# Patient Record
Sex: FEMALE | Race: BLACK OR AFRICAN AMERICAN | NOT HISPANIC OR LATINO | Employment: FULL TIME | URBAN - METROPOLITAN AREA
[De-identification: names, ages, dates, MRNs, and addresses within clinical notes are randomized per-mention and may not be internally consistent; named-entity substitution may affect disease eponyms.]

---

## 2017-01-18 NOTE — TELEPHONE ENCOUNTER
Patient contacted and states she is just not feeling well, she came in to see Dr. Mcfadden a couple weeks ago and was given antibiotics, but the patient states she is still not feeling well.

## 2017-01-18 NOTE — TELEPHONE ENCOUNTER
----- Message from Roseline Harley sent at 1/18/2017  2:25 PM CST -----  Contact: Patient   Patient request a call back at [phone], Regards to she still feel the same and she is done with all of her antibiotics that she was taken for an sinus infection.    Thanks  td

## 2017-01-19 RX ORDER — AMOXICILLIN AND CLAVULANATE POTASSIUM 875; 125 MG/1; MG/1
1 TABLET, FILM COATED ORAL 2 TIMES DAILY
Qty: 20 TABLET | Refills: 0 | Status: SHIPPED | OUTPATIENT
Start: 2017-01-19 | End: 2017-01-30 | Stop reason: ALTCHOICE

## 2017-01-19 RX ORDER — METHYLPREDNISOLONE 4 MG/1
TABLET ORAL
Qty: 1 PACKAGE | Refills: 0 | Status: SHIPPED | OUTPATIENT
Start: 2017-01-19 | End: 2017-01-25

## 2017-01-19 NOTE — TELEPHONE ENCOUNTER
Pt notified and verbalized understanding.   Pt states that she would liked to try the Augmentin with the medrol dose pack.

## 2017-01-19 NOTE — TELEPHONE ENCOUNTER
Advise pt can try another round of ABX (Augmentin) with Medrol dose pack to see if gets better.  Let me know.

## 2017-01-25 ENCOUNTER — OFFICE VISIT (OUTPATIENT)
Dept: FAMILY MEDICINE | Facility: CLINIC | Age: 57
End: 2017-01-25
Payer: COMMERCIAL

## 2017-01-25 VITALS
WEIGHT: 185.63 LBS | BODY MASS INDEX: 34.16 KG/M2 | SYSTOLIC BLOOD PRESSURE: 128 MMHG | OXYGEN SATURATION: 98 % | DIASTOLIC BLOOD PRESSURE: 74 MMHG | HEART RATE: 75 BPM | TEMPERATURE: 97 F | RESPIRATION RATE: 18 BRPM | HEIGHT: 62 IN

## 2017-01-25 DIAGNOSIS — M54.50 ACUTE RIGHT-SIDED LOW BACK PAIN WITHOUT SCIATICA: Primary | ICD-10-CM

## 2017-01-25 DIAGNOSIS — J01.91 ACUTE RECURRENT SINUSITIS, UNSPECIFIED LOCATION: ICD-10-CM

## 2017-01-25 PROCEDURE — 3078F DIAST BP <80 MM HG: CPT | Mod: S$GLB,,, | Performed by: INTERNAL MEDICINE

## 2017-01-25 PROCEDURE — 99213 OFFICE O/P EST LOW 20 MIN: CPT | Mod: S$GLB,,, | Performed by: INTERNAL MEDICINE

## 2017-01-25 PROCEDURE — 3074F SYST BP LT 130 MM HG: CPT | Mod: S$GLB,,, | Performed by: INTERNAL MEDICINE

## 2017-01-25 PROCEDURE — 1159F MED LIST DOCD IN RCRD: CPT | Mod: S$GLB,,, | Performed by: INTERNAL MEDICINE

## 2017-01-25 PROCEDURE — 99999 PR PBB SHADOW E&M-EST. PATIENT-LVL IV: CPT | Mod: PBBFAC,,, | Performed by: INTERNAL MEDICINE

## 2017-01-25 RX ORDER — HYDROCODONE BITARTRATE AND ACETAMINOPHEN 7.5; 325 MG/1; MG/1
1 TABLET ORAL 2 TIMES DAILY PRN
Qty: 20 TABLET | Refills: 0 | Status: SHIPPED | OUTPATIENT
Start: 2017-01-25 | End: 2017-06-12 | Stop reason: ALTCHOICE

## 2017-01-25 RX ORDER — MOMETASONE FUROATE 50 UG/1
2 SPRAY, METERED NASAL DAILY
Qty: 17 G | Refills: 0 | Status: SHIPPED | OUTPATIENT
Start: 2017-01-25 | End: 2017-04-05 | Stop reason: SDUPTHER

## 2017-01-25 RX ORDER — CYCLOBENZAPRINE HCL 10 MG
10 TABLET ORAL NIGHTLY PRN
Qty: 30 TABLET | Refills: 0 | Status: SHIPPED | OUTPATIENT
Start: 2017-01-25 | End: 2017-06-12

## 2017-01-25 NOTE — MR AVS SNAPSHOT
[facility]  [address] [address]  Phone: [phone]                  Angelica Mcfadden   2017 1:00 PM   Office Visit    Description:  Female : 1960   Provider:  Delon Brown MD   Department:  [facility]           Reason for Visit     Back Pain           Diagnoses this Visit        Comments    Acute right-sided low back pain without sciatica    -  Primary     Acute recurrent sinusitis, unspecified location                To Do List           Future Appointments        Provider Department Dept Phone    2017 7:45 AM Monet Alvarez MD [facility] [phone]      Goals (5 Years of Data)     None      Follow-Up and Disposition     Return if symptoms worsen or fail to improve.       These Medications        Disp Refills Start End    cyclobenzaprine (FLEXERIL) 10 MG tablet 30 tablet 0 2017     Take 1 tablet (10 mg total) by mouth nightly as needed for Muscle spasms. - Oral    Pharmacy: [facility] [address] [place] [address] Ph #: [phone]       hydrocodone-acetaminophen 7.5-325mg (NORCO) 7.5-325 mg per tablet 20 tablet 0 2017     Take 1 tablet by mouth 2 (two) times daily as needed for Pain. - Oral    Pharmacy: [facility] [address] [place] [address] Ph #: [phone]       mometasone (NASONEX) 50 mcg/actuation nasal spray 17 g 0 2017     2 sprays by Nasal route once daily. - Nasal    Pharmacy: [facility] [address] [place], [place] [address] [facility] Ph #: [phone]         [facility] On Call     [facility] On Call Nurse Care Line -  Assistance  Registered nurses in the [facility] On Call Center provide clinical advisement, health education, appointment booking, and other advisory services.  Call for this free service at [phone].             Medications           Message regarding Medications      Verify the changes and/or additions to your medication regime listed below are the same as discussed with your clinician today.  If any of these changes or additions are incorrect, please notify your healthcare provider.        START taking these NEW medications        Refills    cyclobenzaprine (FLEXERIL) 10 MG tablet 0    Sig: Take 1 tablet (10 mg total) by mouth nightly as needed for Muscle spasms.    Class: Normal    Route: Oral    hydrocodone-acetaminophen 7.5-325mg (NORCO) 7.5-325 mg per tablet 0    Sig: Take 1 tablet by mouth 2 (two) times daily as needed for Pain.    Class: Normal    Route: Oral      STOP taking these medications     methylPREDNISolone (MEDROL DOSEPACK) 4 mg tablet use as directed           Verify that the below list of medications is an accurate representation of the medications you are currently taking.  If none reported, the list may be blank. If incorrect, please contact your healthcare provider. Carry this list with you in case of emergency.           Current Medications     albuterol 90 mcg/actuation inhaler Inhale 2 puffs into the lungs every 6 (six) hours as needed for Wheezing.    alprazolam (XANAX) 0.5 MG tablet 1 Tablet Oral Twice a day prn    amlodipine (NORVASC) 10 MG tablet Take 1 tablet (10 mg total) by mouth every morning.    amoxicillin-clavulanate 875-125mg (AUGMENTIN) 875-125 mg per tablet Take 1 tablet by mouth 2 (two) times daily.    ascorbic acid (VITAMIN C) 1000 MG tablet Take by mouth. 1 Tablet Oral Every day    BACILLUS COAGULANS (PROBIOTIC, B. COAGULANS, ORAL) Take by mouth once daily.    benzonatate (TESSALON) 100 MG capsule Take 1 capsule (100 mg total) by mouth 3 (three) times daily as needed for Cough.    cetirizine (ZYRTEC) 10 MG tablet Take 1 tablet (10 mg total) by mouth once daily.    cholecalciferol, vitamin D3, 2,000 unit Cap Take by mouth. 1 capsule Oral Every day    ibuprofen (ADVIL,MOTRIN) 800 MG tablet Take 1 tablet (800 mg total) by mouth 2 (two)  "times daily as needed for Pain.    lisinopril (PRINIVIL,ZESTRIL) 20 MG tablet TAKE ONE TABLET BY MOUTH ONCE DAILY    mercaptopurine (PURINETHOL) 50 mg tablet TAKE ONE TABLET BY MOUTH ONCE DAILY    mometasone (NASONEX) 50 mcg/actuation nasal spray 2 sprays by Nasal route once daily.    montelukast (SINGULAIR) 10 mg tablet Take 1 tablet (10 mg total) by mouth every evening.    multivitamin-Ca-iron-minerals (WOMEN'S MULTIPLE VITAMINS) 18-0.4 mg Tab Take by mouth. 1 Tablet Oral Every day    pravastatin (PRAVACHOL) 80 MG tablet TAKE ONE TABLET BY MOUTH ONCE DAILY IN THE EVENING    valacyclovir (VALTREX) 1000 MG tablet Take 2 tablets (2,000 mg total) by mouth 2 (two) times daily. Take 2 pills twice daily as directed    cyclobenzaprine (FLEXERIL) 10 MG tablet Take 1 tablet (10 mg total) by mouth nightly as needed for Muscle spasms.    hydrocodone-acetaminophen 7.5-325mg (NORCO) 7.5-325 mg per tablet Take 1 tablet by mouth 2 (two) times daily as needed for Pain.           Clinical Reference Information           Vital Signs - Last Recorded  Most recent update: 1/25/2017  1:05 PM by Davion Ruiz LPN    BP Pulse Temp Resp    128/74 (BP Location: Right arm, Patient Position: Sitting, BP Method: Manual) 75 97.2 °F (36.2 °C) (Tympanic) 18    Ht Wt SpO2 BMI    5' 2" (1.575 m) 84.2 kg (185 lb 10 oz) 98% 33.95 kg/m2      Blood Pressure          Most Recent Value    BP  128/74      Allergies as of 1/25/2017     No Known Allergies      Immunizations Administered on Date of Encounter - 1/25/2017     None      "

## 2017-01-25 NOTE — PROGRESS NOTES
Subjective:       Patient ID: Angelica Mcfadden is a 56 y.o. female.    Chief Complaint: Back Pain (lower right)  -1 week------------right lower back pain after helping her daughter move----------no radiation---------has been taking ibuprofen.HPI  Review of Systems   Constitutional: Negative for chills and fever.   HENT: Negative.    Respiratory: Negative for apnea, cough, choking, chest tightness, shortness of breath, wheezing and stridor.    Cardiovascular: Negative for chest pain, palpitations and leg swelling.   Gastrointestinal: Negative for abdominal pain, nausea and vomiting.   Genitourinary: Negative.    Musculoskeletal: Positive for back pain.   Psychiatric/Behavioral: Negative for agitation, behavioral problems and confusion.       Objective:      Physical Exam   Constitutional: She is oriented to person, place, and time. She appears well-developed and well-nourished.   Cardiovascular: Normal rate, regular rhythm and normal heart sounds.    Pulmonary/Chest: Effort normal and breath sounds normal.   Musculoskeletal: She exhibits no edema, tenderness or deformity.   Neurological: She is alert and oriented to person, place, and time.   Psychiatric: She has a normal mood and affect. Her behavior is normal. Judgment and thought content normal.   Nursing note and vitals reviewed.      Assessment:       1. Acute right-sided low back pain without sciatica    2. Acute recurrent sinusitis, unspecified location        Plan:        continue ibuprofen, add flexeril 10 mg q hs prn with norco 7.5 bid prn-----------call if persists.

## 2017-01-26 RX ORDER — IBUPROFEN 800 MG/1
TABLET ORAL
Qty: 180 TABLET | Refills: 1 | Status: SHIPPED | OUTPATIENT
Start: 2017-01-26 | End: 2017-06-30 | Stop reason: SDUPTHER

## 2017-01-30 ENCOUNTER — OFFICE VISIT (OUTPATIENT)
Dept: FAMILY MEDICINE | Facility: CLINIC | Age: 57
End: 2017-01-30
Payer: COMMERCIAL

## 2017-01-30 ENCOUNTER — OFFICE VISIT (OUTPATIENT)
Dept: OTOLARYNGOLOGY | Facility: CLINIC | Age: 57
End: 2017-01-30
Payer: COMMERCIAL

## 2017-01-30 ENCOUNTER — LAB VISIT (OUTPATIENT)
Dept: LAB | Facility: HOSPITAL | Age: 57
End: 2017-01-30
Attending: OTOLARYNGOLOGY
Payer: COMMERCIAL

## 2017-01-30 VITALS
DIASTOLIC BLOOD PRESSURE: 71 MMHG | BODY MASS INDEX: 33.43 KG/M2 | HEIGHT: 62 IN | SYSTOLIC BLOOD PRESSURE: 102 MMHG | HEART RATE: 78 BPM | TEMPERATURE: 99 F | WEIGHT: 181.69 LBS | RESPIRATION RATE: 18 BRPM

## 2017-01-30 VITALS
TEMPERATURE: 99 F | DIASTOLIC BLOOD PRESSURE: 82 MMHG | OXYGEN SATURATION: 97 % | SYSTOLIC BLOOD PRESSURE: 128 MMHG | HEIGHT: 62 IN | RESPIRATION RATE: 18 BRPM | WEIGHT: 181.69 LBS | HEART RATE: 112 BPM | BODY MASS INDEX: 33.43 KG/M2

## 2017-01-30 DIAGNOSIS — J30.9 ALLERGIC RHINITIS, UNSPECIFIED ALLERGIC RHINITIS TRIGGER, UNSPECIFIED RHINITIS SEASONALITY: Primary | ICD-10-CM

## 2017-01-30 DIAGNOSIS — M54.50 ACUTE BILATERAL LOW BACK PAIN WITHOUT SCIATICA: ICD-10-CM

## 2017-01-30 DIAGNOSIS — J32.4 CHRONIC PANSINUSITIS: ICD-10-CM

## 2017-01-30 DIAGNOSIS — J30.2 SEASONAL ALLERGIC RHINITIS, UNSPECIFIED ALLERGIC RHINITIS TRIGGER: ICD-10-CM

## 2017-01-30 DIAGNOSIS — J30.2 SEASONAL ALLERGIC RHINITIS, UNSPECIFIED ALLERGIC RHINITIS TRIGGER: Primary | ICD-10-CM

## 2017-01-30 LAB
BASOPHILS # BLD AUTO: 0.01 K/UL
BASOPHILS NFR BLD: 0.1 %
BILIRUB SERPL-MCNC: NEGATIVE MG/DL
BLOOD URINE, POC: NEGATIVE
COLOR, POC UA: YELLOW
DIFFERENTIAL METHOD: ABNORMAL
EOSINOPHIL # BLD AUTO: 0 K/UL
EOSINOPHIL NFR BLD: 0.1 %
ERYTHROCYTE [DISTWIDTH] IN BLOOD BY AUTOMATED COUNT: 14.7 %
GLUCOSE UR QL STRIP: NORMAL
HCT VFR BLD AUTO: 39.9 %
HGB BLD-MCNC: 13.2 G/DL
KETONES UR QL STRIP: ABNORMAL
LEUKOCYTE ESTERASE URINE, POC: ABNORMAL
LYMPHOCYTES # BLD AUTO: 1 K/UL
LYMPHOCYTES NFR BLD: 13.5 %
MCH RBC QN AUTO: 30.9 PG
MCHC RBC AUTO-ENTMCNC: 33.1 %
MCV RBC AUTO: 93 FL
MONOCYTES # BLD AUTO: 1.1 K/UL
MONOCYTES NFR BLD: 14 %
NEUTROPHILS # BLD AUTO: 5.5 K/UL
NEUTROPHILS NFR BLD: 72.2 %
NITRITE, POC UA: NEGATIVE
PH, POC UA: 5
PLATELET # BLD AUTO: 306 K/UL
PMV BLD AUTO: 11.3 FL
PROTEIN, POC: ABNORMAL
RBC # BLD AUTO: 4.27 M/UL
SPECIFIC GRAVITY, POC UA: 1.02
UROBILINOGEN, POC UA: NORMAL
WBC # BLD AUTO: 7.56 K/UL

## 2017-01-30 PROCEDURE — 99999 PR PBB SHADOW E&M-EST. PATIENT-LVL V: CPT | Mod: PBBFAC,,, | Performed by: FAMILY MEDICINE

## 2017-01-30 PROCEDURE — 3079F DIAST BP 80-89 MM HG: CPT | Mod: S$GLB,,, | Performed by: FAMILY MEDICINE

## 2017-01-30 PROCEDURE — 99244 OFF/OP CNSLTJ NEW/EST MOD 40: CPT | Mod: S$GLB,,, | Performed by: OTOLARYNGOLOGY

## 2017-01-30 PROCEDURE — 86003 ALLG SPEC IGE CRUDE XTRC EA: CPT | Mod: 59

## 2017-01-30 PROCEDURE — 36415 COLL VENOUS BLD VENIPUNCTURE: CPT | Mod: PO

## 2017-01-30 PROCEDURE — 99214 OFFICE O/P EST MOD 30 MIN: CPT | Mod: 25,S$GLB,, | Performed by: FAMILY MEDICINE

## 2017-01-30 PROCEDURE — 3074F SYST BP LT 130 MM HG: CPT | Mod: S$GLB,,, | Performed by: FAMILY MEDICINE

## 2017-01-30 PROCEDURE — 86003 ALLG SPEC IGE CRUDE XTRC EA: CPT

## 2017-01-30 PROCEDURE — 81002 URINALYSIS NONAUTO W/O SCOPE: CPT | Mod: S$GLB,,, | Performed by: FAMILY MEDICINE

## 2017-01-30 PROCEDURE — 99999 PR PBB SHADOW E&M-EST. PATIENT-LVL IV: CPT | Mod: PBBFAC,,, | Performed by: OTOLARYNGOLOGY

## 2017-01-30 PROCEDURE — 85025 COMPLETE CBC W/AUTO DIFF WBC: CPT

## 2017-01-30 RX ORDER — METRONIDAZOLE 500 MG/1
500 TABLET ORAL 3 TIMES DAILY
Qty: 30 TABLET | Refills: 0 | Status: SHIPPED | OUTPATIENT
Start: 2017-01-30 | End: 2017-02-09

## 2017-01-30 RX ORDER — LEVOFLOXACIN 500 MG/1
500 TABLET, FILM COATED ORAL DAILY
Qty: 21 TABLET | Refills: 0 | Status: SHIPPED | OUTPATIENT
Start: 2017-01-30 | End: 2017-02-20

## 2017-01-30 RX ORDER — PREDNISONE 20 MG/1
TABLET ORAL
Qty: 21 TABLET | Refills: 0 | Status: SHIPPED | OUTPATIENT
Start: 2017-01-30 | End: 2017-05-04 | Stop reason: ALTCHOICE

## 2017-01-30 NOTE — PROGRESS NOTES
Subjective:       Patient ID: Angelica Mcfadden is a 56 y.o. female.    Chief Complaint: Back Pain and Fever      HPI   Ms. Mcfadden presents to clinic today for complaints of back pain and fever. She states she had a low grade temperature yesterday of 100 and was concerned because she already was on steroids and antibiotics.   She states she just finished Augmentin and a medrol dose pack.   She states she might be having a chron's flare up. She has had diarrhea for 2 days.   The diarrhea has now resolved. She did not have any blood in her stool.   She also has been nauseated.     She reports that the back pain occurred when she tried to help her daughter move. She was seen for this on 1/25 by Dr. Brown and given pain medicine.   She feels that she still has flank pain and it may be a UTI.     She is also still having sneezing and sinus pressure.   She feels that she needs to ENT because of her recurrent sinusitis.     Review of Systems   Constitutional: Positive for fever.   HENT: Positive for sinus pressure and sneezing.    Gastrointestinal: Positive for diarrhea and nausea. Negative for blood in stool.   Genitourinary: Negative for dysuria, frequency, hematuria and urgency.   Musculoskeletal: Positive for back pain.       Medication List with Changes/Refills   Current Medications    ALBUTEROL 90 MCG/ACTUATION INHALER    Inhale 2 puffs into the lungs every 6 (six) hours as needed for Wheezing.    ALPRAZOLAM (XANAX) 0.5 MG TABLET    1 Tablet Oral Twice a day prn    AMLODIPINE (NORVASC) 10 MG TABLET    Take 1 tablet (10 mg total) by mouth every morning.    ASCORBIC ACID (VITAMIN C) 1000 MG TABLET    Take by mouth. 1 Tablet Oral Every day    BACILLUS COAGULANS (PROBIOTIC, B. COAGULANS, ORAL)    Take by mouth once daily.    BENZONATATE (TESSALON) 100 MG CAPSULE    Take 1 capsule (100 mg total) by mouth 3 (three) times daily as needed for Cough.    CETIRIZINE (ZYRTEC) 10 MG TABLET    Take 1 tablet (10 mg total) by mouth  once daily.    CHOLECALCIFEROL, VITAMIN D3, 2,000 UNIT CAP    Take by mouth. 1 capsule Oral Every day    CYCLOBENZAPRINE (FLEXERIL) 10 MG TABLET    Take 1 tablet (10 mg total) by mouth nightly as needed for Muscle spasms.    HYDROCODONE-ACETAMINOPHEN 7.5-325MG (NORCO) 7.5-325 MG PER TABLET    Take 1 tablet by mouth 2 (two) times daily as needed for Pain.    IBUPROFEN (ADVIL,MOTRIN) 800 MG TABLET    TAKE ONE TABLET BY MOUTH TWICE DAILY AS NEEDED FOR PAIN    LISINOPRIL (PRINIVIL,ZESTRIL) 20 MG TABLET    TAKE ONE TABLET BY MOUTH ONCE DAILY    MERCAPTOPURINE (PURINETHOL) 50 MG TABLET    TAKE ONE TABLET BY MOUTH ONCE DAILY    MOMETASONE (NASONEX) 50 MCG/ACTUATION NASAL SPRAY    2 sprays by Nasal route once daily.    MONTELUKAST (SINGULAIR) 10 MG TABLET    Take 1 tablet (10 mg total) by mouth every evening.    MULTIVITAMIN-CA-IRON-MINERALS (WOMEN'S MULTIPLE VITAMINS) 18-0.4 MG TAB    Take by mouth. 1 Tablet Oral Every day    PRAVASTATIN (PRAVACHOL) 80 MG TABLET    TAKE ONE TABLET BY MOUTH ONCE DAILY IN THE EVENING    VALACYCLOVIR (VALTREX) 1000 MG TABLET    Take 2 tablets (2,000 mg total) by mouth 2 (two) times daily. Take 2 pills twice daily as directed       Patient Active Problem List   Diagnosis    HTN (hypertension)    Crohn disease    Dysthymic disorder    Hyperlipidemia    Vitamin D deficiency    Insomnia    Ankle pain, right    Allergic rhinitis    Maxillary sinusitis    Obesity (BMI 30.0-34.9)         Objective:     Physical Exam   Constitutional: She is oriented to person, place, and time. She appears well-developed and well-nourished. No distress.   HENT:   Head: Normocephalic and atraumatic.   Right Ear: External ear normal.   Left Ear: External ear normal.   Turbinate hypertrophy   Eyes: EOM are normal. Right eye exhibits no discharge. Left eye exhibits no discharge.   Cardiovascular: Normal rate and regular rhythm.    Pulmonary/Chest: Effort normal and breath sounds normal. No respiratory distress.  She has no wheezes.   Musculoskeletal: She exhibits no edema or tenderness.   No cva tenderness    Neurological: She is alert and oriented to person, place, and time.   Skin: Skin is warm and dry. She is not diaphoretic. No erythema.   Psychiatric: She has a normal mood and affect.   Vitals reviewed.    Vitals:    01/30/17 1154   BP: 128/82   Pulse: (!) 112   Resp: 18   Temp: 99.2 °F (37.3 °C)       Assessment/  PLAN     Allergic rhinitis, unspecified allergic rhinitis trigger, unspecified rhinitis seasonality  -     Ambulatory referral to ENT    Acute bilateral low back pain without sciatica  -     POCT urine dipstick without microscope  -     Urine culture  - continue pain medicine and anti inflammatory as previously prescribed         Brian Mcfadden MD  [facility]

## 2017-01-30 NOTE — MR AVS SNAPSHOT
[facility]  [address] [address]  Phone: [phone]  Fax: [phone]                  Angelica Mcfadden   2017 2:45 PM   Office Visit    Description:  Female : 1960   Provider:  Desmond Mcconnell MD   Department:  [facility]           Reason for Visit     Sinus Problem           Diagnoses this Visit        Comments    Seasonal allergic rhinitis, unspecified allergic rhinitis trigger    -  Primary     Chronic pansinusitis                To Do List           Future Appointments        Provider Department Dept Phone    2017 6:00 PM LAB, SAME DAY [facility] [phone]    2017 7:30 AM [facility] CT1 LIMIT 500 LBS [facility] [phone]    2017 7:45 AM Monet Alvarez MD [facility] [phone]      Goals (5 Years of Data)     None       These Medications        Disp Refills Start End    levoFLOXacin (LEVAQUIN) 500 MG tablet 21 tablet 0 2017    Take 1 tablet (500 mg total) by mouth once daily. - Oral    Pharmacy: [facility] [address] [place] [address] Ph #: [phone]       metronidazole (FLAGYL) 500 MG tablet 30 tablet 0 2017    Take 1 tablet (500 mg total) by mouth 3 (three) times daily. - Oral    Pharmacy: [facility] [address] [place] [address] Ph #: [phone]       predniSONE (DELTASONE) 20 MG tablet 21 tablet 0 2017     Take 3 tab in am x 3d, then 2 tab in am x 3d, then 1 tab in am x 3d, then 1/2 tab in am x 3d    Pharmacy: [facility] [address] [place] [address] Ph #: [phone]         [facility]     [facility] On Call Nurse Care Line -  Assistance  Registered nurses in the [facility] On Call Center provide clinical advisement, health education, appointment booking, and other advisory services.  Call for this free service at [phone].             Medications            Message regarding Medications     Verify the changes and/or additions to your medication regime listed below are the same as discussed with your clinician today.  If any of these changes or additions are incorrect, please notify your healthcare provider.        START taking these NEW medications        Refills    levoFLOXacin (LEVAQUIN) 500 MG tablet 0    Sig: Take 1 tablet (500 mg total) by mouth once daily.    Class: Normal    Route: Oral    metronidazole (FLAGYL) 500 MG tablet 0    Sig: Take 1 tablet (500 mg total) by mouth 3 (three) times daily.    Class: Normal    Route: Oral    predniSONE (DELTASONE) 20 MG tablet 0    Sig: Take 3 tab in am x 3d, then 2 tab in am x 3d, then 1 tab in am x 3d, then 1/2 tab in am x 3d    Class: Normal           Verify that the below list of medications is an accurate representation of the medications you are currently taking.  If none reported, the list may be blank. If incorrect, please contact your healthcare provider. Carry this list with you in case of emergency.           Current Medications     albuterol 90 mcg/actuation inhaler Inhale 2 puffs into the lungs every 6 (six) hours as needed for Wheezing.    alprazolam (XANAX) 0.5 MG tablet 1 Tablet Oral Twice a day prn    amlodipine (NORVASC) 10 MG tablet Take 1 tablet (10 mg total) by mouth every morning.    ascorbic acid (VITAMIN C) 1000 MG tablet Take by mouth. 1 Tablet Oral Every day    BACILLUS COAGULANS (PROBIOTIC, B. COAGULANS, ORAL) Take by mouth once daily.    benzonatate (TESSALON) 100 MG capsule Take 1 capsule (100 mg total) by mouth 3 (three) times daily as needed for Cough.    cetirizine (ZYRTEC) 10 MG tablet Take 1 tablet (10 mg total) by mouth once daily.    cyclobenzaprine (FLEXERIL) 10 MG tablet Take 1 tablet (10 mg total) by mouth nightly as needed for Muscle spasms.    hydrocodone-acetaminophen 7.5-325mg (NORCO) 7.5-325 mg per tablet Take 1 tablet by mouth 2 (two) times daily as needed for Pain.     "ibuprofen (ADVIL,MOTRIN) 800 MG tablet TAKE ONE TABLET BY MOUTH TWICE DAILY AS NEEDED FOR PAIN    lisinopril (PRINIVIL,ZESTRIL) 20 MG tablet TAKE ONE TABLET BY MOUTH ONCE DAILY    mercaptopurine (PURINETHOL) 50 mg tablet TAKE ONE TABLET BY MOUTH ONCE DAILY    mometasone (NASONEX) 50 mcg/actuation nasal spray 2 sprays by Nasal route once daily.    montelukast (SINGULAIR) 10 mg tablet Take 1 tablet (10 mg total) by mouth every evening.    multivitamin-Ca-iron-minerals (WOMEN'S MULTIPLE VITAMINS) 18-0.4 mg Tab Take by mouth. 1 Tablet Oral Every day    pravastatin (PRAVACHOL) 80 MG tablet TAKE ONE TABLET BY MOUTH ONCE DAILY IN THE EVENING    valacyclovir (VALTREX) 1000 MG tablet Take 2 tablets (2,000 mg total) by mouth 2 (two) times daily. Take 2 pills twice daily as directed    cholecalciferol, vitamin D3, 2,000 unit Cap Take by mouth. 1 capsule Oral Every day    levoFLOXacin (LEVAQUIN) 500 MG tablet Take 1 tablet (500 mg total) by mouth once daily.    metronidazole (FLAGYL) 500 MG tablet Take 1 tablet (500 mg total) by mouth 3 (three) times daily.    predniSONE (DELTASONE) 20 MG tablet Take 3 tab in am x 3d, then 2 tab in am x 3d, then 1 tab in am x 3d, then 1/2 tab in am x 3d           Clinical Reference Information           Vital Signs - Last Recorded  Most recent update: 1/30/2017  2:36 PM by González Duron LPN    BP Pulse Temp Resp Ht Wt    102/71 78 98.8 °F (37.1 °C) (Tympanic) 18 5' 1.5" (1.562 m) 82.4 kg (181 lb 10.5 oz)    BMI                33.77 kg/m2          Blood Pressure          Most Recent Value    BP  102/71      Allergies as of 1/30/2017     No Known Allergies      Immunizations Administered on Date of Encounter - 1/30/2017     None      Orders Placed During Today's Visit     Future Labs/Procedures Expected by Expires    Allergen, Cocklebur  1/30/2017 3/31/2018    Allergen, [place]  1/30/2017 3/31/2018    Allergen, [place]ow Grass ([place])  1/30/2017 3/31/2018    Allergen, Mucor Racemosus  " 1/30/2017 3/31/2018    Allergen, Pecan [place] IgE  1/30/2017 3/31/2018    Allergen, White Panfilo  1/30/2017 3/31/2018    Allergen-Alternaria Alternata  1/30/2017 3/31/2018    Allergen-[place]  1/30/2017 3/31/2018    Allergen-Common Pigweed  1/30/2017 3/31/2018    Allergen-[place]  1/30/2017 3/31/2018    Aspergillus fumagatus IgE  1/30/2017 3/31/2018    Bermuda grass IgE  1/30/2017 3/31/2018    Cat epithelium IgE  1/30/2017 3/31/2018    CBC auto differential  1/30/2017 3/31/2018    Cladosporium IgE  1/30/2017 3/31/2018    Cockroach, American IgE  1/30/2017 3/31/2018    CT Sinuses without Contrast  1/30/2017 1/30/2018    [place], bald IgE  1/30/2017 3/31/2018    D. farinae IgE  1/30/2017 3/31/2018    D. pteronyssinus IgE  1/30/2017 3/31/2018    Dog dander IgE  1/30/2017 3/31/2018    Feather Panel #2  1/30/2017 3/31/2018    Jame grass IgE  1/30/2017 3/31/2018    Duran elder, rough IgE  1/30/2017 3/31/2018    Mugwort IgE  1/30/2017 3/31/2018    Nettle IgE  1/30/2017 3/31/2018    Bourbon, white IgE  1/30/2017 3/31/2018    Penicillium IgE  1/30/2017 3/31/2018    Plantain, English IgE  1/30/2017 3/31/2018    Ragweed, short, common IgE  1/30/2017 3/31/2018    RAST Allergen for [place]  1/30/2017 3/31/2018    RAST Allergen Maple ([place])  1/30/2017 3/31/2018    RAST Allergen [place]  1/30/2017 3/31/2018    RAST Allergen, [place]  1/30/2017 3/31/2018    RAST Allergen, Sheep [place])  1/30/2017 3/31/2018    Lucien IgE  1/30/2017 3/31/2018

## 2017-01-30 NOTE — MR AVS SNAPSHOT
[facility]  [address] [place] [address]  Phone: [phone]                  Angelica CENTENO Bogdan   2017 11:40 AM   Office Visit    Description:  Female : 1960   Provider:  Brian Mcfadden MD   Department:  [facility]           Reason for Visit     Back Pain     Fever           Diagnoses this Visit        Comments    Allergic rhinitis, unspecified allergic rhinitis trigger, unspecified rhinitis seasonality    -  Primary     Acute bilateral low back pain without sciatica                To Do List           Future Appointments        Provider Department Dept Phone    2017 2:45 PM Desmond Mcconnell MD [facility] - [phone]    2017 7:45 AM Monet Alvarez MD [facility] [phone]      Goals (5 Years of Data)     None      [facility]     [facility] -  Assistance  Registered nurses in the [facility] provide clinical advisement, health education, appointment booking, and other advisory services.  Call for this free service at [phone].             Medications           Message regarding Medications     Verify the changes and/or additions to your medication regime listed below are the same as discussed with your clinician today.  If any of these changes or additions are incorrect, please notify your healthcare provider.        STOP taking these medications     amoxicillin-clavulanate 875-125mg (AUGMENTIN) 875-125 mg per tablet Take 1 tablet by mouth 2 (two) times daily.           Verify that the below list of medications is an accurate representation of the medications you are currently taking.  If none reported, the list may be blank. If incorrect, please contact your healthcare provider. Carry this list with you in case of emergency.           Current Medications     albuterol 90 mcg/actuation inhaler Inhale 2 puffs into the lungs every 6 (six) hours as needed for Wheezing.     alprazolam (XANAX) 0.5 MG tablet 1 Tablet Oral Twice a day prn    amlodipine (NORVASC) 10 MG tablet Take 1 tablet (10 mg total) by mouth every morning.    ascorbic acid (VITAMIN C) 1000 MG tablet Take by mouth. 1 Tablet Oral Every day    BACILLUS COAGULANS (PROBIOTIC, B. COAGULANS, ORAL) Take by mouth once daily.    benzonatate (TESSALON) 100 MG capsule Take 1 capsule (100 mg total) by mouth 3 (three) times daily as needed for Cough.    cetirizine (ZYRTEC) 10 MG tablet Take 1 tablet (10 mg total) by mouth once daily.    cyclobenzaprine (FLEXERIL) 10 MG tablet Take 1 tablet (10 mg total) by mouth nightly as needed for Muscle spasms.    hydrocodone-acetaminophen 7.5-325mg (NORCO) 7.5-325 mg per tablet Take 1 tablet by mouth 2 (two) times daily as needed for Pain.    ibuprofen (ADVIL,MOTRIN) 800 MG tablet TAKE ONE TABLET BY MOUTH TWICE DAILY AS NEEDED FOR PAIN    lisinopril (PRINIVIL,ZESTRIL) 20 MG tablet TAKE ONE TABLET BY MOUTH ONCE DAILY    mercaptopurine (PURINETHOL) 50 mg tablet TAKE ONE TABLET BY MOUTH ONCE DAILY    mometasone (NASONEX) 50 mcg/actuation nasal spray 2 sprays by Nasal route once daily.    montelukast (SINGULAIR) 10 mg tablet Take 1 tablet (10 mg total) by mouth every evening.    multivitamin-Ca-iron-minerals (WOMEN'S MULTIPLE VITAMINS) 18-0.4 mg Tab Take by mouth. 1 Tablet Oral Every day    pravastatin (PRAVACHOL) 80 MG tablet TAKE ONE TABLET BY MOUTH ONCE DAILY IN THE EVENING    valacyclovir (VALTREX) 1000 MG tablet Take 2 tablets (2,000 mg total) by mouth 2 (two) times daily. Take 2 pills twice daily as directed    cholecalciferol, vitamin D3, 2,000 unit Cap Take by mouth. 1 capsule Oral Every day           Clinical Reference Information           Vital Signs - Last Recorded  Most recent update: 1/30/2017 12:00 PM by Yamile Singh MA    BP Pulse Temp Resp    128/82 (BP Location: Right arm, Patient Position: Sitting, BP Method: Manual) (!) 112 99.2 °F (37.3 °C) (Tympanic) 18    Ht Wt SpO2  "BMI    5' 1.5" (1.562 m) 82.4 kg (181 lb 10.5 oz) 97% 33.77 kg/m2      Blood Pressure          Most Recent Value    BP  128/82      Allergies as of 1/30/2017     No Known Allergies      Immunizations Administered on Date of Encounter - 1/30/2017     None      Orders Placed During Today's Visit      Normal Orders This Visit    Ambulatory referral to ENT     POCT urine dipstick without microscope       "

## 2017-01-30 NOTE — LETTER
February 2, 2017      Brian Mcfadden MD  [address]  [place] [address]           [facility]  [address]  [place] [address]  Phone: [phone]  Fax: [phone]          Patient: Angelica Mcfadden   MR Number: [id number]   YOB: 1960   Date of Visit: 1/30/2017       Dear Dr. Brian Mcfadden:    Thank you for referring Angelica Mcfadden to me for evaluation. Attached you will find relevant portions of my assessment and plan of care.    If you have questions, please do not hesitate to call me. I look forward to following Angelica Mcfadden along with you.    Sincerely,    Desmond Mcconnell MD    Enclosure  CC:  No Recipients    If you would like to receive this communication electronically, please contact [email or web address] or [phone] to request more information on [employer] Link access.    For providers and/or their staff who would like to refer a patient to [facility], please contact us through our one-stop-shop provider referral line, Paul Nunez, at [phone].    If you feel you have received this communication in error or would no longer like to receive these types of communications, please e-mail [email or web address]

## 2017-01-31 ENCOUNTER — TELEPHONE (OUTPATIENT)
Dept: FAMILY MEDICINE | Facility: CLINIC | Age: 57
End: 2017-01-31

## 2017-01-31 NOTE — TELEPHONE ENCOUNTER
Attempted to call pt to go over urine results   Tried both number and did not reach     filomena talavera

## 2017-02-01 LAB
A ALTERNATA IGE QN: <0.35 KU/L
A FUMIGATUS IGE QN: <0.35 KU/L
ALLERGEN BOXELDER MAPLE TREE IGE: <0.35 KU/L
ALLERGEN MAPLE (BOX ELDER) CLASS: NORMAL
ALLERGEN MULBERRY CLASS: NORMAL
ALLERGEN MULBERRY TREE IGE: <0.35 KU/L
ALLERGEN PENICILLIUM IGE: <0.35 KU/L
ALLERGEN WHITE ASH TREE IGE: <0.35 KU/L
AMER SYCAMORE IGE QN: <0.35 KU/L
BALD CYPRESS IGE QN: <0.35 KU/L
BERMUDA GRASS IGE QN: <0.35 KU/L
C HERBARUM IGE QN: <0.35 KU/L
CAT DANDER IGE QN: <0.35 KU/L
CEDAR IGE QN: <0.35 KU/L
CMN PIGWEED IGE QN: <0.35 KU/L
COCKLEBUR IGE QN: <0.35 KU/L
COMMON RAGWEED IGE QN: <0.35 KU/L
D FARINAE IGE QN: <0.35 KU/L
D PTERONYSS IGE QN: <0.35 KU/L
DEPRECATED A ALTERNATA IGE RAST QL: NORMAL
DEPRECATED A FUMIGATUS IGE RAST QL: NORMAL
DEPRECATED BALD CYPRESS IGE RAST QL: NORMAL
DEPRECATED BERMUDA GRASS IGE RAST QL: NORMAL
DEPRECATED C HERBARUM IGE RAST QL: NORMAL
DEPRECATED CAT DANDER IGE RAST QL: NORMAL
DEPRECATED CEDAR IGE RAST QL: NORMAL
DEPRECATED COCKLEBUR IGE RAST QL: NORMAL
DEPRECATED COMMON PIGWEED IGE RAST QL: NORMAL
DEPRECATED COMMON RAGWEED IGE RAST QL: NORMAL
DEPRECATED D FARINAE IGE RAST QL: NORMAL
DEPRECATED D PTERONYSS IGE RAST QL: NORMAL
DEPRECATED DOG DANDER IGE RAST QL: NORMAL
DEPRECATED ENGL PLANTAIN IGE RAST QL: NORMAL
DEPRECATED GOOSEFOOT IGE RAST QL: NORMAL
DEPRECATED JOHNSON GRASS IGE RAST QL: NORMAL
DEPRECATED KENT BLUE GRASS IGE RAST QL: NORMAL
DEPRECATED M RACEMOSUS IGE RAST QL: NORMAL
DEPRECATED MARSH ELDER IGE RAST QL: NORMAL
DEPRECATED MUGWORT IGE RAST QL: NORMAL
DEPRECATED NETTLE IGE RAST QL: NORMAL
DEPRECATED PECAN/HICK TREE IGE RAST QL: NORMAL
DEPRECATED ROACH IGE RAST QL: NORMAL
DEPRECATED SHEEP SORREL IGE RAST QL: NORMAL
DEPRECATED SILVER BIRCH IGE RAST QL: NORMAL
DEPRECATED TIMOTHY IGE RAST QL: NORMAL
DEPRECATED WHITE OAK IGE RAST QL: NORMAL
DOG DANDER IGE QN: <0.35 KU/L
ELM CEDAR CLASS: NORMAL
ELM CEDAR, IGE: <0.35 KU/L
ENGL PLANTAIN IGE QN: <0.35 KU/L
FEATHER PANEL #2: <0.35 KU/L
GOOSEFOOT IGE QN: <0.35 KU/L
JOHNSON GRASS IGE QN: <0.35 KU/L
KENT BLUE GRASS IGE QN: <0.35 KU/L
M RACEMOSUS IGE QN: <0.35 KU/L
MARSH ELDER IGE QN: <0.35 KU/L
MUGWORT IGE QN: <0.35 KU/L
NETTLE IGE QN: <0.35 KU/L
PECAN/HICK TREE IGE QN: <0.35 KU/L
PENICILLIUM CLASS: NORMAL
ROACH IGE QN: <0.35 KU/L
SHEEP SORREL IGE QN: <0.35 KU/L
SILVER BIRCH IGE QN: <0.35 KU/L
TIMOTHY IGE QN: <0.35 KU/L
WHITE ASH CLASS: NORMAL
WHITE OAK IGE QN: <0.35 KU/L

## 2017-02-03 NOTE — PROGRESS NOTES
Referring Provider:    Biran Mcfadden Md  [address] [address]  Subjective:   Patient: Angelica Mcfadden [id number], :1960   Visit date:2017 10:09 PM    Chief Complaint:  Sinus Problem    HPI:  Angelica is a 56 y.o. female who I was asked to see in consultation for evaluation of the following issue(s):     Sinonasal / Allergy: Angelica has bilateral mild nasal obstruction. She reports the the following sinonasal symptoms: allergies, facial pain and facial pressure.  She denies the the following sinonasal symptoms: aspirin intolerance, reduced sense of smell, significant history of dental procedure just prior to the onset of sinus problems, significant history of prior facial trauma or fractures and history of nasal trauma/surgery.. She has been  on medications for these symptoms. Medications: NASAL STEROID SPRAY(S) and ORAL ANTIHISTAMINE(S) which has been ineffective.    She has had 3 or 4 sinus infections in the past 12 months.  There have been no recent imaging study of the sinuses (none).    She has moderate allergic rhinitis with symptoms including nasal congestion, nasal itchiness and nasal rhinorrhea.  She denies the following allergy symptoms:   coughing, eye itchiness, eye watering, sneezing and wheezing    Allergy testing for this patient was not done.    Current smoker:  No      Review of Systems:  -     Allergic/Immunologic: has No Known Allergies..  -     Constitutional: Current temp: 98.8 °F (37.1 °C) (Tympanic)      Her meds, allergies, medical, surgical, social & family histories were reviewed & updated:  -     She has a current medication list which includes the following prescription(s): amlodipine, ascorbic acid (vitamin c), bacillus coagulans, cetirizine, cholecalciferol (vitamin d3), cyclobenzaprine, hydrocodone-acetaminophen 7.5-325mg, ibuprofen, lisinopril, mercaptopurine, mometasone, montelukast, multivitamin-ca-iron-minerals, pravastatin, albuterol, alprazolam,  "benzonatate, levofloxacin, metronidazole, prednisone, and valacyclovir.  -     She  has a past medical history of Anxiety; Asthma; Crohn's disease; Fibroids; Hyperlipidemia; Hypertension; Iritis; Migraine headache; and Vitamin D deficiency disease.   -     She  does not have any pertinent problems on file.   -     She  has a past surgical history that includes TAHBSO (February 2011); Total abdominal hysterectomy; Breast biopsy; and Ankle fracture surgery (08/19/08).  -     She  reports that she has never smoked. She has never used smokeless tobacco. She reports that she drinks alcohol. She reports that she does not use illicit drugs.  -     Her family history includes Arthritis in her mother; Cancer in her maternal grandfather; Colon cancer in her paternal aunt; Diabetes in her maternal grandmother; Fuch's dystrophy in her mother; Hypertension in her father; Lupus in her sister; Obesity in her sister; Rheum arthritis in her sister; Stroke in her maternal grandmother.  -     She has No Known Allergies.    Objective:     Physical Exam:  Vitals:    Visit Vitals    /71    Pulse 78    Temp 98.8 °F (37.1 °C) (Tympanic)    Resp 18    Ht 5' 1.5" (1.562 m)    Wt 82.4 kg (181 lb 10.5 oz)    BMI 33.77 kg/m2     General appearance:  Well developed, well nourished    Eyes:  Extraocular motions intact, PERRL    Communication:  no hoarseness, no dysphonia    Ears:  Otoscopy of external auditory canals and tympanic membranes was normal, clinical speech reception thresholds grossly intact, no mass/lesion of auricle.  Nose:  Significant erythema and edema bilaterally.    Mouth:  No mass/lesion of lips, teeth, gums, hard/soft palate, tongue, tonsils, or oropharynx.    Cardiovascular:  No pedal edema; Radial Pulses +2     Neck & Lymphatics:  No cervical lymphadenopathy, no neck mass/crepitus/ asymmetry, trachea is midline, no thyroid enlargement/tenderness/mass.    Psych: Oriented x3,  Alert with normal mood and " affect.     Respiration/Chest:  Symmetric expansion during respiration, normal respiratory effort.    Skin:  Warm and intact. No ulcerations of face, scalp, neck.      Assessment & Plan:   Angelica was seen today for sinus problem.    Diagnoses and all orders for this visit:    Seasonal allergic rhinitis, unspecified allergic rhinitis trigger  -     Aspergillus fumagatus IgE; Future  -     Bermuda grass IgE; Future  -     Cat epithelium IgE; Future  -     Cladosporium IgE; Future  -     Cockroach, American IgE; Future  -     [place], bald IgE; Future  -     D. farinae IgE; Future  -     D. pteronyssinus IgE; Future  -     Dog dander IgE; Future  -     Plantain, English IgE; Future  -     Jame grass IgE; Future  -     Marsh elder, rough IgE; Future  -     Mugwort IgE; Future  -     Nettle IgE; Future  -     Oak, white IgE; Future  -     Penicillium IgE; Future  -     Ragweed, short, common IgE; Future  -     Lucien IgE; Future  -     Allergen, Cocklebur; Future  -     Allergen, Elm Cedar; Future  -     Allergen, Meadow Grass ([facility]); Future  -     Allergen, Mucor Racemosus; Future  -     Allergen, Pecan Pollock IgE; Future  -     Allergen, White Panfilo; Future  -     Allergen-Alternaria Alternata; Future  -     Allergen-Cedar; Future  -     Allergen-Common Pigweed; Future  -     Allergen-Silver Birch; Future  -     Feather Panel #2; Future  -     RAST Allergen for [place]; Future  -     RAST Allergen Maple ([place]); Future  -     RAST Allergen [place]; Future  -     RAST Allergen, Lamb's Quarters; Future  -     RAST Allergen, Sheep [place]ellow [place]); Future  -     CBC auto differential; Future  -     CT Sinuses without Contrast; Future    Chronic pansinusitis  -     Aspergillus fumagatus IgE; Future  -     Bermuda grass IgE; Future  -     Cat epithelium IgE; Future  -     Cladosporium IgE; Future  -     Cockroach, American IgE; Future  -     T[place]edo, bald IgE; Future  -     D. farinae IgE;  Future  -     D. pteronyssinus IgE; Future  -     Dog dander IgE; Future  -     Plantain, English IgE; Future  -     Jame grass IgE; Future  -     Marsh elder, rough IgE; Future  -     Mugwort IgE; Future  -     Nettle IgE; Future  -     Oak, white IgE; Future  -     Penicillium IgE; Future  -     Ragweed, short, common IgE; Future  -     Lucien IgE; Future  -     Allergen, Cocklebur; Future  -     Allergen, Elm Cedar; Future  -     Allergen, Meadow Grass ([facility] Blue); Future  -     Allergen, Mucor Racemosus; Future  -     Allergen, Pecan [place] IgE; Future  -     Allergen, White Panfilo; Future  -     Allergen-Alternaria Alternata; Future  -     Allergen-Cedar; Future  -     Allergen-Common Pigweed; Future  -     Allergen-Silver Birch; Future  -     Feather Panel #2; Future  -     RAST Allergen for [place]; Future  -     RAST Allergen Maple ([place]); Future  -     RAST Allergen [place]; Future  -     RAST Allergen, Lamb's Quarters; Future  -     RAST Allergen, Sheep [place](Yellow Dock); Future  -     CBC auto differential; Future  -     CT Sinuses without Contrast; Future    Other orders  -     levoFLOXacin (LEVAQUIN) 500 MG tablet; Take 1 tablet (500 mg total) by mouth once daily.  -     metronidazole (FLAGYL) 500 MG tablet; Take 1 tablet (500 mg total) by mouth 3 (three) times daily.  -     predniSONE (DELTASONE) 20 MG tablet; Take 3 tab in am x 3d, then 2 tab in am x 3d, then 1 tab in am x 3d, then 1/2 tab in am x 3d      -SINUSITIS/RHINITIS  Angelica presents today for initial evaluation.  They have multiple sinonasal complaints and determination of the underlying etiology is a problem of moderate to high complexity.  Patients may present with sinonasal symptoms such as nasal obstruction as a primary anatomic disorder.  Patients may also present with recurrent or chronic inflammatory sinonasal symptoms.  Generally, patients can be stratified into one of several groups.      The first group  represents patients who have frequent or recurrent upper respiratory infections, most frequently viral.  These patients most frequently have normally functioning immune system's but have high levels of exposure.  This is commonly seen in nurses and schoolteachers as well as other groups who spend large amounts of time around 6 patient's and children.      Other patients may have isolated rhinitis without evidence of sinusitis.  The majority of these patients have ALLERGIC rhinitis however other groups may have more rare forms of rhinitis such as nonallergic rhinitis with eosinophilia syndrome.  As a screening evaluation, if the patient has had a normal endoscopy, from time to time a simple x-ray of the sinuses may be performed in combination with antigen specific ALLERGY testing.    The third group of patients present with significant evidence of chronic sinusitis.  Nasal endoscopy may reveal polyps or purulent drainage.  CT scan is an important aspect to determining the extent of disease.  Some patients with chronic sinusitis have an underlying etiology of ALLERGY leading to obstruction of the ostiomeatal units with furthering of the infection.  Others may have an acute infection that leads to stenosis of the sinonasal openings followed by a long, progressive course of infection.  Patients with unilateral disease who do not have inverting papilloma typically fall into this latter group.    CT scan of the sinuses has not been performed.      Antigen specific allergy testing  has not been performed.    Allergy treatment with topical steroids and/or antihistamines has been used with good compliance with symptoms unchanged.      By review of all data, history and exam, there does not seem to be a clear distinction between allergic rhinitis versus rhinitis with a component of chronic sinusitis.        My recommendation is antigen specific allergy testing, a course of antibiotics and steroids followed by CT sinus in  about 4 weeks.      We discussed her medical conditions, treatments and plan.  Angelica should return to clinic if any issues arise (symptoms worsen or persist), otherwise we will see her back in the clinic after CT.      Thank you for allowing me to participate in the care of Angelica.    Desmond Mcconnell MD

## 2017-02-15 RX ORDER — AMLODIPINE BESYLATE 10 MG/1
TABLET ORAL
Qty: 90 TABLET | Refills: 1 | Status: SHIPPED | OUTPATIENT
Start: 2017-02-15 | End: 2017-06-30 | Stop reason: SDUPTHER

## 2017-02-21 ENCOUNTER — TELEPHONE (OUTPATIENT)
Dept: RADIOLOGY | Facility: HOSPITAL | Age: 57
End: 2017-02-21

## 2017-02-22 ENCOUNTER — HOSPITAL ENCOUNTER (OUTPATIENT)
Dept: RADIOLOGY | Facility: HOSPITAL | Age: 57
Discharge: HOME | End: 2017-02-22
Attending: OTOLARYNGOLOGY
Payer: COMMERCIAL

## 2017-02-22 ENCOUNTER — PATIENT MESSAGE (OUTPATIENT)
Dept: OTOLARYNGOLOGY | Facility: CLINIC | Age: 57
End: 2017-02-22

## 2017-02-22 DIAGNOSIS — J32.4 CHRONIC PANSINUSITIS: ICD-10-CM

## 2017-02-22 DIAGNOSIS — J30.2 SEASONAL ALLERGIC RHINITIS, UNSPECIFIED ALLERGIC RHINITIS TRIGGER: ICD-10-CM

## 2017-02-22 PROCEDURE — 70486 CT MAXILLOFACIAL W/O DYE: CPT | Mod: 26,,, | Performed by: RADIOLOGY

## 2017-02-22 PROCEDURE — 70486 CT MAXILLOFACIAL W/O DYE: CPT | Mod: TC,PO

## 2017-02-23 ENCOUNTER — PATIENT MESSAGE (OUTPATIENT)
Dept: OTOLARYNGOLOGY | Facility: CLINIC | Age: 57
End: 2017-02-23

## 2017-02-23 DIAGNOSIS — R05.9 COUGH: ICD-10-CM

## 2017-02-23 DIAGNOSIS — J32.0 MAXILLARY SINUSITIS, UNSPECIFIED CHRONICITY: ICD-10-CM

## 2017-02-23 DIAGNOSIS — J30.9 ALLERGIC RHINITIS, UNSPECIFIED ALLERGIC RHINITIS TRIGGER, UNSPECIFIED RHINITIS SEASONALITY: ICD-10-CM

## 2017-02-23 DIAGNOSIS — I10 ESSENTIAL HYPERTENSION: Chronic | ICD-10-CM

## 2017-02-23 RX ORDER — BENZONATATE 100 MG/1
100 CAPSULE ORAL 3 TIMES DAILY PRN
Qty: 45 CAPSULE | Refills: 0 | Status: SHIPPED | OUTPATIENT
Start: 2017-02-23 | End: 2017-04-06 | Stop reason: SDUPTHER

## 2017-02-23 RX ORDER — CETIRIZINE HYDROCHLORIDE 10 MG/1
10 TABLET ORAL DAILY
Qty: 90 TABLET | Refills: 0 | Status: CANCELLED | OUTPATIENT
Start: 2017-02-23 | End: 2018-02-23

## 2017-02-23 RX ORDER — VALACYCLOVIR HYDROCHLORIDE 1 G/1
2000 TABLET, FILM COATED ORAL 2 TIMES DAILY
Qty: 20 TABLET | Refills: 0 | Status: CANCELLED | OUTPATIENT
Start: 2017-02-23 | End: 2018-02-23

## 2017-04-05 ENCOUNTER — PATIENT MESSAGE (OUTPATIENT)
Dept: FAMILY MEDICINE | Facility: CLINIC | Age: 57
End: 2017-04-05

## 2017-04-05 DIAGNOSIS — J32.0 MAXILLARY SINUSITIS, UNSPECIFIED CHRONICITY: ICD-10-CM

## 2017-04-05 DIAGNOSIS — I10 ESSENTIAL HYPERTENSION: Chronic | ICD-10-CM

## 2017-04-05 DIAGNOSIS — K50.919 CROHN'S DISEASE WITH COMPLICATION, UNSPECIFIED GASTROINTESTINAL TRACT LOCATION: ICD-10-CM

## 2017-04-05 DIAGNOSIS — J30.9 ALLERGIC RHINITIS, UNSPECIFIED ALLERGIC RHINITIS TRIGGER, UNSPECIFIED RHINITIS SEASONALITY: ICD-10-CM

## 2017-04-05 DIAGNOSIS — J01.91 ACUTE RECURRENT SINUSITIS, UNSPECIFIED LOCATION: ICD-10-CM

## 2017-04-05 RX ORDER — ALPRAZOLAM 0.5 MG/1
TABLET ORAL
Qty: 30 TABLET | Refills: 5 | Status: SHIPPED | OUTPATIENT
Start: 2017-04-05 | End: 2017-12-21 | Stop reason: SDUPTHER

## 2017-04-05 RX ORDER — VALACYCLOVIR HYDROCHLORIDE 1 G/1
2000 TABLET, FILM COATED ORAL 2 TIMES DAILY
Qty: 20 TABLET | Refills: 0 | Status: SHIPPED | OUTPATIENT
Start: 2017-04-05 | End: 2018-07-05 | Stop reason: SDUPTHER

## 2017-04-05 RX ORDER — MOMETASONE FUROATE 50 UG/1
2 SPRAY, METERED NASAL DAILY
Qty: 17 G | Refills: 5 | Status: SHIPPED | OUTPATIENT
Start: 2017-04-05 | End: 2018-06-06 | Stop reason: SDUPTHER

## 2017-04-05 RX ORDER — CETIRIZINE HYDROCHLORIDE 10 MG/1
10 TABLET ORAL DAILY
Qty: 90 TABLET | Refills: 1 | Status: SHIPPED | OUTPATIENT
Start: 2017-04-05 | End: 2018-06-06 | Stop reason: SDUPTHER

## 2017-04-05 RX ORDER — CETIRIZINE HYDROCHLORIDE 10 MG/1
10 TABLET ORAL DAILY
Qty: 30 TABLET | Refills: 5 | Status: CANCELLED | OUTPATIENT
Start: 2017-04-05 | End: 2018-04-05

## 2017-04-05 RX ORDER — MERCAPTOPURINE 50 MG/1
50 TABLET ORAL DAILY
Qty: 90 TABLET | Refills: 4 | Status: SHIPPED | OUTPATIENT
Start: 2017-04-05 | End: 2018-06-06 | Stop reason: SDUPTHER

## 2017-04-06 DIAGNOSIS — R05.9 COUGH: ICD-10-CM

## 2017-04-06 RX ORDER — BENZONATATE 100 MG/1
100 CAPSULE ORAL 3 TIMES DAILY PRN
Qty: 45 CAPSULE | Refills: 0 | Status: SHIPPED | OUTPATIENT
Start: 2017-04-06 | End: 2019-07-31

## 2017-05-04 ENCOUNTER — OFFICE VISIT (OUTPATIENT)
Dept: FAMILY MEDICINE | Facility: CLINIC | Age: 57
End: 2017-05-04
Payer: COMMERCIAL

## 2017-05-04 VITALS
SYSTOLIC BLOOD PRESSURE: 138 MMHG | WEIGHT: 178.81 LBS | TEMPERATURE: 97 F | RESPIRATION RATE: 18 BRPM | DIASTOLIC BLOOD PRESSURE: 86 MMHG | BODY MASS INDEX: 32.91 KG/M2 | HEART RATE: 68 BPM | HEIGHT: 62 IN

## 2017-05-04 DIAGNOSIS — J04.0 LARYNGITIS, ACUTE: ICD-10-CM

## 2017-05-04 DIAGNOSIS — J01.90 ACUTE NON-RECURRENT SINUSITIS, UNSPECIFIED LOCATION: Primary | ICD-10-CM

## 2017-05-04 PROCEDURE — 99213 OFFICE O/P EST LOW 20 MIN: CPT | Mod: 25,S$GLB,, | Performed by: FAMILY MEDICINE

## 2017-05-04 PROCEDURE — 3079F DIAST BP 80-89 MM HG: CPT | Mod: S$GLB,,, | Performed by: FAMILY MEDICINE

## 2017-05-04 PROCEDURE — 3075F SYST BP GE 130 - 139MM HG: CPT | Mod: S$GLB,,, | Performed by: FAMILY MEDICINE

## 2017-05-04 PROCEDURE — 96372 THER/PROPH/DIAG INJ SC/IM: CPT | Mod: S$GLB,,, | Performed by: FAMILY MEDICINE

## 2017-05-04 PROCEDURE — 1160F RVW MEDS BY RX/DR IN RCRD: CPT | Mod: S$GLB,,, | Performed by: FAMILY MEDICINE

## 2017-05-04 PROCEDURE — 99999 PR PBB SHADOW E&M-EST. PATIENT-LVL IV: CPT | Mod: PBBFAC,,, | Performed by: FAMILY MEDICINE

## 2017-05-04 RX ORDER — METHYLPREDNISOLONE ACETATE 80 MG/ML
40 INJECTION, SUSPENSION INTRA-ARTICULAR; INTRALESIONAL; INTRAMUSCULAR; SOFT TISSUE
Status: COMPLETED | OUTPATIENT
Start: 2017-05-04 | End: 2017-05-04

## 2017-05-04 RX ORDER — AMOXICILLIN AND CLAVULANATE POTASSIUM 875; 125 MG/1; MG/1
1 TABLET, FILM COATED ORAL EVERY 12 HOURS
Qty: 20 TABLET | Refills: 0 | Status: SHIPPED | OUTPATIENT
Start: 2017-05-04 | End: 2017-05-14

## 2017-05-04 RX ORDER — METHYLPREDNISOLONE ACETATE 40 MG/ML
40 INJECTION, SUSPENSION INTRA-ARTICULAR; INTRALESIONAL; INTRAMUSCULAR; SOFT TISSUE
Status: DISCONTINUED | OUTPATIENT
Start: 2017-05-04 | End: 2017-05-04

## 2017-05-04 RX ORDER — HYDROQUINONE 40 MG/G
CREAM TOPICAL 2 TIMES DAILY PRN
Qty: 28.35 G | Refills: 1 | Status: SHIPPED | OUTPATIENT
Start: 2017-05-04 | End: 2017-06-03

## 2017-05-04 RX ORDER — BETAMETHASONE SODIUM PHOSPHATE AND BETAMETHASONE ACETATE 3; 3 MG/ML; MG/ML
12 INJECTION, SUSPENSION INTRA-ARTICULAR; INTRALESIONAL; INTRAMUSCULAR; SOFT TISSUE
Status: DISCONTINUED | OUTPATIENT
Start: 2017-05-04 | End: 2017-05-04

## 2017-05-04 RX ADMIN — METHYLPREDNISOLONE ACETATE 40 MG: 80 INJECTION, SUSPENSION INTRA-ARTICULAR; INTRALESIONAL; INTRAMUSCULAR; SOFT TISSUE at 11:05

## 2017-05-04 NOTE — PROGRESS NOTES
Subjective:       Patient ID: Angelica Mcfadden is a 56 y.o. female.    Chief Complaint: URI    HPI Comments: Ms. Mcfadden comes in today stating on Saturday she developed sinus pressure followed by 2 days of laryngitis.  She reports last night having pain with difficulty swallowing at the left side of her throat.  She also reports having sore throat, postnasal drip, low-grade fever (temp. 99), headaches, productive cough, itchy, red and watery eyes, body aches and neck pain.  She denies having runny nose, nasal congestion, chills, shortness of breath, wheezing, chest pain, palpitations, abdominal pain, nausea, vomiting, diarrhea.  She states she has been taking Singulair, Tessalon Perles, over-the-counter ibuprofen and Mucinex without help.  She also states she has been using Netipot without help.  She does not smoke.    She has taken medication today.    She requests refill of hydrocodone which she applies for melasma with help.        Current Outpatient Prescriptions:  albuterol 90 mcg/actuation inhaler, Inhale 2 puffs into the lungs every 6 (six) hours as needed for Wheezing.  alprazolam (XANAX) 0.5 MG tablet, 1 Tablet Oral Twice a day prn  amlodipine (NORVASC) 10 MG tablet, TAKE ONE TABLET BY MOUTH ONCE DAILY IN THE MORNING  ascorbic acid (VITAMIN C) 1000 MG tablet, Take by mouth. 1 Tablet Oral Every day  BACILLUS COAGULANS (PROBIOTIC, B. COAGULANS, ORAL), Take by mouth once daily.  benzonatate (TESSALON) 100 MG capsule, Take 1 capsule (100 mg total) by mouth 3 (three) times daily as needed for Cough.  cetirizine (ZYRTEC) 10 MG tablet, Take 1 tablet (10 mg total) by mouth once daily.  cholecalciferol, vitamin D3, 2,000 unit Cap, Take by mouth. 1 capsule Oral Every day  cyclobenzaprine (FLEXERIL) 10 MG tablet, Take 1 tablet (10 mg total) by mouth nightly as needed for Muscle spasms.  hydrocodone-acetaminophen 7.5-325mg (NORCO) 7.5-325 mg per tablet, Take 1 tablet by mouth 2 (two) times daily as needed for  Pain.  ibuprofen (ADVIL,MOTRIN) 800 MG tablet, TAKE ONE TABLET BY MOUTH TWICE DAILY AS NEEDED FOR PAIN  lisinopril (PRINIVIL,ZESTRIL) 20 MG tablet, TAKE ONE TABLET BY MOUTH ONCE DAILY  mercaptopurine (PURINETHOL) 50 mg tablet, Take 1 tablet (50 mg total) by mouth once daily.  mometasone (NASONEX) 50 mcg/actuation nasal spray, 2 sprays by Nasal route once daily.  montelukast (SINGULAIR) 10 mg tablet, Take 1 tablet (10 mg total) by mouth every evening.  multivitamin-Ca-iron-minerals (WOMEN'S MULTIPLE VITAMINS) 18-0.4 mg Tab, Take by mouth. 1 Tablet Oral Every day  pravastatin (PRAVACHOL) 80 MG tablet, TAKE ONE TABLET BY MOUTH ONCE DAILY IN THE EVENING  valacyclovir (VALTREX) 1000 MG tablet, Take 2 tablets (2,000 mg total) by mouth 2 (two) times daily. Take 2 pills twice daily as directed          URI    Associated symptoms include coughing, headaches, neck pain and a sore throat. Pertinent negatives include no abdominal pain, chest pain, congestion, diarrhea, nausea, rhinorrhea, sneezing, vomiting or wheezing.     Review of Systems   Constitutional: Positive for fever. Negative for chills.   HENT: Positive for postnasal drip, sore throat, trouble swallowing and voice change. Negative for congestion, rhinorrhea, sinus pressure and sneezing.    Eyes: Positive for discharge, redness and itching. Negative for pain.   Respiratory: Positive for cough. Negative for shortness of breath and wheezing.    Cardiovascular: Negative for chest pain and palpitations.   Gastrointestinal: Negative for abdominal pain, diarrhea, nausea and vomiting.   Musculoskeletal: Positive for myalgias and neck pain.   Neurological: Positive for headaches.       Objective:      Physical Exam   Constitutional: She is oriented to person, place, and time. She appears well-developed and well-nourished. No distress.   Pleasant.   HENT:   Head: Normocephalic and atraumatic.   Right Ear: External ear normal.   Left Ear: External ear normal.   Nose: Nose  normal.   Mouth/Throat: Oropharynx is clear and moist. No oropharyngeal exudate.   Tender sinuses.  Nasal mucosa inflamed, congestion (right > left) without drainage. TM's shiny and clear. Hoarse voice.   Eyes: Conjunctivae and EOM are normal. Pupils are equal, round, and reactive to light. Right eye exhibits no discharge. Left eye exhibits no discharge.   Neck: Normal range of motion. Neck supple. No thyromegaly present.   Cardiovascular: Normal rate, regular rhythm and normal heart sounds.    No murmur heard.  Pulmonary/Chest: Effort normal and breath sounds normal. No respiratory distress. She has no wheezes.   Abdominal: Soft. Bowel sounds are normal. She exhibits no distension and no mass. There is no tenderness. There is no rebound and no guarding.   Musculoskeletal: Normal range of motion. She exhibits no edema.   She is ambulatory without problems.   Lymphadenopathy:     She has no cervical adenopathy.   Neurological: She is alert and oriented to person, place, and time.   Skin: She is not diaphoretic.   Psychiatric: She has a normal mood and affect. Her behavior is normal. Judgment and thought content normal.   Vitals reviewed.      Assessment:       1. Acute non-recurrent sinusitis, unspecified location    2. Laryngitis, acute        Plan:       1.  DepoMedrol 40 mg IM x 1 today.  2.  Augmentin 875 mg twice daily for days.  3.  Continue current medications, follow low sodium, low cholesterol, low carb diet, daily walks.  4.  Follow up sooner if no improvement or worsening symptoms noted.  5.  Work excuse for 5/4/2017 - 5/5/2017 with return 5/9/2017.  6.  Prescription refill - Hydroquinone 4% cream - apply twice daily prn melasma, #28.35 gm, 1 refill.

## 2017-05-22 DIAGNOSIS — J32.0 MAXILLARY SINUSITIS, UNSPECIFIED CHRONICITY: ICD-10-CM

## 2017-05-22 DIAGNOSIS — I10 ESSENTIAL HYPERTENSION: Chronic | ICD-10-CM

## 2017-05-22 RX ORDER — LISINOPRIL 20 MG/1
20 TABLET ORAL DAILY
Qty: 90 TABLET | Refills: 1 | Status: SHIPPED | OUTPATIENT
Start: 2017-05-22 | End: 2017-06-30 | Stop reason: SDUPTHER

## 2017-05-22 RX ORDER — MONTELUKAST SODIUM 10 MG/1
10 TABLET ORAL NIGHTLY
Qty: 90 TABLET | Refills: 1 | Status: SHIPPED | OUTPATIENT
Start: 2017-05-22 | End: 2017-06-30 | Stop reason: SDUPTHER

## 2017-06-12 ENCOUNTER — OFFICE VISIT (OUTPATIENT)
Dept: FAMILY MEDICINE | Facility: CLINIC | Age: 57
End: 2017-06-12
Payer: COMMERCIAL

## 2017-06-12 VITALS
BODY MASS INDEX: 33.73 KG/M2 | WEIGHT: 181.44 LBS | RESPIRATION RATE: 18 BRPM | SYSTOLIC BLOOD PRESSURE: 130 MMHG | TEMPERATURE: 98 F | OXYGEN SATURATION: 99 % | HEART RATE: 82 BPM | DIASTOLIC BLOOD PRESSURE: 74 MMHG

## 2017-06-12 DIAGNOSIS — J01.11 ACUTE RECURRENT FRONTAL SINUSITIS: Primary | ICD-10-CM

## 2017-06-12 PROCEDURE — 99214 OFFICE O/P EST MOD 30 MIN: CPT | Mod: S$GLB,,, | Performed by: FAMILY MEDICINE

## 2017-06-12 PROCEDURE — 99999 PR PBB SHADOW E&M-EST. PATIENT-LVL V: CPT | Mod: PBBFAC,,, | Performed by: FAMILY MEDICINE

## 2017-06-12 RX ORDER — DOXYCYCLINE 100 MG/1
100 CAPSULE ORAL 2 TIMES DAILY
Qty: 14 CAPSULE | Refills: 0 | Status: SHIPPED | OUTPATIENT
Start: 2017-06-12 | End: 2017-06-19

## 2017-06-12 NOTE — PROGRESS NOTES
Subjective:       Patient ID: Angelica Mcfadden is a 56 y.o. female.    Chief Complaint: Sinusitis      HPI   Ms. Mcfadden presents to clinic today for complaints of fever, post nasal drip, and sinus pressure.   She states it started on Friday.   She has still been using her nasonex, Singulair, and zyrtec.   She states it will get worst if she does not get treated.   She has also been using her netti pot.  She has not tried steam yet.   She has seen ENT before and has had CT sinuses done.     Review of Systems   Constitutional: Positive for diaphoresis and fever.   HENT: Positive for postnasal drip and sinus pressure. Negative for rhinorrhea.    Respiratory: Negative for cough.    Gastrointestinal: Negative for abdominal pain, nausea and vomiting.       Medication List with Changes/Refills   New Medications    DOXYCYCLINE (VIBRAMYCIN) 100 MG CAP    Take 1 capsule (100 mg total) by mouth 2 (two) times daily.   Current Medications    ALBUTEROL 90 MCG/ACTUATION INHALER    Inhale 2 puffs into the lungs every 6 (six) hours as needed for Wheezing.    ALPRAZOLAM (XANAX) 0.5 MG TABLET    1 Tablet Oral Twice a day prn    AMLODIPINE (NORVASC) 10 MG TABLET    TAKE ONE TABLET BY MOUTH ONCE DAILY IN THE MORNING    ASCORBIC ACID (VITAMIN C) 1000 MG TABLET    Take by mouth. 1 Tablet Oral Every day    BACILLUS COAGULANS (PROBIOTIC, B. COAGULANS, ORAL)    Take by mouth once daily.    BENZONATATE (TESSALON) 100 MG CAPSULE    Take 1 capsule (100 mg total) by mouth 3 (three) times daily as needed for Cough.    CETIRIZINE (ZYRTEC) 10 MG TABLET    Take 1 tablet (10 mg total) by mouth once daily.    CHOLECALCIFEROL, VITAMIN D3, 2,000 UNIT CAP    Take by mouth. 1 capsule Oral Every day    IBUPROFEN (ADVIL,MOTRIN) 800 MG TABLET    TAKE ONE TABLET BY MOUTH TWICE DAILY AS NEEDED FOR PAIN    LISINOPRIL (PRINIVIL,ZESTRIL) 20 MG TABLET    Take 1 tablet (20 mg total) by mouth once daily.    MERCAPTOPURINE (PURINETHOL) 50 MG TABLET    Take 1 tablet  (50 mg total) by mouth once daily.    MOMETASONE (NASONEX) 50 MCG/ACTUATION NASAL SPRAY    2 sprays by Nasal route once daily.    MONTELUKAST (SINGULAIR) 10 MG TABLET    Take 1 tablet (10 mg total) by mouth every evening.    MULTIVITAMIN-CA-IRON-MINERALS (WOMEN'S MULTIPLE VITAMINS) 18-0.4 MG TAB    Take by mouth. 1 Tablet Oral Every day    PRAVASTATIN (PRAVACHOL) 80 MG TABLET    TAKE ONE TABLET BY MOUTH ONCE DAILY IN THE EVENING    VALACYCLOVIR (VALTREX) 1000 MG TABLET    Take 2 tablets (2,000 mg total) by mouth 2 (two) times daily. Take 2 pills twice daily as directed   Discontinued Medications    CYCLOBENZAPRINE (FLEXERIL) 10 MG TABLET    Take 1 tablet (10 mg total) by mouth nightly as needed for Muscle spasms.    HYDROCODONE-ACETAMINOPHEN 7.5-325MG (NORCO) 7.5-325 MG PER TABLET    Take 1 tablet by mouth 2 (two) times daily as needed for Pain.       Patient Active Problem List   Diagnosis    HTN (hypertension)    Crohn disease    Dysthymic disorder    Hyperlipidemia    Vitamin D deficiency    Insomnia    Ankle pain, right    Allergic rhinitis    Maxillary sinusitis    Obesity (BMI 30.0-34.9)         Objective:     Physical Exam   Constitutional: She is oriented to person, place, and time. She appears well-developed and well-nourished. No distress.   HENT:   Head: Normocephalic and atraumatic.   Transillumination positive    Eyes: EOM are normal. Right eye exhibits no discharge. Left eye exhibits no discharge.   Cardiovascular: Normal rate and regular rhythm.    Pulmonary/Chest: Effort normal and breath sounds normal. No respiratory distress. She has no wheezes.   Musculoskeletal: She exhibits no edema.   Neurological: She is alert and oriented to person, place, and time.   Skin: Skin is warm and dry. She is not diaphoretic. No erythema.   Psychiatric: She has a normal mood and affect.   Vitals reviewed.    Vitals:    06/12/17 1617   BP: 130/74   Pulse: 82   Resp: 18   Temp: 97.9 °F (36.6 °C)        Assessment/  PLAN     Acute recurrent frontal sinusitis  -     doxycycline (VIBRAMYCIN) 100 MG Cap; Take 1 capsule (100 mg total) by mouth 2 (two) times daily.  Dispense: 14 capsule; Refill: 0  - continue supportive care , continue nasonex, singulair, zyrtec. , try steam       Plan as above  Rtc prn     Brian Mcfadden MD  [facility]

## 2017-06-12 NOTE — PATIENT INSTRUCTIONS
Understanding Sinus Problems    You dont often think about your sinuses until theres a problem. One day you realize you cant smell dinner cooking. Or you find you often have headaches or problems breathing through your nose.  Symptoms of sinus problems  Sinus problems can cause uncomfortable symptoms. Your nose may run constantly. You might have trouble sleeping at night. You may even lose your sense of smell. Other symptoms can include:  · Nasal congestion  · Fullness in ears  · Green, yellow, or bloody drainage from the nose  · Trouble tasting food  · Frequent headaches  · Facial pain  · Cough  When sinuses are blocked  If something blocks the passages in the nose or sinuses, mucus cant drain. Mucus-filled sinuses often become infected.  · Colds cause the lining of the nose and sinuses to swell and make extra mucus. A buildup of mucus can lead to a more serious infection.  · Allergies irritate turbinates and other tissues. This causes swelling, which can cause a blockage. Over time, this irritation can also lead to sacs of swollen tissue (polyps).  · Polyps may form in both the sinuses and nose. Polyps can grow large enough to clog nasal passages and block drainage.  · A crooked (deviated) septum may block nasal passages. This is often the result of an injury.  Date Last Reviewed: 11/1/2016  © 2999-6649 [employer]. [address] [place] [address], [place] [address]. All rights reserved. This information is not intended as a substitute for professional medical care. Always follow your healthcare professional's instructions.

## 2017-06-30 ENCOUNTER — OFFICE VISIT (OUTPATIENT)
Dept: FAMILY MEDICINE | Facility: CLINIC | Age: 57
End: 2017-06-30
Payer: COMMERCIAL

## 2017-06-30 VITALS
OXYGEN SATURATION: 97 % | RESPIRATION RATE: 16 BRPM | WEIGHT: 181.19 LBS | HEIGHT: 62 IN | HEART RATE: 82 BPM | BODY MASS INDEX: 33.34 KG/M2 | DIASTOLIC BLOOD PRESSURE: 79 MMHG | TEMPERATURE: 98 F | SYSTOLIC BLOOD PRESSURE: 130 MMHG

## 2017-06-30 DIAGNOSIS — I10 ESSENTIAL HYPERTENSION: ICD-10-CM

## 2017-06-30 DIAGNOSIS — E55.9 VITAMIN D DEFICIENCY: ICD-10-CM

## 2017-06-30 DIAGNOSIS — J30.9 ALLERGIC RHINITIS, UNSPECIFIED ALLERGIC RHINITIS TRIGGER, UNSPECIFIED RHINITIS SEASONALITY: ICD-10-CM

## 2017-06-30 DIAGNOSIS — E66.9 OBESITY (BMI 30.0-34.9): ICD-10-CM

## 2017-06-30 DIAGNOSIS — Z00.00 ANNUAL PHYSICAL EXAM: Primary | ICD-10-CM

## 2017-06-30 DIAGNOSIS — F34.1 DYSTHYMIC DISORDER: ICD-10-CM

## 2017-06-30 DIAGNOSIS — Z12.31 OTHER SCREENING MAMMOGRAM: ICD-10-CM

## 2017-06-30 DIAGNOSIS — E78.5 HYPERLIPIDEMIA, UNSPECIFIED HYPERLIPIDEMIA TYPE: ICD-10-CM

## 2017-06-30 DIAGNOSIS — Z78.0 POSTMENOPAUSAL: ICD-10-CM

## 2017-06-30 DIAGNOSIS — K50.90 CROHN'S DISEASE WITHOUT COMPLICATION, UNSPECIFIED GASTROINTESTINAL TRACT LOCATION: ICD-10-CM

## 2017-06-30 PROCEDURE — 99999 PR PBB SHADOW E&M-EST. PATIENT-LVL V: CPT | Mod: PBBFAC,,, | Performed by: FAMILY MEDICINE

## 2017-06-30 PROCEDURE — 99396 PREV VISIT EST AGE 40-64: CPT | Mod: S$GLB,,, | Performed by: FAMILY MEDICINE

## 2017-06-30 RX ORDER — CETIRIZINE HYDROCHLORIDE 10 MG/1
10 TABLET ORAL DAILY
Qty: 90 TABLET | Refills: 1 | Status: SHIPPED | OUTPATIENT
Start: 2017-06-30 | End: 2018-07-09

## 2017-06-30 RX ORDER — MONTELUKAST SODIUM 10 MG/1
10 TABLET ORAL NIGHTLY
Qty: 90 TABLET | Refills: 1 | Status: SHIPPED | OUTPATIENT
Start: 2017-06-30 | End: 2018-06-06 | Stop reason: SDUPTHER

## 2017-06-30 RX ORDER — LISINOPRIL 20 MG/1
20 TABLET ORAL DAILY
Qty: 90 TABLET | Refills: 1 | Status: SHIPPED | OUTPATIENT
Start: 2017-06-30 | End: 2018-06-06 | Stop reason: SDUPTHER

## 2017-06-30 RX ORDER — IBUPROFEN 800 MG/1
800 TABLET ORAL 2 TIMES DAILY PRN
Qty: 180 TABLET | Refills: 1 | Status: SHIPPED | OUTPATIENT
Start: 2017-06-30 | End: 2018-06-06 | Stop reason: SDUPTHER

## 2017-06-30 RX ORDER — PRAVASTATIN SODIUM 80 MG/1
TABLET ORAL
Qty: 90 TABLET | Refills: 1 | Status: SHIPPED | OUTPATIENT
Start: 2017-06-30 | End: 2018-03-20 | Stop reason: SDUPTHER

## 2017-06-30 RX ORDER — AMLODIPINE BESYLATE 10 MG/1
10 TABLET ORAL EVERY MORNING
Qty: 90 TABLET | Refills: 1 | Status: SHIPPED | OUTPATIENT
Start: 2017-06-30 | End: 2018-03-20 | Stop reason: SDUPTHER

## 2017-07-05 ENCOUNTER — TELEPHONE (OUTPATIENT)
Dept: GASTROENTEROLOGY | Facility: CLINIC | Age: 57
End: 2017-07-05

## 2017-07-05 NOTE — TELEPHONE ENCOUNTER
----- Message from Zoya Donohue sent at 7/5/2017  9:35 AM CDT -----  Contact: pt  Please call pt @ [phone] regarding appt tomorrow, pt have question regarding procedure.

## 2017-07-06 ENCOUNTER — OFFICE VISIT (OUTPATIENT)
Dept: GASTROENTEROLOGY | Facility: CLINIC | Age: 57
End: 2017-07-06
Payer: COMMERCIAL

## 2017-07-06 ENCOUNTER — LAB VISIT (OUTPATIENT)
Dept: LAB | Facility: HOSPITAL | Age: 57
End: 2017-07-06
Attending: FAMILY MEDICINE
Payer: COMMERCIAL

## 2017-07-06 VITALS — WEIGHT: 185.19 LBS | HEIGHT: 62 IN | BODY MASS INDEX: 34.08 KG/M2

## 2017-07-06 DIAGNOSIS — K50.90 CROHN'S DISEASE WITHOUT COMPLICATION, UNSPECIFIED GASTROINTESTINAL TRACT LOCATION: Primary | ICD-10-CM

## 2017-07-06 DIAGNOSIS — K52.9 IBD (INFLAMMATORY BOWEL DISEASE): Primary | ICD-10-CM

## 2017-07-06 DIAGNOSIS — Z00.00 ANNUAL PHYSICAL EXAM: ICD-10-CM

## 2017-07-06 DIAGNOSIS — K52.9 IBD (INFLAMMATORY BOWEL DISEASE): ICD-10-CM

## 2017-07-06 LAB
25(OH)D3+25(OH)D2 SERPL-MCNC: 48 NG/ML
ALBUMIN SERPL BCP-MCNC: 3.6 G/DL
ALP SERPL-CCNC: 64 U/L
ALT SERPL W/O P-5'-P-CCNC: 19 U/L
ANION GAP SERPL CALC-SCNC: 6 MMOL/L
AST SERPL-CCNC: 21 U/L
BASOPHILS # BLD AUTO: 0.02 K/UL
BASOPHILS NFR BLD: 0.2 %
BILIRUB SERPL-MCNC: 0.4 MG/DL
BUN SERPL-MCNC: 20 MG/DL
CALCIUM SERPL-MCNC: 9.7 MG/DL
CHLORIDE SERPL-SCNC: 106 MMOL/L
CHOLEST/HDLC SERPL: 2.6 {RATIO}
CO2 SERPL-SCNC: 27 MMOL/L
CREAT SERPL-MCNC: 0.8 MG/DL
DIFFERENTIAL METHOD: ABNORMAL
EOSINOPHIL # BLD AUTO: 0.1 K/UL
EOSINOPHIL NFR BLD: 1.5 %
ERYTHROCYTE [DISTWIDTH] IN BLOOD BY AUTOMATED COUNT: 14.8 %
EST. GFR  (AFRICAN AMERICAN): >60 ML/MIN/1.73 M^2
EST. GFR  (NON AFRICAN AMERICAN): >60 ML/MIN/1.73 M^2
GLUCOSE SERPL-MCNC: 83 MG/DL
HCT VFR BLD AUTO: 40.1 %
HDL/CHOLESTEROL RATIO: 37.9 %
HDLC SERPL-MCNC: 198 MG/DL
HDLC SERPL-MCNC: 75 MG/DL
HGB BLD-MCNC: 13.1 G/DL
LDLC SERPL CALC-MCNC: 112.8 MG/DL
LYMPHOCYTES # BLD AUTO: 2.4 K/UL
LYMPHOCYTES NFR BLD: 25.6 %
MCH RBC QN AUTO: 31 PG
MCHC RBC AUTO-ENTMCNC: 32.7 %
MCV RBC AUTO: 95 FL
MONOCYTES # BLD AUTO: 0.6 K/UL
MONOCYTES NFR BLD: 6.3 %
NEUTROPHILS # BLD AUTO: 6.3 K/UL
NEUTROPHILS NFR BLD: 66.3 %
NONHDLC SERPL-MCNC: 123 MG/DL
PLATELET # BLD AUTO: 320 K/UL
PMV BLD AUTO: 11.4 FL
POTASSIUM SERPL-SCNC: 4.4 MMOL/L
PROT SERPL-MCNC: 7.5 G/DL
RBC # BLD AUTO: 4.22 M/UL
SODIUM SERPL-SCNC: 139 MMOL/L
TRIGL SERPL-MCNC: 51 MG/DL
TSH SERPL DL<=0.005 MIU/L-ACNC: 0.93 UIU/ML
WBC # BLD AUTO: 9.44 K/UL

## 2017-07-06 PROCEDURE — 99999 PR PBB SHADOW E&M-EST. PATIENT-LVL II: CPT | Mod: PBBFAC,,, | Performed by: INTERNAL MEDICINE

## 2017-07-06 PROCEDURE — 80061 LIPID PANEL: CPT

## 2017-07-06 PROCEDURE — 99499 UNLISTED E&M SERVICE: CPT | Mod: S$GLB,,, | Performed by: INTERNAL MEDICINE

## 2017-07-06 PROCEDURE — 80053 COMPREHEN METABOLIC PANEL: CPT

## 2017-07-06 PROCEDURE — 82306 VITAMIN D 25 HYDROXY: CPT

## 2017-07-06 PROCEDURE — 36415 COLL VENOUS BLD VENIPUNCTURE: CPT

## 2017-07-06 PROCEDURE — 85025 COMPLETE CBC W/AUTO DIFF WBC: CPT

## 2017-07-06 PROCEDURE — 84443 ASSAY THYROID STIM HORMONE: CPT

## 2017-07-06 RX ORDER — SODIUM, POTASSIUM,MAG SULFATES 17.5-3.13G
SOLUTION, RECONSTITUTED, ORAL ORAL
Qty: 254 ML | Refills: 0 | Status: SHIPPED | OUTPATIENT
Start: 2017-07-06 | End: 2017-07-06 | Stop reason: SDUPTHER

## 2017-07-06 RX ORDER — SODIUM, POTASSIUM,MAG SULFATES 17.5-3.13G
SOLUTION, RECONSTITUTED, ORAL ORAL
Qty: 254 ML | Refills: 0 | Status: ON HOLD | OUTPATIENT
Start: 2017-07-06 | End: 2017-08-11 | Stop reason: HOSPADM

## 2017-07-06 RX ORDER — SODIUM, POTASSIUM,MAG SULFATES 17.5-3.13G
SOLUTION, RECONSTITUTED, ORAL ORAL
OUTPATIENT
Start: 2017-07-06

## 2017-07-06 NOTE — PROGRESS NOTES
The patient was not in need of a visit.  She had been seen by her primary care physician, and a occasional course was already in.  Arrangements were made for her date and time of procedure by the nursing staff.  No physician visit was done.

## 2017-07-06 NOTE — LETTER
July 6, 2017      Monet Alvarez MD  [address]  [facility] [address]           [facility]  [address] [place] [address]  Phone: [phone]  Fax: [phone]          Patient: Angelica Mcfadden   MR Number: [id number]   YOB: 1960   Date of Visit: 7/6/2017       Dear Dr. Monet Alvarez:    Thank you for referring Angelica Mcfadden to me for evaluation. Attached you will find relevant portions of my assessment and plan of care.    If you have questions, please do not hesitate to call me. I look forward to following Angelica Mcfadden along with you.    Sincerely,    Lalo Wood III, MD    Enclosure  CC:  No Recipients    If you would like to receive this communication electronically, please contact [email or web address] or [phone] to request more information on [employer] Link access.    For providers and/or their staff who would like to refer a patient to [facility], please contact us through our one-stop-shop provider referral line, [facility], at [phone].    If you feel you have received this communication in error or would no longer like to receive these types of communications, please e-mail [email or web address]

## 2017-07-19 ENCOUNTER — HOSPITAL ENCOUNTER (OUTPATIENT)
Dept: RADIOLOGY | Facility: HOSPITAL | Age: 57
Discharge: HOME | End: 2017-07-19
Attending: FAMILY MEDICINE
Payer: COMMERCIAL

## 2017-07-19 VITALS — HEIGHT: 62 IN | WEIGHT: 185 LBS | BODY MASS INDEX: 34.04 KG/M2

## 2017-07-19 DIAGNOSIS — Z12.31 OTHER SCREENING MAMMOGRAM: ICD-10-CM

## 2017-07-19 PROCEDURE — 77063 BREAST TOMOSYNTHESIS BI: CPT | Mod: 26,,, | Performed by: RADIOLOGY

## 2017-07-19 PROCEDURE — 77067 SCR MAMMO BI INCL CAD: CPT | Mod: TC

## 2017-07-19 PROCEDURE — 77067 SCR MAMMO BI INCL CAD: CPT | Mod: 26,,, | Performed by: RADIOLOGY

## 2017-08-08 ENCOUNTER — PATIENT MESSAGE (OUTPATIENT)
Dept: GASTROENTEROLOGY | Facility: CLINIC | Age: 57
End: 2017-08-08

## 2017-08-08 ENCOUNTER — TELEPHONE (OUTPATIENT)
Dept: GASTROENTEROLOGY | Facility: CLINIC | Age: 57
End: 2017-08-08

## 2017-08-08 NOTE — TELEPHONE ENCOUNTER
----- Message from Marti Read sent at 8/7/2017  4:55 PM CDT -----  Contact: Pt   Pt called and stated she needed to speak to the nurse. She stated that she is checking the status of her request to have the colonoscopy instructions faxed to her. She can be reached at [phone] fax [phone].    Thanks,  TF

## 2017-08-10 NOTE — H&P
Short Stay Endoscopy History and Physical    PCP - Monet Alvarez MD    Procedure - Colonoscopy  ASA - 3  Mallampati - per anesthesia  History of Anesthesia problems - no  Family history Anesthesia problems -  no     HPI:  This is a 57 y.o.female here for evaluation of : Crohn's Disease surveillance    Reflux - no  Dysphagia - no  Abdominal pain - no  Diarrhea - yes  Anemia - no  GI bleeding - no  Nausea and vomiting-no  Early satiety-no  aversion to sight or smell of food-no    ROS:  Constitutional: No fevers, chills, No weight loss  ENT: No allergies  CV: No chest pain  Pulm: No cough, No shortness of breath  Ophtho: No vision changes  GI: see HPI  Derm: No rash  Heme: No lymphadenopathy, No bruising  MSK: No arthritis  : No dysuria, No hematuria  Endo: No hot or cold intolerance  Neuro: No syncope, No seizure  Psych: No anxiety, No depression    Medical History:  Past Medical History:   Diagnosis Date    Anxiety     Asthma     Crohn's disease     Fibroids     Hyperlipidemia     Hypertension     Iritis     Migraine headache     Ocular    Vitamin D deficiency disease        Surgical History:  Past Surgical History:   Procedure Laterality Date    ANKLE FRACTURE SURGERY  08/19/08    right ankle    BREAST BIOPSY Left 1977    TAHBSO  February 2011    Due to abnormal pap smear and fibroids    TOTAL ABDOMINAL HYSTERECTOMY         Family History:  Family History   Problem Relation Age of Onset    Arthritis Mother     Fuch's dystrophy Mother     Lupus Sister     Rheum arthritis Sister     Obesity Sister     Hypertension Father     Cancer Maternal Grandfather      lung ca    Stroke Maternal Grandmother     Diabetes Maternal Grandmother     Colon cancer Paternal Aunt        Social History:  Social History     Social History    Marital status: Single     Spouse name: N/A    Number of children: 2    Years of education: N/A     Occupational History    [employer]     Social  History Main Topics    Smoking status: Never Smoker    Smokeless tobacco: Never Used    Alcohol use 0.0 oz/week      Comment: ocassionally    Drug use: No    Sexual activity: Yes     Partners: Male      Comment: engaged     Other Topics Concern    Not on file     Social History Narrative    She wears seatbelt. Engaged to be  July 22, 2107       Allergies: Review of patient's allergies indicates:  No Known Allergies    Medications:   No current facility-administered medications on file prior to encounter.      Current Outpatient Prescriptions on File Prior to Encounter   Medication Sig Dispense Refill    albuterol 90 mcg/actuation inhaler Inhale 2 puffs into the lungs every 6 (six) hours as needed for Wheezing. 18 g 5    alprazolam (XANAX) 0.5 MG tablet 1 Tablet Oral Twice a day prn 30 tablet 5    amlodipine (NORVASC) 10 MG tablet Take 1 tablet (10 mg total) by mouth every morning. 90 tablet 1    ascorbic acid (VITAMIN C) 1000 MG tablet Take by mouth. 1 Tablet Oral Every day      BACILLUS COAGULANS (PROBIOTIC, B. COAGULANS, ORAL) Take by mouth once daily.      benzonatate (TESSALON) 100 MG capsule Take 1 capsule (100 mg total) by mouth 3 (three) times daily as needed for Cough. 45 capsule 0    cetirizine (ZYRTEC) 10 MG tablet Take 1 tablet (10 mg total) by mouth once daily. 90 tablet 1    cholecalciferol, vitamin D3, 2,000 unit Cap Take by mouth. 1 capsule Oral Every day      ibuprofen (ADVIL,MOTRIN) 800 MG tablet Take 1 tablet (800 mg total) by mouth 2 (two) times daily as needed. for pain 180 tablet 1    lisinopril (PRINIVIL,ZESTRIL) 20 MG tablet Take 1 tablet (20 mg total) by mouth once daily. 90 tablet 1    mercaptopurine (PURINETHOL) 50 mg tablet Take 1 tablet (50 mg total) by mouth once daily. 90 tablet 4    mometasone (NASONEX) 50 mcg/actuation nasal spray 2 sprays by Nasal route once daily. 17 g 5    montelukast (SINGULAIR) 10 mg tablet Take 1 tablet (10 mg total) by mouth every  evening. 90 tablet 1    multivitamin-Ca-iron-minerals (WOMEN'S MULTIPLE VITAMINS) 18-0.4 mg Tab Take by mouth. 1 Tablet Oral Every day      pravastatin (PRAVACHOL) 80 MG tablet TAKE ONE TABLET BY MOUTH ONCE DAILY IN THE EVENING 90 tablet 1    valacyclovir (VALTREX) 1000 MG tablet Take 2 tablets (2,000 mg total) by mouth 2 (two) times daily. Take 2 pills twice daily as directed 20 tablet 0       Objective Findings:    Vital Signs:There were no vitals filed for this visit.        Physical Exam:  General Appearance: Well appearing in no acute distress  Eyes:    No scleral icterus  ENT: Neck supple, Lips, mucosa, and tongue normal; teeth and gums normal  Lungs: CTA bilaterally in anterior and posterior fields, no wheezes, no crackles.  Heart:  Regular rate, S1, S2 normal, no murmurs heard.  Abdomen: Soft, non tender, non distended with normal bowel sounds. No hepatosplenomegaly, ascites, or mass.  Extremities: No clubbing, cyanosis or edema  Skin: No rash    Labs:  Reviewed    Plan:Colonoscopy  I have explained the risks and benefits of endoscopy procedures to the patient including but not limited to bleeding, perforation, infection, and death. The patient wishes to proceed.

## 2017-08-11 ENCOUNTER — HOSPITAL ENCOUNTER (OUTPATIENT)
Facility: HOSPITAL | Age: 57
Discharge: HOME | End: 2017-08-11
Attending: INTERNAL MEDICINE | Admitting: INTERNAL MEDICINE
Payer: COMMERCIAL

## 2017-08-11 ENCOUNTER — ANESTHESIA EVENT (OUTPATIENT)
Dept: ENDOSCOPY | Facility: HOSPITAL | Age: 57
End: 2017-08-11
Payer: COMMERCIAL

## 2017-08-11 ENCOUNTER — ANESTHESIA (OUTPATIENT)
Dept: ENDOSCOPY | Facility: HOSPITAL | Age: 57
End: 2017-08-11
Payer: COMMERCIAL

## 2017-08-11 ENCOUNTER — SURGERY (OUTPATIENT)
Age: 57
End: 2017-08-11

## 2017-08-11 VITALS
HEIGHT: 62 IN | RESPIRATION RATE: 18 BRPM | BODY MASS INDEX: 32.76 KG/M2 | HEART RATE: 69 BPM | DIASTOLIC BLOOD PRESSURE: 61 MMHG | WEIGHT: 178 LBS | SYSTOLIC BLOOD PRESSURE: 123 MMHG | TEMPERATURE: 98 F | OXYGEN SATURATION: 100 %

## 2017-08-11 DIAGNOSIS — K50.10 CROHN'S DISEASE OF LARGE INTESTINE WITHOUT COMPLICATION: Primary | Chronic | ICD-10-CM

## 2017-08-11 DIAGNOSIS — K50.90 CROHN'S DISEASE: ICD-10-CM

## 2017-08-11 PROCEDURE — 63600175 PHARM REV CODE 636 W HCPCS: Performed by: NURSE ANESTHETIST, CERTIFIED REGISTERED

## 2017-08-11 PROCEDURE — 88305 TISSUE EXAM BY PATHOLOGIST: CPT | Mod: 26,,, | Performed by: PATHOLOGY

## 2017-08-11 PROCEDURE — 88305 TISSUE EXAM BY PATHOLOGIST: CPT | Performed by: PATHOLOGY

## 2017-08-11 PROCEDURE — 37000008 HC ANESTHESIA 1ST 15 MINUTES: Performed by: INTERNAL MEDICINE

## 2017-08-11 PROCEDURE — 27201012 HC FORCEPS, HOT/COLD, DISP: Performed by: INTERNAL MEDICINE

## 2017-08-11 PROCEDURE — 37000009 HC ANESTHESIA EA ADD 15 MINS: Performed by: INTERNAL MEDICINE

## 2017-08-11 PROCEDURE — 45380 COLONOSCOPY AND BIOPSY: CPT | Mod: 33,,, | Performed by: INTERNAL MEDICINE

## 2017-08-11 PROCEDURE — 25000003 PHARM REV CODE 250: Performed by: INTERNAL MEDICINE

## 2017-08-11 PROCEDURE — 25000003 PHARM REV CODE 250: Performed by: NURSE ANESTHETIST, CERTIFIED REGISTERED

## 2017-08-11 PROCEDURE — 45380 COLONOSCOPY AND BIOPSY: CPT | Performed by: INTERNAL MEDICINE

## 2017-08-11 RX ORDER — LIDOCAINE HCL/PF 100 MG/5ML
SYRINGE (ML) INTRAVENOUS
Status: DISCONTINUED | OUTPATIENT
Start: 2017-08-11 | End: 2017-08-11

## 2017-08-11 RX ORDER — SODIUM CHLORIDE, SODIUM LACTATE, POTASSIUM CHLORIDE, CALCIUM CHLORIDE 600; 310; 30; 20 MG/100ML; MG/100ML; MG/100ML; MG/100ML
INJECTION, SOLUTION INTRAVENOUS CONTINUOUS PRN
Status: DISCONTINUED | OUTPATIENT
Start: 2017-08-11 | End: 2017-08-11

## 2017-08-11 RX ORDER — PROPOFOL 10 MG/ML
INJECTION, EMULSION INTRAVENOUS
Status: DISCONTINUED | OUTPATIENT
Start: 2017-08-11 | End: 2017-08-11

## 2017-08-11 RX ORDER — SODIUM CHLORIDE, SODIUM LACTATE, POTASSIUM CHLORIDE, CALCIUM CHLORIDE 600; 310; 30; 20 MG/100ML; MG/100ML; MG/100ML; MG/100ML
INJECTION, SOLUTION INTRAVENOUS CONTINUOUS
Status: DISCONTINUED | OUTPATIENT
Start: 2017-08-11 | End: 2017-08-11 | Stop reason: HOSPADM

## 2017-08-11 RX ADMIN — PROPOFOL 30 MG: 10 INJECTION, EMULSION INTRAVENOUS at 10:08

## 2017-08-11 RX ADMIN — SODIUM CHLORIDE, SODIUM LACTATE, POTASSIUM CHLORIDE, AND CALCIUM CHLORIDE: .6; .31; .03; .02 INJECTION, SOLUTION INTRAVENOUS at 09:08

## 2017-08-11 RX ADMIN — LIDOCAINE HYDROCHLORIDE 100 MG: 20 INJECTION, SOLUTION INTRAVENOUS at 10:08

## 2017-08-11 RX ADMIN — PROPOFOL 130 MG: 10 INJECTION, EMULSION INTRAVENOUS at 10:08

## 2017-08-11 RX ADMIN — SODIUM CHLORIDE, SODIUM LACTATE, POTASSIUM CHLORIDE, AND CALCIUM CHLORIDE: 600; 310; 30; 20 INJECTION, SOLUTION INTRAVENOUS at 09:08

## 2017-08-11 NOTE — OR NURSING
Final time out preformed for Patient and procedure verification agreed by all staff - RN, Tech, MD and CRNA.

## 2017-08-11 NOTE — ANESTHESIA PREPROCEDURE EVALUATION
08/11/2017  Angelica Mcfadden is a 57 y.o., female.    Anesthesia Evaluation    I have reviewed the Patient Summary Reports.    I have reviewed the Nursing Notes.   I have reviewed the Medications.     Review of Systems  Anesthesia Hx:  No problems with previous Anesthesia    Social:  Former Smoker, Social Alcohol Use    Hematology/Oncology:  Hematology Normal   Oncology Normal     EENT/Dental:   chronic allergic rhinitis   Cardiovascular:   Hypertension, well controlled hyperlipidemia    Pulmonary:   Asthma mild Snore   Renal/:  Renal/ Normal     Hepatic/GI:  Hepatic/GI Normal Bowel Prep. 0800 last drink of fluid.  Crohn's disease   Musculoskeletal:  Musculoskeletal Normal    Neurological:   Headaches    Endocrine:  Endocrine Normal    Dermatological:  Skin Normal    Psych:   Psychiatric History          Physical Exam  General:  Well nourished, Obesity    Airway/Jaw/Neck:  Airway Findings: Mallampati: III                Anesthesia Plan  Type of Anesthesia, risks & benefits discussed:  Anesthesia Type:  MAC  Patient's Preference:   Intra-op Monitoring Plan:   Intra-op Monitoring Plan Comments:   Post Op Pain Control Plan:   Post Op Pain Control Plan Comments:   Induction:   IV  Beta Blocker:  Patient is not currently on a Beta-Blocker (No further documentation required).       Informed Consent: Patient understands risks and agrees with Anesthesia plan.  Questions answered. Anesthesia consent signed with patient.  ASA Score: 2     Day of Surgery Review of History & Physical: I have interviewed and examined the patient. I have reviewed the patient's H&P dated: 08/11/17. There are no significant changes.  H&P update referred to the surgeon.         Ready For Surgery From Anesthesia Perspective.

## 2017-08-11 NOTE — TRANSFER OF CARE
"Anesthesia Transfer of Care Note    Patient: Angelica Mcfadden    Procedure(s) Performed: Procedure(s) (LRB):  COLONOSCOPY - REQUESTS DR. MATTI ACUNA (N/A)    Patient location: PACU    Anesthesia Type: MAC    Transport from OR: Transported from OR on room air with adequate spontaneous ventilation    Post pain: adequate analgesia    Post assessment: no apparent anesthetic complications    Post vital signs: stable    Level of consciousness: awake    Nausea/Vomiting: no nausea/vomiting    Complications: none    Transfer of care protocol was followed      Last vitals:   Visit Vitals  BP 93/61 (BP Location: Left arm, Patient Position: Lying)   Pulse 71   Temp 36.5 °C (97.7 °F) (Oral)   Resp 17   Ht 5' 2" (1.575 m)   Wt 80.7 kg (178 lb)   SpO2 97%   Breastfeeding? No   BMI 32.56 kg/m²     "

## 2017-08-11 NOTE — DISCHARGE SUMMARY
[facility] -   Brief Operative Note     SUMMARY     Surgery Date: 8/11/2017     Surgeon(s) and Role:     * Matti Acuna III, MD - Primary    Assisting Surgeon: None    Pre-op Diagnosis:  Crohn's disease without complication, unspecified gastrointestinal tract location [K50.90]    Post-op Diagnosis:  Post-Op Diagnosis Codes:     * Crohn's disease without complication, unspecified gastrointestinal tract location [K50.90]    Procedure(s) (LRB):  COLONOSCOPY - REQUESTS DR. MATTI ACUNA (N/A)    Anesthesia: Choice    Description of the findings of the procedure: Procedures completed. See Procedure note for full details.    Findings/Key Components: Procedures completed. See Procedure note for full details.    Prosthetics/Devices: None    Estimated Blood Loss: * No values recorded between 8/11/2017 12:00 AM and 8/11/2017 10:55 AM *         Specimens:   Specimen (12h ago through future)    Start     Ordered    08/11/17 1018  Specimen to Pathology - Surgery  Once     Comments:  1. Cecum Biopsy, crohn's surveillance2. Ascending Colon Biopsy, crohn's surveillance3. Transverse Colon Biopsy, crohn's surveillance4. Descending Colon Biopsy, crohn's surveillance5. Sigmoid Colon Biopsy, crohn's surveillance6. Rectum biopsy, crohn's sureveillance      08/11/17 1030          Discharge Note    SUMMARY     Admit Date: 8/11/2017    Discharge Date and Time: 8/11/2017    Hospital Course (synopsis of major diagnoses, care, treatment, and services provided during the course of the hospital stay):  Procedures completed. See Procedure note for full details. Discharge patient when discharge criteria met.    Final Diagnosis: Post-Op Diagnosis Codes:     * Crohn's disease without complication, unspecified gastrointestinal tract location [K50.90]    Disposition: Discharge patient when discharge criteria met.    Follow Up/Patient Instructions:       Medications:  Reconciled Home Medications: Current Discharge Medication List       CONTINUE these medications which have NOT CHANGED    Details   amlodipine (NORVASC) 10 MG tablet Take 1 tablet (10 mg total) by mouth every morning.  Qty: 90 tablet, Refills: 1      ascorbic acid (VITAMIN C) 1000 MG tablet Take by mouth. 1 Tablet Oral Every day      BACILLUS COAGULANS (PROBIOTIC, B. COAGULANS, ORAL) Take by mouth once daily.      cetirizine (ZYRTEC) 10 MG tablet Take 1 tablet (10 mg total) by mouth once daily.  Qty: 90 tablet, Refills: 1      cholecalciferol, vitamin D3, 2,000 unit Cap Take by mouth. 1 capsule Oral Every day      ibuprofen (ADVIL,MOTRIN) 800 MG tablet Take 1 tablet (800 mg total) by mouth 2 (two) times daily as needed. for pain  Qty: 180 tablet, Refills: 1      lisinopril (PRINIVIL,ZESTRIL) 20 MG tablet Take 1 tablet (20 mg total) by mouth once daily.  Qty: 90 tablet, Refills: 1      mercaptopurine (PURINETHOL) 50 mg tablet Take 1 tablet (50 mg total) by mouth once daily.  Qty: 90 tablet, Refills: 4    Associated Diagnoses: Crohn's disease with complication, unspecified gastrointestinal tract location      mometasone (NASONEX) 50 mcg/actuation nasal spray 2 sprays by Nasal route once daily.  Qty: 17 g, Refills: 5    Associated Diagnoses: Acute recurrent sinusitis, unspecified location      montelukast (SINGULAIR) 10 mg tablet Take 1 tablet (10 mg total) by mouth every evening.  Qty: 90 tablet, Refills: 1      multivitamin-Ca-iron-minerals (WOMEN'S MULTIPLE VITAMINS) 18-0.4 mg Tab Take by mouth. 1 Tablet Oral Every day      pravastatin (PRAVACHOL) 80 MG tablet TAKE ONE TABLET BY MOUTH ONCE DAILY IN THE EVENING  Qty: 90 tablet, Refills: 1      albuterol 90 mcg/actuation inhaler Inhale 2 puffs into the lungs every 6 (six) hours as needed for Wheezing.  Qty: 18 g, Refills: 5      alprazolam (XANAX) 0.5 MG tablet 1 Tablet Oral Twice a day prn  Qty: 30 tablet, Refills: 5      benzonatate (TESSALON) 100 MG capsule Take 1 capsule (100 mg total) by mouth 3 (three) times daily as needed for  Cough.  Qty: 45 capsule, Refills: 0    Associated Diagnoses: Cough      valacyclovir (VALTREX) 1000 MG tablet Take 2 tablets (2,000 mg total) by mouth 2 (two) times daily. Take 2 pills twice daily as directed  Qty: 20 tablet, Refills: 0         STOP taking these medications       sodium,potassium,mag sulfates (SUPREP BOWEL PREP KIT) 17.5-3.13-1.6 gram SolR Comments:   Reason for Stopping:                Discharge Procedure Orders  Diet general     Activity as tolerated

## 2017-08-11 NOTE — PLAN OF CARE
MD at , speaking with fmly, all questions answered, pt denies c/o pain, VSS, dc instructions reviewed, verbalized understanding, pt ok to dc to home when criteria

## 2017-08-11 NOTE — ANESTHESIA RELEASE NOTE
"Anesthesia Release from PACU Note    Patient: Angelica Mcfadden    Procedure(s) Performed: Procedure(s) (LRB):  COLONOSCOPY - REQUESTS DR. MATTI ACUNA (N/A)    Anesthesia type: MAC    Post pain: Adequate analgesia    Post assessment: no apparent anesthetic complications, tolerated procedure well and no evidence of recall    Last Vitals:   Visit Vitals  BP 93/61 (BP Location: Left arm, Patient Position: Lying)   Pulse 71   Temp 36.5 °C (97.7 °F) (Oral)   Resp 17   Ht 5' 2" (1.575 m)   Wt 80.7 kg (178 lb)   SpO2 97%   Breastfeeding? No   BMI 32.56 kg/m²       Post vital signs: stable    Level of consciousness: awake and alert     Nausea/Vomiting: no nausea/no vomiting    Complications: none    Airway Patency: patent    Respiratory: unassisted, spontaneous ventilation, room air    Cardiovascular: stable    Hydration: euvolemic  "

## 2017-08-11 NOTE — PLAN OF CARE
Problem: Fall Risk (Adult)  Goal: Absence of Falls  Patient will demonstrate the desired outcomes by discharge/transition of care.  Outcome: Outcome(s) achieved Date Met: 08/11/17  Pt denies c/o discomfort, dc instructions reviewed, criteria met, iv dc'd tolerated well no bleeding noted, dc'd to [facility] via wc with fmly

## 2017-08-11 NOTE — ANESTHESIA POSTPROCEDURE EVALUATION
"Anesthesia Post Evaluation    Patient: Angelica Mcfadden    Procedure(s) Performed: Procedure(s) (LRB):  COLONOSCOPY - REQUESTS DR. MATTI ACUNA (N/A)    Final Anesthesia Type: MAC  Patient location during evaluation: PACU  Patient participation: Yes- Able to Participate  Level of consciousness: awake and alert and oriented  Post-procedure vital signs: reviewed and stable  Pain management: adequate  Airway patency: patent  PONV status at discharge: No PONV  Anesthetic complications: no      Cardiovascular status: blood pressure returned to baseline  Respiratory status: unassisted, room air and spontaneous ventilation  Hydration status: euvolemic  Follow-up not needed.        Visit Vitals  BP 93/61 (BP Location: Left arm, Patient Position: Lying)   Pulse 71   Temp 36.5 °C (97.7 °F) (Oral)   Resp 17   Ht 5' 2" (1.575 m)   Wt 80.7 kg (178 lb)   SpO2 97%   Breastfeeding? No   BMI 32.56 kg/m²       Pain/Germán Score: Germán Score: 10 (8/11/2017 10:35 AM)      "

## 2017-08-21 ENCOUNTER — PATIENT MESSAGE (OUTPATIENT)
Dept: GASTROENTEROLOGY | Facility: CLINIC | Age: 57
End: 2017-08-21

## 2017-12-21 ENCOUNTER — OFFICE VISIT (OUTPATIENT)
Dept: FAMILY MEDICINE | Facility: CLINIC | Age: 57
End: 2017-12-21
Payer: COMMERCIAL

## 2017-12-21 VITALS
WEIGHT: 180.56 LBS | TEMPERATURE: 97 F | HEART RATE: 88 BPM | HEIGHT: 62 IN | OXYGEN SATURATION: 98 % | RESPIRATION RATE: 16 BRPM | BODY MASS INDEX: 33.23 KG/M2 | SYSTOLIC BLOOD PRESSURE: 130 MMHG | DIASTOLIC BLOOD PRESSURE: 82 MMHG

## 2017-12-21 DIAGNOSIS — I10 ESSENTIAL HYPERTENSION: Primary | Chronic | ICD-10-CM

## 2017-12-21 DIAGNOSIS — F34.1 DYSTHYMIC DISORDER: Chronic | ICD-10-CM

## 2017-12-21 DIAGNOSIS — E66.9 OBESITY (BMI 30.0-34.9): ICD-10-CM

## 2017-12-21 DIAGNOSIS — E78.5 HYPERLIPIDEMIA, UNSPECIFIED HYPERLIPIDEMIA TYPE: ICD-10-CM

## 2017-12-21 DIAGNOSIS — J30.2 SEASONAL ALLERGIC RHINITIS, UNSPECIFIED CHRONICITY, UNSPECIFIED TRIGGER: ICD-10-CM

## 2017-12-21 DIAGNOSIS — K50.90 CROHN'S DISEASE WITHOUT COMPLICATION, UNSPECIFIED GASTROINTESTINAL TRACT LOCATION: ICD-10-CM

## 2017-12-21 PROCEDURE — 99999 PR PBB SHADOW E&M-EST. PATIENT-LVL III: CPT | Mod: PBBFAC,,, | Performed by: FAMILY MEDICINE

## 2017-12-21 PROCEDURE — 96372 THER/PROPH/DIAG INJ SC/IM: CPT | Mod: S$GLB,,, | Performed by: FAMILY MEDICINE

## 2017-12-21 PROCEDURE — 99214 OFFICE O/P EST MOD 30 MIN: CPT | Mod: 25,S$GLB,, | Performed by: FAMILY MEDICINE

## 2017-12-21 RX ORDER — ALPRAZOLAM 0.5 MG/1
TABLET ORAL
Qty: 30 TABLET | Refills: 5 | Status: SHIPPED | OUTPATIENT
Start: 2017-12-21 | End: 2018-07-09 | Stop reason: SDUPTHER

## 2017-12-21 RX ORDER — TOPIRAMATE 25 MG/1
25 TABLET ORAL 2 TIMES DAILY
Qty: 60 TABLET | Refills: 5 | Status: SHIPPED | OUTPATIENT
Start: 2017-12-21 | End: 2018-07-09 | Stop reason: SDUPTHER

## 2017-12-21 RX ORDER — BETAMETHASONE SODIUM PHOSPHATE AND BETAMETHASONE ACETATE 3; 3 MG/ML; MG/ML
12 INJECTION, SUSPENSION INTRA-ARTICULAR; INTRALESIONAL; INTRAMUSCULAR; SOFT TISSUE
Status: COMPLETED | OUTPATIENT
Start: 2017-12-21 | End: 2017-12-21

## 2017-12-21 RX ADMIN — BETAMETHASONE SODIUM PHOSPHATE AND BETAMETHASONE ACETATE 12 MG: 3; 3 INJECTION, SUSPENSION INTRA-ARTICULAR; INTRALESIONAL; INTRAMUSCULAR; SOFT TISSUE at 11:12

## 2017-12-21 NOTE — PROGRESS NOTES
Subjective:       Patient ID: Angelica Mcfadden is a 57 y.o. female.    Chief Complaint: Hypertension and Follow-up    Ms. Mcfadden comes in today for 6-month hypertension follow-up.  She has taken medication today.  She states she exercises by walking 3 mouth daily and monitors her diet most times.  She states she went to [facility] last month and was prescribed phentermine which she states she takes without problems.  She states she desires to continue taking phentermine.    She states she's been stressed recently with her daughters employment issues.  However, she denies suicidal or homicidal thoughts.    She states for 1 week having left-sided head and nasal congestion, headache, postnasal drip without other sinus, ocular or respiratory symptoms.  She states she's been taken ibuprofen with help for headache.  She also states she has been using Nasonex and taken Singulair and Zyrtec with help.  She does not smoke.    She states on [facility] pressure was 200/102 with recheck 164/94.  Around that time, she states she had seen a patient in the patient's home and a gas range was on.  She states she had also taken Sudafed.  She states the next day her blood pressure was 136/86 and notes this morning blood pressure of 140/90 30 minutes after taken medication.    Otherwise, she denies having fever, chills, fatigue, appetite change; shortness of breath, cough, wheezing; chest pain, palpitations, leg swelling; abdominal pain, nausea, vomiting, diarrhea, constipation; unusual urinary symptoms; back pain.    She saw Dr. Wood, gastroenterologist, on July 6, 2017 for surveillance of Crohn's.      Current Outpatient Prescriptions:  alprazolam (XANAX) 0.5 MG tablet, 1 Tablet Oral Twice a day prn  amlodipine (NORVASC) 10 MG tablet, Take 1 tablet (10 mg total) by mouth every morning.  ascorbic acid (VITAMIN C) 1000 MG tablet, Take by mouth. 1 Tablet Oral Every day  BACILLUS COAGULANS  (PROBIOTIC, B. COAGULANS, ORAL), Take by mouth once daily.  benzonatate (TESSALON) 100 MG capsule, Take 1 capsule (100 mg total) by mouth 3 (three) times daily as needed for Cough.  cetirizine (ZYRTEC) 10 MG tablet, Take 1 tablet (10 mg total) by mouth once daily.  cholecalciferol, vitamin D3, 2,000 unit Cap, Take by mouth. 1 capsule Oral Every day  ibuprofen (ADVIL,MOTRIN) 800 MG tablet, Take 1 tablet (800 mg total) by mouth 2 (two) times daily as needed. for pain  lisinopril (PRINIVIL,ZESTRIL) 20 MG tablet, Take 1 tablet (20 mg total) by mouth once daily.  mercaptopurine (PURINETHOL) 50 mg tablet, Take 1 tablet (50 mg total) by mouth once daily.  mometasone (NASONEX) 50 mcg/actuation nasal spray, 2 sprays by Nasal route once daily.  montelukast (SINGULAIR) 10 mg tablet, Take 1 tablet (10 mg total) by mouth every evening.  multivitamin-Ca-iron-minerals (WOMEN'S MULTIPLE VITAMINS) 18-0.4 mg Tab, Take by mouth. 1 Tablet Oral Every day  pravastatin (PRAVACHOL) 80 MG tablet, TAKE ONE TABLET BY MOUTH ONCE DAILY IN THE EVENING  valacyclovir (VALTREX) 1000 MG tablet, Take 2 tablets (2,000 mg total) by mouth 2 (two) times daily. Take 2 pills twice daily as directed  albuterol 90 mcg/actuation inhaler, Inhale 2 puffs into the lungs every 6 (six) hours as needed for Wheezing.      Labs:                  WBC                      9.44                07/06/2017                 HGB                      13.1                07/06/2017                 HCT                      40.1                07/06/2017                 PLT                      320                 07/06/2017               LDLCALC                  112.8               07/06/2017                           CHOL                     198                 07/06/2017                 TRIG                     51                  07/06/2017                 HDL                      75                  07/06/2017                 ALT                      19                   07/06/2017                 AST                      21                  07/06/2017                 NA                       139                 07/06/2017                 K                        4.4                 07/06/2017                 CL                       106                 07/06/2017                 CREATININE               0.8                 07/06/2017                 BUN                      20                  07/06/2017                 CO2                      27                  07/06/2017                 TSH                      0.925               07/06/2017                 INR                      0.9                 08/18/2008                 HGBA1C                   5.6                 04/27/2016                  Hypertension   Associated symptoms include headaches. Pertinent negatives include no chest pain, palpitations or shortness of breath.     Review of Systems   Constitutional: Negative for activity change, appetite change, chills, fatigue and fever.        Weight  82.2 kg (181 lb 3.5 oz) at June 30, 2017 visit.   HENT: Positive for congestion and postnasal drip. Negative for rhinorrhea, sinus pain, sinus pressure, sneezing and sore throat.         See history of present illness.   Eyes: Negative for pain, discharge, redness and itching.   Respiratory: Negative for cough, shortness of breath and wheezing.    Cardiovascular: Negative for chest pain, palpitations and leg swelling.        See history of present illness.   Gastrointestinal: Negative for abdominal pain, constipation, diarrhea, nausea and vomiting.   Endocrine:        See history of present illness.   Genitourinary: Negative for difficulty urinating.   Musculoskeletal: Negative for back pain.   Neurological: Positive for headaches. Negative for seizures.   Psychiatric/Behavioral: Negative for dysphoric mood and suicidal ideas. The patient is nervous/anxious.         Negative for homicidal ideas.       Objective:       Physical Exam   Constitutional: She is oriented to person, place, and time. She appears well-developed and well-nourished. No distress.   Pleasant.   HENT:   Head: Normocephalic and atraumatic.   Right Ear: External ear normal.   Left Ear: External ear normal.   Nose: Nose normal.   Mouth/Throat: Oropharynx is clear and moist. No oropharyngeal exudate.   Non tender sinuses except slightly tender at left side of face adjacent to eye.  TM's shiny and clear.  Nasal mucosa pink, congested (left > right) without drainage noted.   Eyes: Conjunctivae and EOM are normal. Pupils are equal, round, and reactive to light. Right eye exhibits no discharge. Left eye exhibits no discharge.   She wears glasses.   Neck: Normal range of motion. Neck supple. No thyromegaly present.   Cardiovascular: Normal rate, regular rhythm, normal heart sounds and intact distal pulses.    No murmur heard.  Pulmonary/Chest: Effort normal and breath sounds normal. No respiratory distress. She has no wheezes.   Abdominal: Soft. Bowel sounds are normal. She exhibits no distension and no mass. There is no tenderness. There is no rebound and no guarding.   Musculoskeletal: Normal range of motion. She exhibits no edema.   She is ambulatory without problems.   Lymphadenopathy:     She has no cervical adenopathy.   Neurological: She is alert and oriented to person, place, and time.   Skin: She is not diaphoretic.   Psychiatric: She has a normal mood and affect. Her behavior is normal. Judgment and thought content normal.   Vitals reviewed.      Assessment:       1. Essential hypertension    2. Hyperlipidemia, unspecified hyperlipidemia type    3. Crohn's disease without complication, unspecified gastrointestinal tract location    4. Seasonal allergic rhinitis, unspecified chronicity, unspecified trigger    5. Dysthymic disorder    6. Obesity (BMI 30.0-34.9)        Plan:       1.  No labs today.  2.  Try Topamax 25 mg twice daily as directed to help for  migraine headache and weight issue, #60, 5 refills; medication precautions discussed with patient.  3.  Continue current medications, follow low sodium, low cholesterol, low carb diet, daily walks.  4.  Celestone 12 mg IM x today.  5.  Prescription refill - Alprazolam 0.5 mg twice daily prn anxiety, #60, 5 refills.  6.  Work excuse for 12/28-12/29/17 with return 1/2/2018.  7.  See me in June 2018 for fasting annual wellness examination.

## 2018-03-20 RX ORDER — AMLODIPINE BESYLATE 10 MG/1
10 TABLET ORAL EVERY MORNING
Qty: 90 TABLET | Refills: 1 | Status: SHIPPED | OUTPATIENT
Start: 2018-03-20 | End: 2018-09-07 | Stop reason: SDUPTHER

## 2018-03-20 RX ORDER — PRAVASTATIN SODIUM 80 MG/1
TABLET ORAL
Qty: 90 TABLET | Refills: 1 | Status: SHIPPED | OUTPATIENT
Start: 2018-03-20 | End: 2018-07-09 | Stop reason: SDUPTHER

## 2018-03-21 ENCOUNTER — OFFICE VISIT (OUTPATIENT)
Dept: FAMILY MEDICINE | Facility: CLINIC | Age: 58
End: 2018-03-21
Payer: COMMERCIAL

## 2018-03-21 VITALS
BODY MASS INDEX: 33.95 KG/M2 | TEMPERATURE: 99 F | DIASTOLIC BLOOD PRESSURE: 86 MMHG | HEART RATE: 66 BPM | WEIGHT: 184.5 LBS | SYSTOLIC BLOOD PRESSURE: 122 MMHG | OXYGEN SATURATION: 99 % | RESPIRATION RATE: 18 BRPM | HEIGHT: 62 IN

## 2018-03-21 DIAGNOSIS — L02.92 FURUNCULOSIS: Primary | ICD-10-CM

## 2018-03-21 DIAGNOSIS — J32.9 SINUSITIS, UNSPECIFIED CHRONICITY, UNSPECIFIED LOCATION: ICD-10-CM

## 2018-03-21 PROCEDURE — 3079F DIAST BP 80-89 MM HG: CPT | Mod: CPTII,S$GLB,, | Performed by: FAMILY MEDICINE

## 2018-03-21 PROCEDURE — 99999 PR PBB SHADOW E&M-EST. PATIENT-LVL V: CPT | Mod: PBBFAC,,, | Performed by: FAMILY MEDICINE

## 2018-03-21 PROCEDURE — 3074F SYST BP LT 130 MM HG: CPT | Mod: CPTII,S$GLB,, | Performed by: FAMILY MEDICINE

## 2018-03-21 PROCEDURE — 99214 OFFICE O/P EST MOD 30 MIN: CPT | Mod: S$GLB,,, | Performed by: FAMILY MEDICINE

## 2018-03-21 RX ORDER — DOXYCYCLINE HYCLATE 100 MG
100 TABLET ORAL 2 TIMES DAILY
Qty: 14 TABLET | Refills: 0 | Status: SHIPPED | OUTPATIENT
Start: 2018-03-21 | End: 2018-03-28

## 2018-03-21 NOTE — PATIENT INSTRUCTIONS
Understanding Sinus Problems    You dont often think about your sinuses until theres a problem. One day you realize you cant smell dinner cooking. Or you find you often have headaches or problems breathing through your nose.  Symptoms of sinus problems  Sinus problems can cause uncomfortable symptoms. Your nose may run constantly. You might have trouble sleeping at night. You may even lose your sense of smell. Other symptoms can include:  · Nasal congestion  · Fullness in ears  · Green, yellow, or bloody drainage from the nose  · Trouble tasting food  · Frequent headaches  · Facial pain  · Cough  When sinuses are blocked  If something blocks the passages in the nose or sinuses, mucus cant drain. Mucus-filled sinuses often become infected.  · Colds cause the lining of the nose and sinuses to swell and make extra mucus. A buildup of mucus can lead to a more serious infection.  · Allergies irritate turbinates and other tissues. This causes swelling, which can cause a blockage. Over time, this irritation can also lead to sacs of swollen tissue (polyps).  · Polyps may form in both the sinuses and nose. Polyps can grow large enough to clog nasal passages and block drainage.  · A crooked (deviated) septum may block nasal passages. This is often the result of an injury.  Date Last Reviewed: 11/1/2016  © 5212-4507 [employer]. [address] [place] [address], [place] [address]. All rights reserved. This information is not intended as a substitute for professional medical care. Always follow your healthcare professional's instructions.

## 2018-03-21 NOTE — PROGRESS NOTES
Subjective:       Patient ID: Angelica Flores is a 57 y.o. female.    Chief Complaint: Facial Swelling      HPI   Ms. Flores presents to clinic today for complaints of facial swelling.   She states it started a few days ago and this morning she feels her left side of her face is swollen.   She states the nose is swollen and she is having a headache on her left side.   She has had some sweats.   She denies any fever.   She has had congestion, runny nose and sinus pressure.     Review of Systems   Constitutional: Positive for diaphoresis. Negative for fever.   HENT: Positive for congestion, rhinorrhea, sinus pressure and sore throat.    Eyes: Positive for itching.   Respiratory: Negative for cough.        Medication List with Changes/Refills   New Medications    DOXYCYCLINE (VIBRA-TABS) 100 MG TABLET    Take 1 tablet (100 mg total) by mouth 2 (two) times daily.   Current Medications    ALBUTEROL 90 MCG/ACTUATION INHALER    Inhale 2 puffs into the lungs every 6 (six) hours as needed for Wheezing.    ALPRAZOLAM (XANAX) 0.5 MG TABLET    1 Tablet Oral Twice a day prn    AMLODIPINE (NORVASC) 10 MG TABLET    Take 1 tablet (10 mg total) by mouth every morning.    ASCORBIC ACID (VITAMIN C) 1000 MG TABLET    Take by mouth. 1 Tablet Oral Every day    BACILLUS COAGULANS (PROBIOTIC, B. COAGULANS, ORAL)    Take by mouth once daily.    BENZONATATE (TESSALON) 100 MG CAPSULE    Take 1 capsule (100 mg total) by mouth 3 (three) times daily as needed for Cough.    CETIRIZINE (ZYRTEC) 10 MG TABLET    Take 1 tablet (10 mg total) by mouth once daily.    CHOLECALCIFEROL, VITAMIN D3, 2,000 UNIT CAP    Take by mouth. 1 capsule Oral Every day    IBUPROFEN (ADVIL,MOTRIN) 800 MG TABLET    Take 1 tablet (800 mg total) by mouth 2 (two) times daily as needed. for pain    LISINOPRIL (PRINIVIL,ZESTRIL) 20 MG TABLET    Take 1 tablet (20 mg total) by mouth once daily.    MERCAPTOPURINE (PURINETHOL) 50 MG TABLET    Take 1 tablet (50 mg total) by  mouth once daily.    MOMETASONE (NASONEX) 50 MCG/ACTUATION NASAL SPRAY    2 sprays by Nasal route once daily.    MONTELUKAST (SINGULAIR) 10 MG TABLET    Take 1 tablet (10 mg total) by mouth every evening.    MULTIVITAMIN-CA-IRON-MINERALS (WOMEN'S MULTIPLE VITAMINS) 18-0.4 MG TAB    Take by mouth. 1 Tablet Oral Every day    PRAVASTATIN (PRAVACHOL) 80 MG TABLET    TAKE ONE TABLET BY MOUTH ONCE DAILY IN THE EVENING    TOPIRAMATE (TOPAMAX) 25 MG TABLET    Take 1 tablet (25 mg total) by mouth 2 (two) times daily.    VALACYCLOVIR (VALTREX) 1000 MG TABLET    Take 2 tablets (2,000 mg total) by mouth 2 (two) times daily. Take 2 pills twice daily as directed       Patient Active Problem List   Diagnosis    HTN (hypertension)    Crohn disease    Dysthymic disorder    Hyperlipidemia    Vitamin D deficiency    Insomnia    Ankle pain, right    Allergic rhinitis    Maxillary sinusitis    Obesity (BMI 30.0-34.9)    Crohn's disease         Objective:     Physical Exam   Constitutional: She is oriented to person, place, and time. She appears well-developed and well-nourished. No distress.   HENT:   Head: Normocephalic and atraumatic.   erythema and induration left nare. Furuncle noted inside nasal vestibule. No fluctuation, tender to touch    Eyes: EOM are normal. Right eye exhibits no discharge. Left eye exhibits no discharge.   Cardiovascular: Normal rate and regular rhythm.    Pulmonary/Chest: Effort normal and breath sounds normal. No respiratory distress. She has no wheezes.   Musculoskeletal: She exhibits no edema.   Neurological: She is alert and oriented to person, place, and time.   Skin: Skin is warm and dry. She is not diaphoretic. No erythema.   Psychiatric: She has a normal mood and affect.   Vitals reviewed.    Vitals:    03/21/18 1133   BP: 122/86   Pulse: 66   Resp: 18   Temp: 98.5 °F (36.9 °C)       Assessment/  PLAN     Furunculosis  -     doxycycline (VIBRA-TABS) 100 MG tablet; Take 1 tablet (100 mg  total) by mouth 2 (two) times daily.  Dispense: 14 tablet; Refill: 0    Sinusitis, unspecified chronicity, unspecified location  -     doxycycline (VIBRA-TABS) 100 MG tablet; Take 1 tablet (100 mg total) by mouth 2 (two) times daily.  Dispense: 14 tablet; Refill: 0    apply warm compress on face  Rtc If not better, and may need ENT      Brian Mcfadden MD  [facility]

## 2018-03-22 ENCOUNTER — TELEPHONE (OUTPATIENT)
Dept: FAMILY MEDICINE | Facility: CLINIC | Age: 58
End: 2018-03-22

## 2018-03-22 ENCOUNTER — OFFICE VISIT (OUTPATIENT)
Dept: URGENT CARE | Facility: CLINIC | Age: 58
End: 2018-03-22
Payer: COMMERCIAL

## 2018-03-22 VITALS
HEIGHT: 61 IN | TEMPERATURE: 98 F | BODY MASS INDEX: 35.09 KG/M2 | WEIGHT: 185.88 LBS | OXYGEN SATURATION: 99 % | RESPIRATION RATE: 18 BRPM | HEART RATE: 69 BPM

## 2018-03-22 DIAGNOSIS — L02.92 FURUNCULOSIS: Primary | ICD-10-CM

## 2018-03-22 PROCEDURE — 99999 PR PBB SHADOW E&M-EST. PATIENT-LVL III: CPT | Mod: PBBFAC,,, | Performed by: FAMILY MEDICINE

## 2018-03-22 PROCEDURE — 99214 OFFICE O/P EST MOD 30 MIN: CPT | Mod: S$GLB,,, | Performed by: FAMILY MEDICINE

## 2018-03-22 NOTE — TELEPHONE ENCOUNTER
Spoke with pt, states she saw Dr. Mcfadden yesterday for nasal abscess. Dr. Mcfadden gave her doxycycline. She has taken 3 doses and states it's not getting better. Advised pt that it may take more than a day to help but pt states it is bigger than what it was. Wants to know if you want to see her or if you want to refer to ENT. Dr. Mcfadden is gone for the day.

## 2018-03-22 NOTE — LETTER
March 22, 2018      [facility]  [address]  [place] [address]  Phone: [phone]  Fax: [phone]       Patient: Angelica Flores   YOB: 1960  Date of Visit: 03/22/2018    To Whom It May Concern:    Pia Flores  was at [facility] on 03/22/2018. She may return to work/school on 3/26/18 with no restrictions. If you have any questions or concerns, or if I can be of further assistance, please do not hesitate to contact me.    Sincerely,    MD Estrellita Paris LPN

## 2018-03-22 NOTE — TELEPHONE ENCOUNTER
----- Message from Genevieve Cowan sent at 3/22/2018  2:43 PM CDT -----  Pt at [phone]//states she was seen yesterday by Dr Mcfadden//is calling to discuss her visit//would like a return call from Dr Alvarez's office/please call//thanks/[facility]

## 2018-03-22 NOTE — LETTER
March 22, 2018      [facility]  [address]  [place] [address]  Phone: [phone]  Fax: [phone]       Patient: Angelica Flores   YOB: 1960  Date of Visit: 03/22/2018    To Whom It May Concern:    Pia Flores  was at [facility] on 03/22/2018. She may return to work/school on 3/24 with no restrictions. If you have any questions or concerns, or if I can be of further assistance, please do not hesitate to contact me.    Sincerely,    Donna England MD

## 2018-03-22 NOTE — PATIENT INSTRUCTIONS
continue your doxycycline and continue warm compression  OTC Ichthammol anabela coronae apply topically to boil  Work excuse tomorrow      Folliculitis  Folliculitis is an inflammation of a hair follicle. A hair follicle is the little pocket where a hair grows out of the skin. Bacteria normally live on the skin. But sometimes bacteria can get trapped in a follicle and cause infection. This causes a bumpy rash. The area over the follicles is red and raised. It may itch or be painful. The bumps may have fluid (pus) inside. The pus may leak and then form crusts. Sores can spread to other areas of the body. Once it goes away, folliculitis can come back at any time. Severe cases may cause permanent hair loss and scarring.  Folliculitis can happen anywhere on the body where hair grows. It can be caused by rubbing from tight clothing. Ingrown hairs can cause it. Soaking in a hot tub or swimming pool that has bacteria in the water can cause it. It may also occur if a hair follicle is blocked by a bandage.  Sores often go away in a few days with no treatment. In some cases, medicine may be given. A small piece of skin or pus may be taken to find the type of bacteria causing the infection.  Home care  The healthcare provider may prescribe an antibiotic cream or ointment.  Oral antibiotics may also be prescribed. Or you may be told to use an over-the-counter antibiotic cream. Follow all instructions when using any of these medicines.  General care:  · Apply warm, moist compresses to the sores for 20 minutes up to 3 times a day. You can make a compress by soaking a cloth in warm water. Squeeze out excess water.  · Dont cut, poke, or squeeze the sores. This can be painful and spread infection.  · Dont scratch the affected area. Scratching can delay healing.  · Dont shave the areas affected by folliculitis.  · If the sores leak fluid, cover the area with a nonstick gauze bandage. Use as little tape as possible. Carefully discard  all soiled bandages.  · Dress in loose cotton clothing.  · Change sheets and blankets if they are soiled by pus. Wash all clothes, towels, sheets, and cloth diapers in soap and hot water. Do not share clothes, towels, or sheets with other family members.  · Do not soak the sores in bath water. This can spread infection. Instead, keep the area clean by gently washing sores with soap and warm water.  · Wash your hands or use antibacterial gels often to prevent spreading the bacteria.  Follow-up care  Follow up with your healthcare provider, or as advised.  When to seek medical advice  Call your healthcare provider right away if any of these occur:  · Fever of 100.4°F (38°C) or higher  · Spreading of the rash  · Rash does not get better with treatment  · Redness or swelling that gets worse  · Rash becomes more painful  · Foul-smelling fluid leaking from the skin  · Rash improves, but then comes back   Date Last Reviewed: 11/1/2016  © 0237-5655 [employer]. [address] [place] [address], [place] [address]. All rights reserved. This information is not intended as a substitute for professional medical care. Always follow your healthcare professional's instructions.

## 2018-03-22 NOTE — PROGRESS NOTES
"Subjective:       Patient ID: Angelica Flores is a 57 y.o. female.    Chief Complaint: No chief complaint on file.    Pulse 69   Temp 98.1 °F (36.7 °C) (Tympanic)   Resp 18   Ht 5' 1" (1.549 m)   Wt 84.3 kg (185 lb 13.6 oz)   SpO2 99%   BMI 35.12 kg/m²     HPI  Pt presented with a boil on left side of nose for a few days. Seen at PCP office yesterday, received doxycycline, had 3 doses, hadn't gotten better. Wants checked  Review of Systems   Constitutional: Negative for chills and fever.   HENT: Positive for facial swelling.    Skin: Positive for color change.       Objective:      Physical Exam   Constitutional: She is oriented to person, place, and time. She appears well-developed and well-nourished. No distress.   HENT:   Head: Normocephalic and atraumatic.   Eyes: EOM are normal. Pupils are equal, round, and reactive to light. Right eye exhibits no discharge. Left eye exhibits no discharge.   Neck: Normal range of motion. Neck supple.   Cardiovascular: Normal rate.    Pulmonary/Chest: Effort normal. No respiratory distress.   Musculoskeletal: Normal range of motion.   Neurological: She is alert and oriented to person, place, and time.   Skin: Skin is warm and dry. No rash noted. She is not diaphoretic.   Nose: erythema and induration left nare. Furuncle noted inside nasal vestibule. No fluctuation, tender to touch   Psychiatric: She has a normal mood and affect.   Nursing note and vitals reviewed.      Assessment:       1. Furunculosis        Plan:     Diagnoses and all orders for this visit:    Furunculosis    continue your doxycycline and continue warm compression  OTC Ichthammol drawing salve apply topically to boil  Work excuse tomorrow          Discussed with pt/family all information and results pertaining to this visit. Discussed diagnosis and plan of treatment.  All questions and concerns were addressed at this time. Pt/family expresses understanding of information and instructions.  Care and " follow up instruction provided.

## 2018-03-22 NOTE — TELEPHONE ENCOUNTER
She has seen ENT Dr. Mcconnell before. But, if she wants to see me tomorrow, that's fine but ok if she wants to see Dr. Mcconnell.

## 2018-03-26 ENCOUNTER — OFFICE VISIT (OUTPATIENT)
Dept: OTOLARYNGOLOGY | Facility: CLINIC | Age: 58
End: 2018-03-26
Payer: COMMERCIAL

## 2018-03-26 VITALS
SYSTOLIC BLOOD PRESSURE: 118 MMHG | BODY MASS INDEX: 35.46 KG/M2 | TEMPERATURE: 98 F | WEIGHT: 187.81 LBS | DIASTOLIC BLOOD PRESSURE: 77 MMHG | HEART RATE: 65 BPM | HEIGHT: 61 IN

## 2018-03-26 DIAGNOSIS — J34.0: Primary | ICD-10-CM

## 2018-03-26 PROCEDURE — 99999 PR PBB SHADOW E&M-EST. PATIENT-LVL III: CPT | Mod: PBBFAC,,, | Performed by: OTOLARYNGOLOGY

## 2018-03-26 PROCEDURE — 99213 OFFICE O/P EST LOW 20 MIN: CPT | Mod: S$GLB,,, | Performed by: OTOLARYNGOLOGY

## 2018-03-26 PROCEDURE — 3078F DIAST BP <80 MM HG: CPT | Mod: CPTII,S$GLB,, | Performed by: OTOLARYNGOLOGY

## 2018-03-26 PROCEDURE — 3074F SYST BP LT 130 MM HG: CPT | Mod: CPTII,S$GLB,, | Performed by: OTOLARYNGOLOGY

## 2018-03-26 NOTE — PROGRESS NOTES
"Subjective:   Patient: Angelica Flores [id number], :1960   Visit date:3/26/2018 10:23 AM    Chief Complaint:  Abscess    HPI:  Angelica is a 57 y.o. female who is here for follow-up after a recent visit to  for a "boil" on the inside of the nose.  She was placed on doxy and over the weekend, this began draining intranasally.  Since then, she has had dramatic improvement in the pain.  Swelling and erythema improving.        Review of Systems:  -     Allergic/Immunologic: has No Known Allergies..  -     Constitutional: Current temp: 98.3 °F (36.8 °C) (Tympanic)    Her meds, allergies, medical, surgical, social & family histories were reviewed & updated:  -     She has a current medication list which includes the following prescription(s): albuterol, alprazolam, amlodipine, ascorbic acid (vitamin c), bacillus coagulans, benzonatate, cetirizine, cholecalciferol (vitamin d3), doxycycline, ibuprofen, lisinopril, mercaptopurine, mometasone, montelukast, multivitamin-ca-iron-minerals, pravastatin, topiramate, and valacyclovir.  -     She  has a past medical history of Anxiety; Asthma; Crohn's disease; Fibroids; Hyperlipidemia; Hypertension; Iritis; Migraine headache; and Vitamin D deficiency disease.   -     She  does not have any pertinent problems on file.   -     She  has a past surgical history that includes TAHBSO (2011); Total abdominal hysterectomy; Ankle fracture surgery (08); Breast biopsy (Left, ); and Colonoscopy (N/A, 2017).  -     She  reports that she has never smoked. She has never used smokeless tobacco. She reports that she drinks alcohol. She reports that she does not use drugs.  -     Her family history includes Arthritis in her mother; Cancer in her maternal grandfather; Colon cancer in her paternal aunt; Diabetes in her maternal grandmother; Fuch's dystrophy in her mother; Hypertension in her father; Lupus in her sister; Obesity in her sister; Rheum arthritis in her " "sister; Stroke in her maternal grandmother.  -     She has No Known Allergies.    Objective:     Physical Exam:  Vitals:  /77 (BP Location: Left arm, Patient Position: Sitting, BP Method: Large (Automatic))   Pulse 65   Temp 98.3 °F (36.8 °C) (Tympanic)   Ht 5' 1" (1.549 m)   Wt 85.2 kg (187 lb 13.3 oz)   BMI 35.49 kg/m²   Communication:  Able to communicate, no hoarseness.  Head & Face:  Normocephalic, atraumatic, no sinus tenderness.  Eyes:  Extraocular motions intact.  Ears:  Otoscopy of external auditory canals and tympanic membranes was normal, clinical speech reception thresholds grossly intact, no mass/lesion of auricle.  Nose:   nasal mucosa, septum, and turbinates were within normal limits. Small pimple on external nose.  Internally with no evidence of persistent abscess  Mouth:  No mass/lesion of lips, teeth, gums, hard/soft palate, tongue, tonsils, or oropharynx.  Neck & Lymphatics:  No cervical lymphadenopathy, no neck mass/crepitus/ asymmetry, trachea is midline, no thyroid enlargement/tenderness/mass.  Neuro/Psych: Alert with normal mood and affect.   Respiration/Chest:  Symmetric expansion during respiration, normal respiratory effort.  Skin:  Warm and intact.    Assessment & Plan:     Doing well.  Improving.  Call if sx begin to worsen or fail to resolve.     "

## 2018-06-06 DIAGNOSIS — J01.91 ACUTE RECURRENT SINUSITIS, UNSPECIFIED LOCATION: ICD-10-CM

## 2018-06-06 DIAGNOSIS — K50.919 CROHN'S DISEASE WITH COMPLICATION, UNSPECIFIED GASTROINTESTINAL TRACT LOCATION: ICD-10-CM

## 2018-06-06 DIAGNOSIS — J30.9 ALLERGIC RHINITIS: ICD-10-CM

## 2018-06-06 DIAGNOSIS — I10 ESSENTIAL HYPERTENSION: Chronic | ICD-10-CM

## 2018-06-06 DIAGNOSIS — J32.0 MAXILLARY SINUSITIS, UNSPECIFIED CHRONICITY: ICD-10-CM

## 2018-06-06 RX ORDER — CETIRIZINE HYDROCHLORIDE 10 MG/1
TABLET ORAL
Qty: 90 TABLET | Refills: 1 | Status: SHIPPED | OUTPATIENT
Start: 2018-06-06 | End: 2018-06-19

## 2018-06-06 RX ORDER — MOMETASONE FUROATE 50 UG/1
SPRAY, METERED NASAL
Qty: 17 EACH | Refills: 3 | Status: SHIPPED | OUTPATIENT
Start: 2018-06-06 | End: 2021-10-04

## 2018-06-06 RX ORDER — IBUPROFEN 800 MG/1
TABLET ORAL
Qty: 180 TABLET | Refills: 0 | Status: SHIPPED | OUTPATIENT
Start: 2018-06-06 | End: 2018-12-03 | Stop reason: SDUPTHER

## 2018-06-06 RX ORDER — MONTELUKAST SODIUM 10 MG/1
TABLET ORAL
Qty: 90 TABLET | Refills: 0 | Status: SHIPPED | OUTPATIENT
Start: 2018-06-06 | End: 2018-12-03 | Stop reason: SDUPTHER

## 2018-06-06 RX ORDER — LISINOPRIL 20 MG/1
TABLET ORAL
Qty: 90 TABLET | Refills: 0 | Status: SHIPPED | OUTPATIENT
Start: 2018-06-06 | End: 2018-09-06 | Stop reason: SDUPTHER

## 2018-06-06 RX ORDER — MERCAPTOPURINE 50 MG/1
TABLET ORAL
Qty: 90 TABLET | Refills: 2 | Status: SHIPPED | OUTPATIENT
Start: 2018-06-06 | End: 2019-03-16 | Stop reason: SDUPTHER

## 2018-06-19 ENCOUNTER — OFFICE VISIT (OUTPATIENT)
Dept: FAMILY MEDICINE | Facility: CLINIC | Age: 58
End: 2018-06-19
Payer: COMMERCIAL

## 2018-06-19 VITALS
SYSTOLIC BLOOD PRESSURE: 116 MMHG | HEIGHT: 61 IN | BODY MASS INDEX: 34.88 KG/M2 | RESPIRATION RATE: 18 BRPM | HEART RATE: 91 BPM | WEIGHT: 184.75 LBS | TEMPERATURE: 99 F | DIASTOLIC BLOOD PRESSURE: 80 MMHG | OXYGEN SATURATION: 97 %

## 2018-06-19 DIAGNOSIS — J01.90 ACUTE RHINOSINUSITIS: Primary | ICD-10-CM

## 2018-06-19 PROCEDURE — 96372 THER/PROPH/DIAG INJ SC/IM: CPT | Mod: S$GLB,,, | Performed by: FAMILY MEDICINE

## 2018-06-19 PROCEDURE — 99213 OFFICE O/P EST LOW 20 MIN: CPT | Mod: 25,S$GLB,, | Performed by: FAMILY MEDICINE

## 2018-06-19 PROCEDURE — 3074F SYST BP LT 130 MM HG: CPT | Mod: CPTII,S$GLB,, | Performed by: FAMILY MEDICINE

## 2018-06-19 PROCEDURE — 99999 PR PBB SHADOW E&M-EST. PATIENT-LVL III: CPT | Mod: PBBFAC,,, | Performed by: FAMILY MEDICINE

## 2018-06-19 PROCEDURE — 3008F BODY MASS INDEX DOCD: CPT | Mod: CPTII,S$GLB,, | Performed by: FAMILY MEDICINE

## 2018-06-19 PROCEDURE — 3079F DIAST BP 80-89 MM HG: CPT | Mod: CPTII,S$GLB,, | Performed by: FAMILY MEDICINE

## 2018-06-19 RX ORDER — BETAMETHASONE SODIUM PHOSPHATE AND BETAMETHASONE ACETATE 3; 3 MG/ML; MG/ML
12 INJECTION, SUSPENSION INTRA-ARTICULAR; INTRALESIONAL; INTRAMUSCULAR; SOFT TISSUE
Status: COMPLETED | OUTPATIENT
Start: 2018-06-19 | End: 2018-06-19

## 2018-06-19 RX ADMIN — BETAMETHASONE SODIUM PHOSPHATE AND BETAMETHASONE ACETATE 12 MG: 3; 3 INJECTION, SUSPENSION INTRA-ARTICULAR; INTRALESIONAL; INTRAMUSCULAR; SOFT TISSUE at 11:06

## 2018-06-19 NOTE — PROGRESS NOTES
CHIEF COMPLAINT: This is a 57-year-old female complaining of upper respiratory symptoms.    SUBJECTIVE: Patient complains of a 2-3 day history of upper respiratory illness starting with runny nose, nasal congestion, left, postnasal drip, left ear ache, cough, hoarseness and burning sore throat.  Patient has had fever to 100.1.  She denies chills and myalgias.  Patient complains of odynophagia.  Mucus has turned from light to dark green.  She's been taking Tessalon Perles lozenges, nasal steroids and Zyrtec.  Patient was exposed to strep.    ROS:  GENERAL: Patient denies fever, chills, night sweats.  Patient denies weight gain or loss. Patient denies anorexia,, weakness or swollen glands.  Positive for fatigue.  SKIN: Patient denies rash.  HEENT: Patient denies visual disturbance, eye irritation or discharge.  LUNGS: Patient denies wheeze or hemoptysis.  CARDIOVASCULAR: Patient denies chest pain, shortness of breath, palpitations, syncope or lower extremity edema.  GI: Patient denies abdominal pain, nausea, vomiting, diarrhea, constipation, blood in stool or melena.    OBJECTIVE:   GENERAL: Well-developed well-nourished pleasant obese black female alert and oriented x3 in no acute distress.  Memory, judgment and cognition without deficit.  No audible wheezing.  Hoarseness.  SKIN: Clear without rash.  Normal color and tone.  HEENT: Eyes: Clear conjunctivae.  No scleral icterus.  Ears: Clear canals.  Clear TMs.  Nose: Right nasal congestion.  Pharynx: Posterior pharynx with 3+ injection.  No exudates.  NECK: Supple, normal range of motion.  No lymphadenopathy.  LUNGS: Clear to auscultation.  Normal respiratory effort.  CARDIOVASCULAR: Regular rhythm, normal S1, S2 without murmur, gallop or rub.    ASSESSMENT:  Acute rhinosinusitis    PLAN:   1.  Symptomatic treatment.  2.  Vitamin C 1000 mg daily.  3.  Celestone 12 mg IM.  4.  Increase fluid intake.  5.  Follow-up if no improvement or worsening symptoms.

## 2018-07-05 ENCOUNTER — TELEPHONE (OUTPATIENT)
Dept: FAMILY MEDICINE | Facility: CLINIC | Age: 58
End: 2018-07-05

## 2018-07-05 RX ORDER — VALACYCLOVIR HYDROCHLORIDE 1 G/1
2000 TABLET, FILM COATED ORAL 2 TIMES DAILY
Qty: 20 TABLET | Refills: 0 | Status: SHIPPED | OUTPATIENT
Start: 2018-07-05 | End: 2018-07-09 | Stop reason: SDUPTHER

## 2018-07-05 NOTE — TELEPHONE ENCOUNTER
----- Message from Lionel Ryder sent at 7/5/2018 11:28 AM CDT -----  Contact: pt   David(Valtrex) , 1000mg tab. As needed.                 .  [facility] [id number] [facility] [facility] [address] [facility]  [address] [facility] [address]  Phone: [phone] Fax: [phone]

## 2018-07-05 NOTE — TELEPHONE ENCOUNTER
----- Message from Niki Zhao sent at 7/5/2018  3:50 PM CDT -----  Contact: Pt  Please give pt a call at .[phone] (home) regarding a refill she requested earlier and states that she's been waiting at the pharmacy

## 2018-07-09 ENCOUNTER — OFFICE VISIT (OUTPATIENT)
Dept: FAMILY MEDICINE | Facility: CLINIC | Age: 58
End: 2018-07-09
Payer: COMMERCIAL

## 2018-07-09 ENCOUNTER — LAB VISIT (OUTPATIENT)
Dept: LAB | Facility: HOSPITAL | Age: 58
End: 2018-07-09
Attending: FAMILY MEDICINE
Payer: COMMERCIAL

## 2018-07-09 VITALS
TEMPERATURE: 97 F | WEIGHT: 183 LBS | SYSTOLIC BLOOD PRESSURE: 116 MMHG | DIASTOLIC BLOOD PRESSURE: 84 MMHG | HEART RATE: 84 BPM | BODY MASS INDEX: 34.55 KG/M2 | RESPIRATION RATE: 17 BRPM | HEIGHT: 61 IN | OXYGEN SATURATION: 99 %

## 2018-07-09 DIAGNOSIS — E66.9 OBESITY (BMI 30.0-34.9): ICD-10-CM

## 2018-07-09 DIAGNOSIS — Z00.00 ANNUAL PHYSICAL EXAM: ICD-10-CM

## 2018-07-09 DIAGNOSIS — E55.9 VITAMIN D DEFICIENCY: ICD-10-CM

## 2018-07-09 DIAGNOSIS — Z78.0 POSTMENOPAUSAL: ICD-10-CM

## 2018-07-09 DIAGNOSIS — G43.909 MIGRAINE WITHOUT STATUS MIGRAINOSUS, NOT INTRACTABLE, UNSPECIFIED MIGRAINE TYPE: ICD-10-CM

## 2018-07-09 DIAGNOSIS — Z76.0 MEDICATION REFILL: ICD-10-CM

## 2018-07-09 DIAGNOSIS — Z00.00 ANNUAL PHYSICAL EXAM: Primary | ICD-10-CM

## 2018-07-09 DIAGNOSIS — I10 ESSENTIAL HYPERTENSION: Chronic | ICD-10-CM

## 2018-07-09 DIAGNOSIS — J30.2 SEASONAL ALLERGIC RHINITIS, UNSPECIFIED TRIGGER: ICD-10-CM

## 2018-07-09 DIAGNOSIS — K50.90 CROHN'S DISEASE WITHOUT COMPLICATION, UNSPECIFIED GASTROINTESTINAL TRACT LOCATION: ICD-10-CM

## 2018-07-09 DIAGNOSIS — Z12.31 VISIT FOR SCREENING MAMMOGRAM: ICD-10-CM

## 2018-07-09 DIAGNOSIS — E78.5 HYPERLIPIDEMIA, UNSPECIFIED HYPERLIPIDEMIA TYPE: ICD-10-CM

## 2018-07-09 DIAGNOSIS — F34.1 DYSTHYMIC DISORDER: Chronic | ICD-10-CM

## 2018-07-09 LAB
25(OH)D3+25(OH)D2 SERPL-MCNC: 52 NG/ML
ALBUMIN SERPL BCP-MCNC: 4 G/DL
ALP SERPL-CCNC: 66 U/L
ALT SERPL W/O P-5'-P-CCNC: 15 U/L
ANION GAP SERPL CALC-SCNC: 11 MMOL/L
AST SERPL-CCNC: 19 U/L
BASOPHILS # BLD AUTO: 0.05 K/UL
BASOPHILS NFR BLD: 0.5 %
BILIRUB SERPL-MCNC: 0.7 MG/DL
BILIRUB UR QL STRIP: NEGATIVE
BUN SERPL-MCNC: 12 MG/DL
CALCIUM SERPL-MCNC: 9.8 MG/DL
CAOX CRY UR QL COMP ASSIST: NORMAL
CHLORIDE SERPL-SCNC: 107 MMOL/L
CHOLEST SERPL-MCNC: 195 MG/DL
CHOLEST/HDLC SERPL: 3 {RATIO}
CLARITY UR REFRACT.AUTO: ABNORMAL
CO2 SERPL-SCNC: 21 MMOL/L
COLOR UR AUTO: ABNORMAL
CREAT SERPL-MCNC: 0.7 MG/DL
DIFFERENTIAL METHOD: ABNORMAL
EOSINOPHIL # BLD AUTO: 0.1 K/UL
EOSINOPHIL NFR BLD: 1.3 %
ERYTHROCYTE [DISTWIDTH] IN BLOOD BY AUTOMATED COUNT: 14.8 %
EST. GFR  (AFRICAN AMERICAN): >60 ML/MIN/1.73 M^2
EST. GFR  (NON AFRICAN AMERICAN): >60 ML/MIN/1.73 M^2
GLUCOSE SERPL-MCNC: 83 MG/DL
GLUCOSE UR QL STRIP: NEGATIVE
HCT VFR BLD AUTO: 40.9 %
HDLC SERPL-MCNC: 65 MG/DL
HDLC SERPL: 33.3 %
HGB BLD-MCNC: 13 G/DL
HGB UR QL STRIP: NEGATIVE
IMM GRANULOCYTES # BLD AUTO: 0.03 K/UL
IMM GRANULOCYTES NFR BLD AUTO: 0.3 %
KETONES UR QL STRIP: NEGATIVE
LDLC SERPL CALC-MCNC: 110.8 MG/DL
LEUKOCYTE ESTERASE UR QL STRIP: ABNORMAL
LYMPHOCYTES # BLD AUTO: 2.1 K/UL
LYMPHOCYTES NFR BLD: 20.8 %
MCH RBC QN AUTO: 31.7 PG
MCHC RBC AUTO-ENTMCNC: 31.8 G/DL
MCV RBC AUTO: 100 FL
MICROSCOPIC COMMENT: NORMAL
MONOCYTES # BLD AUTO: 0.5 K/UL
MONOCYTES NFR BLD: 4.8 %
NEUTROPHILS # BLD AUTO: 7.2 K/UL
NEUTROPHILS NFR BLD: 72.3 %
NITRITE UR QL STRIP: NEGATIVE
NONHDLC SERPL-MCNC: 130 MG/DL
NRBC BLD-RTO: 0 /100 WBC
PH UR STRIP: 5 [PH] (ref 5–8)
PLATELET # BLD AUTO: 378 K/UL
PMV BLD AUTO: 11.6 FL
POTASSIUM SERPL-SCNC: 3.8 MMOL/L
PROT SERPL-MCNC: 7.9 G/DL
PROT UR QL STRIP: NEGATIVE
RBC # BLD AUTO: 4.1 M/UL
RBC #/AREA URNS AUTO: 3 /HPF (ref 0–4)
SODIUM SERPL-SCNC: 139 MMOL/L
SP GR UR STRIP: 1.02 (ref 1–1.03)
TRIGL SERPL-MCNC: 96 MG/DL
TSH SERPL DL<=0.005 MIU/L-ACNC: 0.89 UIU/ML
URN SPEC COLLECT METH UR: ABNORMAL
UROBILINOGEN UR STRIP-ACNC: NEGATIVE EU/DL
WBC # BLD AUTO: 9.96 K/UL
WBC #/AREA URNS AUTO: 5 /HPF (ref 0–5)

## 2018-07-09 PROCEDURE — 82306 VITAMIN D 25 HYDROXY: CPT

## 2018-07-09 PROCEDURE — 3079F DIAST BP 80-89 MM HG: CPT | Mod: CPTII,S$GLB,, | Performed by: FAMILY MEDICINE

## 2018-07-09 PROCEDURE — 85025 COMPLETE CBC W/AUTO DIFF WBC: CPT

## 2018-07-09 PROCEDURE — 80053 COMPREHEN METABOLIC PANEL: CPT

## 2018-07-09 PROCEDURE — 36415 COLL VENOUS BLD VENIPUNCTURE: CPT | Mod: PO

## 2018-07-09 PROCEDURE — 99396 PREV VISIT EST AGE 40-64: CPT | Mod: 25,S$GLB,, | Performed by: FAMILY MEDICINE

## 2018-07-09 PROCEDURE — 3074F SYST BP LT 130 MM HG: CPT | Mod: CPTII,S$GLB,, | Performed by: FAMILY MEDICINE

## 2018-07-09 PROCEDURE — 80061 LIPID PANEL: CPT

## 2018-07-09 PROCEDURE — 99999 PR PBB SHADOW E&M-EST. PATIENT-LVL IV: CPT | Mod: PBBFAC,,, | Performed by: FAMILY MEDICINE

## 2018-07-09 PROCEDURE — 84443 ASSAY THYROID STIM HORMONE: CPT

## 2018-07-09 PROCEDURE — 96372 THER/PROPH/DIAG INJ SC/IM: CPT | Mod: S$GLB,,, | Performed by: FAMILY MEDICINE

## 2018-07-09 PROCEDURE — 81001 URINALYSIS AUTO W/SCOPE: CPT

## 2018-07-09 RX ORDER — ALPRAZOLAM 0.5 MG/1
TABLET ORAL
Qty: 30 TABLET | Refills: 5 | Status: SHIPPED | OUTPATIENT
Start: 2018-07-09 | End: 2019-06-27 | Stop reason: SDUPTHER

## 2018-07-09 RX ORDER — BETAMETHASONE SODIUM PHOSPHATE AND BETAMETHASONE ACETATE 3; 3 MG/ML; MG/ML
12 INJECTION, SUSPENSION INTRA-ARTICULAR; INTRALESIONAL; INTRAMUSCULAR; SOFT TISSUE
Status: COMPLETED | OUTPATIENT
Start: 2018-07-09 | End: 2018-07-09

## 2018-07-09 RX ORDER — TOPIRAMATE 25 MG/1
25 TABLET ORAL 2 TIMES DAILY
Qty: 180 TABLET | Refills: 1 | Status: SHIPPED | OUTPATIENT
Start: 2018-07-09 | End: 2019-06-01 | Stop reason: SDUPTHER

## 2018-07-09 RX ORDER — VALACYCLOVIR HYDROCHLORIDE 1 G/1
2000 TABLET, FILM COATED ORAL 2 TIMES DAILY
Qty: 30 TABLET | Refills: 0 | Status: SHIPPED | OUTPATIENT
Start: 2018-07-09 | End: 2019-03-21 | Stop reason: SDUPTHER

## 2018-07-09 RX ORDER — PRAVASTATIN SODIUM 80 MG/1
TABLET ORAL
Qty: 90 TABLET | Refills: 1 | Status: SHIPPED | OUTPATIENT
Start: 2018-07-09 | End: 2019-02-22 | Stop reason: SDUPTHER

## 2018-07-09 RX ORDER — PHENTERMINE HYDROCHLORIDE 37.5 MG/1
37.5 TABLET ORAL
Qty: 30 TABLET | Refills: 0 | Status: SHIPPED | OUTPATIENT
Start: 2018-07-09 | End: 2018-08-17 | Stop reason: SDUPTHER

## 2018-07-09 RX ADMIN — BETAMETHASONE SODIUM PHOSPHATE AND BETAMETHASONE ACETATE 12 MG: 3; 3 INJECTION, SUSPENSION INTRA-ARTICULAR; INTRALESIONAL; INTRAMUSCULAR; SOFT TISSUE at 11:07

## 2018-07-09 NOTE — PROGRESS NOTES
HISTORY OF PRESENT ILLNESS: Ms. Mcfadden comes in today fasting but with taking medication and without acute problems for annual wellness examination.      END OF LIFE DECISION: She does not have a living will and states she does not desire life support.  However, she states she is an organ donor.    Current Outpatient Prescriptions   Medication Sig    ALPRAZolam (XANAX) 0.5 MG tablet 1 Tablet Oral Twice a day prn    amLODIPine (NORVASC) 10 MG tablet Take 1 tablet (10 mg total) by mouth every morning.    ascorbic acid (VITAMIN C) 1000 MG tablet Take by mouth. 1 Tablet Oral Every day    BACILLUS COAGULANS (PROBIOTIC, B. COAGULANS, ORAL) Take by mouth once daily.    benzonatate (TESSALON) 100 MG capsule Take 1 capsule (100 mg total) by mouth 3 (three) times daily as needed for Cough.    cetirizine (ZYRTEC) 10 MG tablet Take 1 tablet (10 mg total) by mouth once daily.    cholecalciferol, vitamin D3, 2,000 unit Cap Take by mouth. 1 capsule Oral Every day    ibuprofen (ADVIL,MOTRIN) 800 MG tablet TAKE 1 TABLET BY MOUTH TWICE DAILY AS NEEDED FOR PAIN    lisinopril (PRINIVIL,ZESTRIL) 20 MG tablet TAKE 1 TABLET BY MOUTH ONCE DAILY    mercaptopurine (PURINETHOL) 50 mg tablet TAKE 1 TABLET BY MOUTH ONCE DAILY    mometasone (NASONEX) 50 mcg/actuation nasal spray USE 2 SPRAY(S) IN EACH NOSTRIL ONCE DAILY    montelukast (SINGULAIR) 10 mg tablet TAKE 1 TABLET BY MOUTH ONCE DAILY IN THE EVENING    multivitamin-Ca-iron-minerals (WOMEN'S MULTIPLE VITAMINS) 18-0.4 mg Tab Take by mouth. 1 Tablet Oral Every day    pravastatin (PRAVACHOL) 80 MG tablet TAKE ONE TABLET BY MOUTH ONCE DAILY IN THE EVENING    topiramate (TOPAMAX) 25 MG tablet Take 1 tablet (25 mg total) by mouth 2 (two) times daily.    valACYclovir (VALTREX) 1000 MG tablet Take 2 tablets (2,000 mg total) by mouth 2 (two) times daily. Take 2 pills twice daily as directed    albuterol 90 mcg/actuation inhaler Inhale 2 puffs into the lungs every 6 (six) hours as  needed for Wheezing. (not currently using)     SCREENINGS:       Cholesterol: July 6, 2017.     FFS/Colonoscopy: August 11, 2017 - Crohn's disease; repeat in 5 years.     Mammogram: July 19, 2017 - okay.      GYN Exam (breast): June 30, 2017 - okay. May 5, 2016 pap smear - okay. Desires pap smear every 3 years (due 2019).     Dexa Scan:  Never.  Reports will wait until age 59.     Eye Exam: January 2018 at [facility] per patient.  She wears glasses.     PPD: Negative in the past.     Immunizations:  Td/Tdap - May 5, 2016.                              Gardasil - N./A.                              Zostavax - N./A.                              Pneumovax - Never.                              Seasonal Flu - September 2017 at [employer] (her job) per patient.     ROS:  GENERAL: Denies fever, chills, fatigue or unusual weight change. Appetite normal. Weight 81.9 kg (180 lb 8.9 oz) at December 21, 2017 visit. Monitors diet and states eats smaller portions.  Exercises/walks 2-1/2 miles 3 times per week. Desires to try Phentermine for weight reduction; reports took if > 1 year ago with help and without problems.  SKIN: Denies rashes, itching, changes in mole, color or texture of skin or easy bruising.  HEAD:  Denies headaches or recent head trauma.  EYES: Denies change in vision, pain, diplopia, redness or discharge.  EARS: Denies ear pain, discharge, vertigo or decreased hearing.  NOSE: Denies loss of smell, epistaxis or rhinitis except reports nasal congestion, postnasal drip, cough with clear phlegm and reports completed Augmentin course 3 days ago.  MOUTH & THROAT: Denies hoarseness or change in voice. Denies excessive gum bleeding or mouth sores.  Denies sore throat.  NODES: Denies swollen glands.  CHEST: Denies MIKE, wheezing, cough, or sputum production.  CARDIOVASCULAR: Denies chest pain, PND, orthopnea or reduced exercise tolerance.  Denies palpitations.  ABDOMEN: Denies nausea, vomiting, abdominal pain, or blood in stool except  "reports recent intermittent diarrhea, constipation.  Saw Dr. Wood, gastroenterologist, on July 6, 2017 for surveillance of Crohn's.  URINARY: Denies flank pain, dysuria or hematuria.  GENITOURINARY: Denies flank pain, dysuria, frequency or hematuria.  Performs monthly breast self-examinations.  ENDOCRINE: Denies diabetes or thyroid problems.  HEME/LYMPH: Denies bleeding problems.  PERIPHERAL VASCULAR:Denies claudication or cyanosis.  MUSCULOSKELETAL: Denies joint stiffness, pain or swelling. Denies edema.  NEUROLOGIC: Denies history of seizures, tremors, paralysis, alteration of gait or coordination.  PSYCHIATRIC: Denies mood swings, uncontrolled depression or anxiety, homicidal or suicidal thoughts. Denies sleep problems.      PE:   VS: /84 (BP Location: Left arm, Patient Position: Sitting, BP Method: Medium (Manual))   Pulse 84   Temp 97.4 °F (36.3 °C) (Tympanic)   Resp 17   Ht 5' 1" (1.549 m)   Wt 83 kg (182 lb 15.7 oz)   SpO2 99%   BMI 34.57 kg/m²   APPEARANCE:  Well nourished, well developed female, obese and pleasant, alert and oriented in no acute distress.    HEAD: Nontender. Full range of motion.  EYES: PERRL, conjunctiva pink, lids no edema.  She wears glasses.  EARS: External canal patent, no swelling or redness. TM's shiny and clear.  NOSE: Mucosa and turbinates inflammed, very swollen. No discharge. Nontender sinuses.  THROAT: No pharyngeal erythema or exudate. No stridor.   NECK: Supple, no mass, thyroid not enlarged.  NODES: No cervical, axillary or inguinal lymph node enlargement.  CHEST: Normal respiratory effort. Lungs clear to auscultation.  CARDIOVASCULAR: Normal S1, S2. No rubs, murmurs or gallops. PMI not displaced. No carotid bruit. Pedal pulses palpable bilaterally. No edema.  ABDOMEN: Bowel sounds present. Not distended. Soft. No tenderness, masses or organomegaly.  BREAST EXAM: Symmetrical, no external lesions, no discharge, no masses palpated.  PELVIC EXAM: Not examined " today as patient has had TAHBSO due to non cancerous reasons.    RECTAL EXAM: No external hemorrhoids or anal fissures. Heme-negative stool in the rectal vault.  MUSCULOSKELETAL: No joint deformities or stiffness.  She is ambulatory without problems.  SKIN: No rashes or suspicious lesions, normal color and turgor.  NEUROLOGIC:   Cranial Nerves: II-XII grossly intact.   DTR's: Knees, Ankles 2+ and equal bilaterally. Gait & Posture: Normal gait and fine motion.  PSYCHIATRIC: Patient alert, oriented x 3. Mood/Affect normal without acute anxiety and depression noted. Judgment/insight good as she makes appropriate decisions on today's examination.     ASSESSMENT:    ICD-10-CM ICD-9-CM    1. Annual physical exam Z00.00 V70.0 CBC auto differential      TSH      Urinalysis      Comprehensive metabolic panel      Lipid panel      Vitamin D   2. Essential hypertension I10 401.9    3. Hyperlipidemia, unspecified hyperlipidemia type E78.5 272.4 pravastatin (PRAVACHOL) 80 MG tablet   4. Vitamin D deficiency E55.9 268.9    5. Crohn's disease without complication, unspecified gastrointestinal tract location K50.90 555.9    6. Seasonal allergic rhinitis, unspecified trigger J30.2 477.9 betamethasone acetate-betamethasone sodium phosphate injection 12 mg   7. Dysthymic disorder F34.1 300.4 ALPRAZolam (XANAX) 0.5 MG tablet   8. Migraine without status migrainosus, not intractable, unspecified migraine type G43.909 346.90 topiramate (TOPAMAX) 25 MG tablet   9. Medication refill Z76.0 V68.1 valACYclovir (VALTREX) 1000 MG tablet   10. Obesity (BMI 30.0-34.9) E66.9 278.00 phentermine (ADIPEX-P) 37.5 mg tablet   11. Postmenopausal Z78.0 V49.81    12. Visit for screening mammogram Z12.31 V76.12 Mammo Digital Screening Bilat with CAD     PLAN:  1. Age-appropriate counseling-appropriate low-sodium, low-cholesterol, low carbohydrate diet and exercise daily, monthly breast self exam, annual wellness examination.   2. Patient advised to call  for results.  3. Continue current medications.  4. Keep follow-up with specialist.  5. Prescription refills as noted above.    6. Start Phentermine 37.5 mg every morning, #30, 0 refill; medication precautions discussed with patient.  7. Flu shot this fall.  8. Follow-up in about 5 weeks (around 8/10/2018) for hypertension and weight recheck.

## 2018-08-03 ENCOUNTER — HOSPITAL ENCOUNTER (OUTPATIENT)
Dept: RADIOLOGY | Facility: HOSPITAL | Age: 58
Discharge: HOME | End: 2018-08-03
Attending: FAMILY MEDICINE
Payer: COMMERCIAL

## 2018-08-03 VITALS — WEIGHT: 182 LBS | HEIGHT: 61 IN | BODY MASS INDEX: 34.36 KG/M2

## 2018-08-03 DIAGNOSIS — Z12.31 VISIT FOR SCREENING MAMMOGRAM: ICD-10-CM

## 2018-08-03 PROCEDURE — 77063 BREAST TOMOSYNTHESIS BI: CPT | Mod: TC,PO

## 2018-08-03 PROCEDURE — 77063 BREAST TOMOSYNTHESIS BI: CPT | Mod: 26,,, | Performed by: RADIOLOGY

## 2018-08-03 PROCEDURE — 77067 SCR MAMMO BI INCL CAD: CPT | Mod: 26,,, | Performed by: RADIOLOGY

## 2018-08-17 ENCOUNTER — OFFICE VISIT (OUTPATIENT)
Dept: FAMILY MEDICINE | Facility: CLINIC | Age: 58
End: 2018-08-17
Payer: COMMERCIAL

## 2018-08-17 VITALS
OXYGEN SATURATION: 99 % | BODY MASS INDEX: 33.05 KG/M2 | HEIGHT: 61 IN | WEIGHT: 175.06 LBS | TEMPERATURE: 97 F | DIASTOLIC BLOOD PRESSURE: 78 MMHG | SYSTOLIC BLOOD PRESSURE: 112 MMHG | HEART RATE: 100 BPM

## 2018-08-17 DIAGNOSIS — E66.9 OBESITY (BMI 30.0-34.9): ICD-10-CM

## 2018-08-17 DIAGNOSIS — I10 ESSENTIAL HYPERTENSION: Primary | Chronic | ICD-10-CM

## 2018-08-17 PROCEDURE — 99213 OFFICE O/P EST LOW 20 MIN: CPT | Mod: S$GLB,,, | Performed by: FAMILY MEDICINE

## 2018-08-17 PROCEDURE — 3074F SYST BP LT 130 MM HG: CPT | Mod: CPTII,S$GLB,, | Performed by: FAMILY MEDICINE

## 2018-08-17 PROCEDURE — 99999 PR PBB SHADOW E&M-EST. PATIENT-LVL III: CPT | Mod: PBBFAC,,, | Performed by: FAMILY MEDICINE

## 2018-08-17 PROCEDURE — 3078F DIAST BP <80 MM HG: CPT | Mod: CPTII,S$GLB,, | Performed by: FAMILY MEDICINE

## 2018-08-17 PROCEDURE — 3008F BODY MASS INDEX DOCD: CPT | Mod: CPTII,S$GLB,, | Performed by: FAMILY MEDICINE

## 2018-08-17 RX ORDER — PHENTERMINE HYDROCHLORIDE 37.5 MG/1
37.5 TABLET ORAL
Qty: 30 TABLET | Refills: 0 | Status: SHIPPED | OUTPATIENT
Start: 2018-08-17 | End: 2018-09-14 | Stop reason: SDUPTHER

## 2018-08-17 NOTE — PROGRESS NOTES
Angelica Flores    Chief Complaint   Patient presents with    Hypertension    Weight Check    Follow-up       History of Present Illness:   Ms. Flores comes in today for 5-week hypertension follow-up and weight recheck.  On July 9, 2018 I prescribed phentermine therapy for her to help her to lose weight.  She states she has been taking it daily without problems. She denies having fever, chills, fatigue, appetite changes; shortness of breath, cough, wheezing; chest pain, palpitations, leg swelling; abdominal pain, nausea, vomiting, diarrhea, constipation; unusual urinary symptoms; back pain; headache; anxiety, depression, homicidal or suicidal thoughts.  She states she walks 5 times a week, 1 hr each time.  She states she monitors her diet.  She desires to continue phentermine therapy.    She reports having slight nasal congestion with postnasal drip; she takes sinus medications with help.  She does not smoke.      Labs:                     WBC                      9.96                07/09/2018                 HGB                      13.0                07/09/2018                 HCT                      40.9                07/09/2018                 PLT                      378 (H)             07/09/2018                 CHOL                     195                 07/09/2018                 TRIG                     96                  07/09/2018                 HDL                      65                  07/09/2018                 ALT                      15                  07/09/2018                 AST                      19                  07/09/2018                 NA                       139                 07/09/2018                 K                        3.8                 07/09/2018                 CL                       107                 07/09/2018                 CREATININE               0.7                 07/09/2018                 BUN                      12                   07/09/2018                 CO2                      21 (L)              07/09/2018                 TSH                      0.894               07/09/2018                 INR                      0.9                 08/18/2008                 HGBA1C                   5.6                 04/27/2016             LDLCALC                  110.8               07/09/2018                  Current Outpatient Medications   Medication Sig    ALPRAZolam (XANAX) 0.5 MG tablet 1 Tablet Oral Twice a day prn    amLODIPine (NORVASC) 10 MG tablet Take 1 tablet (10 mg total) by mouth every morning.    ascorbic acid (VITAMIN C) 1000 MG tablet Take by mouth. 1 Tablet Oral Every day    BACILLUS COAGULANS (PROBIOTIC, B. COAGULANS, ORAL) Take by mouth once daily.    benzonatate (TESSALON) 100 MG capsule Take 1 capsule (100 mg total) by mouth 3 (three) times daily as needed for Cough.    cholecalciferol, vitamin D3, 2,000 unit Cap Take by mouth. 1 capsule Oral Every day    ibuprofen (ADVIL,MOTRIN) 800 MG tablet TAKE 1 TABLET BY MOUTH TWICE DAILY AS NEEDED FOR PAIN    lisinopril (PRINIVIL,ZESTRIL) 20 MG tablet TAKE 1 TABLET BY MOUTH ONCE DAILY    mercaptopurine (PURINETHOL) 50 mg tablet TAKE 1 TABLET BY MOUTH ONCE DAILY    mometasone (NASONEX) 50 mcg/actuation nasal spray USE 2 SPRAY(S) IN EACH NOSTRIL ONCE DAILY    montelukast (SINGULAIR) 10 mg tablet TAKE 1 TABLET BY MOUTH ONCE DAILY IN THE EVENING    multivitamin-Ca-iron-minerals (WOMEN'S MULTIPLE VITAMINS) 18-0.4 mg Tab Take by mouth. 1 Tablet Oral Every day    pravastatin (PRAVACHOL) 80 MG tablet TAKE ONE TABLET BY MOUTH ONCE DAILY IN THE EVENING    topiramate (TOPAMAX) 25 MG tablet Take 1 tablet (25 mg total) by mouth 2 (two) times daily.    valACYclovir (VALTREX) 1000 MG tablet Take 2 tablets (2,000 mg total) by mouth 2 (two) times daily. Take 2 pills twice daily as directed    albuterol 90 mcg/actuation inhaler Inhale 2 puffs into the lungs every 6 (six) hours as  needed for Wheezing.    cetirizine (ZYRTEC) 10 MG tablet Take 1 tablet (10 mg total) by mouth once daily.         Review of Systems   Constitutional: Positive for activity change. Negative for appetite change, chills, fatigue and fever.        Weight  83 kg (182 lb 15.7 oz) at July 9, 2018 visit. See history of present illness.   HENT: Positive for congestion and postnasal drip. Negative for rhinorrhea, sinus pressure, sinus pain, sneezing and sore throat.    Respiratory: Negative for cough, shortness of breath and wheezing.    Cardiovascular: Negative for chest pain, palpitations and leg swelling.   Gastrointestinal: Negative for abdominal pain, constipation, diarrhea, nausea and vomiting.   Genitourinary: Negative for difficulty urinating.   Musculoskeletal: Negative for back pain.   Neurological: Negative for headaches.   Psychiatric/Behavioral: Negative for dysphoric mood and suicidal ideas. The patient is not nervous/anxious.         Negative for homicidal ideas.       Objective:  Physical Exam   Constitutional: She is oriented to person, place, and time. She appears well-developed and well-nourished. No distress.   Pleasant.   HENT:   Head: Normocephalic and atraumatic.   Right Ear: External ear normal.   Left Ear: External ear normal.   Nose: Nose normal.   Mouth/Throat: Oropharynx is clear and moist. No oropharyngeal exudate.   Non tender sinuses.  TM's shiny and clear. Nasal mucosa pink, congested without drainage noted.   Eyes: Conjunctivae and EOM are normal. Pupils are equal, round, and reactive to light. Right eye exhibits no discharge. Left eye exhibits no discharge.   She wears glasses.   Neck: Normal range of motion. Neck supple. No thyromegaly present.   Cardiovascular: Normal rate, regular rhythm, normal heart sounds and intact distal pulses.   No murmur heard.  Pulmonary/Chest: Effort normal and breath sounds normal. No respiratory distress. She has no wheezes.   Abdominal: Soft. Bowel sounds are  normal. She exhibits no distension and no mass. There is no tenderness. There is no rebound and no guarding.   Musculoskeletal: Normal range of motion. She exhibits no edema or tenderness.   She is ambulatory without problems.   Lymphadenopathy:     She has no cervical adenopathy.   Neurological: She is alert and oriented to person, place, and time.   Skin: She is not diaphoretic.   Psychiatric: She has a normal mood and affect. Her behavior is normal. Judgment and thought content normal.   Vitals reviewed.      ASSESSMENT:  1. Essential hypertension    2. Obesity (BMI 30.0-34.9)        PLAN:  Angelica was seen today for hypertension, weight check and follow-up.    Diagnoses and all orders for this visit:    Essential hypertension    Obesity (BMI 30.0-34.9)  -     phentermine (ADIPEX-P) 37.5 mg tablet; Take 1 tablet (37.5 mg total) by mouth before breakfast.       Continue current medications, follow low sodium, low cholesterol, low carb diet, daily walks.  Prescription refill as noted above.  Flu shot this fall.  Follow-up in about 4 weeks (around 9/17/2018) for hypertension and weight recheck.

## 2018-09-07 RX ORDER — LISINOPRIL 20 MG/1
TABLET ORAL
Qty: 90 TABLET | Refills: 1 | Status: SHIPPED | OUTPATIENT
Start: 2018-09-07 | End: 2019-03-16 | Stop reason: SDUPTHER

## 2018-09-07 RX ORDER — AMLODIPINE BESYLATE 10 MG/1
TABLET ORAL
Qty: 90 TABLET | Refills: 1 | Status: SHIPPED | OUTPATIENT
Start: 2018-09-07 | End: 2019-03-16 | Stop reason: SDUPTHER

## 2018-09-14 ENCOUNTER — OFFICE VISIT (OUTPATIENT)
Dept: FAMILY MEDICINE | Facility: CLINIC | Age: 58
End: 2018-09-14
Payer: COMMERCIAL

## 2018-09-14 ENCOUNTER — LAB VISIT (OUTPATIENT)
Dept: LAB | Facility: HOSPITAL | Age: 58
End: 2018-09-14
Attending: FAMILY MEDICINE
Payer: COMMERCIAL

## 2018-09-14 VITALS
OXYGEN SATURATION: 97 % | SYSTOLIC BLOOD PRESSURE: 124 MMHG | HEART RATE: 95 BPM | WEIGHT: 175.69 LBS | TEMPERATURE: 98 F | HEIGHT: 61 IN | DIASTOLIC BLOOD PRESSURE: 82 MMHG | BODY MASS INDEX: 33.17 KG/M2

## 2018-09-14 DIAGNOSIS — I10 ESSENTIAL HYPERTENSION: Chronic | ICD-10-CM

## 2018-09-14 DIAGNOSIS — R68.89 COLD INTOLERANCE: ICD-10-CM

## 2018-09-14 DIAGNOSIS — E66.9 OBESITY (BMI 30.0-34.9): ICD-10-CM

## 2018-09-14 LAB
BASOPHILS # BLD AUTO: 0.04 K/UL
BASOPHILS NFR BLD: 0.4 %
DIFFERENTIAL METHOD: ABNORMAL
EOSINOPHIL # BLD AUTO: 0.1 K/UL
EOSINOPHIL NFR BLD: 0.7 %
ERYTHROCYTE [DISTWIDTH] IN BLOOD BY AUTOMATED COUNT: 14.5 %
FERRITIN SERPL-MCNC: 199 NG/ML
HCT VFR BLD AUTO: 39.9 %
HGB BLD-MCNC: 12.8 G/DL
IMM GRANULOCYTES # BLD AUTO: 0.03 K/UL
IMM GRANULOCYTES NFR BLD AUTO: 0.3 %
LYMPHOCYTES # BLD AUTO: 1.7 K/UL
LYMPHOCYTES NFR BLD: 16 %
MCH RBC QN AUTO: 31.7 PG
MCHC RBC AUTO-ENTMCNC: 32.1 G/DL
MCV RBC AUTO: 99 FL
MONOCYTES # BLD AUTO: 0.6 K/UL
MONOCYTES NFR BLD: 6.1 %
NEUTROPHILS # BLD AUTO: 7.9 K/UL
NEUTROPHILS NFR BLD: 76.5 %
NRBC BLD-RTO: 0 /100 WBC
PLATELET # BLD AUTO: 340 K/UL
PMV BLD AUTO: 11.7 FL
RBC # BLD AUTO: 4.04 M/UL
TSH SERPL DL<=0.005 MIU/L-ACNC: 0.84 UIU/ML
WBC # BLD AUTO: 10.36 K/UL

## 2018-09-14 PROCEDURE — 99999 PR PBB SHADOW E&M-EST. PATIENT-LVL III: CPT | Mod: PBBFAC,,, | Performed by: FAMILY MEDICINE

## 2018-09-14 PROCEDURE — 99214 OFFICE O/P EST MOD 30 MIN: CPT | Mod: S$GLB,,, | Performed by: FAMILY MEDICINE

## 2018-09-14 PROCEDURE — 85025 COMPLETE CBC W/AUTO DIFF WBC: CPT

## 2018-09-14 PROCEDURE — 36415 COLL VENOUS BLD VENIPUNCTURE: CPT | Mod: PO

## 2018-09-14 PROCEDURE — 3008F BODY MASS INDEX DOCD: CPT | Mod: CPTII,S$GLB,, | Performed by: FAMILY MEDICINE

## 2018-09-14 PROCEDURE — 82728 ASSAY OF FERRITIN: CPT

## 2018-09-14 PROCEDURE — 3079F DIAST BP 80-89 MM HG: CPT | Mod: CPTII,S$GLB,, | Performed by: FAMILY MEDICINE

## 2018-09-14 PROCEDURE — 3074F SYST BP LT 130 MM HG: CPT | Mod: CPTII,S$GLB,, | Performed by: FAMILY MEDICINE

## 2018-09-14 PROCEDURE — 84443 ASSAY THYROID STIM HORMONE: CPT

## 2018-09-14 RX ORDER — PHENTERMINE HYDROCHLORIDE 37.5 MG/1
37.5 TABLET ORAL
Qty: 30 TABLET | Refills: 0 | Status: SHIPPED | OUTPATIENT
Start: 2018-09-14 | End: 2018-10-14

## 2018-09-14 NOTE — PROGRESS NOTES
Angelica Flores    Chief Complaint   Patient presents with    Hypertension    Weight Check    Follow-up       History of Present Illness:   Ms. Flores  comes in today for hypertension and weight recheck.  She continues to take Adipex 37.5 mg daily for weight loss attempts without problems.  She states she desires to continue Adipex therapy.  She states she exercises by walking at least 10,000 steps 5 times a week and monitors her diet.    She reports having cold intolerance in the evenings lately (for few months but worse lately).    Otherwise, she states she feels okay today and denies having fever, chills, fatigue, appetite changes; shortness of breath, cough, wheezing; chest pain, palpitations, leg swelling; abdominal pain, nausea, vomiting, diarrhea, constipation; hot intolerance; unusual urinary symptoms; back pain; headache; anxiety, depression, homicidal or suicidal thoughts.    Labs:                  WBC                      9.96                07/09/2018                 HGB                      13.0                07/09/2018                 HCT                      40.9                07/09/2018                 PLT                      378 (H)             07/09/2018                 CHOL                     195                 07/09/2018                 TRIG                     96                  07/09/2018                 HDL                      65                  07/09/2018                 ALT                      15                  07/09/2018                 AST                      19                  07/09/2018                 NA                       139                 07/09/2018                 K                        3.8                 07/09/2018                 CL                       107                 07/09/2018                 CREATININE               0.7                 07/09/2018                 BUN                      12                  07/09/2018                 CO2                       21 (L)              07/09/2018                 TSH                      0.894               07/09/2018                 INR                      0.9                 08/18/2008                 HGBA1C                   5.6                 04/27/2016               LDLCALC                  110.8               07/09/2018                  Current Outpatient Medications   Medication Sig    ALPRAZolam (XANAX) 0.5 MG tablet 1 Tablet Oral Twice a day prn    amLODIPine (NORVASC) 10 MG tablet TAKE 1 TABLET BY MOUTH ONCE DAILY IN THE MORNING    ascorbic acid (VITAMIN C) 1000 MG tablet Take by mouth. 1 Tablet Oral Every day    BACILLUS COAGULANS (PROBIOTIC, B. COAGULANS, ORAL) Take by mouth once daily.    benzonatate (TESSALON) 100 MG capsule Take 1 capsule (100 mg total) by mouth 3 (three) times daily as needed for Cough.    cholecalciferol, vitamin D3, 2,000 unit Cap Take by mouth. 1 capsule Oral Every day    ibuprofen (ADVIL,MOTRIN) 800 MG tablet TAKE 1 TABLET BY MOUTH TWICE DAILY AS NEEDED FOR PAIN    lisinopril (PRINIVIL,ZESTRIL) 20 MG tablet TAKE 1 TABLET BY MOUTH ONCE DAILY    mercaptopurine (PURINETHOL) 50 mg tablet TAKE 1 TABLET BY MOUTH ONCE DAILY    mometasone (NASONEX) 50 mcg/actuation nasal spray USE 2 SPRAY(S) IN EACH NOSTRIL ONCE DAILY    montelukast (SINGULAIR) 10 mg tablet TAKE 1 TABLET BY MOUTH ONCE DAILY IN THE EVENING    multivitamin-Ca-iron-minerals (WOMEN'S MULTIPLE VITAMINS) 18-0.4 mg Tab Take by mouth. 1 Tablet Oral Every day    phentermine (ADIPEX-P) 37.5 mg tablet Take 1 tablet (37.5 mg total) by mouth before breakfast.    pravastatin (PRAVACHOL) 80 MG tablet TAKE ONE TABLET BY MOUTH ONCE DAILY IN THE EVENING    topiramate (TOPAMAX) 25 MG tablet Take 1 tablet (25 mg total) by mouth 2 (two) times daily.    valACYclovir (VALTREX) 1000 MG tablet Take 2 tablets (2,000 mg total) by mouth 2 (two) times daily. Take 2 pills twice daily as directed    albuterol 90 mcg/actuation inhaler  Inhale 2 puffs into the lungs every 6 (six) hours as needed for Wheezing.    cetirizine (ZYRTEC) 10 MG tablet Take 1 tablet (10 mg total) by mouth once daily.     Review of Systems   Constitutional: Negative for activity change, appetite change, chills, fatigue and fever.        Weight  79.4 kg (175 lb 0.7 oz) at August 17, 2018 visit. See history of present illness.   Respiratory: Negative for cough, shortness of breath and wheezing.    Cardiovascular: Negative for chest pain, palpitations and leg swelling.   Gastrointestinal: Negative for abdominal pain, constipation, diarrhea, nausea and vomiting.   Endocrine: Positive for cold intolerance. Negative for heat intolerance.   Genitourinary: Negative for difficulty urinating.   Musculoskeletal: Negative for back pain.   Neurological: Negative for headaches.   Psychiatric/Behavioral: Negative for dysphoric mood and suicidal ideas. The patient is not nervous/anxious.         Negative for homicidal ideas.       Objective:  Physical Exam   Constitutional: She is oriented to person, place, and time. She appears well-developed and well-nourished. No distress.   Pleasant.   Eyes:   She wears glasses.   Neck: Normal range of motion. Neck supple. No thyromegaly present.   Cardiovascular: Normal rate, regular rhythm, normal heart sounds and intact distal pulses.   No murmur heard.  Pulmonary/Chest: Effort normal and breath sounds normal. No respiratory distress. She has no wheezes.   Abdominal: Soft. Bowel sounds are normal. She exhibits no distension and no mass. There is no tenderness. There is no rebound and no guarding.   Musculoskeletal: Normal range of motion. She exhibits no edema or tenderness.   She is ambulatory without problems.   Lymphadenopathy:     She has no cervical adenopathy.   Neurological: She is alert and oriented to person, place, and time.   Skin: She is not diaphoretic.   Psychiatric: She has a normal mood and affect. Her behavior is normal. Judgment  and thought content normal.   Vitals reviewed.      ASSESSMENT:  1. Essential hypertension    2. Cold intolerance    3. Obesity (BMI 30.0-34.9)        PLAN:  Angelica was seen today for hypertension, weight check and follow-up.    Diagnoses and all orders for this visit:    Essential hypertension    Cold intolerance  -     CBC auto differential; Future  -     TSH; Future  -     Ferritin; Future    Obesity (BMI 30.0-34.9)  -     phentermine (ADIPEX-P) 37.5 mg tablet; Take 1 tablet (37.5 mg total) by mouth before breakfast.       Patient advised to call for results.  Continue current medications, follow low sodium, low cholesterol, low carb diet, daily walks.  Prescription refill as noted above.  Follow-up in about 5 weeks (around 10/17/2018) for hypertension and weight recheck.

## 2018-12-03 DIAGNOSIS — I10 ESSENTIAL HYPERTENSION: Chronic | ICD-10-CM

## 2018-12-03 DIAGNOSIS — J30.9 ALLERGIC RHINITIS: ICD-10-CM

## 2018-12-03 DIAGNOSIS — J32.0 MAXILLARY SINUSITIS, UNSPECIFIED CHRONICITY: ICD-10-CM

## 2018-12-04 RX ORDER — CETIRIZINE HYDROCHLORIDE 10 MG/1
TABLET, FILM COATED ORAL
Qty: 90 TABLET | Refills: 1 | Status: SHIPPED | OUTPATIENT
Start: 2018-12-04 | End: 2019-09-20 | Stop reason: SDUPTHER

## 2018-12-04 RX ORDER — MONTELUKAST SODIUM 10 MG/1
TABLET ORAL
Qty: 90 TABLET | Refills: 0 | Status: SHIPPED | OUTPATIENT
Start: 2018-12-04 | End: 2019-02-22 | Stop reason: SDUPTHER

## 2018-12-04 RX ORDER — IBUPROFEN 800 MG/1
TABLET ORAL
Qty: 180 TABLET | Refills: 0 | Status: SHIPPED | OUTPATIENT
Start: 2018-12-04 | End: 2019-02-22 | Stop reason: SDUPTHER

## 2018-12-18 ENCOUNTER — OFFICE VISIT (OUTPATIENT)
Dept: INTERNAL MEDICINE | Facility: CLINIC | Age: 58
End: 2018-12-18
Payer: COMMERCIAL

## 2018-12-18 VITALS
WEIGHT: 180.31 LBS | TEMPERATURE: 98 F | SYSTOLIC BLOOD PRESSURE: 118 MMHG | HEIGHT: 61 IN | DIASTOLIC BLOOD PRESSURE: 78 MMHG | BODY MASS INDEX: 34.04 KG/M2 | HEART RATE: 92 BPM | OXYGEN SATURATION: 97 %

## 2018-12-18 DIAGNOSIS — J01.90 ACUTE SINUSITIS, RECURRENCE NOT SPECIFIED, UNSPECIFIED LOCATION: Primary | ICD-10-CM

## 2018-12-18 PROCEDURE — 99214 OFFICE O/P EST MOD 30 MIN: CPT | Mod: 25,S$GLB,, | Performed by: PHYSICIAN ASSISTANT

## 2018-12-18 PROCEDURE — 3008F BODY MASS INDEX DOCD: CPT | Mod: CPTII,S$GLB,, | Performed by: PHYSICIAN ASSISTANT

## 2018-12-18 PROCEDURE — 99999 PR PBB SHADOW E&M-EST. PATIENT-LVL V: CPT | Mod: PBBFAC,,, | Performed by: PHYSICIAN ASSISTANT

## 2018-12-18 PROCEDURE — 96372 THER/PROPH/DIAG INJ SC/IM: CPT | Mod: S$GLB,,, | Performed by: PHYSICIAN ASSISTANT

## 2018-12-18 PROCEDURE — 3074F SYST BP LT 130 MM HG: CPT | Mod: CPTII,S$GLB,, | Performed by: PHYSICIAN ASSISTANT

## 2018-12-18 PROCEDURE — 3078F DIAST BP <80 MM HG: CPT | Mod: CPTII,S$GLB,, | Performed by: PHYSICIAN ASSISTANT

## 2018-12-18 RX ORDER — BETAMETHASONE SODIUM PHOSPHATE AND BETAMETHASONE ACETATE 3; 3 MG/ML; MG/ML
6 INJECTION, SUSPENSION INTRA-ARTICULAR; INTRALESIONAL; INTRAMUSCULAR; SOFT TISSUE
Status: COMPLETED | OUTPATIENT
Start: 2018-12-18 | End: 2018-12-18

## 2018-12-18 RX ORDER — DOXYCYCLINE 100 MG/1
100 CAPSULE ORAL 2 TIMES DAILY
Qty: 20 CAPSULE | Refills: 0 | Status: SHIPPED | OUTPATIENT
Start: 2018-12-18 | End: 2018-12-28

## 2018-12-18 RX ADMIN — BETAMETHASONE SODIUM PHOSPHATE AND BETAMETHASONE ACETATE 6 MG: 3; 3 INJECTION, SUSPENSION INTRA-ARTICULAR; INTRALESIONAL; INTRAMUSCULAR; SOFT TISSUE at 04:12

## 2018-12-18 NOTE — PROGRESS NOTES
Subjective:      Patient ID: Angelica Flores is a 58 y.o. female.    Chief Complaint: Sore Throat; URI; Cough (with green phlem); and Hoarse    URI    This is a new problem. The current episode started in the past 7 days. The problem has been gradually worsening. There has been no fever. Associated symptoms include congestion, coughing, headaches, rhinorrhea, sinus pain, sneezing, a sore throat and wheezing. Pertinent negatives include no abdominal pain, chest pain, diarrhea, dysuria, ear pain, joint pain, joint swelling, nausea, neck pain, plugged ear sensation, rash, swollen glands or vomiting. She has tried NSAIDs (tessalon pearls, albuterol inhaler, tea, coridicin hbp) for the symptoms. The treatment provided mild relief.       Review of Systems   Constitutional: Positive for activity change, appetite change, chills and fatigue. Negative for diaphoresis, fever and unexpected weight change.   HENT: Positive for congestion, postnasal drip, rhinorrhea, sinus pressure, sinus pain, sneezing and sore throat. Negative for dental problem, drooling, ear discharge, ear pain, facial swelling, hearing loss, mouth sores, nosebleeds and trouble swallowing.    Eyes: Negative for pain, discharge, redness, itching and visual disturbance.   Respiratory: Positive for cough and wheezing. Negative for chest tightness and shortness of breath.    Cardiovascular: Negative for chest pain, palpitations and leg swelling.   Gastrointestinal: Negative for abdominal pain, constipation, diarrhea, nausea and vomiting.   Endocrine: Negative.    Genitourinary: Negative.  Negative for difficulty urinating and dysuria.   Musculoskeletal: Positive for myalgias. Negative for joint pain, neck pain and neck stiffness.   Skin: Negative for rash.   Allergic/Immunologic: Negative for environmental allergies, food allergies and immunocompromised state.   Neurological: Positive for headaches. Negative for dizziness and weakness.     Objective:   BP  "118/78   Pulse 92   Temp 98.3 °F (36.8 °C) (Oral)   Ht 5' 1" (1.549 m)   Wt 81.8 kg (180 lb 5.4 oz)   SpO2 97%   BMI 34.07 kg/m²     Physical Exam   Constitutional: She is oriented to person, place, and time. She appears well-developed and well-nourished.   HENT:   Head: Normocephalic and atraumatic.   Right Ear: Hearing, tympanic membrane, external ear and ear canal normal. No tenderness.   Left Ear: Hearing, tympanic membrane, external ear and ear canal normal. No tenderness.   Nose: Mucosal edema and rhinorrhea present. No sinus tenderness. Right sinus exhibits no maxillary sinus tenderness and no frontal sinus tenderness. Left sinus exhibits maxillary sinus tenderness and frontal sinus tenderness.   Mouth/Throat: Uvula is midline and mucous membranes are normal. No oral lesions. Normal dentition. No dental abscesses, uvula swelling or dental caries. Oropharyngeal exudate and posterior oropharyngeal erythema present. No posterior oropharyngeal edema or tonsillar abscesses. No tonsillar exudate.   Eyes: Conjunctivae and EOM are normal. Pupils are equal, round, and reactive to light. Right eye exhibits no discharge. Left eye exhibits no discharge.   Neck: Normal range of motion. Neck supple.   Cardiovascular: Normal rate, regular rhythm and normal heart sounds. Exam reveals no gallop and no friction rub.   No murmur heard.  Pulmonary/Chest: Effort normal and breath sounds normal. No respiratory distress. She has no wheezes. She has no rales.   Lymphadenopathy:     She has no cervical adenopathy.   Neurological: She is alert and oriented to person, place, and time.   Skin: Skin is warm. No rash noted. No erythema. No pallor.   Psychiatric: She has a normal mood and affect. Her behavior is normal. Judgment and thought content normal.   Nursing note and vitals reviewed.      Assessment:     1. Acute sinusitis, recurrence not specified, unspecified location      Plan:   Acute sinusitis, recurrence not specified, " unspecified location  -     betamethasone acetate-betamethasone sodium phosphate injection 6 mg  -     doxycycline (MONODOX) 100 MG capsule; Take 1 capsule (100 mg total) by mouth 2 (two) times daily. Do not take with milk or yogurt for 10 days  Dispense: 20 capsule; Refill: 0    Steroids can increase blood sugar and blood pressure. Therefore, diabetics need to check their blood sugar regularly while taking a steroid. Patients with hypertension need to check their blood pressure regularly as well.    -continue coricidin hbp  Educational handout on over-the-counter medications and at-home conservative care, pertinent to the patients diagnosis today, was handed to the patient and discussed in detail.    Follow-up if symptoms worsen or fail to improve.

## 2019-02-22 DIAGNOSIS — E78.5 HYPERLIPIDEMIA, UNSPECIFIED HYPERLIPIDEMIA TYPE: ICD-10-CM

## 2019-02-24 RX ORDER — IBUPROFEN 800 MG/1
TABLET ORAL
Qty: 180 TABLET | Refills: 0 | Status: SHIPPED | OUTPATIENT
Start: 2019-02-24 | End: 2019-09-20 | Stop reason: SDUPTHER

## 2019-02-24 RX ORDER — PRAVASTATIN SODIUM 80 MG/1
TABLET ORAL
Qty: 90 TABLET | Refills: 1 | Status: SHIPPED | OUTPATIENT
Start: 2019-02-24 | End: 2019-09-20 | Stop reason: SDUPTHER

## 2019-02-24 RX ORDER — MONTELUKAST SODIUM 10 MG/1
TABLET ORAL
Qty: 90 TABLET | Refills: 0 | Status: SHIPPED | OUTPATIENT
Start: 2019-02-24 | End: 2019-06-01 | Stop reason: SDUPTHER

## 2019-03-16 DIAGNOSIS — K50.919 CROHN'S DISEASE WITH COMPLICATION, UNSPECIFIED GASTROINTESTINAL TRACT LOCATION: ICD-10-CM

## 2019-03-16 RX ORDER — LISINOPRIL 20 MG/1
TABLET ORAL
Qty: 90 TABLET | Refills: 1 | Status: SHIPPED | OUTPATIENT
Start: 2019-03-16 | End: 2019-09-18 | Stop reason: SDUPTHER

## 2019-03-16 RX ORDER — AMLODIPINE BESYLATE 10 MG/1
TABLET ORAL
Qty: 90 TABLET | Refills: 1 | Status: SHIPPED | OUTPATIENT
Start: 2019-03-16 | End: 2019-09-18 | Stop reason: SDUPTHER

## 2019-03-17 RX ORDER — MERCAPTOPURINE 50 MG/1
TABLET ORAL
Qty: 90 TABLET | Refills: 2 | Status: SHIPPED | OUTPATIENT
Start: 2019-03-17 | End: 2020-01-23 | Stop reason: SDUPTHER

## 2019-03-21 DIAGNOSIS — Z76.0 MEDICATION REFILL: ICD-10-CM

## 2019-03-21 RX ORDER — VALACYCLOVIR HYDROCHLORIDE 1 G/1
TABLET, FILM COATED ORAL
Qty: 30 TABLET | Refills: 0 | Status: SHIPPED | OUTPATIENT
Start: 2019-03-21 | End: 2019-07-31 | Stop reason: SDUPTHER

## 2019-06-01 DIAGNOSIS — G43.909 MIGRAINE WITHOUT STATUS MIGRAINOSUS, NOT INTRACTABLE, UNSPECIFIED MIGRAINE TYPE: ICD-10-CM

## 2019-06-01 RX ORDER — TOPIRAMATE 25 MG/1
TABLET ORAL
Qty: 180 TABLET | Refills: 1 | Status: SHIPPED | OUTPATIENT
Start: 2019-06-01 | End: 2019-12-30

## 2019-06-01 RX ORDER — MONTELUKAST SODIUM 10 MG/1
TABLET ORAL
Qty: 90 TABLET | Refills: 1 | Status: SHIPPED | OUTPATIENT
Start: 2019-06-01 | End: 2019-12-30

## 2019-06-27 DIAGNOSIS — F34.1 DYSTHYMIC DISORDER: Chronic | ICD-10-CM

## 2019-06-27 RX ORDER — ALPRAZOLAM 0.5 MG/1
TABLET ORAL
Qty: 30 TABLET | Refills: 5 | Status: SHIPPED | OUTPATIENT
Start: 2019-06-27 | End: 2019-12-30

## 2019-07-31 ENCOUNTER — OFFICE VISIT (OUTPATIENT)
Dept: FAMILY MEDICINE | Facility: CLINIC | Age: 59
End: 2019-07-31
Payer: COMMERCIAL

## 2019-07-31 ENCOUNTER — LAB VISIT (OUTPATIENT)
Dept: LAB | Facility: HOSPITAL | Age: 59
End: 2019-07-31
Attending: FAMILY MEDICINE
Payer: COMMERCIAL

## 2019-07-31 VITALS
HEIGHT: 61 IN | HEART RATE: 80 BPM | WEIGHT: 200.63 LBS | OXYGEN SATURATION: 98 % | BODY MASS INDEX: 37.88 KG/M2 | TEMPERATURE: 98 F | SYSTOLIC BLOOD PRESSURE: 112 MMHG | DIASTOLIC BLOOD PRESSURE: 76 MMHG

## 2019-07-31 DIAGNOSIS — E66.9 OBESITY (BMI 30.0-34.9): ICD-10-CM

## 2019-07-31 DIAGNOSIS — E55.9 VITAMIN D DEFICIENCY: ICD-10-CM

## 2019-07-31 DIAGNOSIS — K50.90 CROHN'S DISEASE WITHOUT COMPLICATION, UNSPECIFIED GASTROINTESTINAL TRACT LOCATION: ICD-10-CM

## 2019-07-31 DIAGNOSIS — Z00.00 ANNUAL PHYSICAL EXAM: ICD-10-CM

## 2019-07-31 DIAGNOSIS — Z76.0 MEDICATION REFILL: ICD-10-CM

## 2019-07-31 DIAGNOSIS — F34.1 DYSTHYMIC DISORDER: Chronic | ICD-10-CM

## 2019-07-31 DIAGNOSIS — Z00.00 ANNUAL PHYSICAL EXAM: Primary | ICD-10-CM

## 2019-07-31 DIAGNOSIS — G43.909 MIGRAINE WITHOUT STATUS MIGRAINOSUS, NOT INTRACTABLE, UNSPECIFIED MIGRAINE TYPE: ICD-10-CM

## 2019-07-31 DIAGNOSIS — I10 ESSENTIAL HYPERTENSION: Chronic | ICD-10-CM

## 2019-07-31 DIAGNOSIS — J30.2 SEASONAL ALLERGIC RHINITIS, UNSPECIFIED TRIGGER: ICD-10-CM

## 2019-07-31 DIAGNOSIS — Z78.0 POSTMENOPAUSAL: ICD-10-CM

## 2019-07-31 DIAGNOSIS — Z12.31 VISIT FOR SCREENING MAMMOGRAM: ICD-10-CM

## 2019-07-31 DIAGNOSIS — E78.5 HYPERLIPIDEMIA, UNSPECIFIED HYPERLIPIDEMIA TYPE: ICD-10-CM

## 2019-07-31 LAB
25(OH)D3+25(OH)D2 SERPL-MCNC: 41 NG/ML (ref 30–96)
ALBUMIN SERPL BCP-MCNC: 4 G/DL (ref 3.5–5.2)
ALP SERPL-CCNC: 72 U/L (ref 55–135)
ALT SERPL W/O P-5'-P-CCNC: 15 U/L (ref 10–44)
ANION GAP SERPL CALC-SCNC: 11 MMOL/L (ref 8–16)
AST SERPL-CCNC: 17 U/L (ref 10–40)
BACTERIA #/AREA URNS AUTO: ABNORMAL /HPF
BASOPHILS # BLD AUTO: 0.05 K/UL (ref 0–0.2)
BASOPHILS NFR BLD: 0.7 % (ref 0–1.9)
BILIRUB SERPL-MCNC: 0.2 MG/DL (ref 0.1–1)
BILIRUB UR QL STRIP: NEGATIVE
BUN SERPL-MCNC: 17 MG/DL (ref 6–20)
CALCIUM SERPL-MCNC: 9.5 MG/DL (ref 8.7–10.5)
CHLORIDE SERPL-SCNC: 111 MMOL/L (ref 95–110)
CHOLEST SERPL-MCNC: 190 MG/DL (ref 120–199)
CHOLEST/HDLC SERPL: 2.6 {RATIO} (ref 2–5)
CLARITY UR REFRACT.AUTO: ABNORMAL
CO2 SERPL-SCNC: 20 MMOL/L (ref 23–29)
COLOR UR AUTO: YELLOW
CREAT SERPL-MCNC: 0.8 MG/DL (ref 0.5–1.4)
DIFFERENTIAL METHOD: ABNORMAL
EOSINOPHIL # BLD AUTO: 0.2 K/UL (ref 0–0.5)
EOSINOPHIL NFR BLD: 2.2 % (ref 0–8)
ERYTHROCYTE [DISTWIDTH] IN BLOOD BY AUTOMATED COUNT: 14.4 % (ref 11.5–14.5)
EST. GFR  (AFRICAN AMERICAN): >60 ML/MIN/1.73 M^2
EST. GFR  (NON AFRICAN AMERICAN): >60 ML/MIN/1.73 M^2
GLUCOSE SERPL-MCNC: 73 MG/DL (ref 70–110)
GLUCOSE UR QL STRIP: NEGATIVE
HCT VFR BLD AUTO: 41.7 % (ref 37–48.5)
HDLC SERPL-MCNC: 73 MG/DL (ref 40–75)
HDLC SERPL: 38.4 % (ref 20–50)
HGB BLD-MCNC: 13.3 G/DL (ref 12–16)
HGB UR QL STRIP: NEGATIVE
IMM GRANULOCYTES # BLD AUTO: 0.03 K/UL (ref 0–0.04)
IMM GRANULOCYTES NFR BLD AUTO: 0.4 % (ref 0–0.5)
KETONES UR QL STRIP: NEGATIVE
LDLC SERPL CALC-MCNC: 107.6 MG/DL (ref 63–159)
LEUKOCYTE ESTERASE UR QL STRIP: ABNORMAL
LYMPHOCYTES # BLD AUTO: 2 K/UL (ref 1–4.8)
LYMPHOCYTES NFR BLD: 26.2 % (ref 18–48)
MCH RBC QN AUTO: 30.5 PG (ref 27–31)
MCHC RBC AUTO-ENTMCNC: 31.9 G/DL (ref 32–36)
MCV RBC AUTO: 96 FL (ref 82–98)
MICROSCOPIC COMMENT: ABNORMAL
MONOCYTES # BLD AUTO: 0.5 K/UL (ref 0.3–1)
MONOCYTES NFR BLD: 6.5 % (ref 4–15)
NEUTROPHILS # BLD AUTO: 4.9 K/UL (ref 1.8–7.7)
NEUTROPHILS NFR BLD: 64 % (ref 38–73)
NITRITE UR QL STRIP: NEGATIVE
NONHDLC SERPL-MCNC: 117 MG/DL
NRBC BLD-RTO: 0 /100 WBC
PH UR STRIP: 5 [PH] (ref 5–8)
PLATELET # BLD AUTO: 321 K/UL (ref 150–350)
PMV BLD AUTO: 11.1 FL (ref 9.2–12.9)
POTASSIUM SERPL-SCNC: 3.9 MMOL/L (ref 3.5–5.1)
PROT SERPL-MCNC: 7.9 G/DL (ref 6–8.4)
PROT UR QL STRIP: NEGATIVE
RBC # BLD AUTO: 4.36 M/UL (ref 4–5.4)
RBC #/AREA URNS AUTO: 1 /HPF (ref 0–4)
SODIUM SERPL-SCNC: 142 MMOL/L (ref 136–145)
SP GR UR STRIP: 1.01 (ref 1–1.03)
SQUAMOUS #/AREA URNS AUTO: 0 /HPF
TRIGL SERPL-MCNC: 47 MG/DL (ref 30–150)
TSH SERPL DL<=0.005 MIU/L-ACNC: 0.67 UIU/ML (ref 0.4–4)
URN SPEC COLLECT METH UR: ABNORMAL
WBC # BLD AUTO: 7.59 K/UL (ref 3.9–12.7)
WBC #/AREA URNS AUTO: 5 /HPF (ref 0–5)

## 2019-07-31 PROCEDURE — 3078F PR MOST RECENT DIASTOLIC BLOOD PRESSURE < 80 MM HG: ICD-10-PCS | Mod: CPTII,S$GLB,, | Performed by: FAMILY MEDICINE

## 2019-07-31 PROCEDURE — 84443 ASSAY THYROID STIM HORMONE: CPT

## 2019-07-31 PROCEDURE — 99396 PR PREVENTIVE VISIT,EST,40-64: ICD-10-PCS | Mod: 25,S$GLB,, | Performed by: FAMILY MEDICINE

## 2019-07-31 PROCEDURE — 3078F DIAST BP <80 MM HG: CPT | Mod: CPTII,S$GLB,, | Performed by: FAMILY MEDICINE

## 2019-07-31 PROCEDURE — 36415 COLL VENOUS BLD VENIPUNCTURE: CPT | Mod: PO

## 2019-07-31 PROCEDURE — 85025 COMPLETE CBC W/AUTO DIFF WBC: CPT

## 2019-07-31 PROCEDURE — 88175 CYTOPATH C/V AUTO FLUID REDO: CPT

## 2019-07-31 PROCEDURE — 96372 THER/PROPH/DIAG INJ SC/IM: CPT | Mod: S$GLB,,, | Performed by: FAMILY MEDICINE

## 2019-07-31 PROCEDURE — 80053 COMPREHEN METABOLIC PANEL: CPT

## 2019-07-31 PROCEDURE — 82306 VITAMIN D 25 HYDROXY: CPT

## 2019-07-31 PROCEDURE — 99396 PREV VISIT EST AGE 40-64: CPT | Mod: 25,S$GLB,, | Performed by: FAMILY MEDICINE

## 2019-07-31 PROCEDURE — 99999 PR PBB SHADOW E&M-EST. PATIENT-LVL IV: CPT | Mod: PBBFAC,,, | Performed by: FAMILY MEDICINE

## 2019-07-31 PROCEDURE — 3074F SYST BP LT 130 MM HG: CPT | Mod: CPTII,S$GLB,, | Performed by: FAMILY MEDICINE

## 2019-07-31 PROCEDURE — 99999 PR PBB SHADOW E&M-EST. PATIENT-LVL IV: ICD-10-PCS | Mod: PBBFAC,,, | Performed by: FAMILY MEDICINE

## 2019-07-31 PROCEDURE — 81001 URINALYSIS AUTO W/SCOPE: CPT

## 2019-07-31 PROCEDURE — 80061 LIPID PANEL: CPT

## 2019-07-31 PROCEDURE — 3074F PR MOST RECENT SYSTOLIC BLOOD PRESSURE < 130 MM HG: ICD-10-PCS | Mod: CPTII,S$GLB,, | Performed by: FAMILY MEDICINE

## 2019-07-31 PROCEDURE — 96372 PR INJECTION,THERAP/PROPH/DIAG2ST, IM OR SUBCUT: ICD-10-PCS | Mod: S$GLB,,, | Performed by: FAMILY MEDICINE

## 2019-07-31 RX ORDER — HYDROQUINONE 40 MG/G
CREAM TOPICAL 2 TIMES DAILY
Qty: 28.35 G | Refills: 0 | Status: SHIPPED | OUTPATIENT
Start: 2019-07-31 | End: 2019-08-30

## 2019-07-31 RX ORDER — LEVOCETIRIZINE DIHYDROCHLORIDE 5 MG/1
5 TABLET, FILM COATED ORAL NIGHTLY
COMMUNITY
End: 2021-10-04

## 2019-07-31 RX ORDER — PHENTERMINE HYDROCHLORIDE 37.5 MG/1
37.5 TABLET ORAL
Qty: 30 TABLET | Refills: 0 | Status: SHIPPED | OUTPATIENT
Start: 2019-07-31 | End: 2019-09-05 | Stop reason: SDUPTHER

## 2019-07-31 RX ORDER — ACYCLOVIR 50 MG/G
CREAM TOPICAL
Qty: 2 G | Refills: 0 | Status: SHIPPED | OUTPATIENT
Start: 2019-07-31 | End: 2020-02-11

## 2019-07-31 RX ORDER — METHYLPREDNISOLONE ACETATE 80 MG/ML
80 INJECTION, SUSPENSION INTRA-ARTICULAR; INTRALESIONAL; INTRAMUSCULAR; SOFT TISSUE ONCE
Status: COMPLETED | OUTPATIENT
Start: 2019-07-31 | End: 2019-07-31

## 2019-07-31 RX ORDER — VALACYCLOVIR HYDROCHLORIDE 1 G/1
2000 TABLET, FILM COATED ORAL 2 TIMES DAILY
Qty: 30 TABLET | Refills: 0 | Status: SHIPPED | OUTPATIENT
Start: 2019-07-31 | End: 2020-12-08 | Stop reason: SDUPTHER

## 2019-07-31 RX ADMIN — METHYLPREDNISOLONE ACETATE 80 MG: 80 INJECTION, SUSPENSION INTRA-ARTICULAR; INTRALESIONAL; INTRAMUSCULAR; SOFT TISSUE at 10:07

## 2019-07-31 NOTE — PROGRESS NOTES
HISTORY OF PRESENT ILLNESS: Ms. Mcfadden comes in today fasting but with taking medication for annual wellness examination.      END OF LIFE DECISION: She does not have a living will and states she does not desire life support.  However, she states she is an organ donor.    Current Outpatient Medications   Medication Sig    albuterol 90 mcg/actuation inhaler Inhale 2 puffs into the lungs every 6 (six) hours as needed for Wheezing.    ALLERGY RELIEF, CETIRIZINE, 10 mg tablet TAKE 1 TABLET BY MOUTH ONCE DAILY    ALPRAZolam (XANAX) 0.5 MG tablet TAKE 1 TABLET BY MOUTH TWICE DAILY AS NEEDED    amLODIPine (NORVASC) 10 MG tablet TAKE 1 TABLET BY MOUTH ONCE DAILY IN THE MORNING    ascorbic acid (VITAMIN C) 1000 MG tablet Take by mouth. 1 Tablet Oral Every day    BACILLUS COAGULANS (PROBIOTIC, B. COAGULANS, ORAL) Take by mouth once daily.    cholecalciferol, vitamin D3, 2,000 unit Cap Take by mouth. 1 capsule Oral Every day    ibuprofen (ADVIL,MOTRIN) 800 MG tablet TAKE 1 TABLET BY MOUTH TWICE DAILY AS NEEDED FOR PAIN    levocetirizine (XYZAL) 5 MG tablet Take 5 mg by mouth every evening.    lisinopril (PRINIVIL,ZESTRIL) 20 MG tablet TAKE 1 TABLET BY MOUTH ONCE DAILY    mercaptopurine (PURINETHOL) 50 mg tablet TAKE 1 TABLET BY MOUTH ONCE DAILY    mometasone (NASONEX) 50 mcg/actuation nasal spray USE 2 SPRAY(S) IN EACH NOSTRIL ONCE DAILY    montelukast (SINGULAIR) 10 mg tablet TAKE 1 TABLET BY MOUTH ONCE DAILY IN THE EVENING    multivitamin-Ca-iron-minerals (WOMEN'S MULTIPLE VITAMINS) 18-0.4 mg Tab Take by mouth. 1 Tablet Oral Every day    pravastatin (PRAVACHOL) 80 MG tablet TAKE 1 TABLET BY MOUTH ONCE DAILY IN THE EVENING    topiramate (TOPAMAX) 25 MG tablet TAKE 1 TABLET BY MOUTH TWICE DAILY    valACYclovir (VALTREX) 1000 MG tablet TAKE 2 TABLETS BY MOUTH TWICE DAILY      SCREENINGS:       Cholesterol: July 9, 2018.     FFS/Colonoscopy: August 11, 2017 - Crohn's disease; repeat in 5 years.     Mammogram:  August 3, 2018 - okay.      GYN Exam (breast): June 9, 2018 - okay. May 5, 2016 pap smear - okay. Desires pap smear every 3 years (due 2019).     Dexa Scan:  Never.  Reports will wait until age 59.     Eye Exam: January 2018 at [facility] per patient.  She wears glasses.     PPD: Negative in the past.     Immunizations:  Td/Tdap - May 5, 2016.                              Gardasil - N./A.                              Zostavax - Never. Reports history of varicella and zoster. She declines.                               Shingrix - Never. Reports history of varicella and zoster. She desires when available.                              Pneumovax - Never.                              Seasonal Flu - September 2018 at [facility] (her job) per patient.     ROS:  GENERAL: Denies fever, chills, fatigue or unusual weight change. Appetite normal. Weight 79.7 kg (175 lb 11.3 oz) at September 14, 2019 visit. Monitors diet by eater smaller portions but eats out a lot.  Reports no longer exercises due to her work hours. Desires to take phentermine again to help with weight loss; reports took appx. 1 year ago with help and without problems  SKIN: Denies rashes, itching, changes in mole, color or texture of skin or easy bruising.  HEAD:  Denies headaches or recent head trauma.  EYES: Denies change in vision, pain, diplopia, redness or discharge.  EARS: Denies ear pain, discharge, vertigo or decreased hearing.  NOSE: Denies loss of smell, epistaxis or rhinitis except reports nasal congestion and uses Nasonex, Xyzal with help.  MOUTH & THROAT: Denies hoarseness or change in voice. Denies excessive gum bleeding or mouth sores.  Denies sore throat.  NODES: Denies swollen glands.  CHEST: Denies MIKE, wheezing, cough, or sputum production.  CARDIOVASCULAR: Denies chest pain, PND, orthopnea or reduced exercise tolerance.  Denies palpitations.  ABDOMEN: Denies nausea, vomiting, diarrhea, abdominal pain, or blood in stool except reports intermittent  "constipation.  Saw Dr. Wood, gastroenterologist, on July 6, 2017 for surveillance of Crohn's.  URINARY: Denies flank pain, dysuria or hematuria.  GENITOURINARY: Denies flank pain, dysuria, frequency or hematuria.  Performs monthly breast self-examinations.  ENDOCRINE: Denies diabetes or thyroid problems.  HEME/LYMPH: Denies bleeding problems.  PERIPHERAL VASCULAR:Denies claudication or cyanosis.  MUSCULOSKELETAL: Denies joint stiffness, pain or swelling. Reports occasional ankle swelling but eats out a lot.   NEUROLOGIC: Denies history of seizures, tremors, paralysis, alteration of gait or coordination.  PSYCHIATRIC: Denies mood swings, uncontrolled depression or anxiety, homicidal or suicidal thoughts. Denies sleep problems. However, reports stressed some with family issues.    PE:   VS: /76   Pulse 80   Temp 98.3 °F (36.8 °C) (Oral)   Ht 5' 1" (1.549 m)   Wt 91 kg (200 lb 9.9 oz)   SpO2 98%   BMI 37.91 kg/m²   APPEARANCE:  Well nourished, well developed female, obese and pleasant, alert and oriented in no acute distress.    HEAD: Nontender. Full range of motion.  EYES: PERRL, conjunctiva pink, lids no edema.  She wears glasses.  EARS: External canal patent, no swelling or redness. TM's shiny and clear.  NOSE: Mucosa and turbinates inflammed, very swollen (right > left). No discharge. Nontender sinuses.  THROAT: No pharyngeal erythema or exudate. No stridor.   NECK: Supple, no mass, thyroid not enlarged.  NODES: No cervical, axillary or inguinal lymph node enlargement.  CHEST: Normal respiratory effort. Lungs clear to auscultation.  CARDIOVASCULAR: Normal S1, S2. No rubs, murmurs or gallops. PMI not displaced. No carotid bruit. Pedal pulses palpable bilaterally. No edema.  ABDOMEN: Bowel sounds present. Not distended. Soft. No tenderness, masses or organomegaly.  BREAST EXAM: Symmetrical, no external lesions, no discharge, no masses palpated.  PELVIC EXAM: No external lesions noted, no discharge, " absent cervix and uterus. Bimanual exam showed no adnexal masses or tenderness. Urethra and bladder intact.   RECTAL EXAM: No external hemorrhoids or anal fissures. Heme-negative stool in the rectal vault.  MUSCULOSKELETAL: No joint deformities or stiffness.  She is ambulatory without problems.  SKIN: No rashes or suspicious lesions, normal color and turgor.  NEUROLOGIC:   Cranial Nerves: II-XII grossly intact.   DTR's: Knees, Ankles 2+ and equal bilaterally. Gait & Posture: Normal gait and fine motion.  PSYCHIATRIC: Patient alert, oriented x 3. Mood/Affect normal without acute anxiety and depression noted. Judgment/insight good as she makes appropriate decisions on today's examination.     ASSESSMENT:    ICD-10-CM ICD-9-CM    1. Annual physical exam Z00.00 V70.0 CBC auto differential      TSH      Urinalysis      Comprehensive metabolic panel      Lipid panel      Vitamin D      Liquid-based pap smear, screening   2. Essential hypertension I10 401.9    3. Hyperlipidemia, unspecified hyperlipidemia type E78.5 272.4    4. Vitamin D deficiency E55.9 268.9    5. Crohn's disease without complication, unspecified gastrointestinal tract location K50.90 555.9    6. Seasonal allergic rhinitis, unspecified trigger J30.2 477.9 methylPREDNISolone acetate injection 80 mg   7. Migraine without status migrainosus, not intractable, unspecified migraine type G43.909 346.90    8. Dysthymic disorder F34.1 300.4    9. Obesity (BMI 30.0-34.9) E66.9 278.00 phentermine (ADIPEX-P) 37.5 mg tablet   10. Postmenopausal Z78.0 V49.81 DXA Bone Density Spine And Hip   11. Visit for screening mammogram Z12.31 V76.12 Mammo Digital Screening Bilat   12. Medication refill Z76.0 V68.1 valACYclovir (VALTREX) 1000 MG tablet      acyclovir 5% (ZOVIRAX) 5 % Crea      hydroquinone 4 % Crea     PLAN:  1. Age-appropriate counseling-appropriate low-sodium, low-cholesterol, low carbohydrate diet and exercise daily, monthly breast self exam, annual wellness  examination.   2. Patient advised to call for results.  3. Continue current medications.  4. Prescription refills per patient request as noted above.  5. Restart Phentermine 37.5 mg every morning; medication precautions discussed with patient.  6. Keep follow up with specialists.  7. Flu shot this fall.  8. Follow up in about 30 days (around 8/30/2019) for hypertension and weight recheck.

## 2019-08-13 ENCOUNTER — HOSPITAL ENCOUNTER (OUTPATIENT)
Dept: RADIOLOGY | Facility: HOSPITAL | Age: 59
Discharge: HOME | End: 2019-08-13
Attending: FAMILY MEDICINE
Payer: COMMERCIAL

## 2019-08-13 VITALS — BODY MASS INDEX: 37.76 KG/M2 | HEIGHT: 61 IN | WEIGHT: 200 LBS

## 2019-08-13 DIAGNOSIS — Z12.31 VISIT FOR SCREENING MAMMOGRAM: ICD-10-CM

## 2019-08-13 PROCEDURE — 77067 SCR MAMMO BI INCL CAD: CPT | Mod: TC

## 2019-08-13 PROCEDURE — 77063 BREAST TOMOSYNTHESIS BI: CPT | Mod: 26,,, | Performed by: RADIOLOGY

## 2019-08-13 PROCEDURE — 77063 MAMMO DIGITAL SCREENING BILAT WITH TOMOSYNTHESIS_CAD: ICD-10-PCS | Mod: 26,,, | Performed by: RADIOLOGY

## 2019-08-13 PROCEDURE — 77067 MAMMO DIGITAL SCREENING BILAT WITH TOMOSYNTHESIS_CAD: ICD-10-PCS | Mod: 26,,, | Performed by: RADIOLOGY

## 2019-08-13 PROCEDURE — 77067 SCR MAMMO BI INCL CAD: CPT | Mod: 26,,, | Performed by: RADIOLOGY

## 2019-09-05 ENCOUNTER — OFFICE VISIT (OUTPATIENT)
Dept: FAMILY MEDICINE | Facility: CLINIC | Age: 59
End: 2019-09-05
Payer: COMMERCIAL

## 2019-09-05 VITALS
BODY MASS INDEX: 35.11 KG/M2 | TEMPERATURE: 98 F | HEART RATE: 88 BPM | OXYGEN SATURATION: 97 % | SYSTOLIC BLOOD PRESSURE: 110 MMHG | HEIGHT: 61 IN | DIASTOLIC BLOOD PRESSURE: 76 MMHG | WEIGHT: 185.94 LBS

## 2019-09-05 DIAGNOSIS — I10 ESSENTIAL HYPERTENSION: Primary | Chronic | ICD-10-CM

## 2019-09-05 DIAGNOSIS — E66.9 OBESITY (BMI 30-39.9): ICD-10-CM

## 2019-09-05 PROCEDURE — 3008F PR BODY MASS INDEX (BMI) DOCUMENTED: ICD-10-PCS | Mod: CPTII,S$GLB,, | Performed by: FAMILY MEDICINE

## 2019-09-05 PROCEDURE — 3008F BODY MASS INDEX DOCD: CPT | Mod: CPTII,S$GLB,, | Performed by: FAMILY MEDICINE

## 2019-09-05 PROCEDURE — 99999 PR PBB SHADOW E&M-EST. PATIENT-LVL III: CPT | Mod: PBBFAC,,, | Performed by: FAMILY MEDICINE

## 2019-09-05 PROCEDURE — 99213 OFFICE O/P EST LOW 20 MIN: CPT | Mod: S$GLB,,, | Performed by: FAMILY MEDICINE

## 2019-09-05 PROCEDURE — 99999 PR PBB SHADOW E&M-EST. PATIENT-LVL III: ICD-10-PCS | Mod: PBBFAC,,, | Performed by: FAMILY MEDICINE

## 2019-09-05 PROCEDURE — 3074F SYST BP LT 130 MM HG: CPT | Mod: CPTII,S$GLB,, | Performed by: FAMILY MEDICINE

## 2019-09-05 PROCEDURE — 99213 PR OFFICE/OUTPT VISIT, EST, LEVL III, 20-29 MIN: ICD-10-PCS | Mod: S$GLB,,, | Performed by: FAMILY MEDICINE

## 2019-09-05 PROCEDURE — 3074F PR MOST RECENT SYSTOLIC BLOOD PRESSURE < 130 MM HG: ICD-10-PCS | Mod: CPTII,S$GLB,, | Performed by: FAMILY MEDICINE

## 2019-09-05 PROCEDURE — 3078F PR MOST RECENT DIASTOLIC BLOOD PRESSURE < 80 MM HG: ICD-10-PCS | Mod: CPTII,S$GLB,, | Performed by: FAMILY MEDICINE

## 2019-09-05 PROCEDURE — 3078F DIAST BP <80 MM HG: CPT | Mod: CPTII,S$GLB,, | Performed by: FAMILY MEDICINE

## 2019-09-05 RX ORDER — PHENTERMINE HYDROCHLORIDE 37.5 MG/1
37.5 TABLET ORAL
Qty: 30 TABLET | Refills: 0 | Status: SHIPPED | OUTPATIENT
Start: 2019-09-05 | End: 2020-07-31 | Stop reason: SDUPTHER

## 2019-09-05 NOTE — PROGRESS NOTES
Angelica Flores    Chief Complaint   Patient presents with    Hypertension    Weight Check    Follow-up       History of Present Illness:   Ms. Flores comes in today for hypertension and weight recheck.  She has taken medication today.  On July 31, 2019 she restarted taking phentermine daily to help her lose weight.  She reports no problems with taking medication but has not taken medication for week and a half as she does not have prescription; she desires to continue taking it.  She states she monitors her diet by eating smaller portions and by increasing her water intake; she also states she will start intermittent fasting.  However, she states she has not been exercising.    Otherwise, she states she feels okay today.  She denies having fever, chills, fatigue, appetite changes; shortness of breath, cough, wheezing; chest pain, palpitations, leg swelling; abdominal pain, nausea, vomiting, diarrhea, constipation; unusual urinary symptoms; back pain; headache; anxiety, depression, homicidal or suicidal thoughts.      Labs:                   WBC                      7.59                07/31/2019                 HGB                      13.3                07/31/2019                 HCT                      41.7                07/31/2019                 PLT                      321                 07/31/2019                 CHOL                     190                 07/31/2019                 TRIG                     47                  07/31/2019                 HDL                      73                  07/31/2019                 ALT                      15                  07/31/2019                 AST                      17                  07/31/2019                 NA                       142                 07/31/2019                 K                        3.9                 07/31/2019                 CL                       111 (H)             07/31/2019                 CREATININE                0.8                 07/31/2019                 BUN                      17                  07/31/2019                 CO2                      20 (L)              07/31/2019                 TSH                      0.673               07/31/2019                 INR                      0.9                 08/18/2008                 HGBA1C                   5.6                 04/27/2016         Vit D, 25-Hydroxy               41         07/31/2019               LDLCALC                  107.6               07/31/2019                Current Outpatient Medications   Medication Sig    acyclovir 5% (ZOVIRAX) 5 % Crea Apply topically 5 (five) times daily. For 4 days as directed    ALLERGY RELIEF, CETIRIZINE, 10 mg tablet TAKE 1 TABLET BY MOUTH ONCE DAILY    ALPRAZolam (XANAX) 0.5 MG tablet TAKE 1 TABLET BY MOUTH TWICE DAILY AS NEEDED    amLODIPine (NORVASC) 10 MG tablet TAKE 1 TABLET BY MOUTH ONCE DAILY IN THE MORNING    ascorbic acid (VITAMIN C) 1000 MG tablet Take by mouth. 1 Tablet Oral Every day    BACILLUS COAGULANS (PROBIOTIC, B. COAGULANS, ORAL) Take by mouth once daily.    cholecalciferol, vitamin D3, 2,000 unit Cap Take by mouth. 1 capsule Oral Every day    ibuprofen (ADVIL,MOTRIN) 800 MG tablet TAKE 1 TABLET BY MOUTH TWICE DAILY AS NEEDED FOR PAIN    levocetirizine (XYZAL) 5 MG tablet Take 5 mg by mouth every evening.    lisinopril (PRINIVIL,ZESTRIL) 20 MG tablet TAKE 1 TABLET BY MOUTH ONCE DAILY    mercaptopurine (PURINETHOL) 50 mg tablet TAKE 1 TABLET BY MOUTH ONCE DAILY    mometasone (NASONEX) 50 mcg/actuation nasal spray USE 2 SPRAY(S) IN EACH NOSTRIL ONCE DAILY    montelukast (SINGULAIR) 10 mg tablet TAKE 1 TABLET BY MOUTH ONCE DAILY IN THE EVENING    multivitamin-Ca-iron-minerals (WOMEN'S MULTIPLE VITAMINS) 18-0.4 mg Tab Take by mouth. 1 Tablet Oral Every day    pravastatin (PRAVACHOL) 80 MG tablet TAKE 1 TABLET BY MOUTH ONCE DAILY IN THE EVENING    topiramate (TOPAMAX) 25 MG tablet  TAKE 1 TABLET BY MOUTH TWICE DAILY    valACYclovir (VALTREX) 1000 MG tablet Take 2 tablets (2,000 mg total) by mouth 2 (two) times daily.    albuterol 90 mcg/actuation inhaler Inhale 2 puffs into the lungs every 6 (six) hours as needed for Wheezing.       Review of Systems   Constitutional: Negative for activity change, appetite change, chills, fatigue and fever.        Weight  91 kg (200 lb 9.9 oz) at July 31, 2019 visit. See history of present illness.   Respiratory: Negative for cough, shortness of breath and wheezing.    Cardiovascular: Negative for chest pain, palpitations and leg swelling.   Gastrointestinal: Negative for abdominal pain, constipation, diarrhea, nausea and vomiting.   Genitourinary: Negative for difficulty urinating.   Musculoskeletal: Negative for back pain.   Neurological: Negative for headaches.   Psychiatric/Behavioral: Negative for dysphoric mood and suicidal ideas. The patient is not nervous/anxious.         Negative for homicidal ideas.       Objective:  Physical Exam   Constitutional: She is oriented to person, place, and time. She appears well-developed and well-nourished. No distress.   Pleasant.   Eyes:   She wears glasses.   Neck: Normal range of motion. Neck supple. No thyromegaly present.   Cardiovascular: Normal rate, regular rhythm, normal heart sounds and intact distal pulses.   No murmur heard.  Pulmonary/Chest: Effort normal and breath sounds normal. No respiratory distress. She has no wheezes.   Abdominal: Soft. Bowel sounds are normal. She exhibits no distension and no mass. There is no tenderness. There is no rebound and no guarding.   Musculoskeletal: Normal range of motion. She exhibits no edema or tenderness.   She is ambulatory without problems.   Lymphadenopathy:     She has no cervical adenopathy.   Neurological: She is alert and oriented to person, place, and time.   Skin: She is not diaphoretic.   Psychiatric: She has a normal mood and affect. Her behavior is  normal. Judgment and thought content normal.   Vitals reviewed.      ASSESSMENT:  1. Essential hypertension    2. Obesity (BMI 30-39.9)        PLAN:  Angelica was seen today for hypertension, weight check and follow-up.    Diagnoses and all orders for this visit:    Essential hypertension    Obesity (BMI 30-39.9)  -     phentermine (ADIPEX-P) 37.5 mg tablet; Take 1 tablet (37.5 mg total) by mouth before breakfast.       Continue current medications, follow low sodium, low cholesterol, low carb diet, daily walks.  Prescription refills as noted above.  Flu shot this fall (at work per patient).  Follow up in about 29 days (around 10/4/2019) for hypertension and weight recheck follow up.

## 2019-09-18 RX ORDER — AMLODIPINE BESYLATE 10 MG/1
TABLET ORAL
Qty: 90 TABLET | Refills: 1 | Status: SHIPPED | OUTPATIENT
Start: 2019-09-18 | End: 2020-04-11

## 2019-09-18 RX ORDER — LISINOPRIL 20 MG/1
TABLET ORAL
Qty: 90 TABLET | Refills: 1 | Status: SHIPPED | OUTPATIENT
Start: 2019-09-18 | End: 2020-04-11

## 2019-09-20 DIAGNOSIS — I10 ESSENTIAL HYPERTENSION: Chronic | ICD-10-CM

## 2019-09-20 DIAGNOSIS — J32.0 MAXILLARY SINUSITIS, UNSPECIFIED CHRONICITY: ICD-10-CM

## 2019-09-20 DIAGNOSIS — J30.9 ALLERGIC RHINITIS: ICD-10-CM

## 2019-09-20 DIAGNOSIS — E78.5 HYPERLIPIDEMIA, UNSPECIFIED HYPERLIPIDEMIA TYPE: ICD-10-CM

## 2019-09-20 RX ORDER — PRAVASTATIN SODIUM 80 MG/1
TABLET ORAL
Qty: 90 TABLET | Refills: 1 | Status: SHIPPED | OUTPATIENT
Start: 2019-09-20 | End: 2020-07-02

## 2019-09-20 RX ORDER — IBUPROFEN 800 MG/1
TABLET ORAL
Qty: 180 TABLET | Refills: 0 | Status: SHIPPED | OUTPATIENT
Start: 2019-09-20 | End: 2019-12-30

## 2019-09-20 RX ORDER — CETIRIZINE HYDROCHLORIDE 10 MG/1
TABLET, FILM COATED ORAL
Qty: 90 TABLET | Refills: 1 | Status: SHIPPED | OUTPATIENT
Start: 2019-09-20 | End: 2022-02-08 | Stop reason: SDUPTHER

## 2019-12-28 DIAGNOSIS — K50.919 CROHN'S DISEASE WITH COMPLICATION, UNSPECIFIED GASTROINTESTINAL TRACT LOCATION: ICD-10-CM

## 2019-12-28 DIAGNOSIS — F34.1 DYSTHYMIC DISORDER: Chronic | ICD-10-CM

## 2019-12-28 DIAGNOSIS — G43.909 MIGRAINE WITHOUT STATUS MIGRAINOSUS, NOT INTRACTABLE, UNSPECIFIED MIGRAINE TYPE: ICD-10-CM

## 2019-12-28 RX ORDER — MERCAPTOPURINE 50 MG/1
TABLET ORAL
Refills: 0 | OUTPATIENT
Start: 2019-12-28

## 2019-12-30 RX ORDER — ALPRAZOLAM 0.5 MG/1
TABLET ORAL
Qty: 60 TABLET | Refills: 0 | Status: SHIPPED | OUTPATIENT
Start: 2019-12-30 | End: 2020-04-11

## 2019-12-30 RX ORDER — IBUPROFEN 800 MG/1
TABLET ORAL
Qty: 180 TABLET | Refills: 1 | Status: SHIPPED | OUTPATIENT
Start: 2019-12-30 | End: 2020-10-14

## 2019-12-30 RX ORDER — MONTELUKAST SODIUM 10 MG/1
TABLET ORAL
Qty: 90 TABLET | Refills: 1 | Status: SHIPPED | OUTPATIENT
Start: 2019-12-30 | End: 2020-07-02

## 2019-12-30 RX ORDER — TOPIRAMATE 25 MG/1
TABLET ORAL
Qty: 180 TABLET | Refills: 1 | Status: SHIPPED | OUTPATIENT
Start: 2019-12-30 | End: 2020-07-31 | Stop reason: SDUPTHER

## 2020-01-03 DIAGNOSIS — K50.919 CROHN'S DISEASE WITH COMPLICATION, UNSPECIFIED GASTROINTESTINAL TRACT LOCATION: ICD-10-CM

## 2020-01-05 RX ORDER — MERCAPTOPURINE 50 MG/1
50 TABLET ORAL DAILY
Qty: 90 TABLET | Refills: 2 | OUTPATIENT
Start: 2020-01-05

## 2020-01-06 ENCOUNTER — PATIENT MESSAGE (OUTPATIENT)
Dept: GASTROENTEROLOGY | Facility: CLINIC | Age: 60
End: 2020-01-06

## 2020-01-21 ENCOUNTER — PATIENT MESSAGE (OUTPATIENT)
Dept: GASTROENTEROLOGY | Facility: CLINIC | Age: 60
End: 2020-01-21

## 2020-01-23 ENCOUNTER — OFFICE VISIT (OUTPATIENT)
Dept: GASTROENTEROLOGY | Facility: CLINIC | Age: 60
End: 2020-01-23
Payer: COMMERCIAL

## 2020-01-23 VITALS
HEIGHT: 67 IN | WEIGHT: 197.56 LBS | BODY MASS INDEX: 31.01 KG/M2 | DIASTOLIC BLOOD PRESSURE: 80 MMHG | HEART RATE: 74 BPM | SYSTOLIC BLOOD PRESSURE: 126 MMHG

## 2020-01-23 DIAGNOSIS — K50.919 CROHN'S DISEASE WITH COMPLICATION, UNSPECIFIED GASTROINTESTINAL TRACT LOCATION: ICD-10-CM

## 2020-01-23 PROCEDURE — 99213 PR OFFICE/OUTPT VISIT, EST, LEVL III, 20-29 MIN: ICD-10-PCS | Mod: S$GLB,,, | Performed by: INTERNAL MEDICINE

## 2020-01-23 PROCEDURE — 3079F PR MOST RECENT DIASTOLIC BLOOD PRESSURE 80-89 MM HG: ICD-10-PCS | Mod: CPTII,S$GLB,, | Performed by: INTERNAL MEDICINE

## 2020-01-23 PROCEDURE — 3008F PR BODY MASS INDEX (BMI) DOCUMENTED: ICD-10-PCS | Mod: CPTII,S$GLB,, | Performed by: INTERNAL MEDICINE

## 2020-01-23 PROCEDURE — 3074F SYST BP LT 130 MM HG: CPT | Mod: CPTII,S$GLB,, | Performed by: INTERNAL MEDICINE

## 2020-01-23 PROCEDURE — 99999 PR PBB SHADOW E&M-EST. PATIENT-LVL III: ICD-10-PCS | Mod: PBBFAC,,, | Performed by: INTERNAL MEDICINE

## 2020-01-23 PROCEDURE — 3074F PR MOST RECENT SYSTOLIC BLOOD PRESSURE < 130 MM HG: ICD-10-PCS | Mod: CPTII,S$GLB,, | Performed by: INTERNAL MEDICINE

## 2020-01-23 PROCEDURE — 3008F BODY MASS INDEX DOCD: CPT | Mod: CPTII,S$GLB,, | Performed by: INTERNAL MEDICINE

## 2020-01-23 PROCEDURE — 99213 OFFICE O/P EST LOW 20 MIN: CPT | Mod: S$GLB,,, | Performed by: INTERNAL MEDICINE

## 2020-01-23 PROCEDURE — 3079F DIAST BP 80-89 MM HG: CPT | Mod: CPTII,S$GLB,, | Performed by: INTERNAL MEDICINE

## 2020-01-23 PROCEDURE — 99999 PR PBB SHADOW E&M-EST. PATIENT-LVL III: CPT | Mod: PBBFAC,,, | Performed by: INTERNAL MEDICINE

## 2020-01-23 RX ORDER — MERCAPTOPURINE 50 MG/1
50 TABLET ORAL DAILY
Qty: 90 TABLET | Refills: 2 | Status: SHIPPED | OUTPATIENT
Start: 2020-01-23 | End: 2020-10-15

## 2020-01-24 ENCOUNTER — LAB VISIT (OUTPATIENT)
Dept: LAB | Facility: HOSPITAL | Age: 60
End: 2020-01-24
Attending: INTERNAL MEDICINE
Payer: COMMERCIAL

## 2020-01-24 DIAGNOSIS — K50.919 CROHN'S DISEASE WITH COMPLICATION, UNSPECIFIED GASTROINTESTINAL TRACT LOCATION: ICD-10-CM

## 2020-01-24 LAB
BASOPHILS # BLD AUTO: 0.03 K/UL (ref 0–0.2)
BASOPHILS NFR BLD: 0.3 % (ref 0–1.9)
CRP SERPL-MCNC: 6.8 MG/L (ref 0–8.2)
DIFFERENTIAL METHOD: ABNORMAL
EOSINOPHIL # BLD AUTO: 0.2 K/UL (ref 0–0.5)
EOSINOPHIL NFR BLD: 1.9 % (ref 0–8)
ERYTHROCYTE [DISTWIDTH] IN BLOOD BY AUTOMATED COUNT: 13.9 % (ref 11.5–14.5)
ERYTHROCYTE [SEDIMENTATION RATE] IN BLOOD BY WESTERGREN METHOD: 18 MM/HR (ref 0–36)
HCT VFR BLD AUTO: 40.5 % (ref 37–48.5)
HGB BLD-MCNC: 13.2 G/DL (ref 12–16)
IMM GRANULOCYTES # BLD AUTO: 0.02 K/UL (ref 0–0.04)
IMM GRANULOCYTES NFR BLD AUTO: 0.2 % (ref 0–0.5)
LYMPHOCYTES # BLD AUTO: 2.7 K/UL (ref 1–4.8)
LYMPHOCYTES NFR BLD: 31.6 % (ref 18–48)
MCH RBC QN AUTO: 32.3 PG (ref 27–31)
MCHC RBC AUTO-ENTMCNC: 32.6 G/DL (ref 32–36)
MCV RBC AUTO: 99 FL (ref 82–98)
MONOCYTES # BLD AUTO: 0.6 K/UL (ref 0.3–1)
MONOCYTES NFR BLD: 6.6 % (ref 4–15)
NEUTROPHILS # BLD AUTO: 5.1 K/UL (ref 1.8–7.7)
NEUTROPHILS NFR BLD: 59.4 % (ref 38–73)
NRBC BLD-RTO: 0 /100 WBC
PLATELET # BLD AUTO: 360 K/UL (ref 150–350)
PMV BLD AUTO: 11.4 FL (ref 9.2–12.9)
RBC # BLD AUTO: 4.09 M/UL (ref 4–5.4)
WBC # BLD AUTO: 8.61 K/UL (ref 3.9–12.7)

## 2020-01-24 PROCEDURE — 86706 HEP B SURFACE ANTIBODY: CPT

## 2020-01-24 PROCEDURE — 80074 ACUTE HEPATITIS PANEL: CPT

## 2020-01-24 PROCEDURE — 86704 HEP B CORE ANTIBODY TOTAL: CPT

## 2020-01-24 PROCEDURE — 86790 VIRUS ANTIBODY NOS: CPT

## 2020-01-24 PROCEDURE — 36415 COLL VENOUS BLD VENIPUNCTURE: CPT

## 2020-01-24 PROCEDURE — 86140 C-REACTIVE PROTEIN: CPT

## 2020-01-24 PROCEDURE — 85652 RBC SED RATE AUTOMATED: CPT

## 2020-01-24 PROCEDURE — 85025 COMPLETE CBC W/AUTO DIFF WBC: CPT

## 2020-01-26 RX ORDER — SODIUM, POTASSIUM,MAG SULFATES 17.5-3.13G
SOLUTION, RECONSTITUTED, ORAL ORAL
Qty: 254 ML | Refills: 0 | Status: SHIPPED | OUTPATIENT
Start: 2020-01-26 | End: 2020-10-17

## 2020-01-26 NOTE — PROGRESS NOTES
Subjective:       Patient ID: Angelica Flores is a 59 y.o. female.    Chief Complaint: Medication Refill     The patient is known to me from multiple previous encounters, and has been followed for Crohn's disease. Her last colonoscopy was in August of 2017.  At the time preceding her colonoscopy, the last study she had in 2015 showed no clear evidence of IBD on biopsy.  On her last study, however, there was chronic inflammation in several segments including 1 with poorly formed granulomas.  Throughout the last few years the patient has been totally asymptomatic, however, and she has been on therapy with only 6 MP.  She was brought in today to discuss the need for more involved intervention.    In the discussion, it was confirmed that on previous lab studies her hemoglobins have been normal and her inflammatory mediated is have been minimally elevated at best.  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 There is difficulty, however, in that it is possible to have asymptomatic disease progression.  With that in mind, knowing that she is not or approved sufficient therapy for her disease, we will do repeat labs at present and look to move to more definitive treatment.  In the meantime will also stool look to do repeat colonoscopy.  We will likely consider starting a biologic based on those findings.    Review of Systems   Constitutional: Negative for activity change, appetite change, chills, diaphoresis, fatigue, fever and unexpected weight change.   HENT: Negative for congestion, ear discharge, ear pain, hearing loss, nosebleeds, postnasal drip and tinnitus.    Eyes: Negative for photophobia and visual disturbance.   Respiratory: Negative for apnea, cough, choking, chest tightness, shortness of breath and wheezing.    Cardiovascular: Negative for chest pain, palpitations and leg swelling.   Gastrointestinal:  Negative for abdominal distention, abdominal pain, anal bleeding, blood in stool, constipation, diarrhea, nausea, rectal pain and vomiting.   Genitourinary: Negative for difficulty urinating, dyspareunia, dysuria, flank pain, frequency, hematuria, menstrual problem, pelvic pain, urgency, vaginal bleeding and vaginal discharge.   Musculoskeletal: Negative for arthralgias, back pain, gait problem, joint swelling, myalgias and neck stiffness.   Skin: Negative for pallor and rash.   Neurological: Negative for dizziness, tremors, seizures, syncope, speech difficulty, weakness, numbness and headaches.   Hematological: Negative for adenopathy.   Psychiatric/Behavioral: Negative for agitation, confusion, hallucinations, sleep disturbance and suicidal ideas.       Objective:      Physical Exam   Constitutional: She is oriented to person, place, and time. She appears well-developed and well-nourished.   Mildly obese   HENT:   Head: Normocephalic and atraumatic.   Bilateral turbinate congestion   Eyes: Pupils are equal, round, and reactive to light. Conjunctivae and EOM are normal. Right eye exhibits no discharge. Left eye exhibits no discharge. No scleral icterus.   Neck: Normal range of motion. Neck supple. No JVD present. No thyromegaly present.   Cardiovascular: Normal rate, regular rhythm, normal heart sounds and intact distal pulses. Exam reveals no gallop and no friction rub.   No murmur heard.  Pulmonary/Chest: Effort normal and breath sounds normal. No respiratory distress. She has no wheezes. She has no rales. She exhibits no tenderness.   Abdominal: Soft. Bowel sounds are normal. She exhibits no distension and no mass. There is no tenderness. There is no rebound and no guarding.   Musculoskeletal: Normal range of motion. She exhibits no edema.   Lymphadenopathy:     She has no cervical adenopathy.   Neurological: She is alert and oriented to person, place, and time. She has normal reflexes. She exhibits normal  muscle tone. Coordination normal.   Skin: Skin is warm and dry. No rash noted. No erythema. No pallor.   Psychiatric: She has a normal mood and affect. Her behavior is normal. Judgment and thought content normal.   Vitals reviewed.      Assessment:     1. Crohn's disease with complication, unspecified gastrointestinal tract location  mercaptopurine (PURINETHOL) 50 mg tablet    Hepatitis panel, acute    Hepatitis panel, acute    Hepatitis B surface antibody    HEPATITIS B CORE ANTIBODY, TOTAL    HEPATITIS A ANTIBODY, IGG    CBC auto differential    C-reactive protein    ESR (SEDIMENTATION RATE, MANUAL)        Plan:   Crohn's disease with complication, unspecified gastrointestinal tract location  -     mercaptopurine (PURINETHOL) 50 mg tablet; Take 1 tablet (50 mg total) by mouth once daily.  Dispense: 90 tablet; Refill: 2  -     Hepatitis panel, acute; Future; Expected date: 01/23/2020  -     Hepatitis panel, acute; Future; Expected date: 01/23/2020  -     Hepatitis B surface antibody; Future; Expected date: 01/23/2020  -     HEPATITIS B CORE ANTIBODY, TOTAL; Future; Expected date: 01/23/2020  -     HEPATITIS A ANTIBODY, IGG; Future; Expected date: 01/23/2020  -     CBC auto differential; Future; Expected date: 01/23/2020  -     C-reactive protein; Future; Expected date: 01/23/2020  -     ESR (SEDIMENTATION RATE, MANUAL); Future; Expected date: 01/23/2020

## 2020-01-27 ENCOUNTER — TELEPHONE (OUTPATIENT)
Dept: ENDOSCOPY | Facility: HOSPITAL | Age: 60
End: 2020-01-27

## 2020-01-27 LAB
HAV IGM SERPL QL IA: NEGATIVE
HAV IGM SERPL QL IA: NEGATIVE
HBV CORE AB SERPL QL IA: NEGATIVE
HBV CORE IGM SERPL QL IA: NEGATIVE
HBV CORE IGM SERPL QL IA: NEGATIVE
HBV SURFACE AB SER-ACNC: NEGATIVE M[IU]/ML
HBV SURFACE AG SERPL QL IA: NEGATIVE
HBV SURFACE AG SERPL QL IA: NEGATIVE
HCV AB SERPL QL IA: NEGATIVE
HCV AB SERPL QL IA: NEGATIVE
HEPATITIS A ANTIBODY, IGG: NEGATIVE

## 2020-01-27 NOTE — TELEPHONE ENCOUNTER
Attempted to schedule procedure. Per pt ; I am not due for another colonoscopy until August 11, 2020. I will call in July to schedule. I am following my insurance guidelines. dw

## 2020-01-28 ENCOUNTER — PATIENT MESSAGE (OUTPATIENT)
Dept: GASTROENTEROLOGY | Facility: CLINIC | Age: 60
End: 2020-01-28

## 2020-02-11 ENCOUNTER — OFFICE VISIT (OUTPATIENT)
Dept: FAMILY MEDICINE | Facility: CLINIC | Age: 60
End: 2020-02-11
Payer: COMMERCIAL

## 2020-02-11 VITALS
HEIGHT: 67 IN | DIASTOLIC BLOOD PRESSURE: 84 MMHG | WEIGHT: 190.69 LBS | HEART RATE: 92 BPM | TEMPERATURE: 98 F | SYSTOLIC BLOOD PRESSURE: 118 MMHG | OXYGEN SATURATION: 97 % | BODY MASS INDEX: 29.93 KG/M2

## 2020-02-11 DIAGNOSIS — J40 SINOBRONCHITIS: Primary | ICD-10-CM

## 2020-02-11 DIAGNOSIS — R68.89 FLU-LIKE SYMPTOMS: ICD-10-CM

## 2020-02-11 DIAGNOSIS — J32.9 SINOBRONCHITIS: Primary | ICD-10-CM

## 2020-02-11 LAB
INFLUENZA A, MOLECULAR: POSITIVE
INFLUENZA B, MOLECULAR: NEGATIVE
SPECIMEN SOURCE: ABNORMAL

## 2020-02-11 PROCEDURE — 87502 INFLUENZA DNA AMP PROBE: CPT

## 2020-02-11 PROCEDURE — 3079F PR MOST RECENT DIASTOLIC BLOOD PRESSURE 80-89 MM HG: ICD-10-PCS | Mod: CPTII,S$GLB,, | Performed by: REGISTERED NURSE

## 2020-02-11 PROCEDURE — 99999 PR PBB SHADOW E&M-EST. PATIENT-LVL IV: CPT | Mod: PBBFAC,,, | Performed by: REGISTERED NURSE

## 2020-02-11 PROCEDURE — 99214 OFFICE O/P EST MOD 30 MIN: CPT | Mod: S$GLB,,, | Performed by: REGISTERED NURSE

## 2020-02-11 PROCEDURE — 99214 PR OFFICE/OUTPT VISIT, EST, LEVL IV, 30-39 MIN: ICD-10-PCS | Mod: S$GLB,,, | Performed by: REGISTERED NURSE

## 2020-02-11 PROCEDURE — 99999 PR PBB SHADOW E&M-EST. PATIENT-LVL IV: ICD-10-PCS | Mod: PBBFAC,,, | Performed by: REGISTERED NURSE

## 2020-02-11 PROCEDURE — 3079F DIAST BP 80-89 MM HG: CPT | Mod: CPTII,S$GLB,, | Performed by: REGISTERED NURSE

## 2020-02-11 PROCEDURE — 3074F PR MOST RECENT SYSTOLIC BLOOD PRESSURE < 130 MM HG: ICD-10-PCS | Mod: CPTII,S$GLB,, | Performed by: REGISTERED NURSE

## 2020-02-11 PROCEDURE — 3074F SYST BP LT 130 MM HG: CPT | Mod: CPTII,S$GLB,, | Performed by: REGISTERED NURSE

## 2020-02-11 PROCEDURE — 3008F PR BODY MASS INDEX (BMI) DOCUMENTED: ICD-10-PCS | Mod: CPTII,S$GLB,, | Performed by: REGISTERED NURSE

## 2020-02-11 PROCEDURE — 3008F BODY MASS INDEX DOCD: CPT | Mod: CPTII,S$GLB,, | Performed by: REGISTERED NURSE

## 2020-02-11 RX ORDER — DOXYCYCLINE 100 MG/1
100 CAPSULE ORAL 2 TIMES DAILY
Qty: 20 CAPSULE | Refills: 0 | Status: SHIPPED | OUTPATIENT
Start: 2020-02-11 | End: 2020-02-21

## 2020-02-11 RX ORDER — ALBUTEROL SULFATE 90 UG/1
2 AEROSOL, METERED RESPIRATORY (INHALATION) EVERY 6 HOURS PRN
Qty: 18 G | Refills: 2 | Status: SHIPPED | OUTPATIENT
Start: 2020-02-11 | End: 2021-08-11 | Stop reason: SDUPTHER

## 2020-02-11 RX ORDER — BENZONATATE 100 MG/1
100-200 CAPSULE ORAL 3 TIMES DAILY PRN
Qty: 60 CAPSULE | Refills: 0 | Status: SHIPPED | OUTPATIENT
Start: 2020-02-11 | End: 2021-10-04

## 2020-02-11 NOTE — PROGRESS NOTES
Subjective:       Patient ID: Angelica Flores is a 59 y.o. female.    Chief Complaint   Patient presents with    Cough    Fever    Nausea    Nasal Congestion       HPI    Angelica Flores is here today with c/o feeling ill yesterday with temp 101.9, sweats, chills and BA.  Actually feeling a little better today.  Positive for ill contacts, works at the [employer].  Reports wheezing, chest tightness and congestion.  Used compazine suppository for NV.  Has taken Tessalon and Nyquil for s/s.  Coughing up green mucus from chest.  History of exercise induced asthma, did not have her MDI to use.        Review of Systems   Constitutional: Positive for chills, diaphoresis, fatigue and fever.   HENT: Positive for congestion, ear pain, postnasal drip, rhinorrhea, sinus pain and sore throat. Negative for nosebleeds, sneezing and tinnitus.    Eyes: Positive for pain.   Respiratory: Positive for cough, chest tightness, shortness of breath and wheezing.    Cardiovascular: Negative.    Gastrointestinal: Positive for nausea and vomiting. Negative for abdominal pain.   Musculoskeletal: Positive for myalgias.   Neurological: Positive for headaches. Negative for weakness, light-headedness and numbness.   Hematological: Negative for adenopathy.         Review of patient's allergies indicates:  No Known Allergies      Patient Active Problem List   Diagnosis    HTN (hypertension)    Crohn disease    Dysthymic disorder    Hyperlipidemia    Vitamin D deficiency    Insomnia    Allergic rhinitis    Crohn's disease    Postmenopausal    Migraine without status migrainosus, not intractable    Obesity (BMI 30-39.9)       Medication List with Changes/Refills   Current Medications    ALBUTEROL 90 MCG/ACTUATION INHALER    Inhale 2 puffs into the lungs every 6 (six) hours as needed for Wheezing.    ALLERGY RELIEF, CETIRIZINE, 10 MG TABLET    TAKE 1 TABLET BY MOUTH ONCE DAILY    ALPRAZOLAM (XANAX) 0.5 MG TABLET    TAKE 1  "TABLET BY MOUTH TWICE DAILY AS NEEDED    AMLODIPINE (NORVASC) 10 MG TABLET    TAKE 1 TABLET BY MOUTH ONCE DAILY IN THE MORNING    ASCORBIC ACID (VITAMIN C) 1000 MG TABLET    Take by mouth. 1 Tablet Oral Every day    BACILLUS COAGULANS (PROBIOTIC, B. COAGULANS, ORAL)    Take by mouth once daily.    CHOLECALCIFEROL, VITAMIN D3, 2,000 UNIT CAP    Take by mouth. 1 capsule Oral Every day    IBUPROFEN (ADVIL,MOTRIN) 800 MG TABLET    TAKE 1 TABLET BY MOUTH TWICE DAILY AS NEEDED FOR PAIN    LEVOCETIRIZINE (XYZAL) 5 MG TABLET    Take 5 mg by mouth every evening.    LISINOPRIL (PRINIVIL,ZESTRIL) 20 MG TABLET    TAKE 1 TABLET BY MOUTH ONCE DAILY    MERCAPTOPURINE (PURINETHOL) 50 MG TABLET    Take 1 tablet (50 mg total) by mouth once daily.    MOMETASONE (NASONEX) 50 MCG/ACTUATION NASAL SPRAY    USE 2 SPRAY(S) IN EACH NOSTRIL ONCE DAILY    MONTELUKAST (SINGULAIR) 10 MG TABLET    TAKE 1 TABLET BY MOUTH ONCE DAILY IN THE EVENING    MULTIVITAMIN-CA-IRON-MINERALS (WOMEN'S MULTIPLE VITAMINS) 18-0.4 MG TAB    Take by mouth. 1 Tablet Oral Every day    PRAVASTATIN (PRAVACHOL) 80 MG TABLET    TAKE 1 TABLET BY MOUTH ONCE DAILY IN THE EVENING    SODIUM,POTASSIUM,MAG SULFATES (SUPREP BOWEL PREP KIT) 17.5-3.13-1.6 GRAM SOLR    As directed for colonoscopy    TOPIRAMATE (TOPAMAX) 25 MG TABLET    TAKE 1 TABLET BY MOUTH TWICE DAILY    VALACYCLOVIR (VALTREX) 1000 MG TABLET    Take 2 tablets (2,000 mg total) by mouth 2 (two) times daily.   Discontinued Medications    ACYCLOVIR 5% (ZOVIRAX) 5 % CREA    Apply topically 5 (five) times daily. For 4 days as directed             Past medical, surgical, family and social histories have been reviewed today.        Objective:     Vitals:    02/11/20 0912   BP: 118/84   Pulse: 92   Temp: 98.4 °F (36.9 °C)   SpO2: 97%   Weight: 86.5 kg (190 lb 11.2 oz)   Height: 5' 7" (1.702 m)   PainSc:   3   PainLoc: Head       Estimated body mass index is 29.87 kg/m² as calculated from the following:    Height as of this " "encounter: 5' 7" (1.702 m).    Weight as of this encounter: 86.5 kg (190 lb 11.2 oz).      Physical Exam   Constitutional: She appears well-developed and well-nourished.   HENT:   Head: Normocephalic and atraumatic.   Right Ear: Tympanic membrane is bulging. Tympanic membrane is not injected, not perforated, not erythematous and not retracted.   Left Ear: Tympanic membrane is bulging. Tympanic membrane is not injected, not perforated, not erythematous and not retracted.   Nose: Mucosal edema and sinus tenderness present. No rhinorrhea or nasal deformity. No epistaxis. Right sinus exhibits frontal sinus tenderness. Left sinus exhibits frontal sinus tenderness.   Mouth/Throat: Oropharynx is clear and moist and mucous membranes are normal.   Eyes: Pupils are equal, round, and reactive to light. Conjunctivae and EOM are normal.   Cardiovascular: Normal rate and regular rhythm.   Pulmonary/Chest: Effort normal. No respiratory distress. She has wheezes. She has no rales. She exhibits no tenderness.   Musculoskeletal: Normal range of motion. She exhibits no edema.   Lymphadenopathy:     She has no cervical adenopathy.   Vitals reviewed.        Diagnosis       1. Sinobronchitis    2. Flu-like symptoms          Assessment/ Plan     Sinobronchitis  -     doxycycline (MONODOX) 100 MG capsule; Take 1 capsule (100 mg total) by mouth 2 (two) times daily. for 10 days  Dispense: 20 capsule; Refill: 0  -     benzonatate (TESSALON) 100 MG capsule; Take 1-2 capsules (100-200 mg total) by mouth 3 (three) times daily as needed for Cough.  Dispense: 60 capsule; Refill: 0  -     albuterol (PROVENTIL/VENTOLIN HFA) 90 mcg/actuation inhaler; Inhale 2 puffs into the lungs every 6 (six) hours as needed for Wheezing or Shortness of Breath.  Dispense: 18 g; Refill: 2    Flu-like symptoms  -     Influenza A & B by Molecular        Flu test pending.  Medication discussed, as directed.  Symptomatic care, rest and fluids.  Follow-up in clinic as " needed.        Patient Care Team:  Monet Alvarez MD as PCP - General (Family Medicine)  Haris Segal LPN as Care Coordinator (Internal Medicine)      [facility]

## 2020-02-12 ENCOUNTER — TELEPHONE (OUTPATIENT)
Dept: FAMILY MEDICINE | Facility: CLINIC | Age: 60
End: 2020-02-12

## 2020-02-12 NOTE — TELEPHONE ENCOUNTER
----- Message from Rolando Bradshaw sent at 2/12/2020 11:02 AM CST -----  Contact: Patient  Patient called in and wanted to speak with the office regarding her past appointment and would like a call back from the office. She can be reached at    [phone]

## 2020-02-12 NOTE — TELEPHONE ENCOUNTER
Called patient back in reference to her message. Patient wanted to know what flu type she had. And was advised that she tested posItive for Flu A

## 2020-02-18 ENCOUNTER — PATIENT MESSAGE (OUTPATIENT)
Dept: FAMILY MEDICINE | Facility: CLINIC | Age: 60
End: 2020-02-18

## 2020-04-10 DIAGNOSIS — F34.1 DYSTHYMIC DISORDER: Chronic | ICD-10-CM

## 2020-04-11 RX ORDER — LISINOPRIL 20 MG/1
TABLET ORAL
Qty: 90 TABLET | Refills: 0 | Status: SHIPPED | OUTPATIENT
Start: 2020-04-11 | End: 2020-07-02

## 2020-04-11 RX ORDER — AMLODIPINE BESYLATE 10 MG/1
TABLET ORAL
Qty: 90 TABLET | Refills: 0 | Status: SHIPPED | OUTPATIENT
Start: 2020-04-11 | End: 2020-07-02

## 2020-04-11 RX ORDER — ALPRAZOLAM 0.5 MG/1
TABLET ORAL
Qty: 60 TABLET | Refills: 0 | Status: SHIPPED | OUTPATIENT
Start: 2020-04-11 | End: 2020-07-02

## 2020-06-22 NOTE — OR NURSING
See anesthesia record for MAC documentation including vital signs and medication administration.     Advancement Flap (Double) Text: The defect edges were debeveled with a #15 scalpel blade.  Given the location of the defect and the proximity to free margins a double advancement flap was deemed most appropriate.  Using a sterile surgical marker, the appropriate advancement flaps were drawn incorporating the defect and placing the expected incisions within the relaxed skin tension lines where possible.    The area thus outlined was incised deep to adipose tissue with a #15 scalpel blade.  The skin margins were undermined to an appropriate distance in all directions utilizing iris scissors.

## 2020-07-31 ENCOUNTER — LAB VISIT (OUTPATIENT)
Dept: LAB | Facility: HOSPITAL | Age: 60
End: 2020-07-31
Payer: COMMERCIAL

## 2020-07-31 ENCOUNTER — OFFICE VISIT (OUTPATIENT)
Dept: FAMILY MEDICINE | Facility: CLINIC | Age: 60
End: 2020-07-31
Payer: COMMERCIAL

## 2020-07-31 VITALS
HEIGHT: 67 IN | SYSTOLIC BLOOD PRESSURE: 130 MMHG | BODY MASS INDEX: 31.65 KG/M2 | OXYGEN SATURATION: 98 % | HEART RATE: 89 BPM | TEMPERATURE: 99 F | WEIGHT: 201.63 LBS | DIASTOLIC BLOOD PRESSURE: 80 MMHG | RESPIRATION RATE: 16 BRPM

## 2020-07-31 DIAGNOSIS — J30.2 SEASONAL ALLERGIC RHINITIS, UNSPECIFIED TRIGGER: ICD-10-CM

## 2020-07-31 DIAGNOSIS — Z00.00 ANNUAL PHYSICAL EXAM: Primary | ICD-10-CM

## 2020-07-31 DIAGNOSIS — K50.90 CROHN'S DISEASE WITHOUT COMPLICATION, UNSPECIFIED GASTROINTESTINAL TRACT LOCATION: ICD-10-CM

## 2020-07-31 DIAGNOSIS — E55.9 VITAMIN D DEFICIENCY: ICD-10-CM

## 2020-07-31 DIAGNOSIS — E78.5 HYPERLIPIDEMIA, UNSPECIFIED HYPERLIPIDEMIA TYPE: ICD-10-CM

## 2020-07-31 DIAGNOSIS — G43.909 MIGRAINE WITHOUT STATUS MIGRAINOSUS, NOT INTRACTABLE, UNSPECIFIED MIGRAINE TYPE: ICD-10-CM

## 2020-07-31 DIAGNOSIS — E66.9 OBESITY (BMI 30-39.9): ICD-10-CM

## 2020-07-31 DIAGNOSIS — Z00.00 ANNUAL PHYSICAL EXAM: ICD-10-CM

## 2020-07-31 DIAGNOSIS — I10 ESSENTIAL HYPERTENSION: Chronic | ICD-10-CM

## 2020-07-31 DIAGNOSIS — Z78.0 POSTMENOPAUSAL: ICD-10-CM

## 2020-07-31 DIAGNOSIS — Z12.31 VISIT FOR SCREENING MAMMOGRAM: ICD-10-CM

## 2020-07-31 LAB
ALBUMIN SERPL BCP-MCNC: 4.2 G/DL (ref 3.5–5.2)
ALP SERPL-CCNC: 81 U/L (ref 55–135)
ALT SERPL W/O P-5'-P-CCNC: 13 U/L (ref 10–44)
ANION GAP SERPL CALC-SCNC: 11 MMOL/L (ref 8–16)
AST SERPL-CCNC: 18 U/L (ref 10–40)
BASOPHILS # BLD AUTO: 0.05 K/UL (ref 0–0.2)
BASOPHILS NFR BLD: 0.5 % (ref 0–1.9)
BILIRUB SERPL-MCNC: 0.3 MG/DL (ref 0.1–1)
BILIRUB UR QL STRIP: NEGATIVE
BUN SERPL-MCNC: 16 MG/DL (ref 8–23)
CALCIUM SERPL-MCNC: 9.9 MG/DL (ref 8.7–10.5)
CHLORIDE SERPL-SCNC: 110 MMOL/L (ref 95–110)
CHOLEST SERPL-MCNC: 190 MG/DL (ref 120–199)
CHOLEST/HDLC SERPL: 2.6 {RATIO} (ref 2–5)
CLARITY UR REFRACT.AUTO: CLEAR
CO2 SERPL-SCNC: 19 MMOL/L (ref 23–29)
COLOR UR AUTO: YELLOW
CREAT SERPL-MCNC: 0.8 MG/DL (ref 0.5–1.4)
DIFFERENTIAL METHOD: ABNORMAL
EOSINOPHIL # BLD AUTO: 0.2 K/UL (ref 0–0.5)
EOSINOPHIL NFR BLD: 1.9 % (ref 0–8)
ERYTHROCYTE [DISTWIDTH] IN BLOOD BY AUTOMATED COUNT: 14.5 % (ref 11.5–14.5)
EST. GFR  (AFRICAN AMERICAN): >60 ML/MIN/1.73 M^2
EST. GFR  (NON AFRICAN AMERICAN): >60 ML/MIN/1.73 M^2
GLUCOSE SERPL-MCNC: 73 MG/DL (ref 70–110)
GLUCOSE UR QL STRIP: NEGATIVE
HCT VFR BLD AUTO: 43.4 % (ref 37–48.5)
HDLC SERPL-MCNC: 74 MG/DL (ref 40–75)
HDLC SERPL: 38.9 % (ref 20–50)
HGB BLD-MCNC: 13.5 G/DL (ref 12–16)
HGB UR QL STRIP: NEGATIVE
IMM GRANULOCYTES # BLD AUTO: 0.02 K/UL (ref 0–0.04)
IMM GRANULOCYTES NFR BLD AUTO: 0.2 % (ref 0–0.5)
KETONES UR QL STRIP: NEGATIVE
LDLC SERPL CALC-MCNC: 106.6 MG/DL (ref 63–159)
LEUKOCYTE ESTERASE UR QL STRIP: NEGATIVE
LYMPHOCYTES # BLD AUTO: 2.8 K/UL (ref 1–4.8)
LYMPHOCYTES NFR BLD: 28.4 % (ref 18–48)
MCH RBC QN AUTO: 31 PG (ref 27–31)
MCHC RBC AUTO-ENTMCNC: 31.1 G/DL (ref 32–36)
MCV RBC AUTO: 100 FL (ref 82–98)
MICROSCOPIC COMMENT: NORMAL
MONOCYTES # BLD AUTO: 0.6 K/UL (ref 0.3–1)
MONOCYTES NFR BLD: 6.2 % (ref 4–15)
NEUTROPHILS # BLD AUTO: 6.2 K/UL (ref 1.8–7.7)
NEUTROPHILS NFR BLD: 62.8 % (ref 38–73)
NITRITE UR QL STRIP: NEGATIVE
NONHDLC SERPL-MCNC: 116 MG/DL
NRBC BLD-RTO: 0 /100 WBC
PH UR STRIP: 6 [PH] (ref 5–8)
PLATELET # BLD AUTO: 357 K/UL (ref 150–350)
PMV BLD AUTO: 11.6 FL (ref 9.2–12.9)
POTASSIUM SERPL-SCNC: 4.4 MMOL/L (ref 3.5–5.1)
PROT SERPL-MCNC: 8.3 G/DL (ref 6–8.4)
PROT UR QL STRIP: NEGATIVE
RBC # BLD AUTO: 4.35 M/UL (ref 4–5.4)
RBC #/AREA URNS AUTO: 1 /HPF (ref 0–4)
SODIUM SERPL-SCNC: 140 MMOL/L (ref 136–145)
SP GR UR STRIP: 1.02 (ref 1–1.03)
TRIGL SERPL-MCNC: 47 MG/DL (ref 30–150)
TSH SERPL DL<=0.005 MIU/L-ACNC: 0.79 UIU/ML (ref 0.4–4)
URN SPEC COLLECT METH UR: NORMAL
WBC # BLD AUTO: 9.85 K/UL (ref 3.9–12.7)
WBC #/AREA URNS AUTO: 1 /HPF (ref 0–5)

## 2020-07-31 PROCEDURE — 81001 URINALYSIS AUTO W/SCOPE: CPT

## 2020-07-31 PROCEDURE — 99396 PREV VISIT EST AGE 40-64: CPT | Mod: S$GLB,,, | Performed by: FAMILY MEDICINE

## 2020-07-31 PROCEDURE — 36415 COLL VENOUS BLD VENIPUNCTURE: CPT | Mod: PO

## 2020-07-31 PROCEDURE — 80053 COMPREHEN METABOLIC PANEL: CPT

## 2020-07-31 PROCEDURE — 3008F PR BODY MASS INDEX (BMI) DOCUMENTED: ICD-10-PCS | Mod: CPTII,S$GLB,, | Performed by: FAMILY MEDICINE

## 2020-07-31 PROCEDURE — 99999 PR PBB SHADOW E&M-EST. PATIENT-LVL V: CPT | Mod: PBBFAC,,, | Performed by: FAMILY MEDICINE

## 2020-07-31 PROCEDURE — 3075F PR MOST RECENT SYSTOLIC BLOOD PRESS GE 130-139MM HG: ICD-10-PCS | Mod: CPTII,S$GLB,, | Performed by: FAMILY MEDICINE

## 2020-07-31 PROCEDURE — 85025 COMPLETE CBC W/AUTO DIFF WBC: CPT

## 2020-07-31 PROCEDURE — 3079F DIAST BP 80-89 MM HG: CPT | Mod: CPTII,S$GLB,, | Performed by: FAMILY MEDICINE

## 2020-07-31 PROCEDURE — 99396 PR PREVENTIVE VISIT,EST,40-64: ICD-10-PCS | Mod: S$GLB,,, | Performed by: FAMILY MEDICINE

## 2020-07-31 PROCEDURE — 80061 LIPID PANEL: CPT

## 2020-07-31 PROCEDURE — 3075F SYST BP GE 130 - 139MM HG: CPT | Mod: CPTII,S$GLB,, | Performed by: FAMILY MEDICINE

## 2020-07-31 PROCEDURE — 84443 ASSAY THYROID STIM HORMONE: CPT

## 2020-07-31 PROCEDURE — 82306 VITAMIN D 25 HYDROXY: CPT

## 2020-07-31 PROCEDURE — 99999 PR PBB SHADOW E&M-EST. PATIENT-LVL V: ICD-10-PCS | Mod: PBBFAC,,, | Performed by: FAMILY MEDICINE

## 2020-07-31 PROCEDURE — 3079F PR MOST RECENT DIASTOLIC BLOOD PRESSURE 80-89 MM HG: ICD-10-PCS | Mod: CPTII,S$GLB,, | Performed by: FAMILY MEDICINE

## 2020-07-31 PROCEDURE — 3008F BODY MASS INDEX DOCD: CPT | Mod: CPTII,S$GLB,, | Performed by: FAMILY MEDICINE

## 2020-07-31 RX ORDER — PHENTERMINE HYDROCHLORIDE 37.5 MG/1
37.5 TABLET ORAL
Qty: 30 TABLET | Refills: 0 | Status: SHIPPED | OUTPATIENT
Start: 2020-07-31 | End: 2020-08-31 | Stop reason: SDUPTHER

## 2020-07-31 RX ORDER — TOPIRAMATE 25 MG/1
25 TABLET ORAL 2 TIMES DAILY
Qty: 180 TABLET | Refills: 1 | Status: SHIPPED | OUTPATIENT
Start: 2020-07-31 | End: 2021-08-11

## 2020-07-31 NOTE — PROGRESS NOTES
HISTORY OF PRESENT ILLNESS: Ms. Mcfadden comes in today fasting but with taking medication for annual wellness examination. She reports no acute problems today.     END OF LIFE DECISION: She does not have a living will and states she does not desire life support.  However, she states she is an organ donor.    Current Outpatient Medications   Medication Sig    albuterol (PROVENTIL/VENTOLIN HFA) 90 mcg/actuation inhaler Inhale 2 puffs into the lungs every 6 (six) hours as needed for Wheezing or Shortness of Breath.    ALLERGY RELIEF, CETIRIZINE, 10 mg tablet TAKE 1 TABLET BY MOUTH ONCE DAILY    ALPRAZolam (XANAX) 0.5 MG tablet Take 1 tablet by mouth twice daily as needed    amLODIPine (NORVASC) 10 MG tablet TAKE 1 TABLET BY MOUTH ONCE DAILY IN THE MORNING . APPOINTMENT REQUIRED FOR FUTURE REFILLS    ascorbic acid (VITAMIN C) 1000 MG tablet Take by mouth. 1 Tablet Oral Every day    BACILLUS COAGULANS (PROBIOTIC, B. COAGULANS, ORAL) Take by mouth once daily.    benzonatate (TESSALON) 100 MG capsule Take 1-2 capsules (100-200 mg total) by mouth 3 (three) times daily as needed for Cough.    cholecalciferol, vitamin D3, 2,000 unit Cap Take by mouth. 1 capsule Oral Every day    ibuprofen (ADVIL,MOTRIN) 800 MG tablet TAKE 1 TABLET BY MOUTH TWICE DAILY AS NEEDED FOR PAIN    levocetirizine (XYZAL) 5 MG tablet Take 5 mg by mouth every evening.    lisinopriL (PRINIVIL,ZESTRIL) 20 MG tablet TAKE 1 TABLET BY MOUTH ONCE DAILY . APPOINTMENT REQUIRED FOR FUTURE REFILLS    mercaptopurine (PURINETHOL) 50 mg tablet Take 1 tablet (50 mg total) by mouth once daily.    mometasone (NASONEX) 50 mcg/actuation nasal spray USE 2 SPRAY(S) IN EACH NOSTRIL ONCE DAILY    montelukast (SINGULAIR) 10 mg tablet TAKE 1 TABLET BY MOUTH ONCE DAILY IN THE EVENING    multivitamin-Ca-iron-minerals (WOMEN'S MULTIPLE VITAMINS) 18-0.4 mg Tab Take by mouth. 1 Tablet Oral Every day    pravastatin (PRAVACHOL) 80 MG tablet TAKE 1 TABLET BY MOUTH ONCE  DAILY IN THE EVENING    sodium,potassium,mag sulfates (SUPREP BOWEL PREP KIT) 17.5-3.13-1.6 gram SolR As directed for colonoscopy    topiramate (TOPAMAX) 25 MG tablet TAKE 1 TABLET BY MOUTH TWICE DAILY    valACYclovir (VALTREX) 1000 MG tablet Take 2 tablets (2,000 mg total) by mouth 2 (two) times daily.    zinc sulfate (ZINC-220 ORAL)      SCREENINGS:       Cholesterol: July 31, 2019.     FFS/Colonoscopy: August 11, 2017 - Crohn's disease; repeat in 5 years.     Mammogram: August 13, 2019 - okay.      GYN Exam (breast): July 31, 2019 pap smear - okay. Desires pap smear every 3 years (due 2022).     Dexa Scan: August 13, 2019 - osteopenia.      Eye Exam: January 2018 at [facility] per patient.  She wears glasses.     PPD: Negative in the past.     Immunizations:  Td/Tdap - May 5, 2016.                              Gardasil - N./A.                              Zostavax - Never. Reports history of varicella and zoster. She declines.                               Shingrix - Reports had 1st dose in September 2019, then 2nd dose on November 16, 2019.                                Pneumovax - Never.                              Seasonal Flu - September 30, 2019.     ROS:  GENERAL: Denies fever, chills, fatigue or unusual weight change. Appetite normal. Weight 84.4 kg (185 lb 15.3 oz) at September 5, 2019 visit. Not monitors diet or exercises recently.  Desires to take phentermine again to help with weight loss; reports took appx. 1 year ago with help and without problems  SKIN: Denies rashes, itching, changes in mole, color or texture of skin or easy bruising.  HEAD:  Denies headaches or recent head trauma.  EYES: Denies change in vision, pain, diplopia, redness or discharge.  EARS: Denies ear pain, discharge, vertigo or decreased hearing.  NOSE: Denies loss of smell, epistaxis or rhinitis today.   MOUTH & THROAT: Denies hoarseness or change in voice. Denies excessive gum bleeding or mouth sores.  Denies sore  "throat.  NODES: Denies swollen glands.  CHEST: Denies MIKE, wheezing, cough, or sputum production.  CARDIOVASCULAR: Denies chest pain, PND, orthopnea or reduced exercise tolerance.  Denies palpitations.  ABDOMEN: Denies nausea, vomiting, diarrhea, abdominal pain, or blood in stool except reports intermittent constipation.  Saw Dr. Wood, gastroenterologist, on January 23, 2020 for surveillance of Crohn's.  URINARY: Denies flank pain, dysuria or hematuria.  GENITOURINARY: Denies flank pain, dysuria, frequency or hematuria.  Performs monthly breast self-examinations.  ENDOCRINE: Denies diabetes or thyroid problems.  HEME/LYMPH: Denies bleeding problems.  PERIPHERAL VASCULAR:Denies claudication or cyanosis.  MUSCULOSKELETAL: Denies joint stiffness, pain or swelling. Reports occasional ankle swelling but eats out a lot.   NEUROLOGIC: Denies history of seizures, tremors, paralysis, alteration of gait or coordination.  PSYCHIATRIC: Denies mood swings, uncontrolled depression or anxiety, homicidal or suicidal thoughts. Denies sleep problems.     PE:   VS: /80   Pulse 89   Temp 99 °F (37.2 °C) (Temporal)   Resp 16   Ht 5' 7" (1.702 m)   Wt 91.4 kg (201 lb 9.8 oz)   SpO2 98%   BMI 31.58 kg/m²   APPEARANCE:  Well nourished, well developed female, obese and pleasant, alert and oriented in no acute distress.    HEAD: Nontender. Full range of motion.  EYES: PERRL, conjunctiva pink, lids no edema.  She wears glasses.  EARS: External canal patent, no swelling or redness. TM's shiny and clear.  NOSE: Mucosa and turbinates no swollen. No discharge. Nontender sinuses.  THROAT: No pharyngeal erythema or exudate. No stridor.   NECK: Supple, no mass, thyroid not enlarged.  NODES: No cervical, axillary or inguinal lymph node enlargement.  CHEST: Normal respiratory effort. Lungs clear to auscultation.  CARDIOVASCULAR: Normal S1, S2. No rubs, murmurs or gallops. PMI not displaced. No carotid bruit. Pedal pulses palpable " bilaterally. No edema.  ABDOMEN: Bowel sounds present. Not distended. Soft. No tenderness, masses or organomegaly.  BREAST EXAM: Symmetrical, no external lesions, no discharge, no masses palpated.  PELVIC EXAM: No external lesions noted, no discharge, absent cervix and uterus. Bimanual exam showed no adnexal masses or tenderness. Urethra and bladder intact.   RECTAL EXAM: No external hemorrhoids or anal fissures. Heme-negative stool in the rectal vault.  MUSCULOSKELETAL: No joint deformities or stiffness.  She is ambulatory without problems.  SKIN: No rashes or suspicious lesions, normal color and turgor.  NEUROLOGIC:   Cranial Nerves: II-XII grossly intact.   DTR's: Knees, Ankles 2+ and equal bilaterally. Gait & Posture: Normal gait and fine motion.  PSYCHIATRIC: Patient alert, oriented x 3. Mood/Affect normal without acute anxiety and depression noted. Judgment/insight good as she makes appropriate decisions on today's examination.        ASSESSMENT:    ICD-10-CM ICD-9-CM    1. Annual physical exam  Z00.00 V70.0 CBC auto differential      TSH      Urinalysis      Comprehensive metabolic panel      Lipid Panel      Vitamin D   2. Essential hypertension  I10 401.9    3. Hyperlipidemia, unspecified hyperlipidemia type  E78.5 272.4    4. Vitamin D deficiency  E55.9 268.9    5. Seasonal allergic rhinitis, unspecified trigger  J30.2 477.9    6. Migraine without status migrainosus, not intractable, unspecified migraine type  G43.909 346.90 topiramate (TOPAMAX) 25 MG tablet   7. Crohn's disease without complication, unspecified gastrointestinal tract location  K50.90 555.9    8. Obesity (BMI 30-39.9)  E66.9 278.00 phentermine (ADIPEX-P) 37.5 mg tablet   9. Postmenopausal  Z78.0 V49.81    10. Visit for screening mammogram  Z12.31 V76.12 Mammo Digital Screening Bilat       PLAN:  1. Age-appropriate counseling-appropriate low-sodium, low-cholesterol, low carbohydrate diet and exercise daily, monthly breast self exam, annual  wellness examination.   2. Patient advised to call for results.  3. Continue current medications.  4. Prescription refill as noted above.  5. Restart Phentermine 37.5 mg every morning; medication precautions discussed with patient.  6. Keep follow up with specialists.  7. Flu shot this fall.                        8. Follow up in about 1 month (around 8/31/2020) for hypertension and weight recheck.    Answers for HPI/ROS submitted by the patient on 7/30/2020   activity change: No  unexpected weight change: No  neck pain: No  hearing loss: No  rhinorrhea: No  trouble swallowing: No  eye discharge: No  visual disturbance: No  chest tightness: No  wheezing: No  chest pain: No  palpitations: No  blood in stool: No  constipation: No  vomiting: No  diarrhea: No  polydipsia: No  polyuria: No  difficulty urinating: No  hematuria: No  menstrual problem: No  dysuria: No  joint swelling: No  arthralgias: No  headaches: Yes  weakness: No  confusion: No  dysphoric mood: No

## 2020-08-01 LAB — 25(OH)D3+25(OH)D2 SERPL-MCNC: 46 NG/ML (ref 30–96)

## 2020-08-19 ENCOUNTER — TELEPHONE (OUTPATIENT)
Dept: ENDOSCOPY | Facility: HOSPITAL | Age: 60
End: 2020-08-19

## 2020-08-19 ENCOUNTER — TELEPHONE (OUTPATIENT)
Dept: GASTROENTEROLOGY | Facility: CLINIC | Age: 60
End: 2020-08-19

## 2020-08-19 DIAGNOSIS — K50.919 CROHN'S DISEASE WITH COMPLICATION, UNSPECIFIED GASTROINTESTINAL TRACT LOCATION: Primary | ICD-10-CM

## 2020-08-19 NOTE — TELEPHONE ENCOUNTER
Returned call to pt. Colonoscopy scheduled. Instructions faxed to pt at [phone] per her request.

## 2020-08-19 NOTE — TELEPHONE ENCOUNTER
----- Message from Rossy Williamson sent at 8/19/2020  1:31 PM CDT -----  Regarding: return call back  .Type:  Patient Returning Call    Who Called: pt  Who Left Message for Patient: Cesar   Does the patient know what this is regarding?:   Would the patient rather a call back or a response via [facility]?  Call back   Best Call Back Number: [phone] (home)    Additional Information:

## 2020-08-19 NOTE — TELEPHONE ENCOUNTER
Patient left a message on [facility] voicemail; attempted to speak with patient-no answer. Left patient a message to call our office, number provided.

## 2020-08-20 ENCOUNTER — HOSPITAL ENCOUNTER (OUTPATIENT)
Dept: RADIOLOGY | Facility: HOSPITAL | Age: 60
Discharge: HOME | End: 2020-08-20
Attending: FAMILY MEDICINE
Payer: COMMERCIAL

## 2020-08-20 VITALS — WEIGHT: 200.63 LBS | BODY MASS INDEX: 31.49 KG/M2 | HEIGHT: 67 IN

## 2020-08-20 DIAGNOSIS — Z12.31 VISIT FOR SCREENING MAMMOGRAM: ICD-10-CM

## 2020-08-20 PROCEDURE — 77063 MAMMO DIGITAL SCREENING BILAT WITH TOMOSYNTHESIS_CAD: ICD-10-PCS | Mod: 26,,, | Performed by: RADIOLOGY

## 2020-08-20 PROCEDURE — 77067 SCR MAMMO BI INCL CAD: CPT | Mod: TC

## 2020-08-20 PROCEDURE — 77067 SCR MAMMO BI INCL CAD: CPT | Mod: 26,,, | Performed by: RADIOLOGY

## 2020-08-20 PROCEDURE — 77063 BREAST TOMOSYNTHESIS BI: CPT | Mod: 26,,, | Performed by: RADIOLOGY

## 2020-08-20 PROCEDURE — 77067 MAMMO DIGITAL SCREENING BILAT WITH TOMOSYNTHESIS_CAD: ICD-10-PCS | Mod: 26,,, | Performed by: RADIOLOGY

## 2020-08-24 ENCOUNTER — TELEPHONE (OUTPATIENT)
Dept: GASTROENTEROLOGY | Facility: CLINIC | Age: 60
End: 2020-08-24

## 2020-08-24 NOTE — TELEPHONE ENCOUNTER
----- Message from Maria Luz Abel sent at 8/24/2020  4:39 PM CDT -----  Regarding: procedure  Good evening,        Pt will cancel procedure and covid testing however will call back to r/s . Her son is having a total hip replacement and she will be helping him. Pt can be reached at [phone]      Thanks,  Maria Luz Abel

## 2020-08-24 NOTE — TELEPHONE ENCOUNTER
Returned call to pt who requested colonoscopy and covid test be cancelled. Pt has to go out of town for her son's surgery and will call to schedule when she returns.

## 2020-08-31 ENCOUNTER — OFFICE VISIT (OUTPATIENT)
Dept: FAMILY MEDICINE | Facility: CLINIC | Age: 60
End: 2020-08-31
Payer: COMMERCIAL

## 2020-08-31 VITALS
HEART RATE: 83 BPM | OXYGEN SATURATION: 99 % | BODY MASS INDEX: 30.97 KG/M2 | RESPIRATION RATE: 16 BRPM | WEIGHT: 197.31 LBS | SYSTOLIC BLOOD PRESSURE: 116 MMHG | HEIGHT: 67 IN | TEMPERATURE: 99 F | DIASTOLIC BLOOD PRESSURE: 82 MMHG

## 2020-08-31 DIAGNOSIS — I10 ESSENTIAL HYPERTENSION: Chronic | ICD-10-CM

## 2020-08-31 DIAGNOSIS — E66.9 OBESITY (BMI 30-39.9): ICD-10-CM

## 2020-08-31 PROCEDURE — 99999 PR PBB SHADOW E&M-EST. PATIENT-LVL V: CPT | Mod: PBBFAC,,, | Performed by: FAMILY MEDICINE

## 2020-08-31 PROCEDURE — 99213 PR OFFICE/OUTPT VISIT, EST, LEVL III, 20-29 MIN: ICD-10-PCS | Mod: S$GLB,,, | Performed by: FAMILY MEDICINE

## 2020-08-31 PROCEDURE — 99999 PR PBB SHADOW E&M-EST. PATIENT-LVL V: ICD-10-PCS | Mod: PBBFAC,,, | Performed by: FAMILY MEDICINE

## 2020-08-31 PROCEDURE — 3074F PR MOST RECENT SYSTOLIC BLOOD PRESSURE < 130 MM HG: ICD-10-PCS | Mod: CPTII,S$GLB,, | Performed by: FAMILY MEDICINE

## 2020-08-31 PROCEDURE — 3079F PR MOST RECENT DIASTOLIC BLOOD PRESSURE 80-89 MM HG: ICD-10-PCS | Mod: CPTII,S$GLB,, | Performed by: FAMILY MEDICINE

## 2020-08-31 PROCEDURE — 99213 OFFICE O/P EST LOW 20 MIN: CPT | Mod: S$GLB,,, | Performed by: FAMILY MEDICINE

## 2020-08-31 PROCEDURE — 3079F DIAST BP 80-89 MM HG: CPT | Mod: CPTII,S$GLB,, | Performed by: FAMILY MEDICINE

## 2020-08-31 PROCEDURE — 3008F PR BODY MASS INDEX (BMI) DOCUMENTED: ICD-10-PCS | Mod: CPTII,S$GLB,, | Performed by: FAMILY MEDICINE

## 2020-08-31 PROCEDURE — 3008F BODY MASS INDEX DOCD: CPT | Mod: CPTII,S$GLB,, | Performed by: FAMILY MEDICINE

## 2020-08-31 PROCEDURE — 3074F SYST BP LT 130 MM HG: CPT | Mod: CPTII,S$GLB,, | Performed by: FAMILY MEDICINE

## 2020-08-31 RX ORDER — PHENTERMINE HYDROCHLORIDE 37.5 MG/1
37.5 TABLET ORAL
Qty: 30 TABLET | Refills: 0 | Status: SHIPPED | OUTPATIENT
Start: 2020-08-31 | End: 2020-09-30

## 2020-08-31 NOTE — PROGRESS NOTES
Angelica Flores    Chief Complaint   Patient presents with    Hypertension    Weight Check    Follow-up       History of Present Illness:   Ms. Flores comes in today for hypertension follow-up with weight recheck.  She states she did not take phentermine for a full month due to pending colonoscopy scheduling although reports she did not have colonoscopy.  She states she continues to monitor her diet but has not been exercising due to the hot weather.    She states she feels okay today.  She denies having acute problems.  She denies having fever, chills, fatigue, appetite changes; shortness of breath, cough, wheezing; chest pain, palpitations, leg swelling abdominal pain, nausea, vomiting, diarrhea, constipation; unusual urinary symptoms; back or neck pain; headache; anxiety, depression, homicidal or suicidal thoughts.              Labs:                  WBC                      9.85                07/31/2020                 HGB                      13.5                07/31/2020                 HCT                      43.4                07/31/2020                 PLT                      357 (H)             07/31/2020                 CHOL                     190                 07/31/2020                 TRIG                     47                  07/31/2020                 HDL                      74                  07/31/2020                 ALT                      13                  07/31/2020                 AST                      18                  07/31/2020                 NA                       140                 07/31/2020                 K                        4.4                 07/31/2020                 CL                       110                 07/31/2020                 CREATININE               0.8                 07/31/2020                 BUN                      16                  07/31/2020                 CO2                      19 (L)              07/31/2020                  TSH                      0.790               07/31/2020                 INR                      0.9                 08/18/2008                 HGBA1C                   5.6                 04/27/2016                LDLCALC                  106.6               07/31/2020              Hypertension  This is a chronic problem. The current episode started more than 1 year ago. The problem has been resolved since onset. The problem is controlled. Pertinent negatives include no anxiety, blurred vision, chest pain, headaches, malaise/fatigue, neck pain, orthopnea, palpitations, peripheral edema, PND, shortness of breath or sweats. Agents associated with hypertension include anorectics. Risk factors for coronary artery disease include obesity, post-menopausal state, sedentary lifestyle and stress. Past treatments include ACE inhibitors, beta blockers and lifestyle changes. The current treatment provides moderate improvement. Compliance problems include exercise.        Current Outpatient Medications   Medication Sig    albuterol (PROVENTIL/VENTOLIN HFA) 90 mcg/actuation inhaler Inhale 2 puffs into the lungs every 6 (six) hours as needed for Wheezing or Shortness of Breath.    ALLERGY RELIEF, CETIRIZINE, 10 mg tablet TAKE 1 TABLET BY MOUTH ONCE DAILY    ALPRAZolam (XANAX) 0.5 MG tablet Take 1 tablet by mouth twice daily as needed    amLODIPine (NORVASC) 10 MG tablet TAKE 1 TABLET BY MOUTH ONCE DAILY IN THE MORNING . APPOINTMENT REQUIRED FOR FUTURE REFILLS    ascorbic acid (VITAMIN C) 1000 MG tablet Take by mouth. 1 Tablet Oral Every day    BACILLUS COAGULANS (PROBIOTIC, B. COAGULANS, ORAL) Take by mouth once daily.    benzonatate (TESSALON) 100 MG capsule Take 1-2 capsules (100-200 mg total) by mouth 3 (three) times daily as needed for Cough.    cholecalciferol, vitamin D3, 2,000 unit Cap Take by mouth. 1 capsule Oral Every day    ibuprofen (ADVIL,MOTRIN) 800 MG tablet TAKE 1 TABLET BY MOUTH TWICE DAILY AS NEEDED  FOR PAIN    levocetirizine (XYZAL) 5 MG tablet Take 5 mg by mouth every evening.    lisinopriL (PRINIVIL,ZESTRIL) 20 MG tablet TAKE 1 TABLET BY MOUTH ONCE DAILY . APPOINTMENT REQUIRED FOR FUTURE REFILLS    mercaptopurine (PURINETHOL) 50 mg tablet Take 1 tablet (50 mg total) by mouth once daily.    mometasone (NASONEX) 50 mcg/actuation nasal spray USE 2 SPRAY(S) IN EACH NOSTRIL ONCE DAILY    montelukast (SINGULAIR) 10 mg tablet TAKE 1 TABLET BY MOUTH ONCE DAILY IN THE EVENING    multivitamin-Ca-iron-minerals (WOMEN'S MULTIPLE VITAMINS) 18-0.4 mg Tab Take by mouth. 1 Tablet Oral Every day    phentermine (ADIPEX-P) 37.5 mg tablet Take 1 tablet (37.5 mg total) by mouth before breakfast.    pravastatin (PRAVACHOL) 80 MG tablet TAKE 1 TABLET BY MOUTH ONCE DAILY IN THE EVENING    sodium,potassium,mag sulfates (SUPREP BOWEL PREP KIT) 17.5-3.13-1.6 gram SolR As directed for colonoscopy    topiramate (TOPAMAX) 25 MG tablet Take 1 tablet (25 mg total) by mouth 2 (two) times daily.    valACYclovir (VALTREX) 1000 MG tablet Take 2 tablets (2,000 mg total) by mouth 2 (two) times daily.    zinc sulfate (ZINC-220 ORAL)      Review of Systems   Constitutional: Positive for appetite change. Negative for activity change, chills, fatigue, fever and malaise/fatigue.        Weight 91.4 kg (201 lb 9.8 oz) at July 31, 2020 visit. See history of present illness.   Eyes: Negative for blurred vision.   Respiratory: Negative for cough, shortness of breath and wheezing.    Cardiovascular: Negative for chest pain, palpitations, orthopnea, leg swelling and PND.   Gastrointestinal: Negative for abdominal pain, constipation, diarrhea, nausea and vomiting.   Genitourinary: Negative for difficulty urinating.   Musculoskeletal: Negative for back pain and neck pain.   Neurological: Negative for headaches.   Psychiatric/Behavioral: Negative for dysphoric mood and suicidal ideas. The patient is not nervous/anxious.         Negative for  homicidal ideas.       Objective:  Physical Exam  Vitals signs reviewed.   Constitutional:       General: She is not in acute distress.     Appearance: Normal appearance. She is well-developed. She is obese. She is not ill-appearing, toxic-appearing or diaphoretic.      Comments: Pleasant.   Neck:      Musculoskeletal: Normal range of motion and neck supple. No muscular tenderness.      Thyroid: No thyromegaly.   Cardiovascular:      Rate and Rhythm: Normal rate and regular rhythm.      Heart sounds: Normal heart sounds. No murmur.   Pulmonary:      Effort: Pulmonary effort is normal. No respiratory distress.      Breath sounds: Normal breath sounds. No wheezing.   Abdominal:      General: Bowel sounds are normal. There is no distension.      Palpations: Abdomen is soft. There is no mass.      Tenderness: There is no abdominal tenderness. There is no guarding or rebound.   Musculoskeletal: Normal range of motion.         General: No swelling or tenderness.      Comments: She is ambulatory without problems.   Lymphadenopathy:      Cervical: No cervical adenopathy.   Neurological:      General: No focal deficit present.      Mental Status: She is alert and oriented to person, place, and time.   Psychiatric:         Mood and Affect: Mood normal.         Behavior: Behavior normal.         Thought Content: Thought content normal.         Judgment: Judgment normal.         ASSESSMENT:  1. Essential hypertension    2. Obesity (BMI 30-39.9)        PLAN:  Angelica was seen today for hypertension, weight check and follow-up.    Diagnoses and all orders for this visit:    Essential hypertension    Obesity (BMI 30-39.9)  -     phentermine (ADIPEX-P) 37.5 mg tablet; Take 1 tablet (37.5 mg total) by mouth before breakfast.       Continue current medications, follow low sodium, low cholesterol, low carb diet, daily walks.  Prescription refill as noted above.  Flu shot this fall.  Follow up in about 1 month (around 9/30/2020) for  weight check with hypertension follow up.

## 2020-10-08 ENCOUNTER — OFFICE VISIT (OUTPATIENT)
Dept: FAMILY MEDICINE | Facility: CLINIC | Age: 60
End: 2020-10-08
Payer: COMMERCIAL

## 2020-10-08 VITALS
DIASTOLIC BLOOD PRESSURE: 80 MMHG | OXYGEN SATURATION: 98 % | HEART RATE: 85 BPM | WEIGHT: 191.81 LBS | SYSTOLIC BLOOD PRESSURE: 120 MMHG | HEIGHT: 67 IN | RESPIRATION RATE: 16 BRPM | TEMPERATURE: 99 F | BODY MASS INDEX: 30.1 KG/M2

## 2020-10-08 DIAGNOSIS — I10 ESSENTIAL HYPERTENSION: Primary | Chronic | ICD-10-CM

## 2020-10-08 DIAGNOSIS — E66.9 OBESITY (BMI 30.0-34.9): ICD-10-CM

## 2020-10-08 PROCEDURE — 99999 PR PBB SHADOW E&M-EST. PATIENT-LVL V: ICD-10-PCS | Mod: PBBFAC,,, | Performed by: FAMILY MEDICINE

## 2020-10-08 PROCEDURE — 3008F BODY MASS INDEX DOCD: CPT | Mod: CPTII,S$GLB,, | Performed by: FAMILY MEDICINE

## 2020-10-08 PROCEDURE — 99213 PR OFFICE/OUTPT VISIT, EST, LEVL III, 20-29 MIN: ICD-10-PCS | Mod: S$GLB,,, | Performed by: FAMILY MEDICINE

## 2020-10-08 PROCEDURE — 99213 OFFICE O/P EST LOW 20 MIN: CPT | Mod: S$GLB,,, | Performed by: FAMILY MEDICINE

## 2020-10-08 PROCEDURE — 3074F SYST BP LT 130 MM HG: CPT | Mod: CPTII,S$GLB,, | Performed by: FAMILY MEDICINE

## 2020-10-08 PROCEDURE — 3008F PR BODY MASS INDEX (BMI) DOCUMENTED: ICD-10-PCS | Mod: CPTII,S$GLB,, | Performed by: FAMILY MEDICINE

## 2020-10-08 PROCEDURE — 3079F PR MOST RECENT DIASTOLIC BLOOD PRESSURE 80-89 MM HG: ICD-10-PCS | Mod: CPTII,S$GLB,, | Performed by: FAMILY MEDICINE

## 2020-10-08 PROCEDURE — 3074F PR MOST RECENT SYSTOLIC BLOOD PRESSURE < 130 MM HG: ICD-10-PCS | Mod: CPTII,S$GLB,, | Performed by: FAMILY MEDICINE

## 2020-10-08 PROCEDURE — 3079F DIAST BP 80-89 MM HG: CPT | Mod: CPTII,S$GLB,, | Performed by: FAMILY MEDICINE

## 2020-10-08 PROCEDURE — 99999 PR PBB SHADOW E&M-EST. PATIENT-LVL V: CPT | Mod: PBBFAC,,, | Performed by: FAMILY MEDICINE

## 2020-10-08 RX ORDER — PHENTERMINE HYDROCHLORIDE 37.5 MG/1
37.5 TABLET ORAL
Qty: 30 TABLET | Refills: 0 | Status: SHIPPED | OUTPATIENT
Start: 2020-10-08 | End: 2021-02-10 | Stop reason: SDUPTHER

## 2020-10-08 NOTE — PROGRESS NOTES
Angelica Flores    Chief Complaint   Patient presents with    Hypertension    Weight Check    Follow-up       History of Present Illness:   Ms. Flores comes in today for hypertension follow-up with weight recheck.  She has been taking Phentermine since July 31, 2020 for assistance with losing weight. She states she continues to monitor her diet and states she stops eating when she is full but has been exercising bu walking.     She states she feels okay today.  She denies having acute problems.  She denies having fever, chills, fatigue, appetite changes; shortness of breath, cough, wheezing; chest pain, palpitations, leg swelling abdominal pain, nausea, vomiting, diarrhea, constipation; unusual urinary symptoms; back or neck pain; headache; anxiety, depression, homicidal or suicidal thoughts.       Current Outpatient Medications   Medication Sig    albuterol (PROVENTIL/VENTOLIN HFA) 90 mcg/actuation inhaler Inhale 2 puffs into the lungs every 6 (six) hours as needed for Wheezing or Shortness of Breath.    ALLERGY RELIEF, CETIRIZINE, 10 mg tablet TAKE 1 TABLET BY MOUTH ONCE DAILY    ALPRAZolam (XANAX) 0.5 MG tablet Take 1 tablet by mouth twice daily as needed    amLODIPine (NORVASC) 10 MG tablet TAKE 1 TABLET BY MOUTH ONCE DAILY IN THE MORNING . APPOINTMENT REQUIRED FOR FUTURE REFILLS    ascorbic acid (VITAMIN C) 1000 MG tablet Take by mouth. 1 Tablet Oral Every day    BACILLUS COAGULANS (PROBIOTIC, B. COAGULANS, ORAL) Take by mouth once daily.    benzonatate (TESSALON) 100 MG capsule Take 1-2 capsules (100-200 mg total) by mouth 3 (three) times daily as needed for Cough.    cholecalciferol, vitamin D3, 2,000 unit Cap Take by mouth. 1 capsule Oral Every day    ibuprofen (ADVIL,MOTRIN) 800 MG tablet TAKE 1 TABLET BY MOUTH TWICE DAILY AS NEEDED FOR PAIN    levocetirizine (XYZAL) 5 MG tablet Take 5 mg by mouth every evening.    lisinopriL (PRINIVIL,ZESTRIL) 20 MG tablet TAKE 1 TABLET BY MOUTH ONCE  DAILY . APPOINTMENT REQUIRED FOR FUTURE REFILLS    mercaptopurine (PURINETHOL) 50 mg tablet Take 1 tablet (50 mg total) by mouth once daily.    mometasone (NASONEX) 50 mcg/actuation nasal spray USE 2 SPRAY(S) IN EACH NOSTRIL ONCE DAILY    montelukast (SINGULAIR) 10 mg tablet TAKE 1 TABLET BY MOUTH ONCE DAILY IN THE EVENING    multivitamin-Ca-iron-minerals (WOMEN'S MULTIPLE VITAMINS) 18-0.4 mg Tab Take by mouth. 1 Tablet Oral Every day    pravastatin (PRAVACHOL) 80 MG tablet TAKE 1 TABLET BY MOUTH ONCE DAILY IN THE EVENING    sodium,potassium,mag sulfates (SUPREP BOWEL PREP KIT) 17.5-3.13-1.6 gram SolR As directed for colonoscopy    topiramate (TOPAMAX) 25 MG tablet Take 1 tablet (25 mg total) by mouth 2 (two) times daily.    valACYclovir (VALTREX) 1000 MG tablet Take 2 tablets (2,000 mg total) by mouth 2 (two) times daily.    zinc sulfate (ZINC-220 ORAL)        Review of Systems   Constitutional: Negative for activity change, appetite change, chills, fatigue and fever.        Weight 89.5 kg (197 lb 5 oz) at August 31, 2020 visit. See history of present illness.   Respiratory: Negative for cough, shortness of breath and wheezing.    Cardiovascular: Negative for chest pain, palpitations and leg swelling.   Gastrointestinal: Negative for abdominal pain, constipation, diarrhea, nausea and vomiting.   Genitourinary: Negative for difficulty urinating.   Musculoskeletal: Negative for back pain.   Neurological: Negative for headaches.   Psychiatric/Behavioral: Negative for dysphoric mood and suicidal ideas. The patient is not nervous/anxious.         Negative for homicidal ideas.       Objective:  Physical Exam  Vitals signs reviewed.   Constitutional:       General: She is not in acute distress.     Appearance: Normal appearance. She is well-developed. She is obese. She is not ill-appearing, toxic-appearing or diaphoretic.      Comments: Pleasant.   Neck:      Musculoskeletal: Normal range of motion and neck  supple. No muscular tenderness.      Thyroid: No thyromegaly.   Cardiovascular:      Rate and Rhythm: Normal rate and regular rhythm.      Heart sounds: Normal heart sounds. No murmur.   Pulmonary:      Effort: Pulmonary effort is normal. No respiratory distress.      Breath sounds: Normal breath sounds. No wheezing.   Abdominal:      General: Bowel sounds are normal. There is no distension.      Palpations: Abdomen is soft. There is no mass.      Tenderness: There is no abdominal tenderness. There is no guarding or rebound.   Musculoskeletal: Normal range of motion.         General: No swelling or tenderness.      Comments: She is ambulatory without problems.   Lymphadenopathy:      Cervical: No cervical adenopathy.   Neurological:      General: No focal deficit present.      Mental Status: She is alert and oriented to person, place, and time.   Psychiatric:         Mood and Affect: Mood normal.         Behavior: Behavior normal.         Thought Content: Thought content normal.         Judgment: Judgment normal.         ASSESSMENT:  1. Essential hypertension    2. Obesity (BMI 30.0-34.9)        PLAN:  Angelica was seen today for hypertension, weight check and follow-up.    Diagnoses and all orders for this visit:    Essential hypertension    Obesity (BMI 30.0-34.9)  -     phentermine (ADIPEX-P) 37.5 mg tablet; Take 1 tablet (37.5 mg total) by mouth before breakfast.       Continue current medications, follow low sodium, low cholesterol, low carb diet, daily walks.  Prescription refill as noted above.  Flu shot this fall.  Follow up in about 2 months (around 12/8/2020) for hypertension and weight recheck.

## 2020-10-16 ENCOUNTER — TELEPHONE (OUTPATIENT)
Dept: ENDOSCOPY | Facility: HOSPITAL | Age: 60
End: 2020-10-16

## 2020-10-17 DIAGNOSIS — K52.9 IBD (INFLAMMATORY BOWEL DISEASE): Primary | ICD-10-CM

## 2020-10-17 RX ORDER — SODIUM, POTASSIUM,MAG SULFATES 17.5-3.13G
SOLUTION, RECONSTITUTED, ORAL ORAL
Qty: 254 ML | Refills: 0 | Status: ON HOLD | OUTPATIENT
Start: 2020-10-17 | End: 2020-11-23 | Stop reason: CLARIF

## 2020-10-19 ENCOUNTER — TELEPHONE (OUTPATIENT)
Dept: ENDOSCOPY | Facility: HOSPITAL | Age: 60
End: 2020-10-19

## 2020-10-19 DIAGNOSIS — K52.9 INFLAMMATORY BOWEL DISEASE: Primary | ICD-10-CM

## 2020-10-19 NOTE — TELEPHONE ENCOUNTER
Pt has Suprep from last visit. Will call and cxl new rx. Went over prep instructions. Pt will hold Adipex 2 wks prior. Pt verbalize understanding.

## 2020-10-19 NOTE — TELEPHONE ENCOUNTER
Location Screening:    If answers yes to any of the following, schedule at [facility] ONLY. If No, OK for either location.    1. Is there a diagnosis of heart failure, severe heart valve disease (aortic stenosis) or mechanical valve? no  a. Is the Left Ventricle Ejection Fraction <30% ? no    2. Does the pt have pulmonary hypertension? no   a. Is pulmonary arterial pressure gradient >50mmHg? no   b. Is there evidence of right ventricular dysfunction? no    3. Does the pt have achalasia? no    4. Any history of negative reaction to anesthesia? no   a. Myasthenia gravis? no   b. Malignant hyperthermia? no   c. Other? no    5. Is procedure for esophageal banding? no      COVID Screening    1. Have you had a fever in the last 7 days or have you used fever reducing medicines for a fever in the last 7 days?  no    2. Are you experiencing shortness of breath, cough, muscle aches, loss of taste or loss of smell?  no    If answered yes to questions 1 and 2, the patient must seek medical attention with their PCP.  Do not schedule their procedure.     3. Are you residing with anyone who has tested positive for Covid?  no    If answered yes to question 3, recommend 14 day self-quarantine from the date of relative's positive test and place special needs note in the depot.  Wait to schedule.     ENDO screening    Have you been admitted for cardiac, kidney or pulmonary causes to the hospital in the past 3 months? No   If yes, schedule an appointment with PCP before scheduling endoscopic procedure.     2. Have you had a stent placed in the last 12 months? no   If yes, for a screening visit, cancel and message the ordering provider.  The patient will need a new order when the time is appropriate.     3. Have you had a stroke or heart attack in the past 6 months? no   If yes, cancel and refer patient to ordering provider for clearance, also message ordering provider to inform.     4. Have you had any chest pain in the past 3 months?  "no   If yes, Have you been evaluated by your PCP and/or cardiologist and it was determined to not be heart related? not applicable   If No, Pt needs to be seen by PCP or Cardiologist .  Pt can be scheduled once clearance obtained by either of those providers.     5. Do you take prescription weight loss medications?  yes. Adipex   If yes, must stop for 2 weeks prior to procedure. Pt verbalized understanding.     6. Have you been diagnosed with diverticulitis within the past 3 months? no   If yes, must have been seen by GI within the last 3 months, if not schedule with GI [facility].    If pt has been seen by GI, schedule procedure 8-12 weeks post antibiotic treatment.     7. Are you on Dialysis? no  If yes, schedule procedure for the day AFTER dialysis.  Appt time should be 9am or later, patient arrival time is 2 hours prior.  Nulytely or miralax prep for all patients with kidney disease.     8. Are you diabetic?  no   If yes, schedule morning appt. Advise pt to hold all diabetic meds day of procedure.     9. If pt is older than 80 years of age and HAS NOT been seen by GI or PCP within the last 6 months, needs appt with GI [facility].   If pt has been seen by the GI provider or PCP within the past 6  months AND meets criteria, schedule procedure AND send message to the endoscopist.     10. Is patient on a "high risk" medication (blood thinner/antiplatelet agent)?  no   If yes, has cardiac clearance been obtained within the last 60 days? No   If no, a new clearance needs to be obtained.     Final Questions:    1.I have reviewed the last colonoscopy for recommendations regarding next procedure bowel prep.  yes  2. I have reviewed medications and allergies.  yes  3. I have verified the pharmacy information and appropriate prep sent if needed. yes  4. Prep instructions have been mailed or sent to portal per patient request. yes  11- vázquez      All schedulers will have ability to reach out to the ordering GI provider to clarify " any issues.

## 2020-11-15 ENCOUNTER — PATIENT MESSAGE (OUTPATIENT)
Dept: FAMILY MEDICINE | Facility: CLINIC | Age: 60
End: 2020-11-15

## 2020-11-16 ENCOUNTER — OFFICE VISIT (OUTPATIENT)
Dept: URGENT CARE | Facility: CLINIC | Age: 60
End: 2020-11-16
Payer: COMMERCIAL

## 2020-11-16 ENCOUNTER — TELEPHONE (OUTPATIENT)
Dept: FAMILY MEDICINE | Facility: CLINIC | Age: 60
End: 2020-11-16

## 2020-11-16 VITALS
OXYGEN SATURATION: 95 % | HEART RATE: 77 BPM | HEIGHT: 60 IN | BODY MASS INDEX: 37.69 KG/M2 | SYSTOLIC BLOOD PRESSURE: 114 MMHG | DIASTOLIC BLOOD PRESSURE: 60 MMHG | TEMPERATURE: 99 F | WEIGHT: 192 LBS | RESPIRATION RATE: 18 BRPM

## 2020-11-16 DIAGNOSIS — J01.90 ACUTE BACTERIAL SINUSITIS: ICD-10-CM

## 2020-11-16 DIAGNOSIS — R05.8 DRY COUGH: Primary | ICD-10-CM

## 2020-11-16 DIAGNOSIS — B96.89 ACUTE BACTERIAL SINUSITIS: ICD-10-CM

## 2020-11-16 LAB
CTP QC/QA: YES
SARS-COV-2 RDRP RESP QL NAA+PROBE: NEGATIVE

## 2020-11-16 PROCEDURE — 1125F PR PAIN SEVERITY QUANTIFIED, PAIN PRESENT: ICD-10-PCS | Mod: S$GLB,,, | Performed by: NURSE PRACTITIONER

## 2020-11-16 PROCEDURE — 3008F PR BODY MASS INDEX (BMI) DOCUMENTED: ICD-10-PCS | Mod: CPTII,S$GLB,, | Performed by: NURSE PRACTITIONER

## 2020-11-16 PROCEDURE — 3008F BODY MASS INDEX DOCD: CPT | Mod: CPTII,S$GLB,, | Performed by: NURSE PRACTITIONER

## 2020-11-16 PROCEDURE — 1125F AMNT PAIN NOTED PAIN PRSNT: CPT | Mod: S$GLB,,, | Performed by: NURSE PRACTITIONER

## 2020-11-16 PROCEDURE — U0002 COVID-19 LAB TEST NON-CDC: HCPCS | Mod: QW,S$GLB,, | Performed by: NURSE PRACTITIONER

## 2020-11-16 PROCEDURE — 99214 OFFICE O/P EST MOD 30 MIN: CPT | Mod: S$GLB,,, | Performed by: NURSE PRACTITIONER

## 2020-11-16 PROCEDURE — U0002: ICD-10-PCS | Mod: QW,S$GLB,, | Performed by: NURSE PRACTITIONER

## 2020-11-16 PROCEDURE — 99214 PR OFFICE/OUTPT VISIT, EST, LEVL IV, 30-39 MIN: ICD-10-PCS | Mod: S$GLB,,, | Performed by: NURSE PRACTITIONER

## 2020-11-16 RX ORDER — DOXYCYCLINE 100 MG/1
100 CAPSULE ORAL EVERY 12 HOURS
Qty: 20 CAPSULE | Refills: 0 | Status: SHIPPED | OUTPATIENT
Start: 2020-11-16 | End: 2020-11-26

## 2020-11-16 NOTE — TELEPHONE ENCOUNTER
----- Message from Luzrito Trammell sent at 11/16/2020  8:32 AM CST -----  Contact: Angelica  Pt has been exposed to someone with positive covid test. She has no symptoms, but wants to know if she should get a covid test today instead of waiting until pre procedure test on Friday. If needs done today, her work is 10 minutes from the [employer]. Please call back at [phone]. Thanks jh

## 2020-11-16 NOTE — PROGRESS NOTES
Subjective:       Patient ID: Angelica Flores is a 60 y.o. female.    Vitals:  height is 5' (1.524 m) and weight is 87.1 kg (192 lb). Her temporal temperature is 98.7 °F (37.1 °C). Her blood pressure is 114/60 and her pulse is 77. Her respiration is 18 and oxygen saturation is 95%.     Chief Complaint: COVID-19 Concerns    Patient present with covid concerns. Patient complains of sinus pain,dry cough,headache,and nauesa .Known exposure to covid. Taken OTC med (ibuprofen). Alysha Fever,diarrhea, or vomiting.     Other  This is a new problem. Associated symptoms include coughing, headaches and nausea. Pertinent negatives include no chills, congestion, diaphoresis, fatigue, fever, myalgias, rash, sore throat or vomiting. Treatments tried: ibuprofen. The treatment provided no relief.       Constitution: Negative for chills, sweating, fatigue and fever.   HENT: Positive for sinus pain. Negative for ear pain, congestion, sinus pressure, sore throat and voice change.    Neck: Negative for painful lymph nodes.   Eyes: Negative for eye redness.   Respiratory: Positive for cough. Negative for chest tightness, sputum production, bloody sputum, COPD, shortness of breath, stridor, wheezing and asthma.    Gastrointestinal: Positive for nausea. Negative for vomiting.   Musculoskeletal: Negative for muscle ache.   Skin: Negative for rash.   Allergic/Immunologic: Negative for seasonal allergies and asthma.   Neurological: Positive for headaches.   Hematologic/Lymphatic: Negative for swollen lymph nodes.       Objective:      Physical Exam   Constitutional: She is oriented to person, place, and time. She appears well-developed. She is cooperative.  Non-toxic appearance. She does not appear ill. No distress.   HENT:   Head: Normocephalic and atraumatic.   Ears:   Right Ear: Hearing, external ear and ear canal normal. A middle ear effusion is present.   Left Ear: Hearing, external ear and ear canal normal. A middle ear effusion  is present.   Nose: Mucosal edema and rhinorrhea present. No nasal deformity. No epistaxis. Right sinus exhibits no maxillary sinus tenderness and no frontal sinus tenderness. Left sinus exhibits maxillary sinus tenderness. Left sinus exhibits no frontal sinus tenderness.   Mouth/Throat: Uvula is midline and mucous membranes are normal. No trismus in the jaw. Normal dentition. No uvula swelling. Posterior oropharyngeal erythema present. No oropharyngeal exudate or posterior oropharyngeal edema.   Eyes: Conjunctivae and lids are normal. No scleral icterus.   Neck: Trachea normal, full passive range of motion without pain and phonation normal. Neck supple. No neck rigidity. No edema and no erythema present.   Cardiovascular: Normal rate, regular rhythm, normal heart sounds and normal pulses.   Pulmonary/Chest: Effort normal and breath sounds normal. No accessory muscle usage. No tachypnea and no bradypnea. No respiratory distress. She has no decreased breath sounds. She has no rhonchi.   Abdominal: Normal appearance.   Musculoskeletal: Normal range of motion.         General: No deformity.   Lymphadenopathy:        Head (right side): No submandibular and no tonsillar adenopathy present.        Head (left side): No submandibular and no tonsillar adenopathy present.   Neurological: She is alert and oriented to person, place, and time. She exhibits normal muscle tone. Coordination normal.   Skin: Skin is warm, dry, intact, not diaphoretic and not pale. Psychiatric: Her speech is normal and behavior is normal. Judgment and thought content normal.   Nursing note and vitals reviewed.        Assessment:       1. Dry cough    2. Acute bacterial sinusitis        Plan:         Dry cough  -     POCT COVID-19 Rapid Screening    Acute bacterial sinusitis  -     doxycycline (MONODOX) 100 MG capsule; Take 1 capsule (100 mg total) by mouth every 12 (twelve) hours. for 10 days  Dispense: 20 capsule; Refill: 0         Results for  orders placed or performed in visit on 11/16/20   POCT COVID-19 Rapid Screening   Result Value Ref Range    POC Rapid COVID Negative Negative     Acceptable Yes      Antibiotic Therapy  Take antibiotics for entire course.  Do not save medications for later, all medications must be taken for full regimen.    Sinus/Allergy Symptoms    - Attempt to avoid allergens.  - Use Normal saline nasal spray to wash offending allergens from airways.  - Take antihistamine as prescribed such as Allegra, Zyrtec, Clartin.   - Take Plain Mucinex as directed.   - Drink plenty of fluids.  - Follow up with Primary Care Provider in 1-2 weeks or sooner if needed.              Go to ER immediately if severe allergic response experienced such as difficulty breathing, facial swelling, throat swelling.

## 2020-11-16 NOTE — TELEPHONE ENCOUNTER
Pt was exposed last Thursday to covid   Asking if she should test today  Or wait until Friday ---she will be doing pre op covid for colonscopy    She has headache, no fever, no SOB, had a cough at night the past 2 days     No other symptoms

## 2020-11-16 NOTE — PATIENT INSTRUCTIONS
Sinusitis (Antibiotic Treatment)    The sinuses are air-filled spaces within the bones of the face. They connect to the inside of the nose. Sinusitis is an inflammation of the tissue lining the sinus cavity. Sinus inflammation can occur during a cold. It can also be due to allergies to pollens and other particles in the air. Sinusitis can cause symptoms of sinus congestion and fullness. A sinus infection causes fever, headache and facial pain. There is often green or yellow drainage from the nose or into the back of the throat (post-nasal drip). You have been given antibiotics to treat this condition.  Home care:  · Take the full course of antibiotics as instructed. Do not stop taking them, even if you feel better.  · Drink plenty of water, hot tea, and other liquids. This may help thin mucus. It also may promote sinus drainage.  · Heat may help soothe painful areas of the face. Use a towel soaked in hot water. Or,  the shower and direct the hot spray onto your face. Using a vaporizer along with a menthol rub at night may also help.   · An expectorant containing guaifenesin may help thin the mucus and promote drainage from the sinuses.  · Over-the-counter decongestants may be used unless a similar medicine was prescribed. Nasal sprays work the fastest. Use one that contains phenylephrine or oxymetazoline. First blow the nose gently. Then use the spray. Do not use these medicines more often than directed on the label or symptoms may get worse. You may also use tablets containing pseudoephedrine. Avoid products that combine ingredients, because side effects may be increased. Read labels. You can also ask the pharmacist for help. (NOTE: Persons with high blood pressure should not use decongestants. They can raise blood pressure.)  · Over-the-counter antihistamines may help if allergies contributed to your sinusitis.    · Do not use nasal rinses or irrigation during an acute sinus infection, unless told to by  your health care provider. Rinsing may spread the infection to other sinuses.  · Use acetaminophen or ibuprofen to control pain, unless another pain medicine was prescribed. (If you have chronic liver or kidney disease or ever had a stomach ulcer, talk with your doctor before using these medicines. Aspirin should never be used in anyone under 18 years of age who is ill with a fever. It may cause severe liver damage.)  · Don't smoke. This can worsen symptoms.  Follow-up care  Follow up with your healthcare provider or our staff if you are not improving within the next week.  When to seek medical advice  Call your healthcare provider if any of these occur:  · Facial pain or headache becoming more severe  · Stiff neck  · Unusual drowsiness or confusion  · Swelling of the forehead or eyelids  · Vision problems, including blurred or double vision  · Fever of 100.4ºF (38ºC) or higher, or as directed by your healthcare provider  · Seizure  · Breathing problems  · Symptoms not resolving within 10 days  Date Last Reviewed: 4/13/2015  © 3154-4760 [employer]. [address] [place] [address], [place] [address]. All rights reserved. This information is not intended as a substitute for professional medical care. Always follow your healthcare professional's instructions.

## 2020-11-20 ENCOUNTER — LAB VISIT (OUTPATIENT)
Dept: OTOLARYNGOLOGY | Facility: CLINIC | Age: 60
End: 2020-11-20
Payer: COMMERCIAL

## 2020-11-20 DIAGNOSIS — K50.919 CROHN'S DISEASE WITH COMPLICATION, UNSPECIFIED GASTROINTESTINAL TRACT LOCATION: ICD-10-CM

## 2020-11-20 PROCEDURE — U0003 INFECTIOUS AGENT DETECTION BY NUCLEIC ACID (DNA OR RNA); SEVERE ACUTE RESPIRATORY SYNDROME CORONAVIRUS 2 (SARS-COV-2) (CORONAVIRUS DISEASE [COVID-19]), AMPLIFIED PROBE TECHNIQUE, MAKING USE OF HIGH THROUGHPUT TECHNOLOGIES AS DESCRIBED BY CMS-2020-01-R: HCPCS

## 2020-11-21 LAB — SARS-COV-2 RNA RESP QL NAA+PROBE: NOT DETECTED

## 2020-11-22 NOTE — H&P
Short Stay Endoscopy History and Physical    PCP - Monet Alvarez MD    Procedure - Colonoscopy  ASA -3  Mallampati - per anesthesia  History of Anesthesia problems - no  Family history Anesthesia problems -  no     HPI:  This is a 60 y.o.female here for evaluation of : Crohn's Disease    Reflux - no  Dysphagia - no  Abdominal pain - no  Diarrhea - no  Anemia - no  GI bleeding - no  Nausea and vomiting-no  Early satiety-no  aversion to sight or smell of food-no    ROS:  Constitutional: No fevers, chills, No weight loss  ENT: No allergies  CV: No chest pain  Pulm: No cough, No shortness of breath  Ophtho: No vision changes  GI: see HPI  Derm: No rash  Heme: No lymphadenopathy, No bruising  MSK: No arthritis  : No dysuria, No hematuria  Endo: No hot or cold intolerance  Neuro: No syncope, No seizure  Psych: No anxiety, No depression    Medical History:  Past Medical History:   Diagnosis Date    Anxiety     Asthma     Crohn's disease     Fibroids     Hyperlipidemia     Hypertension     Iritis     Migraine headache     Ocular    Osteopenia 08/2019    Vitamin D deficiency disease        Surgical History:  Past Surgical History:   Procedure Laterality Date    ANKLE FRACTURE SURGERY  08/19/08    right ankle    BREAST BIOPSY Left 1977    COLONOSCOPY N/A 8/11/2017    Procedure: COLONOSCOPY - REQUESTS DR. MATTI ACUNA;  Surgeon: Matti Acuna III, MD;  Location: [facility];  Service: Endoscopy;  Laterality: N/A;    TAHB  February 2011    Due to abnormal pap smear and fibroids    TOTAL ABDOMINAL HYSTERECTOMY         Family History:  Family History   Problem Relation Age of Onset    Arthritis Mother     Fuch's dystrophy Mother     Lupus Sister     Rheum arthritis Sister     Obesity Sister     Hypertension Father     Cancer Maternal Grandfather         lung ca    Stroke Maternal Grandmother     Diabetes Maternal Grandmother     Colon cancer Paternal Aunt        Social History:  Social History      Socioeconomic History    Marital status:      Spouse name: Not on file    Number of children: 2    Years of education: Not on file    Highest education level: Not on file   Occupational History    Occupation: RN     Employer: [employer]     Comment: [employer]   Social Needs    Financial resource strain: Not hard at all    Food insecurity     Worry: Never true     Inability: Never true    Transportation needs     Medical: No     Non-medical: No   Tobacco Use    Smoking status: Never Smoker    Smokeless tobacco: Never Used   Substance and Sexual Activity    Alcohol use: Yes     Alcohol/week: 0.0 standard drinks     Frequency: 2-4 times a month     Drinks per session: 1 or 2     Binge frequency: Never     Comment: ocassionally    Drug use: No    Sexual activity: Yes     Partners: Male     Birth control/protection: Surgical   Lifestyle    Physical activity     Days per week: 0 days     Minutes per session: 0 min    Stress: To some extent   Relationships    Social connections     Talks on phone: More than three times a week     Gets together: Once a week     Attends [employer] service: Not on file     Active member of club or organization: Yes     Attends meetings of clubs or organizations: 1 to 4 times per year     Relationship status:    Other Topics Concern    Not on file   Social History Narrative    She wears seatbelt.  July 22, 2017. She states she works new position at [employer].       Allergies: Review of patient's allergies indicates:  No Known Allergies    Medications:   No current facility-administered medications on file prior to encounter.      Current Outpatient Medications on File Prior to Encounter   Medication Sig Dispense Refill    albuterol (PROVENTIL/VENTOLIN HFA) 90 mcg/actuation inhaler Inhale 2 puffs into the lungs every 6 (six) hours as needed for Wheezing or Shortness of Breath. 18 g 2    ALLERGY RELIEF, CETIRIZINE, 10 mg tablet TAKE 1 TABLET BY MOUTH  ONCE DAILY 90 tablet 1    ALPRAZolam (XANAX) 0.5 MG tablet Take 1 tablet by mouth twice daily as needed 60 tablet 0    amLODIPine (NORVASC) 10 MG tablet TAKE 1 TABLET BY MOUTH ONCE DAILY IN THE MORNING . APPOINTMENT REQUIRED FOR FUTURE REFILLS 90 tablet 1    ascorbic acid (VITAMIN C) 1000 MG tablet Take by mouth. 1 Tablet Oral Every day      BACILLUS COAGULANS (PROBIOTIC, B. COAGULANS, ORAL) Take by mouth once daily.      benzonatate (TESSALON) 100 MG capsule Take 1-2 capsules (100-200 mg total) by mouth 3 (three) times daily as needed for Cough. 60 capsule 0    cholecalciferol, vitamin D3, 2,000 unit Cap Take by mouth. 1 capsule Oral Every day      ibuprofen (ADVIL,MOTRIN) 800 MG tablet Take 1 tablet by mouth twice daily as needed for pain 180 tablet 1    levocetirizine (XYZAL) 5 MG tablet Take 5 mg by mouth every evening.      lisinopriL (PRINIVIL,ZESTRIL) 20 MG tablet TAKE 1 TABLET BY MOUTH ONCE DAILY . APPOINTMENT REQUIRED FOR FUTURE REFILLS 90 tablet 1    mercaptopurine (PURINETHOL) 50 mg tablet Take 1 tablet by mouth once daily 90 tablet 0    mometasone (NASONEX) 50 mcg/actuation nasal spray USE 2 SPRAY(S) IN EACH NOSTRIL ONCE DAILY 17 each 3    montelukast (SINGULAIR) 10 mg tablet TAKE 1 TABLET BY MOUTH ONCE DAILY IN THE EVENING 90 tablet 1    multivitamin-Ca-iron-minerals (WOMEN'S MULTIPLE VITAMINS) 18-0.4 mg Tab Take by mouth. 1 Tablet Oral Every day      pravastatin (PRAVACHOL) 80 MG tablet TAKE 1 TABLET BY MOUTH ONCE DAILY IN THE EVENING 90 tablet 1    sodium,potassium,mag sulfates (SUPREP BOWEL PREP KIT) 17.5-3.13-1.6 gram SolR As directed for colonoscopy 254 mL 0    topiramate (TOPAMAX) 25 MG tablet Take 1 tablet (25 mg total) by mouth 2 (two) times daily. 180 tablet 1    valACYclovir (VALTREX) 1000 MG tablet Take 2 tablets (2,000 mg total) by mouth 2 (two) times daily. 30 tablet 0    zinc sulfate (ZINC-220 ORAL)          Objective Findings:    Vital Signs:There were no vitals filed  for this visit.        Physical Exam:  General Appearance: Well appearing in no acute distress  Eyes:    No scleral icterus  ENT: Neck supple, Lips, mucosa, and tongue normal; teeth and gums normal  Lungs: CTA bilaterally in anterior and posterior fields, no wheezes, no crackles.  Heart:  Regular rate, S1, S2 normal, no murmurs heard.  Abdomen: Soft, non tender, non distended with normal bowel sounds. No hepatosplenomegaly, ascites, or mass.  Extremities: No clubbing, cyanosis or edema  Skin: No rash    Labs:  Reviewed    Plan: Colonoscopy  I have explained the risks and benefits of endoscopy procedures to the patient including but not limited to bleeding, perforation, infection, and death. The patient wishes to proceed.

## 2020-11-23 ENCOUNTER — ANESTHESIA (OUTPATIENT)
Dept: ENDOSCOPY | Facility: HOSPITAL | Age: 60
End: 2020-11-23
Payer: COMMERCIAL

## 2020-11-23 ENCOUNTER — HOSPITAL ENCOUNTER (OUTPATIENT)
Facility: HOSPITAL | Age: 60
Discharge: HOME | End: 2020-11-23
Attending: INTERNAL MEDICINE | Admitting: INTERNAL MEDICINE
Payer: COMMERCIAL

## 2020-11-23 ENCOUNTER — ANESTHESIA EVENT (OUTPATIENT)
Dept: ENDOSCOPY | Facility: HOSPITAL | Age: 60
End: 2020-11-23
Payer: COMMERCIAL

## 2020-11-23 VITALS
HEART RATE: 70 BPM | SYSTOLIC BLOOD PRESSURE: 100 MMHG | DIASTOLIC BLOOD PRESSURE: 70 MMHG | HEIGHT: 61 IN | WEIGHT: 188.94 LBS | OXYGEN SATURATION: 97 % | BODY MASS INDEX: 35.67 KG/M2 | RESPIRATION RATE: 97 BRPM | TEMPERATURE: 99 F

## 2020-11-23 DIAGNOSIS — K50.10 CROHN'S DISEASE OF LARGE INTESTINE WITHOUT COMPLICATION: Primary | ICD-10-CM

## 2020-11-23 DIAGNOSIS — K52.9 IBD (INFLAMMATORY BOWEL DISEASE): ICD-10-CM

## 2020-11-23 PROCEDURE — 88305 TISSUE EXAM BY PATHOLOGIST: CPT | Mod: 59 | Performed by: STUDENT IN AN ORGANIZED HEALTH CARE EDUCATION/TRAINING PROGRAM

## 2020-11-23 PROCEDURE — 88305 TISSUE EXAM BY PATHOLOGIST: CPT | Mod: 26,,, | Performed by: STUDENT IN AN ORGANIZED HEALTH CARE EDUCATION/TRAINING PROGRAM

## 2020-11-23 PROCEDURE — 37000009 HC ANESTHESIA EA ADD 15 MINS: Performed by: INTERNAL MEDICINE

## 2020-11-23 PROCEDURE — 88305 TISSUE EXAM BY PATHOLOGIST: ICD-10-PCS | Mod: 26,,, | Performed by: STUDENT IN AN ORGANIZED HEALTH CARE EDUCATION/TRAINING PROGRAM

## 2020-11-23 PROCEDURE — 37000008 HC ANESTHESIA 1ST 15 MINUTES: Performed by: INTERNAL MEDICINE

## 2020-11-23 PROCEDURE — 45380 PR COLONOSCOPY,BIOPSY: ICD-10-PCS | Mod: ,,, | Performed by: INTERNAL MEDICINE

## 2020-11-23 PROCEDURE — 45380 COLONOSCOPY AND BIOPSY: CPT | Mod: ,,, | Performed by: INTERNAL MEDICINE

## 2020-11-23 PROCEDURE — 27201012 HC FORCEPS, HOT/COLD, DISP: Performed by: INTERNAL MEDICINE

## 2020-11-23 PROCEDURE — 45380 COLONOSCOPY AND BIOPSY: CPT | Performed by: INTERNAL MEDICINE

## 2020-11-23 PROCEDURE — 63600175 PHARM REV CODE 636 W HCPCS: Performed by: NURSE ANESTHETIST, CERTIFIED REGISTERED

## 2020-11-23 PROCEDURE — 63600175 PHARM REV CODE 636 W HCPCS: Performed by: INTERNAL MEDICINE

## 2020-11-23 PROCEDURE — 25000003 PHARM REV CODE 250: Performed by: NURSE ANESTHETIST, CERTIFIED REGISTERED

## 2020-11-23 RX ORDER — LIDOCAINE HYDROCHLORIDE 10 MG/ML
INJECTION, SOLUTION EPIDURAL; INFILTRATION; INTRACAUDAL; PERINEURAL
Status: DISCONTINUED | OUTPATIENT
Start: 2020-11-23 | End: 2020-11-23

## 2020-11-23 RX ORDER — PROPOFOL 10 MG/ML
VIAL (ML) INTRAVENOUS
Status: DISCONTINUED | OUTPATIENT
Start: 2020-11-23 | End: 2020-11-23

## 2020-11-23 RX ORDER — SODIUM CHLORIDE 0.9 % (FLUSH) 0.9 %
10 SYRINGE (ML) INJECTION
Status: DISCONTINUED | OUTPATIENT
Start: 2020-11-23 | End: 2020-11-23 | Stop reason: HOSPADM

## 2020-11-23 RX ORDER — SODIUM CHLORIDE, SODIUM LACTATE, POTASSIUM CHLORIDE, CALCIUM CHLORIDE 600; 310; 30; 20 MG/100ML; MG/100ML; MG/100ML; MG/100ML
INJECTION, SOLUTION INTRAVENOUS CONTINUOUS
Status: DISCONTINUED | OUTPATIENT
Start: 2020-11-23 | End: 2020-11-23 | Stop reason: HOSPADM

## 2020-11-23 RX ADMIN — SODIUM CHLORIDE, SODIUM LACTATE, POTASSIUM CHLORIDE, AND CALCIUM CHLORIDE: 600; 310; 30; 20 INJECTION, SOLUTION INTRAVENOUS at 01:11

## 2020-11-23 RX ADMIN — PROPOFOL 50 MG: 10 INJECTION, EMULSION INTRAVENOUS at 01:11

## 2020-11-23 RX ADMIN — LIDOCAINE HYDROCHLORIDE 50 MG: 10 INJECTION, SOLUTION EPIDURAL; INFILTRATION; INTRACAUDAL; PERINEURAL at 01:11

## 2020-11-23 RX ADMIN — PROPOFOL 100 MG: 10 INJECTION, EMULSION INTRAVENOUS at 01:11

## 2020-11-23 NOTE — ANESTHESIA PREPROCEDURE EVALUATION
11/23/2020  Angelica Flores is a 60 y.o., female.    Anesthesia Evaluation    I have reviewed the Patient Summary Reports.    I have reviewed the Nursing Notes.       Review of Systems  Anesthesia Hx:  No problems with previous Anesthesia    Cardiovascular:   Exercise tolerance: good Hypertension  Denies Angina.    Pulmonary:   Asthma Denies Shortness of breath.  Denies Sleep Apnea.    Renal/:  Renal/ Normal     Hepatic/GI:  Hepatic/GI Normal    Neurological:   Headaches    Endocrine:  Endocrine Normal    Psych:   Psychiatric History          Physical Exam  General:  Well nourished    Airway/Jaw/Neck:  Airway Findings: Mouth Opening: Normal Tongue: Normal  General Airway Assessment: Adult  Mallampati: II  TM Distance: 4 - 6 cm  Jaw/Neck Findings:  Neck ROM: Normal ROM      Dental:  Dental Findings: In tact   Chest/Lungs:  Chest/Lungs Findings: Clear to auscultation, Normal Respiratory Rate     Heart/Vascular:  Heart Findings: Rate: Normal  Rhythm: Regular Rhythm  Sounds: Normal        Mental Status:  Mental Status Findings:  Cooperative, Alert and Oriented         Anesthesia Plan  Type of Anesthesia, risks & benefits discussed:  Anesthesia Type:  MAC  Patient's Preference:   Intra-op Monitoring Plan:   Intra-op Monitoring Plan Comments:   Post Op Pain Control Plan: per primary service following discharge from PACU  Post Op Pain Control Plan Comments:   Induction:   IV  Beta Blocker:         Informed Consent: Patient understands risks and agrees with Anesthesia plan.  Questions answered. Anesthesia consent signed with patient.  ASA Score: 2     Day of Surgery Review of History & Physical: I have interviewed and examined the patient. I have reviewed the patient's H&P dated:  There are no significant changes.  H&P update referred to the surgeon.  H&P completed by Anesthesiologist.       Ready For  Surgery From Anesthesia Perspective.

## 2020-11-23 NOTE — ANESTHESIA POSTPROCEDURE EVALUATION
Anesthesia Post Evaluation    Patient: Angelica Flores    Procedure(s) Performed: Procedure(s) (LRB):  COLONOSCOPY (N/A)    Final Anesthesia Type: MAC    Patient location during evaluation: PACU  Patient participation: Yes- Able to Participate  Level of consciousness: awake and alert, oriented and awake  Post-procedure vital signs: reviewed and stable  Pain management: adequate  Airway patency: patent    PONV status at discharge: No PONV  Anesthetic complications: no      Cardiovascular status: blood pressure returned to baseline  Respiratory status: unassisted, spontaneous ventilation and room air  Hydration status: euvolemic  Follow-up not needed.          Vitals Value Taken Time   BP 90/49 11/23/20 1353   Temp 99 11/23/20 1356   Pulse 76 11/23/20 1353   Resp 16 11/23/20 1353   SpO2 97 % 11/23/20 1353         No case tracking events are documented in the log.      Pain/Germán Score: Germán Score: 8 (11/23/2020  1:53 PM)

## 2020-11-23 NOTE — DISCHARGE INSTRUCTIONS
Colonoscopy     A camera attached to a flexible tube with a viewing lens is used to take video pictures.     Colonoscopy is a test to view the inside of your lower digestive tract (colon and rectum). Sometimes it can show the last part of the small intestine (ileum). During the test, small pieces of tissue may be removed for testing. This is called a biopsy. Small growths, such as polyps, may also be removed.   Why is colonoscopy done?  The test is done to help look for colon cancer. And it can help find the source of abdominal pain, bleeding, and changes in bowel habits. It may be needed once a year, depending on factors such as your:  · Age  · Health history  · Family health history  · Symptoms  · Results from any prior colonoscopy  Risks and possible complications  These include:  · Bleeding               · A puncture or tear in the colon   · Risks of anesthesia  · A cancer lesion not being seen  Getting ready   To prepare for the test:  · Talk with your healthcare provider about the risks of the test (see below). Also ask your healthcare provider about alternatives to the test.  · Tell your healthcare provider about any medicines you take. Also tell him or her about any health conditions you may have.  · Make sure your rectum and colon are empty for the test. Follow the diet and bowel prep instructions exactly. If you dont, the test may need to be rescheduled.  · Plan for a friend or family member to drive you home after the test.     Colonoscopy provides an inside view of the entire colon.     You may discuss the results with your doctor right away or at a future visit.  During the test   The test is usually done in the hospital on an outpatient basis. This means you go home the same day. The procedure takes about 30 minutes. During that time:  · You are given relaxing (sedating) medicine through an IV line. You may be drowsy, or fully asleep.  · The healthcare provider will first give you a physical exam to  check for anal and rectal problems.  · Then the anus is lubricated and the scope inserted.  · If you are awake, you may have a feeling similar to needing to have a bowel movement. You may also feel pressure as air is pumped into the colon. Its OK to pass gas during the procedure.  · Biopsy, polyp removal, or other treatments may be done during the test.  After the test   You may have gas right after the test. It can help to try to pass it to help prevent later bloating. Your healthcare provider may discuss the results with you right away. Or you may need to schedule a follow-up visit to talk about the results. After the test, you can go back to your normal eating and other activities. You may be tired from the sedation and need to rest for a few hours.  Date Last Reviewed: 11/1/2016 © 2000-2017 [employer]. [address] [place] [address], [place] [address]. All rights reserved. This information is not intended as a substitute for professional medical care. Always follow your healthcare professional's instructions.

## 2020-11-23 NOTE — [employer] PATIENT INSTRUCTIONS
Discharge Summary/Instructions after an Endoscopic Procedure  Patient Name: Angelica lFores  Patient MRN: [id number]  Patient YOB: 1960 Monday, November 23, 2020 Lalo Wood III, MD  RESTRICTIONS:  During your procedure today, you received medications for sedation.  These   medications may affect your judgment, balance and coordination.  Therefore,   for 24 hours, you have the following restrictions:   - DO NOT drive a car, operate machinery, make legal/financial decisions,   sign important papers or drink alcohol.    ACTIVITY:  Today: no heavy lifting, straining or running due to procedural   sedation/anesthesia.  The following day: return to full activity including work.  DIET:  Eat and drink normally unless instructed otherwise.     TREATMENT FOR COMMON SIDE EFFECTS:  - Mild abdominal pain, nausea, belching, bloating or excessive gas:  rest,   eat lightly and use a heating pad.  - Sore Throat: treat with throat lozenges and/or gargle with warm salt   water.  - Because air was used during the procedure, expelling large amounts of air   from your rectum or belching is normal.  - If a bowel prep was taken, you may not have a bowel movement for 1-3 days.    This is normal.  SYMPTOMS TO WATCH FOR AND REPORT TO YOUR PHYSICIAN:  1. Abdominal pain or bloating, other than gas cramps.  2. Chest pain.  3. Back pain.  4. Signs of infection such as: chills or fever occurring within 24 hours   after the procedure.  5. Rectal bleeding, which would show as bright red, maroon, or black stools.   (A tablespoon of blood from the rectum is not serious, especially if   hemorrhoids are present.)  6. Vomiting.  7. Weakness or dizziness.  GO DIRECTLY TO THE NEAREST EMERGENCY ROOM IF YOU HAVE ANY OF THE FOLLOWING:      Difficulty breathing              Chills and/or fever over 101 F   Persistent vomiting and/or vomiting blood   Severe abdominal pain   Severe chest pain   Black, tarry stools   Bleeding- more than one  tablespoon   Any other symptom or condition that you feel may need urgent attention  Your doctor recommends these additional instructions:  If any biopsies were taken, your doctors clinic will contact you in 1 to 2   weeks with any results.  - Discharge patient to home (via wheelchair).   - High fiber diet.   - Continue present medications.   - Await pathology results.   - Return to GI clinic in 2 weeks.   - High fiber diet.   - Continue present medications.   - Await pathology results.   - Repeat colonoscopy in 2 years for surveillance based on pathology results.     - Discharge patient to home (via wheelchair).   - High fiber diet.   - Continue present medications.   - Await pathology results.   - Repeat colonoscopy in 2 years for surveillance based on pathology results.     - Return to GI clinic in 2 weeks.  For questions, problems or results please call your physician Lalo Wood III, MD at Work:  [phone]  If you have any questions about the above instructions, call the GI   department at [phone] or call the endoscopy unit at [phone]   from 7am until 3 pm.  [facility] [place], EMERGENCY ROOM PHONE NUMBER:   [phone]  IF A COMPLICATION OR EMERGENCY SITUATION ARISES AND YOU ARE UNABLE TO REACH   YOUR PHYSICIAN - GO DIRECTLY TO THE EMERGENCY ROOM.  I have read or have had read to me these discharge instructions for my   procedure and have received a written copy.  I understand these   instructions and will follow-up with my physician if I have any questions.     __________________________________       _____________________________________  Nurse Signature                                          Patient/Designated   Responsible Party Signature  Lalo Wood III, MD  11/23/2020 2:17:36 PM  This report has been verified and signed electronically.  [employer]

## 2020-11-23 NOTE — TRANSFER OF CARE
"Anesthesia Transfer of Care Note    Patient: Angelica Flores    Procedure(s) Performed: Procedure(s) (LRB):  COLONOSCOPY (N/A)    Patient location: PACU    Anesthesia Type: MAC    Transport from OR: Transported from OR on room air with adequate spontaneous ventilation    Post pain: adequate analgesia    Post assessment: no apparent anesthetic complications    Post vital signs: stable    Level of consciousness: awake    Nausea/Vomiting: no nausea/vomiting    Complications: none    Transfer of care protocol was followed      Last vitals:   Visit Vitals  BP (!) 90/49   Pulse 76   Temp 37.1 °C (98.8 °F) (Temporal)   Resp 16   Ht 5' 1" (1.549 m)   Wt 85.7 kg (188 lb 15 oz)   SpO2 97%   Breastfeeding No   BMI 35.70 kg/m²     "

## 2020-11-23 NOTE — DISCHARGE SUMMARY
[facility]  Discharge Note  Short Stay    Procedure(s) (LRB):  COLONOSCOPY (N/A)    OUTCOME: Patient tolerated treatment/procedure well without complication and is now ready for discharge.    DISPOSITION: Home or Self Care    FINAL DIAGNOSIS:  <principal problem not specified>    FOLLOWUP: In clinic    DISCHARGE INSTRUCTIONS:    Discharge Procedure Orders   Diet general     Activity as tolerated        Clinical Reference Documents Added to Patient Instructions       Document    COLONOSCOPY  (ENGLISH)

## 2020-11-23 NOTE — INTERVAL H&P NOTE
The patient has been examined and the H&P has been reviewed:I have reviewed this note and I agree with this assessment. The patient was seen in the GI office and remains stable for endoscopy at the time of this present evaluation. GH         risks, benefits and alternative options discussed and understood by patient/family.          Active Hospital Problems    Diagnosis  POA    IBD (inflammatory bowel disease) [K52.9]  Yes      Resolved Hospital Problems   No resolved problems to display.

## 2020-12-02 LAB
FINAL PATHOLOGIC DIAGNOSIS: NORMAL
GROSS: NORMAL
Lab: NORMAL

## 2020-12-08 ENCOUNTER — OFFICE VISIT (OUTPATIENT)
Dept: FAMILY MEDICINE | Facility: CLINIC | Age: 60
End: 2020-12-08
Payer: COMMERCIAL

## 2020-12-08 VITALS
SYSTOLIC BLOOD PRESSURE: 126 MMHG | WEIGHT: 195.19 LBS | TEMPERATURE: 97 F | DIASTOLIC BLOOD PRESSURE: 80 MMHG | OXYGEN SATURATION: 98 % | HEART RATE: 84 BPM | BODY MASS INDEX: 36.85 KG/M2 | HEIGHT: 61 IN

## 2020-12-08 DIAGNOSIS — I10 ESSENTIAL HYPERTENSION: Primary | Chronic | ICD-10-CM

## 2020-12-08 DIAGNOSIS — J30.2 SEASONAL ALLERGIC RHINITIS, UNSPECIFIED TRIGGER: ICD-10-CM

## 2020-12-08 DIAGNOSIS — B00.1 FEVER BLISTER: ICD-10-CM

## 2020-12-08 DIAGNOSIS — M62.830 SPASM OF MUSCLE OF LOWER BACK: ICD-10-CM

## 2020-12-08 DIAGNOSIS — E66.9 OBESITY (BMI 30-39.9): ICD-10-CM

## 2020-12-08 PROCEDURE — 1126F PR PAIN SEVERITY QUANTIFIED, NO PAIN PRESENT: ICD-10-PCS | Mod: S$GLB,,, | Performed by: FAMILY MEDICINE

## 2020-12-08 PROCEDURE — 99214 PR OFFICE/OUTPT VISIT, EST, LEVL IV, 30-39 MIN: ICD-10-PCS | Mod: S$GLB,,, | Performed by: FAMILY MEDICINE

## 2020-12-08 PROCEDURE — 99999 PR PBB SHADOW E&M-EST. PATIENT-LVL V: ICD-10-PCS | Mod: PBBFAC,,, | Performed by: FAMILY MEDICINE

## 2020-12-08 PROCEDURE — 3008F BODY MASS INDEX DOCD: CPT | Mod: CPTII,S$GLB,, | Performed by: FAMILY MEDICINE

## 2020-12-08 PROCEDURE — 1126F AMNT PAIN NOTED NONE PRSNT: CPT | Mod: S$GLB,,, | Performed by: FAMILY MEDICINE

## 2020-12-08 PROCEDURE — 3074F PR MOST RECENT SYSTOLIC BLOOD PRESSURE < 130 MM HG: ICD-10-PCS | Mod: CPTII,S$GLB,, | Performed by: FAMILY MEDICINE

## 2020-12-08 PROCEDURE — 3074F SYST BP LT 130 MM HG: CPT | Mod: CPTII,S$GLB,, | Performed by: FAMILY MEDICINE

## 2020-12-08 PROCEDURE — 3079F PR MOST RECENT DIASTOLIC BLOOD PRESSURE 80-89 MM HG: ICD-10-PCS | Mod: CPTII,S$GLB,, | Performed by: FAMILY MEDICINE

## 2020-12-08 PROCEDURE — 3008F PR BODY MASS INDEX (BMI) DOCUMENTED: ICD-10-PCS | Mod: CPTII,S$GLB,, | Performed by: FAMILY MEDICINE

## 2020-12-08 PROCEDURE — 99214 OFFICE O/P EST MOD 30 MIN: CPT | Mod: S$GLB,,, | Performed by: FAMILY MEDICINE

## 2020-12-08 PROCEDURE — 3079F DIAST BP 80-89 MM HG: CPT | Mod: CPTII,S$GLB,, | Performed by: FAMILY MEDICINE

## 2020-12-08 PROCEDURE — 99999 PR PBB SHADOW E&M-EST. PATIENT-LVL V: CPT | Mod: PBBFAC,,, | Performed by: FAMILY MEDICINE

## 2020-12-08 RX ORDER — PHENTERMINE HYDROCHLORIDE 15 MG/1
15 CAPSULE ORAL EVERY MORNING
Qty: 30 CAPSULE | Refills: 0 | Status: SHIPPED | OUTPATIENT
Start: 2020-12-08 | End: 2021-01-07

## 2020-12-08 RX ORDER — VALACYCLOVIR HYDROCHLORIDE 1 G/1
2000 TABLET, FILM COATED ORAL 2 TIMES DAILY
Qty: 30 TABLET | Refills: 0 | Status: SHIPPED | OUTPATIENT
Start: 2020-12-08 | End: 2022-02-23 | Stop reason: SDUPTHER

## 2020-12-08 RX ORDER — CYCLOBENZAPRINE HCL 10 MG
10 TABLET ORAL NIGHTLY PRN
Qty: 30 TABLET | Refills: 1 | Status: SHIPPED | OUTPATIENT
Start: 2020-12-08 | End: 2020-12-18

## 2020-12-08 NOTE — PROGRESS NOTES
Angelica Flores    Chief Complaint   Patient presents with    Hypertension    Weight Check    Follow-up       History of Present Illness:   Ms. Flores comes in today for hypertension follow-up with weight recheck.  She states she has been taking phentermine since November 8, 2020 in preparation colonoscopy which she had on November 23, 2020 for surveillance Crohn's disease.  She states she has not been exercising not been monitoring her diet.  She had been taking Phentermine since July 31, 2020 for assistance with losing weight. She states she desires to restart phentermine to help her lose weight but is willing to try different weight loss medication.    She reports having slight headache with slight nasal congestion without other sinus or ocular symptoms, shortness of breath, cough, wheezing, fever, chills.  She states she has been taking Allegra and Singulair and using Flonase with help.  She does not smoke.  She states she was evaluated by urgent care in November 16, 2020 for dry cough, acute bacterial sinusitis and tested Covid-19 (-) and was treated with Doxycycline twice daily x 10 days.    She reports having occasional nontraumatic low back pain as she has been practicing with a local orchestra; she plays the flute.  She requests Flexeril.    Otherwise, she denies having fever, chills, fatigue, appetite changes; chest pain, palpitations, leg swelling; abdominal pain, nausea, vomiting, diarrhea, constipation; unusual urinary symptoms; anxiety, depression, homicidal or suicidal thoughts.        Labs:                   WBC                      9.85                07/31/2020                 HGB                      13.5                07/31/2020                 HCT                      43.4                07/31/2020                 PLT                      357 (H)             07/31/2020                 CHOL                     190                 07/31/2020                 TRIG                     47                   07/31/2020                 HDL                      74                  07/31/2020                 ALT                      13                  07/31/2020                 AST                      18                  07/31/2020                 NA                       140                 07/31/2020                 K                        4.4                 07/31/2020                 CL                       110                 07/31/2020                 CREATININE               0.8                 07/31/2020                 BUN                      16                  07/31/2020                 CO2                      19 (L)              07/31/2020                 TSH                      0.790               07/31/2020                 INR                      0.9                 08/18/2008                 HGBA1C                   5.6                 04/27/2016               LDLCALC                  106.6               07/31/2020                Current Outpatient Medications   Medication Sig    albuterol (PROVENTIL/VENTOLIN HFA) 90 mcg/actuation inhaler Inhale 2 puffs into the lungs every 6 (six) hours as needed for Wheezing or Shortness of Breath.    ALLERGY RELIEF, CETIRIZINE, 10 mg tablet TAKE 1 TABLET BY MOUTH ONCE DAILY    ALPRAZolam (XANAX) 0.5 MG tablet Take 1 tablet by mouth twice daily as needed    amLODIPine (NORVASC) 10 MG tablet TAKE 1 TABLET BY MOUTH ONCE DAILY IN THE MORNING . APPOINTMENT REQUIRED FOR FUTURE REFILLS    ascorbic acid (VITAMIN C) 1000 MG tablet Take by mouth. 1 Tablet Oral Every day    BACILLUS COAGULANS (PROBIOTIC, B. COAGULANS, ORAL) Take by mouth once daily.    benzonatate (TESSALON) 100 MG capsule Take 1-2 capsules (100-200 mg total) by mouth 3 (three) times daily as needed for Cough.    cholecalciferol, vitamin D3, 2,000 unit Cap Take by mouth. 1 capsule Oral Every day    ibuprofen (ADVIL,MOTRIN) 800 MG tablet Take 1 tablet by mouth twice daily as needed for  pain    levocetirizine (XYZAL) 5 MG tablet Take 5 mg by mouth every evening.    lisinopriL (PRINIVIL,ZESTRIL) 20 MG tablet TAKE 1 TABLET BY MOUTH ONCE DAILY . APPOINTMENT REQUIRED FOR FUTURE REFILLS    mercaptopurine (PURINETHOL) 50 mg tablet Take 1 tablet by mouth once daily    mometasone (NASONEX) 50 mcg/actuation nasal spray USE 2 SPRAY(S) IN EACH NOSTRIL ONCE DAILY    montelukast (SINGULAIR) 10 mg tablet TAKE 1 TABLET BY MOUTH ONCE DAILY IN THE EVENING    multivitamin-Ca-iron-minerals (WOMEN'S MULTIPLE VITAMINS) 18-0.4 mg Tab Take by mouth. 1 Tablet Oral Every day    pravastatin (PRAVACHOL) 80 MG tablet TAKE 1 TABLET BY MOUTH ONCE DAILY IN THE EVENING    topiramate (TOPAMAX) 25 MG tablet Take 1 tablet (25 mg total) by mouth 2 (two) times daily.    valACYclovir (VALTREX) 1000 MG tablet Take 2 tablets (2,000 mg total) by mouth 2 (two) times daily.    zinc sulfate (ZINC-220 ORAL)        Review of Systems   Constitutional: Negative for activity change, appetite change, chills, fatigue and fever.        Weight 87 kg (191 lb 12.8 oz) at October 8, 2020 visit. See history of present illness.   HENT: Positive for congestion.         See history of present illness.   Respiratory: Negative for cough, shortness of breath and wheezing.    Cardiovascular: Negative for chest pain, palpitations and leg swelling.   Gastrointestinal: Negative for abdominal pain, constipation, diarrhea, nausea and vomiting.   Genitourinary: Negative for difficulty urinating.   Musculoskeletal: Positive for back pain.        See history of present illness.   Neurological: Positive for headaches.   Psychiatric/Behavioral: Negative for dysphoric mood and suicidal ideas. The patient is not nervous/anxious.         Negative for homicidal ideas.       Objective:  Physical Exam  Vitals signs reviewed.   Constitutional:       General: She is not in acute distress.     Appearance: Normal appearance. She is well-developed. She is obese. She is  not ill-appearing, toxic-appearing or diaphoretic.      Comments: Pleasant.   HENT:      Head: Normocephalic and atraumatic.      Right Ear: Tympanic membrane, ear canal and external ear normal. There is no impacted cerumen.      Left Ear: Tympanic membrane, ear canal and external ear normal. There is no impacted cerumen.      Nose: Congestion present. No rhinorrhea.      Comments: Slightly tender left frontal sinus.     Mouth/Throat:      Mouth: Mucous membranes are moist.      Pharynx: Oropharynx is clear. No oropharyngeal exudate or posterior oropharyngeal erythema.   Eyes:      General:         Right eye: No discharge.         Left eye: No discharge.      Extraocular Movements: Extraocular movements intact.      Conjunctiva/sclera: Conjunctivae normal.   Neck:      Musculoskeletal: Normal range of motion and neck supple. No muscular tenderness.      Thyroid: No thyromegaly.   Cardiovascular:      Rate and Rhythm: Normal rate and regular rhythm.      Heart sounds: Normal heart sounds. No murmur.   Pulmonary:      Effort: Pulmonary effort is normal. No respiratory distress.      Breath sounds: Normal breath sounds. No wheezing.   Abdominal:      General: Bowel sounds are normal. There is no distension.      Palpations: Abdomen is soft. There is no mass.      Tenderness: There is no abdominal tenderness. There is no guarding or rebound.   Musculoskeletal: Normal range of motion.         General: No swelling or tenderness.      Comments: She is ambulatory without problems.   Lymphadenopathy:      Cervical: No cervical adenopathy.   Neurological:      General: No focal deficit present.      Mental Status: She is alert and oriented to person, place, and time.   Psychiatric:         Mood and Affect: Mood normal.         Behavior: Behavior normal.         Thought Content: Thought content normal.         Judgment: Judgment normal.         ASSESSMENT:  1. Essential hypertension    2. Seasonal allergic rhinitis,  unspecified trigger    3. Fever blister    4. Spasm of muscle of lower back    5. Obesity (BMI 30-39.9)        PLAN:  Angelica was seen today for hypertension, weight check and follow-up.    Diagnoses and all orders for this visit:    Essential hypertension    Seasonal allergic rhinitis, unspecified trigger    Fever blister  -     valACYclovir (VALTREX) 1000 MG tablet; Take 2 tablets (2,000 mg total) by mouth 2 (two) times daily.    Spasm of muscle of lower back  -     cyclobenzaprine (FLEXERIL) 10 MG tablet; Take 1 tablet (10 mg total) by mouth nightly as needed for Muscle spasms.    Obesity (BMI 30-39.9)  -     phentermine 15 MG capsule; Take 1 capsule (15 mg total) by mouth every morning.       Continue current medications, follow low sodium, low cholesterol, low carb diet, daily walks.  Prescription refills as noted above.  Follow up in about 2 months (around 2/10/2021) for hypertension with weight recheck follow up.

## 2020-12-09 ENCOUNTER — PATIENT MESSAGE (OUTPATIENT)
Dept: GASTROENTEROLOGY | Facility: CLINIC | Age: 60
End: 2020-12-09

## 2020-12-11 ENCOUNTER — LAB VISIT (OUTPATIENT)
Dept: LAB | Facility: HOSPITAL | Age: 60
End: 2020-12-11
Attending: INTERNAL MEDICINE
Payer: COMMERCIAL

## 2020-12-11 ENCOUNTER — OFFICE VISIT (OUTPATIENT)
Dept: GASTROENTEROLOGY | Facility: CLINIC | Age: 60
End: 2020-12-11
Payer: COMMERCIAL

## 2020-12-11 VITALS
DIASTOLIC BLOOD PRESSURE: 82 MMHG | HEART RATE: 80 BPM | HEIGHT: 61 IN | SYSTOLIC BLOOD PRESSURE: 128 MMHG | WEIGHT: 194.88 LBS | BODY MASS INDEX: 36.8 KG/M2

## 2020-12-11 DIAGNOSIS — K50.10 CROHN'S DISEASE OF COLON WITHOUT COMPLICATION: Primary | ICD-10-CM

## 2020-12-11 DIAGNOSIS — K50.10 CROHN'S DISEASE OF COLON WITHOUT COMPLICATION: ICD-10-CM

## 2020-12-11 PROCEDURE — 99213 PR OFFICE/OUTPT VISIT, EST, LEVL III, 20-29 MIN: ICD-10-PCS | Mod: S$GLB,,, | Performed by: INTERNAL MEDICINE

## 2020-12-11 PROCEDURE — 36415 COLL VENOUS BLD VENIPUNCTURE: CPT

## 2020-12-11 PROCEDURE — 3074F PR MOST RECENT SYSTOLIC BLOOD PRESSURE < 130 MM HG: ICD-10-PCS | Mod: CPTII,S$GLB,, | Performed by: INTERNAL MEDICINE

## 2020-12-11 PROCEDURE — 3008F BODY MASS INDEX DOCD: CPT | Mod: CPTII,S$GLB,, | Performed by: INTERNAL MEDICINE

## 2020-12-11 PROCEDURE — 99213 OFFICE O/P EST LOW 20 MIN: CPT | Mod: S$GLB,,, | Performed by: INTERNAL MEDICINE

## 2020-12-11 PROCEDURE — 99999 PR PBB SHADOW E&M-EST. PATIENT-LVL IV: ICD-10-PCS | Mod: PBBFAC,,, | Performed by: INTERNAL MEDICINE

## 2020-12-11 PROCEDURE — 3079F PR MOST RECENT DIASTOLIC BLOOD PRESSURE 80-89 MM HG: ICD-10-PCS | Mod: CPTII,S$GLB,, | Performed by: INTERNAL MEDICINE

## 2020-12-11 PROCEDURE — 99999 PR PBB SHADOW E&M-EST. PATIENT-LVL IV: CPT | Mod: PBBFAC,,, | Performed by: INTERNAL MEDICINE

## 2020-12-11 PROCEDURE — 82542 COL CHROMOTOGRAPHY QUAL/QUAN: CPT

## 2020-12-11 PROCEDURE — 3079F DIAST BP 80-89 MM HG: CPT | Mod: CPTII,S$GLB,, | Performed by: INTERNAL MEDICINE

## 2020-12-11 PROCEDURE — 3008F PR BODY MASS INDEX (BMI) DOCUMENTED: ICD-10-PCS | Mod: CPTII,S$GLB,, | Performed by: INTERNAL MEDICINE

## 2020-12-11 PROCEDURE — 1126F AMNT PAIN NOTED NONE PRSNT: CPT | Mod: S$GLB,,, | Performed by: INTERNAL MEDICINE

## 2020-12-11 PROCEDURE — 3074F SYST BP LT 130 MM HG: CPT | Mod: CPTII,S$GLB,, | Performed by: INTERNAL MEDICINE

## 2020-12-11 PROCEDURE — 1126F PR PAIN SEVERITY QUANTIFIED, NO PAIN PRESENT: ICD-10-PCS | Mod: S$GLB,,, | Performed by: INTERNAL MEDICINE

## 2020-12-11 NOTE — PROGRESS NOTES
Subjective:       Patient ID: Angelica Flores is a 60 y.o. female.    Chief Complaint: Follow-up (colonoscopy follow up )    NOTE: The patient had a Flu vaccine on September 15th at her place of employment.    Discussed results of her colonoscopy. Biopsies are negative for IBD even though she is still only on 6 MP. She had positive biopsies when we tried to stop meds in 2017. Though it seems medical treatment is inadequate her response says different. Will check her Pro-predict level and schedule for repeat study in 2 years.    Review of Systems   Constitutional: Negative for activity change, appetite change, chills, diaphoresis, fatigue, fever and unexpected weight change.   HENT: Negative for congestion, ear discharge, ear pain, hearing loss, nosebleeds, postnasal drip and tinnitus.    Eyes: Negative for photophobia and visual disturbance.   Respiratory: Negative for apnea, cough, choking, chest tightness, shortness of breath and wheezing.    Cardiovascular: Negative for chest pain, palpitations and leg swelling.   Gastrointestinal: Negative for abdominal distention, abdominal pain, anal bleeding, blood in stool, constipation, diarrhea, nausea, rectal pain and vomiting.   Genitourinary: Negative for difficulty urinating, dyspareunia, dysuria, flank pain, frequency, hematuria, menstrual problem, pelvic pain, urgency, vaginal bleeding and vaginal discharge.   Musculoskeletal: Negative for arthralgias, back pain, gait problem, joint swelling, myalgias and neck stiffness.   Skin: Negative for pallor and rash.   Neurological: Negative for dizziness, tremors, seizures, syncope, speech difficulty, weakness, numbness and headaches.   Hematological: Negative for adenopathy.   Psychiatric/Behavioral: Negative for agitation, confusion, hallucinations, sleep disturbance and suicidal ideas.       Objective:      Physical Exam  Vitals signs reviewed.         Assessment:     1. Crohn's disease of colon without complication   PRO-PREDICT METABOLITES (THIOPURINE THERAPY)        Plan:   Crohn's disease of colon without complication  -     PRO-PREDICT METABOLITES (THIOPURINE THERAPY); Future; Expected date: 12/11/2020

## 2020-12-21 ENCOUNTER — PATIENT MESSAGE (OUTPATIENT)
Dept: GASTROENTEROLOGY | Facility: CLINIC | Age: 60
End: 2020-12-21

## 2020-12-21 LAB — PROMETHEUS THIOPURINE METABOLITES: NORMAL

## 2020-12-30 NOTE — TELEPHONE ENCOUNTER
----- Message from Monet Sommers sent at 8/19/2020 10:16 AM CDT -----  Regarding: returned call  Contact: patient  Patient is returning a call, please call them back at  [phone]      [FreeTextEntry1] : The patient is a 69-year-old gentleman hypertension, hyperlipidemia with dilated aortic root. \par #1 CV- ECHO to assess and monitor\par #2 Htn- losartan 50/12.5mg\par #3 Lipids- on atorvastatin

## 2021-01-13 ENCOUNTER — PATIENT MESSAGE (OUTPATIENT)
Dept: FAMILY MEDICINE | Facility: CLINIC | Age: 61
End: 2021-01-13

## 2021-01-13 DIAGNOSIS — E66.9 OBESITY (BMI 30-39.9): Primary | ICD-10-CM

## 2021-01-13 RX ORDER — PHENTERMINE HYDROCHLORIDE 15 MG/1
15 CAPSULE ORAL EVERY MORNING
Qty: 30 CAPSULE | Refills: 0 | Status: SHIPPED | OUTPATIENT
Start: 2021-01-13 | End: 2021-02-10 | Stop reason: ALTCHOICE

## 2021-01-20 ENCOUNTER — SPECIALTY PHARMACY (OUTPATIENT)
Dept: PHARMACY | Facility: CLINIC | Age: 61
End: 2021-01-20

## 2021-02-10 ENCOUNTER — OFFICE VISIT (OUTPATIENT)
Dept: FAMILY MEDICINE | Facility: CLINIC | Age: 61
End: 2021-02-10
Payer: COMMERCIAL

## 2021-02-10 VITALS
RESPIRATION RATE: 16 BRPM | BODY MASS INDEX: 37.55 KG/M2 | SYSTOLIC BLOOD PRESSURE: 132 MMHG | OXYGEN SATURATION: 98 % | TEMPERATURE: 98 F | DIASTOLIC BLOOD PRESSURE: 82 MMHG | WEIGHT: 198.75 LBS | HEART RATE: 86 BPM

## 2021-02-10 DIAGNOSIS — E66.9 OBESITY (BMI 30-39.9): ICD-10-CM

## 2021-02-10 DIAGNOSIS — I10 ESSENTIAL HYPERTENSION: Primary | ICD-10-CM

## 2021-02-10 PROCEDURE — 99213 PR OFFICE/OUTPT VISIT, EST, LEVL III, 20-29 MIN: ICD-10-PCS | Mod: S$GLB,,, | Performed by: FAMILY MEDICINE

## 2021-02-10 PROCEDURE — 3075F PR MOST RECENT SYSTOLIC BLOOD PRESS GE 130-139MM HG: ICD-10-PCS | Mod: CPTII,S$GLB,, | Performed by: FAMILY MEDICINE

## 2021-02-10 PROCEDURE — 1126F PR PAIN SEVERITY QUANTIFIED, NO PAIN PRESENT: ICD-10-PCS | Mod: S$GLB,,, | Performed by: FAMILY MEDICINE

## 2021-02-10 PROCEDURE — 3008F PR BODY MASS INDEX (BMI) DOCUMENTED: ICD-10-PCS | Mod: CPTII,S$GLB,, | Performed by: FAMILY MEDICINE

## 2021-02-10 PROCEDURE — 3079F DIAST BP 80-89 MM HG: CPT | Mod: CPTII,S$GLB,, | Performed by: FAMILY MEDICINE

## 2021-02-10 PROCEDURE — 99213 OFFICE O/P EST LOW 20 MIN: CPT | Mod: S$GLB,,, | Performed by: FAMILY MEDICINE

## 2021-02-10 PROCEDURE — 1126F AMNT PAIN NOTED NONE PRSNT: CPT | Mod: S$GLB,,, | Performed by: FAMILY MEDICINE

## 2021-02-10 PROCEDURE — 99999 PR PBB SHADOW E&M-EST. PATIENT-LVL IV: ICD-10-PCS | Mod: PBBFAC,,, | Performed by: FAMILY MEDICINE

## 2021-02-10 PROCEDURE — 3075F SYST BP GE 130 - 139MM HG: CPT | Mod: CPTII,S$GLB,, | Performed by: FAMILY MEDICINE

## 2021-02-10 PROCEDURE — 3079F PR MOST RECENT DIASTOLIC BLOOD PRESSURE 80-89 MM HG: ICD-10-PCS | Mod: CPTII,S$GLB,, | Performed by: FAMILY MEDICINE

## 2021-02-10 PROCEDURE — 3008F BODY MASS INDEX DOCD: CPT | Mod: CPTII,S$GLB,, | Performed by: FAMILY MEDICINE

## 2021-02-10 PROCEDURE — 99999 PR PBB SHADOW E&M-EST. PATIENT-LVL IV: CPT | Mod: PBBFAC,,, | Performed by: FAMILY MEDICINE

## 2021-02-10 RX ORDER — PHENTERMINE HYDROCHLORIDE 37.5 MG/1
37.5 TABLET ORAL
Qty: 30 TABLET | Refills: 0 | Status: SHIPPED | OUTPATIENT
Start: 2021-02-10 | End: 2021-05-06 | Stop reason: SDUPTHER

## 2021-04-28 ENCOUNTER — PATIENT MESSAGE (OUTPATIENT)
Dept: RESEARCH | Facility: HOSPITAL | Age: 61
End: 2021-04-28

## 2021-05-06 DIAGNOSIS — E66.9 OBESITY (BMI 30-39.9): ICD-10-CM

## 2021-05-06 RX ORDER — PHENTERMINE HYDROCHLORIDE 37.5 MG/1
37.5 TABLET ORAL
Qty: 30 TABLET | Refills: 0 | Status: SHIPPED | OUTPATIENT
Start: 2021-05-06 | End: 2021-06-05

## 2021-05-13 ENCOUNTER — TELEPHONE (OUTPATIENT)
Dept: FAMILY MEDICINE | Facility: CLINIC | Age: 61
End: 2021-05-13

## 2021-05-14 ENCOUNTER — OFFICE VISIT (OUTPATIENT)
Dept: FAMILY MEDICINE | Facility: CLINIC | Age: 61
End: 2021-05-14
Payer: COMMERCIAL

## 2021-05-14 VITALS
BODY MASS INDEX: 36.91 KG/M2 | OXYGEN SATURATION: 96 % | WEIGHT: 195.31 LBS | DIASTOLIC BLOOD PRESSURE: 76 MMHG | RESPIRATION RATE: 16 BRPM | SYSTOLIC BLOOD PRESSURE: 118 MMHG | HEART RATE: 89 BPM | TEMPERATURE: 98 F

## 2021-05-14 DIAGNOSIS — M54.50 ACUTE LOW BACK PAIN WITHOUT SCIATICA, UNSPECIFIED BACK PAIN LATERALITY: ICD-10-CM

## 2021-05-14 DIAGNOSIS — J30.2 SEASONAL ALLERGIC RHINITIS, UNSPECIFIED TRIGGER: Primary | ICD-10-CM

## 2021-05-14 PROCEDURE — 1125F PR PAIN SEVERITY QUANTIFIED, PAIN PRESENT: ICD-10-PCS | Mod: S$GLB,,, | Performed by: FAMILY MEDICINE

## 2021-05-14 PROCEDURE — 1125F AMNT PAIN NOTED PAIN PRSNT: CPT | Mod: S$GLB,,, | Performed by: FAMILY MEDICINE

## 2021-05-14 PROCEDURE — 3008F PR BODY MASS INDEX (BMI) DOCUMENTED: ICD-10-PCS | Mod: CPTII,S$GLB,, | Performed by: FAMILY MEDICINE

## 2021-05-14 PROCEDURE — 99214 PR OFFICE/OUTPT VISIT, EST, LEVL IV, 30-39 MIN: ICD-10-PCS | Mod: 25,S$GLB,, | Performed by: FAMILY MEDICINE

## 2021-05-14 PROCEDURE — 3008F BODY MASS INDEX DOCD: CPT | Mod: CPTII,S$GLB,, | Performed by: FAMILY MEDICINE

## 2021-05-14 PROCEDURE — 96372 THER/PROPH/DIAG INJ SC/IM: CPT | Mod: S$GLB,,, | Performed by: FAMILY MEDICINE

## 2021-05-14 PROCEDURE — 99999 PR PBB SHADOW E&M-EST. PATIENT-LVL V: ICD-10-PCS | Mod: PBBFAC,,, | Performed by: FAMILY MEDICINE

## 2021-05-14 PROCEDURE — 99214 OFFICE O/P EST MOD 30 MIN: CPT | Mod: 25,S$GLB,, | Performed by: FAMILY MEDICINE

## 2021-05-14 PROCEDURE — 96372 PR INJECTION,THERAP/PROPH/DIAG2ST, IM OR SUBCUT: ICD-10-PCS | Mod: S$GLB,,, | Performed by: FAMILY MEDICINE

## 2021-05-14 PROCEDURE — 99999 PR PBB SHADOW E&M-EST. PATIENT-LVL V: CPT | Mod: PBBFAC,,, | Performed by: FAMILY MEDICINE

## 2021-05-14 RX ORDER — CYCLOBENZAPRINE HCL 10 MG
10 TABLET ORAL 3 TIMES DAILY PRN
COMMUNITY
End: 2022-07-29

## 2021-05-14 RX ORDER — METHYLPREDNISOLONE ACETATE 80 MG/ML
80 INJECTION, SUSPENSION INTRA-ARTICULAR; INTRALESIONAL; INTRAMUSCULAR; SOFT TISSUE ONCE
Status: COMPLETED | OUTPATIENT
Start: 2021-05-14 | End: 2021-05-14

## 2021-05-14 RX ADMIN — METHYLPREDNISOLONE ACETATE 80 MG: 80 INJECTION, SUSPENSION INTRA-ARTICULAR; INTRALESIONAL; INTRAMUSCULAR; SOFT TISSUE at 04:05

## 2021-08-01 NOTE — PROGRESS NOTES
Chief Complaint   Patient presents with   • Laceration        HPI:  14yo male, with PMHx listed, comes to the ER for right upper eyelid laceration just prior to his arrival.  States that he was playing basketball and for a lay up when he was accidentally hit by somebody on the face.  Denies LOC, severe headache, neck pain, nausea/vomiting.  Per father, patient is acting like his normal self.  Patient is overall healthy without any significant past medical history and vaccinations are up-to-date.     Review of Systems   Constitutional: Negative for chills and fever.   HENT: Negative for congestion and sore throat.    Eyes: Negative for discharge.        Positive right upper eyelid laceration.   Respiratory: Negative for cough and shortness of breath.    Cardiovascular: Negative for chest pain.   Gastrointestinal: Negative for abdominal pain, diarrhea, nausea and vomiting.   Genitourinary: Negative for dysuria.   Musculoskeletal: Negative for back pain and neck pain.   Neurological: Negative for dizziness, loss of consciousness and headaches.        PAST MEDICAL HX:  None.    Pertinent medical records readily available were reviewed    PAST SURGICAL HX:  None.    Social History     Tobacco Use   • Smoking status: Not on file   Substance Use Topics   • Alcohol use: Not on file   • Drug use: Not on file        Family history: Noncontributory to this chief complaint    ED Triage Vitals [08/01/21 1649]   BP (!) 150/72   Heart Rate 92   Resp 18   Temp 99 °F (37.2 °C)   SpO2 98 %        Physical Exam   CONSTITUTIONAL: Well-appearing; well-nourished; in no apparent distress.  Nontoxic appearance.  HEAD: Normocephalic; no raccoon eyes or mcclain signs   EYES: EOM intact; PERRL. 3cm superficial laceration on the right upper eyelid, no active bleeding or foreign body.  ENT:  normal nose; no rhinorrhea  NECK: Supple; painless ROM  CARD: Normal S1, S2; no murmurs, rubs, or gallops  RESP: Breath sounds clear and equal bilaterally;  HISTORY OF PRESENT ILLNESS: Ms. Mcfadden comes in today non fasting but with taking medication and without acute problems for annual wellness examination.     She states she will be getting  in the next several weeks.     END OF LIFE DECISION: She does not have a living will and states she does not desire life support.  However, she states she is an organ donor.     Current Outpatient Prescriptions   Medication Sig    albuterol 90 mcg/actuation inhaler Inhale 2 puffs into the lungs every 6 (six) hours as needed for Wheezing.    alprazolam (XANAX) 0.5 MG tablet 1 Tablet Oral Twice a day prn    amlodipine (NORVASC) 10 MG tablet TAKE ONE TABLET BY MOUTH ONCE DAILY IN THE MORNING    ascorbic acid (VITAMIN C) 1000 MG tablet Take by mouth. 1 Tablet Oral Every day    BACILLUS COAGULANS (PROBIOTIC, B. COAGULANS, ORAL) Take by mouth once daily.    benzonatate (TESSALON) 100 MG capsule Take 1 capsule (100 mg total) by mouth 3 (three) times daily as needed for Cough.    cetirizine (ZYRTEC) 10 MG tablet Take 1 tablet (10 mg total) by mouth once daily.    cholecalciferol, vitamin D3, 2,000 unit Cap Take by mouth. 1 capsule Oral Every day    ibuprofen (ADVIL,MOTRIN) 800 MG tablet TAKE ONE TABLET BY MOUTH TWICE DAILY AS NEEDED FOR PAIN    lisinopril (PRINIVIL,ZESTRIL) 20 MG tablet Take 1 tablet (20 mg total) by mouth once daily.    mercaptopurine (PURINETHOL) 50 mg tablet Take 1 tablet (50 mg total) by mouth once daily.    mometasone (NASONEX) 50 mcg/actuation nasal spray 2 sprays by Nasal route once daily.    montelukast (SINGULAIR) 10 mg tablet Take 1 tablet (10 mg total) by mouth every evening.    multivitamin-Ca-iron-minerals (WOMEN'S MULTIPLE VITAMINS) 18-0.4 mg Tab Take by mouth. 1 Tablet Oral Every day    pravastatin (PRAVACHOL) 80 MG tablet TAKE ONE TABLET BY MOUTH ONCE DAILY IN THE EVENING    valacyclovir (VALTREX) 1000 MG tablet Take 2 tablets (2,000 mg total) by mouth 2 (two) times daily. Take 2  no wheezes, rhonchi, or rales  EXT: Normal ROM in all four extremities; no deformity  SKIN: Normal for age and race; warm; dry  NEURO: Alert and oriented x3.  CN grossly intact. Normal gait and speech.  BEHAV:  Calm, cooperative.  Normal affect.    Vitals:    08/01/21 1647 08/01/21 1649 08/01/21 1705   BP:  (!) 150/72    Pulse:  92    Resp:  18    Temp:  99 °F (37.2 °C)    SpO2:  98% 99%   Weight: 83.9 kg (185 lb)        MEDICAL DECISION MAKING:  Pt presents with right upper eyelid laceration after minor injury while playing basketball.  Denies any red flags in the history.   Exam showed a 3 cm superficial laceration right upper eyelid otherwise grossly normal.  No emergent indication for any imaging at this time.  Wound cleaned, irrigated and sutured appropriately.   Pt is afebrile with normal vitals and resting comfortably.       Pt understands and acknowledges POC.        Laceration Repair    Date/Time: 8/1/2021 5:31 PM  Performed by: Tyron Avila PA-C  Authorized by: Tyron Avila PA-C     Consent:     Consent obtained:  Verbal    Consent given by:  Patient and parent    Risks discussed:  Infection, pain, poor wound healing, need for additional repair and poor cosmetic result    Alternatives discussed:  No treatment  Anesthesia (see MAR for exact dosages):     Anesthesia method:  Local infiltration    Local anesthetic:  Lidocaine 1% w/o epi  Laceration details:     Location:  Face    Face location:  R upper eyelid    Extent:  Superficial    Length (cm):  3  Repair type:     Repair type:  Simple  Pre-procedure details:     Preparation:  Patient was prepped and draped in usual sterile fashion  Exploration:     Hemostasis achieved with:  Direct pressure    Wound extent: no foreign bodies/material noted, no underlying fracture noted and no vascular damage noted      Contaminated: no    Treatment:     Area cleansed with:  Betadine    Amount of cleaning:  Standard    Irrigation solution:  Sterile saline     pills twice daily as directed      SCREENINGS:       Cholesterol: April 27, 2016     FFS/Colonoscopy: May 1, 2015 - Crohn's disease; repeat in 2 years.     Mammogram: May 5, 2016 - okay.      GYN Exam: May 5, 2016 pap smear - okay.      Dexa Scan:  Never.  Reports will wait until age 59.     Eye Exam: July 2015 per patient.  She wears glasses. Scheduled for July 7, 2017 at [facility].     PPD: Negative in the past.     Immunizations:  Td/Tdap - May 5, 2016.                              Gardasil - N./A.                              Zostavax - N./A.                              Pneumovax - Never.                              Seasonal Flu - September 2016 at [facility] (her job) per patient.                           ROS:  GENERAL: Denies fever, chills, fatigue or unusual weight change. Appetite normal. Weight 81.1 kg (178 lb 12.7 oz) at May 4, 2017 visit. Monitors diet and states eats salads and protein.  Exercises/walks 2-1/2 miles 5 times per week.  SKIN: Denies rashes, itching, changes in mole, color or texture of skin or easy bruising.  HEAD:  Denies headaches or recent head trauma.  EYES: Denies change in vision, pain, diplopia, redness or discharge except reports itchy, red eyes with going into [employer] building which she works in.  EARS: Denies ear pain, discharge, vertigo or decreased hearing.  NOSE: Denies loss of smell, epistaxis or rhinitis.  MOUTH & THROAT: Denies hoarseness or change in voice. Denies excessive gum bleeding or mouth sores.  Denies sore throat.  NODES: Denies swollen glands.  CHEST: Denies MIKE, wheezing, cough, or sputum production.  CARDIOVASCULAR: Denies chest pain, PND, orthopnea or reduced exercise tolerance.  Denies palpitations.  ABDOMEN: Denies nausea, vomiting, abdominal pain, or blood in stool except reports recent intermittent diarrhea, constipation.  Saw Dr. Lalo Wood, gastroenterologist, on July 31, 2015 for inflammatory bowel disease, namely Crohn's disease.  URINARY: Denies flank  "pain, dysuria or hematuria.  GENITOURINARY: Denies flank pain, dysuria, frequency or hematuria.  Performs monthly breast self-examinations.  ENDOCRINE: Denies diabetes or thyroid problems.  HEME/LYMPH: Denies bleeding problems. Reports having high WBC (11-13,000) when drawn at lab at work 2 weeks ago (but recalls having sinus infection at that time).  PERIPHERAL VASCULAR:Denies claudication or cyanosis.  MUSCULOSKELETAL: Denies joint stiffness, pain or swelling. Denies edema.  NEUROLOGIC: Denies history of seizures, tremors, paralysis, alteration of gait or coordination.  PSYCHIATRIC: Denies mood swings, uncontrolled depression or anxiety, homicidal or suicidal thoughts. Denies sleep problems.      PE:   VS: /79 (BP Location: Left arm, Patient Position: Sitting)   Pulse 82   Temp 97.6 °F (36.4 °C) (Tympanic)   Resp 16   Ht 5' 2" (1.575 m)   Wt 82.2 kg (181 lb 3.5 oz)   SpO2 97%   BMI 33.15 kg/m²   APPEARANCE:  Well nourished, well developed female, obese and pleasant, alert and oriented in no acute distress.    HEAD: Nontender. Full range of motion.  EYES: PERRL, conjunctiva pink, lids no edema.  She wears glasses.  EARS: External canal patent, no swelling or redness. TM's shiny and clear.  NOSE: Mucosa and turbinates pink, not swollen. No discharge. Nontender sinuses.  THROAT: No pharyngeal erythema or exudate. No stridor.   NECK: Supple, no mass, thyroid not enlarged.  NODES: No cervical, axillary or inguinal lymph node enlargement.  CHEST: Normal respiratory effort. Lungs clear to auscultation.  CARDIOVASCULAR: Normal S1, S2. No rubs, murmurs or gallops. PMI not displaced. No carotid bruit. Pedal pulses palpable bilaterally. No edema.  ABDOMEN: Bowel sounds present. Not distended. Soft. No tenderness, masses or organomegaly.  BREAST EXAM: Symmetrical, no external lesions, no discharge, no masses palpated.  PELVIC EXAM: Not examined today as patient has had TAHBSO due to non cancerous reasons.  " Irrigation method:  Pressure wash    Visualized foreign bodies/material removed: no    Skin repair:     Repair method:  Sutures    Suture size:  5-0    Suture material:  Nylon    Suture technique:  Simple interrupted    Number of sutures:  5  Approximation:     Approximation:  Close  Post-procedure details:     Dressing:  Non-adherent dressing and antibiotic ointment    Patient tolerance of procedure:  Tolerated well, no immediate complications        ED Diagnosis        Final diagnosis    Right eyelid laceration, initial encounter                 Follow-up Information     Follow up With Specialties Details Why Contact Info    Lydia Wood MD Pediatrics In 3 days  [address] [facility] [id number] [phone]            There are no discharge medications for this patient.      Disposition:  Discharge [1] 8/1/2021  5:29 PM       Tyron Avila PA-C  08/01/21 1732       RECTAL EXAM: No external hemorrhoids or anal fissures. Heme-negative stool in the rectal vault.  MUSCULOSKELETAL: No joint deformities or stiffness.  She is ambulatory without problems.  SKIN: No rashes or suspicious lesions, normal color and turgor.  NEUROLOGIC:   Cranial Nerves: II-XII grossly intact.   DTR's: Knees, Ankles 2+ and equal bilaterally. Gait & Posture: Normal gait and fine motion.  PSYCHIATRIC: Patient alert, oriented x 3. Mood/Affect normal without acute anxiety and depression noted. Judgment/insight good as she makes appropriate decisions on today's examination.    ASSESSMENT:    ICD-10-CM ICD-9-CM    1. Annual physical exam Z00.00 V70.0 Vitamin D      CBC auto differential      TSH      Urinalysis      Comprehensive metabolic panel      Lipid panel   2. Essential hypertension I10 401.9    3. Hyperlipidemia, unspecified hyperlipidemia type E78.5 272.4    4. Vitamin D deficiency E55.9 268.9    5. Crohn's disease without complication, unspecified gastrointestinal tract location K50.90 555.9 Ambulatory referral to Gastroenterology      Case request GI: COLONOSCOPY - REQUESTS DR. MATTI ACUNA      CANCELED: Case request GI: COLONOSCOPY   6. Dysthymic disorder F34.1 300.4    7. Allergic rhinitis, unspecified allergic rhinitis trigger, unspecified rhinitis seasonality J30.9 477.9    8. Postmenopausal Z78.0 V49.81    9. Obesity (BMI 30.0-34.9) E66.9 278.00    10. Other screening mammogram Z12.31 V76.12 Mammo Digital Screening Bilat with CAD     PLAN:  1.  Age-appropriate counseling-appropriate low-sodium, low-cholesterol, low carbohydrate diet and exercise daily, monthly breast self exam, annual wellness examination.  2.  Patient advised to correspond via My Chart for results.  3.  Continue current medications.  4.  Keep follow-up with specialist.  5.  Prescription refills - Amlodipine 10 mg daily, ibuprofen 800 mg twice daily prn, pravastatin 80 mg nightly, lisinopril 20 mg daily, Zyrtec 10 mg daily,  Singulair 10 mg daily ×6 months.  6.  See me in 6 months for hypertension follow-up.  7.  Flu shot this fall.  8.  Work excuse for 7/3/2017 with return 7/5/2017.

## 2021-08-03 ENCOUNTER — OFFICE VISIT (OUTPATIENT)
Dept: FAMILY MEDICINE | Facility: CLINIC | Age: 61
End: 2021-08-03
Payer: COMMERCIAL

## 2021-08-03 DIAGNOSIS — J06.9 UPPER RESPIRATORY TRACT INFECTION, UNSPECIFIED TYPE: Primary | ICD-10-CM

## 2021-08-03 PROCEDURE — 99213 PR OFFICE/OUTPT VISIT, EST, LEVL III, 20-29 MIN: ICD-10-PCS | Mod: 95,,, | Performed by: INTERNAL MEDICINE

## 2021-08-03 PROCEDURE — 99213 OFFICE O/P EST LOW 20 MIN: CPT | Mod: 95,,, | Performed by: INTERNAL MEDICINE

## 2021-08-03 RX ORDER — DOXYCYCLINE HYCLATE 100 MG
100 TABLET ORAL 2 TIMES DAILY
Qty: 20 TABLET | Refills: 0 | Status: SHIPPED | OUTPATIENT
Start: 2021-08-03 | End: 2021-08-13

## 2021-08-09 ENCOUNTER — PATIENT MESSAGE (OUTPATIENT)
Dept: FAMILY MEDICINE | Facility: CLINIC | Age: 61
End: 2021-08-09

## 2021-08-11 ENCOUNTER — OFFICE VISIT (OUTPATIENT)
Dept: FAMILY MEDICINE | Facility: CLINIC | Age: 61
End: 2021-08-11
Payer: COMMERCIAL

## 2021-08-11 ENCOUNTER — LAB VISIT (OUTPATIENT)
Dept: LAB | Facility: HOSPITAL | Age: 61
End: 2021-08-11
Attending: FAMILY MEDICINE
Payer: COMMERCIAL

## 2021-08-11 VITALS
DIASTOLIC BLOOD PRESSURE: 74 MMHG | SYSTOLIC BLOOD PRESSURE: 109 MMHG | HEART RATE: 93 BPM | BODY MASS INDEX: 37.34 KG/M2 | HEIGHT: 61 IN | OXYGEN SATURATION: 98 % | WEIGHT: 197.75 LBS | TEMPERATURE: 98 F

## 2021-08-11 DIAGNOSIS — Z12.31 VISIT FOR SCREENING MAMMOGRAM: ICD-10-CM

## 2021-08-11 DIAGNOSIS — E66.9 OBESITY (BMI 30-39.9): ICD-10-CM

## 2021-08-11 DIAGNOSIS — J30.2 SEASONAL ALLERGIC RHINITIS, UNSPECIFIED TRIGGER: ICD-10-CM

## 2021-08-11 DIAGNOSIS — I10 ESSENTIAL HYPERTENSION: ICD-10-CM

## 2021-08-11 DIAGNOSIS — G43.909 MIGRAINE WITHOUT STATUS MIGRAINOSUS, NOT INTRACTABLE, UNSPECIFIED MIGRAINE TYPE: ICD-10-CM

## 2021-08-11 DIAGNOSIS — E55.9 VITAMIN D DEFICIENCY: ICD-10-CM

## 2021-08-11 DIAGNOSIS — Z00.00 ANNUAL PHYSICAL EXAM: Primary | ICD-10-CM

## 2021-08-11 DIAGNOSIS — K50.90 CROHN'S DISEASE WITHOUT COMPLICATION, UNSPECIFIED GASTROINTESTINAL TRACT LOCATION: ICD-10-CM

## 2021-08-11 DIAGNOSIS — Z78.0 POSTMENOPAUSAL: ICD-10-CM

## 2021-08-11 DIAGNOSIS — E78.5 HYPERLIPIDEMIA, UNSPECIFIED HYPERLIPIDEMIA TYPE: ICD-10-CM

## 2021-08-11 DIAGNOSIS — Z00.00 ANNUAL PHYSICAL EXAM: ICD-10-CM

## 2021-08-11 LAB
25(OH)D3+25(OH)D2 SERPL-MCNC: 67 NG/ML (ref 30–96)
ALBUMIN SERPL BCP-MCNC: 3.8 G/DL (ref 3.5–5.2)
ALP SERPL-CCNC: 66 U/L (ref 55–135)
ALT SERPL W/O P-5'-P-CCNC: 22 U/L (ref 10–44)
ANION GAP SERPL CALC-SCNC: 14 MMOL/L (ref 8–16)
AST SERPL-CCNC: 25 U/L (ref 10–40)
BACTERIA #/AREA URNS AUTO: NORMAL /HPF
BASOPHILS # BLD AUTO: 0.02 K/UL (ref 0–0.2)
BASOPHILS NFR BLD: 0.3 % (ref 0–1.9)
BILIRUB SERPL-MCNC: 0.2 MG/DL (ref 0.1–1)
BILIRUB UR QL STRIP: NEGATIVE
BUN SERPL-MCNC: 15 MG/DL (ref 8–23)
CALCIUM SERPL-MCNC: 9.3 MG/DL (ref 8.7–10.5)
CAOX CRY UR QL COMP ASSIST: NORMAL
CHLORIDE SERPL-SCNC: 101 MMOL/L (ref 95–110)
CHOLEST SERPL-MCNC: 147 MG/DL (ref 120–199)
CHOLEST/HDLC SERPL: 2.9 {RATIO} (ref 2–5)
CLARITY UR REFRACT.AUTO: CLEAR
CO2 SERPL-SCNC: 26 MMOL/L (ref 23–29)
COLOR UR AUTO: YELLOW
CREAT SERPL-MCNC: 0.7 MG/DL (ref 0.5–1.4)
DIFFERENTIAL METHOD: ABNORMAL
EOSINOPHIL # BLD AUTO: 0.1 K/UL (ref 0–0.5)
EOSINOPHIL NFR BLD: 0.8 % (ref 0–8)
ERYTHROCYTE [DISTWIDTH] IN BLOOD BY AUTOMATED COUNT: 14.6 % (ref 11.5–14.5)
EST. GFR  (AFRICAN AMERICAN): >60 ML/MIN/1.73 M^2
EST. GFR  (NON AFRICAN AMERICAN): >60 ML/MIN/1.73 M^2
GLUCOSE SERPL-MCNC: 83 MG/DL (ref 70–110)
GLUCOSE UR QL STRIP: NEGATIVE
HCT VFR BLD AUTO: 40.8 % (ref 37–48.5)
HDLC SERPL-MCNC: 50 MG/DL (ref 40–75)
HDLC SERPL: 34 % (ref 20–50)
HGB BLD-MCNC: 13.2 G/DL (ref 12–16)
HGB UR QL STRIP: NEGATIVE
HYALINE CASTS UR QL AUTO: 0 /LPF
IMM GRANULOCYTES # BLD AUTO: 0.01 K/UL (ref 0–0.04)
IMM GRANULOCYTES NFR BLD AUTO: 0.2 % (ref 0–0.5)
KETONES UR QL STRIP: NEGATIVE
LDLC SERPL CALC-MCNC: 82 MG/DL (ref 63–159)
LEUKOCYTE ESTERASE UR QL STRIP: NEGATIVE
LYMPHOCYTES # BLD AUTO: 2.7 K/UL (ref 1–4.8)
LYMPHOCYTES NFR BLD: 40.7 % (ref 18–48)
MCH RBC QN AUTO: 30.6 PG (ref 27–31)
MCHC RBC AUTO-ENTMCNC: 32.4 G/DL (ref 32–36)
MCV RBC AUTO: 94 FL (ref 82–98)
MICROSCOPIC COMMENT: NORMAL
MONOCYTES # BLD AUTO: 0.6 K/UL (ref 0.3–1)
MONOCYTES NFR BLD: 8.3 % (ref 4–15)
NEUTROPHILS # BLD AUTO: 3.3 K/UL (ref 1.8–7.7)
NEUTROPHILS NFR BLD: 49.7 % (ref 38–73)
NITRITE UR QL STRIP: NEGATIVE
NONHDLC SERPL-MCNC: 97 MG/DL
NRBC BLD-RTO: 0 /100 WBC
PH UR STRIP: 5 [PH] (ref 5–8)
PLATELET # BLD AUTO: 289 K/UL (ref 150–450)
PMV BLD AUTO: 11.3 FL (ref 9.2–12.9)
POTASSIUM SERPL-SCNC: 3.1 MMOL/L (ref 3.5–5.1)
PROT SERPL-MCNC: 7.6 G/DL (ref 6–8.4)
PROT UR QL STRIP: ABNORMAL
RBC # BLD AUTO: 4.32 M/UL (ref 4–5.4)
RBC #/AREA URNS AUTO: 0 /HPF (ref 0–4)
SODIUM SERPL-SCNC: 141 MMOL/L (ref 136–145)
SP GR UR STRIP: 1.03 (ref 1–1.03)
SQUAMOUS #/AREA URNS AUTO: 1 /HPF
TRIGL SERPL-MCNC: 75 MG/DL (ref 30–150)
TSH SERPL DL<=0.005 MIU/L-ACNC: 0.9 UIU/ML (ref 0.4–4)
URN SPEC COLLECT METH UR: ABNORMAL
WBC # BLD AUTO: 6.61 K/UL (ref 3.9–12.7)
WBC #/AREA URNS AUTO: 1 /HPF (ref 0–5)

## 2021-08-11 PROCEDURE — 3078F DIAST BP <80 MM HG: CPT | Mod: CPTII,S$GLB,, | Performed by: FAMILY MEDICINE

## 2021-08-11 PROCEDURE — 81001 URINALYSIS AUTO W/SCOPE: CPT | Performed by: FAMILY MEDICINE

## 2021-08-11 PROCEDURE — 82306 VITAMIN D 25 HYDROXY: CPT | Performed by: FAMILY MEDICINE

## 2021-08-11 PROCEDURE — 84443 ASSAY THYROID STIM HORMONE: CPT | Performed by: FAMILY MEDICINE

## 2021-08-11 PROCEDURE — 99999 PR PBB SHADOW E&M-EST. PATIENT-LVL V: ICD-10-PCS | Mod: PBBFAC,,, | Performed by: FAMILY MEDICINE

## 2021-08-11 PROCEDURE — 80061 LIPID PANEL: CPT | Performed by: FAMILY MEDICINE

## 2021-08-11 PROCEDURE — 96372 PR INJECTION,THERAP/PROPH/DIAG2ST, IM OR SUBCUT: ICD-10-PCS | Mod: S$GLB,,, | Performed by: FAMILY MEDICINE

## 2021-08-11 PROCEDURE — 3074F PR MOST RECENT SYSTOLIC BLOOD PRESSURE < 130 MM HG: ICD-10-PCS | Mod: CPTII,S$GLB,, | Performed by: FAMILY MEDICINE

## 2021-08-11 PROCEDURE — 85025 COMPLETE CBC W/AUTO DIFF WBC: CPT | Performed by: FAMILY MEDICINE

## 2021-08-11 PROCEDURE — 36415 COLL VENOUS BLD VENIPUNCTURE: CPT | Mod: PO | Performed by: FAMILY MEDICINE

## 2021-08-11 PROCEDURE — 4010F PR ACE/ARB THEARPY RXD/TAKEN: ICD-10-PCS | Mod: CPTII,S$GLB,, | Performed by: FAMILY MEDICINE

## 2021-08-11 PROCEDURE — 99396 PR PREVENTIVE VISIT,EST,40-64: ICD-10-PCS | Mod: 25,S$GLB,, | Performed by: FAMILY MEDICINE

## 2021-08-11 PROCEDURE — 3008F PR BODY MASS INDEX (BMI) DOCUMENTED: ICD-10-PCS | Mod: CPTII,S$GLB,, | Performed by: FAMILY MEDICINE

## 2021-08-11 PROCEDURE — 4010F ACE/ARB THERAPY RXD/TAKEN: CPT | Mod: CPTII,S$GLB,, | Performed by: FAMILY MEDICINE

## 2021-08-11 PROCEDURE — 96372 THER/PROPH/DIAG INJ SC/IM: CPT | Mod: S$GLB,,, | Performed by: FAMILY MEDICINE

## 2021-08-11 PROCEDURE — 99396 PREV VISIT EST AGE 40-64: CPT | Mod: 25,S$GLB,, | Performed by: FAMILY MEDICINE

## 2021-08-11 PROCEDURE — 99999 PR PBB SHADOW E&M-EST. PATIENT-LVL V: CPT | Mod: PBBFAC,,, | Performed by: FAMILY MEDICINE

## 2021-08-11 PROCEDURE — 3078F PR MOST RECENT DIASTOLIC BLOOD PRESSURE < 80 MM HG: ICD-10-PCS | Mod: CPTII,S$GLB,, | Performed by: FAMILY MEDICINE

## 2021-08-11 PROCEDURE — 3008F BODY MASS INDEX DOCD: CPT | Mod: CPTII,S$GLB,, | Performed by: FAMILY MEDICINE

## 2021-08-11 PROCEDURE — 80053 COMPREHEN METABOLIC PANEL: CPT | Performed by: FAMILY MEDICINE

## 2021-08-11 PROCEDURE — 3074F SYST BP LT 130 MM HG: CPT | Mod: CPTII,S$GLB,, | Performed by: FAMILY MEDICINE

## 2021-08-11 RX ORDER — METHYLPREDNISOLONE ACETATE 40 MG/ML
40 INJECTION, SUSPENSION INTRA-ARTICULAR; INTRALESIONAL; INTRAMUSCULAR; SOFT TISSUE
Status: COMPLETED | OUTPATIENT
Start: 2021-08-11 | End: 2021-08-11

## 2021-08-11 RX ORDER — AZELASTINE 1 MG/ML
1 SPRAY, METERED NASAL
COMMUNITY
Start: 2021-08-09 | End: 2021-10-04

## 2021-08-11 RX ORDER — ALBUTEROL SULFATE 90 UG/1
2 AEROSOL, METERED RESPIRATORY (INHALATION) EVERY 6 HOURS PRN
Qty: 18 G | Refills: 2 | Status: SHIPPED | OUTPATIENT
Start: 2021-08-11 | End: 2023-08-31

## 2021-08-11 RX ORDER — METHYLPREDNISOLONE ACETATE 40 MG/ML
80 INJECTION, SUSPENSION INTRA-ARTICULAR; INTRALESIONAL; INTRAMUSCULAR; SOFT TISSUE
Status: DISCONTINUED | OUTPATIENT
Start: 2021-08-11 | End: 2021-08-11

## 2021-08-11 RX ADMIN — METHYLPREDNISOLONE ACETATE 40 MG: 40 INJECTION, SUSPENSION INTRA-ARTICULAR; INTRALESIONAL; INTRAMUSCULAR; SOFT TISSUE at 09:08

## 2021-08-24 ENCOUNTER — HOSPITAL ENCOUNTER (OUTPATIENT)
Dept: RADIOLOGY | Facility: HOSPITAL | Age: 61
Discharge: HOME | End: 2021-08-24
Attending: FAMILY MEDICINE
Payer: COMMERCIAL

## 2021-08-24 VITALS — BODY MASS INDEX: 37.34 KG/M2 | WEIGHT: 197.75 LBS | HEIGHT: 61 IN

## 2021-08-24 DIAGNOSIS — Z12.31 VISIT FOR SCREENING MAMMOGRAM: ICD-10-CM

## 2021-08-24 PROCEDURE — 77063 BREAST TOMOSYNTHESIS BI: CPT | Mod: 26,,, | Performed by: RADIOLOGY

## 2021-08-24 PROCEDURE — 77067 SCR MAMMO BI INCL CAD: CPT | Mod: 26,,, | Performed by: RADIOLOGY

## 2021-08-24 PROCEDURE — 77067 MAMMO DIGITAL SCREENING BILAT WITH TOMO: ICD-10-PCS | Mod: 26,,, | Performed by: RADIOLOGY

## 2021-08-24 PROCEDURE — 77067 SCR MAMMO BI INCL CAD: CPT | Mod: TC

## 2021-08-24 PROCEDURE — 77063 MAMMO DIGITAL SCREENING BILAT WITH TOMO: ICD-10-PCS | Mod: 26,,, | Performed by: RADIOLOGY

## 2021-10-04 ENCOUNTER — OFFICE VISIT (OUTPATIENT)
Dept: FAMILY MEDICINE | Facility: CLINIC | Age: 61
End: 2021-10-04
Payer: COMMERCIAL

## 2021-10-04 ENCOUNTER — TELEPHONE (OUTPATIENT)
Dept: FAMILY MEDICINE | Facility: CLINIC | Age: 61
End: 2021-10-04

## 2021-10-04 DIAGNOSIS — R04.0 NOSEBLEED: Primary | ICD-10-CM

## 2021-10-04 PROCEDURE — 99213 PR OFFICE/OUTPT VISIT, EST, LEVL III, 20-29 MIN: ICD-10-PCS | Mod: 95,,, | Performed by: FAMILY MEDICINE

## 2021-10-04 PROCEDURE — 1159F MED LIST DOCD IN RCRD: CPT | Mod: CPTII,95,, | Performed by: FAMILY MEDICINE

## 2021-10-04 PROCEDURE — 4010F PR ACE/ARB THEARPY RXD/TAKEN: ICD-10-PCS | Mod: CPTII,95,, | Performed by: FAMILY MEDICINE

## 2021-10-04 PROCEDURE — 99213 OFFICE O/P EST LOW 20 MIN: CPT | Mod: 95,,, | Performed by: FAMILY MEDICINE

## 2021-10-04 PROCEDURE — 1160F PR REVIEW ALL MEDS BY PRESCRIBER/CLIN PHARMACIST DOCUMENTED: ICD-10-PCS | Mod: CPTII,95,, | Performed by: FAMILY MEDICINE

## 2021-10-04 PROCEDURE — 4010F ACE/ARB THERAPY RXD/TAKEN: CPT | Mod: CPTII,95,, | Performed by: FAMILY MEDICINE

## 2021-10-04 PROCEDURE — 1160F RVW MEDS BY RX/DR IN RCRD: CPT | Mod: CPTII,95,, | Performed by: FAMILY MEDICINE

## 2021-10-04 PROCEDURE — 1159F PR MEDICATION LIST DOCUMENTED IN MEDICAL RECORD: ICD-10-PCS | Mod: CPTII,95,, | Performed by: FAMILY MEDICINE

## 2021-10-04 RX ORDER — FLUTICASONE PROPIONATE 50 MCG
2 SPRAY, SUSPENSION (ML) NASAL DAILY
COMMUNITY
End: 2022-02-23

## 2021-10-06 DIAGNOSIS — E66.9 OBESITY (BMI 30-39.9): ICD-10-CM

## 2021-10-07 ENCOUNTER — TELEPHONE (OUTPATIENT)
Dept: FAMILY MEDICINE | Facility: CLINIC | Age: 61
End: 2021-10-07

## 2021-10-07 ENCOUNTER — OFFICE VISIT (OUTPATIENT)
Dept: FAMILY MEDICINE | Facility: CLINIC | Age: 61
End: 2021-10-07
Payer: COMMERCIAL

## 2021-10-07 VITALS
BODY MASS INDEX: 38 KG/M2 | DIASTOLIC BLOOD PRESSURE: 100 MMHG | SYSTOLIC BLOOD PRESSURE: 140 MMHG | WEIGHT: 201.25 LBS | OXYGEN SATURATION: 97 % | TEMPERATURE: 98 F | HEART RATE: 90 BPM | HEIGHT: 61 IN

## 2021-10-07 DIAGNOSIS — I10 ESSENTIAL HYPERTENSION: Primary | ICD-10-CM

## 2021-10-07 DIAGNOSIS — J30.2 SEASONAL ALLERGIC RHINITIS, UNSPECIFIED TRIGGER: ICD-10-CM

## 2021-10-07 PROCEDURE — 99999 PR PBB SHADOW E&M-EST. PATIENT-LVL V: CPT | Mod: PBBFAC,,, | Performed by: FAMILY MEDICINE

## 2021-10-07 PROCEDURE — 99999 PR PBB SHADOW E&M-EST. PATIENT-LVL V: ICD-10-PCS | Mod: PBBFAC,,, | Performed by: FAMILY MEDICINE

## 2021-10-07 PROCEDURE — 99214 OFFICE O/P EST MOD 30 MIN: CPT | Mod: S$GLB,,, | Performed by: FAMILY MEDICINE

## 2021-10-07 PROCEDURE — 4010F PR ACE/ARB THEARPY RXD/TAKEN: ICD-10-PCS | Mod: CPTII,S$GLB,, | Performed by: FAMILY MEDICINE

## 2021-10-07 PROCEDURE — 4010F ACE/ARB THERAPY RXD/TAKEN: CPT | Mod: CPTII,S$GLB,, | Performed by: FAMILY MEDICINE

## 2021-10-07 PROCEDURE — 99214 PR OFFICE/OUTPT VISIT, EST, LEVL IV, 30-39 MIN: ICD-10-PCS | Mod: S$GLB,,, | Performed by: FAMILY MEDICINE

## 2021-10-07 RX ORDER — HYDROCHLOROTHIAZIDE 12.5 MG/1
12.5 TABLET ORAL DAILY
Qty: 90 TABLET | Refills: 1 | Status: SHIPPED | OUTPATIENT
Start: 2021-10-07 | End: 2021-10-28 | Stop reason: SDUPTHER

## 2021-10-18 ENCOUNTER — TELEPHONE (OUTPATIENT)
Dept: FAMILY MEDICINE | Facility: CLINIC | Age: 61
End: 2021-10-18

## 2021-10-20 ENCOUNTER — TELEPHONE (OUTPATIENT)
Dept: FAMILY MEDICINE | Facility: CLINIC | Age: 61
End: 2021-10-20

## 2021-10-20 ENCOUNTER — PATIENT MESSAGE (OUTPATIENT)
Dept: FAMILY MEDICINE | Facility: CLINIC | Age: 61
End: 2021-10-20

## 2021-10-28 ENCOUNTER — TELEPHONE (OUTPATIENT)
Dept: FAMILY MEDICINE | Facility: CLINIC | Age: 61
End: 2021-10-28
Payer: COMMERCIAL

## 2021-10-28 ENCOUNTER — PATIENT MESSAGE (OUTPATIENT)
Dept: FAMILY MEDICINE | Facility: CLINIC | Age: 61
End: 2021-10-28
Payer: COMMERCIAL

## 2021-10-28 DIAGNOSIS — I10 ESSENTIAL HYPERTENSION: ICD-10-CM

## 2021-10-28 RX ORDER — HYDROCHLOROTHIAZIDE 25 MG/1
25 TABLET ORAL DAILY
Qty: 90 TABLET | Refills: 1 | Status: SHIPPED | OUTPATIENT
Start: 2021-10-28 | End: 2022-01-09 | Stop reason: SDUPTHER

## 2021-10-28 RX ORDER — AMLODIPINE BESYLATE 10 MG/1
TABLET ORAL
Qty: 90 TABLET | Refills: 1 | Status: SHIPPED | OUTPATIENT
Start: 2021-10-28 | End: 2022-02-02

## 2021-10-28 RX ORDER — LISINOPRIL 40 MG/1
40 TABLET ORAL DAILY
Qty: 90 TABLET | Refills: 1 | Status: SHIPPED | OUTPATIENT
Start: 2021-10-28 | End: 2022-02-02

## 2021-10-29 ENCOUNTER — PATIENT MESSAGE (OUTPATIENT)
Dept: FAMILY MEDICINE | Facility: CLINIC | Age: 61
End: 2021-10-29
Payer: COMMERCIAL

## 2021-11-12 ENCOUNTER — PATIENT MESSAGE (OUTPATIENT)
Dept: FAMILY MEDICINE | Facility: CLINIC | Age: 61
End: 2021-11-12
Payer: COMMERCIAL

## 2021-11-12 ENCOUNTER — TELEPHONE (OUTPATIENT)
Dept: FAMILY MEDICINE | Facility: CLINIC | Age: 61
End: 2021-11-12
Payer: COMMERCIAL

## 2021-11-14 ENCOUNTER — PATIENT MESSAGE (OUTPATIENT)
Dept: FAMILY MEDICINE | Facility: CLINIC | Age: 61
End: 2021-11-14
Payer: COMMERCIAL

## 2021-11-14 RX ORDER — DOXYCYCLINE HYCLATE 100 MG
100 TABLET ORAL EVERY 12 HOURS
Qty: 20 TABLET | Refills: 0 | Status: SHIPPED | OUTPATIENT
Start: 2021-11-14 | End: 2021-11-24

## 2022-01-04 ENCOUNTER — PATIENT OUTREACH (OUTPATIENT)
Dept: ADMINISTRATIVE | Facility: HOSPITAL | Age: 62
End: 2022-01-04
Payer: COMMERCIAL

## 2022-01-04 NOTE — PROGRESS NOTES
Working HTN Report       Called pt for home BP Reading, No answer, L/M,       Giulia LYN, LPN Care Coordinator  Care Coordination Department  [facility]  [phone]

## 2022-01-08 DIAGNOSIS — I10 ESSENTIAL HYPERTENSION: ICD-10-CM

## 2022-01-08 NOTE — TELEPHONE ENCOUNTER
No new care gaps identified.  Powered by [employer]. Reference number: [id number].   1/08/2022 8:06:32 AM CST

## 2022-01-09 DIAGNOSIS — I10 ESSENTIAL HYPERTENSION: ICD-10-CM

## 2022-01-09 RX ORDER — HYDROCHLOROTHIAZIDE 12.5 MG/1
TABLET ORAL
Qty: 90 TABLET | Refills: 1 | OUTPATIENT
Start: 2022-01-09

## 2022-01-09 RX ORDER — HYDROCHLOROTHIAZIDE 25 MG/1
25 TABLET ORAL DAILY
Qty: 90 TABLET | Refills: 1 | Status: SHIPPED | OUTPATIENT
Start: 2022-01-09 | End: 2022-05-16

## 2022-01-10 NOTE — TELEPHONE ENCOUNTER
Provider Staff:     Action required for this patient.    Please note Refusal of medication.            Requested Prescriptions     Refused Prescriptions Disp Refills    hydroCHLOROthiazide (HYDRODIURIL) 12.5 MG Tab [Pharmacy Med Name: HYDROCHLOROTHIAZIDE 12.5MG TABLETS] 90 tablet 1     Sig: TAKE 1 TABLET(12.5 MG) BY MOUTH EVERY DAY     Refused By: BUCK PATEL     Reason for Refusal: Refill not appropriate      Thanks!  [facility]   Note composed: 01/10/2022 10:29 AM

## 2022-01-18 ENCOUNTER — TELEPHONE (OUTPATIENT)
Dept: FAMILY MEDICINE | Facility: CLINIC | Age: 62
End: 2022-01-18
Payer: COMMERCIAL

## 2022-01-18 DIAGNOSIS — J01.80 ACUTE NON-RECURRENT SINUSITIS OF OTHER SINUS: Primary | ICD-10-CM

## 2022-01-18 RX ORDER — METHYLPREDNISOLONE 4 MG/1
TABLET ORAL
Qty: 1 EACH | Refills: 0 | Status: SHIPPED | OUTPATIENT
Start: 2022-01-18 | End: 2022-02-23

## 2022-01-18 RX ORDER — AMOXICILLIN 875 MG/1
875 TABLET, FILM COATED ORAL 2 TIMES DAILY
Qty: 20 TABLET | Refills: 0 | Status: SHIPPED | OUTPATIENT
Start: 2022-01-18 | End: 2022-01-28

## 2022-01-18 NOTE — TELEPHONE ENCOUNTER
----- Message from Natalie Ward sent at 1/18/2022  9:25 AM CST -----  Pt is calling in regards to getting medication called in for a sinus infection. Pt would like to know if she can get antibiotics and steroids sent in.Pt stated that she tested negative for covid on Sunday 01/16/2022.  Pt would like a call when medication will be sent in.  Please call [phone].      [facility]/pharmacy #[id number] [place], [place] - [address] [id number] [facility] [address]  Phone: [phone] Fax: [phone]

## 2022-01-18 NOTE — TELEPHONE ENCOUNTER
I have put the following orders and/or medications to this note.  Please advise pt and assist.    No orders of the defined types were placed in this encounter.      Medications Ordered This Encounter   Medications    amoxicillin (AMOXIL) 875 MG tablet     Sig: Take 1 tablet (875 mg total) by mouth 2 (two) times daily. for 10 days     Dispense:  20 tablet     Refill:  0    methylPREDNISolone (MEDROL DOSEPACK) 4 mg tablet     Sig: use as directed     Dispense:  1 each     Refill:  0

## 2022-01-18 NOTE — TELEPHONE ENCOUNTER
Called pt to get more info. No answer. States she has a sinus infection and wants something sent in. Please advise

## 2022-01-19 ENCOUNTER — TELEPHONE (OUTPATIENT)
Dept: FAMILY MEDICINE | Facility: CLINIC | Age: 62
End: 2022-01-19
Payer: COMMERCIAL

## 2022-01-19 NOTE — TELEPHONE ENCOUNTER
----- Message from Colleen Avendano sent at 1/18/2022  2:59 PM CST -----  Contact: Patient  Patient called to consult with nurse or staff regarding a missed call. She states she wasn't able to get to phone in time and would like a call back. Patient can be reached at [phone]. Thanks/

## 2022-02-02 DIAGNOSIS — I10 ESSENTIAL HYPERTENSION: ICD-10-CM

## 2022-02-02 RX ORDER — AMLODIPINE BESYLATE 10 MG/1
TABLET ORAL
Qty: 90 TABLET | Refills: 1 | Status: SHIPPED | OUTPATIENT
Start: 2022-02-02 | End: 2022-08-04

## 2022-02-02 RX ORDER — LISINOPRIL 40 MG/1
TABLET ORAL
Qty: 90 TABLET | Refills: 1 | Status: SHIPPED | OUTPATIENT
Start: 2022-02-02 | End: 2022-08-23 | Stop reason: SDUPTHER

## 2022-02-02 NOTE — TELEPHONE ENCOUNTER
No new care gaps identified.  Powered by [employer]. Reference number: [id number].   2/02/2022 8:06:30 AM CST

## 2022-02-08 DIAGNOSIS — F34.1 DYSTHYMIC DISORDER: Chronic | ICD-10-CM

## 2022-02-08 DIAGNOSIS — K50.919 CROHN'S DISEASE WITH COMPLICATION, UNSPECIFIED GASTROINTESTINAL TRACT LOCATION: ICD-10-CM

## 2022-02-08 DIAGNOSIS — E78.5 HYPERLIPIDEMIA, UNSPECIFIED HYPERLIPIDEMIA TYPE: ICD-10-CM

## 2022-02-08 DIAGNOSIS — I10 ESSENTIAL HYPERTENSION: Chronic | ICD-10-CM

## 2022-02-08 DIAGNOSIS — J32.0 MAXILLARY SINUSITIS, UNSPECIFIED CHRONICITY: ICD-10-CM

## 2022-02-08 DIAGNOSIS — J30.9 ALLERGIC RHINITIS: ICD-10-CM

## 2022-02-08 RX ORDER — IBUPROFEN 800 MG/1
800 TABLET ORAL 2 TIMES DAILY PRN
Qty: 180 TABLET | Refills: 1 | Status: SHIPPED | OUTPATIENT
Start: 2022-02-08 | End: 2023-02-06 | Stop reason: SDUPTHER

## 2022-02-08 RX ORDER — ALPRAZOLAM 0.5 MG/1
0.5 TABLET ORAL 2 TIMES DAILY PRN
Qty: 60 TABLET | Refills: 0 | Status: SHIPPED | OUTPATIENT
Start: 2022-02-08 | End: 2022-07-22 | Stop reason: SDUPTHER

## 2022-02-08 RX ORDER — MERCAPTOPURINE 50 MG/1
50 TABLET ORAL DAILY
Qty: 90 TABLET | Refills: 4 | OUTPATIENT
Start: 2022-02-08 | End: 2022-05-09

## 2022-02-08 RX ORDER — MONTELUKAST SODIUM 10 MG/1
10 TABLET ORAL NIGHTLY
Qty: 90 TABLET | Refills: 1 | Status: SHIPPED | OUTPATIENT
Start: 2022-02-08 | End: 2022-08-16 | Stop reason: SDUPTHER

## 2022-02-08 RX ORDER — PRAVASTATIN SODIUM 80 MG/1
80 TABLET ORAL NIGHTLY
Qty: 90 TABLET | Refills: 1 | Status: SHIPPED | OUTPATIENT
Start: 2022-02-08 | End: 2023-01-30

## 2022-02-08 RX ORDER — CETIRIZINE HYDROCHLORIDE 10 MG/1
10 TABLET ORAL DAILY
Qty: 90 TABLET | Refills: 1 | Status: SHIPPED | OUTPATIENT
Start: 2022-02-08 | End: 2022-07-28 | Stop reason: SDUPTHER

## 2022-02-08 NOTE — TELEPHONE ENCOUNTER
No new care gaps identified.  Powered by [employer]. Reference number: [id number].   2/08/2022 8:28:23 AM CST

## 2022-02-08 NOTE — TELEPHONE ENCOUNTER
LV- 10/7/21  LAV- 8/11/21  Upcoming appt- 2/23/22    Requesting-    1) ALLERGY RELIEF, CETIRIZINE, 10 mg tablet   2) ibuprofen (ADVIL,MOTRIN) 800 MG tablet    3) montelukast (SINGULAIR) 10 mg tablet  4) pravastatin (PRAVACHOL) 80 MG tablet  5) ALPRAZolam (XANAX) 0.5 MG tablet  6) mercaptopurine (PURINETHOL)    Last filled-    1) 9/20/19  2) 8/4/21  3) 8/4/21  4) 12/1/21  5) 12/1/21  6) 1/19/21

## 2022-02-09 ENCOUNTER — PATIENT OUTREACH (OUTPATIENT)
Dept: ADMINISTRATIVE | Facility: HOSPITAL | Age: 62
End: 2022-02-09
Payer: COMMERCIAL

## 2022-02-09 NOTE — PROGRESS NOTES
Working HTN Report       Called pt for home BP Reading, Pt Remote /89     Giulia LYN LPN Care Coordinator  Care Coordination Department  [facility]  [phone]

## 2022-02-23 ENCOUNTER — OFFICE VISIT (OUTPATIENT)
Dept: FAMILY MEDICINE | Facility: CLINIC | Age: 62
End: 2022-02-23
Payer: COMMERCIAL

## 2022-02-23 VITALS
TEMPERATURE: 97 F | OXYGEN SATURATION: 97 % | HEIGHT: 61 IN | WEIGHT: 183.88 LBS | SYSTOLIC BLOOD PRESSURE: 122 MMHG | DIASTOLIC BLOOD PRESSURE: 82 MMHG | BODY MASS INDEX: 34.72 KG/M2 | HEART RATE: 86 BPM

## 2022-02-23 DIAGNOSIS — J30.2 SEASONAL ALLERGIC RHINITIS, UNSPECIFIED TRIGGER: ICD-10-CM

## 2022-02-23 DIAGNOSIS — K50.90 CROHN'S DISEASE WITHOUT COMPLICATION, UNSPECIFIED GASTROINTESTINAL TRACT LOCATION: ICD-10-CM

## 2022-02-23 DIAGNOSIS — I10 ESSENTIAL HYPERTENSION: Primary | ICD-10-CM

## 2022-02-23 DIAGNOSIS — E55.9 VITAMIN D DEFICIENCY: ICD-10-CM

## 2022-02-23 DIAGNOSIS — B00.1 FEVER BLISTER: ICD-10-CM

## 2022-02-23 DIAGNOSIS — E66.9 OBESITY (BMI 30-39.9): ICD-10-CM

## 2022-02-23 DIAGNOSIS — E78.5 HYPERLIPIDEMIA, UNSPECIFIED HYPERLIPIDEMIA TYPE: ICD-10-CM

## 2022-02-23 PROCEDURE — 1159F MED LIST DOCD IN RCRD: CPT | Mod: CPTII,S$GLB,, | Performed by: FAMILY MEDICINE

## 2022-02-23 PROCEDURE — 4010F ACE/ARB THERAPY RXD/TAKEN: CPT | Mod: CPTII,S$GLB,, | Performed by: FAMILY MEDICINE

## 2022-02-23 PROCEDURE — 3074F SYST BP LT 130 MM HG: CPT | Mod: CPTII,S$GLB,, | Performed by: FAMILY MEDICINE

## 2022-02-23 PROCEDURE — 3074F PR MOST RECENT SYSTOLIC BLOOD PRESSURE < 130 MM HG: ICD-10-PCS | Mod: CPTII,S$GLB,, | Performed by: FAMILY MEDICINE

## 2022-02-23 PROCEDURE — 3008F BODY MASS INDEX DOCD: CPT | Mod: CPTII,S$GLB,, | Performed by: FAMILY MEDICINE

## 2022-02-23 PROCEDURE — 1160F RVW MEDS BY RX/DR IN RCRD: CPT | Mod: CPTII,S$GLB,, | Performed by: FAMILY MEDICINE

## 2022-02-23 PROCEDURE — 3079F DIAST BP 80-89 MM HG: CPT | Mod: CPTII,S$GLB,, | Performed by: FAMILY MEDICINE

## 2022-02-23 PROCEDURE — 1159F PR MEDICATION LIST DOCUMENTED IN MEDICAL RECORD: ICD-10-PCS | Mod: CPTII,S$GLB,, | Performed by: FAMILY MEDICINE

## 2022-02-23 PROCEDURE — 1160F PR REVIEW ALL MEDS BY PRESCRIBER/CLIN PHARMACIST DOCUMENTED: ICD-10-PCS | Mod: CPTII,S$GLB,, | Performed by: FAMILY MEDICINE

## 2022-02-23 PROCEDURE — 4010F PR ACE/ARB THEARPY RXD/TAKEN: ICD-10-PCS | Mod: CPTII,S$GLB,, | Performed by: FAMILY MEDICINE

## 2022-02-23 PROCEDURE — 99999 PR PBB SHADOW E&M-EST. PATIENT-LVL V: ICD-10-PCS | Mod: PBBFAC,,, | Performed by: FAMILY MEDICINE

## 2022-02-23 PROCEDURE — 3008F PR BODY MASS INDEX (BMI) DOCUMENTED: ICD-10-PCS | Mod: CPTII,S$GLB,, | Performed by: FAMILY MEDICINE

## 2022-02-23 PROCEDURE — 3079F PR MOST RECENT DIASTOLIC BLOOD PRESSURE 80-89 MM HG: ICD-10-PCS | Mod: CPTII,S$GLB,, | Performed by: FAMILY MEDICINE

## 2022-02-23 PROCEDURE — 99214 PR OFFICE/OUTPT VISIT, EST, LEVL IV, 30-39 MIN: ICD-10-PCS | Mod: S$GLB,,, | Performed by: FAMILY MEDICINE

## 2022-02-23 PROCEDURE — 99999 PR PBB SHADOW E&M-EST. PATIENT-LVL V: CPT | Mod: PBBFAC,,, | Performed by: FAMILY MEDICINE

## 2022-02-23 PROCEDURE — 99214 OFFICE O/P EST MOD 30 MIN: CPT | Mod: S$GLB,,, | Performed by: FAMILY MEDICINE

## 2022-02-23 RX ORDER — AZELASTINE 1 MG/ML
2 SPRAY, METERED NASAL 2 TIMES DAILY
Qty: 90 ML | Refills: 3 | Status: SHIPPED | OUTPATIENT
Start: 2022-02-23 | End: 2024-03-12

## 2022-02-23 RX ORDER — VALACYCLOVIR HYDROCHLORIDE 1 G/1
2000 TABLET, FILM COATED ORAL 2 TIMES DAILY
Qty: 30 TABLET | Refills: 0 | Status: SHIPPED | OUTPATIENT
Start: 2022-02-23 | End: 2022-08-12

## 2022-02-23 NOTE — PROGRESS NOTES
Angelica Flores    Chief Complaint   Patient presents with    Hypertension    Follow-up       History of Present Illness:   Ms. Flores comes in today for 6-month hypertension follow-up.  She is fasting and has taken medications today.  She states she has not been exercising.  She states she monitors her diet and states she gave up eating sugar and instead uses Monk fruit as a substitute    On October 7, 2021 I recommended she add hydrochlorothiazide 12.5 mg daily for blood pressure control.  She states she takes hydrochlorothiazide without problems.  She also states she continues to take amlodipine 10 mg daily and lisinopril 40 mg daily for blood pressure control.  She states she occasionally performs home blood pressure checks with levels ranging 130's/80's.  She states she occasionally has nose bleeds but states her blood pressure levels are okay during those times.    She states she has sinus symptoms (sinus congestion and red eyes) while at work and likely due to ventilation issues at work.  She does not smoke.  She requests refill of Astelin nasal spray as she states it helps but also requests allergy shots.    She saw GI Dr. Wood on December 11, 2020 for surveillance of Crohn's disease.    Otherwise, she denies having fever, chills, fatigue, appetite changes; shortness of breath, cough, wheezing; chest pain, palpitations, leg swelling; other sinus symptoms; abdominal pain, nausea, vomiting, diarrhea, constipation; unusual urinary symptoms; polydipsia, polyphagia, polyuria, hot or cold intolerance; back pain; acute visual changes, numbness, headache; anxiety, depression, homicidal or suicidal thoughts.          Labs:       WBC                      6.61                08/11/2021                 HGB                      13.2                08/11/2021                 HCT                      40.8                08/11/2021                 PLT                      289                 08/11/2021                  CHOL                     147                 08/11/2021                 TRIG                     75                  08/11/2021                 HDL                      50                  08/11/2021                 ALT                      22                  08/11/2021                 AST                      25                  08/11/2021                 NA                       141                 08/11/2021                 K                        3.1 (L)             08/11/2021                 CL                       101                 08/11/2021                 CREATININE               0.7                 08/11/2021                 BUN                      15                  08/11/2021                 CO2                      26                  08/11/2021                 TSH                      0.901               08/11/2021                 INR                      0.9                 08/18/2008                 HGBA1C                   5.6                 04/27/2016               LDLCALC                  82.0                08/11/2021         Vit D, 25-Hydroxy          67             08/11/2021      Current Outpatient Medications   Medication Sig    albuterol (PROVENTIL/VENTOLIN HFA) 90 mcg/actuation inhaler Inhale 2 puffs into the lungs every 6 (six) hours as needed for Wheezing or Shortness of Breath.    ALPRAZolam (XANAX) 0.5 MG tablet Take 1 tablet (0.5 mg total) by mouth 2 (two) times daily as needed.    amLODIPine (NORVASC) 10 MG tablet TAKE 1 TABLET BY MOUTH EVERY DAY    ascorbic acid, vitamin C, (VITAMIN C) 1000 MG tablet Take by mouth. 1 Tablet Oral Every day    BACILLUS COAGULANS (PROBIOTIC, B. COAGULANS, ORAL) Take by mouth once daily.    cetirizine (ALLERGY RELIEF, CETIRIZINE,) 10 MG tablet Take 1 tablet (10 mg total) by mouth once daily.    cholecalciferol, vitamin D3, 2,000 unit Cap Take by mouth. 1 capsule Oral Every day    cyclobenzaprine (FLEXERIL) 10 MG tablet Take 10 mg by  mouth 3 (three) times daily as needed for Muscle spasms.    hydroCHLOROthiazide (HYDRODIURIL) 25 MG tablet Take 1 tablet (25 mg total) by mouth once daily.    ibuprofen (ADVIL,MOTRIN) 800 MG tablet Take 1 tablet (800 mg total) by mouth 2 (two) times daily as needed.    lisinopriL (PRINIVIL,ZESTRIL) 40 MG tablet TAKE 1 TABLET BY MOUTH DAILY. APPOINTMENT REQUIRED FOR FUTURE REFILLS    mercaptopurine (PURINETHOL) 50 mg tablet Take 1 tablet (50 mg total) by mouth once daily.    montelukast (SINGULAIR) 10 mg tablet Take 1 tablet (10 mg total) by mouth every evening.    multivitamin-Ca-iron-minerals 18-0.4 mg Tab Take by mouth. 1 Tablet Oral Every day    pravastatin (PRAVACHOL) 80 MG tablet Take 1 tablet (80 mg total) by mouth every evening.    valACYclovir (VALTREX) 1000 MG tablet Take 2 tablets (2,000 mg total) by mouth 2 (two) times daily.    zinc sulfate (ZINC-220 ORAL)          Review of Systems   Constitutional: Negative for activity change, appetite change, chills, fatigue and fever.        Weight 91.3 kg (201 lb 4.5 oz) at October 7, 2021 visit.   HENT: Positive for congestion and nosebleeds. Negative for postnasal drip, rhinorrhea, sinus pressure, sinus pain and sneezing.         See history of present illness.   Eyes: Negative for visual disturbance.   Respiratory: Negative for cough, shortness of breath and wheezing.    Cardiovascular: Negative for chest pain, palpitations and leg swelling.        See history of present illness.   Gastrointestinal: Negative for abdominal pain, constipation, diarrhea, nausea and vomiting.        See history of present illness.   Endocrine: Negative for cold intolerance, heat intolerance, polydipsia, polyphagia and polyuria.   Genitourinary: Negative for difficulty urinating.   Musculoskeletal: Negative for back pain, joint swelling and myalgias.   Neurological: Negative for numbness and headaches.   Psychiatric/Behavioral: Negative for dysphoric mood and suicidal ideas.  The patient is not nervous/anxious.         Negative for homicidal ideas.       Objective:  Physical Exam  Vitals reviewed.   Constitutional:       General: She is not in acute distress.     Appearance: Normal appearance. She is well-developed. She is obese. She is not ill-appearing, toxic-appearing or diaphoretic.      Comments: Pleasant.   HENT:      Head: Normocephalic and atraumatic.      Comments: Non tender sinuses.         Right Ear: Tympanic membrane, ear canal and external ear normal.      Left Ear: Tympanic membrane, ear canal and external ear normal.      Nose: Congestion present. No rhinorrhea.      Mouth/Throat:      Mouth: Mucous membranes are moist.      Pharynx: Oropharynx is clear. No oropharyngeal exudate or posterior oropharyngeal erythema.   Eyes:      General:         Right eye: No discharge.         Left eye: No discharge.      Extraocular Movements: Extraocular movements intact.      Conjunctiva/sclera: Conjunctivae normal.      Pupils: Pupils are equal, round, and reactive to light.   Neck:      Thyroid: No thyromegaly.   Cardiovascular:      Rate and Rhythm: Normal rate and regular rhythm.      Heart sounds: Normal heart sounds. No murmur heard.  Pulmonary:      Effort: Pulmonary effort is normal. No respiratory distress.      Breath sounds: Normal breath sounds. No wheezing.   Abdominal:      General: Bowel sounds are normal. There is no distension.      Palpations: Abdomen is soft. There is no mass.      Tenderness: There is no abdominal tenderness. There is no guarding or rebound.   Musculoskeletal:         General: No swelling or tenderness. Normal range of motion.      Cervical back: Normal range of motion and neck supple. No tenderness.      Comments: She is ambulatory without problems.     Lymphadenopathy:      Cervical: No cervical adenopathy.   Neurological:      General: No focal deficit present.      Mental Status: She is alert and oriented to person, place, and time.    Psychiatric:         Mood and Affect: Mood normal.         Behavior: Behavior normal.         Thought Content: Thought content normal.         Judgment: Judgment normal.         ASSESSMENT:  1. Essential hypertension    2. Hyperlipidemia, unspecified hyperlipidemia type    3. Vitamin D deficiency    4. Crohn's disease without complication, unspecified gastrointestinal tract location    5. Seasonal allergic rhinitis, unspecified trigger    6. Fever blister    7. Obesity (BMI 30-39.9)        PLAN:  Angelica was seen today for hypertension and follow-up.    Diagnoses and all orders for this visit:    Essential hypertension    Hyperlipidemia, unspecified hyperlipidemia type    Vitamin D deficiency    Crohn's disease without complication, unspecified gastrointestinal tract location    Seasonal allergic rhinitis, unspecified trigger  -     Ambulatory referral/consult to Allergy; Future  -     azelastine (ASTELIN) 137 mcg (0.1 %) nasal spray; 2 sprays (274 mcg total) by Nasal route 2 (two) times daily.    Fever blister  -     valACYclovir (VALTREX) 1000 MG tablet; Take 2 tablets (2,000 mg total) by mouth 2 (two) times daily. For 1 day per episode    Obesity (BMI 30-39.9)       No labs today.  Continue current medications, follow low sodium, low cholesterol, low carb diet, daily walks.  Prescription refills as noted above.  Keep follow up with specialists.  Follow up in about 6 months (around 8/11/2022) for physical.

## 2022-02-26 ENCOUNTER — SPECIALTY PHARMACY (OUTPATIENT)
Dept: PHARMACY | Facility: CLINIC | Age: 62
End: 2022-02-26
Payer: COMMERCIAL

## 2022-02-26 NOTE — TELEPHONE ENCOUNTER
Mercaptopurine rx received at [facility]. Pt would like to continue filling at [facility]. Rx routed to [facility] [address] [place] - [address] [facility]

## 2022-04-11 ENCOUNTER — LAB VISIT (OUTPATIENT)
Dept: LAB | Facility: HOSPITAL | Age: 62
End: 2022-04-11
Attending: ALLERGY & IMMUNOLOGY
Payer: COMMERCIAL

## 2022-04-11 ENCOUNTER — OFFICE VISIT (OUTPATIENT)
Dept: ALLERGY | Facility: CLINIC | Age: 62
End: 2022-04-11
Payer: COMMERCIAL

## 2022-04-11 VITALS
HEART RATE: 75 BPM | BODY MASS INDEX: 37.74 KG/M2 | HEIGHT: 60 IN | SYSTOLIC BLOOD PRESSURE: 113 MMHG | DIASTOLIC BLOOD PRESSURE: 83 MMHG | WEIGHT: 192.25 LBS | TEMPERATURE: 98 F

## 2022-04-11 DIAGNOSIS — J30.2 SEASONAL ALLERGIC RHINITIS, UNSPECIFIED TRIGGER: ICD-10-CM

## 2022-04-11 DIAGNOSIS — J32.9 RECURRENT SINUS INFECTIONS: ICD-10-CM

## 2022-04-11 DIAGNOSIS — J32.9 RECURRENT SINUS INFECTIONS: Primary | ICD-10-CM

## 2022-04-11 LAB — IGE SERPL-ACNC: <35 IU/ML (ref 0–100)

## 2022-04-11 PROCEDURE — 1159F MED LIST DOCD IN RCRD: CPT | Mod: CPTII,S$GLB,, | Performed by: ALLERGY & IMMUNOLOGY

## 2022-04-11 PROCEDURE — 86003 ALLG SPEC IGE CRUDE XTRC EA: CPT | Mod: 59 | Performed by: ALLERGY & IMMUNOLOGY

## 2022-04-11 PROCEDURE — 3079F DIAST BP 80-89 MM HG: CPT | Mod: CPTII,S$GLB,, | Performed by: ALLERGY & IMMUNOLOGY

## 2022-04-11 PROCEDURE — 3079F PR MOST RECENT DIASTOLIC BLOOD PRESSURE 80-89 MM HG: ICD-10-PCS | Mod: CPTII,S$GLB,, | Performed by: ALLERGY & IMMUNOLOGY

## 2022-04-11 PROCEDURE — 1159F PR MEDICATION LIST DOCUMENTED IN MEDICAL RECORD: ICD-10-PCS | Mod: CPTII,S$GLB,, | Performed by: ALLERGY & IMMUNOLOGY

## 2022-04-11 PROCEDURE — 99999 PR PBB SHADOW E&M-EST. PATIENT-LVL V: CPT | Mod: PBBFAC,,, | Performed by: ALLERGY & IMMUNOLOGY

## 2022-04-11 PROCEDURE — 3074F PR MOST RECENT SYSTOLIC BLOOD PRESSURE < 130 MM HG: ICD-10-PCS | Mod: CPTII,S$GLB,, | Performed by: ALLERGY & IMMUNOLOGY

## 2022-04-11 PROCEDURE — 99203 OFFICE O/P NEW LOW 30 MIN: CPT | Mod: S$GLB,,, | Performed by: ALLERGY & IMMUNOLOGY

## 2022-04-11 PROCEDURE — 86003 ALLG SPEC IGE CRUDE XTRC EA: CPT | Performed by: ALLERGY & IMMUNOLOGY

## 2022-04-11 PROCEDURE — 3008F PR BODY MASS INDEX (BMI) DOCUMENTED: ICD-10-PCS | Mod: CPTII,S$GLB,, | Performed by: ALLERGY & IMMUNOLOGY

## 2022-04-11 PROCEDURE — 3008F BODY MASS INDEX DOCD: CPT | Mod: CPTII,S$GLB,, | Performed by: ALLERGY & IMMUNOLOGY

## 2022-04-11 PROCEDURE — 82785 ASSAY OF IGE: CPT | Performed by: ALLERGY & IMMUNOLOGY

## 2022-04-11 PROCEDURE — 36415 COLL VENOUS BLD VENIPUNCTURE: CPT | Performed by: ALLERGY & IMMUNOLOGY

## 2022-04-11 PROCEDURE — 99999 PR PBB SHADOW E&M-EST. PATIENT-LVL V: ICD-10-PCS | Mod: PBBFAC,,, | Performed by: ALLERGY & IMMUNOLOGY

## 2022-04-11 PROCEDURE — 4010F PR ACE/ARB THEARPY RXD/TAKEN: ICD-10-PCS | Mod: CPTII,S$GLB,, | Performed by: ALLERGY & IMMUNOLOGY

## 2022-04-11 PROCEDURE — 99203 PR OFFICE/OUTPT VISIT, NEW, LEVL III, 30-44 MIN: ICD-10-PCS | Mod: S$GLB,,, | Performed by: ALLERGY & IMMUNOLOGY

## 2022-04-11 PROCEDURE — 86317 IMMUNOASSAY INFECTIOUS AGENT: CPT | Mod: 59 | Performed by: ALLERGY & IMMUNOLOGY

## 2022-04-11 PROCEDURE — 4010F ACE/ARB THERAPY RXD/TAKEN: CPT | Mod: CPTII,S$GLB,, | Performed by: ALLERGY & IMMUNOLOGY

## 2022-04-11 PROCEDURE — 3074F SYST BP LT 130 MM HG: CPT | Mod: CPTII,S$GLB,, | Performed by: ALLERGY & IMMUNOLOGY

## 2022-04-11 RX ORDER — IPRATROPIUM BROMIDE 21 UG/1
2 SPRAY, METERED NASAL 3 TIMES DAILY
Qty: 20 ML | Refills: 5 | Status: SHIPPED | OUTPATIENT
Start: 2022-04-11

## 2022-04-11 NOTE — PROGRESS NOTES
"Subjective:       Patient ID: Angelica Flores is a 61 y.o. female.    Chief Complaint:  Allergies      HPI: 61 year old female complaining of recurent sinus infections. 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   Sinus infections over the last year- 6- steroids and antibiotic      Albuterol- uses when in office building. Last used last week- trigger office building. NO oral steroids. NO night time cough.    Hoarse when she leaves the building. Runny Nose, nasal congestion, headache post nasal drip    She takes Zyrtec in the morning and Singular at night. Astelin nose spray. She takes benadryl prn.    March and September- "worse months".    Right neck- area of erythema- itching, macular lesion for one week. Rash occurred after being outside. She thinks she may have been bitten by an insect.    humidifier for nose bleeds      Crohn's disease- MCP    Past Medical History:   Diagnosis Date    Anxiety     Asthma     Crohn's disease     Fibroids     Hyperlipidemia     Hypertension     Iritis     Migraine headache     Ocular    Osteopenia 08/2019    Vitamin D deficiency disease      Family History   Problem Relation Age of Onset    Arthritis Mother     Fuch's dystrophy Mother     Breast cancer Mother     Lupus Sister     Rheum arthritis Sister     Obesity Sister     Hypertension Father     Cancer Maternal Grandfather         lung ca    Stroke Maternal Grandmother     Diabetes Maternal Grandmother     Colon cancer Paternal Aunt      Current Outpatient Medications on File Prior to Visit   Medication Sig Dispense Refill    albuterol (PROVENTIL/VENTOLIN HFA) 90 mcg/actuation inhaler Inhale 2 puffs into the lungs every 6 (six) hours as needed for Wheezing or Shortness of Breath. 18 g 2    ALPRAZolam (XANAX) 0.5 MG tablet Take 1 tablet (0.5 mg total) by mouth 2 (two) times daily as needed. 60 tablet 0    amLODIPine (NORVASC) 10 MG tablet TAKE 1 TABLET BY MOUTH EVERY DAY 90 tablet 1    " ascorbic acid, vitamin C, (VITAMIN C) 1000 MG tablet Take by mouth. 1 Tablet Oral Every day      azelastine (ASTELIN) 137 mcg (0.1 %) nasal spray 2 sprays (274 mcg total) by Nasal route 2 (two) times daily. 90 mL 3    BACILLUS COAGULANS (PROBIOTIC, B. COAGULANS, ORAL) Take by mouth once daily.      cetirizine (ALLERGY RELIEF, CETIRIZINE,) 10 MG tablet Take 1 tablet (10 mg total) by mouth once daily. 90 tablet 1    cholecalciferol, vitamin D3, 2,000 unit Cap Take by mouth. 1 capsule Oral Every day      cyclobenzaprine (FLEXERIL) 10 MG tablet Take 10 mg by mouth 3 (three) times daily as needed for Muscle spasms.      hydroCHLOROthiazide (HYDRODIURIL) 25 MG tablet Take 1 tablet (25 mg total) by mouth once daily. 90 tablet 1    ibuprofen (ADVIL,MOTRIN) 800 MG tablet Take 1 tablet (800 mg total) by mouth 2 (two) times daily as needed. 180 tablet 1    lisinopriL (PRINIVIL,ZESTRIL) 40 MG tablet TAKE 1 TABLET BY MOUTH DAILY. APPOINTMENT REQUIRED FOR FUTURE REFILLS 90 tablet 1    mercaptopurine (PURINETHOL) 50 mg tablet Take 1 tablet (50 mg total) by mouth once daily. 90 tablet 4    montelukast (SINGULAIR) 10 mg tablet Take 1 tablet (10 mg total) by mouth every evening. 90 tablet 1    multivitamin-Ca-iron-minerals 18-0.4 mg Tab Take by mouth. 1 Tablet Oral Every day      pravastatin (PRAVACHOL) 80 MG tablet Take 1 tablet (80 mg total) by mouth every evening. 90 tablet 1    valACYclovir (VALTREX) 1000 MG tablet Take 2 tablets (2,000 mg total) by mouth 2 (two) times daily. For 1 day per episode 30 tablet 0    zinc sulfate (ZINC-220 ORAL)        No current facility-administered medications on file prior to visit.     Review of patient's allergies indicates:  No Known Allergies    Environmental History: Pets in the home: none. She does not smoke. 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  Review of Systems   Constitutional: Negative for chills and fever.   HENT: Positive for congestion, rhinorrhea and voice change.     Eyes: Positive for itching. Negative for discharge.   Respiratory: Positive for cough and chest tightness. Negative for shortness of breath and wheezing.    Gastrointestinal: Negative for nausea and vomiting.   Skin: Positive for rash. Negative for wound.   Allergic/Immunologic: Negative for environmental allergies and food allergies.   Neurological: Positive for headaches. Negative for facial asymmetry and speech difficulty.   Hematological: Negative for adenopathy. Does not bruise/bleed easily.   Psychiatric/Behavioral: Negative for behavioral problems and suicidal ideas.        Objective:    Physical Exam  Vitals reviewed.   Constitutional:       General: She is not in acute distress.     Appearance: Normal appearance. She is well-developed. She is not ill-appearing, toxic-appearing or diaphoretic.   HENT:      Head: Normocephalic and atraumatic.      Right Ear: Tympanic membrane, ear canal and external ear normal. There is no impacted cerumen.      Left Ear: Tympanic membrane, ear canal and external ear normal. There is no impacted cerumen.      Nose: Nose normal. No congestion or rhinorrhea.      Mouth/Throat:      Mouth: Mucous membranes are moist.      Pharynx: No oropharyngeal exudate or posterior oropharyngeal erythema.   Eyes:      General: No scleral icterus.        Right eye: No discharge.         Left eye: No discharge.      Pupils: Pupils are equal, round, and reactive to light.   Neck:      Thyroid: No thyromegaly.   Cardiovascular:      Rate and Rhythm: Normal rate and regular rhythm.      Heart sounds: Normal heart sounds. No murmur heard.    No friction rub. No gallop.   Pulmonary:      Effort: Pulmonary effort is normal. No respiratory distress.      Breath sounds: Normal breath sounds. No stridor. No wheezing, rhonchi or rales.   Chest:      Chest wall: No tenderness.   Abdominal:      General: Bowel sounds are normal. There is no distension.      Palpations: Abdomen is soft. There is no mass.       Tenderness: There is no abdominal tenderness. There is no guarding or rebound.      Hernia: No hernia is present.   Musculoskeletal:         General: No swelling, tenderness, deformity or signs of injury. Normal range of motion.      Cervical back: Normal range of motion and neck supple. No rigidity or tenderness. No muscular tenderness.      Right lower leg: No edema.      Left lower leg: No edema.   Lymphadenopathy:      Cervical: No cervical adenopathy.   Skin:     General: Skin is warm.      Coloration: Skin is not jaundiced or pale.      Findings: No bruising or erythema.   Neurological:      General: No focal deficit present.      Mental Status: She is alert and oriented to person, place, and time.      Motor: No weakness.      Gait: Gait normal.   Psychiatric:         Mood and Affect: Mood normal.         Behavior: Behavior normal.         Thought Content: Thought content normal.         Judgment: Judgment normal.       Unable to skin prick test, as she is taking oral antihistamines.      Assessment:       1. Recurrent sinus infections    2. Seasonal allergic rhinitis, unspecified trigger         Plan:       Recurrent sinus infections  -     Streptococcus pneumoniae IgG Antibody (23 Serotypes), MAID; Future; Expected date: 04/11/2022    Seasonal allergic rhinitis, unspecified trigger  -     Ambulatory referral/consult to Allergy  -     IgE; Future; Expected date: 04/11/2022  -     Bahia grass IgE; Future; Expected date: 04/11/2022  -     Aspergillus fumagatus IgE; Future; Expected date: 04/11/2022  -     Chaetomium globosum IgE; Future; Expected date: 04/11/2022  -     Cockroach, American IgE; Future; Expected date: 04/11/2022  -     Cladosporium IgE; Future; Expected date: 04/11/2022  -     Curvularia lunata IgE; Future; Expected date: 04/11/2022  -     D. farinae IgE; Future; Expected date: 04/11/2022  -     D. pteronyssinus IgE; Future; Expected date: 04/11/2022  -     Dog dander IgE; Future; Expected  date: 04/11/2022  -     Plantain, English IgE; Future; Expected date: 04/11/2022  -     Eucalyptus IgE; Future; Expected date: 04/11/2022  -     Duran elder, rough IgE; Future; Expected date: 04/11/2022  -     Mugwort IgE; Future; Expected date: 04/11/2022  -     Nettle IgE; Future; Expected date: 04/11/2022  -     Orchard grass IgE; Future; Expected date: 04/11/2022  -     Dimock, western white IgE; Future; Expected date: 04/11/2022  -     Privet, common IgE; Future; Expected date: 04/11/2022  -     Ragweed, short, common IgE; Future; Expected date: 04/11/2022  -     Red top grass IgE; Future; Expected date: 04/11/2022  -     Rye grass, cultivated IgE; Future; Expected date: 04/11/2022  -     Thistle, Russian IgE; Future; Expected date: 04/11/2022  -     Stemphyllium IgE; Future; Expected date: 04/11/2022  -     Lucien IgE; Future; Expected date: 04/11/2022  -     Jame grass IgE; Future; Expected date: 04/11/2022  -     Allergen, Pecan Tree IgE; Future; Expected date: 04/11/2022  -     [place], black IgE; Future; Expected date: 04/11/2022  -     [place], bald IgE; Future; Expected date: 04/11/2022  -     Oak, white IgE; Future; Expected date: 04/11/2022  -     Allergen, Cocklebur; Future; Expected date: 04/11/2022  -     Cat epithelium IgE; Future; Expected date: 04/11/2022  -     Allergen, Hackberry Celtis; Future; Expected date: 04/11/2022  -     Allergen, Elm Cedar; Future; Expected date: 04/11/2022  -     Allergen-Ascension; Future; Expected date: 04/11/2022  -     RAST Allergen for [place]; Future; Expected date: 04/11/2022  -     RAST Allergen Maple ([place]); Future; Expected date: 04/11/2022  -     Allergen, Meadow Grass ([place] Blue); Future; Expected date: 04/11/2022  -     Allergen-Silver Birch; Future; Expected date: 04/11/2022  -     RAST Allergen [place]; Future; Expected date: 04/11/2022  -     RAST Allergen, Sheep [place]); Future; Expected date: 04/11/2022  -      Allergen-Alternaria Alternata; Future; Expected date: 04/11/2022  -     Allergen-Maple [place]; Future; Expected date: 04/11/2022  -     Allergen, White Panfilo; Future; Expected date: 04/11/2022  -     ipratropium (ATROVENT) 21 mcg (0.03 %) nasal spray; 2 sprays by Nasal route 3 (three) times daily.  Dispense: 20 mL; Refill: 5    Continue current medications.  She is interested in allergy immunotherapy.  RTC 4-6 weeks or sooner, if needed.    NGOC MORRISON spent a total of 45 minutes on the day of the visit.  This includes face to face time and non-face to face time preparing to see the patient (eg, review of tests), obtaining and/or reviewing separately obtained history, documenting clinical information in the electronic or other health record, independently interpreting results and communicating results to the patient/family/caregiver, or care coordinator.    CC: Dr. Alvarez

## 2022-04-14 ENCOUNTER — PATIENT MESSAGE (OUTPATIENT)
Dept: ALLERGY | Facility: CLINIC | Age: 62
End: 2022-04-14
Payer: COMMERCIAL

## 2022-04-14 LAB
A ALTERNATA IGE QN: <0.1 KU/L
A FUMIGATUS IGE QN: <0.1 KU/L
ALLERGEN BOXELDER MAPLE TREE IGE: <0.1 KU/L
ALLERGEN CHAETOMIUM GLOBOSUM IGE: <0.1 KU/L
ALLERGEN MAPLE (BOX ELDER) CLASS: NORMAL
ALLERGEN MULBERRY CLASS: NORMAL
ALLERGEN MULBERRY TREE IGE: <0.1 KU/L
ALLERGEN WHITE ASH TREE IGE: <0.1 KU/L
ALLERGEN WHITE PINE TREE IGE: <0.1 KU/L
AMER SYCAMORE IGE QN: <0.35 KU/L
BAHIA GRASS IGE QN: <0.1 KU/L
BALD CYPRESS IGE QN: <0.1 KU/L
C HERBARUM IGE QN: <0.1 KU/L
C LUNATA IGE QN: <0.1 KU/L
CAT DANDER IGE QN: <0.1 KU/L
CHAETOMIUM GLOB. CLASS: NORMAL
COCKLEBUR IGE QN: <0.1 KU/L
COCKSFOOT IGE QN: <0.1 KU/L
COMMON RAGWEED IGE QN: <0.1 KU/L
COTTONWOOD IGE QN: <0.1 KU/L
D FARINAE IGE QN: <0.1 KU/L
D PTERONYSS IGE QN: <0.1 KU/L
DEPRECATED A ALTERNATA IGE RAST QL: NORMAL
DEPRECATED A FUMIGATUS IGE RAST QL: NORMAL
DEPRECATED BAHIA GRASS IGE RAST QL: NORMAL
DEPRECATED BALD CYPRESS IGE RAST QL: NORMAL
DEPRECATED C HERBARUM IGE RAST QL: NORMAL
DEPRECATED C LUNATA IGE RAST QL: NORMAL
DEPRECATED CAT DANDER IGE RAST QL: NORMAL
DEPRECATED COCKLEBUR IGE RAST QL: NORMAL
DEPRECATED COCKSFOOT IGE RAST QL: NORMAL
DEPRECATED COMMON RAGWEED IGE RAST QL: NORMAL
DEPRECATED COTTONWOOD IGE RAST QL: NORMAL
DEPRECATED D FARINAE IGE RAST QL: NORMAL
DEPRECATED D PTERONYSS IGE RAST QL: NORMAL
DEPRECATED DOG DANDER IGE RAST QL: NORMAL
DEPRECATED ELDER IGE RAST QL: NORMAL
DEPRECATED ENGL PLANTAIN IGE RAST QL: NORMAL
DEPRECATED GUM-TREE IGE RAST QL: NORMAL
DEPRECATED HACKBERRY TREE IGE RAST QL: NORMAL
DEPRECATED JOHNSON GRASS IGE RAST QL: NORMAL
DEPRECATED KENT BLUE GRASS IGE RAST QL: NORMAL
DEPRECATED LONDON PLANE IGE RAST QL: NORMAL
DEPRECATED MUGWORT IGE RAST QL: NORMAL
DEPRECATED NETTLE IGE RAST QL: NORMAL
DEPRECATED PECAN/HICK TREE IGE RAST QL: NORMAL
DEPRECATED PER RYE GRASS IGE RAST QL: NORMAL
DEPRECATED PRIVET IGE RAST QL: NORMAL
DEPRECATED RED TOP GRASS IGE RAST QL: NORMAL
DEPRECATED ROACH IGE RAST QL: NORMAL
DEPRECATED SALTWORT IGE RAST QL: NORMAL
DEPRECATED SHEEP SORREL IGE RAST QL: NORMAL
DEPRECATED SILVER BIRCH IGE RAST QL: NORMAL
DEPRECATED TIMOTHY IGE RAST QL: NORMAL
DEPRECATED WHITE OAK IGE RAST QL: NORMAL
DEPRECATED WILLOW IGE RAST QL: NORMAL
DOG DANDER IGE QN: <0.1 KU/L
ELDER IGE QN: <0.1 KU/L
ELM CEDAR CLASS: NORMAL
ELM CEDAR, IGE: <0.1 KU/L
ENGL PLANTAIN IGE QN: <0.1 KU/L
GUM-TREE IGE QN: <0.1 KU/L
HACKBERRY TREE IGE QN: <0.1 KU/L
JOHNSON GRASS IGE QN: <0.1 KU/L
KENT BLUE GRASS IGE QN: <0.1 KU/L
LONDON PLANE IGE QN: <0.1 KU/L
MUGWORT IGE QN: <0.1 KU/L
NETTLE IGE QN: <0.1 KU/L
PECAN/HICK TREE IGE QN: <0.1 KU/L
PER RYE GRASS IGE QN: <0.1 KU/L
PRIVET IGE QN: <0.1 KU/L
RED TOP GRASS IGE QN: <0.1 KU/L
ROACH IGE QN: <0.1 KU/L
SALTWORT IGE QN: <0.1 KU/L
SHEEP SORREL IGE QN: <0.1 KU/L
SILVER BIRCH IGE QN: <0.1 KU/L
STEMPHYLIUM HERBARUM CLASS: NORMAL
STEMPHYLLIUM, IGE: <0.1 KU/L
TIMOTHY IGE QN: <0.1 KU/L
WHITE ASH CLASS: NORMAL
WHITE OAK IGE QN: <0.1 KU/L
WHITE PINE CLASS: NORMAL
WILLOW IGE QN: <0.1 KU/L

## 2022-04-20 LAB
S PNEUM DA 1 IGG SER-MCNC: 7.6 MCG/ML
S PNEUM DA 10A IGG SER-MCNC: 10.4 MCG/ML
S PNEUM DA 11A IGG SER-MCNC: 2.4 MCG/ML
S PNEUM DA 12F IGG SER-MCNC: 0.6 MCG/ML
S PNEUM DA 14 IGG SER-MCNC: 4.7 MCG/ML
S PNEUM DA 15B IGG SER-MCNC: 1.5 MCG/ML
S PNEUM DA 17F IGG SER-MCNC: 25.9 MCG/ML
S PNEUM DA 18C IGG SER-MCNC: 1.3 MCG/ML
S PNEUM DA 19A IGG SER-MCNC: 4.5 MCG/ML
S PNEUM DA 2 IGG SER-MCNC: 3.7 MCG/ML
S PNEUM DA 20A IGG SER-MCNC: 5.6 MCG/ML
S PNEUM DA 22F IGG SER-MCNC: 25.4 MCG/ML
S PNEUM DA 23F IGG SER-MCNC: 28.8 MCG/ML
S PNEUM DA 3 IGG SER-MCNC: 8.3 MCG/ML
S PNEUM DA 33F IGG SER-MCNC: 2.9 MCG/ML
S PNEUM DA 4 IGG SER-MCNC: 1.1 MCG/ML
S PNEUM DA 5 IGG SER-MCNC: 3.5 MCG/ML
S PNEUM DA 6B IGG SER-MCNC: 7.5 MCG/ML
S PNEUM DA 7F IGG SER-MCNC: 12.2 MCG/ML
S PNEUM DA 8 IGG SER-MCNC: 3.9 MCG/ML
S PNEUM DA 9N IGG SER-MCNC: NORMAL MCG/ML
S PNEUM DA 9V IGG SER-MCNC: 3.7 MCG/ML
S.PNEUMONIAE TYPE 19F: 10 MCG/ML

## 2022-05-14 DIAGNOSIS — I10 ESSENTIAL HYPERTENSION: ICD-10-CM

## 2022-05-14 NOTE — TELEPHONE ENCOUNTER
Care Due:                  Date            Visit Type   Department     Provider  --------------------------------------------------------------------------------                                EP -                              PRIMARY      [facility]  Last Visit: 02-      CARE ([facility])   MEDICINE       Monet Alvarez                              EP -                              PRIMARY      [facility]  Next Visit: 08-      CARE ([facility])   MEDICINE       Monet Alvarez                                                            Last  Test          Frequency    Reason                     Performed    Due Date  --------------------------------------------------------------------------------    CBC.........  12 months..  valACYclovir.............  08- 08-    CMP.........  12 months..  hydroCHLOROthiazide,       08- 08-                             lisinopriL, pravastatin,                             valACYclovir.............    Lipid Panel.  12 months..  pravastatin..............  08- 08-    [facility] Embedded Care Gaps. Reference number: [id number]. 5/14/2022   5:16:13 AM CDT

## 2022-05-15 NOTE — TELEPHONE ENCOUNTER
Refill Routing Note   Medication(s) are not appropriate for processing by [facility] for the following reason(s):      - Required laboratory values are abnormal    [facility] action(s):  Defer Medication-related problems identified: Requires labs        Medication reconciliation completed: No     Appointments  past 12m or future 3m with PCP    Date Provider   Last Visit   2/23/2022 Monet Alvarez MD   Next Visit   8/23/2022 Monet Alvarez MD   ED visits in past 90 days: 0        Note composed:1:18 PM 05/15/2022

## 2022-05-16 RX ORDER — HYDROCHLOROTHIAZIDE 25 MG/1
TABLET ORAL
Qty: 90 TABLET | Refills: 1 | Status: SHIPPED | OUTPATIENT
Start: 2022-05-16 | End: 2022-10-28 | Stop reason: SDUPTHER

## 2022-07-22 ENCOUNTER — PATIENT MESSAGE (OUTPATIENT)
Dept: FAMILY MEDICINE | Facility: CLINIC | Age: 62
End: 2022-07-22
Payer: COMMERCIAL

## 2022-07-22 ENCOUNTER — TELEPHONE (OUTPATIENT)
Dept: FAMILY MEDICINE | Facility: CLINIC | Age: 62
End: 2022-07-22
Payer: COMMERCIAL

## 2022-07-22 DIAGNOSIS — F34.1 DYSTHYMIC DISORDER: Chronic | ICD-10-CM

## 2022-07-22 NOTE — TELEPHONE ENCOUNTER
"Pt portal message    "Dr. Alvarez, please call me in reference to me passing out.  I passed out at an outdoor festival at the end of May.  I was standing in line waiting for food, felt extremely overheated, warned the person in front of me and eased myself down.  Last night I was at [facility] celebrating my birthday, had 2 glasses of wine with dinner and several hours later, got very hot,  I carry a fan In my purse so I started fanning myself and passed out again. I was able to ease myself to the ground. I attributed both times to dehydration but I have been drinking water, staying indoors. I was not anywhere to check my blood pressure. I have been eating.  Im taking my meds as prescribed.   Please advise. This is scary!!"    please advise  "

## 2022-07-22 NOTE — TELEPHONE ENCOUNTER
Care Due:                  Date            Visit Type   Department     Provider  --------------------------------------------------------------------------------                                EP -                              PRIMARY      [facility]  Last Visit: 02-      CARE ([facility])   MEDICINE       Monet Alvarez                              EP -                              PRIMARY      [facility]  Next Visit: 08-      CARE ([facility])   MEDICINE       Monet Alvarez                                                            Last  Test          Frequency    Reason                     Performed    Due Date  --------------------------------------------------------------------------------    CBC.........  12 months..  valACYclovir.............  08- 08-    CMP.........  12 months..  hydroCHLOROthiazide,       08- 08-                             lisinopriL, pravastatin,                             valACYclovir.............    Lipid Panel.  12 months..  pravastatin..............  08- 08-    [facility] Embedded Care Gaps. Reference number: [id number]. 7/22/2022   1:59:19 PM CDT

## 2022-07-23 RX ORDER — ALPRAZOLAM 0.5 MG/1
0.5 TABLET ORAL 2 TIMES DAILY PRN
Qty: 60 TABLET | Refills: 0 | Status: SHIPPED | OUTPATIENT
Start: 2022-07-23 | End: 2022-10-03

## 2022-07-25 ENCOUNTER — PATIENT MESSAGE (OUTPATIENT)
Dept: FAMILY MEDICINE | Facility: CLINIC | Age: 62
End: 2022-07-25
Payer: COMMERCIAL

## 2022-07-25 ENCOUNTER — TELEPHONE (OUTPATIENT)
Dept: FAMILY MEDICINE | Facility: CLINIC | Age: 62
End: 2022-07-25
Payer: COMMERCIAL

## 2022-07-25 DIAGNOSIS — R55 SYNCOPE, UNSPECIFIED SYNCOPE TYPE: Primary | ICD-10-CM

## 2022-07-25 NOTE — TELEPHONE ENCOUNTER
Please fax referral at computer to Dr. Tejas Granados, cardiologist on [address] [facility], [place], [place]. Thanks.

## 2022-07-25 NOTE — TELEPHONE ENCOUNTER
"Pt portal message    referring to cardiologist    "Tejas Phillips or Marito Henley preferable or if you have someone in mind           "

## 2022-07-27 ENCOUNTER — TELEPHONE (OUTPATIENT)
Dept: FAMILY MEDICINE | Facility: CLINIC | Age: 62
End: 2022-07-27
Payer: COMMERCIAL

## 2022-07-27 NOTE — TELEPHONE ENCOUNTER
----- Message from La Mckeon sent at 7/27/2022  8:06 AM CDT -----  Contact: Angelica  Patient would like a call back at [phone]  in regards to getting an appointment today for a lower back pain    Thanks

## 2022-07-28 DIAGNOSIS — J30.9 ALLERGIC RHINITIS: ICD-10-CM

## 2022-07-28 DIAGNOSIS — I10 ESSENTIAL HYPERTENSION: Chronic | ICD-10-CM

## 2022-07-28 DIAGNOSIS — J32.0 MAXILLARY SINUSITIS, UNSPECIFIED CHRONICITY: ICD-10-CM

## 2022-07-28 RX ORDER — CETIRIZINE HYDROCHLORIDE 10 MG/1
10 TABLET ORAL DAILY
Qty: 90 TABLET | Refills: 1 | Status: SHIPPED | OUTPATIENT
Start: 2022-07-28 | End: 2022-08-16 | Stop reason: SDUPTHER

## 2022-07-28 NOTE — TELEPHONE ENCOUNTER
LV:2/23/22  LAV:  Upcoming Appt:7/29/22    Pt requesting cetirizine (ALLERGY RELIEF, CETIRIZINE,) 10 MG tablet     Last fill 2/8/22

## 2022-07-28 NOTE — TELEPHONE ENCOUNTER
No new care gaps identified.  [facility] Embedded Care Gaps. Reference number: [id number]. 7/28/2022   3:53:42 PM CDT

## 2022-07-29 ENCOUNTER — OFFICE VISIT (OUTPATIENT)
Dept: FAMILY MEDICINE | Facility: CLINIC | Age: 62
End: 2022-07-29
Payer: COMMERCIAL

## 2022-07-29 VITALS
HEART RATE: 84 BPM | OXYGEN SATURATION: 98 % | WEIGHT: 194.69 LBS | DIASTOLIC BLOOD PRESSURE: 81 MMHG | SYSTOLIC BLOOD PRESSURE: 121 MMHG | TEMPERATURE: 97 F | HEIGHT: 60 IN | BODY MASS INDEX: 38.22 KG/M2

## 2022-07-29 DIAGNOSIS — K59.00 CONSTIPATION, UNSPECIFIED CONSTIPATION TYPE: ICD-10-CM

## 2022-07-29 DIAGNOSIS — M54.50 ACUTE BILATERAL LOW BACK PAIN WITHOUT SCIATICA: Primary | ICD-10-CM

## 2022-07-29 DIAGNOSIS — K50.10 CROHN'S DISEASE OF LARGE INTESTINE WITHOUT COMPLICATION: ICD-10-CM

## 2022-07-29 PROCEDURE — 99999 PR PBB SHADOW E&M-EST. PATIENT-LVL V: CPT | Mod: PBBFAC,,, | Performed by: FAMILY MEDICINE

## 2022-07-29 PROCEDURE — 3008F BODY MASS INDEX DOCD: CPT | Mod: CPTII,S$GLB,, | Performed by: FAMILY MEDICINE

## 2022-07-29 PROCEDURE — 99214 PR OFFICE/OUTPT VISIT, EST, LEVL IV, 30-39 MIN: ICD-10-PCS | Mod: 25,S$GLB,, | Performed by: FAMILY MEDICINE

## 2022-07-29 PROCEDURE — 3074F PR MOST RECENT SYSTOLIC BLOOD PRESSURE < 130 MM HG: ICD-10-PCS | Mod: CPTII,S$GLB,, | Performed by: FAMILY MEDICINE

## 2022-07-29 PROCEDURE — 3079F PR MOST RECENT DIASTOLIC BLOOD PRESSURE 80-89 MM HG: ICD-10-PCS | Mod: CPTII,S$GLB,, | Performed by: FAMILY MEDICINE

## 2022-07-29 PROCEDURE — 96372 THER/PROPH/DIAG INJ SC/IM: CPT | Mod: S$GLB,,, | Performed by: FAMILY MEDICINE

## 2022-07-29 PROCEDURE — 1159F MED LIST DOCD IN RCRD: CPT | Mod: CPTII,S$GLB,, | Performed by: FAMILY MEDICINE

## 2022-07-29 PROCEDURE — 4010F PR ACE/ARB THEARPY RXD/TAKEN: ICD-10-PCS | Mod: CPTII,S$GLB,, | Performed by: FAMILY MEDICINE

## 2022-07-29 PROCEDURE — 99214 OFFICE O/P EST MOD 30 MIN: CPT | Mod: 25,S$GLB,, | Performed by: FAMILY MEDICINE

## 2022-07-29 PROCEDURE — 3074F SYST BP LT 130 MM HG: CPT | Mod: CPTII,S$GLB,, | Performed by: FAMILY MEDICINE

## 2022-07-29 PROCEDURE — 4010F ACE/ARB THERAPY RXD/TAKEN: CPT | Mod: CPTII,S$GLB,, | Performed by: FAMILY MEDICINE

## 2022-07-29 PROCEDURE — 96372 PR INJECTION,THERAP/PROPH/DIAG2ST, IM OR SUBCUT: ICD-10-PCS | Mod: S$GLB,,, | Performed by: FAMILY MEDICINE

## 2022-07-29 PROCEDURE — 3008F PR BODY MASS INDEX (BMI) DOCUMENTED: ICD-10-PCS | Mod: CPTII,S$GLB,, | Performed by: FAMILY MEDICINE

## 2022-07-29 PROCEDURE — 3079F DIAST BP 80-89 MM HG: CPT | Mod: CPTII,S$GLB,, | Performed by: FAMILY MEDICINE

## 2022-07-29 PROCEDURE — 1159F PR MEDICATION LIST DOCUMENTED IN MEDICAL RECORD: ICD-10-PCS | Mod: CPTII,S$GLB,, | Performed by: FAMILY MEDICINE

## 2022-07-29 PROCEDURE — 99999 PR PBB SHADOW E&M-EST. PATIENT-LVL V: ICD-10-PCS | Mod: PBBFAC,,, | Performed by: FAMILY MEDICINE

## 2022-07-29 RX ORDER — PREDNISONE 20 MG/1
TABLET ORAL
Qty: 10 TABLET | Refills: 0 | Status: SHIPPED | OUTPATIENT
Start: 2022-07-29 | End: 2022-08-23

## 2022-07-29 RX ORDER — BETAMETHASONE SODIUM PHOSPHATE AND BETAMETHASONE ACETATE 3; 3 MG/ML; MG/ML
9 INJECTION, SUSPENSION INTRA-ARTICULAR; INTRALESIONAL; INTRAMUSCULAR; SOFT TISSUE
Status: COMPLETED | OUTPATIENT
Start: 2022-07-29 | End: 2022-07-29

## 2022-07-29 RX ORDER — CYCLOBENZAPRINE HCL 10 MG
10 TABLET ORAL 3 TIMES DAILY PRN
Qty: 30 TABLET | Refills: 0 | Status: SHIPPED | OUTPATIENT
Start: 2022-07-29 | End: 2022-08-08

## 2022-07-29 RX ADMIN — BETAMETHASONE SODIUM PHOSPHATE AND BETAMETHASONE ACETATE 9 MG: 3; 3 INJECTION, SUSPENSION INTRA-ARTICULAR; INTRALESIONAL; INTRAMUSCULAR; SOFT TISSUE at 08:07

## 2022-07-29 NOTE — PROGRESS NOTES
CHIEF COMPLAINT:  This is a 62-year-old female complaining of lower back pain.    SUBJECTIVE:  Patient complains of a 4 day history of pain in the lower back bilaterally which is dull and achy in quality.  Patient rates the pain at 8/10 on the pain scale.  Pain is worse with movement.  She has difficulty getting in and out of the bed and feels lightheaded when she stands up.  She denies radiation into her lower extremities.  She denies numbness, tingling or weakness in limb.  Patient has had problems with her lower back in the past and has a history of lumbar degenerative disease.  However this pain is different in quality.  The patient has tried taking Flexeril with transient relief as well as Felix's back pills, Tylenol and ibuprofen.  She has used a heating pad.    Patient also complains of abdominal bloating, constipation and concern regarding flare-up of Crohn's disease.  She had 1 episode of emesis.  She increased prune intake and with elimination of bowel movement has had slight wheezing in lower back pain.  Patient denies blood in stool or melena.    ROS:  GENERAL: Patient denies fever, chills, night sweats.  Patient denies weight gain or loss. Patient denies anorexia, fatigue, weakness or swollen glands.  Positive for lightheadedness.  SKIN: Patient denies rash.  HEENT: Patient denies sore throat, ear pain, hearing loss, nasal congestion, or runny nose. Patient denies visual disturbance, eye irritation or discharge.  LUNGS: Patient denies cough, wheeze or hemoptysis.  CARDIOVASCULAR: Patient denies chest pain, shortness of breath, palpitations, syncope or lower extremity edema.  GI: Patient denies nausea, diarrhea, blood in stool or melena.  Positive for abdominal pain, vomiting and constipation.  GENITOURINARY: Patient denies pelvic pain, vaginal discharge, itch or odor. Patient denies irregular vaginal bleeding.  Patient denies dysuria, frequency, hematuria, nocturia, urgency or  incontinence.  MUSCULOSKELETAL: Patient denies joint pain, swelling, redness or warmth.  Positive for low back pain.  NEUROLOGIC: Patient denies headache, vertigo, paresthesias, weakness in limb, dysarthria, dysphagia or abnormality of gait.    OBJECTIVE:   GENERAL: Well-developed well-nourished obese female alert and oriented x3 in no acute distress.  Memory, judgment and cognition without deficit.  SKIN: Clear without rash.  Normal color and tone.  HEENT: Eyes: Clear conjunctivae.  No scleral icterus.    NECK: Supple, normal range of motion.  No JVD.  LUNGS: Clear to auscultation.  Normal respiratory effort.  CARDIOVASCULAR: Regular rhythm, normal S1, S2 without murmur, gallop or rub.  BACK: No CVA tenderness.  Slight lumbar spine tenderness and paraspinous muscle tenderness bilaterally.  Decreased range of motion in flexion and extension.  ABDOMEN: Normal appearance.  Active bowel sounds.  Soft, nontender without mass or organomegaly.  No rebound or guarding.  EXTREMITIES: Without cyanosis, clubbing or edema.  Distal pulses 2+ and equal.  Normal range of motion in all extremities.  No joint effusion, erythema or warmth.  Negative SLR.  Negative JAVAN.  NEUROLOGIC:  Motor strength equal bilaterally.  Sensation normal to touch.  Deep tendon reflexes 2+ and equal.  Gait without abnormality.  No tremor.    ASSESSMENT:  1. Acute bilateral low back pain without sciatica    2. Crohn's disease of large intestine without complication    3. Constipation, unspecified constipation type      PLAN:   1. Celestone 9 mg IM.  2. Short prednisone taper starting at 40 mg over 1 week.  3. May take ibuprofen 800 mg 3 times daily with food for 3-5 days.  4. Refill Flexeril 10 mg.  5. Apply moist heat and/or ice q.i.d. for 15-20 minutes.  6. Colon cleansing with MiraLax.  7. Anti constipation measures discussed.  8. Follow-up if no improvement or worsening symptoms.      30 minutes of total time spent on the encounter, which includes  face to face time and non-face to face time preparing to see the patient (eg, review of tests), Obtaining and/or reviewing separately obtained history, Documenting clinical information in the electronic or other health record, Independently interpreting results (not separately reported) and communicating results to the patient/family/caregiver, or Care coordination (not separately reported).     This note is generated with speech recognition software and is subject to transcription error and sound alike phrases that may be missed by proofreading.

## 2022-08-04 ENCOUNTER — HOSPITAL ENCOUNTER (OUTPATIENT)
Dept: CARDIOLOGY | Facility: HOSPITAL | Age: 62
Discharge: HOME | End: 2022-08-04
Attending: INTERNAL MEDICINE
Payer: COMMERCIAL

## 2022-08-04 ENCOUNTER — OFFICE VISIT (OUTPATIENT)
Dept: CARDIOLOGY | Facility: CLINIC | Age: 62
End: 2022-08-04
Payer: COMMERCIAL

## 2022-08-04 VITALS
SYSTOLIC BLOOD PRESSURE: 126 MMHG | OXYGEN SATURATION: 97 % | HEIGHT: 60 IN | WEIGHT: 198 LBS | DIASTOLIC BLOOD PRESSURE: 80 MMHG | BODY MASS INDEX: 38.87 KG/M2 | HEART RATE: 80 BPM

## 2022-08-04 DIAGNOSIS — I10 ESSENTIAL HYPERTENSION: ICD-10-CM

## 2022-08-04 DIAGNOSIS — E78.5 HYPERLIPIDEMIA, UNSPECIFIED HYPERLIPIDEMIA TYPE: Primary | ICD-10-CM

## 2022-08-04 DIAGNOSIS — E66.9 OBESITY (BMI 30-39.9): ICD-10-CM

## 2022-08-04 DIAGNOSIS — R55 SYNCOPE, UNSPECIFIED SYNCOPE TYPE: ICD-10-CM

## 2022-08-04 DIAGNOSIS — Z76.89 ESTABLISHING CARE WITH NEW DOCTOR, ENCOUNTER FOR: Primary | ICD-10-CM

## 2022-08-04 DIAGNOSIS — K50.10 CROHN'S DISEASE OF LARGE INTESTINE WITHOUT COMPLICATION: ICD-10-CM

## 2022-08-04 DIAGNOSIS — Z76.89 ESTABLISHING CARE WITH NEW DOCTOR, ENCOUNTER FOR: ICD-10-CM

## 2022-08-04 DIAGNOSIS — K52.9 IBD (INFLAMMATORY BOWEL DISEASE): ICD-10-CM

## 2022-08-04 PROCEDURE — 93010 ELECTROCARDIOGRAM REPORT: CPT | Mod: ,,, | Performed by: INTERNAL MEDICINE

## 2022-08-04 PROCEDURE — 99204 PR OFFICE/OUTPT VISIT, NEW, LEVL IV, 45-59 MIN: ICD-10-PCS | Mod: S$GLB,,, | Performed by: INTERNAL MEDICINE

## 2022-08-04 PROCEDURE — 93010 EKG 12-LEAD: ICD-10-PCS | Mod: ,,, | Performed by: INTERNAL MEDICINE

## 2022-08-04 PROCEDURE — 3074F SYST BP LT 130 MM HG: CPT | Mod: CPTII,S$GLB,, | Performed by: INTERNAL MEDICINE

## 2022-08-04 PROCEDURE — 3008F PR BODY MASS INDEX (BMI) DOCUMENTED: ICD-10-PCS | Mod: CPTII,S$GLB,, | Performed by: INTERNAL MEDICINE

## 2022-08-04 PROCEDURE — 3008F BODY MASS INDEX DOCD: CPT | Mod: CPTII,S$GLB,, | Performed by: INTERNAL MEDICINE

## 2022-08-04 PROCEDURE — 3079F DIAST BP 80-89 MM HG: CPT | Mod: CPTII,S$GLB,, | Performed by: INTERNAL MEDICINE

## 2022-08-04 PROCEDURE — 99999 PR PBB SHADOW E&M-EST. PATIENT-LVL V: CPT | Mod: PBBFAC,,, | Performed by: INTERNAL MEDICINE

## 2022-08-04 PROCEDURE — 99204 OFFICE O/P NEW MOD 45 MIN: CPT | Mod: S$GLB,,, | Performed by: INTERNAL MEDICINE

## 2022-08-04 PROCEDURE — 3074F PR MOST RECENT SYSTOLIC BLOOD PRESSURE < 130 MM HG: ICD-10-PCS | Mod: CPTII,S$GLB,, | Performed by: INTERNAL MEDICINE

## 2022-08-04 PROCEDURE — 93005 ELECTROCARDIOGRAM TRACING: CPT

## 2022-08-04 PROCEDURE — 1159F PR MEDICATION LIST DOCUMENTED IN MEDICAL RECORD: ICD-10-PCS | Mod: CPTII,S$GLB,, | Performed by: INTERNAL MEDICINE

## 2022-08-04 PROCEDURE — 4010F ACE/ARB THERAPY RXD/TAKEN: CPT | Mod: CPTII,S$GLB,, | Performed by: INTERNAL MEDICINE

## 2022-08-04 PROCEDURE — 4010F PR ACE/ARB THEARPY RXD/TAKEN: ICD-10-PCS | Mod: CPTII,S$GLB,, | Performed by: INTERNAL MEDICINE

## 2022-08-04 PROCEDURE — 3079F PR MOST RECENT DIASTOLIC BLOOD PRESSURE 80-89 MM HG: ICD-10-PCS | Mod: CPTII,S$GLB,, | Performed by: INTERNAL MEDICINE

## 2022-08-04 PROCEDURE — 1159F MED LIST DOCD IN RCRD: CPT | Mod: CPTII,S$GLB,, | Performed by: INTERNAL MEDICINE

## 2022-08-04 PROCEDURE — 99999 PR PBB SHADOW E&M-EST. PATIENT-LVL V: ICD-10-PCS | Mod: PBBFAC,,, | Performed by: INTERNAL MEDICINE

## 2022-08-04 RX ORDER — AMLODIPINE BESYLATE 5 MG/1
TABLET ORAL
Qty: 30 TABLET | Refills: 11 | Status: SHIPPED | OUTPATIENT
Start: 2022-08-04 | End: 2022-08-16 | Stop reason: DRUGHIGH

## 2022-08-04 RX ORDER — METOPROLOL SUCCINATE 25 MG/1
25 TABLET, EXTENDED RELEASE ORAL DAILY
Qty: 30 TABLET | Refills: 11 | Status: SHIPPED | OUTPATIENT
Start: 2022-08-04 | End: 2023-05-17

## 2022-08-04 NOTE — PROGRESS NOTES
Subjective:   Patient ID:  Angelica Flores is a 62 y.o. female who presents for evaluation of No chief complaint on file.      HPI  61 yo female, crohn's disease, htn, asthma, anxiety   She comes in for two episodes of syncope   Two months and a month ago ,   First episode happened while at festival, was hot, but states was hydrating all day and was not drinking any alcohol and was standing , she let the person next to her to know she was going to lose consciousness , for few seconds, no hospitalization, immediately felt better   Second [place], had two glasses of wine, stood up  To go home and also passed out shortly, normal afterwards, felt it happening,   Gets hot , started to feel fainting and flushed sensation   She had occular migraine 25 years ago with similar episode  No chest pain, no dyspnea, no leg swelling     Past Medical History:   Diagnosis Date    Anxiety     Asthma     Crohn's disease     Fibroids     Hyperlipidemia     Hypertension     Iritis     Migraine headache     Ocular    Osteopenia 08/2019    Syncope and collapse     Vitamin D deficiency disease        Past Surgical History:   Procedure Laterality Date    ANKLE FRACTURE SURGERY  08/19/08    right ankle    BREAST BIOPSY Left 1977    COLONOSCOPY N/A 8/11/2017    Procedure: COLONOSCOPY - REQUESTS DR. MATTI ACUNA;  Surgeon: Matti Acuna III, MD;  Location: [facility];  Service: Endoscopy;  Laterality: N/A;    COLONOSCOPY N/A 11/23/2020    Procedure: COLONOSCOPY;  Surgeon: Matti Acuna III, MD;  Location: [facility];  Service: Endoscopy;  Laterality: N/A;    TAHBSO  February 2011    Due to abnormal pap smear and fibroids    TOTAL ABDOMINAL HYSTERECTOMY         Social History     Tobacco Use    Smoking status: Never Smoker    Smokeless tobacco: Never Used   Substance Use Topics    Alcohol use: Yes     Alcohol/week: 0.0 standard drinks     Comment: ocassionally    Drug use: No       Family History    Problem Relation Age of Onset    Arthritis Mother     Fuch's dystrophy Mother     Breast cancer Mother     Lupus Sister     Rheum arthritis Sister     Obesity Sister     Hypertension Father     Cancer Maternal Grandfather         lung ca    Stroke Maternal Grandmother     Diabetes Maternal Grandmother     Colon cancer Paternal Aunt        Review of Systems   Constitutional: Negative for fever and malaise/fatigue.   HENT: Negative for sore throat.    Eyes: Negative for blurred vision.   Cardiovascular: Positive for syncope. Negative for chest pain, claudication, cyanosis, dyspnea on exertion, irregular heartbeat, leg swelling, near-syncope, orthopnea, palpitations and paroxysmal nocturnal dyspnea.   Respiratory: Negative for cough and hemoptysis.    Hematologic/Lymphatic: Negative for bleeding problem.   Skin: Negative for rash.   Musculoskeletal: Negative for falls.   Gastrointestinal: Negative for abdominal pain.   Genitourinary: Negative.    Psychiatric/Behavioral: Negative for altered mental status and substance abuse.       Current Outpatient Medications on File Prior to Visit   Medication Sig    albuterol (PROVENTIL/VENTOLIN HFA) 90 mcg/actuation inhaler Inhale 2 puffs into the lungs every 6 (six) hours as needed for Wheezing or Shortness of Breath.    ALPRAZolam (XANAX) 0.5 MG tablet Take 1 tablet (0.5 mg total) by mouth 2 (two) times daily as needed.    ascorbic acid, vitamin C, (VITAMIN C) 1000 MG tablet Take by mouth. 1 Tablet Oral Every day    azelastine (ASTELIN) 137 mcg (0.1 %) nasal spray 2 sprays (274 mcg total) by Nasal route 2 (two) times daily.    BACILLUS COAGULANS (PROBIOTIC, B. COAGULANS, ORAL) Take by mouth once daily.    cetirizine (ALLERGY RELIEF, CETIRIZINE,) 10 MG tablet Take 1 tablet (10 mg total) by mouth once daily.    cholecalciferol, vitamin D3, 2,000 unit Cap Take by mouth. 1 capsule Oral Every day    cyclobenzaprine (FLEXERIL) 10 MG tablet Take 1 tablet (10 mg  total) by mouth 3 (three) times daily as needed for Muscle spasms.    hydroCHLOROthiazide (HYDRODIURIL) 25 MG tablet TAKE 1 TABLET(25 MG) BY MOUTH EVERY DAY    ibuprofen (ADVIL,MOTRIN) 800 MG tablet Take 1 tablet (800 mg total) by mouth 2 (two) times daily as needed.    ipratropium (ATROVENT) 21 mcg (0.03 %) nasal spray 2 sprays by Nasal route 3 (three) times daily.    lisinopriL (PRINIVIL,ZESTRIL) 40 MG tablet TAKE 1 TABLET BY MOUTH DAILY. APPOINTMENT REQUIRED FOR FUTURE REFILLS    montelukast (SINGULAIR) 10 mg tablet Take 1 tablet (10 mg total) by mouth every evening.    multivitamin-Ca-iron-minerals 18-0.4 mg Tab Take by mouth. 1 Tablet Oral Every day    pravastatin (PRAVACHOL) 80 MG tablet Take 1 tablet (80 mg total) by mouth every evening.    predniSONE (DELTASONE) 20 MG tablet Take 2 tablets daily for 3 days, then 1 tablet daily for 3 days, then 1/2 tablet daily for 2 days.    valACYclovir (VALTREX) 1000 MG tablet Take 2 tablets (2,000 mg total) by mouth 2 (two) times daily. For 1 day per episode    zinc sulfate (ZINC-220 ORAL)     [DISCONTINUED] amLODIPine (NORVASC) 10 MG tablet TAKE 1 TABLET BY MOUTH EVERY DAY    mercaptopurine (PURINETHOL) 50 mg tablet Take 1 tablet (50 mg total) by mouth once daily.     No current facility-administered medications on file prior to visit.       Objective:   Objective:  Wt Readings from Last 3 Encounters:   08/04/22 89.8 kg (198 lb)   07/29/22 88.3 kg (194 lb 10.7 oz)   04/11/22 87.2 kg (192 lb 3.9 oz)     Temp Readings from Last 3 Encounters:   07/29/22 97 °F (36.1 °C) (Tympanic)   04/11/22 97.9 °F (36.6 °C) (Temporal)   02/23/22 97.4 °F (36.3 °C)     BP Readings from Last 3 Encounters:   08/04/22 126/80   07/29/22 121/81   04/11/22 113/83     Pulse Readings from Last 3 Encounters:   08/04/22 80   07/29/22 84   04/11/22 75       Physical Exam  Vitals reviewed.   Constitutional:       Appearance: She is well-developed.   HENT:      Head: Normocephalic and  atraumatic.   Eyes:      General: No scleral icterus.     Conjunctiva/sclera: Conjunctivae normal.   Cardiovascular:      Rate and Rhythm: Normal rate and regular rhythm.      Pulses: Intact distal pulses.      Heart sounds: Normal heart sounds. No murmur heard.  Pulmonary:      Effort: No respiratory distress.      Breath sounds: No wheezing or rales.   Chest:      Chest wall: No tenderness.   Abdominal:      General: Bowel sounds are normal. There is no distension.      Palpations: Abdomen is soft.      Tenderness: There is no guarding.   Musculoskeletal:         General: Normal range of motion.      Cervical back: Normal range of motion and neck supple.   Skin:     General: Skin is warm.   Neurological:      Mental Status: She is alert and oriented to person, place, and time.         Lab Results   Component Value Date    CHOL 147 08/11/2021    CHOL 190 07/31/2020    CHOL 190 07/31/2019     Lab Results   Component Value Date    HDL 50 08/11/2021    HDL 74 07/31/2020    HDL 73 07/31/2019     Lab Results   Component Value Date    LDLCALC 82.0 08/11/2021    LDLCALC 106.6 07/31/2020    LDLCALC 107.6 07/31/2019     Lab Results   Component Value Date    TRIG 75 08/11/2021    TRIG 47 07/31/2020    TRIG 47 07/31/2019     Lab Results   Component Value Date    CHOLHDL 34.0 08/11/2021    CHOLHDL 38.9 07/31/2020    CHOLHDL 38.4 07/31/2019       Chemistry        Component Value Date/Time     08/11/2021 0950    K 3.1 (L) 08/11/2021 0950     08/11/2021 0950    CO2 26 08/11/2021 0950    BUN 15 08/11/2021 0950    CREATININE 0.7 08/11/2021 0950    GLU 83 08/11/2021 0950        Component Value Date/Time    CALCIUM 9.3 08/11/2021 0950    ALKPHOS 66 08/11/2021 0950    AST 25 08/11/2021 0950    ALT 22 08/11/2021 0950    BILITOT 0.2 08/11/2021 0950    ESTGFRAFRICA >60.0 08/11/2021 0950    EGFRNONAA >60.0 08/11/2021 0950          Lab Results   Component Value Date    TSH 0.901 08/11/2021     Lab Results   Component Value Date     INR 0.9 08/18/2008     Lab Results   Component Value Date    WBC 6.61 08/11/2021    HGB 13.2 08/11/2021    HCT 40.8 08/11/2021    MCV 94 08/11/2021     08/11/2021     BNP  @LABRCNTIP(BNP,BNPTRIAGEBLO)@  CrCl cannot be calculated (Patient's most recent lab result is older than the maximum 7 days allowed.).     Imaging:  ======  No results found for this or any previous visit.    No results found for this or any previous visit.    No results found for this or any previous visit.    Results for orders placed in visit on 02/11/11    X-Ray Chest PA And Lateral    Narrative  DATE OF EXAM: Feb 23 2011    GEN   0012  -  CHEST PA & LAT:   1123 HOURS  [id number]    CLINICAL HISTORY:   V72.84 0 PREOP EXAMINATION NOS    PROCEDURE COMMENT:    ICD 9 CODE(S):   ()    CPT 4 CODE(S)/MODIFIER(S):   ()    RESULTS: COMPARISON - 11/2009.    NORMAL CHEST RADIOGRAPH.    IMPRESSION: NORMAL CHEST RADIOGRAPH.      : florentin  Transcribe Date/Time: Feb 23 2011  1:51P  Dictated by : JAYSHREE YORK MD  Read On: Feb 23 2011 11:25A  JAYSHREE YORK M.D.  [id number]  Images were reviewed, findings were verified and document was  electronically  SIGNED BY: JAYSHREE YORK MD On: Feb 24 2011  3:43P    No results found for this or any previous visit.    No valid procedures specified.    Diagnostic Results:  ECG: Reviewed    The 10-year ASCVD risk score (Ji ANGIE Jr., et al., 2013) is: 6.1%    Values used to calculate the score:      Age: 62 years      Sex: Female      Is Non- : Yes      Diabetic: No      Tobacco smoker: No      Systolic Blood Pressure: 126 mmHg      Is BP treated: Yes      HDL Cholesterol: 50 mg/dL      Total Cholesterol: 147 mg/dL    Assessment and Plan:   Hyperlipidemia, unspecified hyperlipidemia type    Syncope, unspecified syncope type  -     Ambulatory referral/consult to Cardiology  -     Holter monitor - 24 hour; Future  -     metoprolol succinate (TOPROL-XL) 25 MG 24 hr tablet;  Take 1 tablet (25 mg total) by mouth once daily.  Dispense: 30 tablet; Refill: 11    Essential hypertension  -     amLODIPine (NORVASC) 5 MG tablet; TAKE 1 TABLET BY MOUTH EVERY DAY  Dispense: 30 tablet; Refill: 11    Obesity (BMI 30-39.9)    IBD (inflammatory bowel disease)    Crohn's disease of large intestine without complication      Symptoms most likely vasovagal/orthostatic syncope.  Less likely to be arrhythmogenic.  Will give the Holter monitor.  Decrease amlodipine.  Add low-dose beta-blocker.  Discussed compression stockings.  Hydration.  Lower body exercise.  Reviewed all tests and above medical conditions with patient in detail and formulated treatment plan.  Risk factor modification discussed.   Cardiac low salt diet discussed.  Maintaining healthy weight and weight loss goals were discussed in clinic.    Follow up in 6 months

## 2022-08-12 DIAGNOSIS — B00.1 FEVER BLISTER: ICD-10-CM

## 2022-08-12 RX ORDER — VALACYCLOVIR HYDROCHLORIDE 1 G/1
TABLET, FILM COATED ORAL
Qty: 30 TABLET | Refills: 0 | Status: SHIPPED | OUTPATIENT
Start: 2022-08-12

## 2022-08-12 NOTE — TELEPHONE ENCOUNTER
No new care gaps identified.  [facility] Embedded Care Gaps. Reference number: [id number]. 8/12/2022   6:18:24 AM CDT

## 2022-08-12 NOTE — TELEPHONE ENCOUNTER
Refill Decision Note   Angelica Flores  is requesting a refill authorization.  Brief Assessment and Rationale for Refill:  Approve     Medication Therapy Plan:  Labs not required for PRN use.    Medication Reconciliation Completed: No   Comments:     No Care Gaps recommended.     Note composed:10:21 AM 08/12/2022

## 2022-08-16 ENCOUNTER — OFFICE VISIT (OUTPATIENT)
Dept: ALLERGY | Facility: CLINIC | Age: 62
End: 2022-08-16
Payer: COMMERCIAL

## 2022-08-16 VITALS
HEIGHT: 60 IN | WEIGHT: 196.44 LBS | BODY MASS INDEX: 38.56 KG/M2 | HEART RATE: 66 BPM | TEMPERATURE: 98 F | SYSTOLIC BLOOD PRESSURE: 113 MMHG | DIASTOLIC BLOOD PRESSURE: 80 MMHG

## 2022-08-16 DIAGNOSIS — J30.1 NON-SEASONAL ALLERGIC RHINITIS DUE TO POLLEN: ICD-10-CM

## 2022-08-16 DIAGNOSIS — J31.0 NON-ALLERGIC RHINITIS: Primary | ICD-10-CM

## 2022-08-16 PROCEDURE — 99999 PR PBB SHADOW E&M-EST. PATIENT-LVL V: CPT | Mod: PBBFAC,,, | Performed by: ALLERGY & IMMUNOLOGY

## 2022-08-16 PROCEDURE — 3079F DIAST BP 80-89 MM HG: CPT | Mod: CPTII,S$GLB,, | Performed by: ALLERGY & IMMUNOLOGY

## 2022-08-16 PROCEDURE — 1159F PR MEDICATION LIST DOCUMENTED IN MEDICAL RECORD: ICD-10-PCS | Mod: CPTII,S$GLB,, | Performed by: ALLERGY & IMMUNOLOGY

## 2022-08-16 PROCEDURE — 3079F PR MOST RECENT DIASTOLIC BLOOD PRESSURE 80-89 MM HG: ICD-10-PCS | Mod: CPTII,S$GLB,, | Performed by: ALLERGY & IMMUNOLOGY

## 2022-08-16 PROCEDURE — 3074F PR MOST RECENT SYSTOLIC BLOOD PRESSURE < 130 MM HG: ICD-10-PCS | Mod: CPTII,S$GLB,, | Performed by: ALLERGY & IMMUNOLOGY

## 2022-08-16 PROCEDURE — 3008F BODY MASS INDEX DOCD: CPT | Mod: CPTII,S$GLB,, | Performed by: ALLERGY & IMMUNOLOGY

## 2022-08-16 PROCEDURE — 99999 PR PBB SHADOW E&M-EST. PATIENT-LVL V: ICD-10-PCS | Mod: PBBFAC,,, | Performed by: ALLERGY & IMMUNOLOGY

## 2022-08-16 PROCEDURE — 1159F MED LIST DOCD IN RCRD: CPT | Mod: CPTII,S$GLB,, | Performed by: ALLERGY & IMMUNOLOGY

## 2022-08-16 PROCEDURE — 99214 PR OFFICE/OUTPT VISIT, EST, LEVL IV, 30-39 MIN: ICD-10-PCS | Mod: S$GLB,,, | Performed by: ALLERGY & IMMUNOLOGY

## 2022-08-16 PROCEDURE — 4010F PR ACE/ARB THEARPY RXD/TAKEN: ICD-10-PCS | Mod: CPTII,S$GLB,, | Performed by: ALLERGY & IMMUNOLOGY

## 2022-08-16 PROCEDURE — 4010F ACE/ARB THERAPY RXD/TAKEN: CPT | Mod: CPTII,S$GLB,, | Performed by: ALLERGY & IMMUNOLOGY

## 2022-08-16 PROCEDURE — 3008F PR BODY MASS INDEX (BMI) DOCUMENTED: ICD-10-PCS | Mod: CPTII,S$GLB,, | Performed by: ALLERGY & IMMUNOLOGY

## 2022-08-16 PROCEDURE — 3074F SYST BP LT 130 MM HG: CPT | Mod: CPTII,S$GLB,, | Performed by: ALLERGY & IMMUNOLOGY

## 2022-08-16 PROCEDURE — 99214 OFFICE O/P EST MOD 30 MIN: CPT | Mod: S$GLB,,, | Performed by: ALLERGY & IMMUNOLOGY

## 2022-08-16 RX ORDER — MONTELUKAST SODIUM 10 MG/1
10 TABLET ORAL NIGHTLY
Qty: 90 TABLET | Refills: 1 | Status: SHIPPED | OUTPATIENT
Start: 2022-08-16 | End: 2023-08-31 | Stop reason: SDUPTHER

## 2022-08-16 RX ORDER — CETIRIZINE HYDROCHLORIDE 10 MG/1
10 TABLET ORAL DAILY
Qty: 90 TABLET | Refills: 1 | Status: SHIPPED | OUTPATIENT
Start: 2022-08-16

## 2022-08-16 RX ORDER — AMLODIPINE BESYLATE 10 MG/1
5 TABLET ORAL DAILY
COMMUNITY
Start: 2022-08-07 | End: 2022-08-23 | Stop reason: SDUPTHER

## 2022-08-16 NOTE — PROGRESS NOTES
"Subjective:       Patient ID: Angelica Flores is a 62 y.o. female.    Chief Complaint:  Follow-up      HPI 4/11/2022: 61 year old female complaining of recurent sinus infections. 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   Sinus infections over the last year- 6- steroids and antibiotic      Albuterol- uses when in office building. Last used last week- trigger office building. NO oral steroids. NO night time cough.    Hoarse when she leaves the building. Runny Nose, nasal congestion, headache post nasal drip    She takes Zyrtec in the morning and Singular at night. Astelin nose spray. She takes benadryl prn.    March and September- "worse months".    Right neck- area of erythema- itching, macular lesion for one week. Rash occurred after being outside. She thinks she may have been bitten by an insect.    humidifier for nose bleeds      Crohn's disease- [facility]    HPI today:  62 year old female with non allergic rhinitis here for follow up. She reports doing well. She feels the nasal spray previously prescribed has alleviated her nasal symptoms.  She is requesting refills on Singulair and Cetirizine.      Past Medical History:   Diagnosis Date    Anxiety     Asthma     Crohn's disease     Fibroids     Hyperlipidemia     Hypertension     Iritis     Migraine headache     Ocular    Osteopenia 08/2019    Syncope and collapse     Vitamin D deficiency disease      Family History   Problem Relation Age of Onset    Arthritis Mother     Fuch's dystrophy Mother     Breast cancer Mother     Lupus Sister     Rheum arthritis Sister     Obesity Sister     Hypertension Father     Cancer Maternal Grandfather         lung ca    Stroke Maternal Grandmother     Diabetes Maternal Grandmother     Colon cancer Paternal Aunt      Current Outpatient Medications on File Prior to Visit   Medication Sig Dispense Refill    ALPRAZolam (XANAX) 0.5 MG tablet Take 1 tablet (0.5 mg total) by mouth 2 (two) times daily " as needed. 60 tablet 0    amLODIPine (NORVASC) 10 MG tablet Take 10 mg by mouth once daily.      ascorbic acid, vitamin C, (VITAMIN C) 1000 MG tablet Take by mouth. 1 Tablet Oral Every day      azelastine (ASTELIN) 137 mcg (0.1 %) nasal spray 2 sprays (274 mcg total) by Nasal route 2 (two) times daily. 90 mL 3    BACILLUS COAGULANS (PROBIOTIC, B. COAGULANS, ORAL) Take by mouth once daily.      cholecalciferol, vitamin D3, 2,000 unit Cap Take by mouth. 1 capsule Oral Every day      hydroCHLOROthiazide (HYDRODIURIL) 25 MG tablet TAKE 1 TABLET(25 MG) BY MOUTH EVERY DAY 90 tablet 1    ibuprofen (ADVIL,MOTRIN) 800 MG tablet Take 1 tablet (800 mg total) by mouth 2 (two) times daily as needed. 180 tablet 1    ipratropium (ATROVENT) 21 mcg (0.03 %) nasal spray 2 sprays by Nasal route 3 (three) times daily. 20 mL 5    lisinopriL (PRINIVIL,ZESTRIL) 40 MG tablet TAKE 1 TABLET BY MOUTH DAILY. APPOINTMENT REQUIRED FOR FUTURE REFILLS 90 tablet 1    metoprolol succinate (TOPROL-XL) 25 MG 24 hr tablet Take 1 tablet (25 mg total) by mouth once daily. 30 tablet 11    multivitamin-Ca-iron-minerals 18-0.4 mg Tab Take by mouth. 1 Tablet Oral Every day      pravastatin (PRAVACHOL) 80 MG tablet Take 1 tablet (80 mg total) by mouth every evening. 90 tablet 1    predniSONE (DELTASONE) 20 MG tablet Take 2 tablets daily for 3 days, then 1 tablet daily for 3 days, then 1/2 tablet daily for 2 days. 10 tablet 0    valACYclovir (VALTREX) 1000 MG tablet TAKE 2 TABLETS BY MOUTH TWICE DAILY FOR 1 DAY PER EPISODE 30 tablet 0    zinc sulfate (ZINC-220 ORAL)       [DISCONTINUED] cetirizine (ALLERGY RELIEF, CETIRIZINE,) 10 MG tablet Take 1 tablet (10 mg total) by mouth once daily. 90 tablet 1    [DISCONTINUED] montelukast (SINGULAIR) 10 mg tablet Take 1 tablet (10 mg total) by mouth every evening. 90 tablet 1    albuterol (PROVENTIL/VENTOLIN HFA) 90 mcg/actuation inhaler Inhale 2 puffs into the lungs every 6 (six) hours as needed for  Wheezing or Shortness of Breath. 18 g 2    mercaptopurine (PURINETHOL) 50 mg tablet Take 1 tablet (50 mg total) by mouth once daily. 90 tablet 4    [DISCONTINUED] amLODIPine (NORVASC) 5 MG tablet TAKE 1 TABLET BY MOUTH EVERY DAY 30 tablet 11     No current facility-administered medications on file prior to visit.     Review of patient's allergies indicates:  No Known Allergies    Environmental History: Pets in the home: none. She does not smoke. 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  Review of Systems   Constitutional: Negative for chills and fever.   HENT: Negative for congestion, rhinorrhea and voice change.    Eyes: Negative for discharge and itching.   Respiratory: Negative for cough, chest tightness, shortness of breath and wheezing.    Gastrointestinal: Negative for nausea and vomiting.   Skin: Negative for rash and wound.   Allergic/Immunologic: Negative for environmental allergies and food allergies.   Neurological: Negative for facial asymmetry, speech difficulty and headaches.   Hematological: Negative for adenopathy. Does not bruise/bleed easily.   Psychiatric/Behavioral: Negative for behavioral problems and suicidal ideas.        Objective:    Physical Exam  Vitals reviewed.   Constitutional:       General: She is not in acute distress.     Appearance: Normal appearance. She is well-developed. She is not ill-appearing, toxic-appearing or diaphoretic.   HENT:      Head: Normocephalic and atraumatic.      Right Ear: Tympanic membrane, ear canal and external ear normal. There is no impacted cerumen.      Left Ear: Tympanic membrane, ear canal and external ear normal. There is no impacted cerumen.      Nose: Nose normal. No congestion or rhinorrhea.      Mouth/Throat:      Mouth: Mucous membranes are moist.      Pharynx: No oropharyngeal exudate or posterior oropharyngeal erythema.   Eyes:      General: No scleral icterus.        Right eye: No discharge.         Left eye: No discharge.      Pupils: Pupils are  equal, round, and reactive to light.   Neck:      Thyroid: No thyromegaly.   Cardiovascular:      Rate and Rhythm: Normal rate and regular rhythm.      Heart sounds: Normal heart sounds. No murmur heard.    No friction rub. No gallop.   Pulmonary:      Effort: Pulmonary effort is normal. No respiratory distress.      Breath sounds: Normal breath sounds. No stridor. No wheezing, rhonchi or rales.   Chest:      Chest wall: No tenderness.   Abdominal:      General: Bowel sounds are normal. There is no distension.      Palpations: Abdomen is soft. There is no mass.      Tenderness: There is no abdominal tenderness. There is no guarding or rebound.      Hernia: No hernia is present.   Musculoskeletal:         General: No swelling, tenderness, deformity or signs of injury. Normal range of motion.      Cervical back: Normal range of motion and neck supple. No rigidity or tenderness. No muscular tenderness.      Right lower leg: No edema.      Left lower leg: No edema.   Lymphadenopathy:      Cervical: No cervical adenopathy.   Skin:     General: Skin is warm.      Coloration: Skin is not jaundiced or pale.      Findings: No bruising or erythema.   Neurological:      General: No focal deficit present.      Mental Status: She is alert and oriented to person, place, and time.      Motor: No weakness.      Gait: Gait normal.   Psychiatric:         Mood and Affect: Mood normal.         Behavior: Behavior normal.         Thought Content: Thought content normal.         Judgment: Judgment normal.       Specific IgE to allergens via the blood was negative.        Assessment:       1. Non-allergic rhinitis    2. Allergic rhinitis         Plan:       Non-allergic rhinitis  -     montelukast (SINGULAIR) 10 mg tablet; Take 1 tablet (10 mg total) by mouth every evening.  Dispense: 90 tablet; Refill: 1  -     cetirizine (ALLERGY RELIEF, CETIRIZINE,) 10 MG tablet; Take 1 tablet (10 mg total) by mouth once daily.  Dispense: 90 tablet;  Refill: 1    Allergic rhinitis  -     cetirizine (ALLERGY RELIEF, CETIRIZINE,) 10 MG tablet; Take 1 tablet (10 mg total) by mouth once daily.  Dispense: 90 tablet; Refill: 1    Continue current medications.  RTC 6 months  NGOC MORRISON spent a total of 30 minutes on the day of the visit.  This includes face to face time and non-face to face time preparing to see the patient (eg, review of tests), obtaining and/or reviewing separately obtained history, documenting clinical information in the electronic or other health record, independently interpreting results and communicating results to the patient/family/caregiver, or care coordinator.

## 2022-08-18 ENCOUNTER — HOSPITAL ENCOUNTER (OUTPATIENT)
Dept: CARDIOLOGY | Facility: HOSPITAL | Age: 62
Discharge: HOME | End: 2022-08-18
Attending: INTERNAL MEDICINE
Payer: COMMERCIAL

## 2022-08-18 DIAGNOSIS — R55 SYNCOPE, UNSPECIFIED SYNCOPE TYPE: ICD-10-CM

## 2022-08-18 PROCEDURE — 93226 XTRNL ECG REC<48 HR SCAN A/R: CPT

## 2022-08-18 PROCEDURE — 93227 HOLTER MONITOR - 24 HOUR (CUPID ONLY): ICD-10-PCS | Mod: ,,, | Performed by: INTERNAL MEDICINE

## 2022-08-18 PROCEDURE — 93227 XTRNL ECG REC<48 HR R&I: CPT | Mod: ,,, | Performed by: INTERNAL MEDICINE

## 2022-08-19 LAB
OHS CV EVENT MONITOR DAY: 0
OHS CV HOLTER LENGTH DECIMAL HOURS: 24
OHS CV HOLTER LENGTH HOURS: 24
OHS CV HOLTER LENGTH MINUTES: 0
OHS CV HOLTER SINUS AVERAGE HR: 63
OHS CV HOLTER SINUS MAX HR: 95
OHS CV HOLTER SINUS MIN HR: 44

## 2022-08-23 ENCOUNTER — OFFICE VISIT (OUTPATIENT)
Dept: FAMILY MEDICINE | Facility: CLINIC | Age: 62
End: 2022-08-23
Payer: COMMERCIAL

## 2022-08-23 ENCOUNTER — LAB VISIT (OUTPATIENT)
Dept: LAB | Facility: HOSPITAL | Age: 62
End: 2022-08-23
Attending: FAMILY MEDICINE
Payer: COMMERCIAL

## 2022-08-23 VITALS
DIASTOLIC BLOOD PRESSURE: 76 MMHG | HEIGHT: 60 IN | OXYGEN SATURATION: 97 % | SYSTOLIC BLOOD PRESSURE: 132 MMHG | RESPIRATION RATE: 18 BRPM | BODY MASS INDEX: 38.65 KG/M2 | TEMPERATURE: 98 F | HEART RATE: 73 BPM | WEIGHT: 196.88 LBS

## 2022-08-23 DIAGNOSIS — I10 ESSENTIAL HYPERTENSION: ICD-10-CM

## 2022-08-23 DIAGNOSIS — G43.909 MIGRAINE WITHOUT STATUS MIGRAINOSUS, NOT INTRACTABLE, UNSPECIFIED MIGRAINE TYPE: ICD-10-CM

## 2022-08-23 DIAGNOSIS — Z12.31 VISIT FOR SCREENING MAMMOGRAM: ICD-10-CM

## 2022-08-23 DIAGNOSIS — Z00.00 ANNUAL PHYSICAL EXAM: ICD-10-CM

## 2022-08-23 DIAGNOSIS — E78.5 HYPERLIPIDEMIA, UNSPECIFIED HYPERLIPIDEMIA TYPE: ICD-10-CM

## 2022-08-23 DIAGNOSIS — D84.821 DRUG-INDUCED IMMUNODEFICIENCY: ICD-10-CM

## 2022-08-23 DIAGNOSIS — J30.1 NON-SEASONAL ALLERGIC RHINITIS DUE TO POLLEN: ICD-10-CM

## 2022-08-23 DIAGNOSIS — E55.9 VITAMIN D DEFICIENCY: ICD-10-CM

## 2022-08-23 DIAGNOSIS — Z78.0 POSTMENOPAUSAL: ICD-10-CM

## 2022-08-23 DIAGNOSIS — K50.10 CROHN'S DISEASE OF LARGE INTESTINE WITHOUT COMPLICATION: ICD-10-CM

## 2022-08-23 DIAGNOSIS — Z79.899 DRUG-INDUCED IMMUNODEFICIENCY: ICD-10-CM

## 2022-08-23 DIAGNOSIS — E66.01 SEVERE OBESITY (BMI 35.0-39.9) WITH COMORBIDITY: ICD-10-CM

## 2022-08-23 LAB
ALBUMIN SERPL BCP-MCNC: 4.2 G/DL (ref 3.5–5.2)
ALP SERPL-CCNC: 78 U/L (ref 55–135)
ALT SERPL W/O P-5'-P-CCNC: 13 U/L (ref 10–44)
ANION GAP SERPL CALC-SCNC: 9 MMOL/L (ref 8–16)
AST SERPL-CCNC: 14 U/L (ref 10–40)
BASOPHILS # BLD AUTO: 0.05 K/UL (ref 0–0.2)
BASOPHILS NFR BLD: 0.4 % (ref 0–1.9)
BILIRUB SERPL-MCNC: 0.4 MG/DL (ref 0.1–1)
BILIRUB UR QL STRIP: NEGATIVE
BUN SERPL-MCNC: 17 MG/DL (ref 8–23)
CALCIUM SERPL-MCNC: 9.8 MG/DL (ref 8.7–10.5)
CHLORIDE SERPL-SCNC: 102 MMOL/L (ref 95–110)
CHOLEST SERPL-MCNC: 220 MG/DL (ref 120–199)
CHOLEST/HDLC SERPL: 3.5 {RATIO} (ref 2–5)
CLARITY UR REFRACT.AUTO: CLEAR
CO2 SERPL-SCNC: 29 MMOL/L (ref 23–29)
COLOR UR AUTO: YELLOW
CREAT SERPL-MCNC: 0.8 MG/DL (ref 0.5–1.4)
DIFFERENTIAL METHOD: ABNORMAL
EOSINOPHIL # BLD AUTO: 0.3 K/UL (ref 0–0.5)
EOSINOPHIL NFR BLD: 2.5 % (ref 0–8)
ERYTHROCYTE [DISTWIDTH] IN BLOOD BY AUTOMATED COUNT: 14.2 % (ref 11.5–14.5)
EST. GFR  (NO RACE VARIABLE): >60 ML/MIN/1.73 M^2
GLUCOSE SERPL-MCNC: 94 MG/DL (ref 70–110)
GLUCOSE UR QL STRIP: NEGATIVE
HCT VFR BLD AUTO: 40.5 % (ref 37–48.5)
HDLC SERPL-MCNC: 63 MG/DL (ref 40–75)
HDLC SERPL: 28.6 % (ref 20–50)
HGB BLD-MCNC: 12.7 G/DL (ref 12–16)
HGB UR QL STRIP: NEGATIVE
IMM GRANULOCYTES # BLD AUTO: 0.03 K/UL (ref 0–0.04)
IMM GRANULOCYTES NFR BLD AUTO: 0.2 % (ref 0–0.5)
KETONES UR QL STRIP: NEGATIVE
LDLC SERPL CALC-MCNC: 142.8 MG/DL (ref 63–159)
LEUKOCYTE ESTERASE UR QL STRIP: ABNORMAL
LYMPHOCYTES # BLD AUTO: 3.4 K/UL (ref 1–4.8)
LYMPHOCYTES NFR BLD: 27.4 % (ref 18–48)
MCH RBC QN AUTO: 30.5 PG (ref 27–31)
MCHC RBC AUTO-ENTMCNC: 31.4 G/DL (ref 32–36)
MCV RBC AUTO: 97 FL (ref 82–98)
MICROSCOPIC COMMENT: NORMAL
MONOCYTES # BLD AUTO: 0.8 K/UL (ref 0.3–1)
MONOCYTES NFR BLD: 6.3 % (ref 4–15)
NEUTROPHILS # BLD AUTO: 7.8 K/UL (ref 1.8–7.7)
NEUTROPHILS NFR BLD: 63.2 % (ref 38–73)
NITRITE UR QL STRIP: NEGATIVE
NONHDLC SERPL-MCNC: 157 MG/DL
NRBC BLD-RTO: 0 /100 WBC
PH UR STRIP: 7 [PH] (ref 5–8)
PLATELET # BLD AUTO: 389 K/UL (ref 150–450)
PMV BLD AUTO: 11.2 FL (ref 9.2–12.9)
POTASSIUM SERPL-SCNC: 3.3 MMOL/L (ref 3.5–5.1)
PROT SERPL-MCNC: 7.7 G/DL (ref 6–8.4)
PROT UR QL STRIP: NEGATIVE
RBC # BLD AUTO: 4.16 M/UL (ref 4–5.4)
RBC #/AREA URNS AUTO: 3 /HPF (ref 0–4)
SODIUM SERPL-SCNC: 140 MMOL/L (ref 136–145)
SP GR UR STRIP: 1.02 (ref 1–1.03)
SQUAMOUS #/AREA URNS AUTO: 0 /HPF
TRIGL SERPL-MCNC: 71 MG/DL (ref 30–150)
TSH SERPL DL<=0.005 MIU/L-ACNC: 0.77 UIU/ML (ref 0.4–4)
URN SPEC COLLECT METH UR: ABNORMAL
WBC # BLD AUTO: 12.32 K/UL (ref 3.9–12.7)
WBC #/AREA URNS AUTO: 4 /HPF (ref 0–5)

## 2022-08-23 PROCEDURE — 99396 PREV VISIT EST AGE 40-64: CPT | Mod: S$GLB,,, | Performed by: FAMILY MEDICINE

## 2022-08-23 PROCEDURE — 3075F SYST BP GE 130 - 139MM HG: CPT | Mod: CPTII,S$GLB,, | Performed by: FAMILY MEDICINE

## 2022-08-23 PROCEDURE — 87624 HPV HI-RISK TYP POOLED RSLT: CPT | Performed by: FAMILY MEDICINE

## 2022-08-23 PROCEDURE — 4010F PR ACE/ARB THEARPY RXD/TAKEN: ICD-10-PCS | Mod: CPTII,S$GLB,, | Performed by: FAMILY MEDICINE

## 2022-08-23 PROCEDURE — 99999 PR PBB SHADOW E&M-EST. PATIENT-LVL V: CPT | Mod: PBBFAC,,, | Performed by: FAMILY MEDICINE

## 2022-08-23 PROCEDURE — 3078F DIAST BP <80 MM HG: CPT | Mod: CPTII,S$GLB,, | Performed by: FAMILY MEDICINE

## 2022-08-23 PROCEDURE — 1159F MED LIST DOCD IN RCRD: CPT | Mod: CPTII,S$GLB,, | Performed by: FAMILY MEDICINE

## 2022-08-23 PROCEDURE — 1160F RVW MEDS BY RX/DR IN RCRD: CPT | Mod: CPTII,S$GLB,, | Performed by: FAMILY MEDICINE

## 2022-08-23 PROCEDURE — 36415 COLL VENOUS BLD VENIPUNCTURE: CPT | Mod: PO | Performed by: FAMILY MEDICINE

## 2022-08-23 PROCEDURE — 1159F PR MEDICATION LIST DOCUMENTED IN MEDICAL RECORD: ICD-10-PCS | Mod: CPTII,S$GLB,, | Performed by: FAMILY MEDICINE

## 2022-08-23 PROCEDURE — 80053 COMPREHEN METABOLIC PANEL: CPT | Performed by: FAMILY MEDICINE

## 2022-08-23 PROCEDURE — 85025 COMPLETE CBC W/AUTO DIFF WBC: CPT | Performed by: FAMILY MEDICINE

## 2022-08-23 PROCEDURE — 1160F PR REVIEW ALL MEDS BY PRESCRIBER/CLIN PHARMACIST DOCUMENTED: ICD-10-PCS | Mod: CPTII,S$GLB,, | Performed by: FAMILY MEDICINE

## 2022-08-23 PROCEDURE — 3075F PR MOST RECENT SYSTOLIC BLOOD PRESS GE 130-139MM HG: ICD-10-PCS | Mod: CPTII,S$GLB,, | Performed by: FAMILY MEDICINE

## 2022-08-23 PROCEDURE — 4010F ACE/ARB THERAPY RXD/TAKEN: CPT | Mod: CPTII,S$GLB,, | Performed by: FAMILY MEDICINE

## 2022-08-23 PROCEDURE — 3008F BODY MASS INDEX DOCD: CPT | Mod: CPTII,S$GLB,, | Performed by: FAMILY MEDICINE

## 2022-08-23 PROCEDURE — 3078F PR MOST RECENT DIASTOLIC BLOOD PRESSURE < 80 MM HG: ICD-10-PCS | Mod: CPTII,S$GLB,, | Performed by: FAMILY MEDICINE

## 2022-08-23 PROCEDURE — 88175 CYTOPATH C/V AUTO FLUID REDO: CPT | Performed by: FAMILY MEDICINE

## 2022-08-23 PROCEDURE — 3008F PR BODY MASS INDEX (BMI) DOCUMENTED: ICD-10-PCS | Mod: CPTII,S$GLB,, | Performed by: FAMILY MEDICINE

## 2022-08-23 PROCEDURE — 99999 PR PBB SHADOW E&M-EST. PATIENT-LVL V: ICD-10-PCS | Mod: PBBFAC,,, | Performed by: FAMILY MEDICINE

## 2022-08-23 PROCEDURE — 81001 URINALYSIS AUTO W/SCOPE: CPT | Performed by: FAMILY MEDICINE

## 2022-08-23 PROCEDURE — 99396 PR PREVENTIVE VISIT,EST,40-64: ICD-10-PCS | Mod: S$GLB,,, | Performed by: FAMILY MEDICINE

## 2022-08-23 PROCEDURE — 84443 ASSAY THYROID STIM HORMONE: CPT | Performed by: FAMILY MEDICINE

## 2022-08-23 PROCEDURE — 80061 LIPID PANEL: CPT | Performed by: FAMILY MEDICINE

## 2022-08-23 RX ORDER — AMLODIPINE BESYLATE 5 MG/1
5 TABLET ORAL DAILY
Qty: 90 TABLET | Refills: 1 | Status: SHIPPED | OUTPATIENT
Start: 2022-08-23 | End: 2023-05-17

## 2022-08-23 RX ORDER — LISINOPRIL 40 MG/1
TABLET ORAL
Qty: 90 TABLET | Refills: 1 | Status: SHIPPED | OUTPATIENT
Start: 2022-08-23 | End: 2023-02-02

## 2022-08-23 NOTE — PROGRESS NOTES
HISTORY OF PRESENT ILLNESS: Ms. Mcfadden comes in today not fasting but with taking medication for annual wellness examination. She reports no acute problems today.     END OF LIFE DECISION: She does not have a living will and states she does not desire life support. However, she states she is an organ donor.     Current Outpatient Medications   Medication Sig    albuterol (PROVENTIL/VENTOLIN HFA) 90 mcg/actuation inhaler Inhale 2 puffs into the lungs every 6 (six) hours as needed for Wheezing or Shortness of Breath.    ALPRAZolam (XANAX) 0.5 MG tablet Take 1 tablet (0.5 mg total) by mouth 2 (two) times daily as needed.    amLODIPine (NORVASC) 10 MG tablet Take 5 mg by mouth once daily.    ascorbic acid, vitamin C, (VITAMIN C) 1000 MG tablet Take by mouth. 1 Tablet Oral Every day    azelastine (ASTELIN) 137 mcg (0.1 %) nasal spray 2 sprays (274 mcg total) by Nasal route 2 (two) times daily.    BACILLUS COAGULANS (PROBIOTIC, B. COAGULANS, ORAL) Take by mouth once daily.    cetirizine (ALLERGY RELIEF, CETIRIZINE,) 10 MG tablet Take 1 tablet (10 mg total) by mouth once daily.    cholecalciferol, vitamin D3, 2,000 unit Cap Take by mouth. 1 capsule Oral Every day    hydroCHLOROthiazide (HYDRODIURIL) 25 MG tablet TAKE 1 TABLET(25 MG) BY MOUTH EVERY DAY    ibuprofen (ADVIL,MOTRIN) 800 MG tablet Take 1 tablet (800 mg total) by mouth 2 (two) times daily as needed.    ipratropium (ATROVENT) 21 mcg (0.03 %) nasal spray 2 sprays by Nasal route 3 (three) times daily.    lisinopriL (PRINIVIL,ZESTRIL) 40 MG tablet TAKE 1 TABLET BY MOUTH DAILY. APPOINTMENT REQUIRED FOR FUTURE REFILLS    mercaptopurine (PURINETHOL) 50 mg tablet Take 1 tablet (50 mg total) by mouth once daily.    metoprolol succinate (TOPROL-XL) 25 MG 24 hr tablet Take 1 tablet (25 mg total) by mouth once daily.    montelukast (SINGULAIR) 10 mg tablet Take 1 tablet (10 mg total) by mouth every evening.    multivitamin-Ca-iron-minerals 18-0.4 mg Tab Take  by mouth. 1 Tablet Oral Every day    pravastatin (PRAVACHOL) 80 MG tablet Take 1 tablet (80 mg total) by mouth every evening.    valACYclovir (VALTREX) 1000 MG tablet TAKE 2 TABLETS BY MOUTH TWICE DAILY FOR 1 DAY PER EPISODE    zinc sulfate (ZINC-220 ORAL)      SCREENINGS:   Cholesterol: August 11, 2021.   FFS/Colonoscopy: August 11, 2017 - Crohn's disease but November 23, 2020 - okay; repeat in 2 years.   Mammogram: August 24, 2021 - benign.   GYN Exam (breasts): August 11, 2021 - okay. July 31, 2019 pap smear - okay. Desires pap smear every 3 years (due 2022).   Dexa Scan: August 13, 2019 - osteopenia.   Eye Exam: November 2021 at [facility] per patient. She wears glasses.   PPD: Negative in the past.   Immunizations: Td/Tdap - May 5, 2016.   Gardasil - N./A.   Zostavax - Never. Reports history of varicella and zoster. She declines.   Shingrix - Reports had 1st dose in September 2019, then 2nd dose on November 16, 2019.   Pneumovax - Never.   Prevnar - Never.   Seasonal Flu - September 30, 2021.   COVID-19 (Pfizer) vaccine series - December 26, 2020, January 25, 2021, September 30, 2021, April 8, 2022.     ROS:   GENERAL: Denies fever, chills, fatigue or unusual weight change. Appetite normal. Weight 83.4 kg (183 lb 13.8 oz) at February 23, 2022 visit. Monitors diet but does not exercise.  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  SKIN: Denies rashes, itching, changes in mole, color or texture of skin or easy bruising.   HEAD: Denies headaches or recent head trauma.   EYES: Denies change in vision, pain, diplopia, redness or discharge.   EARS: Denies ear pain, discharge, vertigo or decreased hearing.   NOSE: Denies loss of smell, epistaxis or rhinitis today. Saw Dr. Elliott, allergist, on August 16, 2022 for allergies.  MOUTH & THROAT: Denies hoarseness or change in voice. Denies excessive gum bleeding or mouth sores. Denies sore throat.   NODES: Denies swollen glands.   CHEST: Denies MIKE, wheezing, cough, or sputum  production.   CARDIOVASCULAR: Denies chest pain, PND, orthopnea or reduced exercise tolerance. Denies palpitations. Saw Dr. Wharton, cardiologist, on August 4, 2022 for likely vasovagal/orthostatic syncope and advised to decrease Amlodipine 10 mg to 5 mg daily and added metoprolol succinate 25 mg daily with 6-month follow up advised.   ABDOMEN: Denies nausea, vomiting, diarrhea, abdominal pain, or blood in stool except reports intermittent constipation. Saw Dr. Wood, gastroenterologist, on December 11, 2020 for surveillance of Crohn's.   URINARY: Denies flank pain, dysuria or hematuria.   GENITOURINARY: Denies flank pain, dysuria, frequency or hematuria. Performs monthly breast self-examinations.   ENDOCRINE: Denies diabetes or thyroid problems.   HEME/LYMPH: Denies bleeding problems.   PERIPHERAL VASCULAR:Denies claudication or cyanosis.   MUSCULOSKELETAL: Denies joint stiffness, pain or swelling.  Reports now takes OTC potassium - 2 pills daily to help with leg cramping.  NEUROLOGIC: Denies history of seizures, tremors, paralysis, alteration of gait or coordination.   PSYCHIATRIC: Denies mood swings, uncontrolled depression or anxiety, homicidal or suicidal thoughts. Denies sleep problems.     PE:   VS: Blood Pressure 132/76 (BP Location: Right arm, Patient Position: Sitting, BP Method: Medium (Manual))   Pulse 73   Temperature 98.2 °F (36.8 °C)   Respiration 18   Height 5' (1.524 m)   Weight 89.3 kg (196 lb 13.9 oz)   Oxygen Saturation 97%   Body Mass Index 38.45 kg/m²   APPEARANCE: Well nourished, well developed female, obese and pleasant, alert and oriented in no acute distress.   HEAD: Nontender. Full range of motion.   EYES: PERRL, conjunctiva pink, lids no edema. She wears glasses.   EARS: External canal patent, no swelling or redness. TM's shiny and clear but congested at right TM.   NOSE: Mucosa and turbinates swollen. No discharge. Nontender sinuses.   THROAT: No pharyngeal erythema or exudate.  No stridor.   NECK: Supple, no mass, thyroid not enlarged.   NODES: No cervical, axillary or inguinal lymph node enlargement.   CHEST: Normal respiratory effort. Lungs clear to auscultation.   CARDIOVASCULAR: Normal S1, S2. No rubs, murmurs or gallops. PMI not displaced. No carotid bruit. Pedal pulses palpable bilaterally. No edema.   ABDOMEN: Bowel sounds present. Not distended. Soft. No tenderness, masses or organomegaly.   BREAST EXAM: Symmetrical, no external lesions, no discharge, no masses palpated.   PELVIC EXAM: No external lesions noted, no discharge, absent cervix and uterus. Bimanual exam showed no adnexal masses or tenderness. Urethra and bladder intact.  RECTAL EXAM: No external hemorrhoids or anal fissures. Heme-negative stool in the rectal vault.   MUSCULOSKELETAL: No joint deformities or stiffness. She is ambulatory without problems.   SKIN: No rashes or suspicious lesions, normal color and turgor.   NEUROLOGIC: Cranial Nerves: II-XII grossly intact. DTR's: Knees, Ankles 2+ and equal bilaterally. Gait & Posture: Normal gait and fine motion.   PSYCHIATRIC: Patient alert, oriented x 3. Mood/Affect normal without acute anxiety and depression noted. Judgment/insight good as she makes appropriate decisions on today's examination.     ASSESSMENT:    ICD-10-CM ICD-9-CM    1. Annual physical exam  Z00.00 V70.0 CBC Auto Differential      TSH      Urinalysis      Comprehensive Metabolic Panel      Lipid Panel      Liquid-Based Pap Smear, Screening      HPV High Risk Genotypes, PCR   2. Essential hypertension  I10 401.9 lisinopriL (PRINIVIL,ZESTRIL) 40 MG tablet      amLODIPine (NORVASC) 5 MG tablet   3. Hyperlipidemia, unspecified hyperlipidemia type  E78.5 272.4    4. Vitamin D deficiency  E55.9 268.9    5. Crohn's disease of large intestine without complication  K50.10 555.1 Ambulatory referral/consult to Endo Procedure    6. Drug-induced immunodeficiency  D84.821 279.3     Z79.899 E947.9    7.  Migraine without status migrainosus, not intractable, unspecified migraine type  G43.909 346.90    8. Non-seasonal allergic rhinitis due to pollen  J30.1 477.0    9. Severe obesity (BMI 35.0-39.9) with comorbidity  E66.01 278.01    10. Postmenopausal  Z78.0 V49.81    11. Visit for screening mammogram  Z12.31 V76.12 Mammo Digital Screening Bilat w/ Viktor     PLAN:  1. Age-appropriate counseling-appropriate low-sodium, low-cholesterol, low carbohydrate diet and exercise daily, monthly breast self exam, annual wellness examination.   2. Patient advised to call for results.  3. Continue current medications.  4. Prescription refills as noted above.  5. Keep follow up with specialists.  6. Flu shot this fall.  7. Follow up in about 6 months (around 2/23/2023) for hypertension follow up.

## 2022-08-24 ENCOUNTER — HOSPITAL ENCOUNTER (OUTPATIENT)
Dept: PREADMISSION TESTING | Facility: HOSPITAL | Age: 62
Discharge: HOME | End: 2022-08-24
Attending: FAMILY MEDICINE
Payer: COMMERCIAL

## 2022-08-24 DIAGNOSIS — K50.10 CROHN'S DISEASE OF LARGE INTESTINE WITHOUT COMPLICATION: Primary | ICD-10-CM

## 2022-08-24 RX ORDER — SODIUM, POTASSIUM,MAG SULFATES 17.5-3.13G
1 SOLUTION, RECONSTITUTED, ORAL ORAL DAILY
Qty: 1 KIT | Refills: 0 | Status: SHIPPED | OUTPATIENT
Start: 2022-08-24 | End: 2022-08-26

## 2022-08-29 LAB
CLINICAL INFO: NORMAL
CYTO CVX: NORMAL
CYTOLOGIST CVX/VAG CYTO: NORMAL
CYTOLOGIST CVX/VAG CYTO: NORMAL
CYTOLOGY CMNT CVX/VAG CYTO-IMP: NORMAL
CYTOLOGY PAP THIN PREP EXPLANATION: NORMAL
DATE OF PREVIOUS PAP: NORMAL
DATE PREVIOUS BX: NO
GEN CATEG CVX/VAG CYTO-IMP: NORMAL
HPV E6+E7 MRNA CVX QL NAA+PROBE: NOT DETECTED
LMP START DATE: NORMAL
MICROORGANISM CVX/VAG CYTO: NORMAL
PATHOLOGIST CVX/VAG CYTO: NORMAL
SERVICE CMNT-IMP: NORMAL
SPECIMEN SOURCE CVX/VAG CYTO: NORMAL
STAT OF ADQ CVX/VAG CYTO-IMP: NORMAL

## 2022-08-30 ENCOUNTER — HOSPITAL ENCOUNTER (OUTPATIENT)
Dept: RADIOLOGY | Facility: HOSPITAL | Age: 62
Discharge: HOME | End: 2022-08-30
Attending: FAMILY MEDICINE
Payer: COMMERCIAL

## 2022-08-30 DIAGNOSIS — Z12.31 VISIT FOR SCREENING MAMMOGRAM: ICD-10-CM

## 2022-08-30 PROCEDURE — 77063 BREAST TOMOSYNTHESIS BI: CPT | Mod: TC

## 2022-08-30 PROCEDURE — 77067 SCR MAMMO BI INCL CAD: CPT | Mod: TC

## 2022-08-30 PROCEDURE — 77063 BREAST TOMOSYNTHESIS BI: CPT | Mod: 26,,, | Performed by: RADIOLOGY

## 2022-08-30 PROCEDURE — 77067 SCR MAMMO BI INCL CAD: CPT | Mod: 26,,, | Performed by: RADIOLOGY

## 2022-08-30 PROCEDURE — 77063 MAMMO DIGITAL SCREENING BILAT WITH TOMO: ICD-10-PCS | Mod: 26,,, | Performed by: RADIOLOGY

## 2022-08-30 PROCEDURE — 77067 MAMMO DIGITAL SCREENING BILAT WITH TOMO: ICD-10-PCS | Mod: 26,,, | Performed by: RADIOLOGY

## 2022-09-26 NOTE — PROGRESS NOTES
Procedure instructions reviewed. Place, time, begin low fiber diet, clear liquids only on day before procedure no solid food at all, nothing to eat or drink after 2 am dose of prep on am of procedure, no chewing gum or sucking on candy, take BP medication with sip of water at least 2 hours after am dose of prep on am of procedure and inhaler if needed, have someone to drive them home and bowel prep instructions reviewed with pt. Pt verbalized understanding.

## 2022-09-28 ENCOUNTER — HOSPITAL ENCOUNTER (OUTPATIENT)
Facility: HOSPITAL | Age: 62
Discharge: HOME | End: 2022-09-28
Attending: INTERNAL MEDICINE | Admitting: INTERNAL MEDICINE
Payer: COMMERCIAL

## 2022-09-28 ENCOUNTER — PATIENT MESSAGE (OUTPATIENT)
Dept: GASTROENTEROLOGY | Facility: CLINIC | Age: 62
End: 2022-09-28
Payer: COMMERCIAL

## 2022-09-28 ENCOUNTER — ANESTHESIA EVENT (OUTPATIENT)
Dept: ENDOSCOPY | Facility: HOSPITAL | Age: 62
End: 2022-09-28
Payer: COMMERCIAL

## 2022-09-28 ENCOUNTER — ANESTHESIA (OUTPATIENT)
Dept: ENDOSCOPY | Facility: HOSPITAL | Age: 62
End: 2022-09-28
Payer: COMMERCIAL

## 2022-09-28 VITALS
DIASTOLIC BLOOD PRESSURE: 69 MMHG | HEIGHT: 60 IN | SYSTOLIC BLOOD PRESSURE: 100 MMHG | RESPIRATION RATE: 16 BRPM | TEMPERATURE: 97 F | BODY MASS INDEX: 38.28 KG/M2 | WEIGHT: 195 LBS | OXYGEN SATURATION: 98 % | HEART RATE: 48 BPM

## 2022-09-28 DIAGNOSIS — K50.90 CROHN'S DISEASE: ICD-10-CM

## 2022-09-28 DIAGNOSIS — K50.10 CROHN'S DISEASE OF LARGE INTESTINE WITHOUT COMPLICATION: Primary | ICD-10-CM

## 2022-09-28 PROCEDURE — 63600175 PHARM REV CODE 636 W HCPCS: Performed by: NURSE ANESTHETIST, CERTIFIED REGISTERED

## 2022-09-28 PROCEDURE — 45380 COLONOSCOPY AND BIOPSY: CPT | Performed by: INTERNAL MEDICINE

## 2022-09-28 PROCEDURE — 37000008 HC ANESTHESIA 1ST 15 MINUTES: Performed by: INTERNAL MEDICINE

## 2022-09-28 PROCEDURE — 88342 CHG IMMUNOCYTOCHEMISTRY: ICD-10-PCS | Mod: 26,,, | Performed by: PATHOLOGY

## 2022-09-28 PROCEDURE — 88305 TISSUE EXAM BY PATHOLOGIST: CPT | Mod: 26,,, | Performed by: PATHOLOGY

## 2022-09-28 PROCEDURE — 88342 IMHCHEM/IMCYTCHM 1ST ANTB: CPT | Mod: 26,,, | Performed by: PATHOLOGY

## 2022-09-28 PROCEDURE — 27201012 HC FORCEPS, HOT/COLD, DISP: Performed by: INTERNAL MEDICINE

## 2022-09-28 PROCEDURE — 88305 TISSUE EXAM BY PATHOLOGIST: CPT | Mod: 59 | Performed by: PATHOLOGY

## 2022-09-28 PROCEDURE — 88305 TISSUE EXAM BY PATHOLOGIST: ICD-10-PCS | Mod: 26,,, | Performed by: PATHOLOGY

## 2022-09-28 PROCEDURE — 37000009 HC ANESTHESIA EA ADD 15 MINS: Performed by: INTERNAL MEDICINE

## 2022-09-28 PROCEDURE — 45380 COLONOSCOPY AND BIOPSY: CPT | Mod: ,,, | Performed by: INTERNAL MEDICINE

## 2022-09-28 PROCEDURE — 88342 IMHCHEM/IMCYTCHM 1ST ANTB: CPT | Performed by: PATHOLOGY

## 2022-09-28 PROCEDURE — 45380 PR COLONOSCOPY,BIOPSY: ICD-10-PCS | Mod: ,,, | Performed by: INTERNAL MEDICINE

## 2022-09-28 RX ORDER — SODIUM CHLORIDE, SODIUM LACTATE, POTASSIUM CHLORIDE, CALCIUM CHLORIDE 600; 310; 30; 20 MG/100ML; MG/100ML; MG/100ML; MG/100ML
INJECTION, SOLUTION INTRAVENOUS CONTINUOUS
Status: DISCONTINUED | OUTPATIENT
Start: 2022-09-28 | End: 2022-09-28 | Stop reason: HOSPADM

## 2022-09-28 RX ORDER — PROPOFOL 10 MG/ML
VIAL (ML) INTRAVENOUS
Status: DISCONTINUED | OUTPATIENT
Start: 2022-09-28 | End: 2022-09-28

## 2022-09-28 RX ORDER — SODIUM CHLORIDE 0.9 % (FLUSH) 0.9 %
10 SYRINGE (ML) INJECTION
Status: DISCONTINUED | OUTPATIENT
Start: 2022-09-28 | End: 2022-09-28 | Stop reason: HOSPADM

## 2022-09-28 RX ADMIN — PROPOFOL 80 MG: 10 INJECTION, EMULSION INTRAVENOUS at 07:09

## 2022-09-28 RX ADMIN — SODIUM CHLORIDE, POTASSIUM CHLORIDE, SODIUM LACTATE AND CALCIUM CHLORIDE: 600; 310; 30; 20 INJECTION, SOLUTION INTRAVENOUS at 06:09

## 2022-09-28 NOTE — ANESTHESIA POSTPROCEDURE EVALUATION
Anesthesia Post Evaluation    Patient: Angelica Flores    Procedure(s) Performed: Procedure(s) (LRB):  COLONOSCOPY (N/A)    Final Anesthesia Type: MAC      Patient location during evaluation: GI PACU  Patient participation: Yes- Able to Participate  Level of consciousness: awake and alert and awake  Post-procedure vital signs: reviewed and stable  Pain management: adequate  Airway patency: patent  POLI mitigation strategies: Multimodal analgesia  PONV status at discharge: No PONV  Anesthetic complications: no      Cardiovascular status: blood pressure returned to baseline and hemodynamically stable  Respiratory status: unassisted and spontaneous ventilation  Hydration status: euvolemic  Follow-up not needed.          Vitals Value Taken Time   /67 09/28/22 0722   Temp 36.2 °C (97.2 °F) 09/28/22 0722   Pulse 54 09/28/22 0722   Resp 12 09/28/22 0722   SpO2 98 % 09/28/22 0722         No case tracking events are documented in the log.      Pain/Germán Score: Germán Score: 10 (9/28/2022  7:22 AM)

## 2022-09-28 NOTE — ANESTHESIA PREPROCEDURE EVALUATION
09/28/2022  Angelica Flores is a 62 y.o., female.    Pre-op Assessment    I have reviewed the Patient Summary Reports.    I have reviewed the Nursing Notes.       Review of Systems  Anesthesia Hx:  No problems with previous Anesthesia    Cardiovascular:   Exercise tolerance: good Hypertension  Denies Angina.    Pulmonary:   Asthma Denies Shortness of breath.  Denies Sleep Apnea.    Renal/:  Renal/ Normal     Hepatic/GI:  Hepatic/GI Normal    Neurological:   Headaches    Endocrine:  Endocrine Normal    Psych:   Psychiatric History          Physical Exam  General:  Well nourished      Airway/Jaw/Neck:  Airway Findings: Mouth Opening: Normal   Tongue: Normal   General Airway Assessment: Adult Mallampati: II  TM Distance: 4 - 6 cm   Jaw/Neck Findings:  Neck ROM: Normal ROM       Dental:  Dental Findings: In tact     Chest/Lungs:  Chest/Lungs Findings: Clear to auscultation, Normal Respiratory Rate      Heart/Vascular:  Heart Findings: Rate: Normal  Rhythm: Regular Rhythm  Sounds: Normal        Mental Status:  Mental Status Findings:  Cooperative, Alert and Oriented         Anesthesia Plan  Type of Anesthesia, risks & benefits discussed:  Anesthesia Type:  MAC    Patient's Preference:   Plan Factors:          Intra-op Monitoring Plan:   Intra-op Monitoring Plan Comments:   Post Op Pain Control Plan: per primary service following discharge from PACU  Post Op Pain Control Plan Comments:     Induction:   IV  Beta Blocker:         Informed Consent: Informed consent signed with the Patient and all parties understand the risks and agree with anesthesia plan.  All questions answered.  Anesthesia consent signed with patient.  ASA Score: 2     Day of Surgery Review of History & Physical: I have interviewed and examined the patient. I have reviewed the patient's H&P dated:  There are no significant changes.   H&P  completed by Anesthesiologist.         Ready For Surgery From Anesthesia Perspective.           Physical Exam  General: Well nourished    Airway:  Mallampati: II   Mouth Opening: Normal  TM Distance: 4 - 6 cm  Tongue: Normal  Neck ROM: Normal ROM    Dental:  In tact    Chest/Lungs:  Clear to auscultation, Normal Respiratory Rate    Heart:  Rate: Normal  Rhythm: Regular Rhythm  Sounds: Normal          Anesthesia Plan  Type of Anesthesia, risks & benefits discussed:    Anesthesia Type: MAC  Post Op Pain Control Plan: per primary service following discharge from PACU  Induction:  IV  Informed Consent: Informed consent signed with the Patient and all parties understand the risks and agree with anesthesia plan.  All questions answered.   ASA Score: 2  Day of Surgery Review of History & Physical: I have interviewed and examined the patient. I have reviewed the patient's H&P dated: H&P completed by Anesthesiologist.    Ready For Surgery From Anesthesia Perspective.       .

## 2022-09-28 NOTE — PROVATION PATIENT INSTRUCTIONS
Discharge Summary/Instructions after an Endoscopic Procedure  Patient Name: Angelica Flores  Patient MRN: [id number]  Patient YOB: 1960 Wednesday, September 28, 2022 Lalo Wood III, MD  Dear patient,  As a result of recent federal legislation (The Federal Cures Act), you may   receive lab or pathology results from your procedure in your [facility]   account before your physician is able to contact you. Your physician or   their representative will relay the results to you with their   recommendations at their soonest availability.  Thank you,  RESTRICTIONS:  During your procedure today, you received medications for sedation.  These   medications may affect your judgment, balance and coordination.  Therefore,   for 24 hours, you have the following restrictions:   - DO NOT drive a car, operate machinery, make legal/financial decisions,   sign important papers or drink alcohol.    ACTIVITY:  Today: no heavy lifting, straining or running due to procedural   sedation/anesthesia.  The following day: return to full activity including work.  DIET:  Eat and drink normally unless instructed otherwise.     TREATMENT FOR COMMON SIDE EFFECTS:  - Mild abdominal pain, nausea, belching, bloating or excessive gas:  rest,   eat lightly and use a heating pad.  - Sore Throat: treat with throat lozenges and/or gargle with warm salt   water.  - Because air was used during the procedure, expelling large amounts of air   from your rectum or belching is normal.  - If a bowel prep was taken, you may not have a bowel movement for 1-3 days.    This is normal.  SYMPTOMS TO WATCH FOR AND REPORT TO YOUR PHYSICIAN:  1. Abdominal pain or bloating, other than gas cramps.  2. Chest pain.  3. Back pain.  4. Signs of infection such as: chills or fever occurring within 24 hours   after the procedure.  5. Rectal bleeding, which would show as bright red, maroon, or black stools.   (A tablespoon of blood from the rectum is not serious, especially  if   hemorrhoids are present.)  6. Vomiting.  7. Weakness or dizziness.  GO DIRECTLY TO THE NEAREST EMERGENCY ROOM IF YOU HAVE ANY OF THE FOLLOWING:      Difficulty breathing              Chills and/or fever over 101 F   Persistent vomiting and/or vomiting blood   Severe abdominal pain   Severe chest pain   Black, tarry stools   Bleeding- more than one tablespoon   Any other symptom or condition that you feel may need urgent attention  Your doctor recommends these additional instructions:  If any biopsies were taken, your doctors clinic will contact you in 1 to 2   weeks with any results.  - Discharge patient to home (via wheelchair).   - Resume previous diet.   - Continue present medications.   - Await pathology results.   - Repeat colonoscopy in 3 years for surveillance based on pathology results.     - Return to GI clinic in 2 weeks.   - Discharge patient to home (via wheelchair).   - Resume previous diet.   - Continue present medications.   - Await pathology results.   - Repeat colonoscopy in 3 years for surveillance based on pathology results.     - Return to primary care physician as previously scheduled.   - Return to GI clinic in 2 weeks.  For questions, problems or results please call your physician Lalo Wood III, MD at Work:  [phone]  If you have any questions about the above instructions, call the GI   department at [phone] or call the endoscopy unit at [phone]   from 7am until 3 pm.  [facility] [place], EMERGENCY ROOM PHONE NUMBER:   [phone]  IF A COMPLICATION OR EMERGENCY SITUATION ARISES AND YOU ARE UNABLE TO REACH   YOUR PHYSICIAN - GO DIRECTLY TO THE EMERGENCY ROOM.  I have read or have had read to me these discharge instructions for my   procedure and have received a written copy.  I understand these   instructions and will follow-up with my physician if I have any questions.     __________________________________        _____________________________________  Nurse Signature                                          Patient/Designated   Responsible Party Signature  Lalo Wood III, MD  9/28/2022 7:36:38 AM  This report has been verified and signed electronically.  Dear patient,  As a result of recent federal legislation (The Federal Cures Act), you may   receive lab or pathology results from your procedure in your [facility]   account before your physician is able to contact you. Your physician or   their representative will relay the results to you with their   recommendations at their soonest availability.  Thank you,  PROVATION

## 2022-09-28 NOTE — DISCHARGE SUMMARY
[facility] - Endoscopy (Hospital)  Discharge Note  Short Stay    Procedure(s) (LRB):  COLONOSCOPY (N/A)    OUTCOME: Patient tolerated treatment/procedure well without complication and is now ready for discharge.    DISPOSITION: Home or Self Care    FINAL DIAGNOSIS:  Crohn's disease of the Colon    FOLLOWUP: In clinic    DISCHARGE INSTRUCTIONS:    Discharge Procedure Orders   Diet general     Activity as tolerated         Clinical Reference Documents Added to Patient Instructions         Document    COLONOSCOPY (ENGLISH)            TIME SPENT ON DISCHARGE: 14   minutes

## 2022-09-28 NOTE — H&P
Short Stay Endoscopy History and Physical    PCP - Monet Alvarez MD    Procedure - Colonoscopy  ASA - 3  Mallampati - per anesthesia  History of Anesthesia problems - no  Family history Anesthesia problems -  no     HPI:  This is a 62 y.o.female here for evaluation of : Crohn's disease    Reflux - no  Dysphagia - no  Abdominal pain - no  Diarrhea - no  Anemia - no  GI bleeding - no  Nausea and vomiting-no  Early satiety-no  aversion to sight or smell of food-no    ROS:  Constitutional: No fevers, chills, No weight loss  ENT: No allergies  CV: No chest pain  Pulm: No cough, No shortness of breath  Ophtho: No vision changes  GI: see HPI  Derm: No rash  Heme: No lymphadenopathy, No bruising  MSK: No arthritis  : No dysuria, No hematuria  Endo: No hot or cold intolerance  Neuro: No syncope, No seizure  Psych: No anxiety, No depression    Medical History:  Past Medical History:   Diagnosis Date    Anxiety     Asthma     Crohn's disease     Fibroids     Hyperlipidemia     Hypertension     Iritis     Migraine headache     Ocular    Osteopenia 08/2019    Syncope and collapse     Vitamin D deficiency disease        Surgical History:  Past Surgical History:   Procedure Laterality Date    ANKLE FRACTURE SURGERY  08/19/08    right ankle    BREAST BIOPSY Left 1977    COLONOSCOPY N/A 8/11/2017    Procedure: COLONOSCOPY - REQUESTS DR. MATTI ACUNA;  Surgeon: Matti Acuna III, MD;  Location: [facility];  Service: Endoscopy;  Laterality: N/A;    COLONOSCOPY N/A 11/23/2020    Procedure: COLONOSCOPY;  Surgeon: Matti Acuna III, MD;  Location: [facility];  Service: Endoscopy;  Laterality: N/A;    TAHBSO  February 2011    Due to abnormal pap smear and fibroids    TOTAL ABDOMINAL HYSTERECTOMY         Family History:  Family History   Problem Relation Age of Onset    Arthritis Mother     Fuch's dystrophy Mother     Breast cancer Mother     Lupus Sister     Rheum arthritis Sister     Obesity Sister     Hypertension Father      Cancer Maternal Grandfather         lung ca    Stroke Maternal Grandmother     Diabetes Maternal Grandmother     Colon cancer Paternal Aunt        Social History:  Social History     Socioeconomic History    Marital status:     Number of children: 2   Occupational History    Occupation: RN     Employer: [employer]     Comment: [employer]   Tobacco Use    Smoking status: Never    Smokeless tobacco: Never   Substance and Sexual Activity    Alcohol use: Yes     Alcohol/week: 0.0 standard drinks     Comment: ocassionally    Drug use: No    Sexual activity: Yes     Partners: Male     Birth control/protection: Surgical   Social History Narrative    She wears seatbelt.  July 22, 2017. She states she works new position at [employer].     Social Determinants of Health     Financial Resource Strain: Low Risk     Difficulty of Paying Living Expenses: Not hard at all   Food Insecurity: No Food Insecurity    Worried About Running Out of Food in the Last Year: Never true    Ran Out of Food in the Last Year: Never true   Transportation Needs: No Transportation Needs    Lack of Transportation (Medical): No    Lack of Transportation (Non-Medical): No   Physical Activity: Inactive    Days of Exercise per Week: 0 days    Minutes of Exercise per Session: 0 min   Stress: Stress Concern Present    Feeling of Stress : To some extent   Social Connections: Unknown    Frequency of Communication with Friends and Family: More than three times a week    Frequency of Social Gatherings with Friends and Family: Once a week    Active Member of Clubs or Organizations: Yes    Attends Club or Organization Meetings: More than 4 times per year    Marital Status:    Housing Stability: Low Risk     Unable to Pay for Housing in the Last Year: No    Number of Places Lived in the Last Year: 1    Unstable Housing in the Last Year: No       Allergies: Review of patient's allergies indicates:  No Known Allergies    Medications:   No current  facility-administered medications on file prior to encounter.     Current Outpatient Medications on File Prior to Encounter   Medication Sig Dispense Refill    albuterol (PROVENTIL/VENTOLIN HFA) 90 mcg/actuation inhaler Inhale 2 puffs into the lungs every 6 (six) hours as needed for Wheezing or Shortness of Breath. 18 g 2    ALPRAZolam (XANAX) 0.5 MG tablet Take 1 tablet (0.5 mg total) by mouth 2 (two) times daily as needed. 60 tablet 0    amLODIPine (NORVASC) 5 MG tablet Take 1 tablet (5 mg total) by mouth once daily. 90 tablet 1    ascorbic acid, vitamin C, (VITAMIN C) 1000 MG tablet Take by mouth. 1 Tablet Oral Every day      azelastine (ASTELIN) 137 mcg (0.1 %) nasal spray 2 sprays (274 mcg total) by Nasal route 2 (two) times daily. 90 mL 3    BACILLUS COAGULANS (PROBIOTIC, B. COAGULANS, ORAL) Take by mouth once daily.      cetirizine (ALLERGY RELIEF, CETIRIZINE,) 10 MG tablet Take 1 tablet (10 mg total) by mouth once daily. 90 tablet 1    cholecalciferol, vitamin D3, 2,000 unit Cap Take by mouth. 1 capsule Oral Every day      hydroCHLOROthiazide (HYDRODIURIL) 25 MG tablet TAKE 1 TABLET(25 MG) BY MOUTH EVERY DAY 90 tablet 1    ibuprofen (ADVIL,MOTRIN) 800 MG tablet Take 1 tablet (800 mg total) by mouth 2 (two) times daily as needed. 180 tablet 1    ipratropium (ATROVENT) 21 mcg (0.03 %) nasal spray 2 sprays by Nasal route 3 (three) times daily. 20 mL 5    lisinopriL (PRINIVIL,ZESTRIL) 40 MG tablet TAKE 1 TABLET BY MOUTH DAILY. APPOINTMENT REQUIRED FOR FUTURE REFILLS 90 tablet 1    mercaptopurine (PURINETHOL) 50 mg tablet Take 1 tablet (50 mg total) by mouth once daily. 90 tablet 4    metoprolol succinate (TOPROL-XL) 25 MG 24 hr tablet Take 1 tablet (25 mg total) by mouth once daily. 30 tablet 11    montelukast (SINGULAIR) 10 mg tablet Take 1 tablet (10 mg total) by mouth every evening. 90 tablet 1    multivitamin-Ca-iron-minerals 18-0.4 mg Tab Take by mouth. 1 Tablet Oral Every day      pravastatin (PRAVACHOL)  80 MG tablet Take 1 tablet (80 mg total) by mouth every evening. 90 tablet 1    valACYclovir (VALTREX) 1000 MG tablet TAKE 2 TABLETS BY MOUTH TWICE DAILY FOR 1 DAY PER EPISODE 30 tablet 0    zinc sulfate (ZINC-220 ORAL)          Objective Findings:    Vital Signs:There were no vitals filed for this visit.        Physical Exam:  General Appearance: Well appearing in no acute distress  Eyes:    No scleral icterus  ENT: Neck supple, Lips, mucosa, and tongue normal; teeth and gums normal  Lungs: CTA bilaterally in anterior and posterior fields, no wheezes, no crackles.  Heart:  Regular rate, S1, S2 normal, no murmurs heard.  Abdomen: Soft, non tender, non distended with normal bowel sounds. No hepatosplenomegaly, ascites, or mass.  Extremities: No clubbing, cyanosis or edema  Skin: No rash    Labs:  Reviewed    Plan: Colonoscopy  I have explained the risks and benefits of endoscopy procedures to the patient including but not limited to bleeding, perforation, infection, and death. The patient wishes to proceed.

## 2022-09-28 NOTE — TRANSFER OF CARE
Anesthesia Transfer of Care Note    Patient: Angelica Flores    Procedure(s) Performed: Procedure(s) (LRB):  COLONOSCOPY (N/A)    Patient location: GI    Anesthesia Type: MAC    Transport from OR: Transported from OR on room air with adequate spontaneous ventilation    Post pain: adequate analgesia    Post assessment: no apparent anesthetic complications and tolerated procedure well    Post vital signs: stable    Level of consciousness: awake and alert    Nausea/Vomiting: no nausea/vomiting    Complications: none    Transfer of care protocol was followed      Last vitals:   Visit Vitals  BP (!) 142/73   Pulse 61   Temp 36.3 °C (97.3 °F)   Resp 18   Ht 5' (1.524 m)   Wt 88.5 kg (195 lb)   SpO2 97%   Breastfeeding No   BMI 38.08 kg/m²

## 2022-10-03 LAB
FINAL PATHOLOGIC DIAGNOSIS: NORMAL
GROSS: NORMAL
Lab: NORMAL

## 2022-10-19 ENCOUNTER — OFFICE VISIT (OUTPATIENT)
Dept: GASTROENTEROLOGY | Facility: CLINIC | Age: 62
End: 2022-10-19
Payer: COMMERCIAL

## 2022-10-19 ENCOUNTER — TELEPHONE (OUTPATIENT)
Dept: GASTROENTEROLOGY | Facility: CLINIC | Age: 62
End: 2022-10-19
Payer: COMMERCIAL

## 2022-10-19 ENCOUNTER — LAB VISIT (OUTPATIENT)
Dept: LAB | Facility: HOSPITAL | Age: 62
End: 2022-10-19
Attending: NURSE PRACTITIONER
Payer: COMMERCIAL

## 2022-10-19 VITALS
DIASTOLIC BLOOD PRESSURE: 80 MMHG | WEIGHT: 205.69 LBS | HEIGHT: 60 IN | SYSTOLIC BLOOD PRESSURE: 114 MMHG | BODY MASS INDEX: 40.38 KG/M2 | HEART RATE: 52 BPM

## 2022-10-19 DIAGNOSIS — K50.10 CROHN'S DISEASE OF COLON WITHOUT COMPLICATION: Primary | ICD-10-CM

## 2022-10-19 DIAGNOSIS — K50.10 CROHN'S DISEASE OF COLON WITHOUT COMPLICATION: ICD-10-CM

## 2022-10-19 DIAGNOSIS — E55.9 VITAMIN D DEFICIENCY: ICD-10-CM

## 2022-10-19 LAB
ALBUMIN SERPL BCP-MCNC: 3.8 G/DL (ref 3.5–5.2)
ALP SERPL-CCNC: 76 U/L (ref 55–135)
ALT SERPL W/O P-5'-P-CCNC: 11 U/L (ref 10–44)
ANION GAP SERPL CALC-SCNC: 8 MMOL/L (ref 8–16)
AST SERPL-CCNC: 13 U/L (ref 10–40)
BASOPHILS # BLD AUTO: 0.05 K/UL (ref 0–0.2)
BASOPHILS NFR BLD: 0.5 % (ref 0–1.9)
BILIRUB SERPL-MCNC: 0.3 MG/DL (ref 0.1–1)
BUN SERPL-MCNC: 20 MG/DL (ref 8–23)
CALCIUM SERPL-MCNC: 9.1 MG/DL (ref 8.7–10.5)
CHLORIDE SERPL-SCNC: 103 MMOL/L (ref 95–110)
CO2 SERPL-SCNC: 25 MMOL/L (ref 23–29)
CREAT SERPL-MCNC: 0.7 MG/DL (ref 0.5–1.4)
CRP SERPL-MCNC: 15.7 MG/L (ref 0–8.2)
DIFFERENTIAL METHOD: ABNORMAL
EOSINOPHIL # BLD AUTO: 0.2 K/UL (ref 0–0.5)
EOSINOPHIL NFR BLD: 1.7 % (ref 0–8)
ERYTHROCYTE [DISTWIDTH] IN BLOOD BY AUTOMATED COUNT: 14.5 % (ref 11.5–14.5)
ERYTHROCYTE [SEDIMENTATION RATE] IN BLOOD BY PHOTOMETRIC METHOD: 42 MM/HR (ref 0–36)
EST. GFR  (NO RACE VARIABLE): >60 ML/MIN/1.73 M^2
GLUCOSE SERPL-MCNC: 80 MG/DL (ref 70–110)
HBV CORE AB SERPL QL IA: NORMAL
HBV SURFACE AG SERPL QL IA: NORMAL
HCT VFR BLD AUTO: 38.8 % (ref 37–48.5)
HGB BLD-MCNC: 11.9 G/DL (ref 12–16)
IMM GRANULOCYTES # BLD AUTO: 0.03 K/UL (ref 0–0.04)
IMM GRANULOCYTES NFR BLD AUTO: 0.3 % (ref 0–0.5)
LYMPHOCYTES # BLD AUTO: 2.4 K/UL (ref 1–4.8)
LYMPHOCYTES NFR BLD: 23 % (ref 18–48)
MCH RBC QN AUTO: 30.4 PG (ref 27–31)
MCHC RBC AUTO-ENTMCNC: 30.7 G/DL (ref 32–36)
MCV RBC AUTO: 99 FL (ref 82–98)
MONOCYTES # BLD AUTO: 0.7 K/UL (ref 0.3–1)
MONOCYTES NFR BLD: 6.6 % (ref 4–15)
NEUTROPHILS # BLD AUTO: 7.1 K/UL (ref 1.8–7.7)
NEUTROPHILS NFR BLD: 67.9 % (ref 38–73)
NRBC BLD-RTO: 0 /100 WBC
PLATELET # BLD AUTO: 386 K/UL (ref 150–450)
PMV BLD AUTO: 11.4 FL (ref 9.2–12.9)
POTASSIUM SERPL-SCNC: 3.6 MMOL/L (ref 3.5–5.1)
PROT SERPL-MCNC: 7.4 G/DL (ref 6–8.4)
RBC # BLD AUTO: 3.92 M/UL (ref 4–5.4)
SODIUM SERPL-SCNC: 136 MMOL/L (ref 136–145)
WBC # BLD AUTO: 10.44 K/UL (ref 3.9–12.7)

## 2022-10-19 PROCEDURE — 1159F PR MEDICATION LIST DOCUMENTED IN MEDICAL RECORD: ICD-10-PCS | Mod: CPTII,S$GLB,, | Performed by: NURSE PRACTITIONER

## 2022-10-19 PROCEDURE — 99215 OFFICE O/P EST HI 40 MIN: CPT | Mod: S$GLB,,, | Performed by: NURSE PRACTITIONER

## 2022-10-19 PROCEDURE — 1159F MED LIST DOCD IN RCRD: CPT | Mod: CPTII,S$GLB,, | Performed by: NURSE PRACTITIONER

## 2022-10-19 PROCEDURE — 1160F PR REVIEW ALL MEDS BY PRESCRIBER/CLIN PHARMACIST DOCUMENTED: ICD-10-PCS | Mod: CPTII,S$GLB,, | Performed by: NURSE PRACTITIONER

## 2022-10-19 PROCEDURE — 86480 TB TEST CELL IMMUN MEASURE: CPT | Performed by: NURSE PRACTITIONER

## 2022-10-19 PROCEDURE — 4010F PR ACE/ARB THEARPY RXD/TAKEN: ICD-10-PCS | Mod: CPTII,S$GLB,, | Performed by: NURSE PRACTITIONER

## 2022-10-19 PROCEDURE — 99999 PR PBB SHADOW E&M-EST. PATIENT-LVL V: ICD-10-PCS | Mod: PBBFAC,,, | Performed by: NURSE PRACTITIONER

## 2022-10-19 PROCEDURE — 80053 COMPREHEN METABOLIC PANEL: CPT | Performed by: NURSE PRACTITIONER

## 2022-10-19 PROCEDURE — 86704 HEP B CORE ANTIBODY TOTAL: CPT | Performed by: NURSE PRACTITIONER

## 2022-10-19 PROCEDURE — 4010F ACE/ARB THERAPY RXD/TAKEN: CPT | Mod: CPTII,S$GLB,, | Performed by: NURSE PRACTITIONER

## 2022-10-19 PROCEDURE — 86706 HEP B SURFACE ANTIBODY: CPT | Mod: 91 | Performed by: NURSE PRACTITIONER

## 2022-10-19 PROCEDURE — 99999 PR PBB SHADOW E&M-EST. PATIENT-LVL V: CPT | Mod: PBBFAC,,, | Performed by: NURSE PRACTITIONER

## 2022-10-19 PROCEDURE — 1160F RVW MEDS BY RX/DR IN RCRD: CPT | Mod: CPTII,S$GLB,, | Performed by: NURSE PRACTITIONER

## 2022-10-19 PROCEDURE — 3074F PR MOST RECENT SYSTOLIC BLOOD PRESSURE < 130 MM HG: ICD-10-PCS | Mod: CPTII,S$GLB,, | Performed by: NURSE PRACTITIONER

## 2022-10-19 PROCEDURE — 3074F SYST BP LT 130 MM HG: CPT | Mod: CPTII,S$GLB,, | Performed by: NURSE PRACTITIONER

## 2022-10-19 PROCEDURE — 3079F DIAST BP 80-89 MM HG: CPT | Mod: CPTII,S$GLB,, | Performed by: NURSE PRACTITIONER

## 2022-10-19 PROCEDURE — 85652 RBC SED RATE AUTOMATED: CPT | Performed by: NURSE PRACTITIONER

## 2022-10-19 PROCEDURE — 86140 C-REACTIVE PROTEIN: CPT | Performed by: NURSE PRACTITIONER

## 2022-10-19 PROCEDURE — 36415 COLL VENOUS BLD VENIPUNCTURE: CPT | Performed by: NURSE PRACTITIONER

## 2022-10-19 PROCEDURE — 3079F PR MOST RECENT DIASTOLIC BLOOD PRESSURE 80-89 MM HG: ICD-10-PCS | Mod: CPTII,S$GLB,, | Performed by: NURSE PRACTITIONER

## 2022-10-19 PROCEDURE — 85025 COMPLETE CBC W/AUTO DIFF WBC: CPT | Performed by: NURSE PRACTITIONER

## 2022-10-19 PROCEDURE — 87340 HEPATITIS B SURFACE AG IA: CPT | Performed by: NURSE PRACTITIONER

## 2022-10-19 PROCEDURE — 99215 PR OFFICE/OUTPT VISIT, EST, LEVL V, 40-54 MIN: ICD-10-PCS | Mod: S$GLB,,, | Performed by: NURSE PRACTITIONER

## 2022-10-19 RX ORDER — ADALIMUMAB 40MG/0.4ML
40 KIT SUBCUTANEOUS
Qty: 2 PEN | Refills: 11 | Status: SHIPPED | OUTPATIENT
Start: 2022-10-19 | End: 2022-12-29

## 2022-10-19 RX ORDER — AZATHIOPRINE 50 MG/1
100 TABLET ORAL DAILY
Qty: 60 TABLET | Refills: 5 | Status: SHIPPED | OUTPATIENT
Start: 2022-10-19 | End: 2022-12-29

## 2022-10-19 RX ORDER — ADALIMUMAB 80MG/0.8ML
KIT SUBCUTANEOUS
Qty: 3 PEN | Refills: 0 | Status: SHIPPED | OUTPATIENT
Start: 2022-10-19 | End: 2022-12-29

## 2022-10-19 NOTE — TELEPHONE ENCOUNTER
----- Message from Aletha Asif sent at 10/19/2022 12:57 PM CDT -----  Contact: [facility]  Request a return call concerning a drug interaction on two of the pt meds, no additional info given and can be reached at [phone]///[id number]

## 2022-10-19 NOTE — PROGRESS NOTES
Clinic Follow Up:  [facility] Follow Up Note    Reason for Follow Up:  The primary encounter diagnosis was Crohn's disease of colon without complication. A diagnosis of Vitamin D deficiency was also pertinent to this visit.    PCP: Monet Alvarez       HPI:  This is a 62 y.o. female here for follow up of Crohn's disease.     IBD History  - Type: crohn's disease  - Disease Location: colon  - Amount of colon involvement (for Crohn's Disease only): More than 1/3 of the colon is involved   - Phenotype:  inflammatory  - Diagnosed: 25 years (?)  - Surgeries related to IBD: none   - Extra-intestinal Manifestations: joint pain(s)-- left shoulder only    Current Medications  Mercaptopurine 50 mg daily, started 10-12 years ago    Past Medications  Unknown other PO medication     Drug and Antibody Levels   2020- Pro-predict thiopurine metabolites 6TG low normal at 233 (230-400)    Endoscopy Reports  2008- patchy inflammation (descending and ascending)  2012- normal   2015- scattered inflammation (proximal transverse)   2017- patchy inflammation (transverse, ascending, cecum)  2020- normal  9/2022- patchy inflammation (hepatic flexure, ascending, cecum)- on 6-MP    Pertinent Imagine Reports:  NA    Interval History  She had a recent colonoscopy that showed active Crohn's disease in colon only. She follows her individual elimination diet. She has had recent issue with constipation but has resolved.     Bowel movement Hx:   Stool frequency:  3 times per day (in the morning-- incomplete evacuation)  Stool consistency: soft and formed. Occasionally loose.   Abdominal pain: yes-- sometimes has mild cramping   Hematochezia: no    Preventative Medicine    Immunizations  - Influenza: 10/18/22  - Pnemococcal:  PCV 20: none (PCV-13: none; PSV-23 none)  - Hepatitis A/B: not immune to Hep B  - Herpes Zoster: 11/16/19, 8/1/19  - COVID-19: 4/8/22, 9/30/21, 1/25/21, 1/4/21    Cancer Prevention   - Date of last pap smear:  NA- hyst   - Date of last skin cancer screening: due, recommend yearly   - Date of last surveillance colonoscopy: 2022    Bone Health  - Vitamin D level: 67-- on supplement.   - Date of last DEXA: NA    Therapy Related Testing  - Date of last TB testing: 10/19/22- negative   - TPMT status: normal     Miscellaneous  - Vitamin B 12 level (if ileal disease or resection): ?  - Smoking status: no  - NSAID use: yes-- ibuprofen 800 mg (for the last 3 days). She usually does not take them.   - History of C. Diff: no  - Family planning: hyst     Recent Labs  Lab Results   Component Value Date    WBC 10.44 10/19/2022    HGB 11.9 (L) 10/19/2022    HCT 38.8 10/19/2022     10/19/2022    ALT 11 10/19/2022    AST 13 10/19/2022    BUN 20 10/19/2022    CREATININE 0.7 10/19/2022     10/19/2022    K 3.6 10/19/2022    GLU 80 10/19/2022     Lab Results   Component Value Date    CRP 15.7 (H) 10/19/2022    SEDRATE 42 (H) 10/19/2022     Lab Results   Component Value Date    HEPBSAB 5.69 10/19/2022    HEPBSAB Non-reactive 10/19/2022    HEPBSAG Non-reactive 10/19/2022    HEPBCAB Non-reactive 10/19/2022    HEPAIGG Negative 01/24/2020     Lab Results   Component Value Date    ELIJLSZS40WI 67 08/11/2021    NLLEXVQM19 533 06/24/2009     Lab Results   Component Value Date    TBGOLDPLUS Negative 10/19/2022         Review of Systems   Constitutional:  Negative for activity change and appetite change.        As per interval history above   Respiratory:  Negative for cough and shortness of breath.    Cardiovascular:  Negative for chest pain.   Gastrointestinal:  Positive for abdominal pain and diarrhea. Negative for anal bleeding, blood in stool, constipation, nausea, rectal pain and vomiting.   Skin:  Negative for color change and rash.     Medical History:  Past Medical History:   Diagnosis Date    Anxiety     Asthma     Crohn's disease     Fibroids     Hyperlipidemia     Hypertension     Iritis     Migraine headache     Ocular     Osteopenia 08/2019    Syncope and collapse     Vitamin D deficiency disease        Surgical History:   Past Surgical History:   Procedure Laterality Date    ANKLE FRACTURE SURGERY  08/19/08    right ankle    BREAST BIOPSY Left 1977    COLONOSCOPY N/A 8/11/2017    Procedure: COLONOSCOPY - REQUESTS DR. MATTI ACUNA;  Surgeon: Matti Acuna III, MD;  Location: [facility] ENDO;  Service: Endoscopy;  Laterality: N/A;    COLONOSCOPY N/A 11/23/2020    Procedure: COLONOSCOPY;  Surgeon: Matti Acuna III, MD;  Location: [facility] ENDO;  Service: Endoscopy;  Laterality: N/A;    COLONOSCOPY N/A 9/28/2022    Procedure: COLONOSCOPY;  Surgeon: Matti Acuna III, MD;  Location: [facility] ENDO;  Service: Endoscopy;  Laterality: N/A;    TAHBSO  February 2011    Due to abnormal pap smear and fibroids    TOTAL ABDOMINAL HYSTERECTOMY         Family History:   Family History   Problem Relation Age of Onset    Arthritis Mother     Fuch's dystrophy Mother     Breast cancer Mother     Lupus Sister     Rheum arthritis Sister     Obesity Sister     Hypertension Father     Cancer Maternal Grandfather         lung ca    Stroke Maternal Grandmother     Diabetes Maternal Grandmother     Colon cancer Paternal Aunt        Social History:   Social History     Tobacco Use    Smoking status: Never    Smokeless tobacco: Never   Substance Use Topics    Alcohol use: Yes     Alcohol/week: 0.0 standard drinks     Comment: ocassionally    Drug use: No       Allergies: Review of patient's allergies indicates:  No Known Allergies    Home Medications:  Current Outpatient Medications on File Prior to Visit   Medication Sig Dispense Refill    ALPRAZolam (XANAX) 0.5 MG tablet Take 1 tablet by mouth twice daily as needed 60 tablet 0    amLODIPine (NORVASC) 5 MG tablet Take 1 tablet (5 mg total) by mouth once daily. 90 tablet 1    ascorbic acid, vitamin C, (VITAMIN C) 1000 MG tablet Take by mouth. 1 Tablet Oral Every day      azelastine (ASTELIN) 137 mcg (0.1 %) nasal  spray 2 sprays (274 mcg total) by Nasal route 2 (two) times daily. 90 mL 3    BACILLUS COAGULANS (PROBIOTIC, B. COAGULANS, ORAL) Take by mouth once daily.      cetirizine (ALLERGY RELIEF, CETIRIZINE,) 10 MG tablet Take 1 tablet (10 mg total) by mouth once daily. 90 tablet 1    cholecalciferol, vitamin D3, 2,000 unit Cap Take by mouth. 1 capsule Oral Every day      hydroCHLOROthiazide (HYDRODIURIL) 25 MG tablet TAKE 1 TABLET(25 MG) BY MOUTH EVERY DAY 90 tablet 1    ibuprofen (ADVIL,MOTRIN) 800 MG tablet Take 1 tablet (800 mg total) by mouth 2 (two) times daily as needed. 180 tablet 1    ipratropium (ATROVENT) 21 mcg (0.03 %) nasal spray 2 sprays by Nasal route 3 (three) times daily. 20 mL 5    lisinopriL (PRINIVIL,ZESTRIL) 40 MG tablet TAKE 1 TABLET BY MOUTH DAILY. APPOINTMENT REQUIRED FOR FUTURE REFILLS 90 tablet 1    metoprolol succinate (TOPROL-XL) 25 MG 24 hr tablet Take 1 tablet (25 mg total) by mouth once daily. 30 tablet 11    montelukast (SINGULAIR) 10 mg tablet Take 1 tablet (10 mg total) by mouth every evening. 90 tablet 1    multivitamin-Ca-iron-minerals 18-0.4 mg Tab Take by mouth. 1 Tablet Oral Every day      potassium bicarbonate disintegrating tablet Take 10 mEq by mouth 2 (two) times daily.      pravastatin (PRAVACHOL) 80 MG tablet Take 1 tablet (80 mg total) by mouth every evening. 90 tablet 1    valACYclovir (VALTREX) 1000 MG tablet TAKE 2 TABLETS BY MOUTH TWICE DAILY FOR 1 DAY PER EPISODE 30 tablet 0    zinc sulfate (ZINC-220 ORAL)       albuterol (PROVENTIL/VENTOLIN HFA) 90 mcg/actuation inhaler Inhale 2 puffs into the lungs every 6 (six) hours as needed for Wheezing or Shortness of Breath. 18 g 2     No current facility-administered medications on file prior to visit.       /80 (BP Location: Left arm, Patient Position: Sitting, BP Method: Large (Automatic))   Pulse (!) 52   Ht 5' (1.524 m)   Wt 93.3 kg (205 lb 11 oz)   BMI 40.17 kg/m²   Body mass index is 40.17 kg/m².  Physical  Exam  Constitutional:       General: She is not in acute distress.  HENT:      Head: Normocephalic and atraumatic.   Eyes:      General: No scleral icterus.     Conjunctiva/sclera: Conjunctivae normal.   Cardiovascular:      Rate and Rhythm: Normal rate and regular rhythm.      Heart sounds: No murmur heard.  Pulmonary:      Effort: Pulmonary effort is normal. No respiratory distress.      Breath sounds: Normal breath sounds. No wheezing.   Abdominal:      General: Abdomen is flat. Bowel sounds are normal.      Palpations: Abdomen is soft.      Tenderness: There is no abdominal tenderness.   Skin:     General: Skin is warm and dry.   Neurological:      General: No focal deficit present.      Mental Status: She is alert and oriented to person, place, and time.      Cranial Nerves: No cranial nerve deficit.   Psychiatric:         Mood and Affect: Mood normal.         Judgment: Judgment normal.       Labs: Pertinent labs reviewed.    Assessment:   1. Crohn's disease of colon without complication    2. Vitamin D deficiency      Patient with Crohn's disease not in remission on 6-MP therapy. This was evidenced on colonoscopy. She is biologic naive.      Recommendations:   - start Humira at standard dosing  - change mercaptopurine to AZA for immunogenicity benefits.   - need TB and Hep B serology prior to starting.   - Check Humira levels in 12 weeks.   - Will need CBC/CMP every 3 months while on treatment to monitor for drug toxicity.  Standing orders placed.  - counseled on risks and benefits of medications.    Performed independent interpretation of tests completed by another healthcare professional.   - Discussed management and/or test interpretation with another healthcare professional. IBD RN    Health Maintenance Discussed  - Vitamin D supplement: continue Vitamin D3 supplement-- Vitamin D level is at goal.   - Vaccines due: PCV 20- can get anytime in the pharmacy  - DEXA scan: per primary   - Skin cancer screening  with dermatologist: due, recommend yearly. Referral placed.   - Sun exposure precautions: Stay in the shade, especially during midday. Wear clothing to protect exposed skin. Wear a hat with a wide brim to shade the face, head, ears, and neck. Wear sunglasses to protect your eyes. Use sunscreen with at least SPF 30 before you go outside.   - Cervical cancer screening with GYN: vane (August 2022)   - Screening colonoscopy: up to date   - eye exam: due in December.     Crohn's disease of colon without complication  -     Ambulatory referral/consult to Dermatology; Future; Expected date: 10/26/2022  -     adalimumab (HUMIRA,CF, PEN CROHNS-UC-HS) 80 mg/0.8 mL PnKt; Inject 1.6 mLs (160 mg total) into the skin on day 1 then 0.8 mLs (80 mg total) on day 15.  Dispense: 3 pen; Refill: 0  -     adalimumab (HUMIRA,CF, PEN) 40 mg/0.4 mL PnKt; Inject 0.4 mLs (40 mg total) into the skin every 14 (fourteen) days.  Dispense: 2 pen; Refill: 11  -     azaTHIOprine (IMURAN) 50 mg Tab; Take 2 tablets (100 mg total) by mouth once daily.  Dispense: 60 tablet; Refill: 5  -     CBC Auto Differential; Standing  -     Comprehensive Metabolic Panel; Standing  -     C-Reactive Protein; Future; Expected date: 10/19/2022  -     Sedimentation rate; Future; Expected date: 10/19/2022  -     Hepatitis B Core Antibody, Total; Future; Expected date: 10/19/2022  -     Hepatitis B Surface Ab, Qualitative; Future; Expected date: 10/19/2022  -     Hepatitis B Surface Antigen; Future; Expected date: 10/19/2022  -     Quantiferon Gold TB; Future; Expected date: 10/19/2022  -     ADALIMUMAB CONCENTRATION AND ANTI-ADALIMUMAB ANTIBODY (SERIA); Future; Expected date: 10/19/2022  -     Calprotectin, Stool; Future; Expected date: 10/19/2022    Vitamin D deficiency    Other orders  -     Pneumococcal Conjugate Vaccine (20 Valent) (IM); Future; Expected date: 10/19/2022    Return to Clinic:  Follow up in about 3 months (around 1/19/2023).    Thank you for the  opportunity to participate in the care of this patient.  BK Del Toro

## 2022-10-19 NOTE — PATIENT INSTRUCTIONS
Health Maintenance Discussed  - Vitamin D supplement: continue Vitamin D3 supplement-- Vitamin D level is at goal.   - Vaccines due: PCV 20- can get anytime in the pharmacy  - DEXA scan: per primary   - Skin cancer screening with dermatologist: due, recommend yearly. Referral placed.   - Sun exposure precautions: Stay in the shade, especially during midday. Wear clothing to protect exposed skin. Wear a hat with a wide brim to shade the face, head, ears, and neck. Wear sunglasses to protect your eyes. Use sunscreen with at least SPF 30 before you go outside.   - Cervical cancer screening with GYN: hy (2022)   - Screening colonoscopy: up to date   - eye exam: due in December.     Adalimumab (Humira): Biologics - Anti-TNF Agent  - Mechanism of action:  humira blocks TNF alpha which plays a role in the inflammatory process for inflammatory bowel disease  - Labs: we have or will be checking labs to determine the safety of taking this medication including baseline blood counts, liver and kidney function tests, TB test and viral hepatitis testing    Humira Dosage:  - Loading Dose (non-citrate free): 160 mg Subcutaneous on week 0 (4 pens or pre-filled syringes), 80 mg SC on week 2 (2 pens or prefilled syringes)   - Maintenance Dosin mg SC every other week (1 pen or prefilled syringe)    Risks of Humira:  Stop therapy due to adverse event= 10% (10/100)    Please call us immediately if you develop any of the below problems    Allergic reactions  - <2% (2/100) develop injection site reactions  - RARE- hives (rash), difficulty breathing, chest pain/tightness, high or low blood pressure, swelling of the face and hands, fever or chills    Serious Infections= 3% (3/100)  fever, tiredness, flu, open sores, warm red painful skin    Blood Disorders  fever that doesn't go away, bruising, bleeding, severe paleness    Non-Hodgkins Lymphoma=0.06% (6/10,000)    Drug related lupus-like reaction= 1% (1/100)  chest discomfort  or pain that does not go away, shortness of breath, joint pain, rash on the cheeks or arms that gets worse in the sun    Psoriasis  new or worsening red scaly patches or raised bumps on the skin that are filled with pus     Case Reports Only: Multiple Sclerosis and Guillain Prather Syndrome  Numbness/weakness/tingling of your hands and feet, changes in your vision or seizures    Case Reports Only: New or worsening Congestive Heart Failure  shortness of breath, swelling in your ankles or feet, sudden weight gain    Case Reports Only: Serious Liver Injury  jaundice (yellow skin or eyes), dark brown urine, right-sided abdominal pain, fever, severe itchiness    AVOID LIVE VACCINES WHICH INCLUDE:   --Intranasal Influenza A/B  --Measles, Mumps, Rubella (MMR)  --Rotavirus  --Typhoid (oral)  --Vaccina (Smallpox)  --Varicella (Chicken Pox)  --Yellow Fever  --Zoster

## 2022-10-20 LAB
HBV SURFACE AB SER-ACNC: 5.69 MIU/ML
HBV SURFACE AB SER-ACNC: NORMAL M[IU]/ML

## 2022-10-21 ENCOUNTER — LAB VISIT (OUTPATIENT)
Dept: LAB | Facility: HOSPITAL | Age: 62
End: 2022-10-21
Payer: COMMERCIAL

## 2022-10-21 ENCOUNTER — PATIENT MESSAGE (OUTPATIENT)
Dept: GASTROENTEROLOGY | Facility: CLINIC | Age: 62
End: 2022-10-21
Payer: COMMERCIAL

## 2022-10-21 DIAGNOSIS — K50.10 CROHN'S DISEASE OF COLON WITHOUT COMPLICATION: ICD-10-CM

## 2022-10-21 PROBLEM — K52.9 IBD (INFLAMMATORY BOWEL DISEASE): Status: RESOLVED | Noted: 2020-11-23 | Resolved: 2022-10-21

## 2022-10-21 PROBLEM — K50.90 CROHN'S DISEASE: Status: RESOLVED | Noted: 2017-08-11 | Resolved: 2022-10-21

## 2022-10-21 LAB
GAMMA INTERFERON BACKGROUND BLD IA-ACNC: 0.08 IU/ML
M TB IFN-G CD4+ BCKGRND COR BLD-ACNC: -0.01 IU/ML
MITOGEN IGNF BCKGRD COR BLD-ACNC: 3.76 IU/ML
TB GOLD PLUS: NEGATIVE
TB2 - NIL: 0 IU/ML

## 2022-10-21 PROCEDURE — 83993 ASSAY FOR CALPROTECTIN FECAL: CPT | Performed by: NURSE PRACTITIONER

## 2022-10-24 ENCOUNTER — SPECIALTY PHARMACY (OUTPATIENT)
Dept: PHARMACY | Facility: CLINIC | Age: 62
End: 2022-10-24
Payer: COMMERCIAL

## 2022-10-24 ENCOUNTER — PATIENT MESSAGE (OUTPATIENT)
Dept: GASTROENTEROLOGY | Facility: CLINIC | Age: 62
End: 2022-10-24
Payer: COMMERCIAL

## 2022-10-24 DIAGNOSIS — Z23 NEED FOR HEPATITIS B VACCINATION: Primary | ICD-10-CM

## 2022-10-24 NOTE — TELEPHONE ENCOUNTER
[facility] OON. Patient must fill at [facility] ([phone])    -Patient informed via telephone   -Will transfer Rx and close out at [facility].

## 2022-10-24 NOTE — TELEPHONE ENCOUNTER
Flor Morales, this is Lynne Chase with [facility].  We are working on your prescription that your doctor has sent us. We will be working with your insurance to get this approved for you. We will be calling you along the way with updates on your medication.  If you have any questions, you can reach us at [phone].    Welcome call outcome: Left voicemail    Did Pa over the phone due to FEP not supported by CMM.    -PA approved from 10/24/2022 to 10/24/2023

## 2022-10-26 LAB — CALPROTECTIN STL-MCNT: 151.6 MCG/G

## 2022-10-28 ENCOUNTER — PATIENT MESSAGE (OUTPATIENT)
Dept: GASTROENTEROLOGY | Facility: CLINIC | Age: 62
End: 2022-10-28
Payer: COMMERCIAL

## 2022-10-28 DIAGNOSIS — I10 ESSENTIAL HYPERTENSION: ICD-10-CM

## 2022-10-28 RX ORDER — HYDROCHLOROTHIAZIDE 25 MG/1
25 TABLET ORAL DAILY
Qty: 90 TABLET | Refills: 1 | Status: SHIPPED | OUTPATIENT
Start: 2022-10-28 | End: 2023-02-06 | Stop reason: SDUPTHER

## 2022-10-28 NOTE — TELEPHONE ENCOUNTER
No new care gaps identified.  [facility] Embedded Care Gaps. Reference number: [id number]. 10/28/2022   4:09:30 PM CDT

## 2022-11-15 ENCOUNTER — PATIENT MESSAGE (OUTPATIENT)
Dept: GASTROENTEROLOGY | Facility: CLINIC | Age: 62
End: 2022-11-15
Payer: COMMERCIAL

## 2022-11-22 ENCOUNTER — LAB VISIT (OUTPATIENT)
Dept: LAB | Facility: HOSPITAL | Age: 62
End: 2022-11-22
Attending: NURSE PRACTITIONER
Payer: COMMERCIAL

## 2022-11-22 DIAGNOSIS — K50.10 CROHN'S DISEASE OF COLON WITHOUT COMPLICATION: ICD-10-CM

## 2022-11-22 PROCEDURE — 85025 COMPLETE CBC W/AUTO DIFF WBC: CPT | Performed by: NURSE PRACTITIONER

## 2022-11-22 PROCEDURE — 36415 COLL VENOUS BLD VENIPUNCTURE: CPT | Performed by: NURSE PRACTITIONER

## 2022-11-22 PROCEDURE — 80053 COMPREHEN METABOLIC PANEL: CPT | Performed by: NURSE PRACTITIONER

## 2022-11-23 ENCOUNTER — PATIENT MESSAGE (OUTPATIENT)
Dept: GASTROENTEROLOGY | Facility: CLINIC | Age: 62
End: 2022-11-23
Payer: COMMERCIAL

## 2022-11-23 ENCOUNTER — TELEPHONE (OUTPATIENT)
Dept: FAMILY MEDICINE | Facility: CLINIC | Age: 62
End: 2022-11-23
Payer: COMMERCIAL

## 2022-11-23 ENCOUNTER — PATIENT MESSAGE (OUTPATIENT)
Dept: FAMILY MEDICINE | Facility: CLINIC | Age: 62
End: 2022-11-23
Payer: COMMERCIAL

## 2022-11-23 LAB
ALBUMIN SERPL BCP-MCNC: 4.2 G/DL (ref 3.5–5.2)
ALP SERPL-CCNC: 53 U/L (ref 55–135)
ALT SERPL W/O P-5'-P-CCNC: 64 U/L (ref 10–44)
ANION GAP SERPL CALC-SCNC: 9 MMOL/L (ref 8–16)
AST SERPL-CCNC: 40 U/L (ref 10–40)
BASOPHILS # BLD AUTO: 0.05 K/UL (ref 0–0.2)
BASOPHILS NFR BLD: 0.6 % (ref 0–1.9)
BILIRUB SERPL-MCNC: 0.5 MG/DL (ref 0.1–1)
BUN SERPL-MCNC: 16 MG/DL (ref 8–23)
CALCIUM SERPL-MCNC: 9.8 MG/DL (ref 8.7–10.5)
CHLORIDE SERPL-SCNC: 99 MMOL/L (ref 95–110)
CO2 SERPL-SCNC: 30 MMOL/L (ref 23–29)
CREAT SERPL-MCNC: 0.8 MG/DL (ref 0.5–1.4)
DIFFERENTIAL METHOD: ABNORMAL
EOSINOPHIL # BLD AUTO: 0.3 K/UL (ref 0–0.5)
EOSINOPHIL NFR BLD: 2.8 % (ref 0–8)
ERYTHROCYTE [DISTWIDTH] IN BLOOD BY AUTOMATED COUNT: 14.2 % (ref 11.5–14.5)
EST. GFR  (NO RACE VARIABLE): >60 ML/MIN/1.73 M^2
GLUCOSE SERPL-MCNC: 98 MG/DL (ref 70–110)
HCT VFR BLD AUTO: 40.1 % (ref 37–48.5)
HGB BLD-MCNC: 12.2 G/DL (ref 12–16)
IMM GRANULOCYTES # BLD AUTO: 0.02 K/UL (ref 0–0.04)
IMM GRANULOCYTES NFR BLD AUTO: 0.2 % (ref 0–0.5)
LYMPHOCYTES # BLD AUTO: 2.9 K/UL (ref 1–4.8)
LYMPHOCYTES NFR BLD: 32.4 % (ref 18–48)
MCH RBC QN AUTO: 31.1 PG (ref 27–31)
MCHC RBC AUTO-ENTMCNC: 30.4 G/DL (ref 32–36)
MCV RBC AUTO: 102 FL (ref 82–98)
MONOCYTES # BLD AUTO: 0.6 K/UL (ref 0.3–1)
MONOCYTES NFR BLD: 6.3 % (ref 4–15)
NEUTROPHILS # BLD AUTO: 5.2 K/UL (ref 1.8–7.7)
NEUTROPHILS NFR BLD: 57.7 % (ref 38–73)
NRBC BLD-RTO: 0 /100 WBC
PLATELET # BLD AUTO: 430 K/UL (ref 150–450)
PMV BLD AUTO: 11 FL (ref 9.2–12.9)
POTASSIUM SERPL-SCNC: 3.7 MMOL/L (ref 3.5–5.1)
PROT SERPL-MCNC: 7.8 G/DL (ref 6–8.4)
RBC # BLD AUTO: 3.92 M/UL (ref 4–5.4)
SODIUM SERPL-SCNC: 138 MMOL/L (ref 136–145)
WBC # BLD AUTO: 8.92 K/UL (ref 3.9–12.7)

## 2022-11-23 RX ORDER — DICYCLOMINE HYDROCHLORIDE 20 MG/1
20 TABLET ORAL 3 TIMES DAILY PRN
Qty: 90 TABLET | Refills: 0 | Status: SHIPPED | OUTPATIENT
Start: 2022-11-23

## 2022-11-23 RX ORDER — DOXYCYCLINE HYCLATE 100 MG
100 TABLET ORAL EVERY 12 HOURS
Qty: 20 TABLET | Refills: 0 | Status: SHIPPED | OUTPATIENT
Start: 2022-11-23 | End: 2022-12-03

## 2022-11-23 NOTE — TELEPHONE ENCOUNTER
Good morning, I tried to get an appointment to be seen yesterday. You were booked and no other provider at [facility] either. I was getting bloodwork drawn at the [facility] and waited 1 hour to be told that Lizabeth Laguna NP could not see me when they returned from lunch. I checked myself for COVID and it was negative. I have had laryngitis for 3 days and frontal sinus headache on and off. I am have a low grade temp when not otc cold meds. My sputum is now yellowish green . [facility] told me to go to urgent care. Can I get some doxycycline?  Please advise.    Per [facility].

## 2022-12-07 ENCOUNTER — PATIENT MESSAGE (OUTPATIENT)
Dept: PHARMACY | Facility: CLINIC | Age: 62
End: 2022-12-07
Payer: COMMERCIAL

## 2022-12-07 ENCOUNTER — IMMUNIZATION (OUTPATIENT)
Dept: PHARMACY | Facility: CLINIC | Age: 62
End: 2022-12-07

## 2022-12-07 ENCOUNTER — IMMUNIZATION (OUTPATIENT)
Dept: PHARMACY | Facility: CLINIC | Age: 62
End: 2022-12-07
Payer: COMMERCIAL

## 2022-12-07 ENCOUNTER — OFFICE VISIT (OUTPATIENT)
Dept: DERMATOLOGY | Facility: CLINIC | Age: 62
End: 2022-12-07
Payer: COMMERCIAL

## 2022-12-07 DIAGNOSIS — L70.0 ACNE VULGARIS: ICD-10-CM

## 2022-12-07 DIAGNOSIS — L81.1 MELASMA: ICD-10-CM

## 2022-12-07 DIAGNOSIS — K50.10 CROHN'S DISEASE OF COLON WITHOUT COMPLICATION: ICD-10-CM

## 2022-12-07 DIAGNOSIS — D22.9 MULTIPLE NEVI: Primary | ICD-10-CM

## 2022-12-07 DIAGNOSIS — Z12.83 SCREENING, MALIGNANT NEOPLASM, SKIN: ICD-10-CM

## 2022-12-07 PROCEDURE — 4010F ACE/ARB THERAPY RXD/TAKEN: CPT | Mod: CPTII,S$GLB,, | Performed by: DERMATOLOGY

## 2022-12-07 PROCEDURE — 1160F PR REVIEW ALL MEDS BY PRESCRIBER/CLIN PHARMACIST DOCUMENTED: ICD-10-PCS | Mod: CPTII,S$GLB,, | Performed by: DERMATOLOGY

## 2022-12-07 PROCEDURE — 99999 PR PBB SHADOW E&M-EST. PATIENT-LVL IV: CPT | Mod: PBBFAC,,, | Performed by: DERMATOLOGY

## 2022-12-07 PROCEDURE — 99999 PR PBB SHADOW E&M-EST. PATIENT-LVL IV: ICD-10-PCS | Mod: PBBFAC,,, | Performed by: DERMATOLOGY

## 2022-12-07 PROCEDURE — 1160F RVW MEDS BY RX/DR IN RCRD: CPT | Mod: CPTII,S$GLB,, | Performed by: DERMATOLOGY

## 2022-12-07 PROCEDURE — 4010F PR ACE/ARB THEARPY RXD/TAKEN: ICD-10-PCS | Mod: CPTII,S$GLB,, | Performed by: DERMATOLOGY

## 2022-12-07 PROCEDURE — 99204 OFFICE O/P NEW MOD 45 MIN: CPT | Mod: S$GLB,,, | Performed by: DERMATOLOGY

## 2022-12-07 PROCEDURE — 1159F PR MEDICATION LIST DOCUMENTED IN MEDICAL RECORD: ICD-10-PCS | Mod: CPTII,S$GLB,, | Performed by: DERMATOLOGY

## 2022-12-07 PROCEDURE — 1159F MED LIST DOCD IN RCRD: CPT | Mod: CPTII,S$GLB,, | Performed by: DERMATOLOGY

## 2022-12-07 PROCEDURE — 99204 PR OFFICE/OUTPT VISIT, NEW, LEVL IV, 45-59 MIN: ICD-10-PCS | Mod: S$GLB,,, | Performed by: DERMATOLOGY

## 2022-12-07 RX ORDER — HYDROQUINONE 40 MG/G
CREAM TOPICAL
Qty: 28 G | Refills: 1 | Status: SHIPPED | OUTPATIENT
Start: 2022-12-07

## 2022-12-07 RX ORDER — TRETINOIN 0.25 MG/G
CREAM TOPICAL
Qty: 20 G | Refills: 6 | Status: SHIPPED | OUTPATIENT
Start: 2022-12-07 | End: 2023-12-07

## 2022-12-07 NOTE — PATIENT INSTRUCTIONS
"ACNE INSTRUCTIONS  Use hydroquinone bleaching cream to dark spots only each morning.  Then apply a topical vitamin C serum or lotion to entire face. Then use a sunscreen with SPF 30.    Use tretinoin cream (pea-sized amount) to entire face at bedtime. May cause irritation, start using every third night and gradually increase to every night.  You may also apply a non-comedogenic moisturizer prior to applying the tretinoin to help reduce the risk of irritation and dryness.  Use a gentle cleanser twice daily such as CeraVe, Cetaphil, or Elta MD Gentle Foaming Cleanser.  Use CeraVe or Cetaphil lotion or Elta MD AM/PM therapy and use twice a day if dryness occurs.  All skin care products and cosmetics should have the label "non-comdeogenic" which means it will not clog your pores.      "

## 2022-12-07 NOTE — PROGRESS NOTES
Subjective:       Patient ID:  Angelica Flores is a 62 y.o. female who presents for   Chief Complaint   Patient presents with    Skin Cancer     Denies family/personal hx of skin cancer.     Skin Discoloration     Pt has been treated for melsama in the past. Pt reports using the 4% hydroquinine.  Pt reports it didn't lighten skip up a little but was inquiring about other options.      Hx of melasma and Crohn's disease (currently on Humira and imuran since 10/29/22), here to establish care, last seen on 9/10/13 by Dr. Mendoza.     History of Present Illness: The patient presents with chief complaint of skin check.  Location: n/a  Duration: n/a  Signs/Symptoms: n/a  Prior treatments: n/a     For melasma, previously treated with tretinoin micro, HQ 4%      Review of Systems   Constitutional:  Negative for fever and chills.   Gastrointestinal:  Negative for nausea and vomiting.   Skin:  Positive for activity-related sunscreen use. Negative for daily sunscreen use and recent sunburn.   Hematologic/Lymphatic: Does not bruise/bleed easily.      Objective:    Physical Exam   Constitutional: She appears well-developed and well-nourished. No distress.   Neurological: She is alert and oriented to person, place, and time. She is not disoriented.   Psychiatric: She has a normal mood and affect.   Skin:   Areas Examined (abnormalities noted in diagram):   Head / Face Inspection Performed  Neck Inspection Performed  Chest / Axilla Inspection Performed  Abdomen Inspection Performed  Back Inspection Performed  RUE Inspected  LUE Inspection Performed  RLE Inspected  LLE Inspection Performed  Nails and Digits Inspection Performed                 Diagram Legend     Erythematous scaling macule/papule c/w actinic keratosis       Vascular papule c/w angioma      Pigmented verrucoid papule/plaque c/w seborrheic keratosis      Yellow umbilicated papule c/w sebaceous hyperplasia      Irregularly shaped tan macule c/w lentigo     1-2  mm smooth white papules consistent with Milia      Movable subcutaneous cyst with punctum c/w epidermal inclusion cyst      Subcutaneous movable cyst c/w pilar cyst      Firm pink to brown papule c/w dermatofibroma      Pedunculated fleshy papule(s) c/w skin tag(s)      Evenly pigmented macule c/w junctional nevus     Mildly variegated pigmented, slightly irregular-bordered macule c/w mildly atypical nevus      Flesh colored to evenly pigmented papule c/w intradermal nevus       Pink pearly papule/plaque c/w basal cell carcinoma      Erythematous hyperkeratotic cursted plaque c/w SCC      Surgical scar with no sign of skin cancer recurrence      Open and closed comedones      Inflammatory papules and pustules      Verrucoid papule consistent consistent with wart     Erythematous eczematous patches and plaques     Dystrophic onycholytic nail with subungual debris c/w onychomycosis     Umbilicated papule    Erythematous-base heme-crusted tan verrucoid plaque consistent with inflamed seborrheic keratosis     Erythematous Silvery Scaling Plaque c/w Psoriasis     See annotation      Assessment / Plan:        Multiple nevi  Crohn's disease of colon without complication  -     Ambulatory referral/consult to Dermatology  Screening, malignant neoplasm, skin  Reassurance given.  Discussed ABCDEF of melanoma and changes for patient to look for.  AAD Handout given. Discussed importance of daily use of sunscreen which is broad-spectrum and has a minimum SPF of 30.    Melasma  -     hydroquinone 4 % Crea; Apply to dark spots once daily. Use with sunscreen if outdoors  Dispense: 28 g; Refill: 1  Acne vulgaris  -     tretinoin (RETIN-A) 0.025 % cream; Apply pea-sized amount to entire face at bedtime.  If dryness, use every third night and increase as tolerated to every night.  Dispense: 20 g; Refill: 6  -     will start above med with HQ 6%, discussed OTC vitamin C serum and daily sunscreen.            Follow up in about 4 months  (around 4/7/2023).

## 2022-12-18 ENCOUNTER — PATIENT MESSAGE (OUTPATIENT)
Dept: FAMILY MEDICINE | Facility: CLINIC | Age: 62
End: 2022-12-18
Payer: COMMERCIAL

## 2022-12-19 ENCOUNTER — HOSPITAL ENCOUNTER (OUTPATIENT)
Dept: RADIOLOGY | Facility: HOSPITAL | Age: 62
Discharge: HOME | End: 2022-12-19
Attending: FAMILY MEDICINE
Payer: COMMERCIAL

## 2022-12-19 ENCOUNTER — OFFICE VISIT (OUTPATIENT)
Dept: INTERNAL MEDICINE | Facility: CLINIC | Age: 62
End: 2022-12-19
Payer: COMMERCIAL

## 2022-12-19 VITALS
HEIGHT: 60 IN | WEIGHT: 200.38 LBS | SYSTOLIC BLOOD PRESSURE: 114 MMHG | TEMPERATURE: 98 F | DIASTOLIC BLOOD PRESSURE: 80 MMHG | OXYGEN SATURATION: 95 % | HEART RATE: 71 BPM | BODY MASS INDEX: 39.34 KG/M2 | RESPIRATION RATE: 18 BRPM

## 2022-12-19 DIAGNOSIS — R23.3 EASY BRUISABILITY: ICD-10-CM

## 2022-12-19 DIAGNOSIS — E66.01 SEVERE OBESITY (BMI 35.0-39.9) WITH COMORBIDITY: ICD-10-CM

## 2022-12-19 DIAGNOSIS — M25.512 CHRONIC LEFT SHOULDER PAIN: ICD-10-CM

## 2022-12-19 DIAGNOSIS — M25.512 CHRONIC LEFT SHOULDER PAIN: Primary | ICD-10-CM

## 2022-12-19 DIAGNOSIS — I10 ESSENTIAL HYPERTENSION: ICD-10-CM

## 2022-12-19 DIAGNOSIS — G89.29 CHRONIC LEFT SHOULDER PAIN: ICD-10-CM

## 2022-12-19 DIAGNOSIS — G89.29 CHRONIC LEFT SHOULDER PAIN: Primary | ICD-10-CM

## 2022-12-19 PROBLEM — E66.9 OBESITY (BMI 30-39.9): Status: RESOLVED | Noted: 2019-09-05 | Resolved: 2022-12-19

## 2022-12-19 PROCEDURE — 4010F PR ACE/ARB THEARPY RXD/TAKEN: ICD-10-PCS | Mod: CPTII,S$GLB,, | Performed by: FAMILY MEDICINE

## 2022-12-19 PROCEDURE — 3074F SYST BP LT 130 MM HG: CPT | Mod: CPTII,S$GLB,, | Performed by: FAMILY MEDICINE

## 2022-12-19 PROCEDURE — 3008F BODY MASS INDEX DOCD: CPT | Mod: CPTII,S$GLB,, | Performed by: FAMILY MEDICINE

## 2022-12-19 PROCEDURE — 1160F RVW MEDS BY RX/DR IN RCRD: CPT | Mod: CPTII,S$GLB,, | Performed by: FAMILY MEDICINE

## 2022-12-19 PROCEDURE — 73030 X-RAY EXAM OF SHOULDER: CPT | Mod: TC,LT

## 2022-12-19 PROCEDURE — 3074F PR MOST RECENT SYSTOLIC BLOOD PRESSURE < 130 MM HG: ICD-10-PCS | Mod: CPTII,S$GLB,, | Performed by: FAMILY MEDICINE

## 2022-12-19 PROCEDURE — 99214 PR OFFICE/OUTPT VISIT, EST, LEVL IV, 30-39 MIN: ICD-10-PCS | Mod: S$GLB,,, | Performed by: FAMILY MEDICINE

## 2022-12-19 PROCEDURE — 73030 XR SHOULDER COMPLETE 2 OR MORE VIEWS LEFT: ICD-10-PCS | Mod: 26,LT,, | Performed by: RADIOLOGY

## 2022-12-19 PROCEDURE — 3079F PR MOST RECENT DIASTOLIC BLOOD PRESSURE 80-89 MM HG: ICD-10-PCS | Mod: CPTII,S$GLB,, | Performed by: FAMILY MEDICINE

## 2022-12-19 PROCEDURE — 3079F DIAST BP 80-89 MM HG: CPT | Mod: CPTII,S$GLB,, | Performed by: FAMILY MEDICINE

## 2022-12-19 PROCEDURE — 1159F MED LIST DOCD IN RCRD: CPT | Mod: CPTII,S$GLB,, | Performed by: FAMILY MEDICINE

## 2022-12-19 PROCEDURE — 1160F PR REVIEW ALL MEDS BY PRESCRIBER/CLIN PHARMACIST DOCUMENTED: ICD-10-PCS | Mod: CPTII,S$GLB,, | Performed by: FAMILY MEDICINE

## 2022-12-19 PROCEDURE — 1159F PR MEDICATION LIST DOCUMENTED IN MEDICAL RECORD: ICD-10-PCS | Mod: CPTII,S$GLB,, | Performed by: FAMILY MEDICINE

## 2022-12-19 PROCEDURE — 4010F ACE/ARB THERAPY RXD/TAKEN: CPT | Mod: CPTII,S$GLB,, | Performed by: FAMILY MEDICINE

## 2022-12-19 PROCEDURE — 72050 XR CERVICAL SPINE COMPLETE 5 VIEW: ICD-10-PCS | Mod: 26,,, | Performed by: RADIOLOGY

## 2022-12-19 PROCEDURE — 99214 OFFICE O/P EST MOD 30 MIN: CPT | Mod: S$GLB,,, | Performed by: FAMILY MEDICINE

## 2022-12-19 PROCEDURE — 99999 PR PBB SHADOW E&M-EST. PATIENT-LVL IV: ICD-10-PCS | Mod: PBBFAC,,, | Performed by: FAMILY MEDICINE

## 2022-12-19 PROCEDURE — 72050 X-RAY EXAM NECK SPINE 4/5VWS: CPT | Mod: TC

## 2022-12-19 PROCEDURE — 73030 X-RAY EXAM OF SHOULDER: CPT | Mod: 26,LT,, | Performed by: RADIOLOGY

## 2022-12-19 PROCEDURE — 3008F PR BODY MASS INDEX (BMI) DOCUMENTED: ICD-10-PCS | Mod: CPTII,S$GLB,, | Performed by: FAMILY MEDICINE

## 2022-12-19 PROCEDURE — 72050 X-RAY EXAM NECK SPINE 4/5VWS: CPT | Mod: 26,,, | Performed by: RADIOLOGY

## 2022-12-19 PROCEDURE — 99999 PR PBB SHADOW E&M-EST. PATIENT-LVL IV: CPT | Mod: PBBFAC,,, | Performed by: FAMILY MEDICINE

## 2022-12-19 RX ORDER — METHYLPREDNISOLONE 4 MG/1
TABLET ORAL
Qty: 1 EACH | Refills: 0 | Status: SHIPPED | OUTPATIENT
Start: 2022-12-19 | End: 2023-04-27

## 2022-12-19 RX ORDER — GABAPENTIN 100 MG/1
100 CAPSULE ORAL NIGHTLY PRN
Qty: 30 CAPSULE | Refills: 0 | Status: SHIPPED | OUTPATIENT
Start: 2022-12-19 | End: 2023-03-16

## 2022-12-19 NOTE — PROGRESS NOTES
Subjective:       Patient ID: Angelica Flores is a 62 y.o. female.    Chief Complaint: No chief complaint on file.    62-year-old female patient Dr. Alvarez with Patient Active Problem List:     Essential hypertension     Dysthymic disorder     Hyperlipidemia     Vitamin D deficiency     Insomnia     Allergic rhinitis     Postmenopausal     Migraine without status migrainosus, not intractable     Severe obesity (BMI 35.0-39.9) with comorbidity     Drug-induced immunodeficiency     Crohn's disease of colon without complication  Here reports that patient has been having left shoulder pain off and on lately for the past 4 weeks with restricted range of motion with elevation and does not recall having any injury or trauma, occasionally has been having neck pain radiating to the left upper arm with tingling and numbness sensation.   Denies of any chest pain or difficulty breathing with palpitations  Currently on Humira and reports that patient had easy bruising occasionally, secondary to shoulder pain has been taking ibuprofen 800 mg twice daily as needed    Review of Systems   Constitutional:  Negative for fatigue.   Eyes:  Negative for visual disturbance.   Respiratory:  Negative for shortness of breath.    Cardiovascular:  Negative for chest pain and leg swelling.   Gastrointestinal:  Negative for abdominal pain, nausea and vomiting.   Musculoskeletal:  Positive for arthralgias and myalgias.   Skin:  Negative for rash.   Neurological:  Positive for numbness. Negative for weakness, light-headedness and headaches.   Hematological:  Bruises/bleeds easily.   Psychiatric/Behavioral:  Negative for sleep disturbance.        /80   Pulse 71   Temp 98.1 °F (36.7 °C)   Resp 18   Ht 5' (1.524 m)   Wt 90.9 kg (200 lb 6.4 oz)   SpO2 95%   BMI 39.14 kg/m²   Objective:      Physical Exam  Constitutional:       Appearance: She is well-developed.   HENT:      Head: Normocephalic and atraumatic.   Cardiovascular:       Rate and Rhythm: Normal rate and regular rhythm.      Heart sounds: Normal heart sounds. No murmur heard.  Pulmonary:      Effort: Pulmonary effort is normal.      Breath sounds: Normal breath sounds. No wheezing.   Abdominal:      General: Bowel sounds are normal.      Palpations: Abdomen is soft.      Tenderness: There is no abdominal tenderness.   Musculoskeletal:         General: Tenderness present.      Comments: Positive for tenderness to the left shoulder anteriorly and noted restricted range of motion with abduction  No significant tenderness noted to the cervical spine   Skin:     General: Skin is warm and dry.      Findings: Bruising present. No rash.      Comments: Noted bruising to the right thigh at the site of Humira injection   Neurological:      Mental Status: She is alert and oriented to person, place, and time.   Psychiatric:         Mood and Affect: Mood normal.         Assessment/Plan:   1. Chronic left shoulder pain  - X-Ray Shoulder 2 or More Views Left; Future  - X-Ray Cervical Spine Complete 5 view; Future  - methylPREDNISolone (MEDROL DOSEPACK) 4 mg tablet; use as directed  Dispense: 1 each; Refill: 0  - Ambulatory referral/consult to Orthopedics; Future  - gabapentin (NEURONTIN) 100 MG capsule; Take 1 capsule (100 mg total) by mouth nightly as needed (pain).  Dispense: 30 capsule; Refill: 0  Patient was advised to hold off on high doses of ibuprofen and Medrol Dosepak prescribed today for symptomatic relief and gabapentin for neuropathy like symptoms  Will get x-ray of the cervical spine as well as left shoulder and will refer to Orthopedic for further evaluation to rule out rotator cuff dysfunction    2. Essential hypertension  Blood pressure is stable currently on metoprolol 25 mg daily hydrochlorothiazide 25 mg    3. Severe obesity (BMI 35.0-39.9) with comorbidity  Lifestyle modifications discussed to lose weight with BMI 39    4. Easy bruisability   Patient was advised to avoid taking  high doses of ibuprofen regularly and try extra-strength Tylenol

## 2022-12-20 ENCOUNTER — TELEPHONE (OUTPATIENT)
Dept: PAIN MEDICINE | Facility: CLINIC | Age: 62
End: 2022-12-20
Payer: COMMERCIAL

## 2022-12-20 ENCOUNTER — OFFICE VISIT (OUTPATIENT)
Dept: SPORTS MEDICINE | Facility: CLINIC | Age: 62
End: 2022-12-20
Payer: COMMERCIAL

## 2022-12-20 VITALS — HEIGHT: 60 IN | BODY MASS INDEX: 39.34 KG/M2 | WEIGHT: 200.38 LBS

## 2022-12-20 DIAGNOSIS — S46.812A STRAIN OF LEFT TRAPEZIUS MUSCLE, INITIAL ENCOUNTER: ICD-10-CM

## 2022-12-20 DIAGNOSIS — M25.512 CHRONIC LEFT SHOULDER PAIN: ICD-10-CM

## 2022-12-20 DIAGNOSIS — M54.2 NECK PAIN ON LEFT SIDE: Primary | ICD-10-CM

## 2022-12-20 DIAGNOSIS — G89.29 CHRONIC LEFT SHOULDER PAIN: ICD-10-CM

## 2022-12-20 DIAGNOSIS — M47.22 OSTEOARTHRITIS OF SPINE WITH RADICULOPATHY, CERVICAL REGION: Primary | ICD-10-CM

## 2022-12-20 PROCEDURE — 3008F PR BODY MASS INDEX (BMI) DOCUMENTED: ICD-10-PCS | Mod: CPTII,S$GLB,, | Performed by: FAMILY MEDICINE

## 2022-12-20 PROCEDURE — 3008F BODY MASS INDEX DOCD: CPT | Mod: CPTII,S$GLB,, | Performed by: FAMILY MEDICINE

## 2022-12-20 PROCEDURE — 1159F MED LIST DOCD IN RCRD: CPT | Mod: CPTII,S$GLB,, | Performed by: FAMILY MEDICINE

## 2022-12-20 PROCEDURE — 4010F ACE/ARB THERAPY RXD/TAKEN: CPT | Mod: CPTII,S$GLB,, | Performed by: FAMILY MEDICINE

## 2022-12-20 PROCEDURE — 99999 PR PBB SHADOW E&M-EST. PATIENT-LVL III: CPT | Mod: PBBFAC,,, | Performed by: FAMILY MEDICINE

## 2022-12-20 PROCEDURE — 1159F PR MEDICATION LIST DOCUMENTED IN MEDICAL RECORD: ICD-10-PCS | Mod: CPTII,S$GLB,, | Performed by: FAMILY MEDICINE

## 2022-12-20 PROCEDURE — 4010F PR ACE/ARB THEARPY RXD/TAKEN: ICD-10-PCS | Mod: CPTII,S$GLB,, | Performed by: FAMILY MEDICINE

## 2022-12-20 PROCEDURE — 99204 PR OFFICE/OUTPT VISIT, NEW, LEVL IV, 45-59 MIN: ICD-10-PCS | Mod: S$GLB,,, | Performed by: FAMILY MEDICINE

## 2022-12-20 PROCEDURE — 99999 PR PBB SHADOW E&M-EST. PATIENT-LVL III: ICD-10-PCS | Mod: PBBFAC,,, | Performed by: FAMILY MEDICINE

## 2022-12-20 PROCEDURE — 99204 OFFICE O/P NEW MOD 45 MIN: CPT | Mod: S$GLB,,, | Performed by: FAMILY MEDICINE

## 2022-12-20 NOTE — PROGRESS NOTES
Subjective:     Patient ID: Angelica Flores is a 62 y.o. female.    Chief Complaint: Pain of the Left Shoulder      HPI: Patient is being seen for left shoulder pain that has been present for about a month with no known cause. Says she hit her elbow a few days ago as well. Says she has trouble rotating her arm. Has a throbbing, tingling and numbness that radiates down to her fingertips. Rating pain 4/10 at today's visit. Takes gabapentin once at night and two tylenol arthritis twice a day to help with pain relief. Has not participated in physical therapy.     Was started on steroid dose pack yesterday by primary care  Patient did have an immunization injection in the left upper arm a few weeks ago but was already having symptoms before then and says the shot was not painful.    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  No recent changes in her activities or lifestyle.  States the pain in the left arm sometimes shoots down to the elbow but does not radiate below the elbow into the forearm and hand.    Past Medical History:   Diagnosis Date    Anxiety     Asthma     Crohn's disease     Fibroids     Hyperlipidemia     Hypertension     Iritis     Migraine headache     Ocular    Osteopenia 08/2019    Syncope and collapse     Vitamin D deficiency disease      Past Surgical History:   Procedure Laterality Date    ANKLE FRACTURE SURGERY  08/19/08    right ankle    BREAST BIOPSY Left 1977    COLONOSCOPY N/A 8/11/2017    Procedure: COLONOSCOPY - REQUESTS DR. MATTI ACUNA;  Surgeon: Matti Acuna III, MD;  Location: [facility];  Service: Endoscopy;  Laterality: N/A;    COLONOSCOPY N/A 11/23/2020    Procedure: COLONOSCOPY;  Surgeon: Matti Acuna III, MD;  Location: [facility] END[facility];  Service: Endoscopy;  Laterality: N/A;    COLONOSCOPY N/A 9/28/2022    Procedure: COLONOSCOPY;  Surgeon: Matti Acuna III, MD;  Location: [facility];  Service: Endoscopy;  Laterality: N/A;    TAHBSO  February 2011    Due to  abnormal pap smear and fibroids    TOTAL ABDOMINAL HYSTERECTOMY       Family History   Problem Relation Age of Onset    Arthritis Mother     Fuch's dystrophy Mother     Breast cancer Mother     Lupus Sister     Rheum arthritis Sister     Obesity Sister     Hypertension Father     Cancer Maternal Grandfather         lung ca    Stroke Maternal Grandmother     Diabetes Maternal Grandmother     Colon cancer Paternal Aunt      Social History     Socioeconomic History    Marital status:     Number of children: 2   Occupational History    Occupation: RN     Employer: [employer]     Comment: [employer]   Tobacco Use    Smoking status: Never    Smokeless tobacco: Never   Substance and Sexual Activity    Alcohol use: Yes     Alcohol/week: 0.0 standard drinks     Comment: ocassionally    Drug use: No    Sexual activity: Yes     Partners: Male     Birth control/protection: Surgical   Social History Narrative    She wears seatbelt.  July 22, 2017. She states she works new position at [employer].     Social Determinants of Health     Financial Resource Strain: Low Risk     Difficulty of Paying Living Expenses: Not hard at all   Food Insecurity: No Food Insecurity    Worried About Running Out of Food in the Last Year: Never true    Ran Out of Food in the Last Year: Never true   Transportation Needs: No Transportation Needs    Lack of Transportation (Medical): No    Lack of Transportation (Non-Medical): No   Physical Activity: Inactive    Days of Exercise per Week: 0 days    Minutes of Exercise per Session: 0 min   Stress: Stress Concern Present    Feeling of Stress : To some extent   Social Connections: Unknown    Frequency of Communication with Friends and Family: More than three times a week    Frequency of Social Gatherings with Friends and Family: Once a week    Active Member of Clubs or Organizations: Yes    Attends Club or Organization Meetings: More than 4 times per year    Marital Status:    Housing  Stability: Low Risk     Unable to Pay for Housing in the Last Year: No    Number of Places Lived in the Last Year: 1    Unstable Housing in the Last Year: No       Current Outpatient Medications:     adalimumab (HUMIRA,CF, PEN CROHNS-UC-HS) 80 mg/0.8 mL PnKt, Inject 1.6 mLs (160 mg total) into the skin on day 1 then 0.8 mLs (80 mg total) on day 15., Disp: 3 pen, Rfl: 0    adalimumab (HUMIRA,CF, PEN) 40 mg/0.4 mL PnKt, Inject 0.4 mLs (40 mg total) into the skin every 14 (fourteen) days., Disp: 2 pen, Rfl: 11    ALPRAZolam (XANAX) 0.5 MG tablet, Take 1 tablet by mouth twice daily as needed, Disp: 60 tablet, Rfl: 0    amLODIPine (NORVASC) 5 MG tablet, Take 1 tablet (5 mg total) by mouth once daily., Disp: 90 tablet, Rfl: 1    ascorbic acid, vitamin C, (VITAMIN C) 1000 MG tablet, Take by mouth. 1 Tablet Oral Every day, Disp: , Rfl:     azaTHIOprine (IMURAN) 50 mg Tab, Take 2 tablets (100 mg total) by mouth once daily., Disp: 60 tablet, Rfl: 5    azelastine (ASTELIN) 137 mcg (0.1 %) nasal spray, 2 sprays (274 mcg total) by Nasal route 2 (two) times daily., Disp: 90 mL, Rfl: 3    BACILLUS COAGULANS (PROBIOTIC, B. COAGULANS, ORAL), Take by mouth once daily., Disp: , Rfl:     cetirizine (ALLERGY RELIEF, CETIRIZINE,) 10 MG tablet, Take 1 tablet (10 mg total) by mouth once daily., Disp: 90 tablet, Rfl: 1    cholecalciferol, vitamin D3, 2,000 unit Cap, Take by mouth. 1 capsule Oral Every day, Disp: , Rfl:     dicyclomine (BENTYL) 20 mg tablet, Take 1 tablet (20 mg total) by mouth 3 (three) times daily as needed (abdominal pain)., Disp: 90 tablet, Rfl: 0    gabapentin (NEURONTIN) 100 MG capsule, Take 1 capsule (100 mg total) by mouth nightly as needed (pain)., Disp: 30 capsule, Rfl: 0    hepatitis B virus vacc.rec,PF, (ENGERIX-B) 20 mcg/mL Syrg, Inject into the muscle., Disp: 1 mL, Rfl: 2    hydroCHLOROthiazide (HYDRODIURIL) 25 MG tablet, Take 1 tablet (25 mg total) by mouth once daily., Disp: 90 tablet, Rfl: 1    hydroquinone  4 % Crea, Apply to dark spots once daily. Use with sunscreen if outdoors, Disp: 28 g, Rfl: 1    ibuprofen (ADVIL,MOTRIN) 800 MG tablet, Take 1 tablet (800 mg total) by mouth 2 (two) times daily as needed., Disp: 180 tablet, Rfl: 1    ipratropium (ATROVENT) 21 mcg (0.03 %) nasal spray, 2 sprays by Nasal route 3 (three) times daily., Disp: 20 mL, Rfl: 5    lisinopriL (PRINIVIL,ZESTRIL) 40 MG tablet, TAKE 1 TABLET BY MOUTH DAILY. APPOINTMENT REQUIRED FOR FUTURE REFILLS, Disp: 90 tablet, Rfl: 1    methylPREDNISolone (MEDROL DOSEPACK) 4 mg tablet, use as directed, Disp: 1 each, Rfl: 0    metoprolol succinate (TOPROL-XL) 25 MG 24 hr tablet, Take 1 tablet (25 mg total) by mouth once daily., Disp: 30 tablet, Rfl: 11    montelukast (SINGULAIR) 10 mg tablet, Take 1 tablet (10 mg total) by mouth every evening., Disp: 90 tablet, Rfl: 1    multivitamin-Ca-iron-minerals 18-0.4 mg Tab, Take by mouth. 1 Tablet Oral Every day, Disp: , Rfl:     pneumoc 20-shani conj-dip cr,PF, (PREVNAR-20, PF,) 0.5 mL Syrg injection, Inject 0.5 mLs into the muscle., Disp: 0.5 mL, Rfl: 0    potassium bicarbonate disintegrating tablet, Take 10 mEq by mouth 2 (two) times daily., Disp: , Rfl:     pravastatin (PRAVACHOL) 80 MG tablet, Take 1 tablet (80 mg total) by mouth every evening., Disp: 90 tablet, Rfl: 1    tretinoin (RETIN-A) 0.025 % cream, Apply pea-sized amount to entire face at bedtime.  If dryness, use every third night and increase as tolerated to every night., Disp: 20 g, Rfl: 6    valACYclovir (VALTREX) 1000 MG tablet, TAKE 2 TABLETS BY MOUTH TWICE DAILY FOR 1 DAY PER EPISODE, Disp: 30 tablet, Rfl: 0    zinc sulfate (ZINC-220 ORAL), , Disp: , Rfl:     albuterol (PROVENTIL/VENTOLIN HFA) 90 mcg/actuation inhaler, Inhale 2 puffs into the lungs every 6 (six) hours as needed for Wheezing or Shortness of Breath., Disp: 18 g, Rfl: 2  Review of patient's allergies indicates:  No Known Allergies  Review of Systems   Constitutional:  Negative for  chills, fever and weight loss.   Respiratory:  Negative for shortness of breath.    Cardiovascular:  Negative for chest pain and palpitations.     Objective:   Body mass index is 39.14 kg/m².  There were no vitals filed for this visit.        Ortho/SPM Exam-alert and oriented well-nourished well-developed pleasant adult female ambulatory in no acute distress     Respiratory effort normal     Neck-mildly tender to palpation diffusely of left paracervical and superior trapezius musculature   Rotating to the right slightly limited and causes some pulling discomfort on the left side.    Side bending to the left is decreased compared to the right side and is uncomfortable.    Patient has discomfort also with full extension.  No point tenderness of spinous processes     Left shoulder-no acute deformity swelling or atrophy   Mild diffuse tenderness to palpation of the anterior and lateral shoulder    Patient has pain in the empty can position and can not resist downward force   Has strength 3/5 of deltoid with 90° abduction   Has 70° of abduction with the arm extended out away from side of the body   Flexion to 90°   With elbow at her side patient can internally rotate fully but can only externally rotate about 15° and with some discomfort   Patient has positive Umana impingement sign   Spurling maneuver negative bilateral    Neurovascular intact     Biceps curl and triceps pushed down is 4/5 bilaterally in  strength is 4/5 bilateral    Psychiatric good affect and cognition    Plan-physical therapy important for this patient reduce muscle spasm tightness decreased range of motion of left paracervical left shoulder regions and appears to have some tendinitis of the left supra spinatus  Will refer to spine specialist if not improving in the next few weeks    For now take steroid Dosepak and use Tylenol to 3 times a day as needed to control pain.  Continue nightly Neurontin and may need to gradually titrate dosage  upward.  Recheck here 4-6 weeks or sooner let us know if any worsening problems.  There are no Patient Instructions on file for this visit.    IMAGING: X-Ray Shoulder 2 or More Views Left  Narrative: EXAMINATION:  XR SHOULDER COMPLETE 2 OR MORE VIEWS LEFT    CLINICAL HISTORY:  Pain in left shoulder    TECHNIQUE:  Two or three views of the left shoulder were performed.    COMPARISON:  None    FINDINGS:  No acute fracture or dislocation.  Mild AC joint arthrosis.  Glenohumeral joint is maintained.  Impression: See above    Electronically signed by: Noam Rodriguez MD  Date:    12/19/2022  Time:    12:28  X-Ray Cervical Spine Complete 5 view  Narrative: EXAMINATION:  XR CERVICAL SPINE COMPLETE 5 VIEW    CLINICAL HISTORY:  . Pain in left shoulder    TECHNIQUE:  AP, Lateral, bilateral oblique and open mouth views of the cervical spine were performed.    COMPARISON:  None    FINDINGS:  Straightening of normal cervical lordosis.  Discogenic degenerative changes at C4-C5 with moderate disc space narrowing posteriorly at this level.  No fracture.  No listhesis.  Prevertebral soft tissues are maintained.  Mild bilateral neural foraminal encroachment C4-C5.  Lung apices are clear.  Impression: As above    Electronically signed by: Noam Rodriguez MD  Date:    12/19/2022  Time:    12:27       Radiographs / Imaging : Relevant imaging results reviewed by me and interpreted by me, discussed with the patient and / or family -agree with x-ray report      Assessment:     Encounter Diagnoses   Name Primary?    Chronic left shoulder pain     Neck pain on left side Yes    Strain of left trapezius muscle, initial encounter         Plan:   Neck pain on left side    Chronic left shoulder pain  -     Ambulatory referral/consult to Orthopedics    Strain of left trapezius muscle, initial encounter    Note-45 minutes spent with patient and in reviewing pertinent records    The patient verbalized good understanding of the medical issues discussed  today and expressed appreciation for the care provided.  Patient was given the opportunity to ask questions and be an active participant in their medical care. Patient had no further questions or concerns at this time.     The patient was encouraged to participate in appropriate physical activity.      Sedrick Ferrari M.D.  [facility]        This note was dictated using voice recognition software and may have sound a like errors.

## 2022-12-21 DIAGNOSIS — M54.2 NECK PAIN ON LEFT SIDE: Primary | ICD-10-CM

## 2022-12-21 DIAGNOSIS — M25.512 LEFT SHOULDER PAIN, UNSPECIFIED CHRONICITY: ICD-10-CM

## 2022-12-22 ENCOUNTER — PATIENT MESSAGE (OUTPATIENT)
Dept: GASTROENTEROLOGY | Facility: CLINIC | Age: 62
End: 2022-12-22
Payer: COMMERCIAL

## 2022-12-29 DIAGNOSIS — K50.10 CROHN'S DISEASE OF COLON WITHOUT COMPLICATION: Primary | ICD-10-CM

## 2022-12-29 RX ORDER — AZATHIOPRINE 50 MG/1
150 TABLET ORAL DAILY
Qty: 90 TABLET | Refills: 5 | Status: SHIPPED | OUTPATIENT
Start: 2022-12-29 | End: 2023-02-18 | Stop reason: SDUPTHER

## 2022-12-29 RX ORDER — ADALIMUMAB 40MG/0.4ML
40 KIT SUBCUTANEOUS
Qty: 4 PEN | Refills: 11 | Status: SHIPPED | OUTPATIENT
Start: 2022-12-29 | End: 2023-01-04 | Stop reason: SDUPTHER

## 2022-12-30 ENCOUNTER — PATIENT MESSAGE (OUTPATIENT)
Dept: GASTROENTEROLOGY | Facility: CLINIC | Age: 62
End: 2022-12-30
Payer: COMMERCIAL

## 2023-01-03 ENCOUNTER — PATIENT MESSAGE (OUTPATIENT)
Dept: INTERNAL MEDICINE | Facility: CLINIC | Age: 63
End: 2023-01-03
Payer: COMMERCIAL

## 2023-01-03 ENCOUNTER — PATIENT MESSAGE (OUTPATIENT)
Dept: GASTROENTEROLOGY | Facility: CLINIC | Age: 63
End: 2023-01-03
Payer: COMMERCIAL

## 2023-01-03 DIAGNOSIS — K50.10 CROHN'S DISEASE OF COLON WITHOUT COMPLICATION: ICD-10-CM

## 2023-01-04 ENCOUNTER — CLINICAL SUPPORT (OUTPATIENT)
Dept: REHABILITATION | Facility: HOSPITAL | Age: 63
End: 2023-01-04
Payer: COMMERCIAL

## 2023-01-04 ENCOUNTER — SPECIALTY PHARMACY (OUTPATIENT)
Dept: PHARMACY | Facility: CLINIC | Age: 63
End: 2023-01-04
Payer: COMMERCIAL

## 2023-01-04 DIAGNOSIS — R53.1 DECREASED STRENGTH: ICD-10-CM

## 2023-01-04 DIAGNOSIS — M54.2 NECK PAIN ON LEFT SIDE: ICD-10-CM

## 2023-01-04 DIAGNOSIS — Z74.09 DECREASED FUNCTIONAL MOBILITY AND ENDURANCE: ICD-10-CM

## 2023-01-04 DIAGNOSIS — M25.512 LEFT SHOULDER PAIN, UNSPECIFIED CHRONICITY: ICD-10-CM

## 2023-01-04 DIAGNOSIS — M25.60 DECREASED RANGE OF MOTION: ICD-10-CM

## 2023-01-04 PROCEDURE — 97162 PT EVAL MOD COMPLEX 30 MIN: CPT

## 2023-01-04 NOTE — PLAN OF CARE
"[facility]   Physical Therapy Initial Evaluation   Date: 1/4/2023   Name: Angelica Pate [facility] Number: [id number]    Therapy Diagnosis:   Encounter Diagnoses   Name Primary?    Neck pain on left side     Left shoulder pain, unspecified chronicity     Decreased range of motion     Decreased strength     Decreased functional mobility and endurance       Physician: Sedrick Ferrari MD     Physician Orders: Physical Therapy Evaluation and Treat  Medical Diagnosis from Referral:   M54.2 (ICD-10-CM) - Neck pain on left side   M25.512 (ICD-10-CM) - Left shoulder pain, unspecified chronicity   Evaluation Date: 1/4/2023  Authorization Period Expiration: 12/21/2023  Plan of Care Expiration: 03/29/2023  Progress Note Due: 02/03/2023  Visit # / Visits authorized: 1/1   FOTO: 1/3     Precautions: Standard    Time In: 12:30 PM  Time Out: 1:10 PM  Total Billable Time (timed & untimed codes): 40 minutes    SUBJECTIVE   Date of onset: Approximately 6 weeks ago    History of current condition - Angelica reports: her shoulder has been hurting for the past 6 weeks and denies any MECHANISM OF INJURY. Patient reports she has "tried hot, cold, and arm swings", but nothing seems to help her pain. Patient states she has been diagnosed with " left rotator cuff impingement and narrowing in C4 and C5" of her spine. Patient states her worst pain is at night and she cannot seem to find a comfortable position to rest. Patient states her PCP gave her a steroid pack, but that this and Gabapentin has not seemed to really help. Patient states just normal tasks, like using her left arm to grab her seat belt will now give her agonizing pain and she has to compensate with her right UPPER EXTREMITY.    Falls: none    Exercise Regimen: none    Imaging: [x] Xray [] MRI [] CT: Performed on: 12/19/2022    Pain:  Current 2/10, worst 10/10, best 2/10   Location: [] Right   [x] Left:  shoulder  Description: Aching, Throbbing, " Grabbing, Deep, Sharp, and Variable  Aggravating Factors: using arm at any angle 90 degrees and above  Easing Factors: nothing    Prior Therapy:   [] N/A    [x] Yes: for right ankle  Social History: Patient lives with their spouse.  Occupation: Patient is telemed nurse.  Prior Level of Function: Independent and pain free with all ADL, IADL, community mobility and functional activities.   Current Level of Function: Independent with all ADL, IADL, community mobility and functional activities with reports of increased pain and need for increased time and frequent breaks.      Dominant Extremity:    [x] Right    [] Left    Medical History:   Past Medical History:   Diagnosis Date    Anxiety     Asthma     Crohn's disease     Fibroids     Hyperlipidemia     Hypertension     Iritis     Migraine headache     Ocular    Osteopenia 08/2019    Syncope and collapse     Vitamin D deficiency disease        Surgical History:   Angelica Flores  has a past surgical history that includes TAHBSO (February 2011); Total abdominal hysterectomy; Ankle fracture surgery (08/19/08); Breast biopsy (Left, 1977); Colonoscopy (N/A, 8/11/2017); Colonoscopy (N/A, 11/23/2020); and Colonoscopy (N/A, 9/28/2022).    Medications:   Angelica has a current medication list which includes the following prescription(s): humira(cf) pen, albuterol, alprazolam, amlodipine, ascorbic acid (vitamin c), azathioprine, azelastine, bacillus coagulans, cetirizine, cholecalciferol (vitamin d3), dicyclomine, gabapentin, hepatitis b virus vacc.rec(pf), hydrochlorothiazide, hydroquinone, ibuprofen, ipratropium, lisinopril, methylprednisolone, metoprolol succinate, montelukast, multivitamin-ca-iron-minerals, pneumoc 20-shani conj-dip cr(pf), potassium bicarbonate, pravastatin, tretinoin, valacyclovir, and zinc sulfate.    Allergies:   Review of patient's allergies indicates:  No Known Allergies       OBJECTIVE     Sensation:  [x] Intact to Light Touch   []  Impaired:    Palpation: Increased tone and tenderness noted with palpation of left coracoid process as well as pectorals, supraspinatus , and biceps .    Posture: Patient presents with postural abnormalities which include:    [x] Forward Head   [] Increased Lumbar Lordosis   [x] Rounded Shoulder   [] Genu Recurvatum   [] Increased Thoracic Kyphosis [] Genu Valgus   [] Trunk Deviated    [] Pes Planus   [] Scapular Winging    [] Other:       Functional Movement  Analysis   Supine <> Prone Painful  and Dysfunctional   Sit <> Supine Painful  and Dysfunctional     RANGE OF MOTION:    Shoulder AROM/PROM Right Left Pain/Dysfunction with Movement Goal   Shoulder Flexion (180) 160 75 Painful in left shoulder 160 bilateral   Shoulder Abduction (180) 160 75 More painful than flexion in left shoulder 160 bilateral   Shoulder External Rotation (90) 60 20 Painful in left shoulder 60 bilateral   Shoulder Internal Rotation (70) 60 60 Painful in left shoulder -     Elbow AROM/PROM Right Left Pain/Dysfunction with Movement Goal   Elbow Flexion (150) WITHIN FUNCTIONAL LIMITS  WITHIN FUNCTIONAL LIMITS   -   Elbow Extension (0) WITHIN FUNCTIONAL LIMITS  WITHIN FUNCTIONAL LIMITS   -   STRENGTH:    U/E MMT Right Left Pain/Dysfunction with Movement Goal   Shoulder Flexion 4/5 3-/5 Painful in left shoulder 4+/5 Bilateral   Shoulder Abduction 4/5 3-/5 Painful in left shoulder 4+/5 Bilateral   Shoulder Internal Rotation 4/5 3-/5 Painful in left shoulder 4+/5 Bilateral   Shoulder External Rotation   4/5 3-/5 Painful in left shoulder 4+/5 Bilateral   Middle Trapezius   4/5 3-/5 Painful in left shoulder 4+/5 Bilateral   Lower Trapezius 4-/5 3-/5 Painful in left shoulder 4+/5 Bilateral     JOINT MOBILITY:     Joint Motion Tested Right   Left  Goal   Glenohumeral Normal Hypomobile Normal Bilateral       SPECIAL TESTS:     Right  (spine) Left  Goal   Hawkin's jennifer Negative Positive Negative Bilateral   Empty can Negative Positive Negative  Bilateral       FUNCTION:     CMS Impairment/Limitation/Restriction for FOTO Shoulder NOC Survey    Therapist reviewed FOTO scores for Angelica on 1/4/2023.   FOTO documents entered into [email or web address] - see Media section.    Limitation Score: 47%         TREATMENT       Angelica received the treatments listed below:      MANUAL THERAPY TECHNIQUES were applied for 3 minutes, including:    Manual Intervention Performed Today    Soft Tissue Mobilization     Joint Mobilizations x Left shoulder short lever distractions   Mobilization with movement          Functional Dry Needling        Plan for Next Visit: as needed       THERAPEUTIC EXERCISES to develop strength, endurance, ROM, flexibility, posture, and core stabilization for (0) minutes including:    Intervention Performed Today                                              Plan for Next Visit: cut therabands, UPPER BODY ERGOMETER, pulleys in flexion and abduction, wall slides in flexion and abduction, scapular retractions, shoulder extensions, manual therapy as needed, review pendulums       PATIENT EDUCATION AND HOME EXERCISES     Education provided: (15) minutes  PURPOSE: Patient educated on the impairments noted above and the effects of physical therapy intervention to improve overall condition and QOL.   EXERCISE: Patient was educated on all the above exercise prior/during/after for proper posture, positioning, and execution for safe performance with home exercise program.   STRENGTH: Patient educated on the importance of improved core and extremity strength in order to improve alignment of the spine and extremities with static positions and dynamic movement.   SLEEPING POSITIONS: Patient educated on the use of pillows to aid in neutral alignment of spine and extremities when sleeping in supine or side lying.    Written Home Exercises Provided: no.  Exercises were reviewed and Angelica was able to demonstrate them prior to the end of the session.  Angelica demonstrated good   "understanding of the education provided. See EMR under Patient Instructions for exercises provided during therapy sessions.    ASSESSMENT     Angelica is a 62 y.o. female referred to outpatient Physical Therapy with a medical diagnosis of neck pain on left side and Left shoulder pain, unspecified chronicity. Patient presents with impairments including: decreased ROM, decreased strength, decreased joint mobility, postural abnormalities, and decreased overall function.    Patient prognosis is Fair.   Patient will benefit from skilled outpatient Physical Therapy to address the deficits stated above and in the chart below, provide patient/family education, and to maximize patient's level of independence.     Plan of care discussed with patient: Yes  Patient's spiritual, cultural and educational needs considered and patient is agreeable to the plan of care and goals as stated below:     Anticipated Barriers for therapy: co-morbidities, sedentary lifestyle, chronicity of condition, and occupation    Medical Necessity is demonstrated by the following  History  Co-morbidities and personal factors that may impact the plan of care Co-morbidities:   high BMI and HTN    Personal Factors:   age     high   Examination  Body Structures and Functions, activity limitations and participation restrictions that may impact the plan of care Body Regions:   neck  upper extremities  trunk    Body Systems:    gross symmetry  ROM  strength  gross coordinated movement    Participation Restrictions:   See above in "Current Level of Function"     Activity limitations:   Learning and applying knowledge  no deficits    General Tasks and Commands  no deficits    Communication  no deficits    Mobility  lifting and carrying objects  driving (bike, car, motorcycle)    Self care  washing oneself (bathing, drying, washing hands)  caring for body parts (brushing teeth, shaving, grooming)  dressing    Domestic Life  shopping  cooking  doing house work " (cleaning house, washing dishes, laundry)  assisting others    Interactions/Relationships  no deficits    Life Areas  no deficits    Community and Social Life  community life  recreation and leisure         moderate   Clinical Presentation evolving clinical presentation with changing clinical characteristics moderate   Decision Making/ Complexity Score: moderate     GOALS:    Short Term Goals:  6 weeks Status  Date Met   PAIN: Patient will report worst pain of 5/10 in order to progress toward max functional ability and improve quality of life. [x] Progressing  [] Met  [] Not Met    FUNCTION: Patient will demonstrate improved function as indicated by a functional limitation score of less than or equal to 42 out of 100 on FOTO. [x] Progressing  [] Met  [] Not Met    MOBILITY: Patient will improve Range of Motion to 50% of stated goals, listed in objective measures above, in order to progress towards independence with functional activities.  [x] Progressing  [] Met  [] Not Met    STRENGTH: Patient will improve strength to 50% of stated goals, listed in objective measures above, in order to progress towards independence with functional activities.  [x] Progressing  [] Met  [] Not Met      Long Term Goals:  12 weeks Status Date Met   PAIN: Patient will report worst pain of 3/10 in order to progress toward max functional ability and improve quality of life [x] Progressing  [] Met  [] Not Met    FUNCTION: Patient will demonstrate improved function as indicated by a functional limitation score of less than or equal to 34 out of 100 on FOTO. [x] Progressing  [] Met  [] Not Met    MOBILITY: Patient will improve Range of Motion to stated goals, listed in objective measures above, in order to return to maximal functional potential and improve quality of life. [x] Progressing  [] Met  [] Not Met    STRENGTH: Patient will improve strength to stated goals, listed in objective measures above, in order to improve functional  independence and quality of life. [x] Progressing  [] Met  [] Not Met    Patient will return to normal ADL's, IADL's, community involvement, recreational activities, and work-related activities with less than or equal to 3/10 pain and maximal function.  [x] Progressing  [] Met  [] Not Met      PLAN   Plan of Care Certification: 1/4/2023 to 03/29/2023.    Outpatient Physical Therapy 2 times weekly for 12 weeks to include any combination of the following interventions: virtual visits, dry needling, modalities, electrical stimulation (IFC, Pre-Mod, Attended with Functional Dry Needling), Cervical/Lumbar Traction, Gait Training, Manual Therapy, Moist Heat/ Ice, Neuromuscular Re-ed, Patient Education, Self Care, Therapeutic Exercise, and Therapeutic Activites     Kenyetta Carreon, PT, DPT      I CERTIFY THE NEED FOR THESE SERVICES FURNISHED UNDER THIS PLAN OF TREATMENT AND WHILE UNDER MY CARE   Physician's comments:     Physician's Signature: ___________________________________________________

## 2023-01-04 NOTE — TELEPHONE ENCOUNTER
"Humira frequency escalation from q14 days to weekly due to low drug level.    [facility] insurance test claim 4 pens/28 days successfully adjudicates $5 copay.  "You have 0 retail fills left" ([facility] is out of network- plan allows only 1x out of network"retail fill").  Adjudication details states "PA on file expires 10/24/23."     Called [facility] PA dept ( [phone]) to confirm weekly dosing is covered under current auth.  Spoke with Shikha R. Who confirmed that 12 pens/84 days or 4 pens/28 days is covered under current auth (expires 10/24/23).  No auth or ref # available.  Rep states can document her name Shikha FUENTES as reference.      Spoke with pt- let her know current auth covers weekly dosing.  She is locked in filling Rx at  [facility] [address]    Encouraged pt to call [facility] this afternoon so that she can set up shipment soon to get her Rx delivered on time for dose due Friday. Gave pt [facility] ph # in case she has any issues at [facility], told her she can call us and we can contact [facility].      Rx routed- De-enrolling.      "

## 2023-01-04 NOTE — TELEPHONE ENCOUNTER
Andrew, this is Delma Matthews with [facility].  We are working on your prescription that your doctor has sent us. We will be working with your insurance to get this approved for you. We will be calling you along the way with updates on your medication.  If you have any questions, you can reach us at [phone].    Welcome call outcome: Patient/caregiver reached

## 2023-01-05 RX ORDER — ADALIMUMAB 40MG/0.4ML
40 KIT SUBCUTANEOUS
Qty: 4 PEN | Refills: 11 | Status: SHIPPED | OUTPATIENT
Start: 2023-01-05 | End: 2023-02-06 | Stop reason: SDUPTHER

## 2023-01-05 NOTE — TELEPHONE ENCOUNTER
"MDO sent another Rx to [facility] for weekly dosing of Humira.  [facility] was routed to [facility] as per documentation yesterday   "[facility] insurance test claim 4 pens/28 days successfully adjudicates $5 copay.  "You have 0 retail fills left" ([facility] is out of network- plan allows only 1x out of network"retail fill").  Adjudication details states "PA on file expires 10/24/23."     Called [facility] dept ( [phone]) to confirm weekly dosing is covered under current auth.  Spoke with Shikha CHAUDHRY Who confirmed that 12 pens/84 days or 4 pens/28 days is covered under current auth (expires 10/24/23).  No auth or ref # available.  Rep states can document her name Shikha FUENTES as reference. "    Called pt to confirm if she was able to get her order for weekly dosing of Hum[facility] from [facility].  Pt said she will call shortly and call [facility] back if any issues.    "

## 2023-01-09 ENCOUNTER — PATIENT MESSAGE (OUTPATIENT)
Dept: PHARMACY | Facility: CLINIC | Age: 63
End: 2023-01-09
Payer: COMMERCIAL

## 2023-01-10 ENCOUNTER — CLINICAL SUPPORT (OUTPATIENT)
Dept: REHABILITATION | Facility: HOSPITAL | Age: 63
End: 2023-01-10
Payer: COMMERCIAL

## 2023-01-10 DIAGNOSIS — Z74.09 DECREASED FUNCTIONAL MOBILITY AND ENDURANCE: ICD-10-CM

## 2023-01-10 DIAGNOSIS — M25.60 DECREASED RANGE OF MOTION: Primary | ICD-10-CM

## 2023-01-10 DIAGNOSIS — R53.1 DECREASED STRENGTH: ICD-10-CM

## 2023-01-10 PROCEDURE — 97112 NEUROMUSCULAR REEDUCATION: CPT

## 2023-01-10 PROCEDURE — 97110 THERAPEUTIC EXERCISES: CPT

## 2023-01-10 PROCEDURE — 97140 MANUAL THERAPY 1/> REGIONS: CPT

## 2023-01-10 NOTE — PROGRESS NOTES
[facility]   Physical Therapy Treatment Note     Name: Angelica Pate [place]  Clinic Number: [id number]    Therapy Diagnosis:   Encounter Diagnoses   Name Primary?    Decreased range of motion Yes    Decreased strength     Decreased functional mobility and endurance      Physician: Sedrick Ferrari MD    Visit Date: 1/10/2023    Physician Orders: Physical Therapy Evaluation and Treat  Medical Diagnosis from Referral:   M54.2 (ICD-10-CM) - Neck pain on left side   M25.512 (ICD-10-CM) - Left shoulder pain, unspecified chronicity   Evaluation Date: 1/4/2023  Authorization Period Expiration: 01/04/2023  Plan of Care Expiration: 03/29/2023  Progress Note Due: 02/03/2023  Visit # / Visits authorized: 1/20 (+1 Evaluation)  FOTO: 1/3      Precautions: Standard    PTA Visit #: 0/5     Time In: 1:15 PM  Time Out: 2:00 PM  Total Billable Time: 45 minutes    SUBJECTIVE     Patient reports: she is excited to be starting therapy and reports no new complaints since initial evaluation.  She (N/A) compliant with home exercise program.  Response to previous treatment: none yet  Functional change: none yet    Pain: not asked/10  Location: left shoulder    OBJECTIVE     Objective Measures updated at progress report unless specified.     Treatment     Angelica received the treatments listed below:      THERAPEUTIC EXERCISES: to develop strength, endurance, ROM, flexibility, posture and core stabilization for (23)  minutes including: x = performed today    Therapeutic Exercise 1/10/2023    UPPER BODY ERGOMETER  x 3 minutes forward, 3 minutes backwards   Pulleys  Flexion  Abduction   X  x   3 minutes  3 minutes   Wall Slides into Flexion x x30         BOLD = new this visit  Plan for Next Visit: progress as tolerated      NEUROMUSCULAR RE-EDUCATION activities to improve: Coordination, Kinesthetic, Sense, and Proprioception for (10) minutes. The following activities were included: x = exercises performed      Neuromuscular Re-Education 1/10/2023    Scapular Retractions x 3x10 yellow band   Shoulder Extensions x 3x10 yellow band   Scapular W's x 3x10 yellow band         BOLD= new this visit  Plan for Next Visit: progress as tolerated      MANUAL THERAPY TECHNIQUES: Joint mobilizations, Soft tissue Mobilization, and mobilization with movement were applied to the area listed below for (12) minutes, including: x = exercises performed     Manual Intervention 1/10/2023    Soft Tissue Mobilization x Left biceps and short biceps tendon   Joint Mobilizations     Mobilization with movement     Functional Dry Needling      BOLD = new this visit  Plan for Next Visit: as needed        Patient Education and Home Exercises     Home Exercises Provided and Patient Education Provided     Education provided:   PURPOSE: Patient educated on the impairments noted above and the effects of physical therapy intervention to improve overall condition and QOL.   EXERCISE: Patient was educated on all the above exercise prior/during/after for proper posture, positioning, and execution for safe performance with home exercise program.   STRENGTH: Patient educated on the importance of improved core and extremity strength in order to improve alignment of the spine and extremities with static positions and dynamic movement.     Written Home Exercises Provided: Patient instructed to cont prior HEP. Exercises were reviewed and Angelica was able to demonstrate them prior to the end of the session.  Angelica demonstrated good  understanding of the education provided. See EMR under Patient Instructions for exercises provided during therapy sessions    ASSESSMENT   Patient tolerated first treatment session well. Patient with good tolerance to pulleys into flexion and reported her shoulder started to feel better. Patient with some discomfort in her left biceps with elbow extension into pulley abduction so manual therapy was performed to left biceps short head  tendon and musculature and patient with good response and decreased tissue resistance at end of visit. Patient given therabands to take home and given HOME EXERCISE PROGRAM (see EMR for details). Plan to continue progressing as tolerated.    Angelica Is progressing well towards her goals.   Patient prognosis is Good.     Patient will continue to benefit from skilled outpatient physical therapy to address the deficits listed in the problem list box on initial evaluation, provide patient/family education and to maximize patient's level of independence in the home and community environment.     Patient's spiritual, cultural and educational needs considered and patient agreeable to plan of care and goals.     Anticipated barriers to physical therapy: co-morbidities, sedentary lifestyle, chronicity of condition, and occupation    GOALS:  Reviewed: 01/10/2023     Short Term Goals:  6 weeks Status  Date Met   PAIN: Patient will report worst pain of 5/10 in order to progress toward max functional ability and improve quality of life. [x] Progressing  [] Met  [] Not Met     FUNCTION: Patient will demonstrate improved function as indicated by a functional limitation score of less than or equal to 42 out of 100 on FOTO. [x] Progressing  [] Met  [] Not Met     MOBILITY: Patient will improve Range of Motion to 50% of stated goals, listed in objective measures above, in order to progress towards independence with functional activities.  [x] Progressing  [] Met  [] Not Met     STRENGTH: Patient will improve strength to 50% of stated goals, listed in objective measures above, in order to progress towards independence with functional activities.  [x] Progressing  [] Met  [] Not Met        Long Term Goals:  12 weeks Status Date Met   PAIN: Patient will report worst pain of 3/10 in order to progress toward max functional ability and improve quality of life [x] Progressing  [] Met  [] Not Met     FUNCTION: Patient will demonstrate  improved function as indicated by a functional limitation score of less than or equal to 34 out of 100 on FOTO. [x] Progressing  [] Met  [] Not Met     MOBILITY: Patient will improve Range of Motion to stated goals, listed in objective measures above, in order to return to maximal functional potential and improve quality of life. [x] Progressing  [] Met  [] Not Met     STRENGTH: Patient will improve strength to stated goals, listed in objective measures above, in order to improve functional independence and quality of life. [x] Progressing  [] Met  [] Not Met     Patient will return to normal ADL's, IADL's, community involvement, recreational activities, and work-related activities with less than or equal to 3/10 pain and maximal function.  [x] Progressing  [] Met  [] Not Met          PLAN     Continue Plan of Care (POC) and progress per patient tolerance. See treatment section for details on planned progressions next session.  Plan of Care Due: 03/29/2023    Kenyetta Carreon, PT

## 2023-01-13 ENCOUNTER — DOCUMENTATION ONLY (OUTPATIENT)
Dept: REHABILITATION | Facility: HOSPITAL | Age: 63
End: 2023-01-13

## 2023-01-13 ENCOUNTER — IMMUNIZATION (OUTPATIENT)
Dept: PHARMACY | Facility: CLINIC | Age: 63
End: 2023-01-13
Payer: COMMERCIAL

## 2023-01-13 ENCOUNTER — CLINICAL SUPPORT (OUTPATIENT)
Dept: REHABILITATION | Facility: HOSPITAL | Age: 63
End: 2023-01-13
Payer: COMMERCIAL

## 2023-01-13 DIAGNOSIS — M25.60 DECREASED RANGE OF MOTION: Primary | ICD-10-CM

## 2023-01-13 DIAGNOSIS — R53.1 DECREASED STRENGTH: ICD-10-CM

## 2023-01-13 DIAGNOSIS — Z74.09 DECREASED FUNCTIONAL MOBILITY AND ENDURANCE: ICD-10-CM

## 2023-01-13 PROCEDURE — 97112 NEUROMUSCULAR REEDUCATION: CPT | Mod: CQ

## 2023-01-13 PROCEDURE — 97140 MANUAL THERAPY 1/> REGIONS: CPT | Mod: CQ

## 2023-01-13 PROCEDURE — 97110 THERAPEUTIC EXERCISES: CPT | Mod: CQ

## 2023-01-13 NOTE — PROGRESS NOTES
"[facility]   Physical Therapy Treatment Note     Name: Angelica Pate [place]  Clinic Number: [id number]    Therapy Diagnosis:   Encounter Diagnoses   Name Primary?    Decreased range of motion Yes    Decreased strength     Decreased functional mobility and endurance      Physician: Sedrick Ferrari MD    Visit Date: 1/13/2023    Physician Orders: Physical Therapy Evaluation and Treat  Medical Diagnosis from Referral:   M54.2 (ICD-10-CM) - Neck pain on left side   M25.512 (ICD-10-CM) - Left shoulder pain, unspecified chronicity   Evaluation Date: 1/4/2023  Authorization Period Expiration: 01/04/2023  Plan of Care Expiration: 03/29/2023  Progress Note Due: 02/03/2023  Visit # / Visits authorized: 2/20 (+1 Evaluation)  FOTO: 1/3      Precautions: Standard    PTA Visit #: 1/5     Time In: 12:30 PM  Time Out: 1:15 PM  Total Billable Time: 41 minutes    SUBJECTIVE     Patient reports: took 2 Tylenol arthritis and 1 Naproxen before this therapy session. Intermittent pain with activities due to a "catch".     She was partially compliant with home exercise program.    Response to previous treatment: felt good after last session    Functional change: decreased sleep, buckling seat belt    Pain: 2/10  Location: left shoulder    OBJECTIVE     Objective Measures updated at progress report unless specified.     Treatment     Angelica received the treatments listed below:      THERAPEUTIC EXERCISES: to develop strength, endurance, ROM, flexibility, posture and core stabilization for (17)  minutes including: x = performed today    Therapeutic Exercise 1/13/2023    UPPER BODY ERGOMETER  x 3 minutes forward, 3 minutes backwards   Pulleys  Flexion  Abduction   X  x   3 minutes  3 minutes   Wall Slides into Flexion x X 30         BOLD = new this visit  Plan for Next Visit: progress as tolerated      NEUROMUSCULAR RE-EDUCATION activities to improve: Coordination, Kinesthetic, Sense, and Proprioception for (10) " minutes. The following activities were included: x = exercises performed     Neuromuscular Re-Education 1/13/2023    Scapular Retractions x 3x10 yellow band   Shoulder Extensions x 3x10 yellow band   Scapular W's  3x10 yellow band         BOLD= new this visit  Plan for Next Visit: progress as tolerated      MANUAL THERAPY TECHNIQUES: Joint mobilizations, Soft tissue Mobilization, and mobilization with movement were applied to the area listed below for (14) minutes, including: x = exercises performed     Manual Intervention 1/13/2023    Soft Tissue Mobilization x right suboccipital muscles , cervical paraspinals, upper trapezius, and periscapular musculature   Joint Mobilizations     Mobilization with movement     Functional Dry Needling      BOLD = new this visit  Plan for Next Visit: as needed        Patient Education and Home Exercises     Home Exercises Provided and Patient Education Provided     Education provided:   PURPOSE: Patient educated on the impairments noted above and the effects of physical therapy intervention to improve overall condition and QOL.   EXERCISE: Patient was educated on all the above exercise prior/during/after for proper posture, positioning, and execution for safe performance with home exercise program.   STRENGTH: Patient educated on the importance of improved core and extremity strength in order to improve alignment of the spine and extremities with static positions and dynamic movement.   1/13/2023: educated on regular compliance with home exercise program     Written Home Exercises Provided: Patient instructed to cont prior HEP. Exercises were reviewed and Angelica was able to demonstrate them prior to the end of the session.  Angelica demonstrated good  understanding of the education provided. See EMR under Patient Instructions for exercises provided during therapy sessions    ASSESSMENT   Patient tolerated first treatment session well. Patient with good tolerance to pulleys into  flexion with increased range of motion noted in abduction. manual therapy was performed with patient reporting she feels good following.      Angelica Is progressing well towards her goals.   Patient prognosis is Good.     Patient will continue to benefit from skilled outpatient physical therapy to address the deficits listed in the problem list box on initial evaluation, provide patient/family education and to maximize patient's level of independence in the home and community environment.     Patient's spiritual, cultural and educational needs considered and patient agreeable to plan of care and goals.     Anticipated barriers to physical therapy: co-morbidities, sedentary lifestyle, chronicity of condition, and occupation    GOALS:  Reviewed: 01/10/2023     Short Term Goals:  6 weeks Status  Date Met   PAIN: Patient will report worst pain of 5/10 in order to progress toward max functional ability and improve quality of life. [x] Progressing  [] Met  [] Not Met     FUNCTION: Patient will demonstrate improved function as indicated by a functional limitation score of less than or equal to 42 out of 100 on FOTO. [x] Progressing  [] Met  [] Not Met     MOBILITY: Patient will improve Range of Motion to 50% of stated goals, listed in objective measures above, in order to progress towards independence with functional activities.  [x] Progressing  [] Met  [] Not Met     STRENGTH: Patient will improve strength to 50% of stated goals, listed in objective measures above, in order to progress towards independence with functional activities.  [x] Progressing  [] Met  [] Not Met        Long Term Goals:  12 weeks Status Date Met   PAIN: Patient will report worst pain of 3/10 in order to progress toward max functional ability and improve quality of life [x] Progressing  [] Met  [] Not Met     FUNCTION: Patient will demonstrate improved function as indicated by a functional limitation score of less than or equal to 34 out of 100 on  FOTO. [x] Progressing  [] Met  [] Not Met     MOBILITY: Patient will improve Range of Motion to stated goals, listed in objective measures above, in order to return to maximal functional potential and improve quality of life. [x] Progressing  [] Met  [] Not Met     STRENGTH: Patient will improve strength to stated goals, listed in objective measures above, in order to improve functional independence and quality of life. [x] Progressing  [] Met  [] Not Met     Patient will return to normal ADL's, IADL's, community involvement, recreational activities, and work-related activities with less than or equal to 3/10 pain and maximal function.  [x] Progressing  [] Met  [] Not Met          PLAN     Continue Plan of Care (POC) and progress per patient tolerance. See treatment section for details on planned progressions next session.  Plan of Care Due: 03/29/2023    Delvin Hu, PTA

## 2023-01-13 NOTE — PROGRESS NOTES
PT/PTA met face to face to discuss pt's treatment plan and progress towards established goals. Pt will be seen by a physical therapist minimally every 6th visit or every 30 days.        Delvin Hu PTA

## 2023-01-17 ENCOUNTER — PATIENT MESSAGE (OUTPATIENT)
Dept: GASTROENTEROLOGY | Facility: CLINIC | Age: 63
End: 2023-01-17
Payer: COMMERCIAL

## 2023-01-18 ENCOUNTER — CLINICAL SUPPORT (OUTPATIENT)
Dept: REHABILITATION | Facility: HOSPITAL | Age: 63
End: 2023-01-18
Payer: COMMERCIAL

## 2023-01-18 DIAGNOSIS — M25.60 DECREASED RANGE OF MOTION: Primary | ICD-10-CM

## 2023-01-18 DIAGNOSIS — Z74.09 DECREASED FUNCTIONAL MOBILITY AND ENDURANCE: ICD-10-CM

## 2023-01-18 DIAGNOSIS — R53.1 DECREASED STRENGTH: ICD-10-CM

## 2023-01-18 PROCEDURE — 97112 NEUROMUSCULAR REEDUCATION: CPT

## 2023-01-18 PROCEDURE — 97110 THERAPEUTIC EXERCISES: CPT

## 2023-01-18 NOTE — PROGRESS NOTES
"[facility]   Physical Therapy Treatment Note     Name: Angelica Pate [facility] Number: [id number]    Therapy Diagnosis:   Encounter Diagnoses   Name Primary?    Decreased range of motion Yes    Decreased strength     Decreased functional mobility and endurance      Physician: Sedrick Ferrari MD    Visit Date: 1/18/2023    Physician Orders: Physical Therapy Evaluation and Treat  Medical Diagnosis from Referral:   M54.2 (ICD-10-CM) - Neck pain on left side   M25.512 (ICD-10-CM) - Left shoulder pain, unspecified chronicity   Evaluation Date: 1/4/2023  Authorization Period Expiration: 01/04/2023  Plan of Care Expiration: 03/29/2023  Progress Note Due: 02/03/2023  Visit # / Visits authorized: 3/20 (+1 Evaluation)  FOTO: 1/3      Precautions: Standard    PTA Visit #: 1/5     Time In: 12:30 PM  Time Out: 1:10 PM  Total Billable Time: 40 minutes    SUBJECTIVE     Patient reports: she was reaching up with her unaffected shoulder to screw in a light bulb and the light blew up and it startled her so she experienced a jump scare and she felt like after that happened her shoulder "was back in place." So patient is wondering if her shoulder has been mildly dislocated this whole time because since then she feels like she has experienced no shoulder pain and no limitations in RANGE OF MOTION.    She was partially compliant with home exercise program.    Response to previous treatment: felt good after last session    Functional change: decreased sleep, buckling seat belt    Pain: 0/10  Location: left shoulder    OBJECTIVE     Objective Measures updated at progress report unless specified.     Treatment     Angelica received the treatments listed below:      THERAPEUTIC EXERCISES: to develop strength, endurance, ROM, flexibility, posture and core stabilization for (15)  minutes including: x = performed today    Therapeutic Exercise 1/18/2023    UPPER BODY ERGOMETER  x 3 minutes forward, 3 minutes " backwards   Pulleys  Flexion  Abduction        3 minutes  3 minutes   Wall Slides into Flexion  X 30   Wall clocks x X10 3 ways with yellow band   Serratus push ups x X30 on tall mat    BOLD = new this visit  Plan for Next Visit: progress as tolerated      NEUROMUSCULAR RE-EDUCATION activities to improve: Coordination, Kinesthetic, Sense, and Proprioception for (25) minutes. The following activities were included: x = exercises performed     Neuromuscular Re-Education 1/18/2023    Scapular Retractions x 3x10 yellow band   Shoulder Extensions x 3x10 yellow band   Scapular W's x 3x10 yellow band   Shoulder isometrics 4 ways with ball x X30 in flexion, extension, INTERNAL ROTATION, and EXTERNAL ROTATION     BOLD= new this visit  Plan for Next Visit: progress as tolerated      MANUAL THERAPY TECHNIQUES: Joint mobilizations, Soft tissue Mobilization, and mobilization with movement were applied to the area listed below for (0) minutes, including: x = exercises performed     Manual Intervention 1/18/2023    Soft Tissue Mobilization  right suboccipital muscles , cervical paraspinals, upper trapezius, and periscapular musculature   Joint Mobilizations     Mobilization with movement     Functional Dry Needling      BOLD = new this visit  Plan for Next Visit: as needed        Patient Education and Home Exercises     Home Exercises Provided and Patient Education Provided     Education provided:   PURPOSE: Patient educated on the impairments noted above and the effects of physical therapy intervention to improve overall condition and QOL.   EXERCISE: Patient was educated on all the above exercise prior/during/after for proper posture, positioning, and execution for safe performance with home exercise program.   STRENGTH: Patient educated on the importance of improved core and extremity strength in order to improve alignment of the spine and extremities with static positions and dynamic movement.   1/13/2023: educated on regular  compliance with home exercise program   01/18/2023: HOME EXERCISE PROGRAM provided. See EMR for details    Written Home Exercises Provided: Patient instructed to cont prior HEP. Exercises were reviewed and Angelica was able to demonstrate them prior to the end of the session.  Angelica demonstrated good  understanding of the education provided. See EMR under Patient Instructions for exercises provided during therapy sessions    ASSESSMENT   Because patient reported potential shoulder instability today, treatment targeted shoulder stabilization exercises. Patient tolerated treatment well, but required extra rest breaks due to fatigue. Plan to continue monitoring symptoms and continue progressing as tolerated; no adverse effects noted.    Angelica Is progressing well towards her goals.   Patient prognosis is Good.     Patient will continue to benefit from skilled outpatient physical therapy to address the deficits listed in the problem list box on initial evaluation, provide patient/family education and to maximize patient's level of independence in the home and community environment.     Patient's spiritual, cultural and educational needs considered and patient agreeable to plan of care and goals.     Anticipated barriers to physical therapy: co-morbidities, sedentary lifestyle, chronicity of condition, and occupation    GOALS:  Reviewed: 01/18/2023     Short Term Goals:  6 weeks Status  Date Met   PAIN: Patient will report worst pain of 5/10 in order to progress toward max functional ability and improve quality of life. [x] Progressing  [] Met  [] Not Met     FUNCTION: Patient will demonstrate improved function as indicated by a functional limitation score of less than or equal to 42 out of 100 on FOTO. [x] Progressing  [] Met  [] Not Met     MOBILITY: Patient will improve Range of Motion to 50% of stated goals, listed in objective measures above, in order to progress towards independence with functional activities.  [x]  Progressing  [] Met  [] Not Met     STRENGTH: Patient will improve strength to 50% of stated goals, listed in objective measures above, in order to progress towards independence with functional activities.  [x] Progressing  [] Met  [] Not Met        Long Term Goals:  12 weeks Status Date Met   PAIN: Patient will report worst pain of 3/10 in order to progress toward max functional ability and improve quality of life [x] Progressing  [] Met  [] Not Met     FUNCTION: Patient will demonstrate improved function as indicated by a functional limitation score of less than or equal to 34 out of 100 on FOTO. [x] Progressing  [] Met  [] Not Met     MOBILITY: Patient will improve Range of Motion to stated goals, listed in objective measures above, in order to return to maximal functional potential and improve quality of life. [x] Progressing  [] Met  [] Not Met     STRENGTH: Patient will improve strength to stated goals, listed in objective measures above, in order to improve functional independence and quality of life. [x] Progressing  [] Met  [] Not Met     Patient will return to normal ADL's, IADL's, community involvement, recreational activities, and work-related activities with less than or equal to 3/10 pain and maximal function.  [x] Progressing  [] Met  [] Not Met          PLAN     Continue Plan of Care (POC) and progress per patient tolerance. See treatment section for details on planned progressions next session.  Plan of Care Due: 03/29/2023    Kenyetta Carreon, PT

## 2023-01-20 ENCOUNTER — CLINICAL SUPPORT (OUTPATIENT)
Dept: REHABILITATION | Facility: HOSPITAL | Age: 63
End: 2023-01-20
Payer: COMMERCIAL

## 2023-01-20 DIAGNOSIS — R53.1 DECREASED STRENGTH: ICD-10-CM

## 2023-01-20 DIAGNOSIS — Z74.09 DECREASED FUNCTIONAL MOBILITY AND ENDURANCE: ICD-10-CM

## 2023-01-20 DIAGNOSIS — M25.60 DECREASED RANGE OF MOTION: Primary | ICD-10-CM

## 2023-01-20 PROCEDURE — 97110 THERAPEUTIC EXERCISES: CPT | Mod: CQ

## 2023-01-20 PROCEDURE — 97112 NEUROMUSCULAR REEDUCATION: CPT | Mod: CQ

## 2023-01-20 PROCEDURE — 97140 MANUAL THERAPY 1/> REGIONS: CPT | Mod: CQ

## 2023-01-20 NOTE — PROGRESS NOTES
"[facility]   Physical Therapy Treatment Note     Name: Angelica Pate [place]  Clinic Number: [id number]    Therapy Diagnosis:   Encounter Diagnoses   Name Primary?    Decreased range of motion Yes    Decreased strength     Decreased functional mobility and endurance      Physician: Sedrick Ferrari MD    Visit Date: 1/20/2023    Physician Orders: Physical Therapy Evaluation and Treat  Medical Diagnosis from Referral:   M54.2 (ICD-10-CM) - Neck pain on left side   M25.512 (ICD-10-CM) - Left shoulder pain, unspecified chronicity   Evaluation Date: 1/4/2023  Authorization Period Expiration: 01/04/2023  Plan of Care Expiration: 03/29/2023  Progress Note Due: 02/03/2023  Visit # / Visits authorized: 4/20 (+1 Evaluation)  FOTO: 1/3      Precautions: Standard    PTA Visit #: 1/5     Time In: 1:15 PM  Time Out: 2:00 PM  Total Billable Time: 36 minutes    SUBJECTIVE     Patient reports: she was reaching up with her unaffected shoulder to screw in a light bulb and the light blew up and it startled her so she experienced a jump scare and she felt like after that happened her shoulder "was back in place." So patient is wondering if her shoulder has been mildly dislocated this whole time because since then she feels like she has experienced no shoulder pain and no limitations in RANGE OF MOTION. She states she feels like she wants an MRI to see what is exactly wrong with her left shoulder    She was partially compliant with home exercise program.    Response to previous treatment: felt good after last session    Functional change: decreased sleep, buckling seat belt    Pain: 0/10  Location: left shoulder    OBJECTIVE     Objective Measures updated at progress report unless specified.     Treatment     Angelica received the treatments listed below:      THERAPEUTIC EXERCISES: to develop strength, endurance, ROM, flexibility, posture and core stabilization for (16)  minutes including: x = performed " today    Therapeutic Exercise 1/20/2023    UPPER BODY ERGOMETER  x 3 minutes forward, 3 minutes backwards   Pulleys  Flexion  Abduction        3 minutes  3 minutes   Wall Slides into Flexion x X 30 2 second hold    Wall clocks x x 3 ways with yellow band bilateral   Serratus push ups x X30 on tall mat    BOLD = new this visit  Plan for Next Visit: progress as tolerated      NEUROMUSCULAR RE-EDUCATION activities to improve: Coordination, Kinesthetic, Sense, and Proprioception for (12) minutes. The following activities were included: x = exercises performed     Neuromuscular Re-Education 1/20/2023    Scapular Retractions x 3x10 yellow band   Shoulder Extensions x 3x10 yellow band   Scapular W's  3x10 yellow band   Shoulder isometrics 4 ways with ball  X30 in flexion, extension, INTERNAL ROTATION, and EXTERNAL ROTATION     BOLD= new this visit  Plan for Next Visit: progress as tolerated      MANUAL THERAPY TECHNIQUES: Joint mobilizations, Soft tissue Mobilization, and mobilization with movement were applied to the area listed below for (12) minutes, including: x = exercises performed     Manual Intervention 1/20/2023    Soft Tissue Mobilization x right suboccipital muscles , cervical paraspinals, upper trapezius, and periscapular musculature   Joint Mobilizations     Mobilization with movement     Functional Dry Needling      BOLD = new this visit  Plan for Next Visit: as needed        Patient Education and Home Exercises     Home Exercises Provided and Patient Education Provided     Education provided:   PURPOSE: Patient educated on the impairments noted above and the effects of physical therapy intervention to improve overall condition and QOL.   EXERCISE: Patient was educated on all the above exercise prior/during/after for proper posture, positioning, and execution for safe performance with home exercise program.   STRENGTH: Patient educated on the importance of improved core and extremity strength in order to  improve alignment of the spine and extremities with static positions and dynamic movement.   1/13/2023: educated on regular compliance with home exercise program   01/18/2023: HOME EXERCISE PROGRAM provided. See EMR for details    Written Home Exercises Provided: Patient instructed to cont prior HEP. Exercises were reviewed and Angelica was able to demonstrate them prior to the end of the session.  Angelica demonstrated good  understanding of the education provided. See EMR under Patient Instructions for exercises provided during therapy sessions    ASSESSMENT   Because patient tolerate treatment today with no complaints with exercises except with fatigue with wall clocks. She is tender to palpation at her left cervical spine. She may benefit from additional resistance with standing scapula exercises.    Angelica Is progressing well towards her goals.   Patient prognosis is Good.     Patient will continue to benefit from skilled outpatient physical therapy to address the deficits listed in the problem list box on initial evaluation, provide patient/family education and to maximize patient's level of independence in the home and community environment.     Patient's spiritual, cultural and educational needs considered and patient agreeable to plan of care and goals.     Anticipated barriers to physical therapy: co-morbidities, sedentary lifestyle, chronicity of condition, and occupation    GOALS:  Reviewed: 01/18/2023     Short Term Goals:  6 weeks Status  Date Met   PAIN: Patient will report worst pain of 5/10 in order to progress toward max functional ability and improve quality of life. [x] Progressing  [] Met  [] Not Met     FUNCTION: Patient will demonstrate improved function as indicated by a functional limitation score of less than or equal to 42 out of 100 on FOTO. [x] Progressing  [] Met  [] Not Met     MOBILITY: Patient will improve Range of Motion to 50% of stated goals, listed in objective measures above, in  order to progress towards independence with functional activities.  [x] Progressing  [] Met  [] Not Met     STRENGTH: Patient will improve strength to 50% of stated goals, listed in objective measures above, in order to progress towards independence with functional activities.  [x] Progressing  [] Met  [] Not Met        Long Term Goals:  12 weeks Status Date Met   PAIN: Patient will report worst pain of 3/10 in order to progress toward max functional ability and improve quality of life [x] Progressing  [] Met  [] Not Met     FUNCTION: Patient will demonstrate improved function as indicated by a functional limitation score of less than or equal to 34 out of 100 on FOTO. [x] Progressing  [] Met  [] Not Met     MOBILITY: Patient will improve Range of Motion to stated goals, listed in objective measures above, in order to return to maximal functional potential and improve quality of life. [x] Progressing  [] Met  [] Not Met     STRENGTH: Patient will improve strength to stated goals, listed in objective measures above, in order to improve functional independence and quality of life. [x] Progressing  [] Met  [] Not Met     Patient will return to normal ADL's, IADL's, community involvement, recreational activities, and work-related activities with less than or equal to 3/10 pain and maximal function.  [x] Progressing  [] Met  [] Not Met          PLAN     Continue Plan of Care (POC) and progress per patient tolerance. See treatment section for details on planned progressions next session.  Plan of Care Due: 03/29/2023    Delvin Hu PTA

## 2023-01-25 ENCOUNTER — CLINICAL SUPPORT (OUTPATIENT)
Dept: REHABILITATION | Facility: HOSPITAL | Age: 63
End: 2023-01-25
Payer: COMMERCIAL

## 2023-01-25 DIAGNOSIS — Z74.09 DECREASED FUNCTIONAL MOBILITY AND ENDURANCE: ICD-10-CM

## 2023-01-25 DIAGNOSIS — M25.60 DECREASED RANGE OF MOTION: Primary | ICD-10-CM

## 2023-01-25 DIAGNOSIS — R53.1 DECREASED STRENGTH: ICD-10-CM

## 2023-01-25 PROCEDURE — 97112 NEUROMUSCULAR REEDUCATION: CPT

## 2023-01-25 PROCEDURE — 97110 THERAPEUTIC EXERCISES: CPT

## 2023-01-25 NOTE — PROGRESS NOTES
"[facility]   Physical Therapy Treatment Note     Name: Angelica Pate Dillon  Clinic Number: [id number]    Therapy Diagnosis:   Encounter Diagnoses   Name Primary?    Decreased range of motion Yes    Decreased strength     Decreased functional mobility and endurance      Physician: Sedrick Ferrari MD    Visit Date: 1/25/2023    Physician Orders: Physical Therapy Evaluation and Treat  Medical Diagnosis from Referral:   M54.2 (ICD-10-CM) - Neck pain on left side   M25.512 (ICD-10-CM) - Left shoulder pain, unspecified chronicity   Evaluation Date: 1/4/2023  Authorization Period Expiration: 01/04/2023  Plan of Care Expiration: 03/29/2023  Progress Note Due: 02/03/2023  Visit # / Visits authorized: 5/20 (+1 Evaluation)  FOTO: 1/3      Precautions: Standard    PTA Visit #: 1/5     Time In: 2:00 PM  Time Out: 2:40 PM  Total Billable Time: 40 minutes    SUBJECTIVE     Patient reports: ever since her shoulder "popped back into place" she has been feeling better overall, but she states that "something still isn't right."    She was partially compliant with home exercise program.    Response to previous treatment: felt good after last session    Functional change: decreased sleep, buckling seat belt    Pain: 2-3/10  Location: left shoulder    OBJECTIVE     Objective Measures updated at progress report unless specified.     Treatment     Angelica received the treatments listed below:      THERAPEUTIC EXERCISES: to develop strength, endurance, ROM, flexibility, posture and core stabilization for (23)  minutes including: x = performed today    Therapeutic Exercise 1/25/2023    UPPER BODY ERGOMETER  x 3 minutes forward, 3 minutes backwards   Pulleys  Flexion  Abduction   x     3 minutes  3 minutes   Wall Slides into Flexion  X 30 2 second hold    Wall clocks x x 3 ways with yellow band bilateral   Serratus push ups x X30 on tall mat   Backwards Shoulder Rolls x x30    BOLD = new this visit  Plan for Next " Visit: progress as tolerated      NEUROMUSCULAR RE-EDUCATION activities to improve: Coordination, Kinesthetic, Sense, and Proprioception for (17) minutes. The following activities were included: x = exercises performed     Neuromuscular Re-Education 1/25/2023    Scapular Retractions x 3x10 red band   Shoulder Extensions x 3x10 red band   Scapular W's x 3x10 red band   Shoulder isometrics 4 ways with ball x X30 in flexion, extension, INTERNAL ROTATION, and EXTERNAL ROTATION     BOLD= new this visit  Plan for Next Visit: progress as tolerated      MANUAL THERAPY TECHNIQUES: Joint mobilizations, Soft tissue Mobilization, and mobilization with movement were applied to the area listed below for (0) minutes, including: x = exercises performed     Manual Intervention 1/25/2023    Soft Tissue Mobilization  right suboccipital muscles , cervical paraspinals, upper trapezius, and periscapular musculature   Joint Mobilizations     Mobilization with movement     Functional Dry Needling      BOLD = new this visit  Plan for Next Visit: as needed        Patient Education and Home Exercises     Home Exercises Provided and Patient Education Provided     Education provided:   PURPOSE: Patient educated on the impairments noted above and the effects of physical therapy intervention to improve overall condition and QOL.   EXERCISE: Patient was educated on all the above exercise prior/during/after for proper posture, positioning, and execution for safe performance with home exercise program.   STRENGTH: Patient educated on the importance of improved core and extremity strength in order to improve alignment of the spine and extremities with static positions and dynamic movement.   1/13/2023: educated on regular compliance with home exercise program   01/18/2023: HOME EXERCISE PROGRAM provided. See EMR for details    Written Home Exercises Provided: Patient instructed to cont prior HEP. Exercises were reviewed and Angelica was able to  demonstrate them prior to the end of the session.  Angelica demonstrated good  understanding of the education provided. See EMR under Patient Instructions for exercises provided during therapy sessions    ASSESSMENT   Increased resistance on scapula strengthening exercises and patient with good tolerance and good fatigue noted. Added backwards shoulder rolls to improve posterior shoulder mobility and provide some pain relief. Patient with good response. Plan to continue progressing as tolerated.    Angelica Is progressing well towards her goals.   Patient prognosis is Good.     Patient will continue to benefit from skilled outpatient physical therapy to address the deficits listed in the problem list box on initial evaluation, provide patient/family education and to maximize patient's level of independence in the home and community environment.     Patient's spiritual, cultural and educational needs considered and patient agreeable to plan of care and goals.     Anticipated barriers to physical therapy: co-morbidities, sedentary lifestyle, chronicity of condition, and occupation    GOALS:  Reviewed: 01/25/2023     Short Term Goals:  6 weeks Status  Date Met   PAIN: Patient will report worst pain of 5/10 in order to progress toward max functional ability and improve quality of life. [x] Progressing  [] Met  [] Not Met     FUNCTION: Patient will demonstrate improved function as indicated by a functional limitation score of less than or equal to 42 out of 100 on FOTO. [x] Progressing  [] Met  [] Not Met     MOBILITY: Patient will improve Range of Motion to 50% of stated goals, listed in objective measures above, in order to progress towards independence with functional activities.  [x] Progressing  [] Met  [] Not Met     STRENGTH: Patient will improve strength to 50% of stated goals, listed in objective measures above, in order to progress towards independence with functional activities.  [x] Progressing  [] Met  [] Not  Met        Long Term Goals:  12 weeks Status Date Met   PAIN: Patient will report worst pain of 3/10 in order to progress toward max functional ability and improve quality of life [x] Progressing  [] Met  [] Not Met     FUNCTION: Patient will demonstrate improved function as indicated by a functional limitation score of less than or equal to 34 out of 100 on FOTO. [x] Progressing  [] Met  [] Not Met     MOBILITY: Patient will improve Range of Motion to stated goals, listed in objective measures above, in order to return to maximal functional potential and improve quality of life. [x] Progressing  [] Met  [] Not Met     STRENGTH: Patient will improve strength to stated goals, listed in objective measures above, in order to improve functional independence and quality of life. [x] Progressing  [] Met  [] Not Met     Patient will return to normal ADL's, IADL's, community involvement, recreational activities, and work-related activities with less than or equal to 3/10 pain and maximal function.  [x] Progressing  [] Met  [] Not Met          PLAN     Continue Plan of Care (POC) and progress per patient tolerance. See treatment section for details on planned progressions next session.  Plan of Care Due: 03/29/2023    Kenyetta Carreon, PT

## 2023-01-31 ENCOUNTER — TELEPHONE (OUTPATIENT)
Dept: REHABILITATION | Facility: HOSPITAL | Age: 63
End: 2023-01-31
Payer: COMMERCIAL

## 2023-01-31 ENCOUNTER — OFFICE VISIT (OUTPATIENT)
Dept: SPORTS MEDICINE | Facility: CLINIC | Age: 63
End: 2023-01-31
Payer: COMMERCIAL

## 2023-01-31 VITALS — WEIGHT: 200.38 LBS | BODY MASS INDEX: 39.34 KG/M2 | HEIGHT: 60 IN

## 2023-01-31 DIAGNOSIS — M25.512 CHRONIC LEFT SHOULDER PAIN: ICD-10-CM

## 2023-01-31 DIAGNOSIS — M25.512 LEFT SHOULDER PAIN, UNSPECIFIED CHRONICITY: Primary | ICD-10-CM

## 2023-01-31 DIAGNOSIS — G89.29 CHRONIC LEFT SHOULDER PAIN: ICD-10-CM

## 2023-01-31 DIAGNOSIS — M25.60 DECREASED RANGE OF MOTION: ICD-10-CM

## 2023-01-31 PROCEDURE — 99214 OFFICE O/P EST MOD 30 MIN: CPT | Mod: 25,S$GLB,, | Performed by: FAMILY MEDICINE

## 2023-01-31 PROCEDURE — 1159F PR MEDICATION LIST DOCUMENTED IN MEDICAL RECORD: ICD-10-PCS | Mod: CPTII,S$GLB,, | Performed by: FAMILY MEDICINE

## 2023-01-31 PROCEDURE — 99214 PR OFFICE/OUTPT VISIT, EST, LEVL IV, 30-39 MIN: ICD-10-PCS | Mod: 25,S$GLB,, | Performed by: FAMILY MEDICINE

## 2023-01-31 PROCEDURE — 1159F MED LIST DOCD IN RCRD: CPT | Mod: CPTII,S$GLB,, | Performed by: FAMILY MEDICINE

## 2023-01-31 PROCEDURE — 99999 PR PBB SHADOW E&M-EST. PATIENT-LVL III: CPT | Mod: PBBFAC,,, | Performed by: FAMILY MEDICINE

## 2023-01-31 PROCEDURE — 20610 DRAIN/INJ JOINT/BURSA W/O US: CPT | Mod: LT,S$GLB,, | Performed by: FAMILY MEDICINE

## 2023-01-31 PROCEDURE — 3008F PR BODY MASS INDEX (BMI) DOCUMENTED: ICD-10-PCS | Mod: CPTII,S$GLB,, | Performed by: FAMILY MEDICINE

## 2023-01-31 PROCEDURE — 99999 PR PBB SHADOW E&M-EST. PATIENT-LVL III: ICD-10-PCS | Mod: PBBFAC,,, | Performed by: FAMILY MEDICINE

## 2023-01-31 PROCEDURE — 3008F BODY MASS INDEX DOCD: CPT | Mod: CPTII,S$GLB,, | Performed by: FAMILY MEDICINE

## 2023-01-31 PROCEDURE — 20610 LARGE JOINT ASPIRATION/INJECTION: L SUBACROMIAL BURSA: ICD-10-PCS | Mod: LT,S$GLB,, | Performed by: FAMILY MEDICINE

## 2023-01-31 RX ORDER — BETAMETHASONE SODIUM PHOSPHATE AND BETAMETHASONE ACETATE 3; 3 MG/ML; MG/ML
6 INJECTION, SUSPENSION INTRA-ARTICULAR; INTRALESIONAL; INTRAMUSCULAR; SOFT TISSUE
Status: DISCONTINUED | OUTPATIENT
Start: 2023-01-31 | End: 2023-01-31 | Stop reason: HOSPADM

## 2023-01-31 RX ADMIN — BETAMETHASONE SODIUM PHOSPHATE AND BETAMETHASONE ACETATE 6 MG: 3; 3 INJECTION, SUSPENSION INTRA-ARTICULAR; INTRALESIONAL; INTRAMUSCULAR; SOFT TISSUE at 11:01

## 2023-01-31 NOTE — PATIENT INSTRUCTIONS
"Apply ice to affected area three times a day for (15) fifteen minutes for the next 48 hours, and reduce activity during that time.  Voltaren gel three times a day to affected area if recommended.  Oral medication if recommended.  Physical therapy if recommended at home or at clinic.  Keep recheck visit.     Patient Education       Rotator Cuff Injury   The Basics   Written by the doctors and editors at [facility]   What is a rotator cuff injury? -- A rotator cuff injury is a condition that can cause shoulder pain. The rotator cuff is made up of 4 shoulder muscles and their tendons. Tendons are strong bands of tissue that connect muscles to bones.  People can get different types of rotator cuff injuries. One common injury is "tendinopathy," which is when people have a problem with 1 of their tendons. In most people with tendinopathy, the tendons are not inflamed or swollen. If they do get inflamed or swollen, doctors call it "tendinitis." Tendinopathy and tendinitis can happen if people use their rotator cuff muscles too much or do a lot of activity with their arms overhead.  Another type of rotator cuff injury is a tear in a tendon. Tears can happen if a person falls on the shoulder or moves the shoulder too fast and with too much force. Tears can also happen as a tendon wears out over time.  What are the symptoms of a rotator cuff injury? -- Most people with tendinopathy or tendinitis have pain where the shoulder meets the top of the arm and down the outer part of the upper arm. The pain is usually worse when they move their arm over their head or lie on their shoulder.  People with a torn tendon usually have shoulder pain and might also have trouble raising their arm overhead.  Will I need tests? -- You might. Your doctor or nurse will talk with you and do an exam. If he or she suspects a tear, you might need an imaging test of the shoulder. Imaging tests create pictures of the inside of the body.  How is a rotator " cuff injury treated? -- Many rotator cuff injuries get better on their own, but they can take months to heal completely. To help your shoulder get better, you can:  Rest your shoulder - Avoid doing activities that cause pain or strain your shoulder, such as raising your arm overhead or reaching behind you. In general, try to keep your arm down, close to, and in front of your body. But you can move your shoulder gently if you need to.  Ice your shoulder - Put a cold gel pack, bag of ice, or bag of frozen vegetables on the injured area every 1 to 2 hours, for 15 minutes each time, as needed. Put a thin towel between the ice (or other cold object) and your skin.  Take medicine to reduce the pain and swelling - Your doctor or nurse might recommend that you take ibuprofen (sample brand names: Advil, Motrin) or naproxen (sample brand names: Aleve, Naprosyn).  Some tears, especially large tears, might need to be treated with surgery.  Is there anything I can do on my own to feel better? -- Yes. Different exercises can help your shoulder feel better. Ask your doctor or nurse which exercises you should do, when to start them, and how often to do them.  Some exercises help keep your shoulder from getting too stiff. One of these is called the pendulum stretch. To do this exercise, let your arm relax and hang down. Move your arm back and forth, then side to side, and then around in small circles (figure 1). Your doctor or nurse can also show you other stretches that you can do.  Other exercises can help strengthen your shoulder muscles. Your doctor, nurse, or physical therapist (exercise expert) can show you how to do these types of exercises.  When you do shoulder exercises, it's important to:  Warm up your shoulder first by taking a hot shower or bath, or massaging the area  Start slowly and make the exercises harder over time  Know that some soreness is normal. If you have sharp or tearing pain, stop what you're doing and  let your doctor or nurse know.  What if my symptoms don't get better? -- If your symptoms don't get better, talk with your doctor or nurse about other possible treatments, such as:  Getting a shot of medicine into your shoulder  Surgery  All topics are updated as new evidence becomes available and our peer review process is complete.  This topic retrieved from [employer] on: Sep 21, 2021.  Topic 21701 Version 5.0  Release: 29.4.2 - C29.263  © 2021 UpToDate, Inc. and/or its affiliates. All rights reserved.  figure 1: Pendulum swing     To do this exercise, you can sit or stand. Relax your arm and let it hang down. Move your arm back and forth, then side to side, and then around in small circles in both directions. After about a week, you can make the exercise harder by making bigger movements or holding a weight in your hand.  Graphic 47051 Version 3.0    Consumer Information Use and Disclaimer   This information is not specific medical advice and does not replace information you receive from your health care provider. This is only a brief summary of general information. It does NOT include all information about conditions, illnesses, injuries, tests, procedures, treatments, therapies, discharge instructions or life-style choices that may apply to you. You must talk with your health care provider for complete information about your health and treatment options. This information should not be used to decide whether or not to accept your health care provider's advice, instructions or recommendations. Only your health care provider has the knowledge and training to provide advice that is right for you. The use of this information is governed by the [employer] End User License Agreement, available at https://www.[employer]/en/solutions/[employer]/about/mannie.The use of [employer] content is governed by the [employer] Terms of Use. ©2021 UpToDate, Inc. All rights reserved.  Copyright   © 2021 UpToDate, Inc. and/or its  affiliates. All rights reserved.

## 2023-01-31 NOTE — TELEPHONE ENCOUNTER
Called patient due to PTA unable to locate patient for her scheduled appointment. Patient reports having an appointment with ortho before this scheduled PT appointment. During her ortho appointment, she received a Cortizone injection and her doctor told her to not participated with PT today. At the time of this call, she had already left the property.      Patient reports that she may want to be discharged from services at this time but it was decided to keep patient on the schedule to ensure she responded as well as expected after her Cortizone injection. Will call patient in near future for ultimate plan. The above was communicated with supervising physical therapist.

## 2023-01-31 NOTE — PROCEDURES
Large Joint Aspiration/Injection: L subacromial bursa    Date/Time: 1/31/2023 11:20 AM  Performed by: Sedrick Ferrari MD  Authorized by: Sedrick Ferrari MD     Consent Done?:  Yes (Verbal)  Indications:  Pain  Site marked: the procedure site was marked    Timeout: prior to procedure the correct patient, procedure, and site was verified    Prep: patient was prepped and draped in usual sterile fashion    Local anesthetic:  Bupivacaine 0.25% without epinephrine and lidocaine 1% without epinephrine  Approach:  Posterior  Location:  Shoulder  Site:  L subacromial bursa  Medications:  6 mg betamethasone acetate-betamethasone sodium phosphate 6 mg/mL  Patient tolerance:  Patient tolerated the procedure well with no immediate complications

## 2023-01-31 NOTE — PROGRESS NOTES
Subjective:     Patient ID: Angelica Flores is a 62 y.o. female.    Chief Complaint: Pain of the Left Shoulder      HPI: Patient is being seen for left shoulder follow up since last office visit on 12/20/22. Says she has minimum discomfort since then. Reports her shoulder is tender whenever there is left arm abduction. Denies any pain at today's office visit. Takes tylenol three times a day to help with pain relief. Participated in physical therapy twice a week at the [place] location and completes exercises at home.     -not having actual neck pain but states that she does still have some tightness and discomfort in the trapezius muscle between neck and shoulder.    Past Medical History:   Diagnosis Date    Anxiety     Asthma     Crohn's disease     Fibroids     Hyperlipidemia     Hypertension     Iritis     Migraine headache     Ocular    Osteopenia 08/2019    Syncope and collapse     Vitamin D deficiency disease      Past Surgical History:   Procedure Laterality Date    ANKLE FRACTURE SURGERY  08/19/08    right ankle    BREAST BIOPSY Left 1977    COLONOSCOPY N/A 8/11/2017    Procedure: COLONOSCOPY - REQUESTS DR. MATTI ACUNA;  Surgeon: Matti Acuna III, MD;  Location: [facility];  Service: Endoscopy;  Laterality: N/A;    COLONOSCOPY N/A 11/23/2020    Procedure: COLONOSCOPY;  Surgeon: Matti Acuna III, MD;  Location: [facility];  Service: Endoscopy;  Laterality: N/A;    COLONOSCOPY N/A 9/28/2022    Procedure: COLONOSCOPY;  Surgeon: Matti Acuna III, MD;  Location: [facility];  Service: Endoscopy;  Laterality: N/A;    TAHBSO  February 2011    Due to abnormal pap smear and fibroids    TOTAL ABDOMINAL HYSTERECTOMY       Family History   Problem Relation Age of Onset    Arthritis Mother     Fuch's dystrophy Mother     Breast cancer Mother     Lupus Sister     Rheum arthritis Sister     Obesity Sister     Hypertension Father     Cancer Maternal Grandfather         lung ca    Stroke Maternal Grandmother      Diabetes Maternal Grandmother     Colon cancer Paternal Aunt      Social History     Socioeconomic History    Marital status:     Number of children: 2   Occupational History    Occupation: RN     Employer: [employer]     Comment: [employer]   Tobacco Use    Smoking status: Never    Smokeless tobacco: Never   Substance and Sexual Activity    Alcohol use: Yes     Alcohol/week: 0.0 standard drinks     Comment: ocassionally    Drug use: No    Sexual activity: Yes     Partners: Male     Birth control/protection: Surgical   Social History Narrative    She wears seatbelt.  July 22, 2017. She states she works new position at [employer].     Social Determinants of Health     Financial Resource Strain: Low Risk     Difficulty of Paying Living Expenses: Not hard at all   Food Insecurity: No Food Insecurity    Worried About Running Out of Food in the Last Year: Never true    Ran Out of Food in the Last Year: Never true   Transportation Needs: No Transportation Needs    Lack of Transportation (Medical): No    Lack of Transportation (Non-Medical): No   Physical Activity: Inactive    Days of Exercise per Week: 0 days    Minutes of Exercise per Session: 0 min   Stress: Stress Concern Present    Feeling of Stress : To some extent   Social Connections: Unknown    Frequency of Communication with Friends and Family: More than three times a week    Frequency of Social Gatherings with Friends and Family: Once a week    Active Member of Clubs or Organizations: Yes    Attends Club or Organization Meetings: More than 4 times per year    Marital Status:    Housing Stability: Low Risk     Unable to Pay for Housing in the Last Year: No    Number of Places Lived in the Last Year: 1    Unstable Housing in the Last Year: No       Current Outpatient Medications:     adalimumab (HUMIRA,CF, PEN) 40 mg/0.4 mL PnKt, Inject 0.4 mLs (40 mg total) into the skin every 7 days., Disp: 4 pen, Rfl: 11    ALPRAZolam (XANAX) 0.5 MG tablet,  Take 1 tablet by mouth twice daily as needed, Disp: 60 tablet, Rfl: 0    amLODIPine (NORVASC) 5 MG tablet, Take 1 tablet (5 mg total) by mouth once daily., Disp: 90 tablet, Rfl: 1    ascorbic acid, vitamin C, (VITAMIN C) 1000 MG tablet, Take by mouth. 1 Tablet Oral Every day, Disp: , Rfl:     azaTHIOprine (IMURAN) 50 mg Tab, Take 3 tablets (150 mg total) by mouth once daily., Disp: 90 tablet, Rfl: 5    azelastine (ASTELIN) 137 mcg (0.1 %) nasal spray, 2 sprays (274 mcg total) by Nasal route 2 (two) times daily., Disp: 90 mL, Rfl: 3    BACILLUS COAGULANS (PROBIOTIC, B. COAGULANS, ORAL), Take by mouth once daily., Disp: , Rfl:     cetirizine (ALLERGY RELIEF, CETIRIZINE,) 10 MG tablet, Take 1 tablet (10 mg total) by mouth once daily., Disp: 90 tablet, Rfl: 1    cholecalciferol, vitamin D3, 2,000 unit Cap, Take by mouth. 1 capsule Oral Every day, Disp: , Rfl:     dicyclomine (BENTYL) 20 mg tablet, Take 1 tablet (20 mg total) by mouth 3 (three) times daily as needed (abdominal pain)., Disp: 90 tablet, Rfl: 0    gabapentin (NEURONTIN) 100 MG capsule, Take 1 capsule (100 mg total) by mouth nightly as needed (pain)., Disp: 30 capsule, Rfl: 0    hepatitis B virus vacc.rec,PF, (ENGERIX-B) 20 mcg/mL Syrg, Inject into the muscle., Disp: 1 mL, Rfl: 2    hydroCHLOROthiazide (HYDRODIURIL) 25 MG tablet, Take 1 tablet (25 mg total) by mouth once daily., Disp: 90 tablet, Rfl: 1    hydroquinone 4 % Crea, Apply to dark spots once daily. Use with sunscreen if outdoors, Disp: 28 g, Rfl: 1    ibuprofen (ADVIL,MOTRIN) 800 MG tablet, Take 1 tablet (800 mg total) by mouth 2 (two) times daily as needed., Disp: 180 tablet, Rfl: 1    ipratropium (ATROVENT) 21 mcg (0.03 %) nasal spray, 2 sprays by Nasal route 3 (three) times daily., Disp: 20 mL, Rfl: 5    lisinopriL (PRINIVIL,ZESTRIL) 40 MG tablet, TAKE 1 TABLET BY MOUTH DAILY. APPOINTMENT REQUIRED FOR FUTURE REFILLS, Disp: 90 tablet, Rfl: 1    methylPREDNISolone (MEDROL DOSEPACK) 4 mg tablet,  use as directed, Disp: 1 each, Rfl: 0    metoprolol succinate (TOPROL-XL) 25 MG 24 hr tablet, Take 1 tablet (25 mg total) by mouth once daily., Disp: 30 tablet, Rfl: 11    montelukast (SINGULAIR) 10 mg tablet, Take 1 tablet (10 mg total) by mouth every evening., Disp: 90 tablet, Rfl: 1    multivitamin-Ca-iron-minerals 18-0.4 mg Tab, Take by mouth. 1 Tablet Oral Every day, Disp: , Rfl:     pneumoc 20-shani conj-dip cr,PF, (PREVNAR-20, PF,) 0.5 mL Syrg injection, Inject 0.5 mLs into the muscle., Disp: 0.5 mL, Rfl: 0    potassium bicarbonate disintegrating tablet, Take 10 mEq by mouth 2 (two) times daily., Disp: , Rfl:     pravastatin (PRAVACHOL) 80 MG tablet, Take 1 tablet by mouth in the evening, Disp: 90 tablet, Rfl: 1    tretinoin (RETIN-A) 0.025 % cream, Apply pea-sized amount to entire face at bedtime.  If dryness, use every third night and increase as tolerated to every night., Disp: 20 g, Rfl: 6    valACYclovir (VALTREX) 1000 MG tablet, TAKE 2 TABLETS BY MOUTH TWICE DAILY FOR 1 DAY PER EPISODE, Disp: 30 tablet, Rfl: 0    zinc sulfate (ZINC-220 ORAL), , Disp: , Rfl:     albuterol (PROVENTIL/VENTOLIN HFA) 90 mcg/actuation inhaler, Inhale 2 puffs into the lungs every 6 (six) hours as needed for Wheezing or Shortness of Breath., Disp: 18 g, Rfl: 2  Review of patient's allergies indicates:  No Known Allergies  Review of Systems   Constitutional:  Negative for chills, fever and weight loss.   Respiratory:  Negative for shortness of breath.    Cardiovascular:  Negative for chest pain and palpitations.     Objective:   Body mass index is 39.14 kg/m².  There were no vitals filed for this visit.        Ortho/SPM Exam-alert and oriented well-nourished well-developed ambulatory adult female in no acute distress     Respiratory effort normal     Left shoulder-no acute deformity or obvious swelling or discoloration    Mild tenderness to palpation laterally and posterior and proximal biceps tendon  Abduction to 80 degrees and  "flexion to 110 degrees   Difficulty with maintaining empty can position against gravity  Positive Umana impingement sign and very poor internal external rotation   With difficulty can reach behind the body to her left back pocket.    Neurovascular intact  Strength 3 to 4/5    Psychiatric good affect and cognition     Plan cortisone injection to left shoulder today and physical therapy.  Recheck in 6-8 weeks  Patient Instructions   Apply ice to affected area three times a day for (15) fifteen minutes for the next 48 hours, and reduce activity during that time.  Voltaren gel three times a day to affected area if recommended.  Oral medication if recommended.  Physical therapy if recommended at home or at clinic.  Keep recheck visit.     Patient Education       Rotator Cuff Injury   The Basics   Written by the doctors and editors at [facility]   What is a rotator cuff injury? -- A rotator cuff injury is a condition that can cause shoulder pain. The rotator cuff is made up of 4 shoulder muscles and their tendons. Tendons are strong bands of tissue that connect muscles to bones.  People can get different types of rotator cuff injuries. One common injury is "tendinopathy," which is when people have a problem with 1 of their tendons. In most people with tendinopathy, the tendons are not inflamed or swollen. If they do get inflamed or swollen, doctors call it "tendinitis." Tendinopathy and tendinitis can happen if people use their rotator cuff muscles too much or do a lot of activity with their arms overhead.  Another type of rotator cuff injury is a tear in a tendon. Tears can happen if a person falls on the shoulder or moves the shoulder too fast and with too much force. Tears can also happen as a tendon wears out over time.  What are the symptoms of a rotator cuff injury? -- Most people with tendinopathy or tendinitis have pain where the shoulder meets the top of the arm and down the outer part of the upper arm. The pain " is usually worse when they move their arm over their head or lie on their shoulder.  People with a torn tendon usually have shoulder pain and might also have trouble raising their arm overhead.  Will I need tests? -- You might. Your doctor or nurse will talk with you and do an exam. If he or she suspects a tear, you might need an imaging test of the shoulder. Imaging tests create pictures of the inside of the body.  How is a rotator cuff injury treated? -- Many rotator cuff injuries get better on their own, but they can take months to heal completely. To help your shoulder get better, you can:  Rest your shoulder - Avoid doing activities that cause pain or strain your shoulder, such as raising your arm overhead or reaching behind you. In general, try to keep your arm down, close to, and in front of your body. But you can move your shoulder gently if you need to.  Ice your shoulder - Put a cold gel pack, bag of ice, or bag of frozen vegetables on the injured area every 1 to 2 hours, for 15 minutes each time, as needed. Put a thin towel between the ice (or other cold object) and your skin.  Take medicine to reduce the pain and swelling - Your doctor or nurse might recommend that you take ibuprofen (sample brand names: Advil, Motrin) or naproxen (sample brand names: Aleve, Naprosyn).  Some tears, especially large tears, might need to be treated with surgery.  Is there anything I can do on my own to feel better? -- Yes. Different exercises can help your shoulder feel better. Ask your doctor or nurse which exercises you should do, when to start them, and how often to do them.  Some exercises help keep your shoulder from getting too stiff. One of these is called the pendulum stretch. To do this exercise, let your arm relax and hang down. Move your arm back and forth, then side to side, and then around in small circles (figure 1). Your doctor or nurse can also show you other stretches that you can do.  Other exercises can  help strengthen your shoulder muscles. Your doctor, nurse, or physical therapist (exercise expert) can show you how to do these types of exercises.  When you do shoulder exercises, it's important to:  Warm up your shoulder first by taking a hot shower or bath, or massaging the area  Start slowly and make the exercises harder over time  Know that some soreness is normal. If you have sharp or tearing pain, stop what you're doing and let your doctor or nurse know.  What if my symptoms don't get better? -- If your symptoms don't get better, talk with your doctor or nurse about other possible treatments, such as:  Getting a shot of medicine into your shoulder  Surgery  All topics are updated as new evidence becomes available and our peer review process is complete.  This topic retrieved from [employer] on: Sep 21, 2021.  Topic 14237 Version 5.0  Release: 29.4.2 - C29.263  © 2021 UpToDate, Inc. and/or its affiliates. All rights reserved.  figure 1: Pendulum swing     To do this exercise, you can sit or stand. Relax your arm and let it hang down. Move your arm back and forth, then side to side, and then around in small circles in both directions. After about a week, you can make the exercise harder by making bigger movements or holding a weight in your hand.  Graphic 14679 Version 3.0    Consumer Information Use and Disclaimer   This information is not specific medical advice and does not replace information you receive from your health care provider. This is only a brief summary of general information. It does NOT include all information about conditions, illnesses, injuries, tests, procedures, treatments, therapies, discharge instructions or life-style choices that may apply to you. You must talk with your health care provider for complete information about your health and treatment options. This information should not be used to decide whether or not to accept your health care provider's advice, instructions or  recommendations. Only your health care provider has the knowledge and training to provide advice that is right for you. The use of this information is governed by the [employer] End User License Agreement, available at https://[email or web address].Roomtag/en/solutions/Tyche/about/mannie.The use of [employer] content is governed by the [employer] Terms of Use. ©2021 UpToDate, Inc. All rights reserved.  Copyright   © 2021 UpToDate, Inc. and/or its affiliates. All rights reserved.      IMAGING: X-Ray Shoulder 2 or More Views Left  Narrative: EXAMINATION:  XR SHOULDER COMPLETE 2 OR MORE VIEWS LEFT    CLINICAL HISTORY:  Pain in left shoulder    TECHNIQUE:  Two or three views of the left shoulder were performed.    COMPARISON:  None    FINDINGS:  No acute fracture or dislocation.  Mild AC joint arthrosis.  Glenohumeral joint is maintained.  Impression: See above    Electronically signed by: Noam Rodriguez MD  Date:    12/19/2022  Time:    12:28  X-Ray Cervical Spine Complete 5 view  Narrative: EXAMINATION:  XR CERVICAL SPINE COMPLETE 5 VIEW    CLINICAL HISTORY:  . Pain in left shoulder    TECHNIQUE:  AP, Lateral, bilateral oblique and open mouth views of the cervical spine were performed.    COMPARISON:  None    FINDINGS:  Straightening of normal cervical lordosis.  Discogenic degenerative changes at C4-C5 with moderate disc space narrowing posteriorly at this level.  No fracture.  No listhesis.  Prevertebral soft tissues are maintained.  Mild bilateral neural foraminal encroachment C4-C5.  Lung apices are clear.  Impression: As above    Electronically signed by: Noam Rodriguez MD  Date:    12/19/2022  Time:    12:27       Radiographs / Imaging : Relevant imaging results reviewed by me and interpreted by me, discussed with the patient and / or family -agree with x-ray report      Assessment:     Encounter Diagnoses   Name Primary?    Chronic left shoulder pain     Left shoulder pain, unspecified chronicity Yes    Decreased range of  motion         Plan:   Left shoulder pain, unspecified chronicity  -     Large Joint Aspiration/Injection: L subacromial bursa    Chronic left shoulder pain    Decreased range of motion        The patient verbalized good understanding of the medical issues discussed today and expressed appreciation for the care provided.  Patient was given the opportunity to ask questions and be an active participant in their medical care. Patient had no further questions or concerns at this time.     The patient was encouraged to participate in appropriate physical activity.      Sedrick Ferrari M.D.  [facility]        This note was dictated using voice recognition software and may have sound a like errors.

## 2023-02-02 ENCOUNTER — CLINICAL SUPPORT (OUTPATIENT)
Dept: REHABILITATION | Facility: HOSPITAL | Age: 63
End: 2023-02-02
Payer: COMMERCIAL

## 2023-02-02 DIAGNOSIS — M25.60 DECREASED RANGE OF MOTION: Primary | ICD-10-CM

## 2023-02-02 DIAGNOSIS — Z74.09 DECREASED FUNCTIONAL MOBILITY AND ENDURANCE: ICD-10-CM

## 2023-02-02 DIAGNOSIS — R53.1 DECREASED STRENGTH: ICD-10-CM

## 2023-02-02 PROCEDURE — 97112 NEUROMUSCULAR REEDUCATION: CPT | Mod: CQ

## 2023-02-02 PROCEDURE — 97110 THERAPEUTIC EXERCISES: CPT | Mod: CQ

## 2023-02-02 NOTE — PROGRESS NOTES
[facility]   Physical Therapy Treatment Note     Name: Angelica Pate [place]  Clinic Number: [id number]    Therapy Diagnosis:   Encounter Diagnoses   Name Primary?    Decreased range of motion Yes    Decreased strength     Decreased functional mobility and endurance      Physician: Sedrick Ferrari MD    Visit Date: 2/2/2023    Physician Orders: Physical Therapy Evaluation and Treat  Medical Diagnosis from Referral:   M54.2 (ICD-10-CM) - Neck pain on left side   M25.512 (ICD-10-CM) - Left shoulder pain, unspecified chronicity   Evaluation Date: 1/4/2023  Authorization Period Expiration: 01/04/2023  Plan of Care Expiration: 03/29/2023  Progress Note Due: 02/03/2023  Visit # / Visits authorized: 6/20 (+1 Evaluation)  FOTO: 1/3      Precautions: Standard    PTA Visit #: 1/5     Time In: 1345  Time Out: 1430  Total Billable Time: 40 minutes    SUBJECTIVE     Patient reports: she has no pain today. No medication taken to assist with pain management.     She was partially compliant with home exercise program.    Response to previous treatment: felt good after last session    Functional change: sleeping better, not buckling seat belt with left shoulder, unable to reach behind her which makes dressing difficult.     Pain: 0/10  Location: left shoulder    OBJECTIVE     Objective Measures updated at progress report unless specified.     Treatment     Angelica received the treatments listed below:      THERAPEUTIC EXERCISES: to develop strength, endurance, ROM, flexibility, posture and core stabilization for (25)  minutes including: x = performed today    Therapeutic Exercise 2/2/2023    UPPER BODY ERGOMETER  x 4 minutes backwards   Pulleys  Flexion  Abduction        3 minutes  3 minutes   Cervical side bending stretch x 1 minute x 2 bilateral   Wall Slides into Flexion x 3 x 8 with lift off    Wall clocks x x 3 targets x 10 yellow band bilateral   Serratus push ups  X30 on tall mat   Backwards  Shoulder Rolls x X 30    BOLD = new this visit  Plan for Next Visit: progress as tolerated      NEUROMUSCULAR RE-EDUCATION activities to improve: Coordination, Kinesthetic, Sense, and Proprioception for (15) minutes. The following activities were included: x = exercises performed     Neuromuscular Re-Education 2/2/2023    Scapular Retractions x 4 x 10 red band (increased)   Shoulder Extensions x 4 x 10 red band (increased)   Scapular W's x 3x10 (unable to perform in prone with no resistance)   Shoulder isometrics 4 ways with ball  X30 in flexion, extension, INTERNAL ROTATION, and EXTERNAL ROTATION    Standing shoulder horizontal abduction x 3 x 8 yellow band    BOLD= new this visit  Plan for Next Visit: progress as tolerated      MANUAL THERAPY TECHNIQUES: Joint mobilizations, Soft tissue Mobilization, and mobilization with movement were applied to the area listed below for (0) minutes, including: x = exercises performed     Manual Intervention 2/2/2023    Soft Tissue Mobilization  right suboccipital muscles , cervical paraspinals, upper trapezius, and periscapular musculature   Joint Mobilizations     Mobilization with movement     Functional Dry Needling      BOLD = new this visit  Plan for Next Visit: as needed        Patient Education and Home Exercises     Home Exercises Provided and Patient Education Provided     Education provided:   PURPOSE: Patient educated on the impairments noted above and the effects of physical therapy intervention to improve overall condition and QOL.   EXERCISE: Patient was educated on all the above exercise prior/during/after for proper posture, positioning, and execution for safe performance with home exercise program.   STRENGTH: Patient educated on the importance of improved core and extremity strength in order to improve alignment of the spine and extremities with static positions and dynamic movement.   1/13/2023: educated on regular compliance with home exercise program    01/18/2023: HOME EXERCISE PROGRAM provided. See EMR for details    Written Home Exercises Provided: Patient instructed to cont prior HEP. Exercises were reviewed and Angelica was able to demonstrate them prior to the end of the session.  Angelica demonstrated good  understanding of the education provided. See EMR under Patient Instructions for exercises provided during therapy sessions    ASSESSMENT   Increased resistance where appropriate with good quality but with instructions to rest instead of working through fatigue. She required cues with wall slides for improving her quality and to decrease compensation.   Angelica Is progressing well towards her goals.   Patient prognosis is Good.     Patient will continue to benefit from skilled outpatient physical therapy to address the deficits listed in the problem list box on initial evaluation, provide patient/family education and to maximize patient's level of independence in the home and community environment.     Patient's spiritual, cultural and educational needs considered and patient agreeable to plan of care and goals.     Anticipated barriers to physical therapy: co-morbidities, sedentary lifestyle, chronicity of condition, and occupation    GOALS:  Reviewed: 01/25/2023     Short Term Goals:  6 weeks Status  Date Met   PAIN: Patient will report worst pain of 5/10 in order to progress toward max functional ability and improve quality of life. [] Progressing  [x] Met  [] Not Met  2/3/2023   FUNCTION: Patient will demonstrate improved function as indicated by a functional limitation score of less than or equal to 42 out of 100 on FOTO. [x] Progressing  [] Met  [] Not Met     MOBILITY: Patient will improve Range of Motion to 50% of stated goals, listed in objective measures above, in order to progress towards independence with functional activities.  [x] Progressing  [] Met  [] Not Met     STRENGTH: Patient will improve strength to 50% of stated goals, listed in  objective measures above, in order to progress towards independence with functional activities.  [x] Progressing  [] Met  [] Not Met        Long Term Goals:  12 weeks Status Date Met   PAIN: Patient will report worst pain of 3/10 in order to progress toward max functional ability and improve quality of life [] Progressing  [x] Met  [] Not Met 2/3/2023    FUNCTION: Patient will demonstrate improved function as indicated by a functional limitation score of less than or equal to 34 out of 100 on FOTO. [x] Progressing  [] Met  [] Not Met     MOBILITY: Patient will improve Range of Motion to stated goals, listed in objective measures above, in order to return to maximal functional potential and improve quality of life. [x] Progressing  [] Met  [] Not Met     STRENGTH: Patient will improve strength to stated goals, listed in objective measures above, in order to improve functional independence and quality of life. [x] Progressing  [] Met  [] Not Met     Patient will return to normal ADL's, IADL's, community involvement, recreational activities, and work-related activities with less than or equal to 3/10 pain and maximal function.  [x] Progressing  [] Met  [] Not Met          PLAN     Continue Plan of Care (POC) and progress per patient tolerance. See treatment section for details on planned progressions next session.  Plan of Care Due: 03/29/2023    Delvin Hu PTA

## 2023-02-06 ENCOUNTER — PATIENT MESSAGE (OUTPATIENT)
Dept: FAMILY MEDICINE | Facility: CLINIC | Age: 63
End: 2023-02-06

## 2023-02-06 ENCOUNTER — PATIENT MESSAGE (OUTPATIENT)
Dept: CARDIOLOGY | Facility: CLINIC | Age: 63
End: 2023-02-06
Payer: COMMERCIAL

## 2023-02-06 ENCOUNTER — E-VISIT (OUTPATIENT)
Dept: FAMILY MEDICINE | Facility: CLINIC | Age: 63
End: 2023-02-06
Payer: COMMERCIAL

## 2023-02-06 ENCOUNTER — PATIENT MESSAGE (OUTPATIENT)
Dept: GASTROENTEROLOGY | Facility: CLINIC | Age: 63
End: 2023-02-06
Payer: COMMERCIAL

## 2023-02-06 DIAGNOSIS — E66.01 SEVERE OBESITY (BMI 35.0-39.9) WITH COMORBIDITY: ICD-10-CM

## 2023-02-06 DIAGNOSIS — U07.1 COVID-19: Primary | ICD-10-CM

## 2023-02-06 DIAGNOSIS — K50.10 CROHN'S DISEASE OF COLON WITHOUT COMPLICATION: Primary | ICD-10-CM

## 2023-02-06 DIAGNOSIS — D84.821 DRUG-INDUCED IMMUNODEFICIENCY: ICD-10-CM

## 2023-02-06 DIAGNOSIS — Z79.899 DRUG-INDUCED IMMUNODEFICIENCY: ICD-10-CM

## 2023-02-06 DIAGNOSIS — K50.10 CROHN'S DISEASE OF COLON WITHOUT COMPLICATION: ICD-10-CM

## 2023-02-06 PROCEDURE — 99423 OL DIG E/M SVC 21+ MIN: CPT | Mod: S$GLB,,, | Performed by: FAMILY MEDICINE

## 2023-02-06 PROCEDURE — 99423 PR E&M, ONLINE DIGIT, EST, < 7 DAYS,  21+ MINS: ICD-10-PCS | Mod: S$GLB,,, | Performed by: FAMILY MEDICINE

## 2023-02-06 RX ORDER — NIRMATRELVIR AND RITONAVIR 300-100 MG
KIT ORAL
Qty: 30 TABLET | Refills: 0 | Status: SHIPPED | OUTPATIENT
Start: 2023-02-06 | End: 2023-02-11

## 2023-02-07 NOTE — PROGRESS NOTES
Patient ID: Angelica Flores is a 62 y.o. female.    Chief Complaint: COVID-19 Concerns    The patient initiated a request through [employer] on 2/6/2023 for evaluation and management with a chief complaint of COVID-19 Concerns     I evaluated the questionnaire submission on 2/6/2023.    [facility] Peq Evisit Upper Respitatory/Cough Questionnaire    2/6/2023  1:48 PM CST - Filed by Patient   Do you agree to participate in an E-Visit? Yes   If you have any of the following symptoms, please present to your local ER or call 911:  I acknowledge   What is the main issue that you would like for your doctor to address today? + COVID at home testing and head congestion   Are you able to take your vital signs? Yes   Systolic Blood Pressure: 134   Diastolic Blood Pressure: 72   Weight: 200   Height: 5   Pulse: 68   Temp: 97.9   Resp: 18   Pulse Ox:    What symptoms do you currently have?  Fatigue;  Nasal Congestion;  Nausea   Have you had a fever? Yes   What has been the range of your fever? 100.4 or greater   When did your symptoms first appear? 2/4/2023   In the last two weeks, have you been in close contact with someone who has COVID-19 or the Flu? Yes , Covid-19   In the last two weeks, have you worked or volunteered in a healthcare facility or as a ? Healthcare facilities include a hospital, medical or dental clinic, long-term care facility, or nursing home No   Do you live in a long-term care facility, nursing home, or homeless shelter? No   List what you have done or taken to help your symptoms. OTC cold medicines, hot tea, rest, isolation/masking, fluids, no appetite   How severe are your symptoms? Moderate   Have you taken an at home Covid test? Yes   What were the results? Positive   Have you taken a Flu test? No   Have you been fully vaccinated for COVID? (2 Pfizer, 2 Moderna or 1 Jame & Jame vaccine injections) Yes   Have you received a booster? Yes   Have you recieved a Flu shot? Yes   When did  you recieve your Flu shot? 10/12/2022   Do you have transportation to get tested for COVID if it is indicated and ordered for you at an [facility] location? Yes   Are you currently pregnant, could you be pregnant, or are you breast feeding? None of the above   Provide any information you feel is important to your history not asked above Im taking humira injections weekly   Please attach any relevant images or files           Active Problem List with Overview Notes    Diagnosis Date Noted    Decreased range of motion 01/04/2023    Decreased strength 01/04/2023    Decreased functional mobility and endurance 01/04/2023    Chronic left shoulder pain 12/20/2022    Neck pain on left side 12/20/2022    Strain of left trapezius muscle 12/20/2022    Crohn's disease of colon without complication 10/21/2022    Severe obesity (BMI 35.0-39.9) with comorbidity 08/23/2022    Drug-induced immunodeficiency 08/23/2022    Postmenopausal 07/09/2018    Migraine without status migrainosus, not intractable 07/09/2018    Allergic rhinitis 07/31/2015    Hyperlipidemia 10/28/2013    Vitamin D deficiency 10/28/2013    Insomnia 10/28/2013    Essential hypertension 08/13/2013    Dysthymic disorder 08/13/2013      Recent Labs Obtained:  No visits with results within 7 Day(s) from this visit.   Latest known visit with results is:   Lab Visit on 12/19/2022   Component Date Value Ref Range Status    WBC 12/19/2022 9.28  3.90 - 12.70 K/uL Final    RBC 12/19/2022 4.27  4.00 - 5.40 M/uL Final    Hemoglobin 12/19/2022 13.2  12.0 - 16.0 g/dL Final    Hematocrit 12/19/2022 41.8  37.0 - 48.5 % Final    MCV 12/19/2022 98  82 - 98 fL Final    MCH 12/19/2022 30.9  27.0 - 31.0 pg Final    MCHC 12/19/2022 31.6 (L)  32.0 - 36.0 g/dL Final    RDW 12/19/2022 15.9 (H)  11.5 - 14.5 % Final    Platelets 12/19/2022 389  150 - 450 K/uL Final    MPV 12/19/2022 11.2  9.2 - 12.9 fL Final    Immature Granulocytes 12/19/2022 0.2  0.0 - 0.5 % Final    Gran # (ANC) 12/19/2022  5.5  1.8 - 7.7 K/uL Final    Immature Grans (Abs) 12/19/2022 0.02  0.00 - 0.04 K/uL Final    Comment: Mild elevation in immature granulocytes is non specific and   can be seen in a variety of conditions including stress response,   acute inflammation, trauma and pregnancy. Correlation with other   laboratory and clinical findings is essential.      Lymph # 12/19/2022 3.0  1.0 - 4.8 K/uL Final    Mono # 12/19/2022 0.6  0.3 - 1.0 K/uL Final    Eos # 12/19/2022 0.1  0.0 - 0.5 K/uL Final    Baso # 12/19/2022 0.04  0.00 - 0.20 K/uL Final    nRBC 12/19/2022 0  0 /100 WBC Final    Gran % 12/19/2022 59.1  38.0 - 73.0 % Final    Lymph % 12/19/2022 32.4  18.0 - 48.0 % Final    Mono % 12/19/2022 6.4  4.0 - 15.0 % Final    Eosinophil % 12/19/2022 1.5  0.0 - 8.0 % Final    Basophil % 12/19/2022 0.4  0.0 - 1.9 % Final    Differential Method 12/19/2022 Automated   Final    Sodium 12/19/2022 137  136 - 145 mmol/L Final    Potassium 12/19/2022 4.4  3.5 - 5.1 mmol/L Final    Chloride 12/19/2022 102  95 - 110 mmol/L Final    CO2 12/19/2022 25  23 - 29 mmol/L Final    Glucose 12/19/2022 91  70 - 110 mg/dL Final    BUN 12/19/2022 21  8 - 23 mg/dL Final    Creatinine 12/19/2022 0.8  0.5 - 1.4 mg/dL Final    Calcium 12/19/2022 9.9  8.7 - 10.5 mg/dL Final    Total Protein 12/19/2022 8.1  6.0 - 8.4 g/dL Final    Albumin 12/19/2022 4.1  3.5 - 5.2 g/dL Final    Total Bilirubin 12/19/2022 0.4  0.1 - 1.0 mg/dL Final    Comment: For infants and newborns, interpretation of results should be based  on gestational age, weight and in agreement with clinical  observations.    Premature Infant recommended reference ranges:  Up to 24 hours.............<8.0 mg/dL  Up to 48 hours............<12.0 mg/dL  3-5 days..................<15.0 mg/dL  6-29 days.................<15.0 mg/dL      Alkaline Phosphatase 12/19/2022 68  55 - 135 U/L Final    AST 12/19/2022 18  10 - 40 U/L Final    ALT 12/19/2022 19  10 - 44 U/L Final    Anion Gap 12/19/2022 10  8 - 16  mmol/L Final    eGFR 12/19/2022 >60.0  >60 mL/min/1.73 m^2 Final    Adalimumab Concentration & Anti-Ad* 12/19/2022 6.5  ug/mL Final    Comment: Quantitation Limit: <0.6 ug/mL  Results of 0.6 or higher indicate detection of adalimumab.  Comments:  - The optimal drug concentration depends upon patient-  specific factors including the disease and desired  therapeutic endpoint.  - Maintenance trough concentrations >=7.5 may  correspond to higher remission rates.(1)  - Mucosal healing may be more likely in patients with  maintenance trough levels >8.14.(2)  - In rheumatoid arthritis, trough levels of 5-8 are  associated with clinical (EULAR) response.(3)  - This assay measures the antibody-unbound (free)  fraction of adalimumab when serum anti-adalimumab  antibodies are present.      Adalimumab  Antibody 12/19/2022 <25  ng/mL Final    Comment: Interpretation:  The above result is an UNDETECTED Antibody titer  Quantitation Limit: <25 ng/mL.  Results of 25 or higher indicate detection of anti-  adalimumab antibodies.  25 - 100 ng/mL: LOW titer  101 - 300 ng/mL: INTERMEDIATE titer  301 or greater ng/mL: HIGH titer  Comments:  - Anti-drug antibody levels should be interpreted in  the context of the concomitant free drug trough  concentration.  - Low anti-drug antibodies may be transient while high  titers are likely to be more consequential.(4-6)  - Some immunogenicity is reversible. Elimination of  intermediate titer (and even some high titer)  anti-adalimumab antibodies has been achieved with  dose escalation and/or methotrexate or 6-MP.(7)  - This anti-adalimumab antibody assay is drug tolerant,  and all positive results are verified for anti-drug  specificity by a confirmatory test.  References:  1. Marcela WARREN, et al. AGA Review on TDM in IBD.  Gastroenterol 2017;153:835-857.  2. Louie E, et al. J Crohns Col 2016;10(5)                           :510-515.  3. José Miguel MARSH, et al. Chey Rheum Dis 2015;74:513-518.  4.  Quinn ALMAGUER, et al. PIERRE 2011;305(14):4195-1825.  5. Steenhalfie C, et al. J Clin Gastroenterol 2016;  50:482-489.  6. Bailee H, et al. Clin Gastroenterol Hepatol 2015;  13(3):522-530.  7. Tisha CENTENO et al. Gastroenterol 2019;156(6):S-617.  These tests were developed and their performance  characteristics determined by [facility]. They have not been  cleared or approved by the Food and Drug Administration.  However, these electrochemiluminescence immunoassay (ECLIA)  measurements of adalimumab and anti-adalimumab antibody  (constituting DoseASSURE ADL) have been developed and  validated in accordance with CLIA (Clinical Laboratory  Improvement Amendments) and the FDA Guidance document, Assay  Development and Validation for Immunogenicity Testing of  Therapeutic Protein Products (2019).  Performed at:  [facility]  [address] [place] [address]  [id number]  : Ahsan Fisher MD, Phone:  [phone]                           1       4 Covid Risk Score    Encounter Diagnoses   Name Primary?    COVID-19 Yes    Crohn's disease of colon without complication     Drug-induced immunodeficiency     Severe obesity (BMI 35.0-39.9) with comorbidity         No orders of the defined types were placed in this encounter.     Medications Ordered This Encounter   Medications    nirmatrelvir-ritonavir (PAXLOVID, EUA,) 300 mg (150 mg x 2)-100 mg copackaged tablets (EUA)     Sig: Take 3 tablets by mouth 2 (two) times daily. Each dose contains 2 nirmatrelvir (pink tablets) and 1 ritonavir (white tablet). Take all 3 tablets together     Dispense:  30 tablet     Refill:  0      As patient has high Covid Risk Score, I recommend Paxlovid as benefit of taking seems to outweigh risk as reviewed on Up-To-Date. However, I do recommend she not take Humira injection while receiving Paxlovid for Covid infection. I advise patient to quarantine/isolate for 5 days and may end quarantine/isolation on day 6 if afebrile for 24  hours without taking fever-reducing medication and with improving symptoms at which time continue to wear mask. Follow up if no improvement or proceed to ER for further evaluation if worsening symptoms.    E-Visit Time Trackin minutes    Day 1 Time (in minutes): 33     Total Time (in minutes): 33          As patient has high Covid Risk Score, I recommend Paxlovid as benefit of taking seems to outweigh risk as reviewed on Up-To-Date. However, I do recommend she not take Humira injection while receiving Paxlovid for Covid infection. I advised advised patient to quarantine/isolate for 5 days and may end quarantine/isolation on day 6 if afebrile for 24 hours without taking fever-reducing medication and with improving symptoms at which time continue to wear mask.

## 2023-02-10 RX ORDER — ADALIMUMAB 40MG/0.4ML
40 KIT SUBCUTANEOUS
Qty: 4 PEN | Refills: 11 | OUTPATIENT
Start: 2023-02-10 | End: 2023-02-28 | Stop reason: SDUPTHER

## 2023-02-15 ENCOUNTER — PATIENT MESSAGE (OUTPATIENT)
Dept: GASTROENTEROLOGY | Facility: CLINIC | Age: 63
End: 2023-02-15
Payer: COMMERCIAL

## 2023-02-15 ENCOUNTER — TELEPHONE (OUTPATIENT)
Dept: GASTROENTEROLOGY | Facility: CLINIC | Age: 63
End: 2023-02-15
Payer: COMMERCIAL

## 2023-02-15 NOTE — TELEPHONE ENCOUNTER
Contacted the patient for clarification on when her last Humira injection occurred. Labs for trough scheduled for 02/24/23 may need to be rescheduled based on her last dose. Awaiting response from patient .    Sydney Keith, PharmD , [facility]  Clinical Pharmacist Gastroenterology  [facility] [place]

## 2023-02-23 ENCOUNTER — PATIENT MESSAGE (OUTPATIENT)
Dept: GASTROENTEROLOGY | Facility: CLINIC | Age: 63
End: 2023-02-23
Payer: COMMERCIAL

## 2023-02-23 ENCOUNTER — OFFICE VISIT (OUTPATIENT)
Dept: FAMILY MEDICINE | Facility: CLINIC | Age: 63
End: 2023-02-23
Payer: COMMERCIAL

## 2023-02-23 ENCOUNTER — TELEPHONE (OUTPATIENT)
Dept: SPORTS MEDICINE | Facility: CLINIC | Age: 63
End: 2023-02-23
Payer: COMMERCIAL

## 2023-02-23 VITALS
TEMPERATURE: 98 F | HEIGHT: 60 IN | DIASTOLIC BLOOD PRESSURE: 80 MMHG | RESPIRATION RATE: 18 BRPM | HEART RATE: 76 BPM | SYSTOLIC BLOOD PRESSURE: 130 MMHG | BODY MASS INDEX: 40.21 KG/M2 | WEIGHT: 204.81 LBS | OXYGEN SATURATION: 96 %

## 2023-02-23 DIAGNOSIS — I10 ESSENTIAL HYPERTENSION: Primary | ICD-10-CM

## 2023-02-23 DIAGNOSIS — E66.01 MORBID OBESITY WITH BMI OF 40.0-44.9, ADULT: ICD-10-CM

## 2023-02-23 DIAGNOSIS — Z79.899 DRUG-INDUCED IMMUNODEFICIENCY: ICD-10-CM

## 2023-02-23 DIAGNOSIS — K50.10 CROHN'S DISEASE OF COLON WITHOUT COMPLICATION: ICD-10-CM

## 2023-02-23 DIAGNOSIS — D84.821 DRUG-INDUCED IMMUNODEFICIENCY: ICD-10-CM

## 2023-02-23 DIAGNOSIS — M47.812 CERVICAL SPINE ARTHRITIS: ICD-10-CM

## 2023-02-23 PROCEDURE — 99999 PR PBB SHADOW E&M-EST. PATIENT-LVL IV: ICD-10-PCS | Mod: PBBFAC,,, | Performed by: FAMILY MEDICINE

## 2023-02-23 PROCEDURE — 3008F PR BODY MASS INDEX (BMI) DOCUMENTED: ICD-10-PCS | Mod: CPTII,S$GLB,, | Performed by: FAMILY MEDICINE

## 2023-02-23 PROCEDURE — 4010F PR ACE/ARB THEARPY RXD/TAKEN: ICD-10-PCS | Mod: CPTII,S$GLB,, | Performed by: FAMILY MEDICINE

## 2023-02-23 PROCEDURE — 99214 PR OFFICE/OUTPT VISIT, EST, LEVL IV, 30-39 MIN: ICD-10-PCS | Mod: S$GLB,,, | Performed by: FAMILY MEDICINE

## 2023-02-23 PROCEDURE — 3075F PR MOST RECENT SYSTOLIC BLOOD PRESS GE 130-139MM HG: ICD-10-PCS | Mod: CPTII,S$GLB,, | Performed by: FAMILY MEDICINE

## 2023-02-23 PROCEDURE — 3079F PR MOST RECENT DIASTOLIC BLOOD PRESSURE 80-89 MM HG: ICD-10-PCS | Mod: CPTII,S$GLB,, | Performed by: FAMILY MEDICINE

## 2023-02-23 PROCEDURE — 3075F SYST BP GE 130 - 139MM HG: CPT | Mod: CPTII,S$GLB,, | Performed by: FAMILY MEDICINE

## 2023-02-23 PROCEDURE — 3079F DIAST BP 80-89 MM HG: CPT | Mod: CPTII,S$GLB,, | Performed by: FAMILY MEDICINE

## 2023-02-23 PROCEDURE — 99214 OFFICE O/P EST MOD 30 MIN: CPT | Mod: S$GLB,,, | Performed by: FAMILY MEDICINE

## 2023-02-23 PROCEDURE — 3008F BODY MASS INDEX DOCD: CPT | Mod: CPTII,S$GLB,, | Performed by: FAMILY MEDICINE

## 2023-02-23 PROCEDURE — 4010F ACE/ARB THERAPY RXD/TAKEN: CPT | Mod: CPTII,S$GLB,, | Performed by: FAMILY MEDICINE

## 2023-02-23 PROCEDURE — 99999 PR PBB SHADOW E&M-EST. PATIENT-LVL IV: CPT | Mod: PBBFAC,,, | Performed by: FAMILY MEDICINE

## 2023-02-23 RX ORDER — LISINOPRIL 40 MG/1
TABLET ORAL
Qty: 90 TABLET | Refills: 1 | Status: SHIPPED | OUTPATIENT
Start: 2023-02-23 | End: 2024-03-05

## 2023-02-23 NOTE — TELEPHONE ENCOUNTER
Spoke with patient to schedule office visit. Patient accepted and verbalized understanding.   ----- Message from Corina Mancilla sent at 2/23/2023 12:28 PM CST -----  Regarding: Established Patient  Good Afternoon, patient scheduled for 03/28/23. Having some problems needing to be seen sooner. Please call [phone]. Thanks/elr

## 2023-02-23 NOTE — PROGRESS NOTES
"Angelica Flores    Chief Complaint   Patient presents with    Follow-up    Hypertension       History of Present Illness:   Ms. Flores comes in today for hypertension follow up. She states she performs home blood pressure checks every other day with levels range in 126-132/74-82. She states she has not been exercising or monitoring her diet.    She complains of having left shoulder pain and follows with Dr. Ferrari, sports medicine/orthopedist. She states she had X-ray which showed she had mild arthritis in her shoulder and her neck. She states she has been receiving physical therapy followed by steroid shot into her shoulder which worsened for three weeks. She states pain is back again. She states she took Tylenol 650 mg x 2 this morning which helped some as pain is now 3/10 on pain scale.  She states pain is worse at night.  She states she has neck pain at night and sleeps on "C" pillow with help.    Otherwise, she denies having fever, chills, fatigue, appetite changes; shortness of breath, cough, wheezing; chest pain, palpitations, leg swelling; abdominal pain, nausea, vomiting, diarrhea, constipation; unusual urinary symptoms; polydipsia, polyphagia, polyuria, hot or cold intolerance; back pain; acute visual changes, numbness, headache; anxiety, depression, homicidal or suicidal thoughts.           Labs:                   WBC                      9.28                12/19/2022                 HGB                      13.2                12/19/2022                 HCT                      41.8                12/19/2022                 PLT                      389                 12/19/2022                 CHOL                     220 (H)             08/23/2022                 TRIG                     71                  08/23/2022                 HDL                      63                  08/23/2022                 ALT                      19                  12/19/2022                 AST                   "    18                  12/19/2022                 NA                       137                 12/19/2022                 K                        4.4                 12/19/2022                 CL                       102                 12/19/2022                 CREATININE               0.8                 12/19/2022                 BUN                      21                  12/19/2022                 CO2                      25                  12/19/2022                 TSH                      0.769               08/23/2022                 INR                      0.9                 08/18/2008                 HGBA1C                   5.6                 04/27/2016                LDLCALC                  142.8               08/23/2022                Current Outpatient Medications   Medication Sig    adalimumab (HUMIRA,CF, PEN) 40 mg/0.4 mL PnKt Inject 0.4 mLs (40 mg total) into the skin every 7 days.    albuterol (PROVENTIL/VENTOLIN HFA) 90 mcg/actuation inhaler Inhale 2 puffs into the lungs every 6 (six) hours as needed for Wheezing or Shortness of Breath.    ALPRAZolam (XANAX) 0.5 MG tablet Take 1 tablet by mouth twice daily as needed    amLODIPine (NORVASC) 5 MG tablet Take 1 tablet (5 mg total) by mouth once daily.    ascorbic acid, vitamin C, (VITAMIN C) 1000 MG tablet Take by mouth. 1 Tablet Oral Every day    azaTHIOprine (IMURAN) 50 mg Tab Take 3 tablets (150 mg total) by mouth once daily.    azelastine (ASTELIN) 137 mcg (0.1 %) nasal spray 2 sprays (274 mcg total) by Nasal route 2 (two) times daily.    BACILLUS COAGULANS (PROBIOTIC, B. COAGULANS, ORAL) Take by mouth once daily.    cetirizine (ALLERGY RELIEF, CETIRIZINE,) 10 MG tablet Take 1 tablet (10 mg total) by mouth once daily.    cholecalciferol, vitamin D3, 2,000 unit Cap Take by mouth. 1 capsule Oral Every day    dicyclomine (BENTYL) 20 mg tablet Take 1 tablet (20 mg total) by mouth 3 (three) times daily as needed (abdominal pain).     gabapentin (NEURONTIN) 100 MG capsule Take 1 capsule (100 mg total) by mouth nightly as needed (pain).    hepatitis B virus vacc.rec,PF, (ENGERIX-B) 20 mcg/mL Syrg Inject into the muscle.    hydroCHLOROthiazide (HYDRODIURIL) 25 MG tablet Take 1 tablet (25 mg total) by mouth once daily.    hydroquinone 4 % Crea Apply to dark spots once daily. Use with sunscreen if outdoors    ibuprofen (ADVIL,MOTRIN) 800 MG tablet Take 1 tablet (800 mg total) by mouth 2 (two) times daily as needed.    ipratropium (ATROVENT) 21 mcg (0.03 %) nasal spray 2 sprays by Nasal route 3 (three) times daily.    lisinopriL (PRINIVIL,ZESTRIL) 40 MG tablet TAKE 1 TABLET BY MOUTH ONCE DAILY.    methylPREDNISolone (MEDROL DOSEPACK) 4 mg tablet use as directed    metoprolol succinate (TOPROL-XL) 25 MG 24 hr tablet Take 1 tablet (25 mg total) by mouth once daily.    montelukast (SINGULAIR) 10 mg tablet Take 1 tablet (10 mg total) by mouth every evening.    multivitamin-Ca-iron-minerals 18-0.4 mg Tab Take by mouth. 1 Tablet Oral Every day    pneumoc 20-shani conj-dip cr,PF, (PREVNAR-20, PF,) 0.5 mL Syrg injection Inject 0.5 mLs into the muscle.    potassium bicarbonate disintegrating tablet Take 10 mEq by mouth 2 (two) times daily.    pravastatin (PRAVACHOL) 80 MG tablet Take 1 tablet by mouth in the evening    tretinoin (RETIN-A) 0.025 % cream Apply pea-sized amount to entire face at bedtime.  If dryness, use every third night and increase as tolerated to every night.    valACYclovir (VALTREX) 1000 MG tablet TAKE 2 TABLETS BY MOUTH TWICE DAILY FOR 1 DAY PER EPISODE    zinc sulfate (ZINC-220 ORAL)        Review of Systems   Constitutional:  Negative for activity change, appetite change, chills, fatigue and fever.        Weight 89.3 kg (196 lb 13.9 oz) at August 23, 2022 visit.   Eyes:  Negative for visual disturbance.   Respiratory:  Negative for cough, shortness of breath and wheezing.    Cardiovascular:  Negative for chest pain, palpitations and leg  swelling.        See history of present illness.   Gastrointestinal:  Negative for abdominal pain, constipation, diarrhea, nausea and vomiting.        See history of present illness.   Endocrine: Negative for cold intolerance, heat intolerance, polydipsia, polyphagia and polyuria.   Genitourinary:  Negative for difficulty urinating.   Musculoskeletal:  Positive for myalgias and neck pain. Negative for back pain and joint swelling.   Neurological:  Negative for numbness and headaches.   Psychiatric/Behavioral:  Negative for dysphoric mood and suicidal ideas. The patient is not nervous/anxious.         Negative for homicidal ideas.     Objective:  Physical Exam  Vitals reviewed.   Constitutional:       General: She is not in acute distress.     Appearance: Normal appearance. She is well-developed. She is obese. She is not ill-appearing, toxic-appearing or diaphoretic.      Comments: Pleasant.   Neck:      Thyroid: No thyromegaly.   Cardiovascular:      Rate and Rhythm: Normal rate and regular rhythm.      Heart sounds: Normal heart sounds. No murmur heard.  Pulmonary:      Effort: Pulmonary effort is normal. No respiratory distress.      Breath sounds: Normal breath sounds. No wheezing.   Abdominal:      General: Bowel sounds are normal. There is no distension.      Palpations: Abdomen is soft. There is no mass.      Tenderness: There is no abdominal tenderness. There is no guarding or rebound.   Musculoskeletal:         General: No swelling or tenderness. Normal range of motion.      Cervical back: Normal range of motion and neck supple. No tenderness.      Comments: She is ambulatory without problems.     Lymphadenopathy:      Cervical: No cervical adenopathy.   Neurological:      General: No focal deficit present.      Mental Status: She is alert and oriented to person, place, and time.   Psychiatric:         Mood and Affect: Mood normal.         Behavior: Behavior normal.         Thought Content: Thought content  normal.         Judgment: Judgment normal.       ASSESSMENT:  1. Essential hypertension    2. Crohn's disease of colon without complication    3. Drug-induced immunodeficiency    4. Cervical spine arthritis    5. Morbid obesity with BMI of 40.0-44.9, adult        PLAN:  Angelica was seen today for follow-up and hypertension.    Diagnoses and all orders for this visit:    Essential hypertension  -     lisinopriL (PRINIVIL,ZESTRIL) 40 MG tablet; TAKE 1 TABLET BY MOUTH ONCE DAILY.    Crohn's disease of colon without complication    Drug-induced immunodeficiency    Cervical spine arthritis  -     Ambulatory referral/consult to Pain Clinic; Future    Morbid obesity with BMI of 40.0-44.9, adult      No labs today.  Continue current medications, follow low sodium, low cholesterol, low carb diet, daily walks.  Prescription refills as noted above.  Keep follow up with specialists (including consider following through with pain clinic evaluation for neck OA).  Follow up in about 6 months (around 8/23/2023) for physical.

## 2023-02-28 ENCOUNTER — OFFICE VISIT (OUTPATIENT)
Dept: SPORTS MEDICINE | Facility: CLINIC | Age: 63
End: 2023-02-28
Payer: COMMERCIAL

## 2023-02-28 ENCOUNTER — TELEPHONE (OUTPATIENT)
Dept: GASTROENTEROLOGY | Facility: CLINIC | Age: 63
End: 2023-02-28
Payer: COMMERCIAL

## 2023-02-28 VITALS — WEIGHT: 204.81 LBS | HEIGHT: 60 IN | BODY MASS INDEX: 40.21 KG/M2

## 2023-02-28 DIAGNOSIS — M25.512 CHRONIC LEFT SHOULDER PAIN: ICD-10-CM

## 2023-02-28 DIAGNOSIS — G89.29 CHRONIC LEFT SHOULDER PAIN: ICD-10-CM

## 2023-02-28 DIAGNOSIS — K50.10 CROHN'S DISEASE OF COLON WITHOUT COMPLICATION: ICD-10-CM

## 2023-02-28 DIAGNOSIS — G56.92 NEUROPATHY OF LEFT HAND: ICD-10-CM

## 2023-02-28 DIAGNOSIS — S46.812D STRAIN OF LEFT TRAPEZIUS MUSCLE, SUBSEQUENT ENCOUNTER: Primary | ICD-10-CM

## 2023-02-28 DIAGNOSIS — M54.2 NECK PAIN ON LEFT SIDE: ICD-10-CM

## 2023-02-28 PROCEDURE — 4010F ACE/ARB THERAPY RXD/TAKEN: CPT | Mod: CPTII,S$GLB,, | Performed by: FAMILY MEDICINE

## 2023-02-28 PROCEDURE — 4010F PR ACE/ARB THEARPY RXD/TAKEN: ICD-10-PCS | Mod: CPTII,S$GLB,, | Performed by: FAMILY MEDICINE

## 2023-02-28 PROCEDURE — 99999 PR PBB SHADOW E&M-EST. PATIENT-LVL V: CPT | Mod: PBBFAC,,, | Performed by: FAMILY MEDICINE

## 2023-02-28 PROCEDURE — 99999 PR PBB SHADOW E&M-EST. PATIENT-LVL V: ICD-10-PCS | Mod: PBBFAC,,, | Performed by: FAMILY MEDICINE

## 2023-02-28 PROCEDURE — 99215 PR OFFICE/OUTPT VISIT, EST, LEVL V, 40-54 MIN: ICD-10-PCS | Mod: S$GLB,,, | Performed by: FAMILY MEDICINE

## 2023-02-28 PROCEDURE — 3008F PR BODY MASS INDEX (BMI) DOCUMENTED: ICD-10-PCS | Mod: CPTII,S$GLB,, | Performed by: FAMILY MEDICINE

## 2023-02-28 PROCEDURE — 1159F MED LIST DOCD IN RCRD: CPT | Mod: CPTII,S$GLB,, | Performed by: FAMILY MEDICINE

## 2023-02-28 PROCEDURE — 3008F BODY MASS INDEX DOCD: CPT | Mod: CPTII,S$GLB,, | Performed by: FAMILY MEDICINE

## 2023-02-28 PROCEDURE — 99215 OFFICE O/P EST HI 40 MIN: CPT | Mod: S$GLB,,, | Performed by: FAMILY MEDICINE

## 2023-02-28 PROCEDURE — 1159F PR MEDICATION LIST DOCUMENTED IN MEDICAL RECORD: ICD-10-PCS | Mod: CPTII,S$GLB,, | Performed by: FAMILY MEDICINE

## 2023-02-28 RX ORDER — METHOCARBAMOL 750 MG/1
TABLET, FILM COATED ORAL
Qty: 30 TABLET | Refills: 0 | Status: SHIPPED | OUTPATIENT
Start: 2023-02-28 | End: 2023-04-06

## 2023-02-28 RX ORDER — ADALIMUMAB 40MG/0.4ML
40 KIT SUBCUTANEOUS
Qty: 4 PEN | Refills: 11 | Status: SHIPPED | OUTPATIENT
Start: 2023-02-28 | End: 2023-03-02 | Stop reason: SDUPTHER

## 2023-02-28 RX ORDER — ADALIMUMAB 40MG/0.4ML
40 KIT SUBCUTANEOUS
Qty: 4 PEN | Refills: 11 | Status: SHIPPED | OUTPATIENT
Start: 2023-02-28 | End: 2023-02-28 | Stop reason: SDUPTHER

## 2023-02-28 NOTE — PATIENT INSTRUCTIONS
Ice for 15 minutes to shoulder and neck as needed     Ibuprofen as needed and prescribed     Muscle relaxer at night    Follow-up with pain management     Restart physical therapy for neck and left shoulder    Left wrist carpal tunnel splint

## 2023-02-28 NOTE — PROGRESS NOTES
Subjective:     Patient ID: Angelica Flores is a 62 y.o. female.    Chief Complaint: Pain of the Left Shoulder      HPI: Patient is being seen for left shoulder follow up since receiving cortisone injection at her last office visit on 1/31/23. Says the injection worked for about 3 weeks. Reports having numbness and tingling that radiates down to her middle, ring, and little finger. Describes it as a dull, aching sensation most of the time, but says the pain is sharp occasionally and is worst at night. Rating pain 3/10 at today's office visit. Takes ibuprofen 800 that she received from her PCP, Dr. Alvarez, every 6-8 hours to help with pain relief. Was participating in physical therapy twice a week at the [place] location but discontinued after 5 visits and occasionally does exercises at home.      Left neck pain seems to be more prominent now and patient plans to return to pain management for further help with the neck and shoulder.  Patient would like a muscle relaxer.      Past Medical History:   Diagnosis Date    Anxiety     Asthma     Crohn's disease     Fibroids     Hyperlipidemia     Hypertension     Iritis     Migraine headache     Ocular    Osteopenia 08/2019    Syncope and collapse     Vitamin D deficiency disease      Past Surgical History:   Procedure Laterality Date    ANKLE FRACTURE SURGERY  08/19/08    right ankle    BREAST BIOPSY Left 1977    COLONOSCOPY N/A 8/11/2017    Procedure: COLONOSCOPY - REQUESTS DR. MATTI ACUNA;  Surgeon: Matti Acuna III, MD;  Location: [facility];  Service: Endoscopy;  Laterality: N/A;    COLONOSCOPY N/A 11/23/2020    Procedure: COLONOSCOPY;  Surgeon: Matti Acuna III, MD;  Location: [facility];  Service: Endoscopy;  Laterality: N/A;    COLONOSCOPY N/A 9/28/2022    Procedure: COLONOSCOPY;  Surgeon: Matti Acuna III, MD;  Location: [facility];  Service: Endoscopy;  Laterality: N/A;    TAHBSO  February 2011    Due to abnormal pap smear and fibroids    TOTAL  ABDOMINAL HYSTERECTOMY       Family History   Problem Relation Age of Onset    Arthritis Mother     Fuch's dystrophy Mother     Breast cancer Mother     Lupus Sister     Rheum arthritis Sister     Obesity Sister     Hypertension Father     Cancer Maternal Grandfather         lung ca    Stroke Maternal Grandmother     Diabetes Maternal Grandmother     Colon cancer Paternal Aunt      Social History     Socioeconomic History    Marital status:     Number of children: 2   Occupational History    Occupation: RN     Employer: [employer]     Comment: [employer]   Tobacco Use    Smoking status: Never    Smokeless tobacco: Never   Substance and Sexual Activity    Alcohol use: Yes     Alcohol/week: 0.0 standard drinks     Comment: ocassionally    Drug use: No    Sexual activity: Yes     Partners: Male     Birth control/protection: Surgical   Social History Narrative    She wears seatbelt.  July 22, 2017. She states she works new position at [employer].     Social Determinants of Health     Financial Resource Strain: Low Risk     Difficulty of Paying Living Expenses: Not hard at all   Food Insecurity: No Food Insecurity    Worried About Running Out of Food in the Last Year: Never true    Ran Out of Food in the Last Year: Never true   Transportation Needs: No Transportation Needs    Lack of Transportation (Medical): No    Lack of Transportation (Non-Medical): No   Physical Activity: Inactive    Days of Exercise per Week: 0 days    Minutes of Exercise per Session: 0 min   Stress: Stress Concern Present    Feeling of Stress : To some extent   Social Connections: Unknown    Frequency of Communication with Friends and Family: More than three times a week    Frequency of Social Gatherings with Friends and Family: Twice a week    Active Member of Clubs or Organizations: Yes    Attends Club or Organization Meetings: 1 to 4 times per year    Marital Status:    Housing Stability: Low Risk     Unable to Pay for Housing  in the Last Year: No    Number of Places Lived in the Last Year: 1    Unstable Housing in the Last Year: No       Current Outpatient Medications:     adalimumab (HUMIRA,CF, PEN) 40 mg/0.4 mL PnKt, Inject 0.4 mLs (40 mg total) into the skin every 7 days., Disp: 4 pen, Rfl: 11    ALPRAZolam (XANAX) 0.5 MG tablet, Take 1 tablet by mouth twice daily as needed, Disp: 60 tablet, Rfl: 0    amLODIPine (NORVASC) 5 MG tablet, Take 1 tablet (5 mg total) by mouth once daily., Disp: 90 tablet, Rfl: 1    ascorbic acid, vitamin C, (VITAMIN C) 1000 MG tablet, Take by mouth. 1 Tablet Oral Every day, Disp: , Rfl:     azaTHIOprine (IMURAN) 50 mg Tab, Take 3 tablets (150 mg total) by mouth once daily., Disp: 90 tablet, Rfl: 5    BACILLUS COAGULANS (PROBIOTIC, B. COAGULANS, ORAL), Take by mouth once daily., Disp: , Rfl:     cetirizine (ALLERGY RELIEF, CETIRIZINE,) 10 MG tablet, Take 1 tablet (10 mg total) by mouth once daily., Disp: 90 tablet, Rfl: 1    cholecalciferol, vitamin D3, 2,000 unit Cap, Take by mouth. 1 capsule Oral Every day, Disp: , Rfl:     dicyclomine (BENTYL) 20 mg tablet, Take 1 tablet (20 mg total) by mouth 3 (three) times daily as needed (abdominal pain)., Disp: 90 tablet, Rfl: 0    gabapentin (NEURONTIN) 100 MG capsule, Take 1 capsule (100 mg total) by mouth nightly as needed (pain)., Disp: 30 capsule, Rfl: 0    hepatitis B virus vacc.rec,PF, (ENGERIX-B) 20 mcg/mL Syrg, Inject into the muscle., Disp: 1 mL, Rfl: 2    hydroCHLOROthiazide (HYDRODIURIL) 25 MG tablet, Take 1 tablet (25 mg total) by mouth once daily., Disp: 90 tablet, Rfl: 1    hydroquinone 4 % Crea, Apply to dark spots once daily. Use with sunscreen if outdoors, Disp: 28 g, Rfl: 1    ibuprofen (ADVIL,MOTRIN) 800 MG tablet, Take 1 tablet (800 mg total) by mouth 2 (two) times daily as needed., Disp: 180 tablet, Rfl: 1    ipratropium (ATROVENT) 21 mcg (0.03 %) nasal spray, 2 sprays by Nasal route 3 (three) times daily., Disp: 20 mL, Rfl: 5    lisinopriL  (PRINIVIL,ZESTRIL) 40 MG tablet, TAKE 1 TABLET BY MOUTH ONCE DAILY., Disp: 90 tablet, Rfl: 1    methylPREDNISolone (MEDROL DOSEPACK) 4 mg tablet, use as directed, Disp: 1 each, Rfl: 0    metoprolol succinate (TOPROL-XL) 25 MG 24 hr tablet, Take 1 tablet (25 mg total) by mouth once daily., Disp: 30 tablet, Rfl: 11    montelukast (SINGULAIR) 10 mg tablet, Take 1 tablet (10 mg total) by mouth every evening., Disp: 90 tablet, Rfl: 1    multivitamin-Ca-iron-minerals 18-0.4 mg Tab, Take by mouth. 1 Tablet Oral Every day, Disp: , Rfl:     pneumoc 20-shani conj-dip cr,PF, (PREVNAR-20, PF,) 0.5 mL Syrg injection, Inject 0.5 mLs into the muscle., Disp: 0.5 mL, Rfl: 0    potassium bicarbonate disintegrating tablet, Take 10 mEq by mouth 2 (two) times daily., Disp: , Rfl:     pravastatin (PRAVACHOL) 80 MG tablet, Take 1 tablet by mouth in the evening, Disp: 90 tablet, Rfl: 1    tretinoin (RETIN-A) 0.025 % cream, Apply pea-sized amount to entire face at bedtime.  If dryness, use every third night and increase as tolerated to every night., Disp: 20 g, Rfl: 6    valACYclovir (VALTREX) 1000 MG tablet, TAKE 2 TABLETS BY MOUTH TWICE DAILY FOR 1 DAY PER EPISODE, Disp: 30 tablet, Rfl: 0    zinc sulfate (ZINC-220 ORAL), , Disp: , Rfl:     albuterol (PROVENTIL/VENTOLIN HFA) 90 mcg/actuation inhaler, Inhale 2 puffs into the lungs every 6 (six) hours as needed for Wheezing or Shortness of Breath., Disp: 18 g, Rfl: 2    azelastine (ASTELIN) 137 mcg (0.1 %) nasal spray, 2 sprays (274 mcg total) by Nasal route 2 (two) times daily., Disp: 90 mL, Rfl: 3    methocarbamoL (ROBAXIN) 750 MG Tab, 1 tablet at night to help with muscle spasm and sleep, Disp: 30 tablet, Rfl: 0  Review of patient's allergies indicates:  No Known Allergies  Review of Systems   Constitutional:  Negative for chills, fever and weight loss.   Respiratory:  Negative for shortness of breath.    Cardiovascular:  Negative for chest pain and palpitations.     Objective:   Body mass  index is 40 kg/m².  There were no vitals filed for this visit.        Ortho/SPM Exam-alert and oriented well-nourished well-developed pleasant adult female ambulatory in no acute distress     Respiratory effort appears normal     Neck-no point tenderness over spinal processes   Patient has some discomfort in left side of neck with full rotation to either side  Negative Spurling   Also has discomfort with side bending but now with flexion extension     Left shoulder-no acute deformity  C last visit for detailed evaluation but patient still has very weak empty can position with pain increased with minimal downward resistance   Difficulty with abduction or reaching behind her back  Tenderness and tightness of musculature noted left superior trapezius muscle  Biceps and triceps testing is 4/5  Left  strength is 4/5    Right wrist positive Phalen test  Left wrist negative Phalen but positive Tinel  Left elbow normal nontender to palpation    Psychiatric good affect and cognition    Plan-return to physical therapy to let them work more with her left neck and shoulder pain for another 10-15 visits   Continue ibuprofen as needed and may add Robaxin at night to help with spasm or neck pain.    Follow-up with pain management   Left wrist splint for carpal tunnel syndrome   Recheck here in 2 months    Patient Instructions   Ice for 15 minutes to shoulder and neck as needed     Ibuprofen as needed and prescribed     Muscle relaxer at night    Follow-up with pain management     Restart physical therapy for neck and left shoulder    Left wrist carpal tunnel splint    IMAGING: X-Ray Shoulder 2 or More Views Left  Narrative: EXAMINATION:  XR SHOULDER COMPLETE 2 OR MORE VIEWS LEFT    CLINICAL HISTORY:  Pain in left shoulder    TECHNIQUE:  Two or three views of the left shoulder were performed.    COMPARISON:  None    FINDINGS:  No acute fracture or dislocation.  Mild AC joint arthrosis.  Glenohumeral joint is  maintained.  Impression: See above    Electronically signed by: Noam Rodriguez MD  Date:    12/19/2022  Time:    12:28  X-Ray Cervical Spine Complete 5 view  Narrative: EXAMINATION:  XR CERVICAL SPINE COMPLETE 5 VIEW    CLINICAL HISTORY:  . Pain in left shoulder    TECHNIQUE:  AP, Lateral, bilateral oblique and open mouth views of the cervical spine were performed.    COMPARISON:  None    FINDINGS:  Straightening of normal cervical lordosis.  Discogenic degenerative changes at C4-C5 with moderate disc space narrowing posteriorly at this level.  No fracture.  No listhesis.  Prevertebral soft tissues are maintained.  Mild bilateral neural foraminal encroachment C4-C5.  Lung apices are clear.  Impression: As above    Electronically signed by: Noam Rodriguez MD  Date:    12/19/2022  Time:    12:27       Radiographs / Imaging : Relevant imaging results reviewed by me and interpreted by me, discussed with the patient and / or family -agree with x-ray report and discussed with patient      Assessment:     Encounter Diagnoses   Name Primary?    Strain of left trapezius muscle, subsequent encounter Yes    Neck pain on left side     Chronic left shoulder pain     Neuropathy of left hand         Plan:   Strain of left trapezius muscle, subsequent encounter    Neck pain on left side    Chronic left shoulder pain    Neuropathy of left hand    Other orders  -     methocarbamoL (ROBAXIN) 750 MG Tab; 1 tablet at night to help with muscle spasm and sleep  Dispense: 30 tablet; Refill: 0    Note-40 minutes spent with patient and with reviewing pertinent materials    The patient verbalized good understanding of the medical issues discussed today and expressed appreciation for the care provided.  Patient was given the opportunity to ask questions and be an active participant in their medical care. Patient had no further questions or concerns at this time.     The patient was encouraged to participate in appropriate physical  activity.      Sedrick Ferrari M.D.  [facility]        This note was dictated using voice recognition software and may have sound a like errors.

## 2023-02-28 NOTE — TELEPHONE ENCOUNTER
Per [facility] , patient can only fill a specialty med one time a retail location, which patient did at [facility] on Feb 7 , 2023. All other fills will need to be filled at [facility] . I phone in one verbal prescription for Humira 40 mg every 7 days , quantity 4 pens and no additional refills. Submitted an electronic refill request be sent to [facility] .    Sydney Keith, PharmD , [facility]  Clinical Pharmacist Gastroenterology  [facility] [place]

## 2023-03-03 ENCOUNTER — LAB VISIT (OUTPATIENT)
Dept: LAB | Facility: HOSPITAL | Age: 63
End: 2023-03-03
Attending: NURSE PRACTITIONER
Payer: COMMERCIAL

## 2023-03-03 DIAGNOSIS — K50.10 CROHN'S DISEASE OF COLON WITHOUT COMPLICATION: ICD-10-CM

## 2023-03-03 LAB
BASOPHILS # BLD AUTO: 0.02 K/UL (ref 0–0.2)
BASOPHILS NFR BLD: 0.2 % (ref 0–1.9)
DIFFERENTIAL METHOD: ABNORMAL
EOSINOPHIL # BLD AUTO: 0.1 K/UL (ref 0–0.5)
EOSINOPHIL NFR BLD: 0.9 % (ref 0–8)
ERYTHROCYTE [DISTWIDTH] IN BLOOD BY AUTOMATED COUNT: 14.7 % (ref 11.5–14.5)
HCT VFR BLD AUTO: 41 % (ref 37–48.5)
HGB BLD-MCNC: 12.8 G/DL (ref 12–16)
IMM GRANULOCYTES # BLD AUTO: 0.03 K/UL (ref 0–0.04)
IMM GRANULOCYTES NFR BLD AUTO: 0.3 % (ref 0–0.5)
LYMPHOCYTES # BLD AUTO: 2.9 K/UL (ref 1–4.8)
LYMPHOCYTES NFR BLD: 27.5 % (ref 18–48)
MCH RBC QN AUTO: 30.9 PG (ref 27–31)
MCHC RBC AUTO-ENTMCNC: 31.2 G/DL (ref 32–36)
MCV RBC AUTO: 99 FL (ref 82–98)
MONOCYTES # BLD AUTO: 0.7 K/UL (ref 0.3–1)
MONOCYTES NFR BLD: 6.6 % (ref 4–15)
NEUTROPHILS # BLD AUTO: 6.9 K/UL (ref 1.8–7.7)
NEUTROPHILS NFR BLD: 64.5 % (ref 38–73)
NRBC BLD-RTO: 0 /100 WBC
PLATELET # BLD AUTO: 326 K/UL (ref 150–450)
PMV BLD AUTO: 11.3 FL (ref 9.2–12.9)
RBC # BLD AUTO: 4.14 M/UL (ref 4–5.4)
WBC # BLD AUTO: 10.7 K/UL (ref 3.9–12.7)

## 2023-03-03 PROCEDURE — 80053 COMPREHEN METABOLIC PANEL: CPT | Performed by: NURSE PRACTITIONER

## 2023-03-03 PROCEDURE — 36415 COLL VENOUS BLD VENIPUNCTURE: CPT | Performed by: NURSE PRACTITIONER

## 2023-03-03 PROCEDURE — 80145 DRUG ASSAY ADALIMUMAB: CPT | Performed by: NURSE PRACTITIONER

## 2023-03-03 PROCEDURE — 85025 COMPLETE CBC W/AUTO DIFF WBC: CPT | Performed by: NURSE PRACTITIONER

## 2023-03-04 LAB
ALBUMIN SERPL BCP-MCNC: 4.6 G/DL (ref 3.5–5.2)
ALP SERPL-CCNC: 60 U/L (ref 55–135)
ALT SERPL W/O P-5'-P-CCNC: 25 U/L (ref 10–44)
ANION GAP SERPL CALC-SCNC: 13 MMOL/L (ref 8–16)
AST SERPL-CCNC: 26 U/L (ref 10–40)
BILIRUB SERPL-MCNC: 0.7 MG/DL (ref 0.1–1)
BUN SERPL-MCNC: 16 MG/DL (ref 8–23)
CALCIUM SERPL-MCNC: 10.4 MG/DL (ref 8.7–10.5)
CHLORIDE SERPL-SCNC: 104 MMOL/L (ref 95–110)
CO2 SERPL-SCNC: 23 MMOL/L (ref 23–29)
CREAT SERPL-MCNC: 0.8 MG/DL (ref 0.5–1.4)
EST. GFR  (NO RACE VARIABLE): >60 ML/MIN/1.73 M^2
GLUCOSE SERPL-MCNC: 97 MG/DL (ref 70–110)
POTASSIUM SERPL-SCNC: 4.2 MMOL/L (ref 3.5–5.1)
PROT SERPL-MCNC: 8.3 G/DL (ref 6–8.4)
SODIUM SERPL-SCNC: 140 MMOL/L (ref 136–145)

## 2023-03-06 LAB — ADALIMUMAB SERPL IA-MCNC: 16.5 MCG/ML

## 2023-03-07 ENCOUNTER — PATIENT MESSAGE (OUTPATIENT)
Dept: GASTROENTEROLOGY | Facility: CLINIC | Age: 63
End: 2023-03-07
Payer: COMMERCIAL

## 2023-03-13 ENCOUNTER — PATIENT MESSAGE (OUTPATIENT)
Dept: GASTROENTEROLOGY | Facility: CLINIC | Age: 63
End: 2023-03-13
Payer: COMMERCIAL

## 2023-03-13 DIAGNOSIS — K50.10 CROHN'S DISEASE OF COLON WITHOUT COMPLICATION: Primary | ICD-10-CM

## 2023-03-16 ENCOUNTER — OFFICE VISIT (OUTPATIENT)
Dept: PAIN MEDICINE | Facility: CLINIC | Age: 63
End: 2023-03-16
Payer: COMMERCIAL

## 2023-03-16 ENCOUNTER — PATIENT MESSAGE (OUTPATIENT)
Dept: GASTROENTEROLOGY | Facility: CLINIC | Age: 63
End: 2023-03-16
Payer: COMMERCIAL

## 2023-03-16 VITALS
WEIGHT: 204.56 LBS | BODY MASS INDEX: 40.16 KG/M2 | HEIGHT: 60 IN | DIASTOLIC BLOOD PRESSURE: 75 MMHG | HEART RATE: 52 BPM | SYSTOLIC BLOOD PRESSURE: 122 MMHG

## 2023-03-16 DIAGNOSIS — M47.812 CERVICAL SPONDYLOSIS: ICD-10-CM

## 2023-03-16 DIAGNOSIS — M54.2 CERVICALGIA: ICD-10-CM

## 2023-03-16 DIAGNOSIS — M47.812 CERVICAL SPINE ARTHRITIS: ICD-10-CM

## 2023-03-16 DIAGNOSIS — M50.30 DDD (DEGENERATIVE DISC DISEASE), CERVICAL: ICD-10-CM

## 2023-03-16 DIAGNOSIS — M54.12 CERVICAL RADICULOPATHY: Primary | ICD-10-CM

## 2023-03-16 DIAGNOSIS — M25.512 CHRONIC LEFT SHOULDER PAIN: ICD-10-CM

## 2023-03-16 DIAGNOSIS — G89.29 CHRONIC LEFT SHOULDER PAIN: ICD-10-CM

## 2023-03-16 DIAGNOSIS — G56.02 LEFT CARPAL TUNNEL SYNDROME: ICD-10-CM

## 2023-03-16 PROCEDURE — 3078F DIAST BP <80 MM HG: CPT | Mod: CPTII,S$GLB,, | Performed by: ANESTHESIOLOGY

## 2023-03-16 PROCEDURE — 3074F SYST BP LT 130 MM HG: CPT | Mod: CPTII,S$GLB,, | Performed by: ANESTHESIOLOGY

## 2023-03-16 PROCEDURE — 99999 PR PBB SHADOW E&M-EST. PATIENT-LVL III: CPT | Mod: PBBFAC,,, | Performed by: ANESTHESIOLOGY

## 2023-03-16 PROCEDURE — 4010F PR ACE/ARB THEARPY RXD/TAKEN: ICD-10-PCS | Mod: CPTII,S$GLB,, | Performed by: ANESTHESIOLOGY

## 2023-03-16 PROCEDURE — 3078F PR MOST RECENT DIASTOLIC BLOOD PRESSURE < 80 MM HG: ICD-10-PCS | Mod: CPTII,S$GLB,, | Performed by: ANESTHESIOLOGY

## 2023-03-16 PROCEDURE — 4010F ACE/ARB THERAPY RXD/TAKEN: CPT | Mod: CPTII,S$GLB,, | Performed by: ANESTHESIOLOGY

## 2023-03-16 PROCEDURE — 3008F PR BODY MASS INDEX (BMI) DOCUMENTED: ICD-10-PCS | Mod: CPTII,S$GLB,, | Performed by: ANESTHESIOLOGY

## 2023-03-16 PROCEDURE — 99999 PR PBB SHADOW E&M-EST. PATIENT-LVL III: ICD-10-PCS | Mod: PBBFAC,,, | Performed by: ANESTHESIOLOGY

## 2023-03-16 PROCEDURE — 3074F PR MOST RECENT SYSTOLIC BLOOD PRESSURE < 130 MM HG: ICD-10-PCS | Mod: CPTII,S$GLB,, | Performed by: ANESTHESIOLOGY

## 2023-03-16 PROCEDURE — 99204 PR OFFICE/OUTPT VISIT, NEW, LEVL IV, 45-59 MIN: ICD-10-PCS | Mod: S$GLB,,, | Performed by: ANESTHESIOLOGY

## 2023-03-16 PROCEDURE — 3008F BODY MASS INDEX DOCD: CPT | Mod: CPTII,S$GLB,, | Performed by: ANESTHESIOLOGY

## 2023-03-16 PROCEDURE — 1159F MED LIST DOCD IN RCRD: CPT | Mod: CPTII,S$GLB,, | Performed by: ANESTHESIOLOGY

## 2023-03-16 PROCEDURE — 99204 OFFICE O/P NEW MOD 45 MIN: CPT | Mod: S$GLB,,, | Performed by: ANESTHESIOLOGY

## 2023-03-16 PROCEDURE — 1159F PR MEDICATION LIST DOCUMENTED IN MEDICAL RECORD: ICD-10-PCS | Mod: CPTII,S$GLB,, | Performed by: ANESTHESIOLOGY

## 2023-03-16 RX ORDER — TOPIRAMATE 25 MG/1
25 TABLET ORAL NIGHTLY
Qty: 60 TABLET | Refills: 1 | Status: SHIPPED | OUTPATIENT
Start: 2023-03-16 | End: 2023-05-17

## 2023-03-16 NOTE — PROGRESS NOTES
New Patient Interventional Pain Note (Initial Visit)    Referring Physician: Monet Alvarez MD    PCP: Monet Alvarez MD    Chief Complaint:   Chief Complaint   Patient presents with    Shoulder Pain    Neck Pain        SUBJECTIVE:    Angelica Flores is a 62 y.o. female who presents to the clinic for the evaluation of neck pain.   Patient reports six-month history of neck pain.  Pain is described as a throbbing aching pain that starts over the left side of her neck that radiates down to her left upper extremity and numbness and tingling pain.  She denies any significant traumas to her cervical spine.  She denies any previous surgical intervention on her cervical spine.  Patient is currently being followed by Orthopedics for left shoulder pain and left carpal tunnel syndrome.  Neck pain is typically worse at night and with rotation, better with muscle relaxers and stretching.  Pain is rated a 4/10, but can increase to a 10 out 10 with exacerbating factors.  Patient is currently on Humira for IBS.      Non-Pharmacologic Treatments:  Physical Therapy/Home Exercise: yes  Ice/Heat:yes  TENS: no  Acupuncture: no  Massage: yes  Chiropractic: no        Previous Pain Medications:  NSAIDs, Tylenol, muscle relaxers, neuropathic     report:  Reviewed and consistent with medication use as prescribed.    Pain Procedures:   none      Imaging:     Results for orders placed during the hospital encounter of 12/19/22    X-Ray Cervical Spine Complete 5 view    Narrative  EXAMINATION:  XR CERVICAL SPINE COMPLETE 5 VIEW    CLINICAL HISTORY:  . Pain in left shoulder    TECHNIQUE:  AP, Lateral, bilateral oblique and open mouth views of the cervical spine were performed.    COMPARISON:  None    FINDINGS:  Straightening of normal cervical lordosis.  Discogenic degenerative changes at C4-C5 with moderate disc space narrowing posteriorly at this level.  No fracture.  No listhesis.  Prevertebral soft tissues are maintained.  Mild  bilateral neural foraminal encroachment C4-C5.  Lung apices are clear.    Impression  As above      Electronically signed by: Noam Rodriguez MD  Date:    12/19/2022  Time:    12:27        Results for orders placed during the hospital encounter of 12/19/22    X-Ray Shoulder 2 or More Views Left    Narrative  EXAMINATION:  XR SHOULDER COMPLETE 2 OR MORE VIEWS LEFT    CLINICAL HISTORY:  Pain in left shoulder    TECHNIQUE:  Two or three views of the left shoulder were performed.    COMPARISON:  None    FINDINGS:  No acute fracture or dislocation.  Mild AC joint arthrosis.  Glenohumeral joint is maintained.    Impression  See above      Electronically signed by: Noam Rodriguez MD  Date:    12/19/2022  Time:    12:28        Past Medical History:   Diagnosis Date    Anxiety     Asthma     Crohn's disease     Fibroids     Hyperlipidemia     Hypertension     Iritis     Migraine headache     Ocular    Osteopenia 08/2019    Syncope and collapse     Vitamin D deficiency disease      Past Surgical History:   Procedure Laterality Date    ANKLE FRACTURE SURGERY  08/19/08    right ankle    BREAST BIOPSY Left 1977    COLONOSCOPY N/A 8/11/2017    Procedure: COLONOSCOPY - REQUESTS DR. MATTI ACUNA;  Surgeon: Matti Acuna III, MD;  Location: [facility];  Service: Endoscopy;  Laterality: N/A;    COLONOSCOPY N/A 11/23/2020    Procedure: COLONOSCOPY;  Surgeon: Matti Acuna III, MD;  Location: [facility];  Service: Endoscopy;  Laterality: N/A;    COLONOSCOPY N/A 9/28/2022    Procedure: COLONOSCOPY;  Surgeon: Matti Acuna III, MD;  Location: [facility];  Service: Endoscopy;  Laterality: N/A;    TAHBSO  February 2011    Due to abnormal pap smear and fibroids    TOTAL ABDOMINAL HYSTERECTOMY       Social History     Socioeconomic History    Marital status:     Number of children: 2   Occupational History    Occupation: RN     Employer: [employer]     Comment: [employer]   Tobacco Use    Smoking status: Never    Smokeless  tobacco: Never   Substance and Sexual Activity    Alcohol use: Yes     Alcohol/week: 0.0 standard drinks     Comment: ocassionally    Drug use: No    Sexual activity: Yes     Partners: Male     Birth control/protection: Surgical   Social History Narrative    She wears seatbelt.  July 22, 2017. She states she works new position at [employer].     Social Determinants of Health     Financial Resource Strain: Low Risk     Difficulty of Paying Living Expenses: Not hard at all   Food Insecurity: No Food Insecurity    Worried About Running Out of Food in the Last Year: Never true    Ran Out of Food in the Last Year: Never true   Transportation Needs: No Transportation Needs    Lack of Transportation (Medical): No    Lack of Transportation (Non-Medical): No   Physical Activity: Inactive    Days of Exercise per Week: 0 days    Minutes of Exercise per Session: 0 min   Stress: Stress Concern Present    Feeling of Stress : To some extent   Social Connections: Unknown    Frequency of Communication with Friends and Family: More than three times a week    Frequency of Social Gatherings with Friends and Family: Twice a week    Active Member of Clubs or Organizations: Yes    Attends Club or Organization Meetings: 1 to 4 times per year    Marital Status:    Housing Stability: Low Risk     Unable to Pay for Housing in the Last Year: No    Number of Places Lived in the Last Year: 1    Unstable Housing in the Last Year: No     Family History   Problem Relation Age of Onset    Arthritis Mother     Fuch's dystrophy Mother     Breast cancer Mother     Lupus Sister     Rheum arthritis Sister     Obesity Sister     Hypertension Father     Cancer Maternal Grandfather         lung ca    Stroke Maternal Grandmother     Diabetes Maternal Grandmother     Colon cancer Paternal Aunt        Review of patient's allergies indicates:  No Known Allergies    Current Outpatient Medications   Medication Sig    adalimumab (HUMIRA,CF, PEN) 40 mg/0.4 mL  PnKt Inject 0.4 mLs (40 mg total) into the skin every 7 days.    albuterol (PROVENTIL/VENTOLIN HFA) 90 mcg/actuation inhaler Inhale 2 puffs into the lungs every 6 (six) hours as needed for Wheezing or Shortness of Breath.    ALPRAZolam (XANAX) 0.5 MG tablet Take 1 tablet by mouth twice daily as needed    amLODIPine (NORVASC) 5 MG tablet Take 1 tablet (5 mg total) by mouth once daily.    ascorbic acid, vitamin C, (VITAMIN C) 1000 MG tablet Take by mouth. 1 Tablet Oral Every day    azaTHIOprine (IMURAN) 50 mg Tab Take 3 tablets (150 mg total) by mouth once daily.    azelastine (ASTELIN) 137 mcg (0.1 %) nasal spray 2 sprays (274 mcg total) by Nasal route 2 (two) times daily.    BACILLUS COAGULANS (PROBIOTIC, B. COAGULANS, ORAL) Take by mouth once daily.    cetirizine (ALLERGY RELIEF, CETIRIZINE,) 10 MG tablet Take 1 tablet (10 mg total) by mouth once daily.    cholecalciferol, vitamin D3, 2,000 unit Cap Take by mouth. 1 capsule Oral Every day    dicyclomine (BENTYL) 20 mg tablet Take 1 tablet (20 mg total) by mouth 3 (three) times daily as needed (abdominal pain).    gabapentin (NEURONTIN) 100 MG capsule Take 1 capsule (100 mg total) by mouth nightly as needed (pain).    hepatitis B virus vacc.rec,PF, (ENGERIX-B) 20 mcg/mL Syrg Inject into the muscle.    hydroCHLOROthiazide (HYDRODIURIL) 25 MG tablet Take 1 tablet (25 mg total) by mouth once daily.    hydroquinone 4 % Crea Apply to dark spots once daily. Use with sunscreen if outdoors    ibuprofen (ADVIL,MOTRIN) 800 MG tablet Take 1 tablet (800 mg total) by mouth 2 (two) times daily as needed.    ipratropium (ATROVENT) 21 mcg (0.03 %) nasal spray 2 sprays by Nasal route 3 (three) times daily.    lisinopriL (PRINIVIL,ZESTRIL) 40 MG tablet TAKE 1 TABLET BY MOUTH ONCE DAILY.    methocarbamoL (ROBAXIN) 750 MG Tab 1 tablet at night to help with muscle spasm and sleep    methylPREDNISolone (MEDROL DOSEPACK) 4 mg tablet use as directed    metoprolol succinate (TOPROL-XL) 25  MG 24 hr tablet Take 1 tablet (25 mg total) by mouth once daily.    montelukast (SINGULAIR) 10 mg tablet Take 1 tablet (10 mg total) by mouth every evening.    multivitamin-Ca-iron-minerals 18-0.4 mg Tab Take by mouth. 1 Tablet Oral Every day    pneumoc 20-shani conj-dip cr,PF, (PREVNAR-20, PF,) 0.5 mL Syrg injection Inject 0.5 mLs into the muscle.    potassium bicarbonate disintegrating tablet Take 10 mEq by mouth 2 (two) times daily.    pravastatin (PRAVACHOL) 80 MG tablet Take 1 tablet by mouth in the evening    tretinoin (RETIN-A) 0.025 % cream Apply pea-sized amount to entire face at bedtime.  If dryness, use every third night and increase as tolerated to every night.    valACYclovir (VALTREX) 1000 MG tablet TAKE 2 TABLETS BY MOUTH TWICE DAILY FOR 1 DAY PER EPISODE    zinc sulfate (ZINC-220 ORAL)     topiramate (TOPAMAX) 25 MG tablet Take 1 tablet (25 mg total) by mouth every evening.     No current facility-administered medications for this visit.         ROS  Review of Systems   Constitutional:  Negative for chills, diaphoresis, fatigue and fever.   HENT:  Negative for ear discharge, ear pain, rhinorrhea, trouble swallowing and voice change.    Eyes:  Negative for pain and redness.   Respiratory:  Negative for chest tightness, shortness of breath, wheezing and stridor.    Cardiovascular:  Negative for chest pain and leg swelling.   Gastrointestinal:  Negative for blood in stool, diarrhea, nausea and vomiting.   Endocrine: Negative for cold intolerance and heat intolerance.   Genitourinary:  Negative for dysuria, hematuria and urgency.   Musculoskeletal:  Positive for arthralgias, myalgias, neck pain and neck stiffness. Negative for back pain, gait problem and joint swelling.   Skin:  Negative for rash.   Neurological:  Positive for weakness and numbness. Negative for tremors, seizures, speech difficulty, light-headedness and headaches.   Hematological:  Does not bruise/bleed easily.   Psychiatric/Behavioral:   Negative for agitation, confusion and suicidal ideas.           OBJECTIVE:  /75   Pulse (!) 52   Ht 5' (1.524 m)   Wt 92.8 kg (204 lb 9.4 oz)   BMI 39.96 kg/m²         Physical Exam  Constitutional:       General: She is not in acute distress.     Appearance: Normal appearance. She is not ill-appearing.   HENT:      Head: Normocephalic and atraumatic.      Nose: No congestion or rhinorrhea.   Eyes:      Extraocular Movements: Extraocular movements intact.      Pupils: Pupils are equal, round, and reactive to light.   Cardiovascular:      Pulses: Normal pulses.   Pulmonary:      Effort: Pulmonary effort is normal.   Abdominal:      General: Abdomen is flat.      Palpations: Abdomen is soft.   Musculoskeletal:      Right shoulder: Normal.      Left shoulder: Tenderness present. No swelling or effusion. Decreased range of motion. Decreased strength. Normal pulse.   Skin:     General: Skin is warm and dry.      Capillary Refill: Capillary refill takes less than 2 seconds.   Neurological:      General: No focal deficit present.      Mental Status: She is alert and oriented to person, place, and time.      Cranial Nerves: No cranial nerve deficit.      Sensory: No sensory deficit.      Motor: Weakness present. No abnormal muscle tone.      Gait: Gait normal.      Deep Tendon Reflexes:      Reflex Scores:       Tricep reflexes are 2+ on the right side and 2+ on the left side.       Bicep reflexes are 2+ on the right side and 2+ on the left side.       Brachioradialis reflexes are 2+ on the right side and 2+ on the left side.     Comments: Weakness in left handgrip and left shoulder abduction and extension   Psychiatric:         Mood and Affect: Mood normal.         Behavior: Behavior normal.         Thought Content: Thought content normal.         Musculoskeletal:    Cervical Exam  Incision: no  Pain with Flexion: yes  Pain with Extension: yes  Paraspinous TTP: carlos enrique paraspinous and trapezius  Facet TTP:  C5/C6  Spurling: positive on the left  ROM: decreased positive on the left      LABS:  Lab Results   Component Value Date    WBC 10.70 03/03/2023    HGB 12.8 03/03/2023    HCT 41.0 03/03/2023    MCV 99 (H) 03/03/2023     03/03/2023       CMP  Sodium   Date Value Ref Range Status   03/03/2023 140 136 - 145 mmol/L Final     Potassium   Date Value Ref Range Status   03/03/2023 4.2 3.5 - 5.1 mmol/L Final     Chloride   Date Value Ref Range Status   03/03/2023 104 95 - 110 mmol/L Final     CO2   Date Value Ref Range Status   03/03/2023 23 23 - 29 mmol/L Final     Glucose   Date Value Ref Range Status   03/03/2023 97 70 - 110 mg/dL Final     BUN   Date Value Ref Range Status   03/03/2023 16 8 - 23 mg/dL Final     Creatinine   Date Value Ref Range Status   03/03/2023 0.8 0.5 - 1.4 mg/dL Final     Calcium   Date Value Ref Range Status   03/03/2023 10.4 8.7 - 10.5 mg/dL Final     Total Protein   Date Value Ref Range Status   03/03/2023 8.3 6.0 - 8.4 g/dL Final     Albumin   Date Value Ref Range Status   03/03/2023 4.6 3.5 - 5.2 g/dL Final     Total Bilirubin   Date Value Ref Range Status   03/03/2023 0.7 0.1 - 1.0 mg/dL Final     Comment:     For infants and newborns, interpretation of results should be based  on gestational age, weight and in agreement with clinical  observations.    Premature Infant recommended reference ranges:  Up to 24 hours.............<8.0 mg/dL  Up to 48 hours............<12.0 mg/dL  3-5 days..................<15.0 mg/dL  6-29 days.................<15.0 mg/dL       Alkaline Phosphatase   Date Value Ref Range Status   03/03/2023 60 55 - 135 U/L Final     AST   Date Value Ref Range Status   03/03/2023 26 10 - 40 U/L Final     ALT   Date Value Ref Range Status   03/03/2023 25 10 - 44 U/L Final     Anion Gap   Date Value Ref Range Status   03/03/2023 13 8 - 16 mmol/L Final     eGFR if    Date Value Ref Range Status   08/11/2021 >60.0 >60 mL/min/1.73 m^2 Final     eGFR if non     Date Value Ref Range Status   08/11/2021 >60.0 >60 mL/min/1.73 m^2 Final     Comment:     Calculation used to obtain the estimated glomerular filtration  rate (eGFR) is the CKD-EPI equation.          Lab Results   Component Value Date    HGBA1C 5.6 04/27/2016             ASSESSMENT:       62 y.o. year old female with neck pain, consistent with     1. Cervical radiculopathy  MRI Cervical Spine Without Contrast    topiramate (TOPAMAX) 25 MG tablet      2. Cervical spine arthritis  Ambulatory referral/consult to Pain Clinic      3. Cervical spondylosis        4. DDD (degenerative disc disease), cervical        5. Chronic left shoulder pain        6. Left carpal tunnel syndrome  topiramate (TOPAMAX) 25 MG tablet      7. Cervicalgia          Cervical radiculopathy  -     MRI Cervical Spine Without Contrast; Future; Expected date: 03/16/2023  -     topiramate (TOPAMAX) 25 MG tablet; Take 1 tablet (25 mg total) by mouth every evening.  Dispense: 60 tablet; Refill: 1    Cervical spine arthritis  -     Ambulatory referral/consult to Pain Clinic    Cervical spondylosis    DDD (degenerative disc disease), cervical    Chronic left shoulder pain    Left carpal tunnel syndrome  -     topiramate (TOPAMAX) 25 MG tablet; Take 1 tablet (25 mg total) by mouth every evening.  Dispense: 60 tablet; Refill: 1    Cervicalgia             PLAN:   - Interventions:   None at this time.  Can consider cervical epidural steroid injection if pain continues  - Anticoagulation use:   no no anticoagulation    - Medications:   Start Topamax 25 mg at night.  Patient may titrate up to 50 mg at night.   Continue Robaxin 750 mg 3 times a day as needed    - Therapy:    Continue formal physical therapy for neck and left shoulder pain.    - Imaging/Diagnostic:   X-rays of cervical spine reviewed and findings discussed with patient.  Noted for loss of disc height and foraminal narrowing at C4-5.   We will order MRI of cervical spine without  contrast to further evaluate neck pain with left cervical radiculopathy that is continued despite over 12 weeks conservative therapy.   X-ray of left shoulder reviewed     - Consults:   Continue follow-up with orthopedics for left shoulder pain and left carpal tunnel syndrome  Continue follow-up with Gastroenterology for history of Crohn's disease.  Patient currently on Humira.        - Patient Questions: Answered all of the patient's questions regarding diagnosis, therapy, and treatment     This condition does not require this patient to take time off of work, and the primary goal of our Pain Management services is to improve the patient's functional capacity.     - Follow up visit: return to clinic in 5 weeks        The above plan and management options were discussed at length with patient. Patient is in agreement with the above and verbalized understanding.    I discussed the goals of interventional chronic pain management with the patient on today's visit.  I explained the utility of injections for diagnostic and therapeutic purposes.  We discussed a multimodal approach to pain including treating the patient's given worst pain at any given time.  We will use a systematic approach to addressing pain.  We will also adopt a multimodal approach that includes injections, adjuvant medications, physical therapy, at times psychiatry.  There may be a limited role for opioid use intermittently in the treatment of pain, more particularly for acute pain although no one approach can be used as a sole treatment modality.    I emphasized the importance of regular exercise, core strengthening and stretching, diet and weight loss as a cornerstone of long-term pain management.      David Ewing MD  Interventional Pain Management  [facility]    Disclaimer:  This note was prepared using voice recognition system and is likely to have sound alike errors that may have been overlooked even after proof reading.  Please  call me with any questions

## 2023-03-20 ENCOUNTER — DOCUMENTATION ONLY (OUTPATIENT)
Dept: REHABILITATION | Facility: HOSPITAL | Age: 63
End: 2023-03-20
Payer: COMMERCIAL

## 2023-03-20 NOTE — PROGRESS NOTES
[facility]  Physical Therapy Discharge Note    Name: Angelica Pate [facility] Number: [id number]    Therapy Diagnosis: No diagnosis found.  Physician: No ref. provider found    Physician Orders: Physical Therapy Evaluation and Treat  Medical Diagnosis from Referral:   M54.2 (ICD-10-CM) - Neck pain on left side   M25.512 (ICD-10-CM) - Left shoulder pain, unspecified chronicity   Evaluation Date: 1/4/2023    Date of Last visit: 02/02/2023  Total Visits Received: 6    ASSESSMENT      Not applicable     Discharge reason: Patient has not attended therapy since 02/02/2023    Discharge FOTO Score: Not applicable     Goals: Not applicable     PLAN   This patient is discharged from PT.    Kenyetta Carreon, PT, DPT

## 2023-03-21 ENCOUNTER — OFFICE VISIT (OUTPATIENT)
Dept: GASTROENTEROLOGY | Facility: CLINIC | Age: 63
End: 2023-03-21
Payer: COMMERCIAL

## 2023-03-21 VITALS
DIASTOLIC BLOOD PRESSURE: 72 MMHG | BODY MASS INDEX: 39.51 KG/M2 | WEIGHT: 201.25 LBS | OXYGEN SATURATION: 98 % | HEIGHT: 60 IN | HEART RATE: 54 BPM | SYSTOLIC BLOOD PRESSURE: 118 MMHG

## 2023-03-21 DIAGNOSIS — D84.9 IMMUNOCOMPROMISED STATE: ICD-10-CM

## 2023-03-21 DIAGNOSIS — K50.10 CROHN'S DISEASE OF COLON WITHOUT COMPLICATION: Primary | ICD-10-CM

## 2023-03-21 PROCEDURE — 99999 PR PBB SHADOW E&M-EST. PATIENT-LVL III: ICD-10-PCS | Mod: PBBFAC,,, | Performed by: NURSE PRACTITIONER

## 2023-03-21 PROCEDURE — 99214 PR OFFICE/OUTPT VISIT, EST, LEVL IV, 30-39 MIN: ICD-10-PCS | Mod: S$GLB,,, | Performed by: NURSE PRACTITIONER

## 2023-03-21 PROCEDURE — 1159F MED LIST DOCD IN RCRD: CPT | Mod: CPTII,S$GLB,, | Performed by: NURSE PRACTITIONER

## 2023-03-21 PROCEDURE — 99999 PR PBB SHADOW E&M-EST. PATIENT-LVL III: CPT | Mod: PBBFAC,,, | Performed by: NURSE PRACTITIONER

## 2023-03-21 PROCEDURE — 4010F ACE/ARB THERAPY RXD/TAKEN: CPT | Mod: CPTII,S$GLB,, | Performed by: NURSE PRACTITIONER

## 2023-03-21 PROCEDURE — 3008F BODY MASS INDEX DOCD: CPT | Mod: CPTII,S$GLB,, | Performed by: NURSE PRACTITIONER

## 2023-03-21 PROCEDURE — 1160F PR REVIEW ALL MEDS BY PRESCRIBER/CLIN PHARMACIST DOCUMENTED: ICD-10-PCS | Mod: CPTII,S$GLB,, | Performed by: NURSE PRACTITIONER

## 2023-03-21 PROCEDURE — 1159F PR MEDICATION LIST DOCUMENTED IN MEDICAL RECORD: ICD-10-PCS | Mod: CPTII,S$GLB,, | Performed by: NURSE PRACTITIONER

## 2023-03-21 PROCEDURE — 3078F DIAST BP <80 MM HG: CPT | Mod: CPTII,S$GLB,, | Performed by: NURSE PRACTITIONER

## 2023-03-21 PROCEDURE — 4010F PR ACE/ARB THEARPY RXD/TAKEN: ICD-10-PCS | Mod: CPTII,S$GLB,, | Performed by: NURSE PRACTITIONER

## 2023-03-21 PROCEDURE — 3008F PR BODY MASS INDEX (BMI) DOCUMENTED: ICD-10-PCS | Mod: CPTII,S$GLB,, | Performed by: NURSE PRACTITIONER

## 2023-03-21 PROCEDURE — 3074F SYST BP LT 130 MM HG: CPT | Mod: CPTII,S$GLB,, | Performed by: NURSE PRACTITIONER

## 2023-03-21 PROCEDURE — 99214 OFFICE O/P EST MOD 30 MIN: CPT | Mod: S$GLB,,, | Performed by: NURSE PRACTITIONER

## 2023-03-21 PROCEDURE — 3078F PR MOST RECENT DIASTOLIC BLOOD PRESSURE < 80 MM HG: ICD-10-PCS | Mod: CPTII,S$GLB,, | Performed by: NURSE PRACTITIONER

## 2023-03-21 PROCEDURE — 3074F PR MOST RECENT SYSTOLIC BLOOD PRESSURE < 130 MM HG: ICD-10-PCS | Mod: CPTII,S$GLB,, | Performed by: NURSE PRACTITIONER

## 2023-03-21 PROCEDURE — 1160F RVW MEDS BY RX/DR IN RCRD: CPT | Mod: CPTII,S$GLB,, | Performed by: NURSE PRACTITIONER

## 2023-03-21 RX ORDER — AZATHIOPRINE 50 MG/1
150 TABLET ORAL DAILY
Qty: 270 TABLET | Refills: 0 | Status: SHIPPED | OUTPATIENT
Start: 2023-03-21 | End: 2024-03-12

## 2023-03-21 NOTE — PROGRESS NOTES
Clinic Follow Up:  [facility] Follow Up Note    Reason for Follow Up:  The primary encounter diagnosis was Crohn's disease of colon without complication. A diagnosis of Immunocompromised state was also pertinent to this visit.    PCP: Monet Alvarez       HPI:  This is a 62 y.o. female here for follow up Crohn's disease.   She was in [place] 2 weeks ago and developed loss of appetite. Her stools were light colored. She also had low grade fever (Tmax: 100.1).   She is now feeling back to normal.     Review of Systems   Constitutional:  Negative for activity change and appetite change.        As per interval history above   Respiratory:  Negative for cough and shortness of breath.    Cardiovascular:  Negative for chest pain.   Skin:  Negative for color change and rash.     Medical History:  Past Medical History:   Diagnosis Date    Anxiety     Asthma     Crohn's disease     Fibroids     Hyperlipidemia     Hypertension     Iritis     Migraine headache     Ocular    Osteopenia 08/2019    Syncope and collapse     Vitamin D deficiency disease        Surgical History:   Past Surgical History:   Procedure Laterality Date    ANKLE FRACTURE SURGERY  08/19/08    right ankle    BREAST BIOPSY Left 1977    COLONOSCOPY N/A 8/11/2017    Procedure: COLONOSCOPY - REQUESTS DR. MATTI ACUNA;  Surgeon: Matti Acuna III, MD;  Location: [facility];  Service: Endoscopy;  Laterality: N/A;    COLONOSCOPY N/A 11/23/2020    Procedure: COLONOSCOPY;  Surgeon: Matti Acuna III, MD;  Location: [facility];  Service: Endoscopy;  Laterality: N/A;    COLONOSCOPY N/A 9/28/2022    Procedure: COLONOSCOPY;  Surgeon: Matti Acuna III, MD;  Location: [facility];  Service: Endoscopy;  Laterality: N/A;    TAHBSO  February 2011    Due to abnormal pap smear and fibroids    TOTAL ABDOMINAL HYSTERECTOMY         Family History:   Family History   Problem Relation Age of Onset    Arthritis Mother     Fuch's dystrophy Mother      Breast cancer Mother     Lupus Sister     Rheum arthritis Sister     Obesity Sister     Hypertension Father     Cancer Maternal Grandfather         lung ca    Stroke Maternal Grandmother     Diabetes Maternal Grandmother     Colon cancer Paternal Aunt        Social History:   Social History     Tobacco Use    Smoking status: Never    Smokeless tobacco: Never   Substance Use Topics    Alcohol use: Yes     Alcohol/week: 0.0 standard drinks     Comment: ocassionally    Drug use: No       Allergies: Review of patient's allergies indicates:  No Known Allergies    Home Medications:  Current Outpatient Medications on File Prior to Visit   Medication Sig Dispense Refill    adalimumab (HUMIRA,CF, PEN) 40 mg/0.4 mL PnKt Inject 0.4 mLs (40 mg total) into the skin every 7 days. 4 pen 11    ALPRAZolam (XANAX) 0.5 MG tablet Take 1 tablet by mouth twice daily as needed 60 tablet 0    amLODIPine (NORVASC) 5 MG tablet Take 1 tablet (5 mg total) by mouth once daily. 90 tablet 1    ascorbic acid, vitamin C, (VITAMIN C) 1000 MG tablet Take by mouth. 1 Tablet Oral Every day      azelastine (ASTELIN) 137 mcg (0.1 %) nasal spray 2 sprays (274 mcg total) by Nasal route 2 (two) times daily. 90 mL 3    BACILLUS COAGULANS (PROBIOTIC, B. COAGULANS, ORAL) Take by mouth once daily.      cetirizine (ALLERGY RELIEF, CETIRIZINE,) 10 MG tablet Take 1 tablet (10 mg total) by mouth once daily. 90 tablet 1    cholecalciferol, vitamin D3, 2,000 unit Cap Take by mouth. 1 capsule Oral Every day      dicyclomine (BENTYL) 20 mg tablet Take 1 tablet (20 mg total) by mouth 3 (three) times daily as needed (abdominal pain). 90 tablet 0    hydroCHLOROthiazide (HYDRODIURIL) 25 MG tablet Take 1 tablet (25 mg total) by mouth once daily. 90 tablet 1    ibuprofen (ADVIL,MOTRIN) 800 MG tablet Take 1 tablet (800 mg total) by mouth 2 (two) times daily as needed. 180 tablet 1    ipratropium (ATROVENT) 21 mcg (0.03 %) nasal spray 2 sprays by Nasal route 3 (three) times  daily. 20 mL 5    lisinopriL (PRINIVIL,ZESTRIL) 40 MG tablet TAKE 1 TABLET BY MOUTH ONCE DAILY. 90 tablet 1    methocarbamoL (ROBAXIN) 750 MG Tab 1 tablet at night to help with muscle spasm and sleep 30 tablet 0    metoprolol succinate (TOPROL-XL) 25 MG 24 hr tablet Take 1 tablet (25 mg total) by mouth once daily. 30 tablet 11    montelukast (SINGULAIR) 10 mg tablet Take 1 tablet (10 mg total) by mouth every evening. 90 tablet 1    multivitamin-Ca-iron-minerals 18-0.4 mg Tab Take by mouth. 1 Tablet Oral Every day      potassium bicarbonate disintegrating tablet Take 10 mEq by mouth 2 (two) times daily.      pravastatin (PRAVACHOL) 80 MG tablet Take 1 tablet by mouth in the evening 90 tablet 1    topiramate (TOPAMAX) 25 MG tablet Take 1 tablet (25 mg total) by mouth every evening. 60 tablet 1    tretinoin (RETIN-A) 0.025 % cream Apply pea-sized amount to entire face at bedtime.  If dryness, use every third night and increase as tolerated to every night. 20 g 6    valACYclovir (VALTREX) 1000 MG tablet TAKE 2 TABLETS BY MOUTH TWICE DAILY FOR 1 DAY PER EPISODE 30 tablet 0    zinc sulfate (ZINC-220 ORAL)       [DISCONTINUED] azaTHIOprine (IMURAN) 50 mg Tab Take 3 tablets (150 mg total) by mouth once daily. 90 tablet 5    albuterol (PROVENTIL/VENTOLIN HFA) 90 mcg/actuation inhaler Inhale 2 puffs into the lungs every 6 (six) hours as needed for Wheezing or Shortness of Breath. 18 g 2    hepatitis B virus vacc.rec,PF, (ENGERIX-B) 20 mcg/mL Syrg Inject into the muscle. (Patient not taking: Reported on 3/21/2023) 1 mL 2    hydroquinone 4 % Crea Apply to dark spots once daily. Use with sunscreen if outdoors 28 g 1    methylPREDNISolone (MEDROL DOSEPACK) 4 mg tablet use as directed (Patient not taking: Reported on 3/21/2023) 1 each 0    pneumoc 20-shani conj-dip cr,PF, (PREVNAR-20, PF,) 0.5 mL Syrg injection Inject 0.5 mLs into the muscle. (Patient not taking: Reported on 3/21/2023) 0.5 mL 0     No current facility-administered  medications on file prior to visit.       /72   Pulse (!) 54   Ht 5' (1.524 m)   Wt 91.3 kg (201 lb 4.5 oz)   SpO2 98%   BMI 39.31 kg/m²   Body mass index is 39.31 kg/m².  Physical Exam  Constitutional:       General: She is not in acute distress.  HENT:      Head: Normocephalic and atraumatic.   Eyes:      General: No scleral icterus.     Conjunctiva/sclera: Conjunctivae normal.   Cardiovascular:      Rate and Rhythm: Normal rate and regular rhythm.      Heart sounds: No murmur heard.  Pulmonary:      Effort: Pulmonary effort is normal. No respiratory distress.      Breath sounds: Normal breath sounds. No wheezing.   Abdominal:      General: Abdomen is flat. Bowel sounds are normal.      Palpations: Abdomen is soft.   Skin:     General: Skin is warm and dry.   Neurological:      General: No focal deficit present.      Mental Status: She is alert and oriented to person, place, and time.      Cranial Nerves: No cranial nerve deficit.   Psychiatric:         Mood and Affect: Mood normal.         Judgment: Judgment normal.       Labs: Pertinent labs reviewed.    Assessment:   1. Crohn's disease of colon without complication    2. Immunocompromised state    Likely viral illness. Symptoms have resolved.     Recommendations:   - continue Humira and Imuran  - can consider discontinuing Imuran in 3 months.   - will also need colonoscopy around that time as well.     Crohn's disease of colon without complication    Immunocompromised state    Other orders  -     azaTHIOprine (IMURAN) 50 mg Tab; Take 3 tablets (150 mg total) by mouth once daily.  Dispense: 270 tablet; Refill: 0      Thank you for the opportunity to participate in the care of this patient.  BK Del Toro

## 2023-03-22 PROBLEM — M47.812 CERVICAL SPINE ARTHRITIS: Status: ACTIVE | Noted: 2023-03-22

## 2023-03-24 ENCOUNTER — PATIENT MESSAGE (OUTPATIENT)
Dept: GASTROENTEROLOGY | Facility: CLINIC | Age: 63
End: 2023-03-24
Payer: COMMERCIAL

## 2023-03-24 ENCOUNTER — HOSPITAL ENCOUNTER (OUTPATIENT)
Dept: RADIOLOGY | Facility: HOSPITAL | Age: 63
Discharge: HOME | End: 2023-03-24
Attending: ANESTHESIOLOGY
Payer: COMMERCIAL

## 2023-03-24 DIAGNOSIS — M54.12 CERVICAL RADICULOPATHY: ICD-10-CM

## 2023-03-24 PROCEDURE — 72141 MRI NECK SPINE W/O DYE: CPT | Mod: TC

## 2023-03-24 PROCEDURE — 72141 MRI CERVICAL SPINE WITHOUT CONTRAST: ICD-10-PCS | Mod: 26,,, | Performed by: RADIOLOGY

## 2023-03-24 PROCEDURE — 72141 MRI NECK SPINE W/O DYE: CPT | Mod: 26,,, | Performed by: RADIOLOGY

## 2023-04-06 ENCOUNTER — OFFICE VISIT (OUTPATIENT)
Dept: SPORTS MEDICINE | Facility: CLINIC | Age: 63
End: 2023-04-06
Payer: COMMERCIAL

## 2023-04-06 VITALS — BODY MASS INDEX: 39.46 KG/M2 | WEIGHT: 201 LBS | HEIGHT: 60 IN

## 2023-04-06 DIAGNOSIS — M54.2 NECK PAIN ON LEFT SIDE: ICD-10-CM

## 2023-04-06 DIAGNOSIS — M25.512 CHRONIC LEFT SHOULDER PAIN: Primary | ICD-10-CM

## 2023-04-06 DIAGNOSIS — M75.22 BICIPITAL TENDINITIS OF LEFT SHOULDER: ICD-10-CM

## 2023-04-06 DIAGNOSIS — G89.29 CHRONIC LEFT SHOULDER PAIN: Primary | ICD-10-CM

## 2023-04-06 DIAGNOSIS — M54.12 CERVICAL RADICULOPATHY: ICD-10-CM

## 2023-04-06 PROCEDURE — 3008F PR BODY MASS INDEX (BMI) DOCUMENTED: ICD-10-PCS | Mod: CPTII,S$GLB,, | Performed by: FAMILY MEDICINE

## 2023-04-06 PROCEDURE — 99999 PR PBB SHADOW E&M-EST. PATIENT-LVL III: CPT | Mod: PBBFAC,,, | Performed by: FAMILY MEDICINE

## 2023-04-06 PROCEDURE — 3008F BODY MASS INDEX DOCD: CPT | Mod: CPTII,S$GLB,, | Performed by: FAMILY MEDICINE

## 2023-04-06 PROCEDURE — 99214 PR OFFICE/OUTPT VISIT, EST, LEVL IV, 30-39 MIN: ICD-10-PCS | Mod: 25,S$GLB,, | Performed by: FAMILY MEDICINE

## 2023-04-06 PROCEDURE — 4010F ACE/ARB THERAPY RXD/TAKEN: CPT | Mod: CPTII,S$GLB,, | Performed by: FAMILY MEDICINE

## 2023-04-06 PROCEDURE — 4010F PR ACE/ARB THEARPY RXD/TAKEN: ICD-10-PCS | Mod: CPTII,S$GLB,, | Performed by: FAMILY MEDICINE

## 2023-04-06 PROCEDURE — 20550 NJX 1 TENDON SHEATH/LIGAMENT: CPT | Mod: LT,S$GLB,, | Performed by: FAMILY MEDICINE

## 2023-04-06 PROCEDURE — 99214 OFFICE O/P EST MOD 30 MIN: CPT | Mod: 25,S$GLB,, | Performed by: FAMILY MEDICINE

## 2023-04-06 PROCEDURE — 1159F MED LIST DOCD IN RCRD: CPT | Mod: CPTII,S$GLB,, | Performed by: FAMILY MEDICINE

## 2023-04-06 PROCEDURE — 99999 PR PBB SHADOW E&M-EST. PATIENT-LVL III: ICD-10-PCS | Mod: PBBFAC,,, | Performed by: FAMILY MEDICINE

## 2023-04-06 PROCEDURE — 20550 TENDON SHEATH: ICD-10-PCS | Mod: LT,S$GLB,, | Performed by: FAMILY MEDICINE

## 2023-04-06 PROCEDURE — 1159F PR MEDICATION LIST DOCUMENTED IN MEDICAL RECORD: ICD-10-PCS | Mod: CPTII,S$GLB,, | Performed by: FAMILY MEDICINE

## 2023-04-06 RX ORDER — BETAMETHASONE SODIUM PHOSPHATE AND BETAMETHASONE ACETATE 3; 3 MG/ML; MG/ML
9 INJECTION, SUSPENSION INTRA-ARTICULAR; INTRALESIONAL; INTRAMUSCULAR; SOFT TISSUE
Status: DISCONTINUED | OUTPATIENT
Start: 2023-04-06 | End: 2023-04-06 | Stop reason: HOSPADM

## 2023-04-06 RX ORDER — METHOCARBAMOL 500 MG/1
TABLET, FILM COATED ORAL
Qty: 60 TABLET | Refills: 0 | Status: SHIPPED | OUTPATIENT
Start: 2023-04-06 | End: 2024-03-12 | Stop reason: SDUPTHER

## 2023-04-06 RX ADMIN — BETAMETHASONE SODIUM PHOSPHATE AND BETAMETHASONE ACETATE 9 MG: 3; 3 INJECTION, SUSPENSION INTRA-ARTICULAR; INTRALESIONAL; INTRAMUSCULAR; SOFT TISSUE at 08:04

## 2023-04-06 NOTE — PROGRESS NOTES
Subjective:     Patient ID: Angelica Flores is a 62 y.o. female.    Chief Complaint: Pain of the Left Shoulder      HPI:    Patient reports to the clinic today with 2/10 left shoulder (front side) and upper trap pain.  She is right hand dominant, but does eat left handed.  She reports that she wasn't able to complete formal physical therapy due to her schedule with work, but she has been doing a home exercise plan 3 times a week every week.  She has noticed a difference with her exercises and her pain.  She reports that she saw Dr. Ewing in March and he told her to continue PT, Robaxin, and started Topamax.  She reports that she has noticed an improvement with the medications as well.  She also had a cervical MRI completed since she saw us last on 2/28/23.      Sometimes needs the Robaxin to help her sleep at night.    Has intermittent pains and tingling in the left hand worse in the index finger and thumb    Past Medical History:   Diagnosis Date    Anxiety     Asthma     Crohn's disease     Fibroids     Hyperlipidemia     Hypertension     Iritis     Migraine headache     Ocular    Osteopenia 08/2019    Syncope and collapse     Vitamin D deficiency disease      Past Surgical History:   Procedure Laterality Date    ANKLE FRACTURE SURGERY  08/19/08    right ankle    BREAST BIOPSY Left 1977    COLONOSCOPY N/A 8/11/2017    Procedure: COLONOSCOPY - REQUESTS DR. MATTI ACUNA;  Surgeon: Matti Acuna III, MD;  Location: [facility];  Service: Endoscopy;  Laterality: N/A;    COLONOSCOPY N/A 11/23/2020    Procedure: COLONOSCOPY;  Surgeon: Matti Acuna III, MD;  Location: [facility];  Service: Endoscopy;  Laterality: N/A;    COLONOSCOPY N/A 9/28/2022    Procedure: COLONOSCOPY;  Surgeon: Matti Acuna III, MD;  Location: [facility];  Service: Endoscopy;  Laterality: N/A;    TAHBSO  February 2011    Due to abnormal pap smear and fibroids    TOTAL ABDOMINAL HYSTERECTOMY       Family History   Problem Relation  Age of Onset    Arthritis Mother     Fuch's dystrophy Mother     Breast cancer Mother     Lupus Sister     Rheum arthritis Sister     Obesity Sister     Hypertension Father     Cancer Maternal Grandfather         lung ca    Stroke Maternal Grandmother     Diabetes Maternal Grandmother     Colon cancer Paternal Aunt      Social History     Socioeconomic History    Marital status:     Number of children: 2   Occupational History    Occupation: RN     Employer: [employer]     Comment: [employer]   Tobacco Use    Smoking status: Never    Smokeless tobacco: Never   Substance and Sexual Activity    Alcohol use: Yes     Alcohol/week: 0.0 standard drinks     Comment: ocassionally    Drug use: No    Sexual activity: Yes     Partners: Male     Birth control/protection: Surgical   Social History Narrative    She wears seatbelt.  July 22, 2017. She states she works new position at [employer].     Social Determinants of Health     Financial Resource Strain: Low Risk     Difficulty of Paying Living Expenses: Not hard at all   Food Insecurity: No Food Insecurity    Worried About Running Out of Food in the Last Year: Never true    Ran Out of Food in the Last Year: Never true   Transportation Needs: No Transportation Needs    Lack of Transportation (Medical): No    Lack of Transportation (Non-Medical): No   Physical Activity: Inactive    Days of Exercise per Week: 0 days    Minutes of Exercise per Session: 0 min   Stress: Stress Concern Present    Feeling of Stress : To some extent   Social Connections: Unknown    Frequency of Communication with Friends and Family: More than three times a week    Frequency of Social Gatherings with Friends and Family: Twice a week    Active Member of Clubs or Organizations: Yes    Attends Club or Organization Meetings: 1 to 4 times per year    Marital Status:    Housing Stability: Low Risk     Unable to Pay for Housing in the Last Year: No    Number of Places Lived in the Last  Year: 1    Unstable Housing in the Last Year: No       Current Outpatient Medications:     adalimumab (HUMIRA,CF, PEN) 40 mg/0.4 mL PnKt, Inject 0.4 mLs (40 mg total) into the skin every 7 days., Disp: 4 pen, Rfl: 11    ALPRAZolam (XANAX) 0.5 MG tablet, Take 1 tablet by mouth twice daily as needed, Disp: 60 tablet, Rfl: 0    amLODIPine (NORVASC) 5 MG tablet, Take 1 tablet (5 mg total) by mouth once daily., Disp: 90 tablet, Rfl: 1    ascorbic acid, vitamin C, (VITAMIN C) 1000 MG tablet, Take by mouth. 1 Tablet Oral Every day, Disp: , Rfl:     azaTHIOprine (IMURAN) 50 mg Tab, Take 3 tablets (150 mg total) by mouth once daily., Disp: 270 tablet, Rfl: 0    BACILLUS COAGULANS (PROBIOTIC, B. COAGULANS, ORAL), Take by mouth once daily., Disp: , Rfl:     cetirizine (ALLERGY RELIEF, CETIRIZINE,) 10 MG tablet, Take 1 tablet (10 mg total) by mouth once daily., Disp: 90 tablet, Rfl: 1    cholecalciferol, vitamin D3, 2,000 unit Cap, Take by mouth. 1 capsule Oral Every day, Disp: , Rfl:     dicyclomine (BENTYL) 20 mg tablet, Take 1 tablet (20 mg total) by mouth 3 (three) times daily as needed (abdominal pain)., Disp: 90 tablet, Rfl: 0    hepatitis B virus vacc.rec,PF, (ENGERIX-B) 20 mcg/mL Syrg, Inject into the muscle., Disp: 1 mL, Rfl: 2    hydroCHLOROthiazide (HYDRODIURIL) 25 MG tablet, Take 1 tablet (25 mg total) by mouth once daily., Disp: 90 tablet, Rfl: 1    hydroquinone 4 % Crea, Apply to dark spots once daily. Use with sunscreen if outdoors, Disp: 28 g, Rfl: 1    ibuprofen (ADVIL,MOTRIN) 800 MG tablet, Take 1 tablet (800 mg total) by mouth 2 (two) times daily as needed., Disp: 180 tablet, Rfl: 1    ipratropium (ATROVENT) 21 mcg (0.03 %) nasal spray, 2 sprays by Nasal route 3 (three) times daily., Disp: 20 mL, Rfl: 5    lisinopriL (PRINIVIL,ZESTRIL) 40 MG tablet, TAKE 1 TABLET BY MOUTH ONCE DAILY., Disp: 90 tablet, Rfl: 1    methylPREDNISolone (MEDROL DOSEPACK) 4 mg tablet, use as directed, Disp: 1 each, Rfl: 0     metoprolol succinate (TOPROL-XL) 25 MG 24 hr tablet, Take 1 tablet (25 mg total) by mouth once daily., Disp: 30 tablet, Rfl: 11    montelukast (SINGULAIR) 10 mg tablet, Take 1 tablet (10 mg total) by mouth every evening., Disp: 90 tablet, Rfl: 1    multivitamin-Ca-iron-minerals 18-0.4 mg Tab, Take by mouth. 1 Tablet Oral Every day, Disp: , Rfl:     pneumoc 20-shani conj-dip cr,PF, (PREVNAR-20, PF,) 0.5 mL Syrg injection, Inject 0.5 mLs into the muscle., Disp: 0.5 mL, Rfl: 0    potassium bicarbonate disintegrating tablet, Take 10 mEq by mouth 2 (two) times daily., Disp: , Rfl:     pravastatin (PRAVACHOL) 80 MG tablet, Take 1 tablet by mouth in the evening, Disp: 90 tablet, Rfl: 1    topiramate (TOPAMAX) 25 MG tablet, Take 1 tablet (25 mg total) by mouth every evening., Disp: 60 tablet, Rfl: 1    tretinoin (RETIN-A) 0.025 % cream, Apply pea-sized amount to entire face at bedtime.  If dryness, use every third night and increase as tolerated to every night., Disp: 20 g, Rfl: 6    valACYclovir (VALTREX) 1000 MG tablet, TAKE 2 TABLETS BY MOUTH TWICE DAILY FOR 1 DAY PER EPISODE, Disp: 30 tablet, Rfl: 0    zinc sulfate (ZINC-220 ORAL), , Disp: , Rfl:     albuterol (PROVENTIL/VENTOLIN HFA) 90 mcg/actuation inhaler, Inhale 2 puffs into the lungs every 6 (six) hours as needed for Wheezing or Shortness of Breath., Disp: 18 g, Rfl: 2    azelastine (ASTELIN) 137 mcg (0.1 %) nasal spray, 2 sprays (274 mcg total) by Nasal route 2 (two) times daily., Disp: 90 mL, Rfl: 3    methocarbamoL (ROBAXIN) 500 MG Tab, Take 1 tablet once or twice a day as needed for muscle spasm and neck pain., Disp: 60 tablet, Rfl: 0  Review of patient's allergies indicates:  No Known Allergies  Review of Systems   Constitutional:  Negative for chills, fever and weight loss.   Respiratory:  Negative for shortness of breath.    Cardiovascular:  Negative for chest pain and palpitations.     Objective:   Body mass index is 39.26 kg/m².  There were no vitals filed  for this visit.        Ortho/SPM Exam-alert and oriented well-nourished well-developed pleasant adult female ambulatory no acute distress     Respiratory effort appears normal      Left shoulder - good deltoid strength, no acute deformity   Point tenderness upper trapezius muscle.  Mid 3rd very taut, and nodular.  Empty can position is strong   Negative Umana sign but patient has some pain with external rotation with the shoulder and 90° abduction  Abduction 100° and forward flexion 140° has some difficulty reaching left lumbar region  Neurovascular intact   Proximal biceps tendon long head very tender to palpation anterior shoulder    Negative Tinel and Phalen test bilateral wrist and hands and good  strength    Psychiatric good affect cognition     Plan in-refill Robaxin at 500 mg strength since will be added to Topamax in the evenings as needed.  Patient will receive ergonomic evaluation of her work space.  Nerve conduction study EMG for left hand symptoms and use left carpal tunnel splint      Patient Instructions   To Employer-  Angelica Flores needs ergonomic evaluation of her work space due to neck and shoulder problems which she is currently being treated for.    Thank you,     Dr. Clemente Ferrari  [facility]    April 6, 2023    IMAGING: MRI Cervical Spine Without Contrast  Narrative: EXAMINATION:  MRI CERVICAL SPINE WITHOUT CONTRAST    CLINICAL HISTORY:  Neck pain, chronic;Neck pain, chronic, degenerative changes on xray;CERVICAL RADICULOPATHY/ DDD; Radiculopathy, cervical region    TECHNIQUE:  Multiplanar, multisequence MR images of the cervical spine were performed without the administration of contrast.    COMPARISON:  Radiographs dated 12/19/2022    FINDINGS:  Alignment: Normal.    Vertebrae: Normal marrow signal. No fracture.    Discs: There is mild disc height loss C4-5 with slight disc height loss at C5-6.    Cord: Normal.    Skull base and craniocervical junction: Normal.    Degenerative  findings:    C2-C3:  No spinal canal or neuroforaminal stenosis.    C3-C4: Small broad-based posterior disc osteophyte complex. No spinal canal or neuroforaminal stenosis.    C4-C5: Broad-based posterior disc osteophyte complex and mild thickening of the ligamentum flavum.  Mild spinal canal stenosis with mild bilateral neural foraminal narrowing.    C5-C6: Small broad-based posterior disc osteophyte complex and uncovertebral spurring, asymmetric to.  Mild thickening of the ligamentum flavum.  Mild spinal canal stenosis with mild left-sided neural foraminal narrowing.    C6-C7: Small broad-based posterior disc osteophyte complex and uncovertebral spurring, asymmetric to left.  Mild thickening of the ligamentum flavum.  Mild effacement of the thecal sac without appreciable mass effect upon the cord.  Mild left-sided neural foraminal narrowing.    C7-T1:  No spinal canal or neuroforaminal stenosis.    T1-T2:    Paraspinal muscles & soft tissues: Unremarkable.  Impression: Mild multilevel spinal canal and neural foraminal narrowing secondary to degenerative disc disease as detailed above    Electronically signed by: Jamari Ward MD  Date:    03/27/2023  Time:    08:16       Radiographs / Imaging : Relevant imaging results reviewed by me and interpreted by me, discussed with the patient and / or family -discussed MRI report and I agree with report of shoulder x-ray.      Assessment:     Encounter Diagnoses   Name Primary?    Chronic left shoulder pain Yes    Cervical radiculopathy     Neck pain on left side     Bicipital tendinitis of left shoulder         Plan:   Chronic left shoulder pain  -     Ambulatory referral/consult to Physical/Occupational Therapy; Future; Expected date: 04/13/2023  -     Tend[facility] Sheath    Cervical radiculopathy  -     EMG W/ ULTRASOUND AND NERVE CONDUCTION TEST 2 Extremities; Future  -     Ambulatory referral/consult to Physical/Occupational Therapy; Future; Expected date:  04/13/2023    Neck pain on left side  -     Ambulatory referral/consult to Physical/Occupational Therapy; Future; Expected date: 04/13/2023    Bicipital tendinitis of left shoulder  -     Tendon Sheath    Other orders  -     methocarbamoL (ROBAXIN) 500 MG Tab; Take 1 tablet once or twice a day as needed for muscle spasm and neck pain.  Dispense: 60 tablet; Refill: 0        The patient verbalized good understanding of the medical issues discussed today and expressed appreciation for the care provided.  Patient was given the opportunity to ask questions and be an active participant in their medical care. Patient had no further questions or concerns at this time.     The patient was encouraged to participate in appropriate physical activity.      Sedrick Ferrari M.D.  [facility]        This note was dictated using voice recognition software and may have sound a like errors.

## 2023-04-06 NOTE — PATIENT INSTRUCTIONS
To Employer-  Angelica Hope needs ergonomic evaluation of her work space due to neck and shoulder problems which she is currently being treated for.    Thank you,     Dr. Clemente Ferrari  [facility]    April 6, 2023

## 2023-04-06 NOTE — LETTER
April 6, 2023      [facility]  [address]  [place] [address]  Phone: [phone]  Fax: [phone]       Patient: Angelica Flores   YOB: 1960  Date of Visit: 04/06/2023    To Whom It May Concern:    Pia Flores  was at [facility] on 04/06/2023.     The patient may return to work on 4/6/2023.     If you have any questions or concerns, or if I can be of further assistance, please do not hesitate to contact me.    Sincerely,    Jazmine Ferrari MD

## 2023-04-06 NOTE — PROCEDURES
Tendon Sheath    Date/Time: 4/6/2023 8:00 AM  Performed by: Sedrick Ferrari MD  Authorized by: Sedrick Ferrari MD     Indications:  Pain  Site marked: the procedure site was marked    Timeout: prior to procedure the correct patient, procedure, and site was verified    Local anesthetic:  Bupivacaine 0.25% without epinephrine and lidocaine 1% without epinephrine  Location:  Shoulder  Site:  L bicep tendon  Approach:  Lateral  Medications:  9 mg betamethasone acetate-betamethasone sodium phosphate 6 mg/mL  Patient tolerance:  Patient tolerated the procedure well with no immediate complications

## 2023-04-11 ENCOUNTER — CLINICAL SUPPORT (OUTPATIENT)
Dept: REHABILITATION | Facility: HOSPITAL | Age: 63
End: 2023-04-11
Payer: COMMERCIAL

## 2023-04-11 DIAGNOSIS — M54.2 NECK PAIN ON LEFT SIDE: ICD-10-CM

## 2023-04-11 DIAGNOSIS — R53.1 DECREASED STRENGTH: ICD-10-CM

## 2023-04-11 DIAGNOSIS — M54.12 CERVICAL RADICULOPATHY: ICD-10-CM

## 2023-04-11 DIAGNOSIS — M25.60 DECREASED RANGE OF MOTION: ICD-10-CM

## 2023-04-11 DIAGNOSIS — M25.512 CHRONIC LEFT SHOULDER PAIN: Primary | ICD-10-CM

## 2023-04-11 DIAGNOSIS — G89.29 CHRONIC LEFT SHOULDER PAIN: Primary | ICD-10-CM

## 2023-04-11 PROCEDURE — 97162 PT EVAL MOD COMPLEX 30 MIN: CPT | Performed by: PHYSICAL THERAPIST

## 2023-04-11 PROCEDURE — 97110 THERAPEUTIC EXERCISES: CPT | Performed by: PHYSICAL THERAPIST

## 2023-04-11 NOTE — PLAN OF CARE
[facility]   Physical Therapy Initial Evaluation   Date: 4/11/2023   Name: Angelica Pate [facility] Number: [id number]    Therapy Diagnosis:    Encounter Diagnoses   Name Primary?    Chronic left shoulder pain Yes    Neck pain on left side     Decreased range of motion     Decreased strength     Cervical radiculopathy       Physician: Sedrick Ferrari MD     Physician Orders: PT Eval and Treat  Medical Diagnosis from Referral: chronic left shoulder pain, cervical radiculopathy, neck pain on left side  Evaluation Date: 4/11/2023  Authorization Period Expiration: 12/31/2023  Plan of Care Expiration: 7/10/2023  Progress Note Due: 5/11/2023  Visit # / Visits authorized: 1/1   FOTO: 1/3 (last performed on 4/11/2023)    Precautions: Standard    Time In: 1735  Time Out: 1820  Total Billable Time (timed & untimed codes): 45 minutes    SUBJECTIVE   Date of onset: approximately 4 months    History of current condition - Angelica reports that she has been experiencing left shoulder pain since December of 2022. She states that the pain began insidiously and has gradually progressed. Patient saw Dr. Ferrari, who gave her an injection, and it helped significantly. She also did a little physical therapy, which helped as well. The pain gradually began to come back, and she was referred to pain management. They started her on robaxin which seems to help some. Patient followed up with Dr. Ferrari and was given another injection last week. This one did not seem to help. Patient still feels pain with reaching and lifting with her left UE. She was getting some numbness and tingling in her left hand and fingers, but she also has carpal tunnel. Patient reports that her doctor would like to schedule an EMG.     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   Prior Level of Function: Independent and pain free with all ADL, IADL, community mobility and functional activities.   Current Level of Function: Independent with all ADL, IADL, community mobility and functional activities with reports of increased pain and need for increased time and frequent breaks.      Dominant Extremity:    [x] Right    [] Left    Pts goals: Pt reported goals are to decrease overall pain levels in order to return to prior functional level.     Medical History:   Past Medical History:   Diagnosis Date    Anxiety     Asthma     Crohn's disease     Fibroids     Hyperlipidemia     Hypertension     Iritis     Migraine headache     Ocular    Osteopenia 08/2019    Syncope and collapse     Vitamin D deficiency disease        Surgical History:   Angelica Flores  has a past surgical history that includes TAHBSO (February 2011); Total abdominal hysterectomy; Ankle fracture surgery (08/19/08); Breast biopsy (Left, 1977); Colonoscopy (N/A, 8/11/2017); Colonoscopy (N/A, 11/23/2020); and Colonoscopy (N/A, 9/28/2022).    Medications:   Angelica has a current medication list which includes the following prescription(s): humira(cf) pen, albuterol, alprazolam, amlodipine, ascorbic acid (vitamin c), azathioprine, azelastine, bacillus coagulans, cetirizine, cholecalciferol (vitamin d3), dicyclomine, hepatitis b virus vacc.rec(pf), hydrochlorothiazide, hydroquinone, ibuprofen, ipratropium, lisinopril, methocarbamol, methylprednisolone, metoprolol succinate, montelukast, multivitamin-ca-iron-minerals, pneumoc 20-shani conj-dip cr(pf), potassium bicarbonate, pravastatin, topiramate, tretinoin, valacyclovir, and zinc sulfate.    Allergies:   Review of patient's  allergies indicates:  No Known Allergies     OBJECTIVE     RANGE OF MOTION:   Cervical Right   (spine) Left    Pain/Dysfunction with Movement Goal   Cervical Flexion (60º) 54 --- No pain with Rom measurements 60   Cervical Extension (80º) 28 ---  80   Cervical Side Bending (45º) 28 28  45   Cervical Rotation (75º) 58 64         Shoulder AROM/PROM Right Left Pain/Dysfunction with Movement Goal   Shoulder Flexion (180º) 150 110 Pain in L 180   Shoulder Abduction (180º) 146 88  Pain in L  180   Shoulder ER  at 90º (90º)    90   Shoulder IR at 90º (70º)    70   Functional ER T3 Unable to reach landmark Pain in L T3   Functional IR T8 L greater trochanter Pain in L T8       STRENGTH:   U/E MMT Right Left Pain/Dysfunction with Movement Goal   Shoulder Flexion 4/5 NT due to pain No pain with MMT's today 4+/5 B   Shoulder Abduction 4+/5 NT due to pain  4+/5 B   Shoulder IR 4+/5 4/5  4+/5 B   Shoulder ER 4/5 3+/5  4+/5 B   Serratus Anterior NT NT  4+/5 B   Middle Trapezius NT NT  4+/5 B   Lower Trapezius NT NT  4+/5 B   Elbow Flexion  4+/5 4+/5  5/5 B   Elbow Extension 4+/5 4+/5  5/5 B   Wrist Flexion 4/5 4/5  5/5 B   Wrist Extension 5/5 5/5  5/5 B        MUSCLE LENGTH:   Muscle Tested  Right Left  Goal   Upper Trapezius [] Normal      [x] Limited [] Normal      [x] Limited Normal B   Levator Scapular  [] Normal      [x] Limited [] Normal      [x] Limited Normal B   Scalenes [] Normal      [x] Limited [] Normal      [x] Limited Normal B   Pectoralis Minor [] Normal      [x] Limited [] Normal      [x] Limited Normal B   Pectoralis Major [] Normal      [x] Limited [] Normal      [x] Limited Normal B       JOINT MOBILITY:     Joint Motion  Right Mobility  (spine) Left Mobility Goal   Cervical Upglides  [] Hypo     [x] Normal     [] Hyper [] Hypo     [x] Normal     [] Hyper Normal    Cervical Downglides  [] Hypo     [x] Normal     [] Hyper [] Hypo     [x] Normal     [] Hyper Normal    1st Rib [] Hypo     [x] Normal     [] Hyper  [x] Hypo     [] Normal     [] Hyper Normal    Thoracic PA  [x] Hypo     [] Normal     [] Hyper --- Normal   Glenohumeral inferior  [] Hypo     [x] Normal     [] Hyper [x] Hypo     [] Normal     [] Hyper Normal    Glenohumeral anterior [] Hypo     [x] Normal     [] Hyper [x] Hypo     [] Normal     [] Hyper Normal    Glenohumeral posterior [] Hypo     [x] Normal     [] Hyper [x] Hypo     [] Normal     [] Hyper Normal        SPECIAL TESTS:    Right  (spine) Left  Goal   Cervical Distraction [] Positive    [x] Negative [] Positive    [x] Negative Negative B    Cervical Compression  [] Positive    [x] Negative [] Positive    [x] Negative Negative B        Unable to obtain accurate Shoulder Special Testing this visit due to pain and patient being unable to obtain test positions.    SENSATION  [] Intact to Light Touch   [x] Impaired: hypersensitivity throughout entire R UE as compared to left      PALPATION: Muscles: Increased tone and tenderness to palpation of: bilateral, L>R, scalenes , SCM, upper trapezius, levator scapulae , periscapular musculature, rotator cuff muscles, infraspinatus .       POSTURE:  Pt presents with postural abnormalities which include:    [x] Forward Head   [] Increased Lumbar Lordosis   [x] Rounded Shoulder   [] Genu Recurvatum   [] Increased Thoracic Kyphosis [] Genu Valgus   [] Trunk Deviated    [] Pes Planus   [] Scapular Winging    [] Other:         Function:     CMS Impairment/Limitation/Restriction for FOTO Shoulder Survey    Therapist reviewed FOTO scores for Angelica on 4/11/2023.   FOTO documents entered into [employer] - see Media section.    Limitation Score: 55%         TREATMENT     Total Treatment time (time-based codes) separate from Evaluation: (8) minutes     Angelica received the treatments listed below:      THERAPEUTIC EXERCISES to develop strength, endurance, ROM, flexibility, posture, and core stabilization for (8) minutes including:    Intervention Performed Today    HEP established  x Discussed with patient to continue with wall walks in shower. Discussed using L UE to tolerance and not just using R UE to do everything to avoid L shoulder freezing. Patient expressed understanding.                                         Plan for Next Visit:        PATIENT EDUCATION AND HOME EXERCISES     Education provided:  PURPOSE: Patient educated on the impairments noted above and the effects of physical therapy intervention to improve overall condition and QOL.   EXERCISE: Patient was educated on all the above exercise prior/during/after for proper posture, positioning, and execution for safe performance with home exercise program.   STRENGTH: Patient educated on the importance of improved core and extremity strength in order to improve alignment of the spine and extremities with static positions and dynamic movement.   POSTURE: Patient educated on postural awareness to reduce stress and maintain optimal alignment of the spine with static positions and dynamic movement     Written Home Exercises Provided: yes.  Exercises were reviewed and Angelica was able to demonstrate them prior to the end of the session.  Angelica demonstrated good  understanding of the education provided. See EMR under Patient Instructions for exercises provided during therapy sessions.    ASSESSMENT     Angelica is a 62 y.o. female referred to outpatient Physical Therapy with a medical diagnosis of chronic left shoulder pain, cervical radiculopathy, neck pain on left side. Pt presents with impairments in the following categories: IMPAIRMENTS: ROM, strength, joint mobility, muscle length, posture, core strength and stability, functional movement patterns, and sensation    Pt prognosis is Good  Pt will benefit from skilled outpatient Physical Therapy to address the deficits stated above and in the chart below, provide pt/family education, and to maximize pt's level of independence.     Plan of care discussed with patient: Yes  Pt's  "spiritual, cultural and educational needs considered and patient is agreeable to the plan of care and goals as stated below:     Anticipated Barriers for therapy: co-morbidities and chronicity of condition    Medical Necessity is demonstrated by the following  History  Co-morbidities and personal factors that may impact the plan of care Co-morbidities:       Personal Factors:   []Age   []Education   []Coping style   []Social background   []Lifestyle  []Character   []Attitudes   []Other:   [x]No deficits      []Low   []Moderate  [x]High    Examination  Body Structures and Functions, activity limitations and participation restrictions that may impact the plan of care Body Regions:   []Head  [x]Neck   []Back   []Trunk  [x]Upper extremities   []Lower extremities   []Other:      Body Systems:    []Gross symmetry   [x]ROM   [x]Strength   []Gross coordinated movement   []Balance   []Gait []Transfers  []Transitions   [x]Motor control   []Motor learning   []Scar formation  []Other:       Participation Restrictions:   See above in "Current Level of Function"     Activity limitations:   Learning and applying knowledge  [x]No deficits       General Tasks and Commands  [x]No deficits    Communication  [x]No deficits    Mobility   []No deficits  []Fine hand use  []Walking   [x]Driving [x]Lifting/carrying objects  []Using Transportation   []Moving around using equipment  []Other:      Self care  []No deficits  [x]Washing oneself   [x]Caring for body parts   [x]Toileting   [x]Dressing  []Eating   []Drinking   []Looking after one's health  []Other:        Domestic Life  []No Deficits  [x]Shopping   [x]Cooking  [x]Doing housework  []Assisting others   []Other:      Interactions/Relationships  [x]No deficits    Life Areas  [x]No deficits    Community and Social Life  [x]Community life  [x]Recreation and leisure   []Jewish and spirituality  []Human rights   []Political life / citizenship  []No deficits      []Low   []Moderate  []High "    Clinical Presentation []Stable and uncomplicated   [x]Evolving presentation with changing characteristics  []Unstable presentation with unpredictable characteristics []Low   [x]Moderate  []High      Decision Making/ Complexity Score:   []Low   [x]Moderate  []High        Short Term Goals:  6 weeks Status  Date Met   PAIN: Pt will report worst pain of 4/10 in order to progress toward max functional ability and improve quality of life. [x] Progressing  [] Met  [] Not Met    FUNCTION: Patient will demonstrate improved function as indicated by a functional limitation score of less than or equal to 47 out of 100 on FOTO. [x] Progressing  [] Met  [] Not Met    MOBILITY: Patient will improve AROM to 50% of stated goals, listed in objective measures above, in order to progress towards independence with functional activities.  [x] Progressing  [] Met  [] Not Met    STRENGTH: Patient will improve strength to 50% of stated goals, listed in objective measures above, in order to progress towards independence with functional activities. [x] Progressing  [] Met  [] Not Met    POSTURE: Patient will correct postural deviations in sitting and standing, to decrease pain and promote long term stability.  [x] Progressing  [] Met  [] Not Met    HEP: Patient will demonstrate independence with HEP in order to progress toward functional independence. [x] Progressing  [] Met  [] Not Met      Long Term Goals:  12 weeks Status Date Met   PAIN: Pt will report worst pain of 2/10 in order to progress toward max functional ability and improve quality of life [x] Progressing  [] Met  [] Not Met    FUNCTION: Patient will demonstrate improved function as indicated by a functional limitation score of less than or equal to 38 out of 100 on FOTO. [x] Progressing  [] Met  [] Not Met    MOBILITY: Patient will improve AROM to stated goals, listed in objective measures above, in order to return to maximal functional potential and improve quality of life.   [x] Progressing  [] Met  [] Not Met    STRENGTH: Patient will improve strength to stated goals, listed in objective measures above, in order to improve functional independence and quality of life.  [x] Progressing  [] Met  [] Not Met    GAIT: Patient will demonstrate normalized gait mechanics with minimal compensation in order to return to PLOF. [x] Progressing  [] Met  [] Not Met    Patient will return to normal ADL's, IADL's, community involvement, recreational activities, and work-related activities with less than or equal to 0/10 pain and maximal function.  [x] Progressing  [] Met  [] Not Met      PLAN   Plan of care Certification: 4/11/2023 to 7/10/2023.    Outpatient Physical Therapy 2 times weekly for 12 weeks to include any combination of the following interventions: virtual visits, dry needling, modalities, electrical stimulation (IFC, Pre-Mod, Attended with Functional Dry Needling), Aquatic Therapy, Cervical/Lumbar Traction, Gait Training, Manual Therapy, Neuromuscular Re-ed, Patient Education, Self Care, Therapeutic Exercise, and Therapeutic Activites     Olinda Blount, PT, DPT      I CERTIFY THE NEED FOR THESE SERVICES FURNISHED UNDER THIS PLAN OF TREATMENT AND WHILE UNDER MY CARE   Physician's comments:     Physician's Signature: ___________________________________________________

## 2023-04-14 ENCOUNTER — OFFICE VISIT (OUTPATIENT)
Dept: PHYSICAL MEDICINE AND REHAB | Facility: CLINIC | Age: 63
End: 2023-04-14
Payer: COMMERCIAL

## 2023-04-14 ENCOUNTER — CLINICAL SUPPORT (OUTPATIENT)
Dept: REHABILITATION | Facility: HOSPITAL | Age: 63
End: 2023-04-14
Payer: COMMERCIAL

## 2023-04-14 VITALS
WEIGHT: 201 LBS | SYSTOLIC BLOOD PRESSURE: 125 MMHG | RESPIRATION RATE: 13 BRPM | BODY MASS INDEX: 39.46 KG/M2 | HEIGHT: 60 IN | DIASTOLIC BLOOD PRESSURE: 84 MMHG | HEART RATE: 59 BPM

## 2023-04-14 DIAGNOSIS — R68.89 DECREASED STRENGTH, ENDURANCE, AND MOBILITY: ICD-10-CM

## 2023-04-14 DIAGNOSIS — G56.03 BILATERAL CARPAL TUNNEL SYNDROME: Primary | ICD-10-CM

## 2023-04-14 DIAGNOSIS — M25.612 DECREASED RANGE OF MOTION OF LEFT SHOULDER: ICD-10-CM

## 2023-04-14 DIAGNOSIS — M25.512 CHRONIC LEFT SHOULDER PAIN: ICD-10-CM

## 2023-04-14 DIAGNOSIS — M79.18 CERVICAL MYOFASCIAL PAIN SYNDROME: ICD-10-CM

## 2023-04-14 DIAGNOSIS — M54.12 CERVICAL RADICULOPATHY: Primary | ICD-10-CM

## 2023-04-14 DIAGNOSIS — G89.29 CHRONIC LEFT SHOULDER PAIN: ICD-10-CM

## 2023-04-14 DIAGNOSIS — R53.1 DECREASED STRENGTH, ENDURANCE, AND MOBILITY: ICD-10-CM

## 2023-04-14 DIAGNOSIS — M54.2 NECK PAIN ON LEFT SIDE: ICD-10-CM

## 2023-04-14 DIAGNOSIS — Z74.09 DECREASED STRENGTH, ENDURANCE, AND MOBILITY: ICD-10-CM

## 2023-04-14 PROCEDURE — 4010F PR ACE/ARB THEARPY RXD/TAKEN: ICD-10-PCS | Mod: CPTII,S$GLB,, | Performed by: PHYSICAL MEDICINE & REHABILITATION

## 2023-04-14 PROCEDURE — 3008F BODY MASS INDEX DOCD: CPT | Mod: CPTII,S$GLB,, | Performed by: PHYSICAL MEDICINE & REHABILITATION

## 2023-04-14 PROCEDURE — 95885 MUSC TST DONE W/NERV TST LIM: CPT | Mod: S$GLB,,, | Performed by: PHYSICAL MEDICINE & REHABILITATION

## 2023-04-14 PROCEDURE — 1159F PR MEDICATION LIST DOCUMENTED IN MEDICAL RECORD: ICD-10-PCS | Mod: CPTII,S$GLB,, | Performed by: PHYSICAL MEDICINE & REHABILITATION

## 2023-04-14 PROCEDURE — 99499 UNLISTED E&M SERVICE: CPT | Mod: S$GLB,,, | Performed by: PHYSICAL MEDICINE & REHABILITATION

## 2023-04-14 PROCEDURE — 95911 NRV CNDJ TEST 9-10 STUDIES: CPT | Mod: S$GLB,,, | Performed by: PHYSICAL MEDICINE & REHABILITATION

## 2023-04-14 PROCEDURE — 3074F PR MOST RECENT SYSTOLIC BLOOD PRESSURE < 130 MM HG: ICD-10-PCS | Mod: CPTII,S$GLB,, | Performed by: PHYSICAL MEDICINE & REHABILITATION

## 2023-04-14 PROCEDURE — 97112 NEUROMUSCULAR REEDUCATION: CPT | Performed by: PHYSICAL THERAPIST

## 2023-04-14 PROCEDURE — 99999 PR PBB SHADOW E&M-EST. PATIENT-LVL III: CPT | Mod: PBBFAC,,, | Performed by: PHYSICAL MEDICINE & REHABILITATION

## 2023-04-14 PROCEDURE — 99499 NO LOS: ICD-10-PCS | Mod: S$GLB,,, | Performed by: PHYSICAL MEDICINE & REHABILITATION

## 2023-04-14 PROCEDURE — 99999 PR PBB SHADOW E&M-EST. PATIENT-LVL III: ICD-10-PCS | Mod: PBBFAC,,, | Performed by: PHYSICAL MEDICINE & REHABILITATION

## 2023-04-14 PROCEDURE — 1160F RVW MEDS BY RX/DR IN RCRD: CPT | Mod: CPTII,S$GLB,, | Performed by: PHYSICAL MEDICINE & REHABILITATION

## 2023-04-14 PROCEDURE — 1159F MED LIST DOCD IN RCRD: CPT | Mod: CPTII,S$GLB,, | Performed by: PHYSICAL MEDICINE & REHABILITATION

## 2023-04-14 PROCEDURE — 95911 PR NERVE CONDUCTION STUDY; 9-10 STUDIES: ICD-10-PCS | Mod: S$GLB,,, | Performed by: PHYSICAL MEDICINE & REHABILITATION

## 2023-04-14 PROCEDURE — 95885 PR MUSC TST DONE W/NERV TST LIM: ICD-10-PCS | Mod: S$GLB,,, | Performed by: PHYSICAL MEDICINE & REHABILITATION

## 2023-04-14 PROCEDURE — 3079F PR MOST RECENT DIASTOLIC BLOOD PRESSURE 80-89 MM HG: ICD-10-PCS | Mod: CPTII,S$GLB,, | Performed by: PHYSICAL MEDICINE & REHABILITATION

## 2023-04-14 PROCEDURE — 97110 THERAPEUTIC EXERCISES: CPT | Performed by: PHYSICAL THERAPIST

## 2023-04-14 PROCEDURE — 97140 MANUAL THERAPY 1/> REGIONS: CPT | Performed by: PHYSICAL THERAPIST

## 2023-04-14 PROCEDURE — 3074F SYST BP LT 130 MM HG: CPT | Mod: CPTII,S$GLB,, | Performed by: PHYSICAL MEDICINE & REHABILITATION

## 2023-04-14 PROCEDURE — 4010F ACE/ARB THERAPY RXD/TAKEN: CPT | Mod: CPTII,S$GLB,, | Performed by: PHYSICAL MEDICINE & REHABILITATION

## 2023-04-14 PROCEDURE — 3079F DIAST BP 80-89 MM HG: CPT | Mod: CPTII,S$GLB,, | Performed by: PHYSICAL MEDICINE & REHABILITATION

## 2023-04-14 PROCEDURE — 1160F PR REVIEW ALL MEDS BY PRESCRIBER/CLIN PHARMACIST DOCUMENTED: ICD-10-PCS | Mod: CPTII,S$GLB,, | Performed by: PHYSICAL MEDICINE & REHABILITATION

## 2023-04-14 PROCEDURE — 3008F PR BODY MASS INDEX (BMI) DOCUMENTED: ICD-10-PCS | Mod: CPTII,S$GLB,, | Performed by: PHYSICAL MEDICINE & REHABILITATION

## 2023-04-14 NOTE — PROGRESS NOTES
[facility]   [address]  [place] [address]  Phone: [phone]              Full Name: Angelica Flores YOB: 1960  Patient ID: [id number]      Visit Date: 4/14/2023 13:27  Age: 62 Years  Examining Physician:   Referring Physician: Dr. Sedrick Ferrari  Conclusion: ue pain    Chief Complaint   Patient presents with    Arm Pain       HPI: This is a 62 y.o.  female being seen in clinic today for evaluation of chronic achy pain and tightness in her left shoulder/arm with numbness/tingling into her fingers.  With PT and NSAIDs her symptoms have improved. Increased activity and stress can exacerbate her pain.    History obtained from patient    Past family, medical, social, and surgical history reviewed in chart    Review of Systems:     General- denies lethargy, weight change, fever, chills  Head/neck- denies swallowing difficulties  ENT- denies hearing changes  Cardiovascular-denies chest pain  Pulmonary- denies shortness of breath  GI- denies constipation or bowel incontinence  - denies bladder incontinence  Skin- denies wounds or rashes  Musculoskeletal- denies weakness, denies pain  Neurologic- denies numbness and tingling  Psychiatric- denies depressive or psychotic features, denies anxiety  Lymphatic-denies swelling  Endocrine- denies hypoglycemic symptoms/DM history  All other pertinent systems negative     Physical Examination:  General: Well developed, well nourished female, NAD  HEENT:NCAT EOMI bilaterally   Pulmonary:Normal respirations    Spinal Examination: CERVICAL  Active ROM is within normal limits.  Inspection: No deformity of spinal alignment.  Palpation: No vertebral tenderness to percussion.  Tight and ttp at left trapezius, levator scapula, rhomboid, very ttp at left teres minor with reproduction into her arm    Spinal Examination: LUMBAR or THORACIC  Active ROM is within normal limits.  Inspection: No deformity of spinal alignment.    Palpation: No  vertebral tenderness to percussion.      Musculoskeletal Tests:  Phalen: neg on right  Elbow compression (ulnar): neg  Tinels at wrist: +on left    Bilateral Upper and Lower Extremities:  Pulses are 2+ at radial bilaterally.  Shoulder/Elbow/Wrist/Hand ROM wnl mild cuff weakness  Hip/Knee/Ankle ROM   Bilateral Extremities show normal capillary refill.  No signs of cyanosis, rubor, edema, skin changes, or dysvascular changes of appendages.  Nails appear intact.    Neurological Exam:  Cranial Nerves:  II-XII grossly intact    Manual Muscle Testing: (Motor 5=normal)  5/5 strength bilateral upper/lower extremities    No focal atrophy is noted of either upper extremity.    Bilateral Reflexes:  Garnett's response is absent bilaterally.    Sensation: tested to light touch  - intact in arms    Gait: Narrow base and good arm swing.      Entire procedure explained to patient prior to proceeding.  Verbal consent obtained  Sensory NCS      Nerve / Sites Rec. Site Onset Lat Peak Lat NP Amp PP Amp Segments Distance Velocity     ms ms µV µV  mm m/s   L Median - Digit II (Antidromic)      Wrist Dig II 2.29 4.08 10.8 14.0 Wrist - Dig  61   R Median - Digit II (Antidromic)      Wrist Dig II 3.13 4.00 17.8 21.0 Wrist - Dig  45   L Ulnar - Digit V (Antidromic)      Wrist Dig V 2.96 3.88 29.0 31.9 Wrist - Dig V 140 47   R Ulnar - Digit V (Antidromic)      Wrist Dig V 2.85 3.52 21.5 24.9 Wrist - Dig V 140 49   L Radial - Anatomical snuff box (Forearm)      Forearm Wrist 2.02 2.75 10.2 14.9 Forearm - Wrist 100 49   R Radial - Anatomical snuff box (Forearm)      Forearm Wrist 2.10 2.71 14.8 16.0 Forearm - Wrist 100 48                   Motor NCS      Nerve / Sites Muscle Latency Amplitude Amp % Duration Segments Distance Lat Diff Velocity     ms mV % ms  mm ms m/s   L Median - APB      Wrist APB 3.21 11.2 100 7.65 Wrist - APB 80        Elbow APB 6.83 8.0 71.8 7.40 Elbow - Wrist 190 3.63 52   R Median - APB      Wrist APB 3.73  9.0 100 7.12 Wrist - APB 80        Elbow APB 7.27 8.2 91 7.19 Elbow - Wrist 200 3.54 56   L Ulnar - ADM      Wrist ADM 2.79 8.5 100 6.75 Wrist - ADM 80        B.Elbow ADM 6.10 8.2 95.9 6.77 B.Elbow - Wrist 200 3.31 60      A.Elbow ADM 7.75 7.6 89.6 7.04 A.Elbow - B.Elbow 90 1.65 55   R Ulnar - ADM      Wrist ADM 3.02 8.5 100 6.19 Wrist - ADM 80        B.Elbow ADM 6.40 7.8 91.3 6.42 B.Elbow - Wrist 210 3.38 62      A.Elbow ADM 7.92 7.5 87.8 6.54 A.Elbow - B.Elbow 100 1.52 66               EMG Summary Table     Spontaneous MUAP Recruitment   Muscle Nerve Roots IA Fib PSW Fasc H.F. Amp Dur. PPP Pattern   L. Deltoid Axillary C5-C6 N None None None None N N N N   L. Pronator teres Median C6-C7 N None None None None N N N N   L. First dorsal interosseous Ulnar C8-T1 N None None None None N N N N           INTERPRETATION  -Bilateral median motor nerve conduction study showed normal latency, amplitude, and conduction velocity  -Bilateral median sensory nerve conduction study showed prolonged peak latency and normal amplitude  -Bilateral ulnar motor nerve conduction study showed normal latency, amplitude, and conduction velocity  -Bilateral ulnar sensory nerve conduction study showed normal peak latency and amplitude  -Bilateral radial sensory nerve conduction study showed normal peak latency and amplitude  -Needle EMG examination performed to above mentioned muscles       IMPRESSION  ABNORMAL study  2. There is electrodiagnostic evidence of a mild demyelinating median neuropathy (Carpal tunnel syndrome) across bilateral wrists. There was no evidence of a cervical radiculopathy of the C5-T1 nerve roots  3. There is clinical evidence of cervical myofascial pain    PLAN  Discussed in detail for greater than 30 minutes about diagnosis and treatment plan    1. Follow up with referring provider: Dr. Sedrick Ferrari  2. Handouts on CTS provided. Rec cont PT with focus on dry needle, myofascial pain, ROM, etc  3. This study is  good for one year. If symptoms worsen or do not improve, please re-consult.    Corin Denise M.D.  Physical Medicine and Rehab

## 2023-04-14 NOTE — PROGRESS NOTES
[facility]   Physical Therapy Treatment Note     Name: Angelica Pate [facility] Number: [id number]    Therapy Diagnosis:   Encounter Diagnoses   Name Primary?    Cervical radiculopathy Yes    Decreased range of motion of left shoulder     Chronic left shoulder pain     Neck pain on left side     Decreased strength, endurance, and mobility      Physician: Sedrick Ferrari MD    Visit Date: 4/14/2023    Physician Orders: PT Eval and Treat  Medical Diagnosis from Referral: chronic left shoulder pain, cervical radiculopathy, neck pain on left side  Evaluation Date: 4/11/2023  Authorization Period Expiration: 12/31/2023  Plan of Care Expiration: 7/10/2023  Progress Note Due: 5/11/2023  Visit # / Visits authorized: 1/25   FOTO: 1/3 (last performed on 4/11/2023)     Precautions: Standard    PTA Visit #: 0/5     Time In: 0706  Time Out: 0755  Total Billable Time: 42 minutes (Billing reflects 1 on 1 treatment time spent with patient)    SUBJECTIVE     Patient reports: feeling okay today. No new complaints since initial evaluation.     He/She was compliant with home exercise program.  Response to previous treatment: no adverse reaction  Functional change: none noted yet    Pain: 2/10     Location: left neck/shoulder region    OBJECTIVE     Objective Measures updated at progress report or POC update only unless otherwise noted.       TREATMENT     Angelica received the treatments listed below:     MANUAL THERAPY TECHNIQUES were applied for (10) minutes, including:    Manual Intervention Performed Today    Soft Tissue Mobilization [x] left upper trapezius, levator scapulae , periscapular musculature, infraspinatus , deltoid, biceps , scapular release   Joint Mobilizations []     []     []    Functional Dry Needling  []      Plan for Next Visit: Continue as needed           THERAPEUTIC EXERCISES to develop strength, endurance, ROM, flexibility, posture, and core stabilization for (20) minutes  "including:    Intervention Performed Today    UBE for posture strength and endurance x 3'/3'   Pulley's - flexion and abduction x 3' each   Wand flexion - supine x 2 x 10, 3" holds   Wand ER - supine at 45 degrees abd x 2 x 10, 5" holds                         Plan for Next Visit:          NEUROMUSCULAR RE-EDUCATION ACTIVITIES to improve Balance, Coordination, Kinesthetic, Sense, Proprioception, and Posture for (12) minutes.  The following were included:    Intervention Performed Today    Scapular Retraction  x 3 x 10, red theraband    Series 6 x 1' ecah, 2' pec stretch                                   Plan for Next Visit:        PATIENT EDUCATION AND HOME EXERCISES     Home Exercises Provided and Patient Education Provided     Education provided:   PURPOSE: Patient educated on the impairments noted above and the effects of physical therapy intervention to improve overall condition and QOL.   EXERCISE: Patient was educated on all the above exercise prior/during/after for proper posture, positioning, and execution for safe performance with home exercise program.   STRENGTH: Patient educated on the importance of improved core and extremity strength in order to improve alignment of the spine and extremities with static positions and dynamic movement.   POSTURE: Patient educated on postural awareness to reduce stress and maintain optimal alignment of the spine with static positions and dynamic movement     Written Home Exercises Provided: yes.  Exercises were reviewed and Angelica was able to demonstrate them prior to the end of the session.  Angelica demonstrated good  understanding of the education provided. See EMR under Patient Instructions for exercises provided during therapy sessions.    ASSESSMENT     Patient tolerated treatment well. She completed exercises with only minimal difficulty and complaint due to decreased left shoulder range of motion and strength. Patient did well with all AAROM, but demonstrated most " difficulty with external rotation mobility. Soft tissue adhesions and muscle guarding noted along left upper trapezius, levator scapula, and rotator cuff musculature, which did improve some following manual therapy. Patient would benefit from continued work on left shoulder range of motion and rotator cuff strengthening.     Angelica is progressing well towards her goals.   Pt prognosis is Good.     Pt will continue to benefit from skilled outpatient physical therapy to address the deficits listed in the problem list box on initial evaluation, provide pt/family education and to maximize pt's level of independence in the home and community environment.     Pt's spiritual, cultural and educational needs considered and pt agreeable to plan of care and goals.     Anticipated Barriers for therapy: co-morbidities and chronicity of condition    Goals:  Short Term Goals:  6 weeks Status  Date Met   PAIN: Pt will report worst pain of 4/10 in order to progress toward max functional ability and improve quality of life. [x] Progressing  [] Met  [] Not Met     FUNCTION: Patient will demonstrate improved function as indicated by a functional limitation score of less than or equal to 47 out of 100 on FOTO. [x] Progressing  [] Met  [] Not Met     MOBILITY: Patient will improve AROM to 50% of stated goals, listed in objective measures above, in order to progress towards independence with functional activities.  [x] Progressing  [] Met  [] Not Met     STRENGTH: Patient will improve strength to 50% of stated goals, listed in objective measures above, in order to progress towards independence with functional activities. [x] Progressing  [] Met  [] Not Met     POSTURE: Patient will correct postural deviations in sitting and standing, to decrease pain and promote long term stability.  [x] Progressing  [] Met  [] Not Met     HEP: Patient will demonstrate independence with HEP in order to progress toward functional independence. [x]  Progressing  [] Met  [] Not Met        Long Term Goals:  12 weeks Status Date Met   PAIN: Pt will report worst pain of 2/10 in order to progress toward max functional ability and improve quality of life [x] Progressing  [] Met  [] Not Met     FUNCTION: Patient will demonstrate improved function as indicated by a functional limitation score of less than or equal to 38 out of 100 on FOTO. [x] Progressing  [] Met  [] Not Met     MOBILITY: Patient will improve AROM to stated goals, listed in objective measures above, in order to return to maximal functional potential and improve quality of life.  [x] Progressing  [] Met  [] Not Met     STRENGTH: Patient will improve strength to stated goals, listed in objective measures above, in order to improve functional independence and quality of life.  [x] Progressing  [] Met  [] Not Met     GAIT: Patient will demonstrate normalized gait mechanics with minimal compensation in order to return to PLOF. [x] Progressing  [] Met  [] Not Met     Patient will return to normal ADL's, IADL's, community involvement, recreational activities, and work-related activities with less than or equal to 0/10 pain and maximal function.  [x] Progressing  [] Met  [] Not Met          PLAN     Continue Plan of Care (POC) and progress per patient tolerance. See treatment section for details on planned progressions next session.    4/11/2023 (evaluation): Outpatient Physical Therapy 2 times weekly for 12 weeks to include any combination of the following interventions: virtual visits, dry needling, modalities, electrical stimulation (IFC, Pre-Mod, Attended with Functional Dry Needling), Aquatic Therapy, Cervical/Lumbar Traction, Gait Training, Manual Therapy, Neuromuscular Re-ed, Patient Education, Self Care, Therapeutic Exercise, and Therapeutic Activites     Olinda Blount, PT

## 2023-04-14 NOTE — PATIENT INSTRUCTIONS
Optimal Alignment when Seated at Desk     [email or web address]          Posture - Sitting    Sit upright, head facing forward. Scoot your tailbone all the way to the back of your chair. Lean slightly forward and place a lumbar pillow or rolled up towel at your lower back. Lean back so shoulder blades lightly touch the back of the chair. Keep shoulders relaxed, and avoid rounded back. Keep hips level with knees. Avoid crossing legs for long periods.       *All images listed above obtained from [email or web address]

## 2023-04-19 ENCOUNTER — CLINICAL SUPPORT (OUTPATIENT)
Dept: REHABILITATION | Facility: HOSPITAL | Age: 63
End: 2023-04-19
Payer: COMMERCIAL

## 2023-04-19 DIAGNOSIS — G89.29 CHRONIC LEFT SHOULDER PAIN: ICD-10-CM

## 2023-04-19 DIAGNOSIS — R53.1 DECREASED STRENGTH, ENDURANCE, AND MOBILITY: ICD-10-CM

## 2023-04-19 DIAGNOSIS — R68.89 DECREASED STRENGTH, ENDURANCE, AND MOBILITY: ICD-10-CM

## 2023-04-19 DIAGNOSIS — M54.12 CERVICAL RADICULOPATHY: Primary | ICD-10-CM

## 2023-04-19 DIAGNOSIS — Z74.09 DECREASED STRENGTH, ENDURANCE, AND MOBILITY: ICD-10-CM

## 2023-04-19 DIAGNOSIS — M54.2 NECK PAIN ON LEFT SIDE: ICD-10-CM

## 2023-04-19 DIAGNOSIS — M25.612 DECREASED RANGE OF MOTION OF LEFT SHOULDER: ICD-10-CM

## 2023-04-19 DIAGNOSIS — M25.512 CHRONIC LEFT SHOULDER PAIN: ICD-10-CM

## 2023-04-19 PROCEDURE — 97110 THERAPEUTIC EXERCISES: CPT | Performed by: PHYSICAL THERAPIST

## 2023-04-19 PROCEDURE — 97112 NEUROMUSCULAR REEDUCATION: CPT | Performed by: PHYSICAL THERAPIST

## 2023-04-19 NOTE — PROGRESS NOTES
[facility]   Physical Therapy Treatment Note     Name: Angelica Pate Glide  Clinic Number: [id number]    Therapy Diagnosis:   Encounter Diagnoses   Name Primary?    Cervical radiculopathy Yes    Decreased range of motion of left shoulder     Chronic left shoulder pain     Neck pain on left side     Decreased strength, endurance, and mobility      Physician: Sedrick Ferrari MD    Visit Date: 4/19/2023    Physician Orders: PT Eval and Treat  Medical Diagnosis from Referral: chronic left shoulder pain, cervical radiculopathy, neck pain on left side  Evaluation Date: 4/11/2023  Authorization Period Expiration: 12/31/2023  Plan of Care Expiration: 7/10/2023  Progress Note Due: 5/11/2023  Visit # / Visits authorized: 2/25   FOTO: 1/3 (last performed on 4/11/2023)     Precautions: Standard    PTA Visit #: 0/5     Time In: 1731  Time Out: 1813  Total Billable Time: 41 minutes (Billing reflects 1 on 1 treatment time spent with patient)    SUBJECTIVE     Patient reports: feeling okay today, just tired. Patient reports that she has been having less shoulder pain, but her fingers have been tingling a little more. Patient reports that she had her EMG which revealed carpal tunnel in both hands, but no cervical radiculopathy. Patient would like to try dry needling next visit.    He/She was compliant with home exercise program.  Response to previous treatment: no adverse reaction  Functional change: none noted yet    Pain: 0/10     Location: left neck/shoulder region    OBJECTIVE     Objective Measures updated at progress report or POC update only unless otherwise noted.       TREATMENT     Angelica received the treatments listed below:     MANUAL THERAPY TECHNIQUES were applied for (0) minutes, including:    Manual Intervention Performed Today    Soft Tissue Mobilization [] left upper trapezius, levator scapulae , periscapular musculature, infraspinatus , deltoid, biceps , scapular release   Joint  "Mobilizations []     []     []    Functional Dry Needling  []      Plan for Next Visit: Continue as needed           THERAPEUTIC EXERCISES to develop strength, endurance, ROM, flexibility, posture, and core stabilization for (32) minutes including:    Intervention Performed Today    UBE for posture strength and endurance x 3'/3'   Pulley's - flexion and abduction x 3' each   Wand flexion - supine x 2 x 10, 3" holds   Wand ER - supine at 45 degrees abd x 2 x 10, 5" holds   Sidelying L shoulder abduction (added) x 2 x 10   Sidelying L shoulder flexion (added) x 2 x 10 (assisted with Lizett)   Updated HEP and handout administered x See Patient Instructions           Plan for Next Visit:        NEUROMUSCULAR RE-EDUCATION ACTIVITIES to improve Balance, Coordination, Kinesthetic, Sense, Proprioception, and Posture for (9) minutes.  The following were included:    Intervention Performed Today    Scapular Retraction  x 3 x 10, red theraband    Series 6  1' ecah, 2' pec stretch   Sidelying ER (added) x 10x, L UE                              Plan for Next Visit:        PATIENT EDUCATION AND HOME EXERCISES     Home Exercises Provided and Patient Education Provided     Education provided:   PURPOSE: Patient educated on the impairments noted above and the effects of physical therapy intervention to improve overall condition and QOL.   EXERCISE: Patient was educated on all the above exercise prior/during/after for proper posture, positioning, and execution for safe performance with home exercise program.   STRENGTH: Patient educated on the importance of improved core and extremity strength in order to improve alignment of the spine and extremities with static positions and dynamic movement.   POSTURE: Patient educated on postural awareness to reduce stress and maintain optimal alignment of the spine with static positions and dynamic movement     Written Home Exercises Provided: yes.  Exercises were reviewed and Angelica was able " to demonstrate them prior to the end of the session.  Angelica demonstrated good  understanding of the education provided. See EMR under Patient Instructions for exercises provided during therapy sessions.    ASSESSMENT     Patient did well with treatment today. She completed exercises with less difficulty and without complaint. Patient demonstrated improved range of motion this visit, and reported that she felt she could move her shoulder better today. Patient also reported decreased muscle tension following HEP stretching and ROM activities. Patient expressed and demonstrated understanding of updated HEP. Discussed with her how dry needling will most likely help with muscle tension, but how she will also need to continue to work on shoulder range of motion activities for joint mobility, and she expressed understanding. Patient progressing well towards goals.     Angelica is progressing well towards her goals.   Pt prognosis is Good.     Pt will continue to benefit from skilled outpatient physical therapy to address the deficits listed in the problem list box on initial evaluation, provide pt/family education and to maximize pt's level of independence in the home and community environment.     Pt's spiritual, cultural and educational needs considered and pt agreeable to plan of care and goals.     Anticipated Barriers for therapy: co-morbidities and chronicity of condition    Goals:  Short Term Goals:  6 weeks Status  Date Met   PAIN: Pt will report worst pain of 4/10 in order to progress toward max functional ability and improve quality of life. [x] Progressing  [] Met  [] Not Met     FUNCTION: Patient will demonstrate improved function as indicated by a functional limitation score of less than or equal to 47 out of 100 on FOTO. [x] Progressing  [] Met  [] Not Met     MOBILITY: Patient will improve AROM to 50% of stated goals, listed in objective measures above, in order to progress towards independence with  functional activities.  [x] Progressing  [] Met  [] Not Met     STRENGTH: Patient will improve strength to 50% of stated goals, listed in objective measures above, in order to progress towards independence with functional activities. [x] Progressing  [] Met  [] Not Met     POSTURE: Patient will correct postural deviations in sitting and standing, to decrease pain and promote long term stability.  [x] Progressing  [] Met  [] Not Met     HEP: Patient will demonstrate independence with HEP in order to progress toward functional independence. [x] Progressing  [] Met  [] Not Met        Long Term Goals:  12 weeks Status Date Met   PAIN: Pt will report worst pain of 2/10 in order to progress toward max functional ability and improve quality of life [x] Progressing  [] Met  [] Not Met     FUNCTION: Patient will demonstrate improved function as indicated by a functional limitation score of less than or equal to 38 out of 100 on FOTO. [x] Progressing  [] Met  [] Not Met     MOBILITY: Patient will improve AROM to stated goals, listed in objective measures above, in order to return to maximal functional potential and improve quality of life.  [x] Progressing  [] Met  [] Not Met     STRENGTH: Patient will improve strength to stated goals, listed in objective measures above, in order to improve functional independence and quality of life.  [x] Progressing  [] Met  [] Not Met     GAIT: Patient will demonstrate normalized gait mechanics with minimal compensation in order to return to PLOF. [x] Progressing  [] Met  [] Not Met     Patient will return to normal ADL's, IADL's, community involvement, recreational activities, and work-related activities with less than or equal to 0/10 pain and maximal function.  [x] Progressing  [] Met  [] Not Met          PLAN     Continue Plan of Care (POC) and progress per patient tolerance. See treatment section for details on planned progressions next session.    4/11/2023 (evaluation): Outpatient  Physical Therapy 2 times weekly for 12 weeks to include any combination of the following interventions: virtual visits, dry needling, modalities, electrical stimulation (IFC, Pre-Mod, Attended with Functional Dry Needling), Aquatic Therapy, Cervical/Lumbar Traction, Gait Training, Manual Therapy, Neuromuscular Re-ed, Patient Education, Self Care, Therapeutic Exercise, and Therapeutic Activites     Olinda Blount, PT

## 2023-04-21 ENCOUNTER — CLINICAL SUPPORT (OUTPATIENT)
Dept: REHABILITATION | Facility: HOSPITAL | Age: 63
End: 2023-04-21
Payer: COMMERCIAL

## 2023-04-21 DIAGNOSIS — R68.89 DECREASED STRENGTH, ENDURANCE, AND MOBILITY: ICD-10-CM

## 2023-04-21 DIAGNOSIS — M54.12 CERVICAL RADICULOPATHY: Primary | ICD-10-CM

## 2023-04-21 DIAGNOSIS — R53.1 DECREASED STRENGTH, ENDURANCE, AND MOBILITY: ICD-10-CM

## 2023-04-21 DIAGNOSIS — G89.29 CHRONIC LEFT SHOULDER PAIN: ICD-10-CM

## 2023-04-21 DIAGNOSIS — Z74.09 DECREASED STRENGTH, ENDURANCE, AND MOBILITY: ICD-10-CM

## 2023-04-21 DIAGNOSIS — M54.2 NECK PAIN ON LEFT SIDE: ICD-10-CM

## 2023-04-21 DIAGNOSIS — M25.612 DECREASED RANGE OF MOTION OF LEFT SHOULDER: ICD-10-CM

## 2023-04-21 DIAGNOSIS — M25.512 CHRONIC LEFT SHOULDER PAIN: ICD-10-CM

## 2023-04-21 PROCEDURE — 97110 THERAPEUTIC EXERCISES: CPT

## 2023-04-21 PROCEDURE — 97112 NEUROMUSCULAR REEDUCATION: CPT

## 2023-04-21 NOTE — PROGRESS NOTES
[facility]   Physical Therapy Treatment Note     Name: Angelica Pate [place]  Clinic Number: [id number]    Therapy Diagnosis:   Encounter Diagnoses   Name Primary?    Cervical radiculopathy Yes    Decreased range of motion of left shoulder     Chronic left shoulder pain     Neck pain on left side     Decreased strength, endurance, and mobility      Physician: Sedrick Ferrari MD    Visit Date: 4/21/2023    Physician Orders: PT Eval and Treat  Medical Diagnosis from Referral: chronic left shoulder pain, cervical radiculopathy, neck pain on left side  Evaluation Date: 4/11/2023  Authorization Period Expiration: 12/31/2023  Plan of Care Expiration: 7/10/2023  Progress Note Due: 5/11/2023  Visit # / Visits authorized: 3/25   FOTO: 1/3 (last performed on 4/11/2023)     Precautions: Standard    PTA Visit #: 0/5     Time In: 7:03 AM  Time Out: 7:45 AM  Total Billable Time: 42 minutes (Billing reflects 1 on 1 treatment time spent with patient)    SUBJECTIVE     Patient reports: her shoulder is definitely doing much better since beginning therapy but she states she has noticed bilateral carpal tunnel symptoms.    He/She was compliant with home exercise program.  Response to previous treatment: no adverse reaction  Functional change: none noted yet    Pain: 0/10     Location: left neck/shoulder region    OBJECTIVE     Objective Measures updated at progress report or POC update only unless otherwise noted.       TREATMENT     Angelica received the treatments listed below:     MANUAL THERAPY TECHNIQUES were applied for (0) minutes, including:    Manual Intervention Performed Today    Soft Tissue Mobilization [] left upper trapezius, levator scapulae , periscapular musculature, infraspinatus , deltoid, biceps , scapular release   Joint Mobilizations []     []     []    Functional Dry Needling  []      Plan for Next Visit: Continue as needed         THERAPEUTIC EXERCISES to develop strength, endurance,  "ROM, flexibility, posture, and core stabilization for (32) minutes including:    Intervention Performed Today    UBE for posture strength and endurance x 3'/3'   Pulley's - flexion and abduction x 3' each   Wand flexion - supine x 2 x 10, 3" holds   Wand ER - supine at 45 degrees abd x 2 x 10, 5" holds   Sidelying L shoulder abduction  x 2 x 10   Sidelying L shoulder flexion   2 x 10 (assisted with Lizett)   Updated HEP and handout administered x See Patient Instructions (added for median nerve glides)          Plan for Next Visit:        NEUROMUSCULAR RE-EDUCATION ACTIVITIES to improve Balance, Coordination, Kinesthetic, Sense, Proprioception, and Posture for (10) minutes.  The following were included:    Intervention Performed Today    Scapular Retraction  x 3 x 10, red theraband    Series 6  1' ecah, 2' pec stretch   Sidelying ER  x 20x, L UE                              Plan for Next Visit:        PATIENT EDUCATION AND HOME EXERCISES     Home Exercises Provided and Patient Education Provided     Education provided:   PURPOSE: Patient educated on the impairments noted above and the effects of physical therapy intervention to improve overall condition and QOL.   EXERCISE: Patient was educated on all the above exercise prior/during/after for proper posture, positioning, and execution for safe performance with home exercise program.   STRENGTH: Patient educated on the importance of improved core and extremity strength in order to improve alignment of the spine and extremities with static positions and dynamic movement.   POSTURE: Patient educated on postural awareness to reduce stress and maintain optimal alignment of the spine with static positions and dynamic movement     Written Home Exercises Provided: yes.  Exercises were reviewed and Angelica was able to demonstrate them prior to the end of the session.  Angelica demonstrated good  understanding of the education provided. See EMR under Patient Instructions for " exercises provided during therapy sessions.    ASSESSMENT     Patient tolerated session well. Additional cueing was given for sidelying shoulder EXTERNAL ROTATION and patient did experience some fatigue with this, but completed with good form. Median nerve glides and stretches were given to patient as HOME EXERCISE PROGRAM in attempt to help alleviate these nerve symptoms. Patient verbalized and demonstrated understanding. Due to time constraints, dry needling was not performed, but patient agreed to have this manual therapy performed next week. Plan to continue progressing as tolerated; no adverse effects noted.     Angelica is progressing well towards her goals.   Pt prognosis is Good.     Pt will continue to benefit from skilled outpatient physical therapy to address the deficits listed in the problem list box on initial evaluation, provide pt/family education and to maximize pt's level of independence in the home and community environment.     Pt's spiritual, cultural and educational needs considered and pt agreeable to plan of care and goals.     Anticipated Barriers for therapy: co-morbidities and chronicity of condition    Goals:  Short Term Goals:  6 weeks Status  Date Met   PAIN: Pt will report worst pain of 4/10 in order to progress toward max functional ability and improve quality of life. [x] Progressing  [] Met  [] Not Met     FUNCTION: Patient will demonstrate improved function as indicated by a functional limitation score of less than or equal to 47 out of 100 on FOTO. [x] Progressing  [] Met  [] Not Met     MOBILITY: Patient will improve AROM to 50% of stated goals, listed in objective measures above, in order to progress towards independence with functional activities.  [x] Progressing  [] Met  [] Not Met     STRENGTH: Patient will improve strength to 50% of stated goals, listed in objective measures above, in order to progress towards independence with functional activities. [x] Progressing  []  Met  [] Not Met     POSTURE: Patient will correct postural deviations in sitting and standing, to decrease pain and promote long term stability.  [x] Progressing  [] Met  [] Not Met     HEP: Patient will demonstrate independence with HEP in order to progress toward functional independence. [x] Progressing  [] Met  [] Not Met        Long Term Goals:  12 weeks Status Date Met   PAIN: Pt will report worst pain of 2/10 in order to progress toward max functional ability and improve quality of life [x] Progressing  [] Met  [] Not Met     FUNCTION: Patient will demonstrate improved function as indicated by a functional limitation score of less than or equal to 38 out of 100 on FOTO. [x] Progressing  [] Met  [] Not Met     MOBILITY: Patient will improve AROM to stated goals, listed in objective measures above, in order to return to maximal functional potential and improve quality of life.  [x] Progressing  [] Met  [] Not Met     STRENGTH: Patient will improve strength to stated goals, listed in objective measures above, in order to improve functional independence and quality of life.  [x] Progressing  [] Met  [] Not Met     GAIT: Patient will demonstrate normalized gait mechanics with minimal compensation in order to return to PLOF. [x] Progressing  [] Met  [] Not Met     Patient will return to normal ADL's, IADL's, community involvement, recreational activities, and work-related activities with less than or equal to 0/10 pain and maximal function.  [x] Progressing  [] Met  [] Not Met          PLAN     Continue Plan of Care (POC) and progress per patient tolerance. See treatment section for details on planned progressions next session.    4/11/2023 (evaluation): Outpatient Physical Therapy 2 times weekly for 12 weeks to include any combination of the following interventions: virtual visits, dry needling, modalities, electrical stimulation (IFC, Pre-Mod, Attended with Functional Dry Needling), Aquatic Therapy,  Cervical/Lumbar Traction, Gait Training, Manual Therapy, Neuromuscular Re-ed, Patient Education, Self Care, Therapeutic Exercise, and Therapeutic Activites     Kenyetta Carreon, PT

## 2023-04-25 ENCOUNTER — CLINICAL SUPPORT (OUTPATIENT)
Dept: REHABILITATION | Facility: HOSPITAL | Age: 63
End: 2023-04-25
Payer: COMMERCIAL

## 2023-04-25 DIAGNOSIS — M54.2 NECK PAIN ON LEFT SIDE: ICD-10-CM

## 2023-04-25 DIAGNOSIS — M54.12 CERVICAL RADICULOPATHY: Primary | ICD-10-CM

## 2023-04-25 DIAGNOSIS — Z74.09 DECREASED STRENGTH, ENDURANCE, AND MOBILITY: ICD-10-CM

## 2023-04-25 DIAGNOSIS — R68.89 DECREASED STRENGTH, ENDURANCE, AND MOBILITY: ICD-10-CM

## 2023-04-25 DIAGNOSIS — R53.1 DECREASED STRENGTH, ENDURANCE, AND MOBILITY: ICD-10-CM

## 2023-04-25 DIAGNOSIS — M25.612 DECREASED RANGE OF MOTION OF LEFT SHOULDER: ICD-10-CM

## 2023-04-25 DIAGNOSIS — G89.29 CHRONIC LEFT SHOULDER PAIN: ICD-10-CM

## 2023-04-25 DIAGNOSIS — M25.512 CHRONIC LEFT SHOULDER PAIN: ICD-10-CM

## 2023-04-25 PROCEDURE — 97140 MANUAL THERAPY 1/> REGIONS: CPT | Performed by: PHYSICAL THERAPIST

## 2023-04-25 PROCEDURE — 97112 NEUROMUSCULAR REEDUCATION: CPT | Performed by: PHYSICAL THERAPIST

## 2023-04-25 PROCEDURE — 97110 THERAPEUTIC EXERCISES: CPT | Performed by: PHYSICAL THERAPIST

## 2023-04-25 NOTE — PROGRESS NOTES
[facility]   Physical Therapy Treatment Note     Name: Angelica Pate [place]  Clinic Number: [id number]    Therapy Diagnosis:   Encounter Diagnoses   Name Primary?    Cervical radiculopathy Yes    Decreased range of motion of left shoulder     Chronic left shoulder pain     Neck pain on left side     Decreased strength, endurance, and mobility      Physician: Sedrick Ferrari MD    Visit Date: 4/25/2023    Physician Orders: PT Eval and Treat  Medical Diagnosis from Referral: chronic left shoulder pain, cervical radiculopathy, neck pain on left side  Evaluation Date: 4/11/2023  Authorization Period Expiration: 12/31/2023  Plan of Care Expiration: 7/10/2023  Progress Note Due: 5/11/2023  Visit # / Visits authorized: 4/25   FOTO: 1/3 (last performed on 4/11/2023)     Precautions: Standard    PTA Visit #: 0/5     Time In: 1520  Time Out: 1605  Total Billable Time: 45 minutes (Billing reflects 1 on 1 treatment time spent with patient)    SUBJECTIVE     Patient reports: that she is feeling good today. She is not having any pain or tingling in her hands. Patient notices improvements in the evenings as well, as she is able to sleep better without taking anything.     He/She was compliant with home exercise program.  Response to previous treatment: no adverse reaction  Functional change: improving motion and pain levels    Pain: 0/10     Location: left neck/shoulder region    OBJECTIVE     Objective Measures updated at progress report or POC update only unless otherwise noted.       TREATMENT     Angelica received the treatments listed below:     MANUAL THERAPY TECHNIQUES were applied for (8) minutes, including:    Manual Intervention Performed Today    Soft Tissue Mobilization [x] left upper trapezius, levator scapulae , periscapular musculature, infraspinatus , deltoid, biceps , scapular release   Joint Mobilizations [x] GH jt mobs    []     []    Functional Dry Needling  []      Plan for Next  "Visit: Continue as needed         THERAPEUTIC EXERCISES to develop strength, endurance, ROM, flexibility, posture, and core stabilization for (27) minutes including:    Intervention Performed Today    UBE for posture strength and endurance x 3'/3'   Pulley's - flexion and abduction x 3' each   Wand flexion - supine (progressed) x 3 x 10, 3" holds, 2#   Wand ER - supine at 45 degrees abd x 2 x 10, 5" holds   Sidelying L shoulder abduction  x 2 x 10   Sidelying L shoulder flexion   2 x 10 (assisted with Lizett)   Updated HEP and handout administered  See Patient Instructions (added for median nerve glides)   PROM L SH x All planes     Plan for Next Visit:        NEUROMUSCULAR RE-EDUCATION ACTIVITIES to improve Balance, Coordination, Kinesthetic, Sense, Proprioception, and Posture for (10) minutes.  The following were included:    Intervention Performed Today    Scapular Retraction   3 x 10, red theraband    Series 6 x 1' each, 2' pec stretch   Sidelying ER  x 20x, L UE                              Plan for Next Visit:        PATIENT EDUCATION AND HOME EXERCISES     Home Exercises Provided and Patient Education Provided     Education provided:   PURPOSE: Patient educated on the impairments noted above and the effects of physical therapy intervention to improve overall condition and QOL.   EXERCISE: Patient was educated on all the above exercise prior/during/after for proper posture, positioning, and execution for safe performance with home exercise program.   STRENGTH: Patient educated on the importance of improved core and extremity strength in order to improve alignment of the spine and extremities with static positions and dynamic movement.   POSTURE: Patient educated on postural awareness to reduce stress and maintain optimal alignment of the spine with static positions and dynamic movement     Written Home Exercises Provided: yes.  Exercises were reviewed and Angelica was able to demonstrate them prior to the end " of the session.  Angelica demonstrated good  understanding of the education provided. See EMR under Patient Instructions for exercises provided during therapy sessions.    ASSESSMENT     Patient did well with treatment today. She completed exercises with less difficulty and without increased complaint. Patient demonstrated improved AAROM and PROM this visit. However, she is still limited in left shoulder range of motion, especially external rotation. Cues still required for sidelying ER, but less difficulty noted as compared to last visit. It is evident that she has been keeping up with HEP on her own. Good relief noted following manual therapy. Patient is progressing well towards goals.     Angelica is progressing well towards her goals.   Pt prognosis is Good.     Pt will continue to benefit from skilled outpatient physical therapy to address the deficits listed in the problem list box on initial evaluation, provide pt/family education and to maximize pt's level of independence in the home and community environment.     Pt's spiritual, cultural and educational needs considered and pt agreeable to plan of care and goals.     Anticipated Barriers for therapy: co-morbidities and chronicity of condition    Goals:  Short Term Goals:  6 weeks Status  Date Met   PAIN: Pt will report worst pain of 4/10 in order to progress toward max functional ability and improve quality of life. [x] Progressing  [] Met  [] Not Met     FUNCTION: Patient will demonstrate improved function as indicated by a functional limitation score of less than or equal to 47 out of 100 on FOTO. [x] Progressing  [] Met  [] Not Met     MOBILITY: Patient will improve AROM to 50% of stated goals, listed in objective measures above, in order to progress towards independence with functional activities.  [x] Progressing  [] Met  [] Not Met     STRENGTH: Patient will improve strength to 50% of stated goals, listed in objective measures above, in order to progress  towards independence with functional activities. [x] Progressing  [] Met  [] Not Met     POSTURE: Patient will correct postural deviations in sitting and standing, to decrease pain and promote long term stability.  [x] Progressing  [] Met  [] Not Met     HEP: Patient will demonstrate independence with HEP in order to progress toward functional independence. [x] Progressing  [] Met  [] Not Met        Long Term Goals:  12 weeks Status Date Met   PAIN: Pt will report worst pain of 2/10 in order to progress toward max functional ability and improve quality of life [x] Progressing  [] Met  [] Not Met     FUNCTION: Patient will demonstrate improved function as indicated by a functional limitation score of less than or equal to 38 out of 100 on FOTO. [x] Progressing  [] Met  [] Not Met     MOBILITY: Patient will improve AROM to stated goals, listed in objective measures above, in order to return to maximal functional potential and improve quality of life.  [x] Progressing  [] Met  [] Not Met     STRENGTH: Patient will improve strength to stated goals, listed in objective measures above, in order to improve functional independence and quality of life.  [x] Progressing  [] Met  [] Not Met     GAIT: Patient will demonstrate normalized gait mechanics with minimal compensation in order to return to PLOF. [x] Progressing  [] Met  [] Not Met     Patient will return to normal ADL's, IADL's, community involvement, recreational activities, and work-related activities with less than or equal to 0/10 pain and maximal function.  [x] Progressing  [] Met  [] Not Met          PLAN     Continue Plan of Care (POC) and progress per patient tolerance. See treatment section for details on planned progressions next session.    4/11/2023 (evaluation): Outpatient Physical Therapy 2 times weekly for 12 weeks to include any combination of the following interventions: virtual visits, dry needling, modalities, electrical stimulation (IFC, Pre-Mod,  Attended with Functional Dry Needling), Aquatic Therapy, Cervical/Lumbar Traction, Gait Training, Manual Therapy, Neuromuscular Re-ed, Patient Education, Self Care, Therapeutic Exercise, and Therapeutic Activites     Olinda Blount, PT

## 2023-04-27 ENCOUNTER — OFFICE VISIT (OUTPATIENT)
Dept: PAIN MEDICINE | Facility: CLINIC | Age: 63
End: 2023-04-27
Payer: COMMERCIAL

## 2023-04-27 VITALS
DIASTOLIC BLOOD PRESSURE: 74 MMHG | SYSTOLIC BLOOD PRESSURE: 117 MMHG | BODY MASS INDEX: 38.69 KG/M2 | HEART RATE: 64 BPM | HEIGHT: 60 IN | WEIGHT: 197.06 LBS

## 2023-04-27 DIAGNOSIS — M50.30 DDD (DEGENERATIVE DISC DISEASE), CERVICAL: Primary | ICD-10-CM

## 2023-04-27 DIAGNOSIS — G56.03 BILATERAL CARPAL TUNNEL SYNDROME: ICD-10-CM

## 2023-04-27 DIAGNOSIS — M54.12 CERVICAL RADICULOPATHY: ICD-10-CM

## 2023-04-27 DIAGNOSIS — M47.812 CERVICAL SPONDYLOSIS: ICD-10-CM

## 2023-04-27 PROCEDURE — 4010F ACE/ARB THERAPY RXD/TAKEN: CPT | Mod: CPTII,S$GLB,, | Performed by: ANESTHESIOLOGY

## 2023-04-27 PROCEDURE — 4010F PR ACE/ARB THEARPY RXD/TAKEN: ICD-10-PCS | Mod: CPTII,S$GLB,, | Performed by: ANESTHESIOLOGY

## 2023-04-27 PROCEDURE — 3074F SYST BP LT 130 MM HG: CPT | Mod: CPTII,S$GLB,, | Performed by: ANESTHESIOLOGY

## 2023-04-27 PROCEDURE — 1159F MED LIST DOCD IN RCRD: CPT | Mod: CPTII,S$GLB,, | Performed by: ANESTHESIOLOGY

## 2023-04-27 PROCEDURE — 3078F DIAST BP <80 MM HG: CPT | Mod: CPTII,S$GLB,, | Performed by: ANESTHESIOLOGY

## 2023-04-27 PROCEDURE — 99999 PR PBB SHADOW E&M-EST. PATIENT-LVL III: CPT | Mod: PBBFAC,,, | Performed by: ANESTHESIOLOGY

## 2023-04-27 PROCEDURE — 99214 OFFICE O/P EST MOD 30 MIN: CPT | Mod: S$GLB,,, | Performed by: ANESTHESIOLOGY

## 2023-04-27 PROCEDURE — 3074F PR MOST RECENT SYSTOLIC BLOOD PRESSURE < 130 MM HG: ICD-10-PCS | Mod: CPTII,S$GLB,, | Performed by: ANESTHESIOLOGY

## 2023-04-27 PROCEDURE — 99214 PR OFFICE/OUTPT VISIT, EST, LEVL IV, 30-39 MIN: ICD-10-PCS | Mod: S$GLB,,, | Performed by: ANESTHESIOLOGY

## 2023-04-27 PROCEDURE — 3008F BODY MASS INDEX DOCD: CPT | Mod: CPTII,S$GLB,, | Performed by: ANESTHESIOLOGY

## 2023-04-27 PROCEDURE — 99999 PR PBB SHADOW E&M-EST. PATIENT-LVL III: ICD-10-PCS | Mod: PBBFAC,,, | Performed by: ANESTHESIOLOGY

## 2023-04-27 PROCEDURE — 3078F PR MOST RECENT DIASTOLIC BLOOD PRESSURE < 80 MM HG: ICD-10-PCS | Mod: CPTII,S$GLB,, | Performed by: ANESTHESIOLOGY

## 2023-04-27 PROCEDURE — 3008F PR BODY MASS INDEX (BMI) DOCUMENTED: ICD-10-PCS | Mod: CPTII,S$GLB,, | Performed by: ANESTHESIOLOGY

## 2023-04-27 PROCEDURE — 1159F PR MEDICATION LIST DOCUMENTED IN MEDICAL RECORD: ICD-10-PCS | Mod: CPTII,S$GLB,, | Performed by: ANESTHESIOLOGY

## 2023-04-27 NOTE — PROGRESS NOTES
Interventional Pain Progress Note       Referring Physician: No ref. provider found    PCP: Monet Alvarez MD    Chief Complaint:   Neck Pain    Interval History (04/27/2023):  Patient returns to clinic for evaluation of neck pain.  Patient reports that neck pain and left upper extremity pain has greatly improved since last being seen.  Patient reports significant improvement with Topamax medication and physical therapy.  Patient reports occasional throbbing pain in her left-sided neck and occasional numbness and tingling in her left hand, however this is very mild.  Pain is rated 0/10 today. Denies any fevers, chills, changes in gait, saddle anesthesia, or bowel and bladder incontinence        SUBJECTIVE:    Angelica Flores is a 62 y.o. female who presents to the clinic for the evaluation of neck pain.   Patient reports six-month history of neck pain.  Pain is described as a throbbing aching pain that starts over the left side of her neck that radiates down to her left upper extremity and numbness and tingling pain.  She denies any significant traumas to her cervical spine.  She denies any previous surgical intervention on her cervical spine.  Patient is currently being followed by Orthopedics for left shoulder pain and left carpal tunnel syndrome.  Neck pain is typically worse at night and with rotation, better with muscle relaxers and stretching.  Pain is rated a 4/10, but can increase to a 10 out 10 with exacerbating factors.  Patient is currently on Humira for IBS.      Non-Pharmacologic Treatments:  Physical Therapy/Home Exercise: yes  Ice/Heat:yes  TENS: no  Acupuncture: no  Massage: yes  Chiropractic: no        Previous Pain Medications:  NSAIDs, Tylenol, muscle relaxers, neuropathic     report:  Reviewed and consistent with medication use as prescribed.    Pain Procedures:   none  Imaging:     Results for orders placed during the hospital encounter of 03/24/23    MRI Cervical Spine Without  Contrast    Narrative  EXAMINATION:  MRI CERVICAL SPINE WITHOUT CONTRAST    CLINICAL HISTORY:  Neck pain, chronic;Neck pain, chronic, degenerative changes on xray;CERVICAL RADICULOPATHY/ DDD; Radiculopathy, cervical region    TECHNIQUE:  Multiplanar, multisequence MR images of the cervical spine were performed without the administration of contrast.    COMPARISON:  Radiographs dated 12/19/2022    FINDINGS:  Alignment: Normal.    Vertebrae: Normal marrow signal. No fracture.    Discs: There is mild disc height loss C4-5 with slight disc height loss at C5-6.    Cord: Normal.    Skull base and craniocervical junction: Normal.    Degenerative findings:    C2-C3:  No spinal canal or neuroforaminal stenosis.    C3-C4: Small broad-based posterior disc osteophyte complex. No spinal canal or neuroforaminal stenosis.    C4-C5: Broad-based posterior disc osteophyte complex and mild thickening of the ligamentum flavum.  Mild spinal canal stenosis with mild bilateral neural foraminal narrowing.    C5-C6: Small broad-based posterior disc osteophyte complex and uncovertebral spurring, asymmetric to.  Mild thickening of the ligamentum flavum.  Mild spinal canal stenosis with mild left-sided neural foraminal narrowing.    C6-C7: Small broad-based posterior disc osteophyte complex and uncovertebral spurring, asymmetric to left.  Mild thickening of the ligamentum flavum.  Mild effacement of the thecal sac without appreciable mass effect upon the cord.  Mild left-sided neural foraminal narrowing.    C7-T1:  No spinal canal or neuroforaminal stenosis.    T1-T2:    Paraspinal muscles & soft tissues: Unremarkable.    Impression  Mild multilevel spinal canal and neural foraminal narrowing secondary to degenerative disc disease as detailed above      Electronically signed by: Jamari Ward MD  Date:    03/27/2023  Time:    08:16    Results for orders placed during the hospital encounter of 12/19/22    X-Ray Cervical Spine Complete 5  view    Narrative  EXAMINATION:  XR CERVICAL SPINE COMPLETE 5 VIEW    CLINICAL HISTORY:  . Pain in left shoulder    TECHNIQUE:  AP, Lateral, bilateral oblique and open mouth views of the cervical spine were performed.    COMPARISON:  None    FINDINGS:  Straightening of normal cervical lordosis.  Discogenic degenerative changes at C4-C5 with moderate disc space narrowing posteriorly at this level.  No fracture.  No listhesis.  Prevertebral soft tissues are maintained.  Mild bilateral neural foraminal encroachment C4-C5.  Lung apices are clear.    Impression  As above      Electronically signed by: Noam Rodriguez MD  Date:    12/19/2022  Time:    12:27        Results for orders placed during the hospital encounter of 12/19/22    X-Ray Shoulder 2 or More Views Left    Narrative  EXAMINATION:  XR SHOULDER COMPLETE 2 OR MORE VIEWS LEFT    CLINICAL HISTORY:  Pain in left shoulder    TECHNIQUE:  Two or three views of the left shoulder were performed.    COMPARISON:  None    FINDINGS:  No acute fracture or dislocation.  Mild AC joint arthrosis.  Glenohumeral joint is maintained.    Impression  See above      Electronically signed by: Noam Rodriguez MD  Date:    12/19/2022  Time:    12:28    Bilateral upper extremity EMG and nerve conduction study, 4/14/2023     IMPRESSION  ABNORMAL study  2. There is electrodiagnostic evidence of a mild demyelinating median neuropathy (Carpal tunnel syndrome) across bilateral wrists. There was no evidence of a cervical radiculopathy of the C5-T1 nerve roots  3. There is clinical evidence of cervical myofascial pain       Past Medical History:   Diagnosis Date    Anxiety     Asthma     Crohn's disease     Fibroids     Hyperlipidemia     Hypertension     Iritis     Migraine headache     Ocular    Osteopenia 08/2019    Syncope and collapse     Vitamin D deficiency disease      Past Surgical History:   Procedure Laterality Date    ANKLE FRACTURE SURGERY  08/19/08    right ankle    BREAST BIOPSY  Left 1977    COLONOSCOPY N/A 8/11/2017    Procedure: COLONOSCOPY - REQUESTS DR. MATTI ACUNA;  Surgeon: Matti Acuna III, MD;  Location: [facility] ENDO;  Service: Endoscopy;  Laterality: N/A;    COLONOSCOPY N/A 11/23/2020    Procedure: COLONOSCOPY;  Surgeon: Matti Acuna III, MD;  Location: [facility] ENDO;  Service: Endoscopy;  Laterality: N/A;    COLONOSCOPY N/A 9/28/2022    Procedure: COLONOSCOPY;  Surgeon: Matti Acuna III, MD;  Location: [facility] ENDO;  Service: Endoscopy;  Laterality: N/A;    TAHBSO  February 2011    Due to abnormal pap smear and fibroids    TOTAL ABDOMINAL HYSTERECTOMY       Social History     Socioeconomic History    Marital status:     Number of children: 2   Occupational History    Occupation: RN     Employer: [employer]     Comment: [employer]   Tobacco Use    Smoking status: Never    Smokeless tobacco: Never   Substance and Sexual Activity    Alcohol use: Yes     Alcohol/week: 0.0 standard drinks     Comment: ocassionally    Drug use: No    Sexual activity: Yes     Partners: Male     Birth control/protection: Surgical   Social History Narrative    She wears seatbelt.  July 22, 2017. She states she works new position at [employer].     Social Determinants of Health     Financial Resource Strain: Low Risk     Difficulty of Paying Living Expenses: Not hard at all   Food Insecurity: No Food Insecurity    Worried About Running Out of Food in the Last Year: Never true    Ran Out of Food in the Last Year: Never true   Transportation Needs: No Transportation Needs    Lack of Transportation (Medical): No    Lack of Transportation (Non-Medical): No   Physical Activity: Inactive    Days of Exercise per Week: 0 days    Minutes of Exercise per Session: 0 min   Stress: Stress Concern Present    Feeling of Stress : To some extent   Social Connections: Unknown    Frequency of Communication with Friends and Family: More than three times a week    Frequency of Social Gatherings with Friends and  Family: Twice a week    Active Member of Clubs or Organizations: Yes    Attends Club or Organization Meetings: 1 to 4 times per year    Marital Status:    Housing Stability: Low Risk     Unable to Pay for Housing in the Last Year: No    Number of Places Lived in the Last Year: 1    Unstable Housing in the Last Year: No     Family History   Problem Relation Age of Onset    Arthritis Mother     Fuch's dystrophy Mother     Breast cancer Mother     Lupus Sister     Rheum arthritis Sister     Obesity Sister     Hypertension Father     Cancer Maternal Grandfather         lung ca    Stroke Maternal Grandmother     Diabetes Maternal Grandmother     Colon cancer Paternal Aunt        Review of patient's allergies indicates:  No Known Allergies    Current Outpatient Medications   Medication Sig    adalimumab (HUMIRA,CF, PEN) 40 mg/0.4 mL PnKt Inject 0.4 mLs (40 mg total) into the skin every 7 days.    albuterol (PROVENTIL/VENTOLIN HFA) 90 mcg/actuation inhaler Inhale 2 puffs into the lungs every 6 (six) hours as needed for Wheezing or Shortness of Breath.    ALPRAZolam (XANAX) 0.5 MG tablet Take 1 tablet by mouth twice daily as needed    amLODIPine (NORVASC) 5 MG tablet Take 1 tablet (5 mg total) by mouth once daily.    ascorbic acid, vitamin C, (VITAMIN C) 1000 MG tablet Take by mouth. 1 Tablet Oral Every day    azaTHIOprine (IMURAN) 50 mg Tab Take 3 tablets (150 mg total) by mouth once daily.    azelastine (ASTELIN) 137 mcg (0.1 %) nasal spray 2 sprays (274 mcg total) by Nasal route 2 (two) times daily.    BACILLUS COAGULANS (PROBIOTIC, B. COAGULANS, ORAL) Take by mouth once daily.    cetirizine (ALLERGY RELIEF, CETIRIZINE,) 10 MG tablet Take 1 tablet (10 mg total) by mouth once daily.    cholecalciferol, vitamin D3, 2,000 unit Cap Take by mouth. 1 capsule Oral Every day    dicyclomine (BENTYL) 20 mg tablet Take 1 tablet (20 mg total) by mouth 3 (three) times daily as needed (abdominal pain).    hepatitis B virus  vacc.rec,PF, (ENGERIX-B) 20 mcg/mL Syrg Inject into the muscle.    hydroCHLOROthiazide (HYDRODIURIL) 25 MG tablet Take 1 tablet (25 mg total) by mouth once daily.    hydroquinone 4 % Crea Apply to dark spots once daily. Use with sunscreen if outdoors    ibuprofen (ADVIL,MOTRIN) 800 MG tablet Take 1 tablet (800 mg total) by mouth 2 (two) times daily as needed.    ipratropium (ATROVENT) 21 mcg (0.03 %) nasal spray 2 sprays by Nasal route 3 (three) times daily.    lisinopriL (PRINIVIL,ZESTRIL) 40 MG tablet TAKE 1 TABLET BY MOUTH ONCE DAILY.    methocarbamoL (ROBAXIN) 500 MG Tab Take 1 tablet once or twice a day as needed for muscle spasm and neck pain.    metoprolol succinate (TOPROL-XL) 25 MG 24 hr tablet Take 1 tablet (25 mg total) by mouth once daily.    montelukast (SINGULAIR) 10 mg tablet Take 1 tablet (10 mg total) by mouth every evening.    multivitamin-Ca-iron-minerals 18-0.4 mg Tab Take by mouth. 1 Tablet Oral Every day    pneumoc 20-shani conj-dip cr,PF, (PREVNAR-20, PF,) 0.5 mL Syrg injection Inject 0.5 mLs into the muscle.    potassium bicarbonate disintegrating tablet Take 10 mEq by mouth 2 (two) times daily.    pravastatin (PRAVACHOL) 80 MG tablet Take 1 tablet by mouth in the evening    topiramate (TOPAMAX) 25 MG tablet Take 1 tablet (25 mg total) by mouth every evening.    tretinoin (RETIN-A) 0.025 % cream Apply pea-sized amount to entire face at bedtime.  If dryness, use every third night and increase as tolerated to every night.    valACYclovir (VALTREX) 1000 MG tablet TAKE 2 TABLETS BY MOUTH TWICE DAILY FOR 1 DAY PER EPISODE    zinc sulfate (ZINC-220 ORAL)      No current facility-administered medications for this visit.         ROS  Review of Systems   Constitutional:  Negative for chills, diaphoresis, fatigue and fever.   Respiratory:  Negative for chest tightness, shortness of breath, wheezing and stridor.    Cardiovascular:  Negative for chest pain and leg swelling.   Gastrointestinal:  Negative  for blood in stool, diarrhea, nausea and vomiting.   Endocrine: Negative for cold intolerance and heat intolerance.   Genitourinary:  Negative for dysuria, hematuria and urgency.   Musculoskeletal:  Positive for myalgias. Negative for arthralgias, back pain, gait problem, joint swelling, neck pain and neck stiffness.   Skin:  Negative for rash.   Neurological:  Positive for numbness. Negative for tremors, seizures, speech difficulty, weakness, light-headedness and headaches.   Hematological:  Does not bruise/bleed easily.   Psychiatric/Behavioral:  Negative for agitation, confusion and suicidal ideas.           OBJECTIVE:  /74   Pulse 64   Ht 5' (1.524 m)   Wt 89.4 kg (197 lb 1.5 oz)   BMI 38.49 kg/m²         Physical Exam  Constitutional:       General: She is not in acute distress.     Appearance: Normal appearance. She is not ill-appearing.   HENT:      Head: Normocephalic and atraumatic.   Eyes:      Extraocular Movements: Extraocular movements intact.      Pupils: Pupils are equal, round, and reactive to light.   Cardiovascular:      Pulses: Normal pulses.   Pulmonary:      Effort: Pulmonary effort is normal.   Abdominal:      General: Abdomen is flat.      Palpations: Abdomen is soft.   Musculoskeletal:      Right shoulder: Normal.      Left shoulder: Tenderness present. No swelling or effusion. Decreased range of motion. Decreased strength. Normal pulse.   Skin:     General: Skin is warm and dry.      Capillary Refill: Capillary refill takes less than 2 seconds.   Neurological:      General: No focal deficit present.      Mental Status: She is alert and oriented to person, place, and time.      Cranial Nerves: No cranial nerve deficit.      Sensory: No sensory deficit.      Motor: No weakness or abnormal muscle tone.      Gait: Gait normal.      Deep Tendon Reflexes:      Reflex Scores:       Tricep reflexes are 2+ on the right side and 2+ on the left side.       Bicep reflexes are 2+ on the right  side and 2+ on the left side.       Brachioradialis reflexes are 2+ on the right side and 2+ on the left side.  Psychiatric:         Mood and Affect: Mood normal.         Behavior: Behavior normal.         Thought Content: Thought content normal.         Musculoskeletal:    Cervical Exam  Incision: no  Pain with Flexion: no  Pain with Extension: no  Paraspinous TTP: mild on the left  Facet TTP: C5/C6  Spurling: negative bilaterally  ROM:  Improved compared to prior exam      LABS:  Lab Results   Component Value Date    WBC 10.70 03/03/2023    HGB 12.8 03/03/2023    HCT 41.0 03/03/2023    MCV 99 (H) 03/03/2023     03/03/2023       CMP  Sodium   Date Value Ref Range Status   03/03/2023 140 136 - 145 mmol/L Final     Potassium   Date Value Ref Range Status   03/03/2023 4.2 3.5 - 5.1 mmol/L Final     Chloride   Date Value Ref Range Status   03/03/2023 104 95 - 110 mmol/L Final     CO2   Date Value Ref Range Status   03/03/2023 23 23 - 29 mmol/L Final     Glucose   Date Value Ref Range Status   03/03/2023 97 70 - 110 mg/dL Final     BUN   Date Value Ref Range Status   03/03/2023 16 8 - 23 mg/dL Final     Creatinine   Date Value Ref Range Status   03/03/2023 0.8 0.5 - 1.4 mg/dL Final     Calcium   Date Value Ref Range Status   03/03/2023 10.4 8.7 - 10.5 mg/dL Final     Total Protein   Date Value Ref Range Status   03/03/2023 8.3 6.0 - 8.4 g/dL Final     Albumin   Date Value Ref Range Status   03/03/2023 4.6 3.5 - 5.2 g/dL Final     Total Bilirubin   Date Value Ref Range Status   03/03/2023 0.7 0.1 - 1.0 mg/dL Final     Comment:     For infants and newborns, interpretation of results should be based  on gestational age, weight and in agreement with clinical  observations.    Premature Infant recommended reference ranges:  Up to 24 hours.............<8.0 mg/dL  Up to 48 hours............<12.0 mg/dL  3-5 days..................<15.0 mg/dL  6-29 days.................<15.0 mg/dL       Alkaline Phosphatase   Date Value  Ref Range Status   03/03/2023 60 55 - 135 U/L Final     AST   Date Value Ref Range Status   03/03/2023 26 10 - 40 U/L Final     ALT   Date Value Ref Range Status   03/03/2023 25 10 - 44 U/L Final     Anion Gap   Date Value Ref Range Status   03/03/2023 13 8 - 16 mmol/L Final     eGFR if    Date Value Ref Range Status   08/11/2021 >60.0 >60 mL/min/1.73 m^2 Final     eGFR if non    Date Value Ref Range Status   08/11/2021 >60.0 >60 mL/min/1.73 m^2 Final     Comment:     Calculation used to obtain the estimated glomerular filtration  rate (eGFR) is the CKD-EPI equation.          Lab Results   Component Value Date    HGBA1C 5.6 04/27/2016             ASSESSMENT:       62 y.o. year old female with neck pain, consistent with     1. DDD (degenerative disc disease), cervical        2. Cervical spondylosis        3. Cervical radiculopathy        4. Bilateral carpal tunnel syndrome          DDD (degenerative disc disease), cervical    Cervical spondylosis    Cervical radiculopathy    Bilateral carpal tunnel syndrome             PLAN:   - Interventions:   None at this time.  Can consider cervical epidural steroid injection if pain   - Anticoagulation use:   no no anticoagulation    - Medications:   Continue Topamax 50 mg at night       - Therapy:    Continue formal physical therapy for neck and left shoulder pain.    - Imaging/Diagnostic:   MRI cervical spine reviewed and findings discussed with patient.  Significant for disc osteophytes at C5-C6 and C6-7 asymmetric to the left resulting in mild canal stenosis and left foraminal stenosis.   EMG and nerve conduction study reviewed.  Significant for bilateral carpal tunnel syndrome, left-greater-than-right.   X-rays of cervical spine reviewed.  Noted for loss of disc height and foraminal narrowing at C4-5.   X-ray of left shoulder reviewed     - Consults:   Continue follow-up with orthopedics for left shoulder pain and left carpal tunnel  syndrome  Continue follow-up with Gastroenterology for history of Crohn's disease.  Patient currently on Humira.        - Patient Questions: Answered all of the patient's questions regarding diagnosis, therapy, and treatment     This condition does not require this patient to take time off of work, and the primary goal of our Pain Management services is to improve the patient's functional capacity.     - Follow up visit: return to clinic p.r.n.        The above plan and management options were discussed at length with patient. Patient is in agreement with the above and verbalized understanding.    I discussed the goals of interventional chronic pain management with the patient on today's visit.  I explained the utility of injections for diagnostic and therapeutic purposes.  We discussed a multimodal approach to pain including treating the patient's given worst pain at any given time.  We will use a systematic approach to addressing pain.  We will also adopt a multimodal approach that includes injections, adjuvant medications, physical therapy, at times psychiatry.  There may be a limited role for opioid use intermittently in the treatment of pain, more particularly for acute pain although no one approach can be used as a sole treatment modality.    I emphasized the importance of regular exercise, core strengthening and stretching, diet and weight loss as a cornerstone of long-term pain management.      David Ewing MD  Interventional Pain Management  [facility]    Disclaimer:  This note was prepared using voice recognition system and is likely to have sound alike errors that may have been overlooked even after proof reading.  Please call me with any questions

## 2023-04-28 ENCOUNTER — CLINICAL SUPPORT (OUTPATIENT)
Dept: REHABILITATION | Facility: HOSPITAL | Age: 63
End: 2023-04-28
Payer: COMMERCIAL

## 2023-04-28 DIAGNOSIS — R68.89 DECREASED STRENGTH, ENDURANCE, AND MOBILITY: ICD-10-CM

## 2023-04-28 DIAGNOSIS — M54.12 CERVICAL RADICULOPATHY: Primary | ICD-10-CM

## 2023-04-28 DIAGNOSIS — G89.29 CHRONIC LEFT SHOULDER PAIN: ICD-10-CM

## 2023-04-28 DIAGNOSIS — M25.512 CHRONIC LEFT SHOULDER PAIN: ICD-10-CM

## 2023-04-28 DIAGNOSIS — Z74.09 DECREASED STRENGTH, ENDURANCE, AND MOBILITY: ICD-10-CM

## 2023-04-28 DIAGNOSIS — R53.1 DECREASED STRENGTH, ENDURANCE, AND MOBILITY: ICD-10-CM

## 2023-04-28 DIAGNOSIS — M54.2 NECK PAIN ON LEFT SIDE: ICD-10-CM

## 2023-04-28 DIAGNOSIS — M25.612 DECREASED RANGE OF MOTION OF LEFT SHOULDER: ICD-10-CM

## 2023-04-28 PROCEDURE — 97110 THERAPEUTIC EXERCISES: CPT

## 2023-04-28 PROCEDURE — 97112 NEUROMUSCULAR REEDUCATION: CPT

## 2023-04-28 PROCEDURE — 97140 MANUAL THERAPY 1/> REGIONS: CPT

## 2023-04-28 NOTE — PROGRESS NOTES
[facility]   Physical Therapy Treatment Note     Name: Angelica Pate [facility] Number: [id number]    Therapy Diagnosis:   Encounter Diagnoses   Name Primary?    Cervical radiculopathy Yes    Decreased range of motion of left shoulder     Chronic left shoulder pain     Neck pain on left side     Decreased strength, endurance, and mobility      Physician: Sedrick Ferrari MD    Visit Date: 4/28/2023    Physician Orders: PT Eval and Treat  Medical Diagnosis from Referral: chronic left shoulder pain, cervical radiculopathy, neck pain on left side  Evaluation Date: 4/11/2023  Authorization Period Expiration: 12/31/2023  Plan of Care Expiration: 7/10/2023  Progress Note Due: 5/11/2023  Visit # / Visits authorized: 5/25   FOTO: 1/3 (last performed on 4/11/2023)     Precautions: Standard    PTA Visit #: 0/5     Time In: 7:00 AM  Time Out: 7:40 AM  Total Billable Time: 40 minutes (Billing reflects 1 on 1 treatment time spent with patient)    SUBJECTIVE     Patient reports: she went to see her doctor yesterday and he said it was ok for her to continue therapy and suggested that if her pain did return in the future she could get a steroid injection.    He/She was compliant with home exercise program.  Response to previous treatment: no adverse reaction  Functional change: improving motion and pain levels    Pain: 0/10     Location: left neck/shoulder region    OBJECTIVE     Objective Measures updated at progress report or POC update only unless otherwise noted.       TREATMENT     Angelica received the treatments listed below:     MANUAL THERAPY TECHNIQUES were applied for (8) minutes, including:    Manual Intervention Performed Today    Soft Tissue Mobilization [] left upper trapezius, levator scapulae , periscapular musculature, infraspinatus , deltoid, biceps , scapular release   Joint Mobilizations [x] GH jt mobs    []     []    Functional Dry Needling  []      Plan for Next Visit: Continue  "as needed         THERAPEUTIC EXERCISES to develop strength, endurance, ROM, flexibility, posture, and core stabilization for (20) minutes including:    Intervention Performed Today    UBE for posture strength and endurance x 3'/3'   Pulley's - flexion and abduction x 3' each   Wand flexion - supine (progressed) x 3 x 10, 3" holds, 2#   Wand ER - supine at 45 degrees abd x 2 x 10, 5" holds   Sidelying L shoulder abduction  x 2 x 10   Chest Stretch at Wall x 3x30 seconds   Sidelying L shoulder flexion   2 x 10 (assisted with Lizett)   Updated HEP and handout administered  See Patient Instructions (added for median nerve glides)   PROM L SH  All planes     Plan for Next Visit:        NEUROMUSCULAR RE-EDUCATION ACTIVITIES to improve Balance, Coordination, Kinesthetic, Sense, Proprioception, and Posture for (12) minutes.  The following were included:    Intervention Performed Today    Scapular Retraction  x 3 x 10, green theraband    Series 6 x 2' each   Sidelying ER  x 20x, L UE                              Plan for Next Visit:        PATIENT EDUCATION AND HOME EXERCISES     Home Exercises Provided and Patient Education Provided     Education provided:   PURPOSE: Patient educated on the impairments noted above and the effects of physical therapy intervention to improve overall condition and QOL.   EXERCISE: Patient was educated on all the above exercise prior/during/after for proper posture, positioning, and execution for safe performance with home exercise program.   STRENGTH: Patient educated on the importance of improved core and extremity strength in order to improve alignment of the spine and extremities with static positions and dynamic movement.   POSTURE: Patient educated on postural awareness to reduce stress and maintain optimal alignment of the spine with static positions and dynamic movement     Written Home Exercises Provided: yes.  Exercises were reviewed and Angelica was able to demonstrate them prior to " the end of the session.  Angelica demonstrated good  understanding of the education provided. See EMR under Patient Instructions for exercises provided during therapy sessions.    ASSESSMENT     Patient tolerated session well and more time was taken to perform left shoulder EXTERNAL ROTATION mobilizations at 90 degrees. Patient with good response and relief to this, but still with some left shoulder joint capsule stiffness noted. Added anterior chest stretch with good tolerance today as well. Plan to continue progressing as tolerated.    Angelica is progressing well towards her goals.   Pt prognosis is Good.     Pt will continue to benefit from skilled outpatient physical therapy to address the deficits listed in the problem list box on initial evaluation, provide pt/family education and to maximize pt's level of independence in the home and community environment.     Pt's spiritual, cultural and educational needs considered and pt agreeable to plan of care and goals.     Anticipated Barriers for therapy: co-morbidities and chronicity of condition    Goals:  Short Term Goals:  6 weeks Status  Date Met   PAIN: Pt will report worst pain of 4/10 in order to progress toward max functional ability and improve quality of life. [x] Progressing  [] Met  [] Not Met     FUNCTION: Patient will demonstrate improved function as indicated by a functional limitation score of less than or equal to 47 out of 100 on FOTO. [x] Progressing  [] Met  [] Not Met     MOBILITY: Patient will improve AROM to 50% of stated goals, listed in objective measures above, in order to progress towards independence with functional activities.  [x] Progressing  [] Met  [] Not Met     STRENGTH: Patient will improve strength to 50% of stated goals, listed in objective measures above, in order to progress towards independence with functional activities. [x] Progressing  [] Met  [] Not Met     POSTURE: Patient will correct postural deviations in sitting and  standing, to decrease pain and promote long term stability.  [x] Progressing  [] Met  [] Not Met     HEP: Patient will demonstrate independence with HEP in order to progress toward functional independence. [x] Progressing  [] Met  [] Not Met        Long Term Goals:  12 weeks Status Date Met   PAIN: Pt will report worst pain of 2/10 in order to progress toward max functional ability and improve quality of life [x] Progressing  [] Met  [] Not Met     FUNCTION: Patient will demonstrate improved function as indicated by a functional limitation score of less than or equal to 38 out of 100 on FOTO. [x] Progressing  [] Met  [] Not Met     MOBILITY: Patient will improve AROM to stated goals, listed in objective measures above, in order to return to maximal functional potential and improve quality of life.  [x] Progressing  [] Met  [] Not Met     STRENGTH: Patient will improve strength to stated goals, listed in objective measures above, in order to improve functional independence and quality of life.  [x] Progressing  [] Met  [] Not Met     GAIT: Patient will demonstrate normalized gait mechanics with minimal compensation in order to return to PLOF. [x] Progressing  [] Met  [] Not Met     Patient will return to normal ADL's, IADL's, community involvement, recreational activities, and work-related activities with less than or equal to 0/10 pain and maximal function.  [x] Progressing  [] Met  [] Not Met          PLAN     Continue Plan of Care (POC) and progress per patient tolerance. See treatment section for details on planned progressions next session.    4/11/2023 (evaluation): Outpatient Physical Therapy 2 times weekly for 12 weeks to include any combination of the following interventions: virtual visits, dry needling, modalities, electrical stimulation (IFC, Pre-Mod, Attended with Functional Dry Needling), Aquatic Therapy, Cervical/Lumbar Traction, Gait Training, Manual Therapy, Neuromuscular Re-ed, Patient Education,  Self Care, Therapeutic Exercise, and Therapeutic Activites     Kenyetta Carreon, PT

## 2023-05-02 ENCOUNTER — CLINICAL SUPPORT (OUTPATIENT)
Dept: REHABILITATION | Facility: HOSPITAL | Age: 63
End: 2023-05-02
Payer: COMMERCIAL

## 2023-05-02 DIAGNOSIS — Z74.09 DECREASED STRENGTH, ENDURANCE, AND MOBILITY: ICD-10-CM

## 2023-05-02 DIAGNOSIS — M25.512 CHRONIC LEFT SHOULDER PAIN: ICD-10-CM

## 2023-05-02 DIAGNOSIS — G89.29 CHRONIC LEFT SHOULDER PAIN: ICD-10-CM

## 2023-05-02 DIAGNOSIS — R68.89 DECREASED STRENGTH, ENDURANCE, AND MOBILITY: ICD-10-CM

## 2023-05-02 DIAGNOSIS — M54.2 NECK PAIN ON LEFT SIDE: ICD-10-CM

## 2023-05-02 DIAGNOSIS — M25.612 DECREASED RANGE OF MOTION OF LEFT SHOULDER: ICD-10-CM

## 2023-05-02 DIAGNOSIS — M54.12 CERVICAL RADICULOPATHY: Primary | ICD-10-CM

## 2023-05-02 DIAGNOSIS — R53.1 DECREASED STRENGTH, ENDURANCE, AND MOBILITY: ICD-10-CM

## 2023-05-02 PROCEDURE — 97112 NEUROMUSCULAR REEDUCATION: CPT

## 2023-05-02 PROCEDURE — 97110 THERAPEUTIC EXERCISES: CPT

## 2023-05-02 NOTE — PROGRESS NOTES
[facility] OUTPATIENT THERAPY AND WELLNESS   Physical Therapy Treatment Note     Name: Angelica Pate Moseley  Clinic Number: [id number]    Therapy Diagnosis:   Encounter Diagnoses   Name Primary?    Cervical radiculopathy Yes    Decreased range of motion of left shoulder     Chronic left shoulder pain     Neck pain on left side     Decreased strength, endurance, and mobility      Physician: Sedrick Ferrari MD    Visit Date: 5/2/2023    Physician Orders: PT Eval and Treat  Medical Diagnosis from Referral: chronic left shoulder pain, cervical radiculopathy, neck pain on left side  Evaluation Date: 4/11/2023  Authorization Period Expiration: 12/31/2023  Plan of Care Expiration: 7/10/2023  Progress Note Due: 5/11/2023  Visit # / Visits authorized: 6/25   FOTO: 1/3 (last performed on 4/11/2023)     Precautions: Standard    PTA Visit #: 0/5     Time In: 4:30 PM  Time Out: 5:20 PM  Total Billable Time: 40 minutes (Billing reflects 1 on 1 treatment time spent with patient)    SUBJECTIVE     Patient reports: she is doing so much better and is glad she came back to therapy.    He/She was compliant with home exercise program.  Response to previous treatment: no adverse reaction  Functional change: improving motion and pain levels    Pain: 0/10     Location: left neck/shoulder region    OBJECTIVE     Objective Measures updated at progress report or POC update only unless otherwise noted.       TREATMENT     Angelica received the treatments listed below:     MANUAL THERAPY TECHNIQUES were applied for (0) minutes, including:    Manual Intervention Performed Today    Soft Tissue Mobilization [] left upper trapezius, levator scapulae , periscapular musculature, infraspinatus , deltoid, biceps , scapular release   Joint Mobilizations [] GH jt mobs    []     []    Functional Dry Needling  []      Plan for Next Visit: Continue as needed         THERAPEUTIC EXERCISES to develop strength, endurance, ROM, flexibility, posture, and core  "stabilization for (25) minutes including:    Intervention Performed Today    UBE for posture strength and endurance x 3'/3'   Pulley's - flexion and abduction x 3' each   Wand flexion - supine  x 3 x 10, 3" holds, 2#   Wand ER - supine at 45 degrees abd x 2 x 10, 5" holds   Standing shoulder EXTERNAL ROTATION  x 3x10 yellow band    Sidelying L shoulder abduction   2 x 10   Chest Stretch at Wall  3x30 seconds   Sidelying L shoulder flexion   2 x 10 (assisted with Lizett)   Updated HEP and handout administered  See Patient Instructions (added for median nerve glides)   PROM L SH  All planes     Plan for Next Visit:        NEUROMUSCULAR RE-EDUCATION ACTIVITIES to improve Balance, Coordination, Kinesthetic, Sense, Proprioception, and Posture for (15) minutes.  The following were included:    Intervention Performed Today    Scapular Retraction  x 3 x 10, green theraband    Shoulder Extensions x 3x10 red band   Series 6 x 2' each   Sidelying ER   20x, L UE                         Plan for Next Visit:        PATIENT EDUCATION AND HOME EXERCISES     Home Exercises Provided and Patient Education Provided     Education provided:   PURPOSE: Patient educated on the impairments noted above and the effects of physical therapy intervention to improve overall condition and QOL.   EXERCISE: Patient was educated on all the above exercise prior/during/after for proper posture, positioning, and execution for safe performance with home exercise program.   STRENGTH: Patient educated on the importance of improved core and extremity strength in order to improve alignment of the spine and extremities with static positions and dynamic movement.   POSTURE: Patient educated on postural awareness to reduce stress and maintain optimal alignment of the spine with static positions and dynamic movement     Written Home Exercises Provided: yes.  Exercises were reviewed and Angelica was able to demonstrate them prior to the end of the session.  Angelica " demonstrated good  understanding of the education provided. See EMR under Patient Instructions for exercises provided during therapy sessions.    ASSESSMENT     Added standing EXTERNAL ROTATION and shoulder extensions today and patient tolerated well but did need time for extra rest breaks due to muscular fatigue. Patient continues to exhibit good willingness to improve condition. No adverse effects noted; plan to continue progressing as tolerated.    Angelica is progressing well towards her goals.   Pt prognosis is Good.     Pt will continue to benefit from skilled outpatient physical therapy to address the deficits listed in the problem list box on initial evaluation, provide pt/family education and to maximize pt's level of independence in the home and community environment.     Pt's spiritual, cultural and educational needs considered and pt agreeable to plan of care and goals.     Anticipated Barriers for therapy: co-morbidities and chronicity of condition    Goals:  Short Term Goals:  6 weeks Status  Date Met   PAIN: Pt will report worst pain of 4/10 in order to progress toward max functional ability and improve quality of life. [x] Progressing  [] Met  [] Not Met     FUNCTION: Patient will demonstrate improved function as indicated by a functional limitation score of less than or equal to 47 out of 100 on FOTO. [x] Progressing  [] Met  [] Not Met     MOBILITY: Patient will improve AROM to 50% of stated goals, listed in objective measures above, in order to progress towards independence with functional activities.  [x] Progressing  [] Met  [] Not Met     STRENGTH: Patient will improve strength to 50% of stated goals, listed in objective measures above, in order to progress towards independence with functional activities. [x] Progressing  [] Met  [] Not Met     POSTURE: Patient will correct postural deviations in sitting and standing, to decrease pain and promote long term stability.  [x] Progressing  [] Met  []  Not Met     HEP: Patient will demonstrate independence with HEP in order to progress toward functional independence. [x] Progressing  [] Met  [] Not Met        Long Term Goals:  12 weeks Status Date Met   PAIN: Pt will report worst pain of 2/10 in order to progress toward max functional ability and improve quality of life [x] Progressing  [] Met  [] Not Met     FUNCTION: Patient will demonstrate improved function as indicated by a functional limitation score of less than or equal to 38 out of 100 on FOTO. [x] Progressing  [] Met  [] Not Met     MOBILITY: Patient will improve AROM to stated goals, listed in objective measures above, in order to return to maximal functional potential and improve quality of life.  [x] Progressing  [] Met  [] Not Met     STRENGTH: Patient will improve strength to stated goals, listed in objective measures above, in order to improve functional independence and quality of life.  [x] Progressing  [] Met  [] Not Met     GAIT: Patient will demonstrate normalized gait mechanics with minimal compensation in order to return to PLOF. [x] Progressing  [] Met  [] Not Met     Patient will return to normal ADL's, IADL's, community involvement, recreational activities, and work-related activities with less than or equal to 0/10 pain and maximal function.  [x] Progressing  [] Met  [] Not Met          PLAN     Continue Plan of Care (POC) and progress per patient tolerance. See treatment section for details on planned progressions next session.    4/11/2023 (evaluation): Outpatient Physical Therapy 2 times weekly for 12 weeks to include any combination of the following interventions: virtual visits, dry needling, modalities, electrical stimulation (IFC, Pre-Mod, Attended with Functional Dry Needling), Aquatic Therapy, Cervical/Lumbar Traction, Gait Training, Manual Therapy, Neuromuscular Re-ed, Patient Education, Self Care, Therapeutic Exercise, and Therapeutic Activites     Kenyetta Carreon  PT

## 2023-05-09 ENCOUNTER — PATIENT MESSAGE (OUTPATIENT)
Dept: PHARMACY | Facility: CLINIC | Age: 63
End: 2023-05-09
Payer: COMMERCIAL

## 2023-05-10 ENCOUNTER — IMMUNIZATION (OUTPATIENT)
Dept: PHARMACY | Facility: CLINIC | Age: 63
End: 2023-05-10
Payer: COMMERCIAL

## 2023-05-10 ENCOUNTER — CLINICAL SUPPORT (OUTPATIENT)
Dept: REHABILITATION | Facility: HOSPITAL | Age: 63
End: 2023-05-10
Payer: COMMERCIAL

## 2023-05-10 DIAGNOSIS — M25.512 CHRONIC LEFT SHOULDER PAIN: ICD-10-CM

## 2023-05-10 DIAGNOSIS — G89.29 CHRONIC LEFT SHOULDER PAIN: ICD-10-CM

## 2023-05-10 DIAGNOSIS — R68.89 DECREASED STRENGTH, ENDURANCE, AND MOBILITY: ICD-10-CM

## 2023-05-10 DIAGNOSIS — M54.2 NECK PAIN ON LEFT SIDE: ICD-10-CM

## 2023-05-10 DIAGNOSIS — M25.612 DECREASED RANGE OF MOTION OF LEFT SHOULDER: ICD-10-CM

## 2023-05-10 DIAGNOSIS — R53.1 DECREASED STRENGTH, ENDURANCE, AND MOBILITY: ICD-10-CM

## 2023-05-10 DIAGNOSIS — Z74.09 DECREASED STRENGTH, ENDURANCE, AND MOBILITY: ICD-10-CM

## 2023-05-10 DIAGNOSIS — M54.12 CERVICAL RADICULOPATHY: Primary | ICD-10-CM

## 2023-05-10 PROCEDURE — 97110 THERAPEUTIC EXERCISES: CPT | Performed by: PHYSICAL THERAPIST

## 2023-05-10 NOTE — PROGRESS NOTES
[facility]   Physical Therapy Treatment Note + Progress Note    Name: Angelica Flores  Clinic Number: [id number]    Therapy Diagnosis:   Encounter Diagnoses   Name Primary?    Cervical radiculopathy Yes    Decreased range of motion of left shoulder     Chronic left shoulder pain     Neck pain on left side     Decreased strength, endurance, and mobility      Physician: Sedrick Ferrari MD    Visit Date: 5/10/2023    Physician Orders: PT Eval and Treat  Medical Diagnosis from Referral: chronic left shoulder pain, cervical radiculopathy, neck pain on left side  Evaluation Date: 4/11/2023  Authorization Period Expiration: 12/31/2023  Plan of Care Expiration: 7/10/2023  Progress Note Due: 5/11/2023  Visit # / Visits authorized: 7/25   FOTO: 1/3 (last performed on 4/11/2023)     Precautions: Standard    PTA Visit #: 0/5     Time In: 1605  Time Out: 1650  Total Billable Time: 40 minutes (Billing reflects 1 on 1 treatment time spent with patient)    SUBJECTIVE     Patient reports: that her her left shoulder is doing much better overall. On Monday, she was coming back from traveling, and when her  helped her take her bag off her shoulder, it aggravated the left shoulder a little. She thinks it's fine today, but it was a little sore.     He/She was compliant with home exercise program.  Response to previous treatment: no adverse reaction  Functional change: improving motion and pain levels    Pain: 0/10     Location: left neck/shoulder region    OBJECTIVE     Objective Measures updated at progress report or POC update only unless otherwise noted.     RANGE OF MOTION:   Cervical Right   (spine) Left     Pain/Dysfunction with Movement Goal   Cervical Flexion (60º) 62  (54 initial) --- No pain with Rom measurements 60   Cervical Extension (80º) 68  (28 initial) ---   80   Cervical Side Bending (45º) 34  (28 initial) 36  (28 initial)   45   Cervical Rotation (75º) 74  (58 initial) 69  (64  initial)    70       Shoulder AROM/PROM Right Left Pain/Dysfunction with Movement Goal   Shoulder Flexion (180º) 180  (150 initial) 144  (110 initial)  180   Shoulder Abduction (180º) 172  (146 initial) 118  (88 initial)   180   Shoulder ER  at 90º (90º)       90   Shoulder IR at 90º (70º)       70   Functional ER T5  (T3 initial) T2  (Unable to reach landmark - initial)  T3   Functional IR T8 L PSIS  (L greater trochanter) Pain in L, but less T8         STRENGTH:   U/E MMT Right Left Pain/Dysfunction with Movement Goal Right   5/10/2023  Left  5/10/2023    Shoulder Flexion 4/5 NT due to pain No pain with MMT's today 4+/5 B 4+ 4-   Shoulder Abduction 4+/5 NT due to pain   4+/5 B 4+ 3+   Shoulder IR 4+/5 4/5   4+/5 B 4+ 4   Shoulder ER 4/5 3+/5   4+/5 B 4 4-   Serratus Anterior NT NT   4+/5 B NT NT   Middle Trapezius NT NT   4+/5 B NT NT   Lower Trapezius NT NT   4+/5 B NT NT   Elbow Flexion  4+/5 4+/5   5/5 B 5 5   Elbow Extension 4+/5 4+/5   5/5 B 5 4+   Wrist Flexion 4/5 4/5   5/5 B 4+ 4+   Wrist Extension 5/5 5/5   5/5 B 5 5         MUSCLE LENGTH:   Muscle Tested  Right Left  Goal   Upper Trapezius [] Normal      [x] Limited [] Normal      [x] Limited Normal B   Levator Scapular  [] Normal      [x] Limited [] Normal      [x] Limited Normal B   Scalenes [] Normal      [x] Limited [] Normal      [x] Limited Normal B   Pectoralis Minor [] Normal      [x] Limited [] Normal      [x] Limited Normal B   Pectoralis Major [] Normal      [x] Limited [] Normal      [x] Limited Normal B    **Although still decreased, muscle length has improved as compared to previous visits.      JOINT MOBILITY:      Joint Motion  Right Mobility  (spine) Left Mobility Goal   Cervical Upglides  [] Hypo     [x] Normal     [] Hyper [] Hypo     [x] Normal     [] Hyper Normal    Cervical Downglides  [] Hypo     [x] Normal     [] Hyper [] Hypo     [x] Normal     [] Hyper Normal    1st Rib [] Hypo     [x] Normal     [] Hyper [x] Hypo     [] Normal      [] Hyper Normal    Thoracic PA  [x] Hypo     [] Normal     [] Hyper --- Normal   Glenohumeral inferior  [] Hypo     [x] Normal     [] Hyper [x] Hypo     [] Normal     [] Hyper Normal    Glenohumeral anterior [] Hypo     [x] Normal     [] Hyper [x] Hypo     [] Normal     [] Hyper Normal    Glenohumeral posterior [] Hypo     [x] Normal     [] Hyper [x] Hypo     [] Normal     [] Hyper Normal          SPECIAL TESTS:     Right  (spine) Left  Goal   Cervical Distraction [] Positive    [x] Negative [] Positive    [x] Negative Negative B    Cervical Compression  [] Positive    [x] Negative [] Positive    [x] Negative Negative B        Unable to obtain accurate Shoulder Special Testing this visit due to pain and patient being unable to obtain test positions.     SENSATION  [x] Intact to Light Touch                       [] Impaired:         PALPATION: Muscles: Increased tone and tenderness to palpation of: bilateral, L>R, scalenes , SCM, upper trapezius, levator scapulae , periscapular musculature, rotator cuff muscles, infraspinatus, but decreased as compared to previous visits.        POSTURE:  Pt presents with postural abnormalities which include:               [x] Forward Head                                [] Increased Lumbar Lordosis              [x] Rounded Shoulder                         [] Genu Recurvatum              [] Increased Thoracic Kyphosis        [] Genu Valgus              [] Trunk Deviated                              [] Pes Planus              [] Scapular Winging                          [] Other:            Function:       TREATMENT     Angelica received the treatments listed below:     MANUAL THERAPY TECHNIQUES were applied for (0) minutes, including:    Manual Intervention Performed Today    Soft Tissue Mobilization [] left upper trapezius, levator scapulae , periscapular musculature, infraspinatus , deltoid, biceps , scapular release   Joint Mobilizations [] GH jt mobs    []     []    Functional  "Dry Needling  []      Plan for Next Visit: Continue as needed         THERAPEUTIC EXERCISES to develop strength, endurance, ROM, flexibility, posture, and core stabilization for (40) minutes including:    Intervention Performed Today    UBE for posture strength and endurance x 3'/3'   Pulley's - flexion and abduction  3' each   Wand flexion - supine   3 x 10, 3" holds, 2#   Wand ER - supine at 45 degrees abd  2 x 10, 5" holds   Standing shoulder EXTERNAL ROTATION   3x10 yellow band    Sidelying L shoulder abduction   2 x 10   Chest Stretch at Wall  3x30 seconds   Sidelying L shoulder flexion   2 x 10 (assisted with Lizett)   Updated HEP and handout administered  See Patient Instructions (added for median nerve glides)   PROM L SH  All planes   AAROM shoulder extension with wand- standing (added) x 10x, 3-5 second holds   Standing IR with wand behind back (added) x 10x, 3-5 second holds   Progress Note x RE-assessment as above in objective section     Plan for Next Visit:        NEUROMUSCULAR RE-EDUCATION ACTIVITIES to improve Balance, Coordination, Kinesthetic, Sense, Proprioception, and Posture for (0) minutes.  The following were included:    Intervention Performed Today    Scapular Retraction   3 x 10, green theraband    Shoulder Extensions  3x10 red band   Series 6  2' each   Sidelying ER   20x, L UE                         Plan for Next Visit:        PATIENT EDUCATION AND HOME EXERCISES     Home Exercises Provided and Patient Education Provided     Education provided:   PURPOSE: Patient educated on the impairments noted above and the effects of physical therapy intervention to improve overall condition and QOL.   EXERCISE: Patient was educated on all the above exercise prior/during/after for proper posture, positioning, and execution for safe performance with home exercise program.   STRENGTH: Patient educated on the importance of improved core and extremity strength in order to improve alignment of the spine " and extremities with static positions and dynamic movement.   POSTURE: Patient educated on postural awareness to reduce stress and maintain optimal alignment of the spine with static positions and dynamic movement     Written Home Exercises Provided: yes.  Exercises were reviewed and Angelica was able to demonstrate them prior to the end of the session.  Angelica demonstrated good  understanding of the education provided. See EMR under Patient Instructions for exercises provided during therapy sessions.    ASSESSMENT     Patient tolerated treatment well and has attended 8 total PT visits. She has made good overall progress with PT, demonstrating improvements in cervical spine and shoulder range of motion, upper extremity strength, and postural stability. She presents with decreased soft tissue adhesions and improved muscle length, as well as decreased tenderness to palpation. Patient is returning to more functional activities with less limitation. However, despite improvements, patient still presents with limitations in shoulder range of motion and strength, and she would benefit from continued PT in order to address remaining limitations and to reach max functional potential.     Angelica is progressing well towards her goals.   Pt prognosis is Good.     Pt will continue to benefit from skilled outpatient physical therapy to address the deficits listed in the problem list box on initial evaluation, provide pt/family education and to maximize pt's level of independence in the home and community environment.     Pt's spiritual, cultural and educational needs considered and pt agreeable to plan of care and goals.     Anticipated Barriers for therapy: co-morbidities and chronicity of condition    Goals:  Short Term Goals:  6 weeks Status  Date Met   PAIN: Pt will report worst pain of 4/10 in order to progress toward max functional ability and improve quality of life. [] Progressing  [x] Met  [] Not Met 5/10/2023    FUNCTION:  Patient will demonstrate improved function as indicated by a functional limitation score of less than or equal to 47 out of 100 on FOTO. [] Progressing  [x] Met  [] Not Met  5/10/2023   MOBILITY: Patient will improve AROM to 50% of stated goals, listed in objective measures above, in order to progress towards independence with functional activities.  [] Progressing  [x] Met  [] Not Met  5/10/2023   STRENGTH: Patient will improve strength to 50% of stated goals, listed in objective measures above, in order to progress towards independence with functional activities. [] Progressing  [x] Met  [] Not Met  5/10/2023   POSTURE: Patient will correct postural deviations in sitting and standing, to decrease pain and promote long term stability.  [] Progressing  [x] Met  [] Not Met  5/10/2023   HEP: Patient will demonstrate independence with HEP in order to progress toward functional independence. [] Progressing  [x] Met  [] Not Met  5/10/2023      Long Term Goals:  12 weeks Status Date Met   PAIN: Pt will report worst pain of 2/10 in order to progress toward max functional ability and improve quality of life [x] Progressing  [] Met  [] Not Met     FUNCTION: Patient will demonstrate improved function as indicated by a functional limitation score of less than or equal to 38 out of 100 on FOTO. [x] Progressing  [] Met  [] Not Met     MOBILITY: Patient will improve AROM to stated goals, listed in objective measures above, in order to return to maximal functional potential and improve quality of life.  [x] Progressing  [] Met  [] Not Met    STRENGTH: Patient will improve strength to stated goals, listed in objective measures above, in order to improve functional independence and quality of life.  [x] Progressing  [] Met  [] Not Met     GAIT: Patient will demonstrate normalized gait mechanics with minimal compensation in order to return to PLOF. [x] Progressing  [] Met  [] Not Met     Patient will return to normal ADL's, IADL's,  community involvement, recreational activities, and work-related activities with less than or equal to 0/10 pain and maximal function.  [x] Progressing  [] Met  [] Not Met          PLAN     Continue Plan of Care (POC) and progress per patient tolerance. See treatment section for details on planned progressions next session.    5/10/2023: It is my recommendation that patient continue with PT at her current frequency of 2 times per week for the remainder of her approved visits. Her treatment plan will remain the same, and she will be progressed appropriately.     4/11/2023 (evaluation): Outpatient Physical Therapy 2 times weekly for 12 weeks to include any combination of the following interventions: virtual visits, dry needling, modalities, electrical stimulation (IFC, Pre-Mod, Attended with Functional Dry Needling), Aquatic Therapy, Cervical/Lumbar Traction, Gait Training, Manual Therapy, Neuromuscular Re-ed, Patient Education, Self Care, Therapeutic Exercise, and Therapeutic Activites     Olinda Blount, PT

## 2023-05-11 NOTE — PLAN OF CARE
[facility]   Physical Therapy Treatment Note + Progress Note    Name: Angelica Flores  Clinic Number: [id number]    Therapy Diagnosis:   Encounter Diagnoses   Name Primary?    Cervical radiculopathy Yes    Decreased range of motion of left shoulder     Chronic left shoulder pain     Neck pain on left side     Decreased strength, endurance, and mobility      Physician: Sedrick Ferrari MD    Visit Date: 5/10/2023    Physician Orders: PT Eval and Treat  Medical Diagnosis from Referral: chronic left shoulder pain, cervical radiculopathy, neck pain on left side  Evaluation Date: 4/11/2023  Authorization Period Expiration: 12/31/2023  Plan of Care Expiration: 7/10/2023  Progress Note Due: 5/11/2023  Visit # / Visits authorized: 7/25   FOTO: 1/3 (last performed on 4/11/2023)     Precautions: Standard    PTA Visit #: 0/5     Time In: 1605  Time Out: 1650  Total Billable Time: 40 minutes (Billing reflects 1 on 1 treatment time spent with patient)    SUBJECTIVE     Patient reports: that her her left shoulder is doing much better overall. On Monday, she was coming back from traveling, and when her  helped her take her bag off her shoulder, it aggravated the left shoulder a little. She thinks it's fine today, but it was a little sore.     He/She was compliant with home exercise program.  Response to previous treatment: no adverse reaction  Functional change: improving motion and pain levels    Pain: 0/10     Location: left neck/shoulder region    OBJECTIVE     Objective Measures updated at progress report or POC update only unless otherwise noted.     RANGE OF MOTION:   Cervical Right   (spine) Left     Pain/Dysfunction with Movement Goal   Cervical Flexion (60º) 62  (54 initial) --- No pain with Rom measurements 60   Cervical Extension (80º) 68  (28 initial) ---   80   Cervical Side Bending (45º) 34  (28 initial) 36  (28 initial)   45   Cervical Rotation (75º) 74  (58 initial) 69  (64  initial)    70       Shoulder AROM/PROM Right Left Pain/Dysfunction with Movement Goal   Shoulder Flexion (180º) 180  (150 initial) 144  (110 initial)  180   Shoulder Abduction (180º) 172  (146 initial) 118  (88 initial)   180   Shoulder ER  at 90º (90º)       90   Shoulder IR at 90º (70º)       70   Functional ER T5  (T3 initial) T2  (Unable to reach landmark - initial)  T3   Functional IR T8 L PSIS  (L greater trochanter) Pain in L, but less T8         STRENGTH:   U/E MMT Right Left Pain/Dysfunction with Movement Goal Right   5/10/2023  Left  5/10/2023    Shoulder Flexion 4/5 NT due to pain No pain with MMT's today 4+/5 B 4+ 4-   Shoulder Abduction 4+/5 NT due to pain   4+/5 B 4+ 3+   Shoulder IR 4+/5 4/5   4+/5 B 4+ 4   Shoulder ER 4/5 3+/5   4+/5 B 4 4-   Serratus Anterior NT NT   4+/5 B NT NT   Middle Trapezius NT NT   4+/5 B NT NT   Lower Trapezius NT NT   4+/5 B NT NT   Elbow Flexion  4+/5 4+/5   5/5 B 5 5   Elbow Extension 4+/5 4+/5   5/5 B 5 4+   Wrist Flexion 4/5 4/5   5/5 B 4+ 4+   Wrist Extension 5/5 5/5   5/5 B 5 5         MUSCLE LENGTH:   Muscle Tested  Right Left  Goal   Upper Trapezius [] Normal      [x] Limited [] Normal      [x] Limited Normal B   Levator Scapular  [] Normal      [x] Limited [] Normal      [x] Limited Normal B   Scalenes [] Normal      [x] Limited [] Normal      [x] Limited Normal B   Pectoralis Minor [] Normal      [x] Limited [] Normal      [x] Limited Normal B   Pectoralis Major [] Normal      [x] Limited [] Normal      [x] Limited Normal B    **Although still decreased, muscle length has improved as compared to previous visits.      JOINT MOBILITY:      Joint Motion  Right Mobility  (spine) Left Mobility Goal   Cervical Upglides  [] Hypo     [x] Normal     [] Hyper [] Hypo     [x] Normal     [] Hyper Normal    Cervical Downglides  [] Hypo     [x] Normal     [] Hyper [] Hypo     [x] Normal     [] Hyper Normal    1st Rib [] Hypo     [x] Normal     [] Hyper [x] Hypo     [] Normal      [] Hyper Normal    Thoracic PA  [x] Hypo     [] Normal     [] Hyper --- Normal   Glenohumeral inferior  [] Hypo     [x] Normal     [] Hyper [x] Hypo     [] Normal     [] Hyper Normal    Glenohumeral anterior [] Hypo     [x] Normal     [] Hyper [x] Hypo     [] Normal     [] Hyper Normal    Glenohumeral posterior [] Hypo     [x] Normal     [] Hyper [x] Hypo     [] Normal     [] Hyper Normal          SPECIAL TESTS:     Right  (spine) Left  Goal   Cervical Distraction [] Positive    [x] Negative [] Positive    [x] Negative Negative B    Cervical Compression  [] Positive    [x] Negative [] Positive    [x] Negative Negative B        Unable to obtain accurate Shoulder Special Testing this visit due to pain and patient being unable to obtain test positions.     SENSATION  [x] Intact to Light Touch                       [] Impaired:         PALPATION: Muscles: Increased tone and tenderness to palpation of: bilateral, L>R, scalenes , SCM, upper trapezius, levator scapulae , periscapular musculature, rotator cuff muscles, infraspinatus, but decreased as compared to previous visits.        POSTURE:  Pt presents with postural abnormalities which include:               [x] Forward Head                                [] Increased Lumbar Lordosis              [x] Rounded Shoulder                         [] Genu Recurvatum              [] Increased Thoracic Kyphosis        [] Genu Valgus              [] Trunk Deviated                              [] Pes Planus              [] Scapular Winging                          [] Other:            Function:       TREATMENT     Angelica received the treatments listed below:     MANUAL THERAPY TECHNIQUES were applied for (0) minutes, including:    Manual Intervention Performed Today    Soft Tissue Mobilization [] left upper trapezius, levator scapulae , periscapular musculature, infraspinatus , deltoid, biceps , scapular release   Joint Mobilizations [] GH jt mobs    []     []    Functional  "Dry Needling  []      Plan for Next Visit: Continue as needed         THERAPEUTIC EXERCISES to develop strength, endurance, ROM, flexibility, posture, and core stabilization for (40) minutes including:    Intervention Performed Today    UBE for posture strength and endurance x 3'/3'   Pulley's - flexion and abduction  3' each   Wand flexion - supine   3 x 10, 3" holds, 2#   Wand ER - supine at 45 degrees abd  2 x 10, 5" holds   Standing shoulder EXTERNAL ROTATION   3x10 yellow band    Sidelying L shoulder abduction   2 x 10   Chest Stretch at Wall  3x30 seconds   Sidelying L shoulder flexion   2 x 10 (assisted with Lizett)   Updated HEP and handout administered  See Patient Instructions (added for median nerve glides)   PROM L SH  All planes   AAROM shoulder extension with wand- standing (added) x 10x, 3-5 second holds   Standing IR with wand behind back (added) x 10x, 3-5 second holds   Progress Note x RE-assessment as above in objective section     Plan for Next Visit:        NEUROMUSCULAR RE-EDUCATION ACTIVITIES to improve Balance, Coordination, Kinesthetic, Sense, Proprioception, and Posture for (0) minutes.  The following were included:    Intervention Performed Today    Scapular Retraction   3 x 10, green theraband    Shoulder Extensions  3x10 red band   Series 6  2' each   Sidelying ER   20x, L UE                         Plan for Next Visit:        PATIENT EDUCATION AND HOME EXERCISES     Home Exercises Provided and Patient Education Provided     Education provided:   PURPOSE: Patient educated on the impairments noted above and the effects of physical therapy intervention to improve overall condition and QOL.   EXERCISE: Patient was educated on all the above exercise prior/during/after for proper posture, positioning, and execution for safe performance with home exercise program.   STRENGTH: Patient educated on the importance of improved core and extremity strength in order to improve alignment of the spine " and extremities with static positions and dynamic movement.   POSTURE: Patient educated on postural awareness to reduce stress and maintain optimal alignment of the spine with static positions and dynamic movement     Written Home Exercises Provided: yes.  Exercises were reviewed and Angelica was able to demonstrate them prior to the end of the session.  Angelica demonstrated good  understanding of the education provided. See EMR under Patient Instructions for exercises provided during therapy sessions.    ASSESSMENT     Patient tolerated treatment well and has attended 8 total PT visits. She has made good overall progress with PT, demonstrating improvements in cervical spine and shoulder range of motion, upper extremity strength, and postural stability. She presents with decreased soft tissue adhesions and improved muscle length, as well as decreased tenderness to palpation. Patient is returning to more functional activities with less limitation. However, despite improvements, patient still presents with limitations in shoulder range of motion and strength, and she would benefit from continued PT in order to address remaining limitations and to reach max functional potential.     Angelica is progressing well towards her goals.   Pt prognosis is Good.     Pt will continue to benefit from skilled outpatient physical therapy to address the deficits listed in the problem list box on initial evaluation, provide pt/family education and to maximize pt's level of independence in the home and community environment.     Pt's spiritual, cultural and educational needs considered and pt agreeable to plan of care and goals.     Anticipated Barriers for therapy: co-morbidities and chronicity of condition    Goals:  Short Term Goals:  6 weeks Status  Date Met   PAIN: Pt will report worst pain of 4/10 in order to progress toward max functional ability and improve quality of life. [] Progressing  [x] Met  [] Not Met 5/10/2023    FUNCTION:  Patient will demonstrate improved function as indicated by a functional limitation score of less than or equal to 47 out of 100 on FOTO. [] Progressing  [x] Met  [] Not Met  5/10/2023   MOBILITY: Patient will improve AROM to 50% of stated goals, listed in objective measures above, in order to progress towards independence with functional activities.  [] Progressing  [x] Met  [] Not Met  5/10/2023   STRENGTH: Patient will improve strength to 50% of stated goals, listed in objective measures above, in order to progress towards independence with functional activities. [] Progressing  [x] Met  [] Not Met  5/10/2023   POSTURE: Patient will correct postural deviations in sitting and standing, to decrease pain and promote long term stability.  [] Progressing  [x] Met  [] Not Met  5/10/2023   HEP: Patient will demonstrate independence with HEP in order to progress toward functional independence. [] Progressing  [x] Met  [] Not Met  5/10/2023      Long Term Goals:  12 weeks Status Date Met   PAIN: Pt will report worst pain of 2/10 in order to progress toward max functional ability and improve quality of life [x] Progressing  [] Met  [] Not Met     FUNCTION: Patient will demonstrate improved function as indicated by a functional limitation score of less than or equal to 38 out of 100 on FOTO. [x] Progressing  [] Met  [] Not Met     MOBILITY: Patient will improve AROM to stated goals, listed in objective measures above, in order to return to maximal functional potential and improve quality of life.  [x] Progressing  [] Met  [] Not Met    STRENGTH: Patient will improve strength to stated goals, listed in objective measures above, in order to improve functional independence and quality of life.  [x] Progressing  [] Met  [] Not Met     GAIT: Patient will demonstrate normalized gait mechanics with minimal compensation in order to return to PLOF. [x] Progressing  [] Met  [] Not Met     Patient will return to normal ADL's, IADL's,  community involvement, recreational activities, and work-related activities with less than or equal to 0/10 pain and maximal function.  [x] Progressing  [] Met  [] Not Met          PLAN     Continue Plan of Care (POC) and progress per patient tolerance. See treatment section for details on planned progressions next session.    5/10/2023: It is my recommendation that patient continue with PT at her current frequency of 2 times per week for the remainder of her approved visits. Her treatment plan will remain the same, and she will be progressed appropriately.     4/11/2023 (evaluation): Outpatient Physical Therapy 2 times weekly for 12 weeks to include any combination of the following interventions: virtual visits, dry needling, modalities, electrical stimulation (IFC, Pre-Mod, Attended with Functional Dry Needling), Aquatic Therapy, Cervical/Lumbar Traction, Gait Training, Manual Therapy, Neuromuscular Re-ed, Patient Education, Self Care, Therapeutic Exercise, and Therapeutic Activites     Olinda Blount, PT

## 2023-05-12 ENCOUNTER — CLINICAL SUPPORT (OUTPATIENT)
Dept: REHABILITATION | Facility: HOSPITAL | Age: 63
End: 2023-05-12
Payer: COMMERCIAL

## 2023-05-12 DIAGNOSIS — R68.89 DECREASED STRENGTH, ENDURANCE, AND MOBILITY: ICD-10-CM

## 2023-05-12 DIAGNOSIS — M54.2 NECK PAIN ON LEFT SIDE: ICD-10-CM

## 2023-05-12 DIAGNOSIS — M25.512 CHRONIC LEFT SHOULDER PAIN: ICD-10-CM

## 2023-05-12 DIAGNOSIS — G89.29 CHRONIC LEFT SHOULDER PAIN: ICD-10-CM

## 2023-05-12 DIAGNOSIS — M25.612 DECREASED RANGE OF MOTION OF LEFT SHOULDER: ICD-10-CM

## 2023-05-12 DIAGNOSIS — R53.1 DECREASED STRENGTH, ENDURANCE, AND MOBILITY: ICD-10-CM

## 2023-05-12 DIAGNOSIS — M54.12 CERVICAL RADICULOPATHY: Primary | ICD-10-CM

## 2023-05-12 DIAGNOSIS — Z74.09 DECREASED STRENGTH, ENDURANCE, AND MOBILITY: ICD-10-CM

## 2023-05-12 PROCEDURE — 97112 NEUROMUSCULAR REEDUCATION: CPT | Performed by: PHYSICAL THERAPIST

## 2023-05-12 PROCEDURE — 97110 THERAPEUTIC EXERCISES: CPT | Performed by: PHYSICAL THERAPIST

## 2023-05-12 PROCEDURE — 97140 MANUAL THERAPY 1/> REGIONS: CPT | Performed by: PHYSICAL THERAPIST

## 2023-05-12 NOTE — PROGRESS NOTES
[facility]   Physical Therapy Treatment Note     Name: Angelica Pate Pelsor  Clinic Number: [id number]    Therapy Diagnosis:   Encounter Diagnoses   Name Primary?    Cervical radiculopathy Yes    Decreased range of motion of left shoulder     Chronic left shoulder pain     Neck pain on left side     Decreased strength, endurance, and mobility      Physician: Sedrick Ferrari MD    Visit Date: 5/12/2023    Physician Orders: PT Eval and Treat  Medical Diagnosis from Referral: chronic left shoulder pain, cervical radiculopathy, neck pain on left side  Evaluation Date: 4/11/2023  Authorization Period Expiration: 12/31/2023  Plan of Care Expiration: 7/10/2023  Progress Note Due: 5/11/2023  Visit # / Visits authorized: 8/25   FOTO: 1/3 (last performed on 4/11/2023)     Precautions: Standard    PTA Visit #: 0/5     Time In: 1301  Time Out: 1346  Total Billable Time: 43 minutes (Billing reflects 1 on 1 treatment time spent with patient)    SUBJECTIVE     Patient reports: that she is feeling good today. She does not even have any soreness like she did last visit.     He/She was compliant with home exercise program.  Response to previous treatment: no adverse reaction  Functional change: improving motion and pain levels    Pain: 0/10     Location: left neck/shoulder region    OBJECTIVE     Objective Measures updated at progress report or POC update only unless otherwise noted.    TREATMENT     Angelica received the treatments listed below:     MANUAL THERAPY TECHNIQUES were applied for (10) minutes, including:    Manual Intervention Performed Today    Soft Tissue Mobilization [x] left upper trapezius, levator scapulae , periscapular musculature, infraspinatus , deltoid, biceps , scapular release   Joint Mobilizations [] GH jt mobs    []     []    Functional Dry Needling  []      Plan for Next Visit: Continue as needed         THERAPEUTIC EXERCISES to develop strength, endurance, ROM, flexibility,  "posture, and core stabilization for (23) minutes including:    Intervention Performed Today    UBE for posture strength and endurance x 3'/3'   Pulley's - flexion and abduction  3' each   Wand flexion - supine   3 x 10, 3" holds, 2#   Wand ER - supine at 90/90 x 2 x 10, 5" holds   Standing shoulder EXTERNAL ROTATION   3x10 yellow band    Sidelying L shoulder abduction   2 x 10   Chest Stretch at Wall  3x30 seconds   Sidelying L shoulder flexion   2 x 10 (assisted with Lizett)   Updated HEP and handout administered  See Patient Instructions (added for median nerve glides)   PROM L SH  All planes   AAROM shoulder extension with wand- standing  x 10x, 3-5 second holds   Standing IR with wand behind back (added) x 10x, 3-5 second holds   AAROM wand abduction - standing or seated (added) x 2 x 10   Progress Note  RE-assessment as above in objective section     Plan for Next Visit:        NEUROMUSCULAR RE-EDUCATION ACTIVITIES to improve Balance, Coordination, Kinesthetic, Sense, Proprioception, and Posture for (10) minutes.  The following were included:    Intervention Performed Today    Scapular Retraction  x 3 x 10, green theraband    Shoulder Extensions x 3x10 red band   Series 6  2' each   Sidelying ER  x 20x, L UE                         Plan for Next Visit:        PATIENT EDUCATION AND HOME EXERCISES     Home Exercises Provided and Patient Education Provided     Education provided:   PURPOSE: Patient educated on the impairments noted above and the effects of physical therapy intervention to improve overall condition and QOL.   EXERCISE: Patient was educated on all the above exercise prior/during/after for proper posture, positioning, and execution for safe performance with home exercise program.   STRENGTH: Patient educated on the importance of improved core and extremity strength in order to improve alignment of the spine and extremities with static positions and dynamic movement.   POSTURE: Patient educated on " postural awareness to reduce stress and maintain optimal alignment of the spine with static positions and dynamic movement     Written Home Exercises Provided: yes.  Exercises were reviewed and Angelica was able to demonstrate them prior to the end of the session.  Angelica demonstrated good  understanding of the education provided. See EMR under Patient Instructions for exercises provided during therapy sessions.    ASSESSMENT     Patient did well with treatment today. She completed exercises with less difficulty and without complaint. Improved range of motion noted again this visit. Flexion and abduction range of motion seem to be more limited due to muscular restrictions, while internal and external rotation appear to be more capsular restricted. However, both have much improved as compared to previous visits. She is progressing well towards goals.     Angelica is progressing well towards her goals.   Pt prognosis is Good.     Pt will continue to benefit from skilled outpatient physical therapy to address the deficits listed in the problem list box on initial evaluation, provide pt/family education and to maximize pt's level of independence in the home and community environment.     Pt's spiritual, cultural and educational needs considered and pt agreeable to plan of care and goals.     Anticipated Barriers for therapy: co-morbidities and chronicity of condition    Goals:  Short Term Goals:  6 weeks Status  Date Met   PAIN: Pt will report worst pain of 4/10 in order to progress toward max functional ability and improve quality of life. [] Progressing  [x] Met  [] Not Met 5/10/2023    FUNCTION: Patient will demonstrate improved function as indicated by a functional limitation score of less than or equal to 47 out of 100 on FOTO. [] Progressing  [x] Met  [] Not Met  5/10/2023   MOBILITY: Patient will improve AROM to 50% of stated goals, listed in objective measures above, in order to progress towards independence with  functional activities.  [] Progressing  [x] Met  [] Not Met  5/10/2023   STRENGTH: Patient will improve strength to 50% of stated goals, listed in objective measures above, in order to progress towards independence with functional activities. [] Progressing  [x] Met  [] Not Met  5/10/2023   POSTURE: Patient will correct postural deviations in sitting and standing, to decrease pain and promote long term stability.  [] Progressing  [x] Met  [] Not Met  5/10/2023   HEP: Patient will demonstrate independence with HEP in order to progress toward functional independence. [] Progressing  [x] Met  [] Not Met  5/10/2023      Long Term Goals:  12 weeks Status Date Met   PAIN: Pt will report worst pain of 2/10 in order to progress toward max functional ability and improve quality of life [x] Progressing  [] Met  [] Not Met     FUNCTION: Patient will demonstrate improved function as indicated by a functional limitation score of less than or equal to 38 out of 100 on FOTO. [x] Progressing  [] Met  [] Not Met     MOBILITY: Patient will improve AROM to stated goals, listed in objective measures above, in order to return to maximal functional potential and improve quality of life.  [x] Progressing  [] Met  [] Not Met    STRENGTH: Patient will improve strength to stated goals, listed in objective measures above, in order to improve functional independence and quality of life.  [x] Progressing  [] Met  [] Not Met     GAIT: Patient will demonstrate normalized gait mechanics with minimal compensation in order to return to PLOF. [x] Progressing  [] Met  [] Not Met     Patient will return to normal ADL's, IADL's, community involvement, recreational activities, and work-related activities with less than or equal to 0/10 pain and maximal function.  [x] Progressing  [] Met  [] Not Met          PLAN     Continue Plan of Care (POC) and progress per patient tolerance. See treatment section for details on planned progressions next  session.    5/10/2023: It is my recommendation that patient continue with PT at her current frequency of 2 times per week for the remainder of her approved visits. Her treatment plan will remain the same, and she will be progressed appropriately.     4/11/2023 (evaluation): Outpatient Physical Therapy 2 times weekly for 12 weeks to include any combination of the following interventions: virtual visits, dry needling, modalities, electrical stimulation (IFC, Pre-Mod, Attended with Functional Dry Needling), Aquatic Therapy, Cervical/Lumbar Traction, Gait Training, Manual Therapy, Neuromuscular Re-ed, Patient Education, Self Care, Therapeutic Exercise, and Therapeutic Activites     Olinda Blount, PT

## 2023-05-16 ENCOUNTER — CLINICAL SUPPORT (OUTPATIENT)
Dept: REHABILITATION | Facility: HOSPITAL | Age: 63
End: 2023-05-16
Payer: COMMERCIAL

## 2023-05-16 DIAGNOSIS — M25.612 DECREASED RANGE OF MOTION OF LEFT SHOULDER: ICD-10-CM

## 2023-05-16 DIAGNOSIS — M54.12 CERVICAL RADICULOPATHY: Primary | ICD-10-CM

## 2023-05-16 DIAGNOSIS — Z74.09 DECREASED STRENGTH, ENDURANCE, AND MOBILITY: ICD-10-CM

## 2023-05-16 DIAGNOSIS — R68.89 DECREASED STRENGTH, ENDURANCE, AND MOBILITY: ICD-10-CM

## 2023-05-16 DIAGNOSIS — G89.29 CHRONIC LEFT SHOULDER PAIN: ICD-10-CM

## 2023-05-16 DIAGNOSIS — M25.512 CHRONIC LEFT SHOULDER PAIN: ICD-10-CM

## 2023-05-16 DIAGNOSIS — M54.2 NECK PAIN ON LEFT SIDE: ICD-10-CM

## 2023-05-16 DIAGNOSIS — R53.1 DECREASED STRENGTH, ENDURANCE, AND MOBILITY: ICD-10-CM

## 2023-05-16 PROCEDURE — 97112 NEUROMUSCULAR REEDUCATION: CPT | Performed by: PHYSICAL THERAPIST

## 2023-05-16 PROCEDURE — 97110 THERAPEUTIC EXERCISES: CPT | Performed by: PHYSICAL THERAPIST

## 2023-05-16 PROCEDURE — 97140 MANUAL THERAPY 1/> REGIONS: CPT | Performed by: PHYSICAL THERAPIST

## 2023-05-16 NOTE — PROGRESS NOTES
[facility]   Physical Therapy Treatment Note     Name: Angelica Pate [place]  Clinic Number: [id number]    Therapy Diagnosis:   Encounter Diagnoses   Name Primary?    Cervical radiculopathy Yes    Decreased range of motion of left shoulder     Chronic left shoulder pain     Neck pain on left side     Decreased strength, endurance, and mobility      Physician: Sedrick Ferrari MD    Visit Date: 5/16/2023    Physician Orders: PT Eval and Treat  Medical Diagnosis from Referral: chronic left shoulder pain, cervical radiculopathy, neck pain on left side  Evaluation Date: 4/11/2023  Authorization Period Expiration: 12/31/2023  Plan of Care Expiration: 7/10/2023  Progress Note Due: 5/11/2023  Visit # / Visits authorized: 9/25   FOTO: 1/3 (last performed on 4/11/2023)     Precautions: Standard    PTA Visit #: 0/5     Time In: 1004  Time Out: 1050  Total Billable Time: 43 minutes (Billing reflects 1 on 1 treatment time spent with patient)    SUBJECTIVE     Patient reports: continued progress    He/She was compliant with home exercise program.  Response to previous treatment: no adverse reaction  Functional change: improving motion and pain levels    Pain: 0/10     Location: left neck/shoulder region    OBJECTIVE     Objective Measures updated at progress report or POC update only unless otherwise noted.    TREATMENT     Angelica received the treatments listed below:     MANUAL THERAPY TECHNIQUES were applied for (10) minutes, including:    Manual Intervention Performed Today    Soft Tissue Mobilization [x] left upper trapezius, levator scapulae , periscapular musculature, infraspinatus , deltoid, biceps , scapularis release   Joint Mobilizations [] GH jt mobs    []     []    Functional Dry Needling  []      Plan for Next Visit: Continue as needed         THERAPEUTIC EXERCISES to develop strength, endurance, ROM, flexibility, posture, and core stabilization for (23) minutes including:    Intervention  "Performed Today    UBE for posture strength and endurance x 3'/3'   Pulley's - flexion and abduction  3' each   Wand flexion - supine   3 x 10, 3" holds, 2#   Wand ER - supine at 90/90 x 2 x 10, 5" holds   Standing shoulder EXTERNAL ROTATION   3x10 yellow band    Sidelying L shoulder abduction   2 x 10   Chest Stretch at Wall  3x30 seconds   Sidelying L shoulder flexion   2 x 10 (assisted with Lizett)   Updated HEP and handout administered  See Patient Instructions (added for median nerve glides)   PROM L SH  All planes   AAROM shoulder extension with wand- standing  x 2 x 10, 3-5 second holds   Standing IR with wand behind back  x 2 x 10, 3-5 second holds   AAROM wand abduction - standing or seated  x 2 x 10   Progress Note  RE-assessment as above in objective section     Plan for Next Visit:        NEUROMUSCULAR RE-EDUCATION ACTIVITIES to improve Balance, Coordination, Kinesthetic, Sense, Proprioception, and Posture for (10) minutes.  The following were included:    Intervention Performed Today    Scapular Retraction  x 3 x 10, green theraband    Shoulder Extensions x 3x10 red band   Series 6  2' each   Sidelying ER  x 20x, L UE                         Plan for Next Visit:        PATIENT EDUCATION AND HOME EXERCISES     Home Exercises Provided and Patient Education Provided     Education provided:   PURPOSE: Patient educated on the impairments noted above and the effects of physical therapy intervention to improve overall condition and QOL.   EXERCISE: Patient was educated on all the above exercise prior/during/after for proper posture, positioning, and execution for safe performance with home exercise program.   STRENGTH: Patient educated on the importance of improved core and extremity strength in order to improve alignment of the spine and extremities with static positions and dynamic movement.   POSTURE: Patient educated on postural awareness to reduce stress and maintain optimal alignment of the spine with " static positions and dynamic movement     Written Home Exercises Provided: yes.  Exercises were reviewed and Angelica was able to demonstrate them prior to the end of the session.  Angelica demonstrated good  understanding of the education provided. See EMR under Patient Instructions for exercises provided during therapy sessions.    ASSESSMENT     Patient tolerated treatment very well. She continues to demonstrate improving left shoulder range of motion and upper extremity strength. Palpation to trigger points along left subscapularis musculature reproduces some of the numbness and tingling she feels down in to her left hand. This was eliminated following manual therapy to this region, and patient also demonstrated improved left shoulder flexion and abduction range following. She is progressing well towards goals.     Angelica is progressing well towards her goals.   Pt prognosis is Good.     Pt will continue to benefit from skilled outpatient physical therapy to address the deficits listed in the problem list box on initial evaluation, provide pt/family education and to maximize pt's level of independence in the home and community environment.     Pt's spiritual, cultural and educational needs considered and pt agreeable to plan of care and goals.     Anticipated Barriers for therapy: co-morbidities and chronicity of condition    Goals:  Short Term Goals:  6 weeks Status  Date Met   PAIN: Pt will report worst pain of 4/10 in order to progress toward max functional ability and improve quality of life. [] Progressing  [x] Met  [] Not Met 5/10/2023    FUNCTION: Patient will demonstrate improved function as indicated by a functional limitation score of less than or equal to 47 out of 100 on FOTO. [] Progressing  [x] Met  [] Not Met  5/10/2023   MOBILITY: Patient will improve AROM to 50% of stated goals, listed in objective measures above, in order to progress towards independence with functional activities.  []  Progressing  [x] Met  [] Not Met  5/10/2023   STRENGTH: Patient will improve strength to 50% of stated goals, listed in objective measures above, in order to progress towards independence with functional activities. [] Progressing  [x] Met  [] Not Met  5/10/2023   POSTURE: Patient will correct postural deviations in sitting and standing, to decrease pain and promote long term stability.  [] Progressing  [x] Met  [] Not Met  5/10/2023   HEP: Patient will demonstrate independence with HEP in order to progress toward functional independence. [] Progressing  [x] Met  [] Not Met  5/10/2023      Long Term Goals:  12 weeks Status Date Met   PAIN: Pt will report worst pain of 2/10 in order to progress toward max functional ability and improve quality of life [x] Progressing  [] Met  [] Not Met     FUNCTION: Patient will demonstrate improved function as indicated by a functional limitation score of less than or equal to 38 out of 100 on FOTO. [x] Progressing  [] Met  [] Not Met     MOBILITY: Patient will improve AROM to stated goals, listed in objective measures above, in order to return to maximal functional potential and improve quality of life.  [x] Progressing  [] Met  [] Not Met    STRENGTH: Patient will improve strength to stated goals, listed in objective measures above, in order to improve functional independence and quality of life.  [x] Progressing  [] Met  [] Not Met     GAIT: Patient will demonstrate normalized gait mechanics with minimal compensation in order to return to PLOF. [x] Progressing  [] Met  [] Not Met     Patient will return to normal ADL's, IADL's, community involvement, recreational activities, and work-related activities with less than or equal to 0/10 pain and maximal function.  [x] Progressing  [] Met  [] Not Met          PLAN     Continue Plan of Care (POC) and progress per patient tolerance. See treatment section for details on planned progressions next session.    5/10/2023: It is my  recommendation that patient continue with PT at her current frequency of 2 times per week for the remainder of her approved visits. Her treatment plan will remain the same, and she will be progressed appropriately.     4/11/2023 (evaluation): Outpatient Physical Therapy 2 times weekly for 12 weeks to include any combination of the following interventions: virtual visits, dry needling, modalities, electrical stimulation (IFC, Pre-Mod, Attended with Functional Dry Needling), Aquatic Therapy, Cervical/Lumbar Traction, Gait Training, Manual Therapy, Neuromuscular Re-ed, Patient Education, Self Care, Therapeutic Exercise, and Therapeutic Activites     Olinda Blount, PT

## 2023-05-17 DIAGNOSIS — R55 SYNCOPE, UNSPECIFIED SYNCOPE TYPE: ICD-10-CM

## 2023-05-17 DIAGNOSIS — M54.12 CERVICAL RADICULOPATHY: ICD-10-CM

## 2023-05-17 DIAGNOSIS — G56.02 LEFT CARPAL TUNNEL SYNDROME: ICD-10-CM

## 2023-05-17 DIAGNOSIS — I10 ESSENTIAL HYPERTENSION: ICD-10-CM

## 2023-05-17 RX ORDER — METOPROLOL SUCCINATE 25 MG/1
TABLET, EXTENDED RELEASE ORAL
Qty: 90 TABLET | Refills: 0 | Status: SHIPPED | OUTPATIENT
Start: 2023-05-17 | End: 2023-06-19

## 2023-05-17 RX ORDER — AMLODIPINE BESYLATE 5 MG/1
TABLET ORAL
Qty: 90 TABLET | Refills: 0 | Status: SHIPPED | OUTPATIENT
Start: 2023-05-17 | End: 2023-06-19

## 2023-05-17 RX ORDER — TOPIRAMATE 25 MG/1
TABLET ORAL
Qty: 60 TABLET | Refills: 0 | Status: SHIPPED | OUTPATIENT
Start: 2023-05-17 | End: 2023-06-11

## 2023-05-18 ENCOUNTER — CLINICAL SUPPORT (OUTPATIENT)
Dept: REHABILITATION | Facility: HOSPITAL | Age: 63
End: 2023-05-18
Payer: COMMERCIAL

## 2023-05-18 DIAGNOSIS — G89.29 CHRONIC LEFT SHOULDER PAIN: ICD-10-CM

## 2023-05-18 DIAGNOSIS — M25.512 CHRONIC LEFT SHOULDER PAIN: ICD-10-CM

## 2023-05-18 DIAGNOSIS — Z74.09 DECREASED STRENGTH, ENDURANCE, AND MOBILITY: ICD-10-CM

## 2023-05-18 DIAGNOSIS — R68.89 DECREASED STRENGTH, ENDURANCE, AND MOBILITY: ICD-10-CM

## 2023-05-18 DIAGNOSIS — M25.612 DECREASED RANGE OF MOTION OF LEFT SHOULDER: ICD-10-CM

## 2023-05-18 DIAGNOSIS — M54.12 CERVICAL RADICULOPATHY: Primary | ICD-10-CM

## 2023-05-18 DIAGNOSIS — M54.2 NECK PAIN ON LEFT SIDE: ICD-10-CM

## 2023-05-18 DIAGNOSIS — R53.1 DECREASED STRENGTH, ENDURANCE, AND MOBILITY: ICD-10-CM

## 2023-05-18 PROCEDURE — 97110 THERAPEUTIC EXERCISES: CPT | Performed by: PHYSICAL THERAPIST

## 2023-05-18 PROCEDURE — 97140 MANUAL THERAPY 1/> REGIONS: CPT | Performed by: PHYSICAL THERAPIST

## 2023-05-18 PROCEDURE — 97112 NEUROMUSCULAR REEDUCATION: CPT | Performed by: PHYSICAL THERAPIST

## 2023-05-18 NOTE — PROGRESS NOTES
[facility]   Physical Therapy Treatment Note     Name: Angelica Pate [place]  Clinic Number: [id number]    Therapy Diagnosis:   Encounter Diagnoses   Name Primary?    Cervical radiculopathy Yes    Decreased range of motion of left shoulder     Chronic left shoulder pain     Neck pain on left side     Decreased strength, endurance, and mobility      Physician: Sedrick Ferrari MD    Visit Date: 5/18/2023    Physician Orders: PT Eval and Treat  Medical Diagnosis from Referral: chronic left shoulder pain, cervical radiculopathy, neck pain on left side  Evaluation Date: 4/11/2023  Authorization Period Expiration: 12/31/2023  Plan of Care Expiration: 7/10/2023  Progress Note Due: 6/9/2023  Visit # / Visits authorized: 10/25   FOTO: 1/3 (last performed on 4/11/2023)     Precautions: Standard    PTA Visit #: 0/5     Time In: 1432  Time Out: 1518  Total Billable Time: 43 minutes (Billing reflects 1 on 1 treatment time spent with patient)    SUBJECTIVE     Patient reports: that she felt good following manual therapy last visit and continues to feel better overall.     He/She was compliant with home exercise program.  Response to previous treatment: no adverse reaction  Functional change: improving motion and pain levels    Pain: 0/10     Location: left neck/shoulder region    OBJECTIVE     Objective Measures updated at progress report or POC update only unless otherwise noted.    TREATMENT     Angelica received the treatments listed below:     MANUAL THERAPY TECHNIQUES were applied for (10) minutes, including:    Manual Intervention Performed Today    Soft Tissue Mobilization [x] left upper trapezius, levator scapulae , periscapular musculature, infraspinatus , deltoid, biceps , scapularis release   Joint Mobilizations [x] GH jt mobs    []     []    Functional Dry Needling  []      Plan for Next Visit: Continue as needed         THERAPEUTIC EXERCISES to develop strength, endurance, ROM, flexibility,  "posture, and core stabilization for (23) minutes including:    Intervention Performed Today    UBE for posture strength and endurance x 3'/3'   Pulley's - flexion and abduction  3' each   Wand flexion - supine  x 3 x 10, 3" holds, 3#   Wand ER - supine at 90/90 x 2 x 10, 5" holds   Standing shoulder EXTERNAL ROTATION   3x10 yellow band    Chest Stretch at Wall  3x30 seconds   PROM L SH x All planes   AAROM shoulder extension with wand- standing  x 2 x 10, 3-5 second holds   Standing IR with wand behind back  x 2 x 10, 3-5 second holds   AAROM wand abduction - standing or seated  x 2 x 10   Chest press with wand  x 3 x 10, 3#   Progress Note  RE-assessment as above in objective section     Plan for Next Visit:        NEUROMUSCULAR RE-EDUCATION ACTIVITIES to improve Balance, Coordination, Kinesthetic, Sense, Proprioception, and Posture for (10) minutes.  The following were included:    Intervention Performed Today    Scapular Retraction  x 3 x 10, green theraband    Shoulder Extensions x 3x10 green theraband   Series 6  2' each   Sidelying ER  x 20x, L UE                         Plan for Next Visit:        PATIENT EDUCATION AND HOME EXERCISES     Home Exercises Provided and Patient Education Provided     Education provided:   PURPOSE: Patient educated on the impairments noted above and the effects of physical therapy intervention to improve overall condition and QOL.   EXERCISE: Patient was educated on all the above exercise prior/during/after for proper posture, positioning, and execution for safe performance with home exercise program.   STRENGTH: Patient educated on the importance of improved core and extremity strength in order to improve alignment of the spine and extremities with static positions and dynamic movement.   POSTURE: Patient educated on postural awareness to reduce stress and maintain optimal alignment of the spine with static positions and dynamic movement     Written Home Exercises Provided: " yes.  Exercises were reviewed and Angelica was able to demonstrate them prior to the end of the session.  Angelica demonstrated good  understanding of the education provided. See EMR under Patient Instructions for exercises provided during therapy sessions.    ASSESSMENT     Patient tolerated treatment very well. She continues to demonstrate improving left shoulder range of motion and upper extremity strength. Decreased soft tissue adhesions along left subscapularis musculature, and today palpation did not reproduce any numbness and tingling down into her left hand. Discussed sleeper stretch as an addition to HEP, and patient demonstrated understanding. She reported good relief following GH jt mobilizations this visit, demonstrating improved AROM functional IT following.      Angelica is progressing well towards her goals.   Pt prognosis is Good.     Pt will continue to benefit from skilled outpatient physical therapy to address the deficits listed in the problem list box on initial evaluation, provide pt/family education and to maximize pt's level of independence in the home and community environment.     Pt's spiritual, cultural and educational needs considered and pt agreeable to plan of care and goals.     Anticipated Barriers for therapy: co-morbidities and chronicity of condition    Goals:  Short Term Goals:  6 weeks Status  Date Met   PAIN: Pt will report worst pain of 4/10 in order to progress toward max functional ability and improve quality of life. [] Progressing  [x] Met  [] Not Met 5/10/2023    FUNCTION: Patient will demonstrate improved function as indicated by a functional limitation score of less than or equal to 47 out of 100 on FOTO. [] Progressing  [x] Met  [] Not Met  5/10/2023   MOBILITY: Patient will improve AROM to 50% of stated goals, listed in objective measures above, in order to progress towards independence with functional activities.  [] Progressing  [x] Met  [] Not Met  5/10/2023   STRENGTH:  Patient will improve strength to 50% of stated goals, listed in objective measures above, in order to progress towards independence with functional activities. [] Progressing  [x] Met  [] Not Met  5/10/2023   POSTURE: Patient will correct postural deviations in sitting and standing, to decrease pain and promote long term stability.  [] Progressing  [x] Met  [] Not Met  5/10/2023   HEP: Patient will demonstrate independence with HEP in order to progress toward functional independence. [] Progressing  [x] Met  [] Not Met  5/10/2023      Long Term Goals:  12 weeks Status Date Met   PAIN: Pt will report worst pain of 2/10 in order to progress toward max functional ability and improve quality of life [x] Progressing  [] Met  [] Not Met     FUNCTION: Patient will demonstrate improved function as indicated by a functional limitation score of less than or equal to 38 out of 100 on FOTO. [x] Progressing  [] Met  [] Not Met     MOBILITY: Patient will improve AROM to stated goals, listed in objective measures above, in order to return to maximal functional potential and improve quality of life.  [x] Progressing  [] Met  [] Not Met    STRENGTH: Patient will improve strength to stated goals, listed in objective measures above, in order to improve functional independence and quality of life.  [x] Progressing  [] Met  [] Not Met     GAIT: Patient will demonstrate normalized gait mechanics with minimal compensation in order to return to PLOF. [x] Progressing  [] Met  [] Not Met     Patient will return to normal ADL's, IADL's, community involvement, recreational activities, and work-related activities with less than or equal to 0/10 pain and maximal function.  [x] Progressing  [] Met  [] Not Met          PLAN     Continue Plan of Care (POC) and progress per patient tolerance. See treatment section for details on planned progressions next session.    5/10/2023: It is my recommendation that patient continue with PT at her current  frequency of 2 times per week for the remainder of her approved visits. Her treatment plan will remain the same, and she will be progressed appropriately.     4/11/2023 (evaluation): Outpatient Physical Therapy 2 times weekly for 12 weeks to include any combination of the following interventions: virtual visits, dry needling, modalities, electrical stimulation (IFC, Pre-Mod, Attended with Functional Dry Needling), Aquatic Therapy, Cervical/Lumbar Traction, Gait Training, Manual Therapy, Neuromuscular Re-ed, Patient Education, Self Care, Therapeutic Exercise, and Therapeutic Activites     Olinda Blount, PT

## 2023-05-22 ENCOUNTER — OFFICE VISIT (OUTPATIENT)
Dept: CARDIOLOGY | Facility: CLINIC | Age: 63
End: 2023-05-22
Payer: COMMERCIAL

## 2023-05-22 VITALS
HEART RATE: 66 BPM | OXYGEN SATURATION: 99 % | WEIGHT: 197.31 LBS | DIASTOLIC BLOOD PRESSURE: 76 MMHG | SYSTOLIC BLOOD PRESSURE: 118 MMHG | HEIGHT: 60 IN | BODY MASS INDEX: 38.74 KG/M2

## 2023-05-22 DIAGNOSIS — E78.5 HYPERLIPIDEMIA, UNSPECIFIED HYPERLIPIDEMIA TYPE: ICD-10-CM

## 2023-05-22 DIAGNOSIS — I10 ESSENTIAL HYPERTENSION: Primary | ICD-10-CM

## 2023-05-22 DIAGNOSIS — G89.29 CHRONIC LEFT SHOULDER PAIN: ICD-10-CM

## 2023-05-22 DIAGNOSIS — M25.512 CHRONIC LEFT SHOULDER PAIN: ICD-10-CM

## 2023-05-22 PROCEDURE — 4010F PR ACE/ARB THEARPY RXD/TAKEN: ICD-10-PCS | Mod: CPTII,S$GLB,, | Performed by: INTERNAL MEDICINE

## 2023-05-22 PROCEDURE — 3008F PR BODY MASS INDEX (BMI) DOCUMENTED: ICD-10-PCS | Mod: CPTII,S$GLB,, | Performed by: INTERNAL MEDICINE

## 2023-05-22 PROCEDURE — 3078F PR MOST RECENT DIASTOLIC BLOOD PRESSURE < 80 MM HG: ICD-10-PCS | Mod: CPTII,S$GLB,, | Performed by: INTERNAL MEDICINE

## 2023-05-22 PROCEDURE — 1159F MED LIST DOCD IN RCRD: CPT | Mod: CPTII,S$GLB,, | Performed by: INTERNAL MEDICINE

## 2023-05-22 PROCEDURE — 1159F PR MEDICATION LIST DOCUMENTED IN MEDICAL RECORD: ICD-10-PCS | Mod: CPTII,S$GLB,, | Performed by: INTERNAL MEDICINE

## 2023-05-22 PROCEDURE — 3074F PR MOST RECENT SYSTOLIC BLOOD PRESSURE < 130 MM HG: ICD-10-PCS | Mod: CPTII,S$GLB,, | Performed by: INTERNAL MEDICINE

## 2023-05-22 PROCEDURE — 3078F DIAST BP <80 MM HG: CPT | Mod: CPTII,S$GLB,, | Performed by: INTERNAL MEDICINE

## 2023-05-22 PROCEDURE — 99999 PR PBB SHADOW E&M-EST. PATIENT-LVL III: ICD-10-PCS | Mod: PBBFAC,,, | Performed by: INTERNAL MEDICINE

## 2023-05-22 PROCEDURE — 99999 PR PBB SHADOW E&M-EST. PATIENT-LVL III: CPT | Mod: PBBFAC,,, | Performed by: INTERNAL MEDICINE

## 2023-05-22 PROCEDURE — 4010F ACE/ARB THERAPY RXD/TAKEN: CPT | Mod: CPTII,S$GLB,, | Performed by: INTERNAL MEDICINE

## 2023-05-22 PROCEDURE — 99214 OFFICE O/P EST MOD 30 MIN: CPT | Mod: S$GLB,,, | Performed by: INTERNAL MEDICINE

## 2023-05-22 PROCEDURE — 3074F SYST BP LT 130 MM HG: CPT | Mod: CPTII,S$GLB,, | Performed by: INTERNAL MEDICINE

## 2023-05-22 PROCEDURE — 99214 PR OFFICE/OUTPT VISIT, EST, LEVL IV, 30-39 MIN: ICD-10-PCS | Mod: S$GLB,,, | Performed by: INTERNAL MEDICINE

## 2023-05-22 PROCEDURE — 3008F BODY MASS INDEX DOCD: CPT | Mod: CPTII,S$GLB,, | Performed by: INTERNAL MEDICINE

## 2023-05-22 NOTE — PROGRESS NOTES
Subjective:   Patient ID:  Angelica Flores is a 62 y.o. female who presents for evaluation of No chief complaint on file.      HPI  5.22.2023  Comes in for six-month follow-up.    Doing very well.    Denies any complaints.    No syncope presyncope   Blood pressure control.    Undergoing rehab for her shoulder.    No chest pain, dyspnea on exertion.  Lower extremity swelling.  Palpitations.    Holter monitor benign.        8.4.2022  63 yo female, crohn's disease, htn, asthma, anxiety   She comes in for two episodes of syncope   Two months and a month ago ,   First episode happened while at festival, was hot, but states was hydrating all day and was not drinking any alcohol and was standing , she let the person next to her to know she was going to lose consciousness , for few seconds, no hospitalization, immediately felt better   Second [place], had two glasses of wine, stood up  To go home and also passed out shortly, normal afterwards, felt it happening,   Gets hot , started to feel fainting and flushed sensation   She had occular migraine 25 years ago with similar episode  No chest pain, no dyspnea, no leg swelling     Past Medical History:   Diagnosis Date    Anxiety     Asthma     Crohn's disease     Fibroids     Hyperlipidemia     Hypertension     Iritis     Migraine headache     Ocular    Osteopenia 08/2019    Syncope and collapse     Vitamin D deficiency disease        Past Surgical History:   Procedure Laterality Date    ANKLE FRACTURE SURGERY  08/19/08    right ankle    BREAST BIOPSY Left 1977    COLONOSCOPY N/A 8/11/2017    Procedure: COLONOSCOPY - REQUESTS DR. MATTI ACUNA;  Surgeon: Matti Acuna III, MD;  Location: [facility];  Service: Endoscopy;  Laterality: N/A;    COLONOSCOPY N/A 11/23/2020    Procedure: COLONOSCOPY;  Surgeon: Matti Acuna III, MD;  Location: [facility];  Service: Endoscopy;  Laterality: N/A;    COLONOSCOPY N/A 9/28/2022    Procedure: COLONOSCOPY;   Surgeon: Lalo Wood III, MD;  Location: [facility];  Service: Endoscopy;  Laterality: N/A;    TAHBSO  February 2011    Due to abnormal pap smear and fibroids    TOTAL ABDOMINAL HYSTERECTOMY         Social History     Tobacco Use    Smoking status: Never    Smokeless tobacco: Never   Substance Use Topics    Alcohol use: Yes     Alcohol/week: 0.0 standard drinks     Comment: ocassionally    Drug use: No       Family History   Problem Relation Age of Onset    Arthritis Mother     Fuch's dystrophy Mother     Breast cancer Mother     Lupus Sister     Rheum arthritis Sister     Obesity Sister     Hypertension Father     Cancer Maternal Grandfather         lung ca    Stroke Maternal Grandmother     Diabetes Maternal Grandmother     Colon cancer Paternal Aunt        Review of Systems   Constitutional: Negative for fever and malaise/fatigue.   HENT:  Negative for sore throat.    Eyes:  Negative for blurred vision.   Cardiovascular:  Positive for syncope. Negative for chest pain, claudication, cyanosis, dyspnea on exertion, irregular heartbeat, leg swelling, near-syncope, orthopnea, palpitations and paroxysmal nocturnal dyspnea.   Respiratory:  Negative for cough and hemoptysis.    Hematologic/Lymphatic: Negative for bleeding problem.   Skin:  Negative for rash.   Musculoskeletal:  Negative for falls.   Gastrointestinal:  Negative for abdominal pain.   Genitourinary: Negative.    Psychiatric/Behavioral:  Negative for altered mental status and substance abuse.      Current Outpatient Medications on File Prior to Visit   Medication Sig    adalimumab (HUMIRA,CF, PEN) 40 mg/0.4 mL PnKt Inject 0.4 mLs (40 mg total) into the skin every 7 days.    albuterol (PROVENTIL/VENTOLIN HFA) 90 mcg/actuation inhaler Inhale 2 puffs into the lungs every 6 (six) hours as needed for Wheezing or Shortness of Breath.    ALPRAZolam (XANAX) 0.5 MG tablet Take 1 tablet by mouth twice daily as needed    amLODIPine (NORVASC) 5 MG tablet Take 1  tablet by mouth once daily    ascorbic acid, vitamin C, (VITAMIN C) 1000 MG tablet Take by mouth. 1 Tablet Oral Every day    azaTHIOprine (IMURAN) 50 mg Tab Take 3 tablets (150 mg total) by mouth once daily.    azelastine (ASTELIN) 137 mcg (0.1 %) nasal spray 2 sprays (274 mcg total) by Nasal route 2 (two) times daily.    BACILLUS COAGULANS (PROBIOTIC, B. COAGULANS, ORAL) Take by mouth once daily.    cetirizine (ALLERGY RELIEF, CETIRIZINE,) 10 MG tablet Take 1 tablet (10 mg total) by mouth once daily.    cholecalciferol, vitamin D3, 2,000 unit Cap Take by mouth. 1 capsule Oral Every day    dicyclomine (BENTYL) 20 mg tablet Take 1 tablet (20 mg total) by mouth 3 (three) times daily as needed (abdominal pain).    hepatitis B virus vacc.rec,PF, (ENGERIX-B) 20 mcg/mL Syrg Inject into the muscle.    hydroCHLOROthiazide (HYDRODIURIL) 25 MG tablet Take 1 tablet (25 mg total) by mouth once daily.    hydroquinone 4 % Crea Apply to dark spots once daily. Use with sunscreen if outdoors    ibuprofen (ADVIL,MOTRIN) 800 MG tablet Take 1 tablet (800 mg total) by mouth 2 (two) times daily as needed.    ipratropium (ATROVENT) 21 mcg (0.03 %) nasal spray 2 sprays by Nasal route 3 (three) times daily.    lisinopriL (PRINIVIL,ZESTRIL) 40 MG tablet TAKE 1 TABLET BY MOUTH ONCE DAILY.    methocarbamoL (ROBAXIN) 500 MG Tab Take 1 tablet once or twice a day as needed for muscle spasm and neck pain.    metoprolol succinate (TOPROL-XL) 25 MG 24 hr tablet Take 1 tablet by mouth once daily    montelukast (SINGULAIR) 10 mg tablet Take 1 tablet (10 mg total) by mouth every evening.    multivitamin-Ca-iron-minerals 18-0.4 mg Tab Take by mouth. 1 Tablet Oral Every day    pneumoc 20-shani conj-dip cr,PF, (PREVNAR-20, PF,) 0.5 mL Syrg injection Inject 0.5 mLs into the muscle.    potassium bicarbonate disintegrating tablet Take 10 mEq by mouth 2 (two) times daily.    pravastatin (PRAVACHOL) 80 MG tablet Take 1 tablet by mouth in the evening     topiramate (TOPAMAX) 25 MG tablet TAKE 1 TABLET BY MOUTH AT NIGHT FOR 5 DAYS AND THEN TAKE 2 TABLETS AT NIGHT THEREAFTER    tretinoin (RETIN-A) 0.025 % cream Apply pea-sized amount to entire face at bedtime.  If dryness, use every third night and increase as tolerated to every night.    valACYclovir (VALTREX) 1000 MG tablet TAKE 2 TABLETS BY MOUTH TWICE DAILY FOR 1 DAY PER EPISODE    zinc sulfate (ZINC-220 ORAL)      No current facility-administered medications on file prior to visit.       Objective:   Objective:  Wt Readings from Last 3 Encounters:   05/22/23 89.5 kg (197 lb 5 oz)   04/27/23 89.4 kg (197 lb 1.5 oz)   04/14/23 91.2 kg (201 lb)     Temp Readings from Last 3 Encounters:   02/23/23 97.8 °F (36.6 °C)   12/19/22 98.1 °F (36.7 °C)   09/28/22 97.2 °F (36.2 °C) (Temporal)     BP Readings from Last 3 Encounters:   05/22/23 118/76   04/27/23 117/74   04/14/23 125/84     Pulse Readings from Last 3 Encounters:   05/22/23 66   04/27/23 64   04/14/23 (!) 59       Physical Exam  Vitals reviewed.   Constitutional:       Appearance: She is well-developed.   HENT:      Head: Normocephalic and atraumatic.   Eyes:      General: No scleral icterus.     Conjunctiva/sclera: Conjunctivae normal.   Cardiovascular:      Rate and Rhythm: Normal rate and regular rhythm.      Pulses: Intact distal pulses.      Heart sounds: Normal heart sounds. No murmur heard.  Pulmonary:      Effort: No respiratory distress.      Breath sounds: No wheezing or rales.   Chest:      Chest wall: No tenderness.   Abdominal:      General: Bowel sounds are normal. There is no distension.      Palpations: Abdomen is soft.      Tenderness: There is no guarding.   Musculoskeletal:         General: Normal range of motion.      Cervical back: Normal range of motion and neck supple.   Skin:     General: Skin is warm.   Neurological:      Mental Status: She is alert and oriented to person, place, and time.       Lab Results   Component Value Date    CHOL  220 (H) 08/23/2022    CHOL 147 08/11/2021    CHOL 190 07/31/2020     Lab Results   Component Value Date    HDL 63 08/23/2022    HDL 50 08/11/2021    HDL 74 07/31/2020     Lab Results   Component Value Date    LDLCALC 142.8 08/23/2022    LDLCALC 82.0 08/11/2021    LDLCALC 106.6 07/31/2020     Lab Results   Component Value Date    TRIG 71 08/23/2022    TRIG 75 08/11/2021    TRIG 47 07/31/2020     Lab Results   Component Value Date    CHOLHDL 28.6 08/23/2022    CHOLHDL 34.0 08/11/2021    CHOLHDL 38.9 07/31/2020       Chemistry        Component Value Date/Time     03/03/2023 1358    K 4.2 03/03/2023 1358     03/03/2023 1358    CO2 23 03/03/2023 1358    BUN 16 03/03/2023 1358    CREATININE 0.8 03/03/2023 1358    GLU 97 03/03/2023 1358        Component Value Date/Time    CALCIUM 10.4 03/03/2023 1358    ALKPHOS 60 03/03/2023 1358    AST 26 03/03/2023 1358    ALT 25 03/03/2023 1358    BILITOT 0.7 03/03/2023 1358    ESTGFRAFRICA >60.0 08/11/2021 0950    EGFRNONAA >60.0 08/11/2021 0950          Lab Results   Component Value Date    TSH 0.769 08/23/2022     Lab Results   Component Value Date    INR 0.9 08/18/2008     Lab Results   Component Value Date    WBC 10.70 03/03/2023    HGB 12.8 03/03/2023    HCT 41.0 03/03/2023    MCV 99 (H) 03/03/2023     03/03/2023     BNP  @LABRCNTIP(BNP,BNPTRIAGEBLO)@  CrCl cannot be calculated (Patient's most recent lab result is older than the maximum 7 days allowed.).     Imaging:  ======  No results found for this or any previous visit.    No results found for this or any previous visit.    No results found for this or any previous visit.    Results for orders placed in visit on 02/11/11    X-Ray Chest PA And Lateral    Narrative  DATE OF EXAM: Feb 23 2011    GEN   0012  -  CHEST PA & LAT:   1123 HOURS  [id number]    CLINICAL HISTORY:   V72.84 0 PREOP EXAMINATION NOS    PROCEDURE COMMENT:    ICD 9 CODE(S):   ()    CPT 4 CODE(S)/MODIFIER(S):   ()    RESULTS: COMPARISON -  11/2009.    NORMAL CHEST RADIOGRAPH.    IMPRESSION: NORMAL CHEST RADIOGRAPH.      : florentin  Transcribe Date/Time: Feb 23 2011  1:51P  Dictated by : JAYSHREE YORK MD  Read On: Feb 23 2011 11:25A  JAYSHREE YORK M.D.  [id number]  Images were reviewed, findings were verified and document was  electronically  SIGNED BY: JAYSHREE YORK MD On: Feb 24 2011  3:43P    No results found for this or any previous visit.    No valid procedures specified.    Diagnostic Results:  ECG: Reviewed    The 10-year ASCVD risk score (Herb CUNNINGHAM, et al., 2019) is: 6.9%    Values used to calculate the score:      Age: 62 years      Sex: Female      Is Non- : Yes      Diabetic: No      Tobacco smoker: No      Systolic Blood Pressure: 118 mmHg      Is BP treated: Yes      HDL Cholesterol: 63 mg/dL      Total Cholesterol: 220 mg/dL    Assessment and Plan:   Essential hypertension    Hyperlipidemia, unspecified hyperlipidemia type    Chronic left shoulder pain    BMI 38.0-38.9,adult      Angina free, htn controlled, euvolemic  cw compression stockings.  Hydration.  Lower body exercise.  Reviewed all tests and above medical conditions with patient in detail and formulated treatment plan.  Risk factor modification discussed.   Cardiac low salt diet discussed.  Maintaining healthy weight and weight loss goals were discussed in clinic.    Follow up in 6 months

## 2023-05-23 ENCOUNTER — CLINICAL SUPPORT (OUTPATIENT)
Dept: REHABILITATION | Facility: HOSPITAL | Age: 63
End: 2023-05-23
Payer: COMMERCIAL

## 2023-05-23 DIAGNOSIS — Z74.09 DECREASED STRENGTH, ENDURANCE, AND MOBILITY: ICD-10-CM

## 2023-05-23 DIAGNOSIS — M54.2 NECK PAIN ON LEFT SIDE: ICD-10-CM

## 2023-05-23 DIAGNOSIS — R68.89 DECREASED STRENGTH, ENDURANCE, AND MOBILITY: ICD-10-CM

## 2023-05-23 DIAGNOSIS — R53.1 DECREASED STRENGTH, ENDURANCE, AND MOBILITY: ICD-10-CM

## 2023-05-23 DIAGNOSIS — M25.612 DECREASED RANGE OF MOTION OF LEFT SHOULDER: ICD-10-CM

## 2023-05-23 DIAGNOSIS — M54.12 CERVICAL RADICULOPATHY: Primary | ICD-10-CM

## 2023-05-23 DIAGNOSIS — G89.29 CHRONIC LEFT SHOULDER PAIN: ICD-10-CM

## 2023-05-23 DIAGNOSIS — M25.512 CHRONIC LEFT SHOULDER PAIN: ICD-10-CM

## 2023-05-23 PROCEDURE — 97110 THERAPEUTIC EXERCISES: CPT | Performed by: PHYSICAL THERAPIST

## 2023-05-23 PROCEDURE — 97140 MANUAL THERAPY 1/> REGIONS: CPT | Performed by: PHYSICAL THERAPIST

## 2023-05-23 PROCEDURE — 97112 NEUROMUSCULAR REEDUCATION: CPT | Performed by: PHYSICAL THERAPIST

## 2023-05-23 NOTE — PROGRESS NOTES
[facility]   Physical Therapy Treatment Note     Name: Angelica Pate [facility] Number: [id number]    Therapy Diagnosis:   Encounter Diagnoses   Name Primary?    Cervical radiculopathy Yes    Decreased range of motion of left shoulder     Chronic left shoulder pain     Neck pain on left side     Decreased strength, endurance, and mobility      Physician: Sedrick Ferrari MD    Visit Date: 5/23/2023    Physician Orders: PT Eval and Treat  Medical Diagnosis from Referral: chronic left shoulder pain, cervical radiculopathy, neck pain on left side  Evaluation Date: 4/11/2023  Authorization Period Expiration: 12/31/2023  Plan of Care Expiration: 7/10/2023  Progress Note Due: 6/9/2023  Visit # / Visits authorized: 11/25   FOTO: 1/3 (last performed on 4/11/2023)     Precautions: Standard    PTA Visit #: 0/5     Time In: 1603  Time Out: 1650  Total Billable Time: 43 minutes (Billing reflects 1 on 1 treatment time spent with patient)    SUBJECTIVE     Patient reports: that she continues to feel better overall. She is able to do so much more functionally, such as washing her hair, getting dressed, doing chores around the house.     He/She was compliant with home exercise program.  Response to previous treatment: no adverse reaction  Functional change: improving motion and pain levels    Pain: 0/10     Location: left neck/shoulder region    OBJECTIVE     Objective Measures updated at progress report or POC update only unless otherwise noted.    TREATMENT     Angelica received the treatments listed below:     MANUAL THERAPY TECHNIQUES were applied for (10) minutes, including:    Manual Intervention Performed Today    Soft Tissue Mobilization [x] left upper trapezius, levator scapulae , periscapular musculature, infraspinatus , deltoid, biceps , scapularis release   Joint Mobilizations [x] GH jt mobs    []     []    Functional Dry Needling  []      Plan for Next Visit: Continue as needed      "    THERAPEUTIC EXERCISES to develop strength, endurance, ROM, flexibility, posture, and core stabilization for (23) minutes including:    Intervention Performed Today    UBE for posture strength and endurance x 3'/3'   Pulley's - flexion and abduction  3' each   Wand flexion - supine  x 3 x 10, 3" holds, 3#   Wand ER - supine at 90/90 x 2 x 10, 5" holds   Standing shoulder EXTERNAL ROTATION   3x10 yellow band    Chest Stretch at Wall  3x30 seconds   PROM L SH x All planes   AAROM shoulder extension with wand- standing  x 2 x 10, 3-5 second holds   Standing IR with wand behind back  x 2 x 10, 3-5 second holds   AAROM wand abduction - standing or seated  x 2 x 10   Chest press with wand  x 3 x 10, 3#   AROM shoulder flexion to tolerance x 2 x 10 (added)   AROM shoulder abduction to tolerance x 2 x 10 (added)             Progress Note  RE-assessment as above in objective section     Plan for Next Visit:        NEUROMUSCULAR RE-EDUCATION ACTIVITIES to improve Balance, Coordination, Kinesthetic, Sense, Proprioception, and Posture for (10) minutes.  The following were included:    Intervention Performed Today    Scapular Retraction  x 3 x 10, green theraband    Shoulder Extensions x 3x10 green theraband   Series 6  2' each   Sidelying ER   20x, L UE   Prone row x 2 x 10, L UE   Prone T in modified position  - elbow bent x 2 x 10, L UE               Plan for Next Visit:        PATIENT EDUCATION AND HOME EXERCISES     Home Exercises Provided and Patient Education Provided     Education provided:   PURPOSE: Patient educated on the impairments noted above and the effects of physical therapy intervention to improve overall condition and QOL.   EXERCISE: Patient was educated on all the above exercise prior/during/after for proper posture, positioning, and execution for safe performance with home exercise program.   STRENGTH: Patient educated on the importance of improved core and extremity strength in order to improve " alignment of the spine and extremities with static positions and dynamic movement.   POSTURE: Patient educated on postural awareness to reduce stress and maintain optimal alignment of the spine with static positions and dynamic movement     Written Home Exercises Provided: yes.  Exercises were reviewed and Angelica was able to demonstrate them prior to the end of the session.  Angelica demonstrated good  understanding of the education provided. See EMR under Patient Instructions for exercises provided during therapy sessions.    ASSESSMENT     Patient did well with treatment today and continues to progress very well towards goals. She presents again today with improved glenohumeral joint mobility and range of motion. Progressed to AROM shoulder flexion and abduction per tolerance today with out issue, although patient is still limited, especially in abduction AROM.     Angelica is progressing well towards her goals.   Pt prognosis is Good.     Pt will continue to benefit from skilled outpatient physical therapy to address the deficits listed in the problem list box on initial evaluation, provide pt/family education and to maximize pt's level of independence in the home and community environment.     Pt's spiritual, cultural and educational needs considered and pt agreeable to plan of care and goals.     Anticipated Barriers for therapy: co-morbidities and chronicity of condition    Goals:  Short Term Goals:  6 weeks Status  Date Met   PAIN: Pt will report worst pain of 4/10 in order to progress toward max functional ability and improve quality of life. [] Progressing  [x] Met  [] Not Met 5/10/2023    FUNCTION: Patient will demonstrate improved function as indicated by a functional limitation score of less than or equal to 47 out of 100 on FOTO. [] Progressing  [x] Met  [] Not Met  5/10/2023   MOBILITY: Patient will improve AROM to 50% of stated goals, listed in objective measures above, in order to progress towards  independence with functional activities.  [] Progressing  [x] Met  [] Not Met  5/10/2023   STRENGTH: Patient will improve strength to 50% of stated goals, listed in objective measures above, in order to progress towards independence with functional activities. [] Progressing  [x] Met  [] Not Met  5/10/2023   POSTURE: Patient will correct postural deviations in sitting and standing, to decrease pain and promote long term stability.  [] Progressing  [x] Met  [] Not Met  5/10/2023   HEP: Patient will demonstrate independence with HEP in order to progress toward functional independence. [] Progressing  [x] Met  [] Not Met  5/10/2023      Long Term Goals:  12 weeks Status Date Met   PAIN: Pt will report worst pain of 2/10 in order to progress toward max functional ability and improve quality of life [x] Progressing  [] Met  [] Not Met     FUNCTION: Patient will demonstrate improved function as indicated by a functional limitation score of less than or equal to 38 out of 100 on FOTO. [x] Progressing  [] Met  [] Not Met     MOBILITY: Patient will improve AROM to stated goals, listed in objective measures above, in order to return to maximal functional potential and improve quality of life.  [x] Progressing  [] Met  [] Not Met    STRENGTH: Patient will improve strength to stated goals, listed in objective measures above, in order to improve functional independence and quality of life.  [x] Progressing  [] Met  [] Not Met     GAIT: Patient will demonstrate normalized gait mechanics with minimal compensation in order to return to PLOF. [x] Progressing  [] Met  [] Not Met     Patient will return to normal ADL's, IADL's, community involvement, recreational activities, and work-related activities with less than or equal to 0/10 pain and maximal function.  [x] Progressing  [] Met  [] Not Met          PLAN     Continue Plan of Care (POC) and progress per patient tolerance. See treatment section for details on planned progressions  next session.    5/10/2023: It is my recommendation that patient continue with PT at her current frequency of 2 times per week for the remainder of her approved visits. Her treatment plan will remain the same, and she will be progressed appropriately.     4/11/2023 (evaluation): Outpatient Physical Therapy 2 times weekly for 12 weeks to include any combination of the following interventions: virtual visits, dry needling, modalities, electrical stimulation (IFC, Pre-Mod, Attended with Functional Dry Needling), Aquatic Therapy, Cervical/Lumbar Traction, Gait Training, Manual Therapy, Neuromuscular Re-ed, Patient Education, Self Care, Therapeutic Exercise, and Therapeutic Activites     Olinda lBount, PT

## 2023-05-30 ENCOUNTER — CLINICAL SUPPORT (OUTPATIENT)
Dept: REHABILITATION | Facility: HOSPITAL | Age: 63
End: 2023-05-30
Payer: COMMERCIAL

## 2023-05-30 DIAGNOSIS — M54.12 CERVICAL RADICULOPATHY: Primary | ICD-10-CM

## 2023-05-30 DIAGNOSIS — Z74.09 DECREASED STRENGTH, ENDURANCE, AND MOBILITY: ICD-10-CM

## 2023-05-30 DIAGNOSIS — R53.1 DECREASED STRENGTH, ENDURANCE, AND MOBILITY: ICD-10-CM

## 2023-05-30 DIAGNOSIS — M54.2 NECK PAIN ON LEFT SIDE: ICD-10-CM

## 2023-05-30 DIAGNOSIS — G89.29 CHRONIC LEFT SHOULDER PAIN: ICD-10-CM

## 2023-05-30 DIAGNOSIS — M25.512 CHRONIC LEFT SHOULDER PAIN: ICD-10-CM

## 2023-05-30 DIAGNOSIS — R68.89 DECREASED STRENGTH, ENDURANCE, AND MOBILITY: ICD-10-CM

## 2023-05-30 DIAGNOSIS — M25.612 DECREASED RANGE OF MOTION OF LEFT SHOULDER: ICD-10-CM

## 2023-05-30 PROCEDURE — 97140 MANUAL THERAPY 1/> REGIONS: CPT | Performed by: PHYSICAL THERAPIST

## 2023-05-30 PROCEDURE — 97112 NEUROMUSCULAR REEDUCATION: CPT | Performed by: PHYSICAL THERAPIST

## 2023-05-30 PROCEDURE — 97110 THERAPEUTIC EXERCISES: CPT | Performed by: PHYSICAL THERAPIST

## 2023-05-30 NOTE — PROGRESS NOTES
[facility]   Physical Therapy Treatment Note     Name: Angelica Pate [place]  Clinic Number: [id number]    Therapy Diagnosis:   Encounter Diagnoses   Name Primary?    Cervical radiculopathy Yes    Decreased range of motion of left shoulder     Chronic left shoulder pain     Neck pain on left side     Decreased strength, endurance, and mobility      Physician: Sedrick Ferrari MD    Visit Date: 5/30/2023    Physician Orders: PT Eval and Treat  Medical Diagnosis from Referral: chronic left shoulder pain, cervical radiculopathy, neck pain on left side  Evaluation Date: 4/11/2023  Authorization Period Expiration: 12/31/2023  Plan of Care Expiration: 7/10/2023  Progress Note Due: 6/9/2023  Visit # / Visits authorized: 12/25   FOTO: 1/3 (last performed on 4/11/2023)     Precautions: Standard    PTA Visit #: 0/5     Time In: 1645  Time Out: 1735  Total Billable Time: 45 minutes (Billing reflects 1 on 1 treatment time spent with patient)    SUBJECTIVE     Patient reports: feeling good. She continues to feel better overall, and she is improving functionally with reaching, lifting, etc.     He/She was compliant with home exercise program.  Response to previous treatment: no adverse reaction  Functional change: improving motion and pain levels    Pain: 0/10     Location: left neck/shoulder region    OBJECTIVE     Objective Measures updated at progress report or POC update only unless otherwise noted.    TREATMENT     Angelica received the treatments listed below:     MANUAL THERAPY TECHNIQUES were applied for (8) minutes, including:    Manual Intervention Performed Today    Soft Tissue Mobilization [x] left upper trapezius, levator scapulae , periscapular musculature, infraspinatus , deltoid, biceps , scapularis release   Joint Mobilizations [x] GH jt mobs    []     []    Functional Dry Needling  []      Plan for Next Visit: Continue as needed         THERAPEUTIC EXERCISES to develop strength,  "endurance, ROM, flexibility, posture, and core stabilization for (25) minutes including:    Intervention Performed Today    UBE for posture strength and endurance x 3'/3'   Pulley's - flexion and abduction  3' each   Wand flexion - supine   3 x 10, 3" holds, 3#   Wand ER - supine at 90/90  2 x 10, 5" holds   Chest Stretch at Wall  3x30 seconds   PROM L SH x All planes   AAROM shoulder extension with wand- standing  x 3 x 10, 3-5 second holds   Standing IR with wand behind back  x 3 x 10, 3-5 second holds   AAROM wand abduction - standing or seated  x 2 x 10   Chest press with wand   3 x 10, 3#   AROM shoulder flexion to tolerance x 2 x 10    AROM shoulder abduction to tolerance x 2 x 10    AAROM - standing wand up corner of wall with OH lift off at end range (added) x 2 x 10   Open Book (added) x 10x, 5-10 second holds each side   Progress Note  RE-assessment as above in objective section     Plan for Next Visit:        NEUROMUSCULAR RE-EDUCATION ACTIVITIES to improve Balance, Coordination, Kinesthetic, Sense, Proprioception, and Posture for (12) minutes.  The following were included:    Intervention Performed Today    Scapular Retraction  x 3 x 10, green theraband    Shoulder Extensions x 3x10 green theraband   Standing ER/IR - theraband x 2 x 10, yellow theraband    Series 6  2' each   Sidelying ER   20x, L UE   Prone row x 2 x 10, L UE   Prone T in modified position  - elbow bent x 2 x 10, L UE               Plan for Next Visit:        PATIENT EDUCATION AND HOME EXERCISES     Home Exercises Provided and Patient Education Provided     Education provided:   PURPOSE: Patient educated on the impairments noted above and the effects of physical therapy intervention to improve overall condition and QOL.   EXERCISE: Patient was educated on all the above exercise prior/during/after for proper posture, positioning, and execution for safe performance with home exercise program.   STRENGTH: Patient educated on the importance " of improved core and extremity strength in order to improve alignment of the spine and extremities with static positions and dynamic movement.   POSTURE: Patient educated on postural awareness to reduce stress and maintain optimal alignment of the spine with static positions and dynamic movement     Written Home Exercises Provided: yes.  Exercises were reviewed and Angelica was able to demonstrate them prior to the end of the session.  Angelica demonstrated good  understanding of the education provided. See EMR under Patient Instructions for exercises provided during therapy sessions.    ASSESSMENT     Patient tolerated treatment well and without complaint. She was able to be progressed to additional rotator cuff strengthening exercises without issue. Patient required occasional tactile cues to maintain proper form with progressions. Her range of motion looks significantly better overall, passively and actively. She continues to progress well towards goals.     Angelica is progressing well towards her goals.   Pt prognosis is Good.     Pt will continue to benefit from skilled outpatient physical therapy to address the deficits listed in the problem list box on initial evaluation, provide pt/family education and to maximize pt's level of independence in the home and community environment.     Pt's spiritual, cultural and educational needs considered and pt agreeable to plan of care and goals.     Anticipated Barriers for therapy: co-morbidities and chronicity of condition    Goals:  Short Term Goals:  6 weeks Status  Date Met   PAIN: Pt will report worst pain of 4/10 in order to progress toward max functional ability and improve quality of life. [] Progressing  [x] Met  [] Not Met 5/10/2023    FUNCTION: Patient will demonstrate improved function as indicated by a functional limitation score of less than or equal to 47 out of 100 on FOTO. [] Progressing  [x] Met  [] Not Met  5/10/2023   MOBILITY: Patient will improve AROM  to 50% of stated goals, listed in objective measures above, in order to progress towards independence with functional activities.  [] Progressing  [x] Met  [] Not Met  5/10/2023   STRENGTH: Patient will improve strength to 50% of stated goals, listed in objective measures above, in order to progress towards independence with functional activities. [] Progressing  [x] Met  [] Not Met  5/10/2023   POSTURE: Patient will correct postural deviations in sitting and standing, to decrease pain and promote long term stability.  [] Progressing  [x] Met  [] Not Met  5/10/2023   HEP: Patient will demonstrate independence with HEP in order to progress toward functional independence. [] Progressing  [x] Met  [] Not Met  5/10/2023      Long Term Goals:  12 weeks Status Date Met   PAIN: Pt will report worst pain of 2/10 in order to progress toward max functional ability and improve quality of life [x] Progressing  [] Met  [] Not Met     FUNCTION: Patient will demonstrate improved function as indicated by a functional limitation score of less than or equal to 38 out of 100 on FOTO. [x] Progressing  [] Met  [] Not Met     MOBILITY: Patient will improve AROM to stated goals, listed in objective measures above, in order to return to maximal functional potential and improve quality of life.  [x] Progressing  [] Met  [] Not Met    STRENGTH: Patient will improve strength to stated goals, listed in objective measures above, in order to improve functional independence and quality of life.  [x] Progressing  [] Met  [] Not Met     GAIT: Patient will demonstrate normalized gait mechanics with minimal compensation in order to return to PLOF. [x] Progressing  [] Met  [] Not Met     Patient will return to normal ADL's, IADL's, community involvement, recreational activities, and work-related activities with less than or equal to 0/10 pain and maximal function.  [x] Progressing  [] Met  [] Not Met          PLAN     Continue Plan of Care (POC) and  progress per patient tolerance. See treatment section for details on planned progressions next session.    5/10/2023: It is my recommendation that patient continue with PT at her current frequency of 2 times per week for the remainder of her approved visits. Her treatment plan will remain the same, and she will be progressed appropriately.     4/11/2023 (evaluation): Outpatient Physical Therapy 2 times weekly for 12 weeks to include any combination of the following interventions: virtual visits, dry needling, modalities, electrical stimulation (IFC, Pre-Mod, Attended with Functional Dry Needling), Aquatic Therapy, Cervical/Lumbar Traction, Gait Training, Manual Therapy, Neuromuscular Re-ed, Patient Education, Self Care, Therapeutic Exercise, and Therapeutic Activites     Olinda Blount, PT

## 2023-05-31 ENCOUNTER — TELEPHONE (OUTPATIENT)
Dept: SPORTS MEDICINE | Facility: CLINIC | Age: 63
End: 2023-05-31
Payer: COMMERCIAL

## 2023-05-31 NOTE — TELEPHONE ENCOUNTER
Called and left voicemail to inform pt that Dr. Ferrari wont be in clinic for the rest of the week and to give a call back to reschedule appointment for a time that's best for her.

## 2023-06-05 ENCOUNTER — PATIENT MESSAGE (OUTPATIENT)
Dept: GASTROENTEROLOGY | Facility: CLINIC | Age: 63
End: 2023-06-05
Payer: COMMERCIAL

## 2023-06-06 ENCOUNTER — CLINICAL SUPPORT (OUTPATIENT)
Dept: REHABILITATION | Facility: HOSPITAL | Age: 63
End: 2023-06-06
Payer: COMMERCIAL

## 2023-06-06 DIAGNOSIS — M25.612 DECREASED RANGE OF MOTION OF LEFT SHOULDER: ICD-10-CM

## 2023-06-06 DIAGNOSIS — M54.12 CERVICAL RADICULOPATHY: Primary | ICD-10-CM

## 2023-06-06 DIAGNOSIS — R68.89 DECREASED STRENGTH, ENDURANCE, AND MOBILITY: ICD-10-CM

## 2023-06-06 DIAGNOSIS — M54.2 NECK PAIN ON LEFT SIDE: ICD-10-CM

## 2023-06-06 DIAGNOSIS — R53.1 DECREASED STRENGTH, ENDURANCE, AND MOBILITY: ICD-10-CM

## 2023-06-06 DIAGNOSIS — Z74.09 DECREASED STRENGTH, ENDURANCE, AND MOBILITY: ICD-10-CM

## 2023-06-06 DIAGNOSIS — M25.512 CHRONIC LEFT SHOULDER PAIN: ICD-10-CM

## 2023-06-06 DIAGNOSIS — G89.29 CHRONIC LEFT SHOULDER PAIN: ICD-10-CM

## 2023-06-06 PROCEDURE — 97140 MANUAL THERAPY 1/> REGIONS: CPT | Performed by: PHYSICAL THERAPIST

## 2023-06-06 PROCEDURE — 97112 NEUROMUSCULAR REEDUCATION: CPT | Performed by: PHYSICAL THERAPIST

## 2023-06-06 PROCEDURE — 97110 THERAPEUTIC EXERCISES: CPT | Performed by: PHYSICAL THERAPIST

## 2023-06-06 NOTE — PROGRESS NOTES
"[facility]   Physical Therapy Treatment Note     Name: Angelica Pate [facility] Number: [id number]    Therapy Diagnosis:   Encounter Diagnoses   Name Primary?    Cervical radiculopathy Yes    Decreased range of motion of left shoulder     Chronic left shoulder pain     Neck pain on left side     Decreased strength, endurance, and mobility      Physician: Sedrick Ferrari MD    Visit Date: 6/6/2023    Physician Orders: PT Eval and Treat  Medical Diagnosis from Referral: chronic left shoulder pain, cervical radiculopathy, neck pain on left side  Evaluation Date: 4/11/2023  Authorization Period Expiration: 12/31/2023  Plan of Care Expiration: 7/10/2023  Progress Note Due: 6/9/2023  Visit # / Visits authorized: 13/25   FOTO: 1/3 (last performed on 4/11/2023)     Precautions: Standard    PTA Visit #: 0/5     Time In: 1646  Time Out: 1735  Total Billable Time: 45 minutes (Billing reflects 1 on 1 treatment time spent with patient)    SUBJECTIVE     Patient reports: that she feels like her shoulder has been feeling "heavier". She still is not having any pain and is able to do everything functionally, but it just feels a little different.     He/She was compliant with home exercise program.  Response to previous treatment: no adverse reaction  Functional change: improving motion and pain levels    Pain: 0/10     Location: left neck/shoulder region    OBJECTIVE     Objective Measures updated at progress report or POC update only unless otherwise noted.    TREATMENT     Angelica received the treatments listed below:     MANUAL THERAPY TECHNIQUES were applied for (8) minutes, including:    Manual Intervention Performed Today    Soft Tissue Mobilization [x] left upper trapezius, levator scapulae , periscapular musculature, infraspinatus , deltoid, biceps , scapularis release   Joint Mobilizations [x] GH jt mobs    []     []    Functional Dry Needling  []      Plan for Next Visit: Continue as needed " "        THERAPEUTIC EXERCISES to develop strength, endurance, ROM, flexibility, posture, and core stabilization for (25) minutes including:    Intervention Performed Today    UBE for posture strength and endurance x 3'/3'   Pulley's - flexion and abduction  3' each   Wand flexion - supine  x 3 x 10, 3" holds, 3#   Wand ER - supine at 90/90  2 x 10, 5" holds   Chest Stretch at Wall  3x30 seconds   PROM L SH x All planes   AAROM shoulder extension with wand- standing  x 3 x 10, 3-5 second holds   Standing IR with wand behind back  x 3 x 10, 3-5 second holds   AAROM wand abduction - standing or seated  x 2 x 10   Chest press with wand   3 x 10, 3#   AROM shoulder flexion to tolerance x 2 x 10    AROM shoulder abduction to tolerance x 2 x 10    AAROM - standing wand up corner of wall with OH lift off at end range (added)  2 x 10   Open Book (added)  10x, 5-10 second holds each side   Progress Note  RE-assessment as above in objective section     Plan for Next Visit:        NEUROMUSCULAR RE-EDUCATION ACTIVITIES to improve Balance, Coordination, Kinesthetic, Sense, Proprioception, and Posture for (12) minutes.  The following were included:    Intervention Performed Today    Scapular Retraction  x 3 x 10, blue theraband    Shoulder Extensions x 3x10 blue theraband   Standing ER/IR - theraband x 2 x 10 IR, 1 x 10 ER yellow theraband    Series 6  2' each   Sidelying ER   20x, L UE   Prone row x 2 x 10, L UE   Prone T in modified position  - elbow bent x 2 x 10, L UE               Plan for Next Visit:        PATIENT EDUCATION AND HOME EXERCISES     Home Exercises Provided and Patient Education Provided     Education provided:   PURPOSE: Patient educated on the impairments noted above and the effects of physical therapy intervention to improve overall condition and QOL.   EXERCISE: Patient was educated on all the above exercise prior/during/after for proper posture, positioning, and execution for safe performance with home " "exercise program.   STRENGTH: Patient educated on the importance of improved core and extremity strength in order to improve alignment of the spine and extremities with static positions and dynamic movement.   POSTURE: Patient educated on postural awareness to reduce stress and maintain optimal alignment of the spine with static positions and dynamic movement     Written Home Exercises Provided: yes.  Exercises were reviewed and Angelica was able to demonstrate them prior to the end of the session.  Angelica demonstrated good  understanding of the education provided. See EMR under Patient Instructions for exercises provided during therapy sessions.    ASSESSMENT     Patient did well with treatment today, despite complaints of "heaviness" in shoulder. She demonstrated slightly more difficulty with strengthening activities such as theraband external rotation and prone modified T's, but she reported that it was tolerable and was still able to complete activity. AAROM and PROM appear to still be improve; however, slight increase in stiffness noted initially with PROM external rotation this visit. This improved to full range with manual therapy. Patient is still limited actively with abduction and external rotation. Will continue to monitor and progress per tolerance in upcoming visits.     Angelica is progressing well towards her goals.   Pt prognosis is Good.     Pt will continue to benefit from skilled outpatient physical therapy to address the deficits listed in the problem list box on initial evaluation, provide pt/family education and to maximize pt's level of independence in the home and community environment.     Pt's spiritual, cultural and educational needs considered and pt agreeable to plan of care and goals.     Anticipated Barriers for therapy: co-morbidities and chronicity of condition    Goals:  Short Term Goals:  6 weeks Status  Date Met   PAIN: Pt will report worst pain of 4/10 in order to progress toward max " functional ability and improve quality of life. [] Progressing  [x] Met  [] Not Met 5/10/2023    FUNCTION: Patient will demonstrate improved function as indicated by a functional limitation score of less than or equal to 47 out of 100 on FOTO. [] Progressing  [x] Met  [] Not Met  5/10/2023   MOBILITY: Patient will improve AROM to 50% of stated goals, listed in objective measures above, in order to progress towards independence with functional activities.  [] Progressing  [x] Met  [] Not Met  5/10/2023   STRENGTH: Patient will improve strength to 50% of stated goals, listed in objective measures above, in order to progress towards independence with functional activities. [] Progressing  [x] Met  [] Not Met  5/10/2023   POSTURE: Patient will correct postural deviations in sitting and standing, to decrease pain and promote long term stability.  [] Progressing  [x] Met  [] Not Met  5/10/2023   HEP: Patient will demonstrate independence with HEP in order to progress toward functional independence. [] Progressing  [x] Met  [] Not Met  5/10/2023      Long Term Goals:  12 weeks Status Date Met   PAIN: Pt will report worst pain of 2/10 in order to progress toward max functional ability and improve quality of life [x] Progressing  [] Met  [] Not Met     FUNCTION: Patient will demonstrate improved function as indicated by a functional limitation score of less than or equal to 38 out of 100 on FOTO. [x] Progressing  [] Met  [] Not Met     MOBILITY: Patient will improve AROM to stated goals, listed in objective measures above, in order to return to maximal functional potential and improve quality of life.  [x] Progressing  [] Met  [] Not Met    STRENGTH: Patient will improve strength to stated goals, listed in objective measures above, in order to improve functional independence and quality of life.  [x] Progressing  [] Met  [] Not Met     GAIT: Patient will demonstrate normalized gait mechanics with minimal compensation in  order to return to PLOF. [x] Progressing  [] Met  [] Not Met     Patient will return to normal ADL's, IADL's, community involvement, recreational activities, and work-related activities with less than or equal to 0/10 pain and maximal function.  [x] Progressing  [] Met  [] Not Met          PLAN     Continue Plan of Care (POC) and progress per patient tolerance. See treatment section for details on planned progressions next session.    5/10/2023: It is my recommendation that patient continue with PT at her current frequency of 2 times per week for the remainder of her approved visits. Her treatment plan will remain the same, and she will be progressed appropriately.     4/11/2023 (evaluation): Outpatient Physical Therapy 2 times weekly for 12 weeks to include any combination of the following interventions: virtual visits, dry needling, modalities, electrical stimulation (IFC, Pre-Mod, Attended with Functional Dry Needling), Aquatic Therapy, Cervical/Lumbar Traction, Gait Training, Manual Therapy, Neuromuscular Re-ed, Patient Education, Self Care, Therapeutic Exercise, and Therapeutic Activites     Olinda Blount, PT

## 2023-06-08 ENCOUNTER — TELEPHONE (OUTPATIENT)
Dept: GASTROENTEROLOGY | Facility: CLINIC | Age: 63
End: 2023-06-08
Payer: COMMERCIAL

## 2023-06-13 ENCOUNTER — TELEPHONE (OUTPATIENT)
Dept: GASTROENTEROLOGY | Facility: CLINIC | Age: 63
End: 2023-06-13
Payer: COMMERCIAL

## 2023-06-17 DIAGNOSIS — R55 SYNCOPE, UNSPECIFIED SYNCOPE TYPE: ICD-10-CM

## 2023-06-17 DIAGNOSIS — I10 ESSENTIAL HYPERTENSION: ICD-10-CM

## 2023-06-19 ENCOUNTER — TELEPHONE (OUTPATIENT)
Dept: REHABILITATION | Facility: HOSPITAL | Age: 63
End: 2023-06-19
Payer: COMMERCIAL

## 2023-06-19 RX ORDER — AMLODIPINE BESYLATE 5 MG/1
TABLET ORAL
Qty: 90 TABLET | Refills: 0 | Status: SHIPPED | OUTPATIENT
Start: 2023-06-19 | End: 2023-11-17 | Stop reason: SDUPTHER

## 2023-06-19 RX ORDER — METOPROLOL SUCCINATE 25 MG/1
TABLET, EXTENDED RELEASE ORAL
Qty: 90 TABLET | Refills: 0 | Status: SHIPPED | OUTPATIENT
Start: 2023-06-19 | End: 2023-11-17 | Stop reason: SDUPTHER

## 2023-06-19 NOTE — TELEPHONE ENCOUNTER
Called patient about cancellation for tomorrow and to see if she would like to be DC or schedule more. Left message for her to call back.  Olinda Blount, PT, DPT

## 2023-06-29 ENCOUNTER — PATIENT MESSAGE (OUTPATIENT)
Dept: GASTROENTEROLOGY | Facility: CLINIC | Age: 63
End: 2023-06-29
Payer: COMMERCIAL

## 2023-07-04 DIAGNOSIS — G56.02 LEFT CARPAL TUNNEL SYNDROME: ICD-10-CM

## 2023-07-04 DIAGNOSIS — M54.12 CERVICAL RADICULOPATHY: ICD-10-CM

## 2023-07-04 RX ORDER — TOPIRAMATE 25 MG/1
TABLET ORAL
Qty: 60 TABLET | Refills: 0 | Status: SHIPPED | OUTPATIENT
Start: 2023-07-04 | End: 2023-07-25

## 2023-07-05 ENCOUNTER — OFFICE VISIT (OUTPATIENT)
Dept: FAMILY MEDICINE | Facility: CLINIC | Age: 63
End: 2023-07-05
Attending: FAMILY MEDICINE
Payer: COMMERCIAL

## 2023-07-05 VITALS
SYSTOLIC BLOOD PRESSURE: 100 MMHG | DIASTOLIC BLOOD PRESSURE: 66 MMHG | WEIGHT: 197.31 LBS | HEIGHT: 60 IN | RESPIRATION RATE: 18 BRPM | OXYGEN SATURATION: 96 % | BODY MASS INDEX: 38.74 KG/M2 | HEART RATE: 80 BPM | TEMPERATURE: 98 F

## 2023-07-05 DIAGNOSIS — J04.0 LARYNGITIS, ACUTE: ICD-10-CM

## 2023-07-05 DIAGNOSIS — Z12.31 OTHER SCREENING MAMMOGRAM: ICD-10-CM

## 2023-07-05 DIAGNOSIS — J01.90 ACUTE NON-RECURRENT SINUSITIS, UNSPECIFIED LOCATION: Primary | ICD-10-CM

## 2023-07-05 DIAGNOSIS — K50.10 CROHN'S DISEASE OF COLON WITHOUT COMPLICATION: ICD-10-CM

## 2023-07-05 DIAGNOSIS — R05.9 COUGH, UNSPECIFIED TYPE: ICD-10-CM

## 2023-07-05 PROCEDURE — 96372 PR INJECTION,THERAP/PROPH/DIAG2ST, IM OR SUBCUT: ICD-10-PCS | Mod: S$GLB,,, | Performed by: FAMILY MEDICINE

## 2023-07-05 PROCEDURE — 1160F RVW MEDS BY RX/DR IN RCRD: CPT | Mod: CPTII,S$GLB,, | Performed by: FAMILY MEDICINE

## 2023-07-05 PROCEDURE — 1159F MED LIST DOCD IN RCRD: CPT | Mod: CPTII,S$GLB,, | Performed by: FAMILY MEDICINE

## 2023-07-05 PROCEDURE — 4010F PR ACE/ARB THEARPY RXD/TAKEN: ICD-10-PCS | Mod: CPTII,S$GLB,, | Performed by: FAMILY MEDICINE

## 2023-07-05 PROCEDURE — 99214 OFFICE O/P EST MOD 30 MIN: CPT | Mod: 25,S$GLB,, | Performed by: FAMILY MEDICINE

## 2023-07-05 PROCEDURE — 3078F DIAST BP <80 MM HG: CPT | Mod: CPTII,S$GLB,, | Performed by: FAMILY MEDICINE

## 2023-07-05 PROCEDURE — 3008F BODY MASS INDEX DOCD: CPT | Mod: CPTII,S$GLB,, | Performed by: FAMILY MEDICINE

## 2023-07-05 PROCEDURE — 3074F PR MOST RECENT SYSTOLIC BLOOD PRESSURE < 130 MM HG: ICD-10-PCS | Mod: CPTII,S$GLB,, | Performed by: FAMILY MEDICINE

## 2023-07-05 PROCEDURE — 1160F PR REVIEW ALL MEDS BY PRESCRIBER/CLIN PHARMACIST DOCUMENTED: ICD-10-PCS | Mod: CPTII,S$GLB,, | Performed by: FAMILY MEDICINE

## 2023-07-05 PROCEDURE — 96372 THER/PROPH/DIAG INJ SC/IM: CPT | Mod: S$GLB,,, | Performed by: FAMILY MEDICINE

## 2023-07-05 PROCEDURE — 3008F PR BODY MASS INDEX (BMI) DOCUMENTED: ICD-10-PCS | Mod: CPTII,S$GLB,, | Performed by: FAMILY MEDICINE

## 2023-07-05 PROCEDURE — 3078F PR MOST RECENT DIASTOLIC BLOOD PRESSURE < 80 MM HG: ICD-10-PCS | Mod: CPTII,S$GLB,, | Performed by: FAMILY MEDICINE

## 2023-07-05 PROCEDURE — 3074F SYST BP LT 130 MM HG: CPT | Mod: CPTII,S$GLB,, | Performed by: FAMILY MEDICINE

## 2023-07-05 PROCEDURE — 99214 PR OFFICE/OUTPT VISIT, EST, LEVL IV, 30-39 MIN: ICD-10-PCS | Mod: 25,S$GLB,, | Performed by: FAMILY MEDICINE

## 2023-07-05 PROCEDURE — 1159F PR MEDICATION LIST DOCUMENTED IN MEDICAL RECORD: ICD-10-PCS | Mod: CPTII,S$GLB,, | Performed by: FAMILY MEDICINE

## 2023-07-05 PROCEDURE — 4010F ACE/ARB THERAPY RXD/TAKEN: CPT | Mod: CPTII,S$GLB,, | Performed by: FAMILY MEDICINE

## 2023-07-05 PROCEDURE — 99999 PR PBB SHADOW E&M-EST. PATIENT-LVL III: ICD-10-PCS | Mod: PBBFAC,,, | Performed by: FAMILY MEDICINE

## 2023-07-05 PROCEDURE — 99999 PR PBB SHADOW E&M-EST. PATIENT-LVL III: CPT | Mod: PBBFAC,,, | Performed by: FAMILY MEDICINE

## 2023-07-05 RX ORDER — METHYLPREDNISOLONE ACETATE 80 MG/ML
80 INJECTION, SUSPENSION INTRA-ARTICULAR; INTRALESIONAL; INTRAMUSCULAR; SOFT TISSUE ONCE
Status: COMPLETED | OUTPATIENT
Start: 2023-07-05 | End: 2023-07-05

## 2023-07-05 RX ORDER — BENZONATATE 100 MG/1
100 CAPSULE ORAL 3 TIMES DAILY PRN
Qty: 30 CAPSULE | Refills: 0 | Status: SHIPPED | OUTPATIENT
Start: 2023-07-05 | End: 2023-07-26 | Stop reason: SDUPTHER

## 2023-07-05 RX ORDER — DOXYCYCLINE HYCLATE 100 MG
100 TABLET ORAL EVERY 12 HOURS
Qty: 20 TABLET | Refills: 0 | Status: SHIPPED | OUTPATIENT
Start: 2023-07-05 | End: 2023-07-15

## 2023-07-05 RX ADMIN — METHYLPREDNISOLONE ACETATE 80 MG: 80 INJECTION, SUSPENSION INTRA-ARTICULAR; INTRALESIONAL; INTRAMUSCULAR; SOFT TISSUE at 02:07

## 2023-07-05 NOTE — PROGRESS NOTES
Angelica Flores    Chief Complaint   Patient presents with    Sinusitis       History of Present Illness:   Ms. Flores, a nonsmoker, comes in today with complaint of having 3 days of laryngitis, productive cough of greenish mucus, sinus pain and pressure, headaches, body aches, and left ear pain.  She states she has been taking [place] Cold or Theraflu and a drinking hot tea with some help as she states her voice is a little better today.  She denies having exposure to known ill contacts.  She states she took home COVID test which was normal.  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  She states she did not go to work today.    Otherwise, he denies having fever, chills, fatigue, appetite changes; shortness of breath, cough, wheezing; chest pain, palpitations, leg swelling; other sinus symptoms; abdominal pain, nausea, vomiting, diarrhea, constipation; unusual urinary symptoms; polydipsia, polyphagia, polyuria, hot or cold intolerance; back pain; anxiety, depression, homicidal or suicidal thoughts.             Current Outpatient Medications   Medication Sig    adalimumab (HUMIRA,CF, PEN) 40 mg/0.4 mL PnKt Inject 0.4 mLs (40 mg total) into the skin every 7 days.    albuterol (PROVENTIL/VENTOLIN HFA) 90 mcg/actuation inhaler Inhale 2 puffs into the lungs every 6 (six) hours as needed for Wheezing or Shortness of Breath.    ALPRAZolam (XANAX) 0.5 MG tablet Take 1 tablet by mouth twice daily as needed    amLODIPine (NORVASC) 5 MG tablet Take 1 tablet by mouth once daily    ascorbic acid, vitamin C, (VITAMIN C) 1000 MG tablet Take by mouth. 1 Tablet Oral Every day    azaTHIOprine (IMURAN) 50 mg Tab Take 3 tablets (150 mg total) by mouth once daily.    azelastine (ASTELIN) 137 mcg (0.1 %) nasal spray 2 sprays (274 mcg total) by Nasal route 2 (two) times daily.    BACILLUS COAGULANS (PROBIOTIC, B. COAGULANS, ORAL) Take by mouth once daily.    cetirizine (ALLERGY RELIEF, CETIRIZINE,)  10 MG tablet Take 1 tablet (10 mg total) by mouth once daily.    cholecalciferol, vitamin D3, 2,000 unit Cap Take by mouth. 1 capsule Oral Every day    dicyclomine (BENTYL) 20 mg tablet Take 1 tablet (20 mg total) by mouth 3 (three) times daily as needed (abdominal pain).    hepatitis B virus vacc.rec,PF, (ENGERIX-B) 20 mcg/mL Syrg Inject into the muscle.    hydroCHLOROthiazide (HYDRODIURIL) 25 MG tablet Take 1 tablet (25 mg total) by mouth once daily.    hydroquinone 4 % Crea Apply to dark spots once daily. Use with sunscreen if outdoors    ibuprofen (ADVIL,MOTRIN) 800 MG tablet Take 1 tablet (800 mg total) by mouth 2 (two) times daily as needed.    ipratropium (ATROVENT) 21 mcg (0.03 %) nasal spray 2 sprays by Nasal route 3 (three) times daily.    lisinopriL (PRINIVIL,ZESTRIL) 40 MG tablet TAKE 1 TABLET BY MOUTH ONCE DAILY.    methocarbamoL (ROBAXIN) 500 MG Tab Take 1 tablet once or twice a day as needed for muscle spasm and neck pain.    metoprolol succinate (TOPROL-XL) 25 MG 24 hr tablet Take 1 tablet by mouth once daily    montelukast (SINGULAIR) 10 mg tablet Take 1 tablet (10 mg total) by mouth every evening.    multivitamin-Ca-iron-minerals 18-0.4 mg Tab Take by mouth. 1 Tablet Oral Every day    pneumoc 20-shani conj-dip cr,PF, (PREVNAR-20, PF,) 0.5 mL Syrg injection Inject 0.5 mLs into the muscle.    potassium bicarbonate disintegrating tablet Take 10 mEq by mouth 2 (two) times daily.    pravastatin (PRAVACHOL) 80 MG tablet Take 1 tablet by mouth in the evening    topiramate (TOPAMAX) 25 MG tablet TAKE 1 TABLET BY MOUTH NIGHTLY FOR 5 DAYS THEN  TAKE  2  TABLETS  NIGHTLY  THEREAFTER    tretinoin (RETIN-A) 0.025 % cream Apply pea-sized amount to entire face at bedtime.  If dryness, use every third night and increase as tolerated to every night.    valACYclovir (VALTREX) 1000 MG tablet TAKE 2 TABLETS BY MOUTH TWICE DAILY FOR 1 DAY PER EPISODE    zinc sulfate (ZINC-220 ORAL)        Review of Systems    Constitutional:  Negative for appetite change, chills, fatigue and fever.   HENT:  Positive for sinus pressure, sinus pain and voice change. Negative for congestion, nosebleeds, postnasal drip, rhinorrhea, sneezing and sore throat.    Respiratory:  Positive for cough. Negative for shortness of breath and wheezing.    Cardiovascular:  Negative for chest pain, palpitations and leg swelling.   Gastrointestinal:  Negative for abdominal pain, constipation, diarrhea, nausea and vomiting.   Genitourinary:  Negative for difficulty urinating.   Musculoskeletal:  Positive for myalgias. Negative for back pain.   Neurological:  Positive for headaches. Negative for numbness.   Psychiatric/Behavioral:  Negative for dysphoric mood and suicidal ideas. The patient is not nervous/anxious.         Negative for homicidal ideas.     Objective:  Physical Exam  Vitals reviewed.   Constitutional:       General: She is not in acute distress.     Appearance: Normal appearance. She is well-developed. She is obese. She is not ill-appearing, toxic-appearing or diaphoretic.      Comments: Pleasant.   HENT:      Head: Normocephalic and atraumatic.      Comments: Slightly tender over frontal and left maxillary sinuses.     Right Ear: Tympanic membrane, ear canal and external ear normal. There is no impacted cerumen.      Left Ear: Tympanic membrane, ear canal and external ear normal. There is no impacted cerumen.      Nose: Congestion present. No rhinorrhea.      Comments: Nasal congested with drainage at right.     Mouth/Throat:      Mouth: Mucous membranes are moist.      Pharynx: Oropharynx is clear. No oropharyngeal exudate or posterior oropharyngeal erythema.   Eyes:      General:         Right eye: No discharge.         Left eye: No discharge.      Extraocular Movements: Extraocular movements intact.      Conjunctiva/sclera: Conjunctivae normal.      Pupils: Pupils are equal, round, and reactive to light.   Neck:      Thyroid: No  thyromegaly.   Cardiovascular:      Rate and Rhythm: Normal rate and regular rhythm.      Heart sounds: Normal heart sounds. No murmur heard.  Pulmonary:      Effort: Pulmonary effort is normal. No respiratory distress.      Breath sounds: Normal breath sounds. No wheezing.   Abdominal:      General: Bowel sounds are normal. There is no distension.      Palpations: Abdomen is soft. There is no mass.      Tenderness: There is no abdominal tenderness. There is no guarding or rebound.   Musculoskeletal:         General: No swelling or tenderness.      Cervical back: Normal range of motion and neck supple.      Comments: She is ambulatory without problems.   Lymphadenopathy:      Cervical: No cervical adenopathy.   Neurological:      General: No focal deficit present.      Mental Status: She is alert and oriented to person, place, and time.      Comments: Negative straight leg raises bilaterally.    Psychiatric:         Mood and Affect: Mood normal.         Behavior: Behavior normal.         Thought Content: Thought content normal.         Judgment: Judgment normal.       ASSESSMENT:  1. Acute non-recurrent sinusitis, unspecified location    2. Laryngitis, acute    3. Cough, unspecified type    4. Crohn's disease of colon without complication    5. Other screening mammogram        PLAN:  Angelica was seen today for sinusitis.    Diagnoses and all orders for this visit:    Acute non-recurrent sinusitis, unspecified location  -     doxycycline (VIBRA-TABS) 100 MG tablet; Take 1 tablet (100 mg total) by mouth every 12 (twelve) hours. for 10 days  -     methylPREDNISolone acetate injection 80 mg    Laryngitis, acute  -     methylPREDNISolone acetate injection 80 mg    Cough, unspecified type  -     benzonatate (TESSALON) 100 MG capsule; Take 1 capsule (100 mg total) by mouth 3 (three) times daily as needed for Cough.    Crohn's disease of colon without complication    Other screening mammogram  -     Mammo Digital Screening  Bilat w/ Viktor; Future      Continue current medications, follow low sodium, low cholesterol, low carb diet, daily walks.  Add Doxycycline 100 mg twice daily for 10 days; medication precautions discussed with patient.  Hold Humira until completes Doxycycline therapy.  Add Tessalon Perles 100 mg three times daily prn cough; medication precautions discussed with patient.  Flu shot this fall.  Work excuse for today with return tomorrow.  Follow up if symptoms worsen or fail to improve.

## 2023-07-12 ENCOUNTER — LAB VISIT (OUTPATIENT)
Dept: LAB | Facility: HOSPITAL | Age: 63
End: 2023-07-12
Attending: FAMILY MEDICINE
Payer: COMMERCIAL

## 2023-07-12 DIAGNOSIS — K50.10 CROHN'S DISEASE OF COLON WITHOUT COMPLICATION: ICD-10-CM

## 2023-07-12 LAB
ALBUMIN SERPL BCP-MCNC: 4.1 G/DL (ref 3.5–5.2)
ALP SERPL-CCNC: 57 U/L (ref 55–135)
ALT SERPL W/O P-5'-P-CCNC: 10 U/L (ref 10–44)
ANION GAP SERPL CALC-SCNC: 11 MMOL/L (ref 8–16)
AST SERPL-CCNC: 13 U/L (ref 10–40)
BASOPHILS # BLD AUTO: 0.06 K/UL (ref 0–0.2)
BASOPHILS NFR BLD: 0.5 % (ref 0–1.9)
BILIRUB SERPL-MCNC: 0.3 MG/DL (ref 0.1–1)
BUN SERPL-MCNC: 31 MG/DL (ref 8–23)
CALCIUM SERPL-MCNC: 9.9 MG/DL (ref 8.7–10.5)
CHLORIDE SERPL-SCNC: 104 MMOL/L (ref 95–110)
CO2 SERPL-SCNC: 24 MMOL/L (ref 23–29)
CREAT SERPL-MCNC: 0.9 MG/DL (ref 0.5–1.4)
CRP SERPL-MCNC: 5.6 MG/L (ref 0–8.2)
DIFFERENTIAL METHOD: ABNORMAL
EOSINOPHIL # BLD AUTO: 0.1 K/UL (ref 0–0.5)
EOSINOPHIL NFR BLD: 1.2 % (ref 0–8)
ERYTHROCYTE [DISTWIDTH] IN BLOOD BY AUTOMATED COUNT: 13.8 % (ref 11.5–14.5)
ERYTHROCYTE [SEDIMENTATION RATE] IN BLOOD BY PHOTOMETRIC METHOD: 23 MM/HR (ref 0–36)
EST. GFR  (NO RACE VARIABLE): >60 ML/MIN/1.73 M^2
GLUCOSE SERPL-MCNC: 99 MG/DL (ref 70–110)
HCT VFR BLD AUTO: 40.6 % (ref 37–48.5)
HGB BLD-MCNC: 13.2 G/DL (ref 12–16)
IMM GRANULOCYTES # BLD AUTO: 0.03 K/UL (ref 0–0.04)
IMM GRANULOCYTES NFR BLD AUTO: 0.3 % (ref 0–0.5)
LYMPHOCYTES # BLD AUTO: 4 K/UL (ref 1–4.8)
LYMPHOCYTES NFR BLD: 36.3 % (ref 18–48)
MCH RBC QN AUTO: 31.9 PG (ref 27–31)
MCHC RBC AUTO-ENTMCNC: 32.5 G/DL (ref 32–36)
MCV RBC AUTO: 98 FL (ref 82–98)
MONOCYTES # BLD AUTO: 0.8 K/UL (ref 0.3–1)
MONOCYTES NFR BLD: 7.4 % (ref 4–15)
NEUTROPHILS # BLD AUTO: 6 K/UL (ref 1.8–7.7)
NEUTROPHILS NFR BLD: 54.3 % (ref 38–73)
NRBC BLD-RTO: 0 /100 WBC
PLATELET # BLD AUTO: 395 K/UL (ref 150–450)
PMV BLD AUTO: 11.2 FL (ref 9.2–12.9)
POTASSIUM SERPL-SCNC: 3.6 MMOL/L (ref 3.5–5.1)
PROT SERPL-MCNC: 7.8 G/DL (ref 6–8.4)
RBC # BLD AUTO: 4.14 M/UL (ref 4–5.4)
SODIUM SERPL-SCNC: 139 MMOL/L (ref 136–145)
WBC # BLD AUTO: 11.02 K/UL (ref 3.9–12.7)

## 2023-07-12 PROCEDURE — 80053 COMPREHEN METABOLIC PANEL: CPT | Performed by: NURSE PRACTITIONER

## 2023-07-12 PROCEDURE — 85652 RBC SED RATE AUTOMATED: CPT | Performed by: NURSE PRACTITIONER

## 2023-07-12 PROCEDURE — 85025 COMPLETE CBC W/AUTO DIFF WBC: CPT | Performed by: NURSE PRACTITIONER

## 2023-07-12 PROCEDURE — 86140 C-REACTIVE PROTEIN: CPT | Performed by: NURSE PRACTITIONER

## 2023-07-12 PROCEDURE — 36415 COLL VENOUS BLD VENIPUNCTURE: CPT | Performed by: NURSE PRACTITIONER

## 2023-07-16 DIAGNOSIS — E78.5 HYPERLIPIDEMIA, UNSPECIFIED HYPERLIPIDEMIA TYPE: ICD-10-CM

## 2023-07-16 NOTE — TELEPHONE ENCOUNTER
Care Due:                  Date            Visit Type   Department     Provider  --------------------------------------------------------------------------------                                MYCHART                              FOLLOWUP/OF  [facility]  Last Visit: 07-      FICE VISIT   MEDICINE       Monet Alvarez                              EP -                              PRIMARY      [facility]  Next Visit: 08-      CARE ([facility])   MEDICINE       Monet Alvarez                                                            Last  Test          Frequency    Reason                     Performed    Due Date  --------------------------------------------------------------------------------    Lipid Panel.  12 months..  pravastatin..............  08- 08-    [employer] Embedded Care Due Messages. Reference number: [id number].   7/16/2023 11:06:56 AM CDT

## 2023-07-17 RX ORDER — PRAVASTATIN SODIUM 80 MG/1
TABLET ORAL
Qty: 90 TABLET | Refills: 0 | Status: SHIPPED | OUTPATIENT
Start: 2023-07-17 | End: 2023-10-06

## 2023-07-17 NOTE — TELEPHONE ENCOUNTER
Provider Staff:  Action required for this patient     Please see care gap opportunities below in Care Due Message.    Thanks!  [facility]     Appointments      Date Provider   Last Visit   7/5/2023 Monet Alvarez MD   Next Visit   8/31/2023 Monet Alvarez MD     Refill Decision Note   Angelica Flores  is requesting a refill authorization.  Brief Assessment and Rationale for Refill:  Approve     Medication Therapy Plan:         Comments:     Note composed:12:26 PM 07/17/2023             Appointments     Last Visit   7/5/2023 Monet Alvarez MD   Next Visit   8/31/2023 Monet Alvarez MD

## 2023-07-25 DIAGNOSIS — G56.02 LEFT CARPAL TUNNEL SYNDROME: ICD-10-CM

## 2023-07-25 DIAGNOSIS — M54.12 CERVICAL RADICULOPATHY: ICD-10-CM

## 2023-07-25 RX ORDER — TOPIRAMATE 25 MG/1
TABLET ORAL
Qty: 60 TABLET | Refills: 0 | Status: SHIPPED | OUTPATIENT
Start: 2023-07-25 | End: 2023-08-25

## 2023-07-26 ENCOUNTER — OFFICE VISIT (OUTPATIENT)
Dept: FAMILY MEDICINE | Facility: CLINIC | Age: 63
End: 2023-07-26
Attending: FAMILY MEDICINE
Payer: COMMERCIAL

## 2023-07-26 ENCOUNTER — HOSPITAL ENCOUNTER (OUTPATIENT)
Dept: RADIOLOGY | Facility: HOSPITAL | Age: 63
Discharge: HOME | End: 2023-07-26
Attending: FAMILY MEDICINE
Payer: COMMERCIAL

## 2023-07-26 VITALS
WEIGHT: 195.56 LBS | BODY MASS INDEX: 38.39 KG/M2 | HEART RATE: 85 BPM | OXYGEN SATURATION: 98 % | TEMPERATURE: 97 F | SYSTOLIC BLOOD PRESSURE: 112 MMHG | RESPIRATION RATE: 18 BRPM | HEIGHT: 60 IN | DIASTOLIC BLOOD PRESSURE: 70 MMHG

## 2023-07-26 DIAGNOSIS — R05.9 COUGH, UNSPECIFIED TYPE: ICD-10-CM

## 2023-07-26 DIAGNOSIS — J40 SINOBRONCHITIS: ICD-10-CM

## 2023-07-26 DIAGNOSIS — E66.01 SEVERE OBESITY (BMI 35.0-39.9) WITH COMORBIDITY: ICD-10-CM

## 2023-07-26 DIAGNOSIS — J32.9 SINOBRONCHITIS: ICD-10-CM

## 2023-07-26 PROCEDURE — 99999 PR PBB SHADOW E&M-EST. PATIENT-LVL V: CPT | Mod: PBBFAC,,, | Performed by: FAMILY MEDICINE

## 2023-07-26 PROCEDURE — 1159F MED LIST DOCD IN RCRD: CPT | Mod: CPTII,S$GLB,, | Performed by: FAMILY MEDICINE

## 2023-07-26 PROCEDURE — 3074F SYST BP LT 130 MM HG: CPT | Mod: CPTII,S$GLB,, | Performed by: FAMILY MEDICINE

## 2023-07-26 PROCEDURE — 4010F PR ACE/ARB THEARPY RXD/TAKEN: ICD-10-PCS | Mod: CPTII,S$GLB,, | Performed by: FAMILY MEDICINE

## 2023-07-26 PROCEDURE — 3078F DIAST BP <80 MM HG: CPT | Mod: CPTII,S$GLB,, | Performed by: FAMILY MEDICINE

## 2023-07-26 PROCEDURE — 3008F PR BODY MASS INDEX (BMI) DOCUMENTED: ICD-10-PCS | Mod: CPTII,S$GLB,, | Performed by: FAMILY MEDICINE

## 2023-07-26 PROCEDURE — 4010F ACE/ARB THERAPY RXD/TAKEN: CPT | Mod: CPTII,S$GLB,, | Performed by: FAMILY MEDICINE

## 2023-07-26 PROCEDURE — 71046 X-RAY EXAM CHEST 2 VIEWS: CPT | Mod: 26,,, | Performed by: RADIOLOGY

## 2023-07-26 PROCEDURE — 99213 OFFICE O/P EST LOW 20 MIN: CPT | Mod: S$GLB,,, | Performed by: FAMILY MEDICINE

## 2023-07-26 PROCEDURE — 71046 XR CHEST PA AND LATERAL: ICD-10-PCS | Mod: 26,,, | Performed by: RADIOLOGY

## 2023-07-26 PROCEDURE — 1159F PR MEDICATION LIST DOCUMENTED IN MEDICAL RECORD: ICD-10-PCS | Mod: CPTII,S$GLB,, | Performed by: FAMILY MEDICINE

## 2023-07-26 PROCEDURE — 3078F PR MOST RECENT DIASTOLIC BLOOD PRESSURE < 80 MM HG: ICD-10-PCS | Mod: CPTII,S$GLB,, | Performed by: FAMILY MEDICINE

## 2023-07-26 PROCEDURE — 99213 PR OFFICE/OUTPT VISIT, EST, LEVL III, 20-29 MIN: ICD-10-PCS | Mod: S$GLB,,, | Performed by: FAMILY MEDICINE

## 2023-07-26 PROCEDURE — 3008F BODY MASS INDEX DOCD: CPT | Mod: CPTII,S$GLB,, | Performed by: FAMILY MEDICINE

## 2023-07-26 PROCEDURE — 3074F PR MOST RECENT SYSTOLIC BLOOD PRESSURE < 130 MM HG: ICD-10-PCS | Mod: CPTII,S$GLB,, | Performed by: FAMILY MEDICINE

## 2023-07-26 PROCEDURE — 71046 X-RAY EXAM CHEST 2 VIEWS: CPT | Mod: TC,FY,PO

## 2023-07-26 PROCEDURE — 99999 PR PBB SHADOW E&M-EST. PATIENT-LVL V: ICD-10-PCS | Mod: PBBFAC,,, | Performed by: FAMILY MEDICINE

## 2023-07-26 RX ORDER — AMOXICILLIN AND CLAVULANATE POTASSIUM 875; 125 MG/1; MG/1
1 TABLET, FILM COATED ORAL EVERY 12 HOURS
Qty: 20 TABLET | Refills: 0 | Status: SHIPPED | OUTPATIENT
Start: 2023-07-26 | End: 2023-08-05

## 2023-07-26 NOTE — PROGRESS NOTES
Angelica Flores    Chief Complaint   Patient presents with    Cough       History of Present Illness:   Ms. Flores, a nonsmoker, comes in today with complaint of having productive cough of greenish phlegm with frontal sinus pressure/congestion and chest congestion with hoarseness, stopped up ears, and fatigue for 2 weeks.  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  She states she has been drinking tea with lemon, using Astelin and Atrovent nasal sprays, taking Singulair at night and recently taking Zyrtec-D-12 hour for 2 days with help for her symptoms as she states she feels a little better.    On July 5, 2023 she was treated with 10-day course of doxycycline, Tessalon Perles, and given steroid shot for her acute sinusitis.  She states she felt better with taking antibiotic but feels her symptoms returned off antibiotic.    Otherwise, she denies having fever, chills, appetite changes; shortness of breath, wheezing; chest pain, palpitations, leg swelling; other sinus symptoms; abdominal pain, nausea, vomiting, diarrhea, constipation; unusual urinary symptoms; polydipsia, polyphagia, polyuria, hot or cold intolerance; back pain; headache; anxiety, depression, homicidal or suicidal thoughts.             Current Outpatient Medications   Medication Sig    adalimumab (HUMIRA,CF, PEN) 40 mg/0.4 mL PnKt Inject 0.4 mLs (40 mg total) into the skin every 7 days.    albuterol (PROVENTIL/VENTOLIN HFA) 90 mcg/actuation inhaler Inhale 2 puffs into the lungs every 6 (six) hours as needed for Wheezing or Shortness of Breath.    ALPRAZolam (XANAX) 0.5 MG tablet Take 1 tablet by mouth twice daily as needed    amLODIPine (NORVASC) 5 MG tablet Take 1 tablet by mouth once daily    ascorbic acid, vitamin C, (VITAMIN C) 1000 MG tablet Take by mouth. 1 Tablet Oral Every day    azaTHIOprine (IMURAN) 50 mg Tab Take 3 tablets (150 mg total) by mouth once daily.     azelastine (ASTELIN) 137 mcg (0.1 %) nasal spray 2 sprays (274 mcg total) by Nasal route 2 (two) times daily.    BACILLUS COAGULANS (PROBIOTIC, B. COAGULANS, ORAL) Take by mouth once daily.    cholecalciferol, vitamin D3, 2,000 unit Cap Take by mouth. 1 capsule Oral Every day    dicyclomine (BENTYL) 20 mg tablet Take 1 tablet (20 mg total) by mouth 3 (three) times daily as needed (abdominal pain).    hydroCHLOROthiazide (HYDRODIURIL) 25 MG tablet Take 1 tablet (25 mg total) by mouth once daily.    hydroquinone 4 % Crea Apply to dark spots once daily. Use with sunscreen if outdoors    ibuprofen (ADVIL,MOTRIN) 800 MG tablet Take 1 tablet (800 mg total) by mouth 2 (two) times daily as needed.    ipratropium (ATROVENT) 21 mcg (0.03 %) nasal spray 2 sprays by Nasal route 3 (three) times daily.    lisinopriL (PRINIVIL,ZESTRIL) 40 MG tablet TAKE 1 TABLET BY MOUTH ONCE DAILY.    methocarbamoL (ROBAXIN) 500 MG Tab Take 1 tablet once or twice a day as needed for muscle spasm and neck pain.    metoprolol succinate (TOPROL-XL) 25 MG 24 hr tablet Take 1 tablet by mouth once daily    montelukast (SINGULAIR) 10 mg tablet Take 1 tablet (10 mg total) by mouth every evening.    multivitamin-Ca-iron-minerals 18-0.4 mg Tab Take by mouth. 1 Tablet Oral Every day    potassium bicarbonate disintegrating tablet Take 10 mEq by mouth 2 (two) times daily.    pravastatin (PRAVACHOL) 80 MG tablet Take 1 tablet by mouth in the evening    topiramate (TOPAMAX) 25 MG tablet TAKE 1 TABLET BY MOUTH NIGHTLY FOR 5 DAYS ,  THEN  TAKE  2  TABLETS  NIGHTLY  THEREAFTER.    tretinoin (RETIN-A) 0.025 % cream Apply pea-sized amount to entire face at bedtime.  If dryness, use every third night and increase as tolerated to every night.    valACYclovir (VALTREX) 1000 MG tablet TAKE 2 TABLETS BY MOUTH TWICE DAILY FOR 1 DAY PER EPISODE    zinc sulfate (ZINC-220 ORAL)     cetirizine (ALLERGY RELIEF, CETIRIZINE,) 10 MG tablet Take 1 tablet (10 mg total) by mouth  once daily. (Patient not taking: Reported on 7/26/2023)       Review of Systems   Constitutional:  Positive for fatigue. Negative for appetite change, chills and fever.   HENT:  Positive for congestion, sinus pressure and voice change. Negative for nosebleeds, postnasal drip, rhinorrhea, sinus pain, sneezing and sore throat.         Positive for stopped up ears.   Eyes:  Negative for pain, discharge, redness and itching.   Respiratory:  Positive for cough. Negative for shortness of breath and wheezing.    Cardiovascular:  Negative for chest pain, palpitations and leg swelling.   Gastrointestinal:  Negative for abdominal pain, constipation, diarrhea, nausea and vomiting.   Genitourinary:  Negative for difficulty urinating.   Musculoskeletal:  Negative for back pain and myalgias.   Neurological:  Negative for numbness and headaches.   Psychiatric/Behavioral:  Negative for dysphoric mood and suicidal ideas. The patient is not nervous/anxious.         Negative for homicidal ideas.     Objective:  Physical Exam  Vitals reviewed.   Constitutional:       General: She is not in acute distress.     Appearance: Normal appearance. She is well-developed. She is obese. She is not ill-appearing, toxic-appearing or diaphoretic.      Comments: Pleasant.   HENT:      Head: Normocephalic and atraumatic.      Comments: Subtle tenderness over left maxillary sinus.     Right Ear: Tympanic membrane, ear canal and external ear normal. There is no impacted cerumen.      Left Ear: Tympanic membrane, ear canal and external ear normal. There is no impacted cerumen.      Nose: Congestion present. No rhinorrhea.      Comments: Subtle congestion.      Mouth/Throat:      Mouth: Mucous membranes are moist.      Pharynx: Oropharynx is clear. No oropharyngeal exudate or posterior oropharyngeal erythema.   Eyes:      General:         Right eye: No discharge.         Left eye: No discharge.      Extraocular Movements: Extraocular movements intact.       Conjunctiva/sclera: Conjunctivae normal.      Pupils: Pupils are equal, round, and reactive to light.   Neck:      Thyroid: No thyromegaly.   Cardiovascular:      Rate and Rhythm: Normal rate and regular rhythm.      Heart sounds: Normal heart sounds. No murmur heard.  Pulmonary:      Effort: Pulmonary effort is normal. No respiratory distress.      Breath sounds: Normal breath sounds. No wheezing.   Abdominal:      General: Bowel sounds are normal. There is no distension.      Palpations: Abdomen is soft. There is no mass.      Tenderness: There is no abdominal tenderness. There is no guarding or rebound.   Musculoskeletal:         General: No swelling or tenderness.      Cervical back: Normal range of motion and neck supple.      Comments: She is ambulatory without problems.   Lymphadenopathy:      Cervical: No cervical adenopathy.   Neurological:      General: No focal deficit present.      Mental Status: She is alert and oriented to person, place, and time.   Psychiatric:         Mood and Affect: Mood normal.         Behavior: Behavior normal.         Thought Content: Thought content normal.         Judgment: Judgment normal.       ASSESSMENT:  1. Sinobronchitis    2. Cough, unspecified type    3. Severe obesity (BMI 35.0-39.9) with comorbidity        PLAN:  Angelica was seen today for cough.    Diagnoses and all orders for this visit:    Sinobronchitis  -     amoxicillin-clavulanate 875-125mg (AUGMENTIN) 875-125 mg per tablet; Take 1 tablet by mouth every 12 (twelve) hours. for 10 days    Cough, unspecified type  -     X-Ray Chest PA And Lateral; Future    Severe obesity (BMI 35.0-39.9) with comorbidity      Patient advised to call for results.  Continue current medications, follow low sodium, low cholesterol, low carb diet, daily walks.  Add Augmentin 875 mg twice daily x 10 days; medication precautions discussed with patient.  Keep follow up with specialists.  Flu shot this fall.  Follow up if symptoms worsen  or fail to improve.

## 2023-07-27 RX ORDER — BENZONATATE 100 MG/1
100 CAPSULE ORAL 3 TIMES DAILY PRN
Qty: 30 CAPSULE | Refills: 0 | Status: SHIPPED | OUTPATIENT
Start: 2023-07-27 | End: 2023-08-06

## 2023-07-27 NOTE — TELEPHONE ENCOUNTER
Refill Routing Note   Medication(s) are not appropriate for processing by [facility] for the following reason(s):      Medication outside of protocol    [facility] action(s):  Route Care Due:  None identified              Appointments  past 12m or future 3m with PCP    Date Provider   Last Visit   7/26/2023 Monet Alvarez MD   Next Visit   8/31/2023 Monet Alvarez MD   ED visits in past 90 days: 0        Note composed:5:06 PM 07/27/2023

## 2023-08-02 ENCOUNTER — OFFICE VISIT (OUTPATIENT)
Dept: GASTROENTEROLOGY | Facility: CLINIC | Age: 63
End: 2023-08-02
Payer: COMMERCIAL

## 2023-08-02 VITALS
HEIGHT: 60 IN | DIASTOLIC BLOOD PRESSURE: 80 MMHG | WEIGHT: 193.56 LBS | SYSTOLIC BLOOD PRESSURE: 112 MMHG | BODY MASS INDEX: 38 KG/M2 | HEART RATE: 67 BPM

## 2023-08-02 DIAGNOSIS — K50.10 CROHN'S DISEASE OF COLON WITHOUT COMPLICATION: Primary | ICD-10-CM

## 2023-08-02 DIAGNOSIS — D84.9 IMMUNOCOMPROMISED STATE: ICD-10-CM

## 2023-08-02 PROCEDURE — 1159F MED LIST DOCD IN RCRD: CPT | Mod: CPTII,S$GLB,, | Performed by: NURSE PRACTITIONER

## 2023-08-02 PROCEDURE — 3008F BODY MASS INDEX DOCD: CPT | Mod: CPTII,S$GLB,, | Performed by: NURSE PRACTITIONER

## 2023-08-02 PROCEDURE — 4010F PR ACE/ARB THEARPY RXD/TAKEN: ICD-10-PCS | Mod: CPTII,S$GLB,, | Performed by: NURSE PRACTITIONER

## 2023-08-02 PROCEDURE — 1160F RVW MEDS BY RX/DR IN RCRD: CPT | Mod: CPTII,S$GLB,, | Performed by: NURSE PRACTITIONER

## 2023-08-02 PROCEDURE — 99999 PR PBB SHADOW E&M-EST. PATIENT-LVL III: ICD-10-PCS | Mod: PBBFAC,,, | Performed by: NURSE PRACTITIONER

## 2023-08-02 PROCEDURE — 99214 PR OFFICE/OUTPT VISIT, EST, LEVL IV, 30-39 MIN: ICD-10-PCS | Mod: S$GLB,,, | Performed by: NURSE PRACTITIONER

## 2023-08-02 PROCEDURE — 3008F PR BODY MASS INDEX (BMI) DOCUMENTED: ICD-10-PCS | Mod: CPTII,S$GLB,, | Performed by: NURSE PRACTITIONER

## 2023-08-02 PROCEDURE — 1159F PR MEDICATION LIST DOCUMENTED IN MEDICAL RECORD: ICD-10-PCS | Mod: CPTII,S$GLB,, | Performed by: NURSE PRACTITIONER

## 2023-08-02 PROCEDURE — 99214 OFFICE O/P EST MOD 30 MIN: CPT | Mod: S$GLB,,, | Performed by: NURSE PRACTITIONER

## 2023-08-02 PROCEDURE — 99999 PR PBB SHADOW E&M-EST. PATIENT-LVL III: CPT | Mod: PBBFAC,,, | Performed by: NURSE PRACTITIONER

## 2023-08-02 PROCEDURE — 3079F PR MOST RECENT DIASTOLIC BLOOD PRESSURE 80-89 MM HG: ICD-10-PCS | Mod: CPTII,S$GLB,, | Performed by: NURSE PRACTITIONER

## 2023-08-02 PROCEDURE — 1160F PR REVIEW ALL MEDS BY PRESCRIBER/CLIN PHARMACIST DOCUMENTED: ICD-10-PCS | Mod: CPTII,S$GLB,, | Performed by: NURSE PRACTITIONER

## 2023-08-02 PROCEDURE — 4010F ACE/ARB THERAPY RXD/TAKEN: CPT | Mod: CPTII,S$GLB,, | Performed by: NURSE PRACTITIONER

## 2023-08-02 PROCEDURE — 3074F SYST BP LT 130 MM HG: CPT | Mod: CPTII,S$GLB,, | Performed by: NURSE PRACTITIONER

## 2023-08-02 PROCEDURE — 3079F DIAST BP 80-89 MM HG: CPT | Mod: CPTII,S$GLB,, | Performed by: NURSE PRACTITIONER

## 2023-08-02 PROCEDURE — 3074F PR MOST RECENT SYSTOLIC BLOOD PRESSURE < 130 MM HG: ICD-10-PCS | Mod: CPTII,S$GLB,, | Performed by: NURSE PRACTITIONER

## 2023-08-02 RX ORDER — SODIUM, POTASSIUM,MAG SULFATES 17.5-3.13G
SOLUTION, RECONSTITUTED, ORAL ORAL
Qty: 354 ML | Refills: 0 | Status: SHIPPED | OUTPATIENT
Start: 2023-08-02 | End: 2023-08-31

## 2023-08-02 NOTE — PROGRESS NOTES
Clinic Follow Up:  [facility] Follow Up Note    Reason for Follow Up:  The primary encounter diagnosis was Crohn's disease of colon without complication. A diagnosis of Immunocompromised state was also pertinent to this visit.    PCP: Monet Alvarez       HPI:  This is a 63 y.o. female here for follow up of Crohn's disease.     IBD History  - Type: crohn's disease  - Disease Location: colon  - Amount of colon involvement (for Crohn's Disease only): More than 1/3 of the colon is involved   - Phenotype:  inflammatory  - Diagnosed:   - Surgeries related to IBD: none   - Extra-intestinal Manifestations: joint pain(s)-- left shoulder only     Current Medications  Humira 40 mg every 7 days, started 10/2022, increased to weekly 2023  Imuran 150 mg daily      Past Medications  Mercaptopurine 50 mg daily, started 10-12 years ago     Drug and Antibody Levels   - Pro-predict thiopurine metabolites 6TG low normal at 233 (230-400)  22 Humira level 6.5; no AB formation (standard dosing)  3/3/23 Humira level 16.5; no AB formation (weekly dosing)     Endoscopy Reports  - patchy inflammation (descending and ascending)  - normal   - scattered inflammation (proximal transverse)   - patchy inflammation (transverse, ascending, cecum)  - normal  2022- patchy inflammation (hepatic flexure, ascending, cecum)- on 6-MP     Pertinent Imagine Reports:  NA     Interval History  She is on weekly Humira + Imuran. She has been having URI but aotherwise is doing well.     Preventative Medicine     Immunizations  - Influenza: 10/18/22  - Pnemococcal:  PCV 20: 22  - Hepatitis A/B: 5/10/23, 23, 22  - Herpes Zoster: 19, 19  - COVID-19:      Cancer Prevention   - Date of last pap smear: NA- hyst   - Date of last skin cancer screenin2022, recommend yearly   - Date of last surveillance colonoscopy:      Bone Health  - Vitamin D level: 67 ()-- on  "supplement.   - Date of last DEXA: NA     Therapy Related Testing  - Date of last TB testing: 10/19/22- negative   - TPMT status: normal      Miscellaneous  - Vitamin B 12 level (if ileal disease or resection): NA  - Smoking status: no  - NSAID use: no   - History of C. Diff: no  - Family planning: hyst     Recent Labs:   Lab Results   Component Value Date    WBC 11.02 07/12/2023    HGB 13.2 07/12/2023    HCT 40.6 07/12/2023     07/12/2023    ALT 10 07/12/2023    AST 13 07/12/2023    BUN 31 (H) 07/12/2023    CREATININE 0.9 07/12/2023    ALBUMIN 4.1 07/12/2023     07/12/2023    K 3.6 07/12/2023    GLU 99 07/12/2023     Lab Results   Component Value Date    CRP 5.6 07/12/2023    SEDRATE 23 07/12/2023    CALPROTECTIN 151.6 (H) 10/21/2022     Lab Results   Component Value Date    HEPBSAB 5.69 10/19/2022    HEPBSAB Non-reactive 10/19/2022    HEPBSAG Non-reactive 10/19/2022    HEPBCAB Non-reactive 10/19/2022    HEPAIGG Negative 01/24/2020     Lab Results   Component Value Date    WFXNNAEV52 533 06/24/2009     Lab Results   Component Value Date    ZDNHEXBS34VF 67 08/11/2021     No results found for: "TBGOLD"     Review of Systems   Constitutional:  Negative for activity change and appetite change.        As per interval history above   Respiratory:  Negative for cough and shortness of breath.    Cardiovascular:  Negative for chest pain.   Gastrointestinal:  Positive for diarrhea. Negative for abdominal pain, constipation, nausea and vomiting.   Skin:  Negative for color change and rash.       Medical History:  Past Medical History:   Diagnosis Date    Anxiety     Asthma     Crohn's disease     Fibroids     Hyperlipidemia     Hypertension     Iritis     Migraine headache     Ocular    Osteopenia 08/2019    Syncope and collapse     Vitamin D deficiency disease        Surgical History:   Past Surgical History:   Procedure Laterality Date    ANKLE FRACTURE SURGERY  08/19/08    right ankle    BREAST BIOPSY Left " 1977    COLONOSCOPY N/A 8/11/2017    Procedure: COLONOSCOPY - REQUESTS DR. MATTI ACUNA;  Surgeon: Matti Acuna III, MD;  Location: [facility] ENDO;  Service: Endoscopy;  Laterality: N/A;    COLONOSCOPY N/A 11/23/2020    Procedure: COLONOSCOPY;  Surgeon: Matti Acuna III, MD;  Location: [facility] ENDO;  Service: Endoscopy;  Laterality: N/A;    COLONOSCOPY N/A 9/28/2022    Procedure: COLONOSCOPY;  Surgeon: Matti Acuna III, MD;  Location: [facility] ENDO;  Service: Endoscopy;  Laterality: N/A;    TAHBSO  February 2011    Due to abnormal pap smear and fibroids    TOTAL ABDOMINAL HYSTERECTOMY         Family History:   Family History   Problem Relation Age of Onset    Arthritis Mother     Fuch's dystrophy Mother     Breast cancer Mother     Lupus Sister     Rheum arthritis Sister     Obesity Sister     Hypertension Father     Cancer Maternal Grandfather         lung ca    Stroke Maternal Grandmother     Diabetes Maternal Grandmother     Colon cancer Paternal Aunt        Social History:   Social History     Tobacco Use    Smoking status: Never    Smokeless tobacco: Never   Substance Use Topics    Alcohol use: Yes     Alcohol/week: 0.0 standard drinks of alcohol     Comment: ocassionally    Drug use: No       Allergies: Review of patient's allergies indicates:  No Known Allergies    Home Medications:  Current Outpatient Medications on File Prior to Visit   Medication Sig Dispense Refill    ALPRAZolam (XANAX) 0.5 MG tablet Take 1 tablet by mouth twice daily as needed 60 tablet 0    amLODIPine (NORVASC) 5 MG tablet Take 1 tablet by mouth once daily 90 tablet 0    amoxicillin-clavulanate 875-125mg (AUGMENTIN) 875-125 mg per tablet Take 1 tablet by mouth every 12 (twelve) hours. for 10 days 20 tablet 0    ascorbic acid, vitamin C, (VITAMIN C) 1000 MG tablet Take by mouth. 1 Tablet Oral Every day      BACILLUS COAGULANS (PROBIOTIC, B. COAGULANS, ORAL) Take by mouth once daily.      benzonatate (TESSALON) 100 MG capsule Take 1  capsule (100 mg total) by mouth 3 (three) times daily as needed for Cough. 30 capsule 0    cetirizine (ALLERGY RELIEF, CETIRIZINE,) 10 MG tablet Take 1 tablet (10 mg total) by mouth once daily. 90 tablet 1    cholecalciferol, vitamin D3, 2,000 unit Cap Take by mouth. 1 capsule Oral Every day      hydroCHLOROthiazide (HYDRODIURIL) 25 MG tablet Take 1 tablet (25 mg total) by mouth once daily. 90 tablet 1    hydroquinone 4 % Crea Apply to dark spots once daily. Use with sunscreen if outdoors 28 g 1    ibuprofen (ADVIL,MOTRIN) 800 MG tablet Take 1 tablet (800 mg total) by mouth 2 (two) times daily as needed. 180 tablet 1    ipratropium (ATROVENT) 21 mcg (0.03 %) nasal spray 2 sprays by Nasal route 3 (three) times daily. 20 mL 5    lisinopriL (PRINIVIL,ZESTRIL) 40 MG tablet TAKE 1 TABLET BY MOUTH ONCE DAILY. 90 tablet 1    methocarbamoL (ROBAXIN) 500 MG Tab Take 1 tablet once or twice a day as needed for muscle spasm and neck pain. 60 tablet 0    metoprolol succinate (TOPROL-XL) 25 MG 24 hr tablet Take 1 tablet by mouth once daily 90 tablet 0    montelukast (SINGULAIR) 10 mg tablet Take 1 tablet (10 mg total) by mouth every evening. 90 tablet 1    multivitamin-Ca-iron-minerals 18-0.4 mg Tab Take by mouth. 1 Tablet Oral Every day      potassium bicarbonate disintegrating tablet Take 10 mEq by mouth 2 (two) times daily.      pravastatin (PRAVACHOL) 80 MG tablet Take 1 tablet by mouth in the evening 90 tablet 0    topiramate (TOPAMAX) 25 MG tablet TAKE 1 TABLET BY MOUTH NIGHTLY FOR 5 DAYS ,  THEN  TAKE  2  TABLETS  NIGHTLY  THEREAFTER. 60 tablet 0    tretinoin (RETIN-A) 0.025 % cream Apply pea-sized amount to entire face at bedtime.  If dryness, use every third night and increase as tolerated to every night. 20 g 6    valACYclovir (VALTREX) 1000 MG tablet TAKE 2 TABLETS BY MOUTH TWICE DAILY FOR 1 DAY PER EPISODE 30 tablet 0    zinc sulfate (ZINC-220 ORAL)       adalimumab (HUMIRA,CF, PEN) 40 mg/0.4 mL PnKt Inject 0.4 mLs  (40 mg total) into the skin every 7 days. 4 pen 11    albuterol (PROVENTIL/VENTOLIN HFA) 90 mcg/actuation inhaler Inhale 2 puffs into the lungs every 6 (six) hours as needed for Wheezing or Shortness of Breath. 18 g 2    azaTHIOprine (IMURAN) 50 mg Tab Take 3 tablets (150 mg total) by mouth once daily. 270 tablet 0    azelastine (ASTELIN) 137 mcg (0.1 %) nasal spray 2 sprays (274 mcg total) by Nasal route 2 (two) times daily. 90 mL 3    dicyclomine (BENTYL) 20 mg tablet Take 1 tablet (20 mg total) by mouth 3 (three) times daily as needed (abdominal pain). (Patient not taking: Reported on 8/2/2023) 90 tablet 0     No current facility-administered medications on file prior to visit.       /80 (BP Location: Left arm, Patient Position: Sitting, BP Method: Large (Manual))   Pulse 67   Ht 5' (1.524 m)   Wt 87.8 kg (193 lb 9 oz)   BMI 37.80 kg/m²   Body mass index is 37.8 kg/m².  Physical Exam  Constitutional:       General: She is not in acute distress.  HENT:      Head: Normocephalic and atraumatic.   Eyes:      General: No scleral icterus.     Conjunctiva/sclera: Conjunctivae normal.   Cardiovascular:      Rate and Rhythm: Normal rate and regular rhythm.      Heart sounds: No murmur heard.  Pulmonary:      Effort: Pulmonary effort is normal. No respiratory distress.      Breath sounds: Normal breath sounds. No wheezing.   Abdominal:      General: Abdomen is flat. Bowel sounds are normal.      Palpations: Abdomen is soft.      Tenderness: There is no abdominal tenderness.   Skin:     General: Skin is warm and dry.   Neurological:      General: No focal deficit present.      Mental Status: She is alert and oriented to person, place, and time.      Cranial Nerves: No cranial nerve deficit.   Psychiatric:         Mood and Affect: Mood normal.         Judgment: Judgment normal.         Labs: Pertinent labs reviewed.    Assessment:   1. Crohn's disease of colon without complication    2. Immunocompromised state     Doing well on Humira weekly dosing + Imuran 150 mg.   Drug levels are 16 which are borderline elevated.     Recommendations:   - continue current medications  - colonoscopy now to assess response to Humira.  If normal, will discontinue Imuran and repeat drug levels in 8-12 weeks.   - will update her yearly labs at that time.     Crohn's disease of colon without complication  -     Case Request Endoscopy: COLONOSCOPY  -     Ambulatory referral/consult to Endo Procedure ; Future; Expected date: 08/03/2023  -     sodium,potassium,mag sulfates (SUPREP BOWEL PREP KIT) 17.5-3.13-1.6 gram SolR; Use as directed  Dispense: 354 mL; Refill: 0  -     ADALIMUMAB CONCENTRATION AND ANTI-ADALIMUMAB ANTIBODY (SERIA); Future; Expected date: 08/02/2023  -     CBC Auto Differential; Future; Expected date: 08/02/2023  -     Comprehensive Metabolic Panel; Future; Expected date: 08/02/2023  -     Vitamin D; Future; Expected date: 08/02/2023  -     Quantiferon Gold TB; Future; Expected date: 08/02/2023  -     Hepatitis B Core Antibody, Total; Future; Expected date: 08/02/2023  -     Hepatitis B Surface Ab, Qualitative; Future; Expected date: 08/02/2023  -     Hepatitis B Surface Antigen; Future; Expected date: 08/02/2023    Immunocompromised state  -     Case Request Endoscopy: COLONOSCOPY  -     Ambulatory referral/consult to Endo Procedure ; Future; Expected date: 08/03/2023  -     sodium,potassium,mag sulfates (SUPREP BOWEL PREP KIT) 17.5-3.13-1.6 gram SolR; Use as directed  Dispense: 354 mL; Refill: 0    Return to Clinic:  Follow up in about 6 months (around 2/2/2024).    Thank you for the opportunity to participate in the care of this patient.  BK Del Toro

## 2023-08-03 ENCOUNTER — HOSPITAL ENCOUNTER (OUTPATIENT)
Dept: PREADMISSION TESTING | Facility: HOSPITAL | Age: 63
Discharge: HOME | End: 2023-08-03
Attending: INTERNAL MEDICINE
Payer: COMMERCIAL

## 2023-08-03 DIAGNOSIS — K50.10 CROHN'S DISEASE OF COLON WITHOUT COMPLICATION: ICD-10-CM

## 2023-08-03 DIAGNOSIS — D84.9 IMMUNOCOMPROMISED STATE: ICD-10-CM

## 2023-08-07 ENCOUNTER — HOSPITAL ENCOUNTER (OUTPATIENT)
Dept: PREADMISSION TESTING | Facility: HOSPITAL | Age: 63
Discharge: HOME | End: 2023-08-07
Attending: INTERNAL MEDICINE
Payer: COMMERCIAL

## 2023-08-09 ENCOUNTER — DOCUMENTATION ONLY (OUTPATIENT)
Dept: REHABILITATION | Facility: HOSPITAL | Age: 63
End: 2023-08-09
Payer: COMMERCIAL

## 2023-08-09 DIAGNOSIS — Z74.09 DECREASED STRENGTH, ENDURANCE, AND MOBILITY: ICD-10-CM

## 2023-08-09 DIAGNOSIS — M25.612 DECREASED RANGE OF MOTION OF LEFT SHOULDER: ICD-10-CM

## 2023-08-09 DIAGNOSIS — G89.29 CHRONIC LEFT SHOULDER PAIN: ICD-10-CM

## 2023-08-09 DIAGNOSIS — M54.12 CERVICAL RADICULOPATHY: Primary | ICD-10-CM

## 2023-08-09 DIAGNOSIS — R53.1 DECREASED STRENGTH, ENDURANCE, AND MOBILITY: ICD-10-CM

## 2023-08-09 DIAGNOSIS — M54.2 NECK PAIN ON LEFT SIDE: ICD-10-CM

## 2023-08-09 DIAGNOSIS — M25.512 CHRONIC LEFT SHOULDER PAIN: ICD-10-CM

## 2023-08-09 DIAGNOSIS — R68.89 DECREASED STRENGTH, ENDURANCE, AND MOBILITY: ICD-10-CM

## 2023-08-09 NOTE — PROGRESS NOTES
[facility]  Physical Therapy Discharge Note    Name: Angelica Pate [place]  Clinic Number: [id number]    Therapy Diagnosis:   Encounter Diagnoses   Name Primary?    Cervical radiculopathy Yes    Decreased range of motion of left shoulder     Chronic left shoulder pain     Neck pain on left side     Decreased strength, endurance, and mobility      Physician: Sedrick Ferrari MD      Physician Orders: PT Eval and Treat  Medical Diagnosis from Referral: chronic left shoulder pain, cervical radiculopathy, neck pain on left side  Evaluation Date: 4/11/2023    Date of Last visit: 6/6/2023  Total Visits Received: 13    ASSESSMENT      Patient was progressing very well towards DC, but she had to cancel her last visit and never returned for final visit.     Discharge reason: Patient has not attended therapy since 6/6/2023    Discharge FOTO Score: N/A      Goals:  Short Term Goals:  6 weeks Status  Date Met   PAIN: Pt will report worst pain of 4/10 in order to progress toward max functional ability and improve quality of life. [] Progressing  [x] Met  [] Not Met 5/10/2023    FUNCTION: Patient will demonstrate improved function as indicated by a functional limitation score of less than or equal to 47 out of 100 on FOTO. [] Progressing  [x] Met  [] Not Met  5/10/2023   MOBILITY: Patient will improve AROM to 50% of stated goals, listed in objective measures above, in order to progress towards independence with functional activities.  [] Progressing  [x] Met  [] Not Met  5/10/2023   STRENGTH: Patient will improve strength to 50% of stated goals, listed in objective measures above, in order to progress towards independence with functional activities. [] Progressing  [x] Met  [] Not Met  5/10/2023   POSTURE: Patient will correct postural deviations in sitting and standing, to decrease pain and promote long term stability.  [] Progressing  [x] Met  [] Not Met  5/10/2023   HEP: Patient will demonstrate  independence with HEP in order to progress toward functional independence. [] Progressing  [x] Met  [] Not Met  5/10/2023      Long Term Goals:  12 weeks Status Date Met   PAIN: Pt will report worst pain of 2/10 in order to progress toward max functional ability and improve quality of life [x] Progressing  [] Met  [] Not Met     FUNCTION: Patient will demonstrate improved function as indicated by a functional limitation score of less than or equal to 38 out of 100 on FOTO. [x] Progressing  [] Met  [] Not Met     MOBILITY: Patient will improve AROM to stated goals, listed in objective measures above, in order to return to maximal functional potential and improve quality of life.  [x] Progressing  [] Met  [] Not Met     STRENGTH: Patient will improve strength to stated goals, listed in objective measures above, in order to improve functional independence and quality of life.  [x] Progressing  [] Met  [] Not Met     GAIT: Patient will demonstrate normalized gait mechanics with minimal compensation in order to return to PLOF. [x] Progressing  [] Met  [] Not Met     Patient will return to normal ADL's, IADL's, community involvement, recreational activities, and work-related activities with less than or equal to 0/10 pain and maximal function.  [x] Progressing  [] Met  [] Not Met          PLAN   This patient is discharged from Physical Therapy      Olinda Blount, PT

## 2023-08-15 ENCOUNTER — LAB VISIT (OUTPATIENT)
Dept: LAB | Facility: HOSPITAL | Age: 63
End: 2023-08-15
Attending: NURSE PRACTITIONER
Payer: COMMERCIAL

## 2023-08-15 DIAGNOSIS — K50.10 CROHN'S DISEASE OF COLON WITHOUT COMPLICATION: ICD-10-CM

## 2023-08-15 DIAGNOSIS — J31.0 NON-ALLERGIC RHINITIS: ICD-10-CM

## 2023-08-15 LAB
C DIFF GDH STL QL: NEGATIVE
C DIFF TOX A+B STL QL IA: NEGATIVE

## 2023-08-15 PROCEDURE — 83993 ASSAY FOR CALPROTECTIN FECAL: CPT | Performed by: NURSE PRACTITIONER

## 2023-08-15 PROCEDURE — 87449 NOS EACH ORGANISM AG IA: CPT | Performed by: NURSE PRACTITIONER

## 2023-08-15 RX ORDER — MONTELUKAST SODIUM 10 MG/1
10 TABLET ORAL NIGHTLY
Qty: 90 TABLET | Refills: 1 | OUTPATIENT
Start: 2023-08-15

## 2023-08-18 ENCOUNTER — ANESTHESIA EVENT (OUTPATIENT)
Dept: ENDOSCOPY | Facility: HOSPITAL | Age: 63
End: 2023-08-18
Payer: COMMERCIAL

## 2023-08-18 NOTE — ANESTHESIA PREPROCEDURE EVALUATION
08/18/2023  Angelica Flores is a 63 y.o., female.    Past Medical History:   Diagnosis Date    Anxiety     Asthma     Crohn's disease     Fibroids     Hyperlipidemia     Hypertension     Iritis     Migraine headache     Ocular    Osteopenia 08/2019    Syncope and collapse     Vitamin D deficiency disease      Past Surgical History:   Procedure Laterality Date    ANKLE FRACTURE SURGERY  08/19/08    right ankle    BREAST BIOPSY Left 1977    COLONOSCOPY N/A 8/11/2017    Procedure: COLONOSCOPY - REQUESTS DR. MATTI ACUNA;  Surgeon: Matti Acuna III, MD;  Location: [facility] ENDO;  Service: Endoscopy;  Laterality: N/A;    COLONOSCOPY N/A 11/23/2020    Procedure: COLONOSCOPY;  Surgeon: Matti Acuna III, MD;  Location: [facility] ENDO;  Service: Endoscopy;  Laterality: N/A;    COLONOSCOPY N/A 9/28/2022    Procedure: COLONOSCOPY;  Surgeon: Matti Acuna III, MD;  Location: [facility];  Service: Endoscopy;  Laterality: N/A;    TAHBSO  February 2011    Due to abnormal pap smear and fibroids    TOTAL ABDOMINAL HYSTERECTOMY       Patient Active Problem List   Diagnosis    Essential hypertension    Dysthymic disorder    Hyperlipidemia    Vitamin D deficiency    Insomnia    Allergic rhinitis    Postmenopausal    Migraine without status migrainosus, not intractable    Severe obesity (BMI 35.0-39.9) with comorbidity    Drug-induced immunodeficiency    Crohn's disease of colon without complication    Chronic left shoulder pain    Neck pain on left side    Strain of left trapezius muscle    Decreased range of motion of left shoulder    Decreased strength, endurance, and mobility    Decreased functional mobility and endurance    Neuropathy of left hand    Cervical spine arthritis    Cervical radiculopathy    Bilateral carpal tunnel syndrome     Normal sinus rhythm   Possible Anterior  infarct ,age undetermined   Abnormal ECG   When compared with ECG of 17-FEB-2011 10:25,   No significant change was found   Confirmed by GLENDA RODRIGUEZ MD ([id number]) on 8/4/2022     Pre-op Assessment    I have reviewed the Patient Summary Reports.     I have reviewed the Nursing Notes. I have reviewed the NPO Status.   I have reviewed the Medications.     Review of Systems  Anesthesia Hx:  No problems with previous Anesthesia  Denies Family Hx of Anesthesia complications.   Denies Personal Hx of Anesthesia complications.   Social:  Non-Smoker, Social Alcohol Use    EENT/Dental:   chronic allergic rhinitis   Cardiovascular:   Hypertension hyperlipidemia ECG has been reviewed. Normal sinus rhythm   Possible Anterior infarct ,age undetermined   Abnormal ECG   When compared with ECG of 17-FEB-2011 10:25,   No significant change was found   Confirmed by GLENDA RODRIGUEZ MD ([id number]) on 8/4/2022    Pulmonary:   Asthma    Hepatic/GI:   Bowel Prep. Crohn's disease   Neurological:   Neuromuscular Disease, Headaches    Endocrine:  Obesity / BMI > 30  Psych:   Psychiatric History anxiety Dysthymic disorder         Physical Exam  General: Cooperative and Alert    Airway:  Mallampati: II / II  Mouth Opening: Normal  TM Distance: Normal  Tongue: Normal  Neck ROM: Normal ROM    Dental:  Intact    Chest/Lungs:  Normal Respiratory Rate        Anesthesia Plan  Type of Anesthesia, risks & benefits discussed:    Anesthesia Type: Gen Natural Airway  Intra-op Monitoring Plan: Standard ASA Monitors  Post Op Pain Control Plan: multimodal analgesia  Induction:  IV  Informed Consent: Informed consent signed with the Patient and all parties understand the risks and agree with anesthesia plan.  All questions answered.   ASA Score: 2  Day of Surgery Review of History & Physical: H&P Update referred to the surgeon/provider.    Ready For Surgery From Anesthesia Perspective.     .

## 2023-08-22 ENCOUNTER — HOSPITAL ENCOUNTER (OUTPATIENT)
Facility: HOSPITAL | Age: 63
Discharge: HOME | End: 2023-08-22
Attending: INTERNAL MEDICINE | Admitting: INTERNAL MEDICINE
Payer: COMMERCIAL

## 2023-08-22 ENCOUNTER — PATIENT MESSAGE (OUTPATIENT)
Dept: GASTROENTEROLOGY | Facility: CLINIC | Age: 63
End: 2023-08-22

## 2023-08-22 ENCOUNTER — ANESTHESIA (OUTPATIENT)
Dept: ENDOSCOPY | Facility: HOSPITAL | Age: 63
End: 2023-08-22
Payer: COMMERCIAL

## 2023-08-22 VITALS
TEMPERATURE: 98 F | WEIGHT: 191.81 LBS | OXYGEN SATURATION: 99 % | RESPIRATION RATE: 16 BRPM | BODY MASS INDEX: 37.66 KG/M2 | DIASTOLIC BLOOD PRESSURE: 72 MMHG | SYSTOLIC BLOOD PRESSURE: 106 MMHG | HEART RATE: 65 BPM | HEIGHT: 60 IN

## 2023-08-22 DIAGNOSIS — K50.10 CROHN'S DISEASE OF LARGE INTESTINE WITHOUT COMPLICATION: Primary | ICD-10-CM

## 2023-08-22 LAB — CALPROTECTIN STL-MCNT: 29.3 MCG/G

## 2023-08-22 PROCEDURE — 37000009 HC ANESTHESIA EA ADD 15 MINS: Performed by: INTERNAL MEDICINE

## 2023-08-22 PROCEDURE — 88305 TISSUE EXAM BY PATHOLOGIST: ICD-10-PCS | Mod: 26,,, | Performed by: PATHOLOGY

## 2023-08-22 PROCEDURE — 45380 PR COLONOSCOPY,BIOPSY: ICD-10-PCS | Mod: ,,, | Performed by: INTERNAL MEDICINE

## 2023-08-22 PROCEDURE — 45380 COLONOSCOPY AND BIOPSY: CPT | Performed by: INTERNAL MEDICINE

## 2023-08-22 PROCEDURE — 27201012 HC FORCEPS, HOT/COLD, DISP: Performed by: INTERNAL MEDICINE

## 2023-08-22 PROCEDURE — D9220A PRA ANESTHESIA: Mod: ,,, | Performed by: NURSE ANESTHETIST, CERTIFIED REGISTERED

## 2023-08-22 PROCEDURE — 88305 TISSUE EXAM BY PATHOLOGIST: CPT | Mod: 26,,, | Performed by: PATHOLOGY

## 2023-08-22 PROCEDURE — 63600175 PHARM REV CODE 636 W HCPCS: Performed by: NURSE ANESTHETIST, CERTIFIED REGISTERED

## 2023-08-22 PROCEDURE — 37000008 HC ANESTHESIA 1ST 15 MINUTES: Performed by: INTERNAL MEDICINE

## 2023-08-22 PROCEDURE — 63600175 PHARM REV CODE 636 W HCPCS: Performed by: INTERNAL MEDICINE

## 2023-08-22 PROCEDURE — D9220A PRA ANESTHESIA: ICD-10-PCS | Mod: ,,, | Performed by: NURSE ANESTHETIST, CERTIFIED REGISTERED

## 2023-08-22 PROCEDURE — 45380 COLONOSCOPY AND BIOPSY: CPT | Mod: ,,, | Performed by: INTERNAL MEDICINE

## 2023-08-22 PROCEDURE — 88305 TISSUE EXAM BY PATHOLOGIST: CPT | Mod: 59 | Performed by: PATHOLOGY

## 2023-08-22 PROCEDURE — 25000003 PHARM REV CODE 250: Performed by: NURSE ANESTHETIST, CERTIFIED REGISTERED

## 2023-08-22 RX ORDER — LIDOCAINE HYDROCHLORIDE 20 MG/ML
INJECTION INTRAVENOUS
Status: DISCONTINUED | OUTPATIENT
Start: 2023-08-22 | End: 2023-08-22

## 2023-08-22 RX ORDER — SODIUM CHLORIDE, SODIUM LACTATE, POTASSIUM CHLORIDE, CALCIUM CHLORIDE 600; 310; 30; 20 MG/100ML; MG/100ML; MG/100ML; MG/100ML
INJECTION, SOLUTION INTRAVENOUS CONTINUOUS
Status: ACTIVE | OUTPATIENT
Start: 2023-08-22

## 2023-08-22 RX ORDER — PROPOFOL 10 MG/ML
VIAL (ML) INTRAVENOUS
Status: DISCONTINUED | OUTPATIENT
Start: 2023-08-22 | End: 2023-08-22

## 2023-08-22 RX ADMIN — LIDOCAINE HYDROCHLORIDE 40 MG: 20 INJECTION INTRAVENOUS at 12:08

## 2023-08-22 RX ADMIN — PROPOFOL 50 MG: 10 INJECTION, EMULSION INTRAVENOUS at 12:08

## 2023-08-22 RX ADMIN — PROPOFOL 100 MG: 10 INJECTION, EMULSION INTRAVENOUS at 12:08

## 2023-08-22 RX ADMIN — SODIUM CHLORIDE, SODIUM LACTATE, POTASSIUM CHLORIDE, AND CALCIUM CHLORIDE: 600; 310; 30; 20 INJECTION, SOLUTION INTRAVENOUS at 12:08

## 2023-08-22 NOTE — TRANSFER OF CARE
Anesthesia Transfer of Care Note    Patient: Angelica Flores    Procedure(s) Performed: Procedure(s) (LRB):  COLONOSCOPY (N/A)    Patient location: PACU    Anesthesia Type: general    Transport from OR: Transported from OR on room air with adequate spontaneous ventilation    Post pain: adequate analgesia    Post assessment: no apparent anesthetic complications    Post vital signs: stable    Level of consciousness: awake    Nausea/Vomiting: no nausea/vomiting    Complications: none    Transfer of care protocol was followed      Last vitals:   Visit Vitals  /74   Pulse 88   Temp 36.1 °C (97 °F)   Resp 18   Ht 5' (1.524 m)   Wt 87 kg (191 lb 12.8 oz)   SpO2 97%   Breastfeeding No   BMI 37.46 kg/m²

## 2023-08-22 NOTE — H&P
PRE PROCEDURE H&P    Patient Name: Angelica Flores  MRN: [id number]  : 1960  Date of Procedure:  2023  Referring Physician: Haylee Gallo NP  Primary Physician: Monet Alvarez MD  Procedure Physician: Erlinda Lindsay MD       Planned Procedure: Colonoscopy  Diagnosis: Crohn's disease  Chief Complaint: Same as above    HPI: Patient is an 63 y.o. female is here for the above.     On humira weekly plus imuran     Past Medical History:   Past Medical History:   Diagnosis Date    Anxiety     Asthma     Crohn's disease     Fibroids     Hyperlipidemia     Hypertension     Iritis     Migraine headache     Ocular    Osteopenia 2019    Syncope and collapse     Vitamin D deficiency disease         Past Surgical History:  Past Surgical History:   Procedure Laterality Date    ANKLE FRACTURE SURGERY  08    right ankle    BREAST BIOPSY Left     COLONOSCOPY N/A 2017    Procedure: COLONOSCOPY - REQUESTS DR. MATTI WOOD;  Surgeon: Matti Wood III, MD;  Location: [facility];  Service: Endoscopy;  Laterality: N/A;    COLONOSCOPY N/A 2020    Procedure: COLONOSCOPY;  Surgeon: Matti Wood III, MD;  Location: [facility];  Service: Endoscopy;  Laterality: N/A;    COLONOSCOPY N/A 2022    Procedure: COLONOSCOPY;  Surgeon: Matti Wood III, MD;  Location: [facility];  Service: Endoscopy;  Laterality: N/A;    TAHBSO  2011    Due to abnormal pap smear and fibroids    TOTAL ABDOMINAL HYSTERECTOMY          Home Medications:  Prior to Admission medications    Medication Sig Start Date End Date Taking? Authorizing Provider   amLODIPine (NORVASC) 5 MG tablet Take 1 tablet by mouth once daily 23  Yes Amie Wharton MD   hydroCHLOROthiazide (HYDRODIURIL) 25 MG tablet Take 1 tablet (25 mg total) by mouth once daily. 23  Yes Monet Alvarez MD   lisinopriL (PRINIVIL,ZESTRIL) 40 MG tablet TAKE 1 TABLET BY MOUTH ONCE DAILY. 23  Yes Monet Alvarez MD    metoprolol succinate (TOPROL-XL) 25 MG 24 hr tablet Take 1 tablet by mouth once daily 6/19/23  Yes Amie Wharton MD   pravastatin (PRAVACHOL) 80 MG tablet Take 1 tablet by mouth in the evening 7/17/23  Yes Monet Alvarez MD   topiramate (TOPAMAX) 25 MG tablet TAKE 1 TABLET BY MOUTH NIGHTLY FOR 5 DAYS ,  THEN  TAKE  2  TABLETS  NIGHTLY  THEREAFTER. 7/25/23  Yes David Ewing MD   adalimumab (HUMIRA,CF, PEN) 40 mg/0.4 mL PnKt Inject 0.4 mLs (40 mg total) into the skin every 7 days. 3/3/23 7/26/23  Haylee Gallo NP   albuterol (PROVENTIL/VENTOLIN HFA) 90 mcg/actuation inhaler Inhale 2 puffs into the lungs every 6 (six) hours as needed for Wheezing or Shortness of Breath. 8/11/21 7/26/23  Monet Alvarez MD   ALPRAZolam (XANAX) 0.5 MG tablet Take 1 tablet by mouth twice daily as needed 2/2/23   Monet Alvarez MD   ascorbic acid, vitamin C, (VITAMIN C) 1000 MG tablet Take by mouth. 1 Tablet Oral Every day    Provider, Historical   azaTHIOprine (IMURAN) 50 mg Tab Take 3 tablets (150 mg total) by mouth once daily. 3/21/23 7/26/23  Haylee Gallo, NP   azelastine (ASTELIN) 137 mcg (0.1 %) nasal spray 2 sprays (274 mcg total) by Nasal route 2 (two) times daily. 2/23/22 7/26/23  Monet Alvarez MD   BACILLUS COAGULANS (PROBIOTIC, B. COAGULANS, ORAL) Take by mouth once daily.    Provider, Historical   cetirizine (ALLERGY RELIEF, CETIRIZINE,) 10 MG tablet Take 1 tablet (10 mg total) by mouth once daily. 8/16/22   Kathy Elliott MD   cholecalciferol, vitamin D3, 2,000 unit Cap Take by mouth. 1 capsule Oral Every day    Provider, Historical   dicyclomine (BENTYL) 20 mg tablet Take 1 tablet (20 mg total) by mouth 3 (three) times daily as needed (abdominal pain).  Patient not taking: Reported on 8/2/2023 11/23/22   Haylee Gallo, NP   hydroquinone 4 % Crea Apply to dark spots once daily. Use with sunscreen if outdoors 12/7/22   Rosmery Walker MD   ibuprofen (ADVIL,MOTRIN) 800 MG tablet Take 1  tablet (800 mg total) by mouth 2 (two) times daily as needed. 2/6/23   Monet Alvarez MD   ipratropium (ATROVENT) 21 mcg (0.03 %) nasal spray 2 sprays by Nasal route 3 (three) times daily. 4/11/22   Kathy Elliott MD   methocarbamoL (ROBAXIN) 500 MG Tab Take 1 tablet once or twice a day as needed for muscle spasm and neck pain. 4/6/23   Sedrick Ferrari MD   montelukast (SINGULAIR) 10 mg tablet Take 1 tablet (10 mg total) by mouth every evening. 8/16/22   Kathy Elliott MD   multivitamin-Ca-iron-minerals 18-0.4 mg Tab Take by mouth. 1 Tablet Oral Every day    Provider, Historical   potassium bicarbonate disintegrating tablet Take 10 mEq by mouth 2 (two) times daily.    Provider, Historical   sodium,potassium,mag sulfates (SUPREP BOWEL PREP KIT) 17.5-3.13-1.6 gram SolR Use as directed 8/2/23   Haylee Gallo NP   tretinoin (RETIN-A) 0.025 % cream Apply pea-sized amount to entire face at bedtime.  If dryness, use every third night and increase as tolerated to every night. 12/7/22 12/7/23  Rosmery Walker MD   valACYclovir (VALTREX) 1000 MG tablet TAKE 2 TABLETS BY MOUTH TWICE DAILY FOR 1 DAY PER EPISODE 8/12/22   Monet Alvarez MD   zinc sulfate (ZINC-220 ORAL)  7/10/20   Provider, Historical        Allergies:  Review of patient's allergies indicates:  No Known Allergies     Social History:   Social History     Socioeconomic History    Marital status:     Number of children: 2   Occupational History    Occupation: RN     Employer: [employer]     Comment: [employer]   Tobacco Use    Smoking status: Never    Smokeless tobacco: Never   Substance and Sexual Activity    Alcohol use: Yes     Alcohol/week: 0.0 standard drinks of alcohol     Comment: ocassionally    Drug use: No    Sexual activity: Yes     Partners: Male     Birth control/protection: Surgical   Social History Narrative    She wears seatbelt.  July 22, 2017. She states she works new position at [employer].     Social Determinants of  Health     Financial Resource Strain: Low Risk  (2/17/2023)    Overall Financial Resource Strain (CARDIA)     Difficulty of Paying Living Expenses: Not hard at all   Food Insecurity: No Food Insecurity (2/17/2023)    Hunger Vital Sign     Worried About Running Out of Food in the Last Year: Never true     Ran Out of Food in the Last Year: Never true   Transportation Needs: No Transportation Needs (2/17/2023)    PRAPARE - Transportation     Lack of Transportation (Medical): No     Lack of Transportation (Non-Medical): No   Physical Activity: Inactive (7/5/2023)    Exercise Vital Sign     Days of Exercise per Week: 0 days     Minutes of Exercise per Session: 0 min   Stress: Stress Concern Present (2/17/2023)    [place] [employer] - Occupational Stress Questionnaire     Feeling of Stress : To some extent   Social Connections: Unknown (2/17/2023)    Social Connection and Isolation Panel [NHANES]     Frequency of Communication with Friends and Family: More than three times a week     Frequency of Social Gatherings with Friends and Family: Twice a week     Active Member of Clubs or Organizations: Yes     Attends Club or Organization Meetings: 1 to 4 times per year     Marital Status:    Housing Stability: Low Risk  (2/17/2023)    Housing Stability Vital Sign     Unable to Pay for Housing in the Last Year: No     Number of Places Lived in the Last Year: 1     Unstable Housing in the Last Year: No       Family History:  Family History   Problem Relation Age of Onset    Arthritis Mother     Fuch's dystrophy Mother     Breast cancer Mother     Lupus Sister     Rheum arthritis Sister     Obesity Sister     Hypertension Father     Cancer Maternal Grandfather         lung ca    Stroke Maternal Grandmother     Diabetes Maternal Grandmother     Colon cancer Paternal Aunt        ROS: No acute cardiac events, no acute respiratory complaints.     Physical Exam (all patients):    /72 (BP Location:  Right arm, Patient Position: Lying)   Pulse 65   Temp 97.5 °F (36.4 °C) (Temporal)   Resp 16   Ht 5' (1.524 m)   Wt 87 kg (191 lb 12.8 oz)   SpO2 99%   Breastfeeding No   BMI 37.46 kg/m²   Lungs: Clear to auscultation bilaterally, respirations unlabored  Heart: Regular rate and rhythm, S1 and S2 normal, no obvious murmurs  Abdomen:         Soft, non-tender, bowel sounds normal, no masses, no organomegaly    Lab Results   Component Value Date    WBC 11.02 07/12/2023    MCV 98 07/12/2023    RDW 13.8 07/12/2023     07/12/2023    INR 0.9 08/18/2008    GLU 99 07/12/2023    HGBA1C 5.6 04/27/2016    BUN 31 (H) 07/12/2023     07/12/2023    K 3.6 07/12/2023     07/12/2023        SEDATION PLAN: per anesthesia      History reviewed, vital signs satisfactory, cardiopulmonary status satisfactory, sedation options, risks and plans have been discussed with the patient  All their questions were answered and the patient agrees to the sedation procedures as planned and the patient is deemed an appropriate candidate for the sedation as planned.    Procedure explained to patient, informed consent obtained and placed in chart.    Erlinda Lindsay  8/22/2023  2:56 PM

## 2023-08-22 NOTE — ANESTHESIA POSTPROCEDURE EVALUATION
Anesthesia Post Evaluation    Patient: Angelica Flores    Procedure(s) Performed: Procedure(s) (LRB):  COLONOSCOPY (N/A)    Final Anesthesia Type: general      Patient location during evaluation: PACU  Patient participation: Yes- Able to Participate  Level of consciousness: awake and alert and oriented  Post-procedure vital signs: reviewed and stable  Pain management: adequate  Airway patency: patent    PONV status at discharge: No PONV  Anesthetic complications: no      Cardiovascular status: blood pressure returned to baseline, stable and hemodynamically stable  Respiratory status: unassisted  Hydration status: euvolemic  Follow-up not needed.          Vitals Value Taken Time   /65 08/22/23 1309   Temp 36.4 °C (97.5 °F) 08/22/23 1256   Pulse 58 08/22/23 1313   Resp 22 08/22/23 1313   SpO2 100 % 08/22/23 1313   Vitals shown include unvalidated device data.      No case tracking events are documented in the log.      Pain/Germán Score: Germán Score: 10 (8/22/2023  1:06 PM)

## 2023-08-22 NOTE — PROVATION PATIENT INSTRUCTIONS
Discharge Summary/Instructions after an Endoscopic Procedure  Patient Name: Angelica Flores  Patient MRN: [id number]  Patient YOB: 1960 Tuesday, August 22, 2023  Erlinda Lindsay MD  Dear patient,  As a result of recent federal legislation (The Federal Cures Act), you may   receive lab or pathology results from your procedure in your [facility]   account before your physician is able to contact you. Your physician or   their representative will relay the results to you with their   recommendations at their soonest availability.  Thank you,  RESTRICTIONS:  During your procedure today, you received medications for sedation.  These   medications may affect your judgment, balance and coordination.  Therefore,   for 24 hours, you have the following restrictions:   - DO NOT drive a car, operate machinery, make legal/financial decisions,   sign important papers or drink alcohol.    ACTIVITY:  Today: no heavy lifting, straining or running due to procedural   sedation/anesthesia.  The following day: return to full activity including work.  DIET:  Eat and drink normally unless instructed otherwise.     TREATMENT FOR COMMON SIDE EFFECTS:  - Mild abdominal pain, nausea, belching, bloating or excessive gas:  rest,   eat lightly and use a heating pad.  - Sore Throat: treat with throat lozenges and/or gargle with warm salt   water.  - Because air was used during the procedure, expelling large amounts of air   from your rectum or belching is normal.  - If a bowel prep was taken, you may not have a bowel movement for 1-3 days.    This is normal.  SYMPTOMS TO WATCH FOR AND REPORT TO YOUR PHYSICIAN:  1. Abdominal pain or bloating, other than gas cramps.  2. Chest pain.  3. Back pain.  4. Signs of infection such as: chills or fever occurring within 24 hours   after the procedure.  5. Rectal bleeding, which would show as bright red, maroon, or black stools.   (A tablespoon of blood from the rectum is not serious, especially if    hemorrhoids are present.)  6. Vomiting.  7. Weakness or dizziness.  GO DIRECTLY TO THE NEAREST EMERGENCY ROOM IF YOU HAVE ANY OF THE FOLLOWING:      Difficulty breathing              Chills and/or fever over 101 F   Persistent vomiting and/or vomiting blood   Severe abdominal pain   Severe chest pain   Black, tarry stools   Bleeding- more than one tablespoon   Any other symptom or condition that you feel may need urgent attention  Your doctor recommends these additional instructions:  If any biopsies were taken, your doctors clinic will contact you in 1 to 2   weeks with any results.  - Patient has a contact number available for emergencies.  The signs and   symptoms of potential delayed complications were discussed with the   patient.  Return to normal activities tomorrow.  Written discharge   instructions were provided to the patient.   - Discharge patient to home (via wheelchair).   - Resume previous diet today.   - Continue present medications.   - Await pathology results.   - Repeat colonoscopy in 2 years for surveillance.   - Return to nurse practitioner.  For questions, problems or results please call your physician Erlinda Lindsay MD at Work:  [phone]  If you have any questions about the above instructions, call the GI   department at [phone] or call the endoscopy unit at [phone]   from 7am until 3 pm.  [facility] [place], EMERGENCY ROOM PHONE NUMBER:   [phone]  IF A COMPLICATION OR EMERGENCY SITUATION ARISES AND YOU ARE UNABLE TO REACH   YOUR PHYSICIAN - GO DIRECTLY TO THE EMERGENCY ROOM.  I have read or have had read to me these discharge instructions for my   procedure and have received a written copy.  I understand these   instructions and will follow-up with my physician if I have any questions.     __________________________________       _____________________________________  Nurse Signature                                          Patient/Designated    Responsible Party Signature  MD Erlinda Colvin MD  8/22/2023 1:01:29 PM  This report has been verified and signed electronically.  Dear patient,  As a result of recent federal legislation (The Federal Cures Act), you may   receive lab or pathology results from your procedure in your [facility]   account before your physician is able to contact you. Your physician or   their representative will relay the results to you with their   recommendations at their soonest availability.  Thank you,  PROVATION

## 2023-08-25 DIAGNOSIS — M54.12 CERVICAL RADICULOPATHY: ICD-10-CM

## 2023-08-25 DIAGNOSIS — G56.02 LEFT CARPAL TUNNEL SYNDROME: ICD-10-CM

## 2023-08-25 RX ORDER — TOPIRAMATE 25 MG/1
TABLET ORAL
Qty: 60 TABLET | Refills: 0 | Status: SHIPPED | OUTPATIENT
Start: 2023-08-25 | End: 2023-11-17 | Stop reason: SDUPTHER

## 2023-08-28 LAB
FINAL PATHOLOGIC DIAGNOSIS: NORMAL
Lab: NORMAL

## 2023-08-31 ENCOUNTER — HOSPITAL ENCOUNTER (OUTPATIENT)
Dept: RADIOLOGY | Facility: HOSPITAL | Age: 63
Discharge: HOME | End: 2023-08-31
Attending: FAMILY MEDICINE
Payer: COMMERCIAL

## 2023-08-31 ENCOUNTER — OFFICE VISIT (OUTPATIENT)
Dept: FAMILY MEDICINE | Facility: CLINIC | Age: 63
End: 2023-08-31
Payer: COMMERCIAL

## 2023-08-31 VITALS
SYSTOLIC BLOOD PRESSURE: 116 MMHG | OXYGEN SATURATION: 97 % | DIASTOLIC BLOOD PRESSURE: 66 MMHG | RESPIRATION RATE: 18 BRPM | TEMPERATURE: 98 F | BODY MASS INDEX: 36.46 KG/M2 | HEIGHT: 61 IN | WEIGHT: 193.13 LBS | HEART RATE: 62 BPM

## 2023-08-31 DIAGNOSIS — E55.9 VITAMIN D DEFICIENCY: ICD-10-CM

## 2023-08-31 DIAGNOSIS — D84.821 DRUG-INDUCED IMMUNODEFICIENCY: ICD-10-CM

## 2023-08-31 DIAGNOSIS — I10 ESSENTIAL HYPERTENSION: ICD-10-CM

## 2023-08-31 DIAGNOSIS — Z79.899 DRUG-INDUCED IMMUNODEFICIENCY: ICD-10-CM

## 2023-08-31 DIAGNOSIS — K50.10 CROHN'S DISEASE OF LARGE INTESTINE WITHOUT COMPLICATION: ICD-10-CM

## 2023-08-31 DIAGNOSIS — E78.5 HYPERLIPIDEMIA, UNSPECIFIED HYPERLIPIDEMIA TYPE: ICD-10-CM

## 2023-08-31 DIAGNOSIS — Z78.0 POSTMENOPAUSAL: ICD-10-CM

## 2023-08-31 DIAGNOSIS — G43.909 MIGRAINE WITHOUT STATUS MIGRAINOSUS, NOT INTRACTABLE, UNSPECIFIED MIGRAINE TYPE: ICD-10-CM

## 2023-08-31 DIAGNOSIS — E66.01 SEVERE OBESITY (BMI 35.0-39.9) WITH COMORBIDITY: ICD-10-CM

## 2023-08-31 DIAGNOSIS — Z00.00 ANNUAL PHYSICAL EXAM: Primary | ICD-10-CM

## 2023-08-31 DIAGNOSIS — Z12.31 OTHER SCREENING MAMMOGRAM: ICD-10-CM

## 2023-08-31 DIAGNOSIS — J31.0 NON-ALLERGIC RHINITIS: ICD-10-CM

## 2023-08-31 PROCEDURE — 3074F PR MOST RECENT SYSTOLIC BLOOD PRESSURE < 130 MM HG: ICD-10-PCS | Mod: CPTII,S$GLB,, | Performed by: FAMILY MEDICINE

## 2023-08-31 PROCEDURE — 3008F PR BODY MASS INDEX (BMI) DOCUMENTED: ICD-10-PCS | Mod: CPTII,S$GLB,, | Performed by: FAMILY MEDICINE

## 2023-08-31 PROCEDURE — 4010F PR ACE/ARB THEARPY RXD/TAKEN: ICD-10-PCS | Mod: CPTII,S$GLB,, | Performed by: FAMILY MEDICINE

## 2023-08-31 PROCEDURE — 99999 PR PBB SHADOW E&M-EST. PATIENT-LVL V: CPT | Mod: PBBFAC,,, | Performed by: FAMILY MEDICINE

## 2023-08-31 PROCEDURE — 77067 MAMMO DIGITAL SCREENING BILAT WITH TOMO: ICD-10-PCS | Mod: 26,,, | Performed by: RADIOLOGY

## 2023-08-31 PROCEDURE — 3044F HG A1C LEVEL LT 7.0%: CPT | Mod: CPTII,S$GLB,, | Performed by: FAMILY MEDICINE

## 2023-08-31 PROCEDURE — 77063 BREAST TOMOSYNTHESIS BI: CPT | Mod: 26,,, | Performed by: RADIOLOGY

## 2023-08-31 PROCEDURE — 4010F ACE/ARB THERAPY RXD/TAKEN: CPT | Mod: CPTII,S$GLB,, | Performed by: FAMILY MEDICINE

## 2023-08-31 PROCEDURE — 77067 SCR MAMMO BI INCL CAD: CPT | Mod: 26,,, | Performed by: RADIOLOGY

## 2023-08-31 PROCEDURE — 3078F DIAST BP <80 MM HG: CPT | Mod: CPTII,S$GLB,, | Performed by: FAMILY MEDICINE

## 2023-08-31 PROCEDURE — 81003 URINALYSIS AUTO W/O SCOPE: CPT | Performed by: FAMILY MEDICINE

## 2023-08-31 PROCEDURE — 77063 MAMMO DIGITAL SCREENING BILAT WITH TOMO: ICD-10-PCS | Mod: 26,,, | Performed by: RADIOLOGY

## 2023-08-31 PROCEDURE — 99999 PR PBB SHADOW E&M-EST. PATIENT-LVL V: ICD-10-PCS | Mod: PBBFAC,,, | Performed by: FAMILY MEDICINE

## 2023-08-31 PROCEDURE — 3074F SYST BP LT 130 MM HG: CPT | Mod: CPTII,S$GLB,, | Performed by: FAMILY MEDICINE

## 2023-08-31 PROCEDURE — 3044F PR MOST RECENT HEMOGLOBIN A1C LEVEL <7.0%: ICD-10-PCS | Mod: CPTII,S$GLB,, | Performed by: FAMILY MEDICINE

## 2023-08-31 PROCEDURE — 99396 PR PREVENTIVE VISIT,EST,40-64: ICD-10-PCS | Mod: S$GLB,,, | Performed by: FAMILY MEDICINE

## 2023-08-31 PROCEDURE — 1159F PR MEDICATION LIST DOCUMENTED IN MEDICAL RECORD: ICD-10-PCS | Mod: CPTII,S$GLB,, | Performed by: FAMILY MEDICINE

## 2023-08-31 PROCEDURE — 3078F PR MOST RECENT DIASTOLIC BLOOD PRESSURE < 80 MM HG: ICD-10-PCS | Mod: CPTII,S$GLB,, | Performed by: FAMILY MEDICINE

## 2023-08-31 PROCEDURE — 1159F MED LIST DOCD IN RCRD: CPT | Mod: CPTII,S$GLB,, | Performed by: FAMILY MEDICINE

## 2023-08-31 PROCEDURE — 1160F PR REVIEW ALL MEDS BY PRESCRIBER/CLIN PHARMACIST DOCUMENTED: ICD-10-PCS | Mod: CPTII,S$GLB,, | Performed by: FAMILY MEDICINE

## 2023-08-31 PROCEDURE — 3008F BODY MASS INDEX DOCD: CPT | Mod: CPTII,S$GLB,, | Performed by: FAMILY MEDICINE

## 2023-08-31 PROCEDURE — 1160F RVW MEDS BY RX/DR IN RCRD: CPT | Mod: CPTII,S$GLB,, | Performed by: FAMILY MEDICINE

## 2023-08-31 PROCEDURE — 77067 SCR MAMMO BI INCL CAD: CPT | Mod: TC

## 2023-08-31 PROCEDURE — 99396 PREV VISIT EST AGE 40-64: CPT | Mod: S$GLB,,, | Performed by: FAMILY MEDICINE

## 2023-08-31 RX ORDER — MONTELUKAST SODIUM 10 MG/1
10 TABLET ORAL NIGHTLY
Qty: 90 TABLET | Refills: 3 | Status: SHIPPED | OUTPATIENT
Start: 2023-08-31

## 2023-08-31 NOTE — PROGRESS NOTES
HISTORY OF PRESENT ILLNESS: Ms. Mcfadden comes in today fasting but with taking medication for annual wellness examination. She reports no acute problems today.     END OF LIFE DECISION: She does not have a living will and states she does not desire life support. However, she states she is an organ donor.     Current Outpatient Medications   Medication Sig    ALPRAZolam (XANAX) 0.5 MG tablet Take 1 tablet by mouth twice daily as needed    amLODIPine (NORVASC) 5 MG tablet Take 1 tablet by mouth once daily    ascorbic acid, vitamin C, (VITAMIN C) 1000 MG tablet Take by mouth. 1 Tablet Oral Every day    BACILLUS COAGULANS (PROBIOTIC, B. COAGULANS, ORAL) Take by mouth once daily.    cetirizine (ALLERGY RELIEF, CETIRIZINE,) 10 MG tablet Take 1 tablet (10 mg total) by mouth once daily.    cholecalciferol, vitamin D3, 2,000 unit Cap Take by mouth. 1 capsule Oral Every day    dicyclomine (BENTYL) 20 mg tablet Take 1 tablet (20 mg total) by mouth 3 (three) times daily as needed (abdominal pain).    hydroCHLOROthiazide (HYDRODIURIL) 25 MG tablet Take 1 tablet (25 mg total) by mouth once daily.    hydroquinone 4 % Crea Apply to dark spots once daily. Use with sunscreen if outdoors    ibuprofen (ADVIL,MOTRIN) 800 MG tablet Take 1 tablet (800 mg total) by mouth 2 (two) times daily as needed.    ipratropium (ATROVENT) 21 mcg (0.03 %) nasal spray 2 sprays by Nasal route 3 (three) times daily.    lisinopriL (PRINIVIL,ZESTRIL) 40 MG tablet TAKE 1 TABLET BY MOUTH ONCE DAILY.    methocarbamoL (ROBAXIN) 500 MG Tab Take 1 tablet once or twice a day as needed for muscle spasm and neck pain.    metoprolol succinate (TOPROL-XL) 25 MG 24 hr tablet Take 1 tablet by mouth once daily    montelukast (SINGULAIR) 10 mg tablet Take 1 tablet (10 mg total) by mouth every evening.    multivitamin-Ca-iron-minerals 18-0.4 mg Tab Take by mouth. 1 Tablet Oral Every day    potassium bicarbonate disintegrating tablet Take 10 mEq by mouth 2 (two) times daily.     pravastatin (PRAVACHOL) 80 MG tablet Take 1 tablet by mouth in the evening    topiramate (TOPAMAX) 25 MG tablet TAKE 1 TABLET BY MOUTH NIGHTLY FOR 5 DAYS THEN  TAKE  2  TABLETS  NIGHTLY  THEREAFTER    tretinoin (RETIN-A) 0.025 % cream Apply pea-sized amount to entire face at bedtime.  If dryness, use every third night and increase as tolerated to every night.    valACYclovir (VALTREX) 1000 MG tablet TAKE 2 TABLETS BY MOUTH TWICE DAILY FOR 1 DAY PER EPISODE    zinc sulfate (ZINC-220 ORAL)     azaTHIOprine (IMURAN) 50 mg Tab Take 3 tablets (150 mg total) by mouth once daily.    azelastine (ASTELIN) 137 mcg (0.1 %) nasal spray 2 sprays (274 mcg total) by Nasal route 2 (two) times daily. (Patient not taking: Reported on 8/31/2023)       SCREENINGS:   Cholesterol: August 23, 2022.   FFS/Colonoscopy: August 22, 2023 - okay but h/o Crohn's disease; repeat in 2 years.   Mammogram: August 31, 2023 - pending.   GYN Exam: August 23, 2022 pap smear, HPV - okay. Desires pap smear every 3 years (due 2025).   Dexa Scan: August 13, 2019 - osteopenia.   Eye Exam: November 2021 at [facility] per patient. She wears glasses.   Dental Exam: June 2023 and every 6 months.  PPD: Negative in the past.   Immunizations: Td/Tdap - May 5, 2016.   Gardasil - N./A.   Zostavax - Never. Reports history of varicella and zoster. She declines.   Shingrix - Reports had 1st dose in September 2019, then 2nd dose on November 16, 2019.   Pneumovax - Never.   Prevnar-20 - December 7, 2022.   Seasonal Flu - October 18, 2022.   COVID-19 (Pfizer) vaccine series - December 26, 2020, January 25, 2021, September 30, 2021, April 8, 2022.   Hepatitis B vaccine series - December 7, 2022, January 13, 2023, May 10, 2023.    ROS:   GENERAL: Denies fever, chills, fatigue or unusual weight change. Appetite normal. Weight 92.9 kg (204 lb 12.9 oz) at February 23, 2023 visit. Monitors diet but does not exercise.  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  SKIN: Denies rashes,  itching, changes in mole, color or texture of skin or easy bruising.   HEAD: Denies headaches or recent head trauma.   EYES: Denies change in vision, pain, diplopia, redness or discharge.   EARS: Denies ear pain, discharge, vertigo or decreased hearing.   NOSE: Denies loss of smell, epistaxis or rhinitis today. Saw Dr. Elliott, allergist, on August 16, 2022 for allergies.  MOUTH & THROAT: Denies hoarseness or change in voice. Denies excessive gum bleeding or mouth sores. Denies sore throat.   NODES: Denies swollen glands.   CHEST: Denies MIKE, wheezing, cough, or sputum production.   CARDIOVASCULAR: Denies chest pain, PND, orthopnea or reduced exercise tolerance. Denies palpitations. Saw Dr. Wharton, cardiologist, on May 22, 2023 for HTN, HLD, Obesity, chronic left shoulder pain with 6-month follow up advised. Performs home blood pressure checks daily with levels ranging 116-120/60-70.  ABDOMEN: Denies nausea, vomiting, diarrhea, abdominal pain, or blood in stool except reports intermittent constipation. Saw KIMMIE Gallo with GI on August 2, 2023 for surveillance of Crohn's.   URINARY: Denies flank pain, dysuria or hematuria.   GENITOURINARY: Denies flank pain, dysuria, frequency or hematuria. Performs monthly breast self-examinations.   ENDOCRINE: Denies diabetes or thyroid problems.   HEME/LYMPH: Denies bleeding problems.   PERIPHERAL VASCULAR:Denies claudication or cyanosis.  MUSCULOSKELETAL: Denies joint stiffness, pain or swelling. Saw Dr. Ewing, pain management specialist, on for DDD (degenerative disc disease), cervical, cervical spondylosis, cervical radiculopathy, bilateral carpal tunnel syndrome   NEUROLOGIC: Denies history of seizures, tremors, paralysis, alteration of gait or coordination.   PSYCHIATRIC: Denies mood swings, uncontrolled depression or anxiety, homicidal or suicidal thoughts. Denies sleep problems.     PE:   VS: Blood Pressure 116/66   Pulse 62   Temperature 98 °F (36.7 °C)  "(Tympanic)   Respiration 18   Height 5' 1" (1.549 m)   Weight 87.6 kg (193 lb 2 oz)   Oxygen Saturation 97%   Body Mass Index 36.49 kg/m²   APPEARANCE: Well nourished, well developed female, obese and pleasant, alert and oriented in no acute distress.   HEAD: Nontender. Full range of motion.   EYES: PERRL, conjunctiva pink, lids no edema. She wears glasses.   EARS: External canal patent, no swelling or redness. TM's shiny and clear.   NOSE: Mucosa and turbinates swollen. No discharge. Nontender sinuses.   THROAT: No pharyngeal erythema or exudate. No stridor.   NECK: Supple, no mass, thyroid not enlarged.   NODES: No cervical, axillary or inguinal lymph node enlargement.   CHEST: Normal respiratory effort. Lungs clear to auscultation.   CARDIOVASCULAR: Normal S1, S2. No rubs, murmurs or gallops. PMI not displaced. No carotid bruit. Pedal pulses palpable bilaterally. No edema.   ABDOMEN: Bowel sounds present. Not distended. Soft. No tenderness, masses or organomegaly.   BREAST EXAM: Symmetrical, no external lesions, no discharge, no masses palpated.   PELVIC EXAM: No external lesions noted, no discharge, absent cervix and uterus. Bimanual exam showed no adnexal masses or tenderness. Urethra and bladder intact.  RECTAL EXAM: No external hemorrhoids or anal fissures. Heme-negative stool in the rectal vault.   MUSCULOSKELETAL: No joint deformities or stiffness. She is ambulatory without problems.   SKIN: No rashes or suspicious lesions, normal color and turgor.   NEUROLOGIC: Cranial Nerves: II-XII grossly intact. DTR's: Knees, Ankles 2+ and equal bilaterally. Gait & Posture: Normal gait and fine motion.   PSYCHIATRIC: Patient alert, oriented x 3. Mood/Affect normal without acute anxiety and depression noted. Judgment/insight good as she makes appropriate decisions on today's examination.     ASSESSMENT:    ICD-10-CM ICD-9-CM    1. Annual physical exam  Z00.00 V70.0 Lipid Panel      TSH      Urinalysis      " Hemoglobin A1C      2. Essential hypertension  I10 401.9       3. Hyperlipidemia, unspecified hyperlipidemia type  E78.5 272.4       4. Vitamin D deficiency  E55.9 268.9       5. Crohn's disease of large intestine without complication  K50.10 555.1       6. Drug-induced immunodeficiency  D84.821 279.3     Z79.899 E947.9       7. Migraine without status migrainosus, not intractable, unspecified migraine type  G43.909 346.90       8. Non-allergic rhinitis  J31.0 472.0 montelukast (SINGULAIR) 10 mg tablet      9. Severe obesity (BMI 35.0-39.9) with comorbidity  E66.01 278.01       10. Postmenopausal  Z78.0 V49.81           PLAN:  1. Age-appropriate counseling-appropriate low-sodium, low-cholesterol, low carbohydrate diet and exercise daily, monthly breast self exam, annual wellness examination.   2. Patient advised to call for results.  3. Continue current medications.  4. Prescription refills as noted above.  5. Keep follow up with specialists.  6. Flu shot this fall.  7. Follow up in about 6 months (around 2/29/2024) for hypertension follow up.

## 2023-09-01 LAB
BILIRUB UR QL STRIP: NEGATIVE
CLARITY UR REFRACT.AUTO: CLEAR
COLOR UR AUTO: YELLOW
GLUCOSE UR QL STRIP: NEGATIVE
HGB UR QL STRIP: NEGATIVE
KETONES UR QL STRIP: NEGATIVE
LEUKOCYTE ESTERASE UR QL STRIP: NEGATIVE
NITRITE UR QL STRIP: NEGATIVE
PH UR STRIP: 7 [PH] (ref 5–8)
PROT UR QL STRIP: ABNORMAL
SP GR UR STRIP: 1.02 (ref 1–1.03)
URN SPEC COLLECT METH UR: ABNORMAL

## 2023-09-27 ENCOUNTER — PATIENT MESSAGE (OUTPATIENT)
Dept: GASTROENTEROLOGY | Facility: CLINIC | Age: 63
End: 2023-09-27
Payer: COMMERCIAL

## 2023-10-06 DIAGNOSIS — E78.5 HYPERLIPIDEMIA, UNSPECIFIED HYPERLIPIDEMIA TYPE: ICD-10-CM

## 2023-10-06 RX ORDER — PRAVASTATIN SODIUM 80 MG/1
TABLET ORAL
Qty: 90 TABLET | Refills: 3 | Status: SHIPPED | OUTPATIENT
Start: 2023-10-06

## 2023-10-06 NOTE — TELEPHONE ENCOUNTER
Refill Decision Note   Angelicajason Flores  is requesting a refill authorization.  Brief Assessment and Rationale for Refill:  Approve     Medication Therapy Plan:         Comments:     Note composed:10:20 AM 10/06/2023

## 2023-10-06 NOTE — TELEPHONE ENCOUNTER
No care due was identified.  [facility] Embedded Care Due Messages. Reference number: [id number].   10/06/2023 9:40:49 AM CDT

## 2023-11-03 ENCOUNTER — PATIENT MESSAGE (OUTPATIENT)
Dept: GASTROENTEROLOGY | Facility: CLINIC | Age: 63
End: 2023-11-03
Payer: COMMERCIAL

## 2023-11-06 ENCOUNTER — LAB VISIT (OUTPATIENT)
Dept: LAB | Facility: HOSPITAL | Age: 63
End: 2023-11-06
Attending: NURSE PRACTITIONER
Payer: COMMERCIAL

## 2023-11-06 DIAGNOSIS — K50.10 CROHN'S DISEASE OF COLON WITHOUT COMPLICATION: ICD-10-CM

## 2023-11-06 LAB
25(OH)D3+25(OH)D2 SERPL-MCNC: 73 NG/ML (ref 30–96)
ALBUMIN SERPL BCP-MCNC: 3.9 G/DL (ref 3.5–5.2)
ALP SERPL-CCNC: 47 U/L (ref 55–135)
ALT SERPL W/O P-5'-P-CCNC: 20 U/L (ref 10–44)
ANION GAP SERPL CALC-SCNC: 14 MMOL/L (ref 8–16)
AST SERPL-CCNC: 23 U/L (ref 10–40)
BASOPHILS # BLD AUTO: 0.06 K/UL (ref 0–0.2)
BASOPHILS NFR BLD: 0.6 % (ref 0–1.9)
BILIRUB SERPL-MCNC: 0.4 MG/DL (ref 0.1–1)
BUN SERPL-MCNC: 21 MG/DL (ref 8–23)
CALCIUM SERPL-MCNC: 9.7 MG/DL (ref 8.7–10.5)
CHLORIDE SERPL-SCNC: 102 MMOL/L (ref 95–110)
CO2 SERPL-SCNC: 26 MMOL/L (ref 23–29)
CREAT SERPL-MCNC: 0.9 MG/DL (ref 0.5–1.4)
DIFFERENTIAL METHOD: ABNORMAL
EOSINOPHIL # BLD AUTO: 0.2 K/UL (ref 0–0.5)
EOSINOPHIL NFR BLD: 1.5 % (ref 0–8)
ERYTHROCYTE [DISTWIDTH] IN BLOOD BY AUTOMATED COUNT: 13.5 % (ref 11.5–14.5)
EST. GFR  (NO RACE VARIABLE): >60 ML/MIN/1.73 M^2
GLUCOSE SERPL-MCNC: 77 MG/DL (ref 70–110)
HBV CORE AB SERPL QL IA: NORMAL
HBV SURFACE AB SER-ACNC: 216.75 MIU/ML
HBV SURFACE AB SER-ACNC: REACTIVE M[IU]/ML
HBV SURFACE AG SERPL QL IA: NORMAL
HCT VFR BLD AUTO: 40.3 % (ref 37–48.5)
HGB BLD-MCNC: 13 G/DL (ref 12–16)
IMM GRANULOCYTES # BLD AUTO: 0.03 K/UL (ref 0–0.04)
IMM GRANULOCYTES NFR BLD AUTO: 0.3 % (ref 0–0.5)
LYMPHOCYTES # BLD AUTO: 4 K/UL (ref 1–4.8)
LYMPHOCYTES NFR BLD: 37.4 % (ref 18–48)
MCH RBC QN AUTO: 32.1 PG (ref 27–31)
MCHC RBC AUTO-ENTMCNC: 32.3 G/DL (ref 32–36)
MCV RBC AUTO: 100 FL (ref 82–98)
MONOCYTES # BLD AUTO: 0.7 K/UL (ref 0.3–1)
MONOCYTES NFR BLD: 6.7 % (ref 4–15)
NEUTROPHILS # BLD AUTO: 5.7 K/UL (ref 1.8–7.7)
NEUTROPHILS NFR BLD: 53.5 % (ref 38–73)
NRBC BLD-RTO: 0 /100 WBC
PLATELET # BLD AUTO: 258 K/UL (ref 150–450)
PMV BLD AUTO: 12.3 FL (ref 9.2–12.9)
POTASSIUM SERPL-SCNC: 3.6 MMOL/L (ref 3.5–5.1)
PROT SERPL-MCNC: 7.7 G/DL (ref 6–8.4)
RBC # BLD AUTO: 4.05 M/UL (ref 4–5.4)
SODIUM SERPL-SCNC: 142 MMOL/L (ref 136–145)
WBC # BLD AUTO: 10.58 K/UL (ref 3.9–12.7)

## 2023-11-06 PROCEDURE — 86480 TB TEST CELL IMMUN MEASURE: CPT | Performed by: NURSE PRACTITIONER

## 2023-11-06 PROCEDURE — 85025 COMPLETE CBC W/AUTO DIFF WBC: CPT | Performed by: NURSE PRACTITIONER

## 2023-11-06 PROCEDURE — 86706 HEP B SURFACE ANTIBODY: CPT | Mod: 91 | Performed by: NURSE PRACTITIONER

## 2023-11-06 PROCEDURE — 82306 VITAMIN D 25 HYDROXY: CPT | Performed by: NURSE PRACTITIONER

## 2023-11-06 PROCEDURE — 87340 HEPATITIS B SURFACE AG IA: CPT | Performed by: NURSE PRACTITIONER

## 2023-11-06 PROCEDURE — 86704 HEP B CORE ANTIBODY TOTAL: CPT | Performed by: NURSE PRACTITIONER

## 2023-11-06 PROCEDURE — 36415 COLL VENOUS BLD VENIPUNCTURE: CPT | Performed by: NURSE PRACTITIONER

## 2023-11-06 PROCEDURE — 80053 COMPREHEN METABOLIC PANEL: CPT | Performed by: NURSE PRACTITIONER

## 2023-11-07 LAB
GAMMA INTERFERON BACKGROUND BLD IA-ACNC: 0.04 IU/ML
M TB IFN-G CD4+ BCKGRND COR BLD-ACNC: 0.05 IU/ML
M TB IFN-G CD4+ BCKGRND COR BLD-ACNC: 0.07 IU/ML
MITOGEN IGNF BCKGRD COR BLD-ACNC: 9.01 IU/ML
TB GOLD PLUS: NEGATIVE

## 2023-11-08 ENCOUNTER — PATIENT MESSAGE (OUTPATIENT)
Dept: GASTROENTEROLOGY | Facility: CLINIC | Age: 63
End: 2023-11-08
Payer: COMMERCIAL

## 2023-11-13 ENCOUNTER — PATIENT MESSAGE (OUTPATIENT)
Dept: GASTROENTEROLOGY | Facility: CLINIC | Age: 63
End: 2023-11-13
Payer: COMMERCIAL

## 2023-11-14 DIAGNOSIS — K50.10 CROHN'S DISEASE OF COLON WITHOUT COMPLICATION: Primary | ICD-10-CM

## 2023-11-14 RX ORDER — ADALIMUMAB 40MG/0.4ML
40 KIT SUBCUTANEOUS
Qty: 4 PEN | Refills: 11 | Status: SHIPPED | OUTPATIENT
Start: 2023-11-14 | End: 2024-10-15

## 2023-11-15 ENCOUNTER — TELEPHONE (OUTPATIENT)
Dept: CARDIOLOGY | Facility: CLINIC | Age: 63
End: 2023-11-15
Payer: COMMERCIAL

## 2023-11-15 DIAGNOSIS — I10 ESSENTIAL HYPERTENSION: Primary | ICD-10-CM

## 2023-11-16 ENCOUNTER — PATIENT MESSAGE (OUTPATIENT)
Dept: GASTROENTEROLOGY | Facility: CLINIC | Age: 63
End: 2023-11-16
Payer: COMMERCIAL

## 2023-11-16 DIAGNOSIS — K50.10 CROHN'S DISEASE OF COLON WITHOUT COMPLICATION: Primary | ICD-10-CM

## 2023-11-16 DIAGNOSIS — D12.6 DYSPLASIA OF COLON: ICD-10-CM

## 2023-11-17 ENCOUNTER — TELEPHONE (OUTPATIENT)
Dept: ENDOSCOPY | Facility: HOSPITAL | Age: 63
End: 2023-11-17
Payer: COMMERCIAL

## 2023-11-17 VITALS — BODY MASS INDEX: 36.44 KG/M2 | HEIGHT: 61 IN | WEIGHT: 193 LBS

## 2023-11-17 DIAGNOSIS — J31.0 NON-ALLERGIC RHINITIS: ICD-10-CM

## 2023-11-17 DIAGNOSIS — M54.12 CERVICAL RADICULOPATHY: ICD-10-CM

## 2023-11-17 DIAGNOSIS — R55 SYNCOPE, UNSPECIFIED SYNCOPE TYPE: ICD-10-CM

## 2023-11-17 DIAGNOSIS — G56.02 LEFT CARPAL TUNNEL SYNDROME: ICD-10-CM

## 2023-11-17 DIAGNOSIS — Z12.11 SPECIAL SCREENING FOR MALIGNANT NEOPLASMS, COLON: Primary | ICD-10-CM

## 2023-11-17 DIAGNOSIS — J30.1 NON-SEASONAL ALLERGIC RHINITIS DUE TO POLLEN: ICD-10-CM

## 2023-11-17 DIAGNOSIS — I10 ESSENTIAL HYPERTENSION: ICD-10-CM

## 2023-11-17 RX ORDER — SODIUM, POTASSIUM,MAG SULFATES 17.5-3.13G
1 SOLUTION, RECONSTITUTED, ORAL ORAL DAILY
Qty: 1 KIT | Refills: 0 | Status: SHIPPED | OUTPATIENT
Start: 2023-11-17 | End: 2023-11-19

## 2023-11-17 RX ORDER — CETIRIZINE HYDROCHLORIDE 10 MG/1
10 TABLET ORAL DAILY
Qty: 90 TABLET | Refills: 1 | OUTPATIENT
Start: 2023-11-17

## 2023-11-17 NOTE — TELEPHONE ENCOUNTER
Spoke to patient to schedule procedure(s) Colonoscopy with Chromo       Physician to perform procedure(s) Dr. LONA Muller  Date of Procedure (s) 2/1/24  Arrival Time 6:30 AM  Time of Procedure(s) 7:30 AM   Location of Procedure(s) [facility]  Type of Rx Prep sent to patient: Suprep  Instructions provided to patient via [facility]    Patient was informed on the following information and verbalized understanding. Screening questionnaire reviewed with patient and complete. If procedure requires anesthesia, a responsible adult needs to be present to accompany the patient home, patient cannot drive after receiving anesthesia. Appointment details are tentative, especially check-in time. Patient will receive a prep-op call 7 days prior to confirm check-in time for procedure. If applicable the patient should contact their pharmacy to verify Rx for procedure prep is ready for pick-up. Patient was advised to call the scheduling department at [phone] if pharmacy states no Rx is available. Patient was advised to call the endoscopy scheduling department if any questions or concerns arise.      [facility] Scheduling Department

## 2023-11-19 RX ORDER — TOPIRAMATE 25 MG/1
50 TABLET ORAL NIGHTLY
Qty: 60 TABLET | Refills: 1 | Status: SHIPPED | OUTPATIENT
Start: 2023-11-19 | End: 2024-01-15

## 2023-11-20 RX ORDER — METOPROLOL SUCCINATE 25 MG/1
25 TABLET, EXTENDED RELEASE ORAL DAILY
Qty: 90 TABLET | Refills: 0 | Status: SHIPPED | OUTPATIENT
Start: 2023-11-20 | End: 2024-02-19

## 2023-11-20 RX ORDER — AMLODIPINE BESYLATE 5 MG/1
5 TABLET ORAL DAILY
Qty: 90 TABLET | Refills: 0 | Status: SHIPPED | OUTPATIENT
Start: 2023-11-20 | End: 2024-02-19

## 2023-11-21 ENCOUNTER — OFFICE VISIT (OUTPATIENT)
Dept: CARDIOLOGY | Facility: CLINIC | Age: 63
End: 2023-11-21
Payer: COMMERCIAL

## 2023-11-21 ENCOUNTER — HOSPITAL ENCOUNTER (OUTPATIENT)
Dept: CARDIOLOGY | Facility: HOSPITAL | Age: 63
Discharge: HOME | End: 2023-11-21
Attending: INTERNAL MEDICINE
Payer: COMMERCIAL

## 2023-11-21 VITALS
WEIGHT: 196.88 LBS | SYSTOLIC BLOOD PRESSURE: 124 MMHG | BODY MASS INDEX: 37.17 KG/M2 | HEIGHT: 61 IN | HEART RATE: 68 BPM | DIASTOLIC BLOOD PRESSURE: 74 MMHG | OXYGEN SATURATION: 98 %

## 2023-11-21 DIAGNOSIS — R55 SYNCOPE, UNSPECIFIED SYNCOPE TYPE: ICD-10-CM

## 2023-11-21 DIAGNOSIS — I10 ESSENTIAL HYPERTENSION: ICD-10-CM

## 2023-11-21 DIAGNOSIS — E66.01 SEVERE OBESITY (BMI 35.0-39.9) WITH COMORBIDITY: ICD-10-CM

## 2023-11-21 DIAGNOSIS — R94.31 ABNORMAL ECG: Primary | ICD-10-CM

## 2023-11-21 DIAGNOSIS — E78.49 OTHER HYPERLIPIDEMIA: ICD-10-CM

## 2023-11-21 PROCEDURE — 1160F PR REVIEW ALL MEDS BY PRESCRIBER/CLIN PHARMACIST DOCUMENTED: ICD-10-PCS | Mod: CPTII,S$GLB,, | Performed by: INTERNAL MEDICINE

## 2023-11-21 PROCEDURE — 1160F RVW MEDS BY RX/DR IN RCRD: CPT | Mod: CPTII,S$GLB,, | Performed by: INTERNAL MEDICINE

## 2023-11-21 PROCEDURE — 1159F MED LIST DOCD IN RCRD: CPT | Mod: CPTII,S$GLB,, | Performed by: INTERNAL MEDICINE

## 2023-11-21 PROCEDURE — 93010 ELECTROCARDIOGRAM REPORT: CPT | Mod: ,,, | Performed by: INTERNAL MEDICINE

## 2023-11-21 PROCEDURE — 1159F PR MEDICATION LIST DOCUMENTED IN MEDICAL RECORD: ICD-10-PCS | Mod: CPTII,S$GLB,, | Performed by: INTERNAL MEDICINE

## 2023-11-21 PROCEDURE — 3008F BODY MASS INDEX DOCD: CPT | Mod: CPTII,S$GLB,, | Performed by: INTERNAL MEDICINE

## 2023-11-21 PROCEDURE — 99999 PR PBB SHADOW E&M-EST. PATIENT-LVL V: ICD-10-PCS | Mod: PBBFAC,,, | Performed by: INTERNAL MEDICINE

## 2023-11-21 PROCEDURE — 99999 PR PBB SHADOW E&M-EST. PATIENT-LVL V: CPT | Mod: PBBFAC,,, | Performed by: INTERNAL MEDICINE

## 2023-11-21 PROCEDURE — 4010F PR ACE/ARB THEARPY RXD/TAKEN: ICD-10-PCS | Mod: CPTII,S$GLB,, | Performed by: INTERNAL MEDICINE

## 2023-11-21 PROCEDURE — 93005 ELECTROCARDIOGRAM TRACING: CPT

## 2023-11-21 PROCEDURE — 3078F PR MOST RECENT DIASTOLIC BLOOD PRESSURE < 80 MM HG: ICD-10-PCS | Mod: CPTII,S$GLB,, | Performed by: INTERNAL MEDICINE

## 2023-11-21 PROCEDURE — 93010 EKG 12-LEAD: ICD-10-PCS | Mod: ,,, | Performed by: INTERNAL MEDICINE

## 2023-11-21 PROCEDURE — 3008F PR BODY MASS INDEX (BMI) DOCUMENTED: ICD-10-PCS | Mod: CPTII,S$GLB,, | Performed by: INTERNAL MEDICINE

## 2023-11-21 PROCEDURE — 4010F ACE/ARB THERAPY RXD/TAKEN: CPT | Mod: CPTII,S$GLB,, | Performed by: INTERNAL MEDICINE

## 2023-11-21 PROCEDURE — 3074F PR MOST RECENT SYSTOLIC BLOOD PRESSURE < 130 MM HG: ICD-10-PCS | Mod: CPTII,S$GLB,, | Performed by: INTERNAL MEDICINE

## 2023-11-21 PROCEDURE — 3044F PR MOST RECENT HEMOGLOBIN A1C LEVEL <7.0%: ICD-10-PCS | Mod: CPTII,S$GLB,, | Performed by: INTERNAL MEDICINE

## 2023-11-21 PROCEDURE — 99214 PR OFFICE/OUTPT VISIT, EST, LEVL IV, 30-39 MIN: ICD-10-PCS | Mod: S$GLB,,, | Performed by: INTERNAL MEDICINE

## 2023-11-21 PROCEDURE — 3074F SYST BP LT 130 MM HG: CPT | Mod: CPTII,S$GLB,, | Performed by: INTERNAL MEDICINE

## 2023-11-21 PROCEDURE — 3044F HG A1C LEVEL LT 7.0%: CPT | Mod: CPTII,S$GLB,, | Performed by: INTERNAL MEDICINE

## 2023-11-21 PROCEDURE — 99214 OFFICE O/P EST MOD 30 MIN: CPT | Mod: S$GLB,,, | Performed by: INTERNAL MEDICINE

## 2023-11-21 PROCEDURE — 3078F DIAST BP <80 MM HG: CPT | Mod: CPTII,S$GLB,, | Performed by: INTERNAL MEDICINE

## 2023-11-21 NOTE — PROGRESS NOTES
Subjective:   Patient ID:  Angelica Flores is a 63 y.o. female who presents for evaluation of Follow-up      HPI pt presents for f/u.  Sees Dr. Wharton, Cards.  Chart reviewed.  First visit w pt.  Has HTN, hyperlipidemia, obesity, Crohns,h/o syncope.  Nonsmoker.  Syncope attributed to heat, earlier this year.  Did not go to hospital either time (twice).  No recurrent syncope since then.  No angina/CP sxs.  No CHF sxs.  HTN is controlled.  Ecg today 11/21/23 NSR,possible old anterior infarct vs body habitus false +, possible old inferior infarct.  Lipids above goal, on high dose statin.      Past Medical History:   Diagnosis Date    Anxiety     Asthma     Crohn's disease     Fibroids     Hyperlipidemia     Hypertension     Iritis     Migraine headache     Ocular    Osteopenia 08/2019    Syncope and collapse     Vitamin D deficiency disease        Current Outpatient Medications:     adalimumab (HUMIRA,CF, PEN) 40 mg/0.4 mL PnKt, Inject 0.4 mLs (40 mg total) into the skin every 7 days., Disp: 4 pen , Rfl: 11    ALPRAZolam (XANAX) 0.5 MG tablet, Take 1 tablet by mouth twice daily as needed, Disp: 60 tablet, Rfl: 0    amLODIPine (NORVASC) 5 MG tablet, Take 1 tablet (5 mg total) by mouth once daily., Disp: 90 tablet, Rfl: 0    ascorbic acid, vitamin C, (VITAMIN C) 1000 MG tablet, Take by mouth. 1 Tablet Oral Every day, Disp: , Rfl:     BACILLUS COAGULANS (PROBIOTIC, B. COAGULANS, ORAL), Take by mouth once daily., Disp: , Rfl:     cetirizine (ALLERGY RELIEF, CETIRIZINE,) 10 MG tablet, Take 1 tablet (10 mg total) by mouth once daily., Disp: 90 tablet, Rfl: 1    cholecalciferol, vitamin D3, 2,000 unit Cap, Take by mouth. 1 capsule Oral Every day, Disp: , Rfl:     dicyclomine (BENTYL) 20 mg tablet, Take 1 tablet (20 mg total) by mouth 3 (three) times daily as needed (abdominal pain)., Disp: 90 tablet, Rfl: 0    hydroCHLOROthiazide (HYDRODIURIL) 25 MG tablet, Take 1 tablet (25 mg total) by mouth once daily., Disp: 90  tablet, Rfl: 1    hydroquinone 4 % Crea, Apply to dark spots once daily. Use with sunscreen if outdoors, Disp: 28 g, Rfl: 1    ibuprofen (ADVIL,MOTRIN) 800 MG tablet, Take 1 tablet (800 mg total) by mouth 2 (two) times daily as needed., Disp: 180 tablet, Rfl: 1    ipratropium (ATROVENT) 21 mcg (0.03 %) nasal spray, 2 sprays by Nasal route 3 (three) times daily., Disp: 20 mL, Rfl: 5    lisinopriL (PRINIVIL,ZESTRIL) 40 MG tablet, TAKE 1 TABLET BY MOUTH ONCE DAILY., Disp: 90 tablet, Rfl: 1    methocarbamoL (ROBAXIN) 500 MG Tab, Take 1 tablet once or twice a day as needed for muscle spasm and neck pain., Disp: 60 tablet, Rfl: 0    metoprolol succinate (TOPROL-XL) 25 MG 24 hr tablet, Take 1 tablet (25 mg total) by mouth once daily., Disp: 90 tablet, Rfl: 0    montelukast (SINGULAIR) 10 mg tablet, Take 1 tablet (10 mg total) by mouth every evening., Disp: 90 tablet, Rfl: 3    multivitamin-Ca-iron-minerals 18-0.4 mg Tab, Take by mouth. 1 Tablet Oral Every day, Disp: , Rfl:     potassium bicarbonate disintegrating tablet, Take 10 mEq by mouth 2 (two) times daily., Disp: , Rfl:     pravastatin (PRAVACHOL) 80 MG tablet, Take 1 tablet by mouth in the evening, Disp: 90 tablet, Rfl: 3    topiramate (TOPAMAX) 25 MG tablet, Take 2 tablets (50 mg total) by mouth every evening., Disp: 60 tablet, Rfl: 1    tretinoin (RETIN-A) 0.025 % cream, Apply pea-sized amount to entire face at bedtime.  If dryness, use every third night and increase as tolerated to every night., Disp: 20 g, Rfl: 6    valACYclovir (VALTREX) 1000 MG tablet, TAKE 2 TABLETS BY MOUTH TWICE DAILY FOR 1 DAY PER EPISODE, Disp: 30 tablet, Rfl: 0    zinc sulfate (ZINC-220 ORAL), , Disp: , Rfl:     azaTHIOprine (IMURAN) 50 mg Tab, Take 3 tablets (150 mg total) by mouth once daily., Disp: 270 tablet, Rfl: 0    azelastine (ASTELIN) 137 mcg (0.1 %) nasal spray, 2 sprays (274 mcg total) by Nasal route 2 (two) times daily. (Patient not taking: Reported on 8/31/2023), Disp: 90  "mL, Rfl: 3  No current facility-administered medications for this visit.    Facility-Administered Medications Ordered in Other Visits:     lactated ringers infusion, , Intravenous, Continuous, Erlinda Lindsay MD, Last Rate: 200 mL/hr at 08/22/23 1222, Rate Change at 08/22/23 1222      Review of Systems   Constitutional: Negative.   HENT: Negative.     Eyes: Negative.    Cardiovascular: Negative.    Respiratory: Negative.     Endocrine: Negative.    Hematologic/Lymphatic: Negative.    Skin: Negative.    Musculoskeletal: Negative.    Gastrointestinal: Negative.    Genitourinary: Negative.    Neurological: Negative.    Psychiatric/Behavioral: Negative.     Allergic/Immunologic: Negative.        /74   Pulse 68   Ht 5' 1" (1.549 m)   Wt 89.3 kg (196 lb 13.9 oz)   SpO2 98%   BMI 37.20 kg/m²   Wt Readings from Last 3 Encounters:   11/21/23 89.3 kg (196 lb 13.9 oz)   11/17/23 87.5 kg (193 lb)   08/31/23 87.6 kg (193 lb 2 oz)     Temp Readings from Last 3 Encounters:   08/31/23 98 °F (36.7 °C) (Tympanic)   08/22/23 97.5 °F (36.4 °C) (Temporal)   07/26/23 96.8 °F (36 °C) (Tympanic)     BP Readings from Last 3 Encounters:   11/21/23 124/74   08/31/23 116/66   08/22/23 106/72     Pulse Readings from Last 3 Encounters:   11/21/23 68   08/31/23 62   08/22/23 65         Objective:   Physical Exam  Vitals and nursing note reviewed.   Constitutional:       General: She is not in acute distress.     Appearance: Normal appearance. She is well-developed. She is not ill-appearing or diaphoretic.   HENT:      Head: Normocephalic.   Neck:      Thyroid: No thyromegaly.      Vascular: Normal carotid pulses. No carotid bruit, hepatojugular reflux or JVD.   Cardiovascular:      Rate and Rhythm: Normal rate and regular rhythm.      Chest Wall: PMI is not displaced.      Pulses: Normal pulses.           Radial pulses are 2+ on the right side and 2+ on the left side.      Heart sounds: Normal heart sounds, S1 normal and S2 normal. " No murmur heard.     No friction rub. No gallop.   Pulmonary:      Effort: Pulmonary effort is normal.      Breath sounds: Normal breath sounds. No wheezing or rales.   Abdominal:      General: Bowel sounds are normal. There is no abdominal bruit.      Palpations: Abdomen is soft. There is no hepatomegaly, splenomegaly or mass.      Tenderness: There is no abdominal tenderness.   Musculoskeletal:      Cervical back: Neck supple.   Lymphadenopathy:      Cervical: No cervical adenopathy.   Skin:     General: Skin is warm.   Neurological:      Mental Status: She is alert and oriented to person, place, and time.   Psychiatric:         Behavior: Behavior normal. Behavior is cooperative.     I have reviewed all pertinent labs and cardiac studies.      Chemistry        Component Value Date/Time     11/06/2023 1307    K 3.6 11/06/2023 1307     11/06/2023 1307    CO2 26 11/06/2023 1307    BUN 21 11/06/2023 1307    CREATININE 0.9 11/06/2023 1307    GLU 77 11/06/2023 1307        Component Value Date/Time    CALCIUM 9.7 11/06/2023 1307    ALKPHOS 47 (L) 11/06/2023 1307    AST 23 11/06/2023 1307    ALT 20 11/06/2023 1307    BILITOT 0.4 11/06/2023 1307    ESTGFRAFRICA >60.0 08/11/2021 0950    EGFRNONAA >60.0 08/11/2021 0950        Lab Results   Component Value Date    WBC 10.58 11/06/2023    HGB 13.0 11/06/2023    HCT 40.3 11/06/2023     (H) 11/06/2023     11/06/2023       Lab Results   Component Value Date    HGBA1C 5.4 08/31/2023       Lab Results   Component Value Date    CHOL 211 (H) 08/31/2023    CHOL 220 (H) 08/23/2022    CHOL 147 08/11/2021     Lab Results   Component Value Date    HDL 59 08/31/2023    HDL 63 08/23/2022    HDL 50 08/11/2021     Lab Results   Component Value Date    LDLCALC 141.0 08/31/2023    LDLCALC 142.8 08/23/2022    LDLCALC 82.0 08/11/2021     Lab Results   Component Value Date    TRIG 55 08/31/2023    TRIG 71 08/23/2022    TRIG 75 08/11/2021       Lab Results   Component  Value Date    CHOLHDL 28.0 08/31/2023    CHOLHDL 28.6 08/23/2022    CHOLHDL 34.0 08/11/2021         Assessment:      1. Abnormal ECG    2. Syncope, unspecified syncope type    3. Essential hypertension    4. Other hyperlipidemia    5. Severe obesity (BMI 35.0-39.9) with comorbidity        Plan:       At risk for CAD, multiple CV risk factors.  Ecg likely false + due to body habitus.  Syncope: attributed to heat.  Stress echocardiogram.  HTN control.  Goal < 130/80.  Weight loss discussed.  Daily exercise, goal 30 minutes.  Lower lipids to goal -- improve diet, exercise and weight loss.  Consider adding Zetia in future or switching to Crestor.    PHONE REVIEW FOR TEST RESULTS.      F/u 6 months.

## 2023-11-29 ENCOUNTER — TELEPHONE (OUTPATIENT)
Dept: CARDIOLOGY | Facility: HOSPITAL | Age: 63
End: 2023-11-29
Payer: COMMERCIAL

## 2023-11-29 DIAGNOSIS — R55 SYNCOPE, UNSPECIFIED SYNCOPE TYPE: ICD-10-CM

## 2023-11-29 DIAGNOSIS — E66.01 SEVERE OBESITY (BMI 35.0-39.9) WITH COMORBIDITY: ICD-10-CM

## 2023-11-29 DIAGNOSIS — I10 ESSENTIAL HYPERTENSION: Primary | ICD-10-CM

## 2023-11-29 DIAGNOSIS — R94.31 ABNORMAL ECG: ICD-10-CM

## 2023-11-30 ENCOUNTER — PATIENT MESSAGE (OUTPATIENT)
Dept: GASTROENTEROLOGY | Facility: CLINIC | Age: 63
End: 2023-11-30
Payer: COMMERCIAL

## 2023-12-01 DIAGNOSIS — F34.1 DYSTHYMIC DISORDER: Chronic | ICD-10-CM

## 2023-12-01 NOTE — TELEPHONE ENCOUNTER
No care due was identified.  [facility] Embedded Care Due Messages. Reference number: [id number].   12/01/2023 12:15:47 AM CST

## 2023-12-03 RX ORDER — ALPRAZOLAM 0.5 MG/1
0.5 TABLET ORAL 2 TIMES DAILY PRN
Qty: 60 TABLET | Refills: 0 | Status: SHIPPED | OUTPATIENT
Start: 2023-12-03 | End: 2024-01-09

## 2023-12-15 ENCOUNTER — PATIENT MESSAGE (OUTPATIENT)
Dept: GASTROENTEROLOGY | Facility: CLINIC | Age: 63
End: 2023-12-15
Payer: COMMERCIAL

## 2023-12-19 ENCOUNTER — HOSPITAL ENCOUNTER (OUTPATIENT)
Dept: CARDIOLOGY | Facility: HOSPITAL | Age: 63
Discharge: HOME | End: 2023-12-19
Attending: INTERNAL MEDICINE
Payer: COMMERCIAL

## 2023-12-19 ENCOUNTER — HOSPITAL ENCOUNTER (OUTPATIENT)
Dept: RADIOLOGY | Facility: HOSPITAL | Age: 63
Discharge: HOME | End: 2023-12-19
Attending: INTERNAL MEDICINE
Payer: COMMERCIAL

## 2023-12-19 DIAGNOSIS — I10 ESSENTIAL HYPERTENSION: ICD-10-CM

## 2023-12-19 DIAGNOSIS — R55 SYNCOPE, UNSPECIFIED SYNCOPE TYPE: ICD-10-CM

## 2023-12-19 DIAGNOSIS — R94.31 ABNORMAL ECG: ICD-10-CM

## 2023-12-19 DIAGNOSIS — E66.01 SEVERE OBESITY (BMI 35.0-39.9) WITH COMORBIDITY: ICD-10-CM

## 2023-12-19 LAB
CV STRESS BASE HR: 62 BPM
DIASTOLIC BLOOD PRESSURE: 68 MMHG
NUC REST EJECTION FRACTION: 60
NUC STRESS EJECTION FRACTION: 77 %
OHS CV CPX 85 PERCENT MAX PREDICTED HEART RATE MALE: 133
OHS CV CPX ESTIMATED METS: 1
OHS CV CPX MAX PREDICTED HEART RATE: 156
OHS CV CPX PATIENT IS FEMALE: 1
OHS CV CPX PATIENT IS MALE: 0
OHS CV CPX PEAK DIASTOLIC BLOOD PRESSURE: 70 MMHG
OHS CV CPX PEAK HEAR RATE: 93 BPM
OHS CV CPX PEAK RATE PRESSURE PRODUCT: NORMAL
OHS CV CPX PEAK SYSTOLIC BLOOD PRESSURE: 130 MMHG
OHS CV CPX PERCENT MAX PREDICTED HEART RATE ACHIEVED: 62
OHS CV CPX RATE PRESSURE PRODUCT PRESENTING: 8556
SYSTOLIC BLOOD PRESSURE: 138 MMHG

## 2023-12-19 PROCEDURE — 78452 HT MUSCLE IMAGE SPECT MULT: CPT

## 2023-12-19 PROCEDURE — 93018 NUCLEAR STRESS - CARDIOLOGY INTERPRETED (CUPID ONLY): ICD-10-PCS | Mod: ,,, | Performed by: INTERNAL MEDICINE

## 2023-12-19 PROCEDURE — 78452 NUCLEAR STRESS - CARDIOLOGY INTERPRETED (CUPID ONLY): ICD-10-PCS | Mod: 26,,, | Performed by: INTERNAL MEDICINE

## 2023-12-19 PROCEDURE — 78452 HT MUSCLE IMAGE SPECT MULT: CPT | Mod: 26,,, | Performed by: INTERNAL MEDICINE

## 2023-12-19 PROCEDURE — 63600175 PHARM REV CODE 636 W HCPCS: Performed by: INTERNAL MEDICINE

## 2023-12-19 PROCEDURE — 93018 CV STRESS TEST I&R ONLY: CPT | Mod: ,,, | Performed by: INTERNAL MEDICINE

## 2023-12-19 PROCEDURE — 93017 CV STRESS TEST TRACING ONLY: CPT

## 2023-12-19 PROCEDURE — 93016 CV STRESS TEST SUPVJ ONLY: CPT | Mod: ,,, | Performed by: INTERNAL MEDICINE

## 2023-12-19 PROCEDURE — 93016 NUCLEAR STRESS - CARDIOLOGY INTERPRETED (CUPID ONLY): ICD-10-PCS | Mod: ,,, | Performed by: INTERNAL MEDICINE

## 2023-12-19 RX ORDER — REGADENOSON 0.08 MG/ML
0.4 INJECTION, SOLUTION INTRAVENOUS
Status: COMPLETED | OUTPATIENT
Start: 2023-12-19 | End: 2023-12-19

## 2023-12-19 RX ADMIN — REGADENOSON 0.4 MG: 0.08 INJECTION, SOLUTION INTRAVENOUS at 10:12

## 2024-01-02 ENCOUNTER — HOSPITAL ENCOUNTER (OUTPATIENT)
Dept: CARDIOLOGY | Facility: HOSPITAL | Age: 64
Discharge: HOME | End: 2024-01-02
Attending: INTERNAL MEDICINE
Payer: COMMERCIAL

## 2024-01-02 VITALS
BODY MASS INDEX: 37 KG/M2 | SYSTOLIC BLOOD PRESSURE: 124 MMHG | WEIGHT: 196 LBS | DIASTOLIC BLOOD PRESSURE: 74 MMHG | HEIGHT: 61 IN

## 2024-01-02 DIAGNOSIS — K50.10 CROHN'S DISEASE OF COLON WITHOUT COMPLICATION: Primary | ICD-10-CM

## 2024-01-02 PROCEDURE — 93306 TTE W/DOPPLER COMPLETE: CPT | Mod: 26,,, | Performed by: STUDENT IN AN ORGANIZED HEALTH CARE EDUCATION/TRAINING PROGRAM

## 2024-01-02 PROCEDURE — 93306 TTE W/DOPPLER COMPLETE: CPT

## 2024-01-06 DIAGNOSIS — F34.1 DYSTHYMIC DISORDER: Chronic | ICD-10-CM

## 2024-01-06 NOTE — TELEPHONE ENCOUNTER
No care due was identified.  [facility] Embedded Care Due Messages. Reference number: [id number].   1/06/2024 9:56:30 AM CST

## 2024-01-09 RX ORDER — ALPRAZOLAM 0.5 MG/1
0.5 TABLET ORAL 2 TIMES DAILY PRN
Qty: 60 TABLET | Refills: 0 | Status: SHIPPED | OUTPATIENT
Start: 2024-01-09

## 2024-01-14 DIAGNOSIS — G56.02 LEFT CARPAL TUNNEL SYNDROME: ICD-10-CM

## 2024-01-14 DIAGNOSIS — M54.12 CERVICAL RADICULOPATHY: ICD-10-CM

## 2024-01-15 RX ORDER — TOPIRAMATE 25 MG/1
50 TABLET ORAL
Qty: 60 TABLET | Refills: 0 | Status: SHIPPED | OUTPATIENT
Start: 2024-01-15 | End: 2024-02-19

## 2024-01-17 ENCOUNTER — PATIENT MESSAGE (OUTPATIENT)
Dept: GASTROENTEROLOGY | Facility: CLINIC | Age: 64
End: 2024-01-17
Payer: COMMERCIAL

## 2024-01-23 ENCOUNTER — PATIENT MESSAGE (OUTPATIENT)
Dept: DERMATOLOGY | Facility: CLINIC | Age: 64
End: 2024-01-23
Payer: COMMERCIAL

## 2024-01-23 RX ORDER — TRETINOIN 0.25 MG/G
CREAM TOPICAL NIGHTLY
COMMUNITY
End: 2024-01-23 | Stop reason: SDUPTHER

## 2024-01-24 RX ORDER — TRETINOIN 0.25 MG/G
CREAM TOPICAL
Qty: 20 G | Refills: 11 | Status: SHIPPED | OUTPATIENT
Start: 2024-01-24

## 2024-02-01 ENCOUNTER — HOSPITAL ENCOUNTER (OUTPATIENT)
Facility: HOSPITAL | Age: 64
Discharge: HOME | End: 2024-02-01
Attending: INTERNAL MEDICINE | Admitting: INTERNAL MEDICINE
Payer: COMMERCIAL

## 2024-02-01 ENCOUNTER — ANESTHESIA (OUTPATIENT)
Dept: ENDOSCOPY | Facility: HOSPITAL | Age: 64
End: 2024-02-01
Payer: COMMERCIAL

## 2024-02-01 ENCOUNTER — ANESTHESIA EVENT (OUTPATIENT)
Dept: ENDOSCOPY | Facility: HOSPITAL | Age: 64
End: 2024-02-01
Payer: COMMERCIAL

## 2024-02-01 VITALS
OXYGEN SATURATION: 100 % | SYSTOLIC BLOOD PRESSURE: 97 MMHG | DIASTOLIC BLOOD PRESSURE: 62 MMHG | RESPIRATION RATE: 16 BRPM | TEMPERATURE: 98 F | HEIGHT: 60 IN | WEIGHT: 189 LBS | HEART RATE: 65 BPM | BODY MASS INDEX: 37.11 KG/M2

## 2024-02-01 DIAGNOSIS — K50.90 CROHN'S DISEASE: ICD-10-CM

## 2024-02-01 DIAGNOSIS — K50.119 CROHN'S DISEASE OF COLON WITH COMPLICATION: Primary | ICD-10-CM

## 2024-02-01 PROCEDURE — 37000009 HC ANESTHESIA EA ADD 15 MINS: Performed by: INTERNAL MEDICINE

## 2024-02-01 PROCEDURE — 27201012 HC FORCEPS, HOT/COLD, DISP: Performed by: INTERNAL MEDICINE

## 2024-02-01 PROCEDURE — 27201089 HC SNARE, DISP (ANY): Performed by: INTERNAL MEDICINE

## 2024-02-01 PROCEDURE — 37000008 HC ANESTHESIA 1ST 15 MINUTES: Performed by: INTERNAL MEDICINE

## 2024-02-01 PROCEDURE — 45385 COLONOSCOPY W/LESION REMOVAL: CPT | Mod: 33,,, | Performed by: INTERNAL MEDICINE

## 2024-02-01 PROCEDURE — 25000003 PHARM REV CODE 250: Performed by: NURSE ANESTHETIST, CERTIFIED REGISTERED

## 2024-02-01 PROCEDURE — 45380 COLONOSCOPY AND BIOPSY: CPT | Mod: 33,59,, | Performed by: INTERNAL MEDICINE

## 2024-02-01 PROCEDURE — 63600175 PHARM REV CODE 636 W HCPCS: Performed by: NURSE ANESTHETIST, CERTIFIED REGISTERED

## 2024-02-01 PROCEDURE — 45380 COLONOSCOPY AND BIOPSY: CPT | Mod: PT,59 | Performed by: INTERNAL MEDICINE

## 2024-02-01 PROCEDURE — E9220 PRA ENDO ANESTHESIA: HCPCS | Mod: 33,,, | Performed by: NURSE ANESTHETIST, CERTIFIED REGISTERED

## 2024-02-01 PROCEDURE — 88305 TISSUE EXAM BY PATHOLOGIST: CPT | Mod: 59 | Performed by: PATHOLOGY

## 2024-02-01 PROCEDURE — 88305 TISSUE EXAM BY PATHOLOGIST: CPT | Mod: 26,,, | Performed by: PATHOLOGY

## 2024-02-01 PROCEDURE — 25000003 PHARM REV CODE 250: Performed by: INTERNAL MEDICINE

## 2024-02-01 PROCEDURE — 45385 COLONOSCOPY W/LESION REMOVAL: CPT | Mod: PT | Performed by: INTERNAL MEDICINE

## 2024-02-01 RX ORDER — PROPOFOL 10 MG/ML
VIAL (ML) INTRAVENOUS
Status: DISCONTINUED | OUTPATIENT
Start: 2024-02-01 | End: 2024-02-01

## 2024-02-01 RX ORDER — PROPOFOL 10 MG/ML
INJECTION, EMULSION INTRAVENOUS CONTINUOUS PRN
Status: DISCONTINUED | OUTPATIENT
Start: 2024-02-01 | End: 2024-02-01

## 2024-02-01 RX ORDER — LIDOCAINE HYDROCHLORIDE 20 MG/ML
INJECTION INTRAVENOUS
Status: DISCONTINUED | OUTPATIENT
Start: 2024-02-01 | End: 2024-02-01

## 2024-02-01 RX ORDER — PHENYLEPHRINE HYDROCHLORIDE 10 MG/ML
INJECTION INTRAVENOUS
Status: DISCONTINUED | OUTPATIENT
Start: 2024-02-01 | End: 2024-02-01

## 2024-02-01 RX ORDER — SODIUM CHLORIDE 9 MG/ML
INJECTION, SOLUTION INTRAVENOUS CONTINUOUS
Status: DISCONTINUED | OUTPATIENT
Start: 2024-02-01 | End: 2024-02-01 | Stop reason: HOSPADM

## 2024-02-01 RX ADMIN — GLYCOPYRROLATE 0.2 MG: 0.2 INJECTION, SOLUTION INTRAMUSCULAR; INTRAVENOUS at 07:02

## 2024-02-01 RX ADMIN — PROPOFOL 30 MG: 10 INJECTION, EMULSION INTRAVENOUS at 07:02

## 2024-02-01 RX ADMIN — PROPOFOL 150 MCG/KG/MIN: 10 INJECTION, EMULSION INTRAVENOUS at 07:02

## 2024-02-01 RX ADMIN — PHENYLEPHRINE HYDROCHLORIDE 100 MCG: 10 INJECTION INTRAVENOUS at 08:02

## 2024-02-01 RX ADMIN — SODIUM CHLORIDE: 0.9 INJECTION, SOLUTION INTRAVENOUS at 07:02

## 2024-02-01 RX ADMIN — LIDOCAINE HYDROCHLORIDE 100 MG: 20 INJECTION INTRAVENOUS at 07:02

## 2024-02-01 RX ADMIN — PHENYLEPHRINE HYDROCHLORIDE 100 MCG: 10 INJECTION INTRAVENOUS at 07:02

## 2024-02-01 RX ADMIN — PROPOFOL 70 MG: 10 INJECTION, EMULSION INTRAVENOUS at 07:02

## 2024-02-01 NOTE — PROVATION PATIENT INSTRUCTIONS
Discharge Summary/Instructions after an Endoscopic Procedure  Patient Name: Angelica Flores  Patient MRN: [id number]  Patient YOB: 1960 Thursday, February 1, 2024  Edwin Muller MD  Dear patient,  As a result of recent federal legislation (The Federal Cures Act), you may   receive lab or pathology results from your procedure in your [facility]   account before your physician is able to contact you. Your physician or   their representative will relay the results to you with their   recommendations at their soonest availability.  Thank you,  RESTRICTIONS:  During your procedure today, you received medications for sedation.  These   medications may affect your judgment, balance and coordination.  Therefore,   for 24 hours, you have the following restrictions:   - DO NOT drive a car, operate machinery, make legal/financial decisions,   sign important papers or drink alcohol.    ACTIVITY:  Today: no heavy lifting, straining or running due to procedural   sedation/anesthesia.  The following day: return to full activity including work.  DIET:  Eat and drink normally unless instructed otherwise.     TREATMENT FOR COMMON SIDE EFFECTS:  - Mild abdominal pain, nausea, belching, bloating or excessive gas:  rest,   eat lightly and use a heating pad.  - Sore Throat: treat with throat lozenges and/or gargle with warm salt   water.  - Because air was used during the procedure, expelling large amounts of air   from your rectum or belching is normal.  - If a bowel prep was taken, you may not have a bowel movement for 1-3 days.    This is normal.  SYMPTOMS TO WATCH FOR AND REPORT TO YOUR PHYSICIAN:  1. Abdominal pain or bloating, other than gas cramps.  2. Chest pain.  3. Back pain.  4. Signs of infection such as: chills or fever occurring within 24 hours   after the procedure.  5. Rectal bleeding, which would show as bright red, maroon, or black stools.   (A tablespoon of blood from the rectum is not serious, especially  if   hemorrhoids are present.)  6. Vomiting.  7. Weakness or dizziness.  GO DIRECTLY TO THE NEAREST EMERGENCY ROOM IF YOU HAVE ANY OF THE FOLLOWING:      Difficulty breathing              Chills and/or fever over 101 F   Persistent vomiting and/or vomiting blood   Severe abdominal pain   Severe chest pain   Black, tarry stools   Bleeding- more than one tablespoon   Any other symptom or condition that you feel may need urgent attention  Your doctor recommends these additional instructions:  If any biopsies were taken, your doctors clinic will contact you in 1 to 2   weeks with any results.  - Discharge patient to home.   - Resume previous diet today.   - Continue present medications.   - Await pathology results.   - Repeat colonoscopy in 6 months for surveillance.   - Return to referring physician as previously scheduled.   - Patient has a contact number available for emergencies.  The signs and   symptoms of potential delayed complications were discussed with the   patient.  Return to normal activities tomorrow.  Written discharge   instructions were provided to the patient.  For questions, problems or results please call your physician - Edwin Muller MD at Work:  [phone].  [facility], EMERGENCY ROOM PHONE NUMBER: [phone]  IF A COMPLICATION OR EMERGENCY SITUATION ARISES AND YOU ARE UNABLE TO REACH   YOUR PHYSICIAN - GO DIRECTLY TO THE EMERGENCY ROOM.  Edwin Muller MD  2/1/2024 8:13:03 AM  This report has been verified and signed electronically.  Dear patient,  As a result of recent federal legislation (The Federal Cures Act), you may   receive lab or pathology results from your procedure in your [facility]   account before your physician is able to contact you. Your physician or   their representative will relay the results to you with their   recommendations at their soonest availability.  Thank you,  PROVATION

## 2024-02-01 NOTE — ANESTHESIA POSTPROCEDURE EVALUATION
Anesthesia Post Evaluation    Patient: Angelica Flores    Procedure(s) Performed: Procedure(s) (LRB):  Colonosocpy with CHROMO (N/A)    Final Anesthesia Type: general      Patient location during evaluation: GI PACU  Patient participation: Yes- Able to Participate  Level of consciousness: awake and alert  Post-procedure vital signs: reviewed and stable  Pain management: adequate  Airway patency: patent  POLI mitigation strategies: Multimodal analgesia, Preoperative use of mandibular advancement devices or oral appliances and Intraoperative administration of CPAP, nasopharyngeal airway, or oral appliance during sedation  PONV status at discharge: No PONV  Anesthetic complications: no      Cardiovascular status: blood pressure returned to baseline, hemodynamically stable and stable  Respiratory status: unassisted and spontaneous ventilation  Hydration status: euvolemic  Follow-up not needed.              Vitals Value Taken Time   BP 97/62 02/01/24 0843   Temp 36.4 °C (97.5 °F) 02/01/24 0813   Pulse 65 02/01/24 0843   Resp 16 02/01/24 0843   SpO2 100 % 02/01/24 0843         No case tracking events are documented in the log.      Pain/Germán Score: Germán Score: 10 (2/1/2024  8:43 AM)

## 2024-02-01 NOTE — ANESTHESIA PREPROCEDURE EVALUATION
02/01/2024  Angelica Flores is a 63 y.o., female.    Past Medical History:   Diagnosis Date    Anxiety     Asthma     Crohn's disease     Fibroids     Hyperlipidemia     Hypertension     Iritis     Migraine headache     Ocular    Osteopenia 08/2019    Syncope and collapse     Vitamin D deficiency disease      Patient Active Problem List   Diagnosis    Essential hypertension    Dysthymic disorder    Hyperlipidemia    Vitamin D deficiency    Insomnia    Allergic rhinitis    Postmenopausal    Migraine without status migrainosus, not intractable    Severe obesity (BMI 35.0-39.9) with comorbidity    Drug-induced immunodeficiency    Crohn's disease of large intestine without complication    Chronic left shoulder pain    Neck pain on left side    Strain of left trapezius muscle    Decreased range of motion of left shoulder    Decreased strength, endurance, and mobility    Decreased functional mobility and endurance    Neuropathy of left hand    Cervical spine arthritis    Cervical radiculopathy    Bilateral carpal tunnel syndrome    Abnormal ECG    Syncope     Social History     Socioeconomic History    Marital status:     Number of children: 2   Occupational History    Occupation: RN     Employer: [employer]     Comment: [employer]   Tobacco Use    Smoking status: Never    Smokeless tobacco: Never   Substance and Sexual Activity    Alcohol use: Yes     Alcohol/week: 0.0 standard drinks of alcohol     Comment: ocassionally    Drug use: No    Sexual activity: Yes     Partners: Male     Birth control/protection: Surgical   Social History Narrative    She wears seatbelt.  July 22, 2017. She states she works new position at [employer].     Social Determinants of Health     Financial Resource Strain: Low Risk  (2/17/2023)    Overall Financial Resource Strain (CARDIA)     Difficulty of Paying Living  Expenses: Not hard at all   Food Insecurity: No Food Insecurity (2/17/2023)    Hunger Vital Sign     Worried About Running Out of Food in the Last Year: Never true     Ran Out of Food in the Last Year: Never true   Transportation Needs: No Transportation Needs (2/17/2023)    PRAPARE - Transportation     Lack of Transportation (Medical): No     Lack of Transportation (Non-Medical): No   Physical Activity: Inactive (7/5/2023)    Exercise Vital Sign     Days of Exercise per Week: 0 days     Minutes of Exercise per Session: 0 min   Stress: Stress Concern Present (2/17/2023)    Citizen of [place] of Occupational Health - Occupational Stress Questionnaire     Feeling of Stress : To some extent   Social Connections: Unknown (2/17/2023)    Social Connection and Isolation Panel [NHANES]     Frequency of Communication with Friends and Family: More than three times a week     Frequency of Social Gatherings with Friends and Family: Twice a week     Active Member of Clubs or Organizations: Yes     Attends Club or Organization Meetings: 1 to 4 times per year     Marital Status:    Housing Stability: Low Risk  (2/17/2023)    Housing Stability Vital Sign     Unable to Pay for Housing in the Last Year: No     Number of Places Lived in the Last Year: 1     Unstable Housing in the Last Year: No     Past Surgical History:   Procedure Laterality Date    ANKLE FRACTURE SURGERY  08/19/2008    right ankle    BREAST BIOPSY Left 1977    COLONOSCOPY N/A 08/11/2017    Procedure: COLONOSCOPY - REQUESTS DR. MATTI ACUNA;  Surgeon: Matti Acuna III, MD;  Location: [facility];  Service: Endoscopy;  Laterality: N/A;    COLONOSCOPY N/A 11/23/2020    Procedure: COLONOSCOPY;  Surgeon: Matti Acuna III, MD;  Location: [facility] ENDO;  Service: Endoscopy;  Laterality: N/A;    COLONOSCOPY N/A 09/28/2022    Procedure: COLONOSCOPY;  Surgeon: Matti Acuna III, MD;  Location: [facility];  Service: Endoscopy;  Laterality: N/A;    COLONOSCOPY N/A  08/22/2023    Procedure: COLONOSCOPY;  Surgeon: Erlinda Lindsay MD;  Location: [facility];  Service: Endoscopy;  Laterality: N/A;    OOPHORECTOMY Bilateral     TAHBSO  02/2011    Due to abnormal pap smear and fibroids    TOTAL ABDOMINAL HYSTERECTOMY       Current Facility-Administered Medications on File Prior to Encounter   Medication Dose Route Frequency Provider Last Rate Last Admin    lactated ringers infusion   Intravenous Continuous Erlinda Lindsay  mL/hr at 08/22/23 1222 Rate Change at 08/22/23 1222     Current Outpatient Medications on File Prior to Encounter   Medication Sig Dispense Refill    adalimumab (HUMIRA,CF, PEN) 40 mg/0.4 mL PnKt Inject 0.4 mLs (40 mg total) into the skin every 7 days. 4 pen 11    ascorbic acid, vitamin C, (VITAMIN C) 1000 MG tablet Take by mouth. 1 Tablet Oral Every day      azaTHIOprine (IMURAN) 50 mg Tab Take 3 tablets (150 mg total) by mouth once daily. 270 tablet 0    azelastine (ASTELIN) 137 mcg (0.1 %) nasal spray 2 sprays (274 mcg total) by Nasal route 2 (two) times daily. (Patient not taking: Reported on 8/31/2023) 90 mL 3    BACILLUS COAGULANS (PROBIOTIC, B. COAGULANS, ORAL) Take by mouth once daily.      cetirizine (ALLERGY RELIEF, CETIRIZINE,) 10 MG tablet Take 1 tablet (10 mg total) by mouth once daily. 90 tablet 1    cholecalciferol, vitamin D3, 2,000 unit Cap Take by mouth. 1 capsule Oral Every day      dicyclomine (BENTYL) 20 mg tablet Take 1 tablet (20 mg total) by mouth 3 (three) times daily as needed (abdominal pain). 90 tablet 0    hydroquinone 4 % Crea Apply to dark spots once daily. Use with sunscreen if outdoors 28 g 1    ibuprofen (ADVIL,MOTRIN) 800 MG tablet Take 1 tablet (800 mg total) by mouth 2 (two) times daily as needed. 180 tablet 1    ipratropium (ATROVENT) 21 mcg (0.03 %) nasal spray 2 sprays by Nasal route 3 (three) times daily. 20 mL 5    lisinopriL (PRINIVIL,ZESTRIL) 40 MG tablet TAKE 1 TABLET BY MOUTH ONCE DAILY. 90 tablet 1     methocarbamoL (ROBAXIN) 500 MG Tab Take 1 tablet once or twice a day as needed for muscle spasm and neck pain. 60 tablet 0    montelukast (SINGULAIR) 10 mg tablet Take 1 tablet (10 mg total) by mouth every evening. 90 tablet 3    multivitamin-Ca-iron-minerals 18-0.4 mg Tab Take by mouth. 1 Tablet Oral Every day      potassium bicarbonate disintegrating tablet Take 10 mEq by mouth 2 (two) times daily.      pravastatin (PRAVACHOL) 80 MG tablet Take 1 tablet by mouth in the evening 90 tablet 3    valACYclovir (VALTREX) 1000 MG tablet TAKE 2 TABLETS BY MOUTH TWICE DAILY FOR 1 DAY PER EPISODE 30 tablet 0    zinc sulfate (ZINC-220 ORAL)        Pre-op Assessment    I have reviewed the NPO Status.      Review of Systems  Anesthesia Hx:               Denies Personal Hx of Anesthesia complications.                    EENT/Dental:  EENT/Dental Normal           Cardiovascular:     Hypertension           hyperlipidemia                             Pulmonary:    Asthma                    Renal/:  Renal/ Normal                 Hepatic/GI:     Bowel Conditions:  Inflammatory Bowel Disease, Crohns        Musculoskeletal:  Arthritis   Cervical radiculopathy  Neuropathy of left hand            Neurological:      Headaches                                 Endocrine:  Endocrine Normal            Psych:   anxiety                 Physical Exam    Airway:  Mallampati: II   Neck ROM: Normal ROM        Anesthesia Plan  Type of Anesthesia, risks & benefits discussed:    Anesthesia Type: Gen Natural Airway  Intra-op Monitoring Plan: Standard ASA Monitors  Induction:  IV  Informed Consent: Informed consent signed with the Patient and all parties understand the risks and agree with anesthesia plan.  All questions answered.   ASA Score: 2    Ready For Surgery From Anesthesia Perspective.     .

## 2024-02-01 NOTE — H&P
Short Stay Endoscopy History and Physical    PCP - Monet Alvarez MD    Procedure - Colonoscopy  ASA - 2  Mallampati - per anesthesia  History of Anesthesia problems - no  Family history Anesthesia problems -  no     HPI:  This is a 63 y.o. female here for evaluation of :     Average Risk Screening: No  High risk screening: No  History of polyps: No  Anemia: No  Blood in stools: No  Diarrhea: No  Abdominal Pain: No  Other: Crohn's disease with dysplasia    Review of Systems:  CONSTITUTIONAL: Denies weight change,  fatigue, fevers, chills, night sweats.  CARDIOVASCULAR: Denies chest pain, shortness of breath, orthopnea and edema.  RESPIRATORY: Denies cough, hemoptysis, dyspnea, and wheezing.  GI: See HPI.    Medical History:  Past Medical History:   Diagnosis Date    Anxiety     Asthma     Crohn's disease     Fibroids     Hyperlipidemia     Hypertension     Iritis     Migraine headache     Ocular    Osteopenia 08/2019    Syncope and collapse     Vitamin D deficiency disease        Surgical History:   Past Surgical History:   Procedure Laterality Date    ANKLE FRACTURE SURGERY  08/19/2008    right ankle    BREAST BIOPSY Left 1977    COLONOSCOPY N/A 08/11/2017    Procedure: COLONOSCOPY - REQUESTS DR. MATTI ACUNA;  Surgeon: Matti Acuna III, MD;  Location: [facility];  Service: Endoscopy;  Laterality: N/A;    COLONOSCOPY N/A 11/23/2020    Procedure: COLONOSCOPY;  Surgeon: Matti Acuna III, MD;  Location: [facility];  Service: Endoscopy;  Laterality: N/A;    COLONOSCOPY N/A 09/28/2022    Procedure: COLONOSCOPY;  Surgeon: Matti Acuna III, MD;  Location: [facility];  Service: Endoscopy;  Laterality: N/A;    COLONOSCOPY N/A 08/22/2023    Procedure: COLONOSCOPY;  Surgeon: Erlinda Lindsay MD;  Location: [facility];  Service: Endoscopy;  Laterality: N/A;    OOPHORECTOMY Bilateral     TAHBSO  02/2011    Due to abnormal pap smear and fibroids    TOTAL ABDOMINAL HYSTERECTOMY         Family History:   Family  History   Problem Relation Age of Onset    Arthritis Mother     Fuch's dystrophy Mother     Breast cancer Mother     Lupus Sister     Rheum arthritis Sister     Obesity Sister     Hypertension Father     Cancer Maternal Grandfather         lung ca    Stroke Maternal Grandmother     Diabetes Maternal Grandmother     Colon cancer Paternal Aunt        Social History:   Social History     Tobacco Use    Smoking status: Never    Smokeless tobacco: Never   Substance Use Topics    Alcohol use: Yes     Alcohol/week: 0.0 standard drinks of alcohol     Comment: ocassionally    Drug use: No       Allergies: Reviewed.    Medications:  Current Facility-Administered Medications on File Prior to Encounter   Medication Dose Route Frequency Provider Last Rate Last Admin    lactated ringers infusion   Intravenous Continuous Erlinda Lindsay  mL/hr at 08/22/23 1222 Rate Change at 08/22/23 1222     Current Outpatient Medications on File Prior to Encounter   Medication Sig Dispense Refill    adalimumab (HUMIRA,CF, PEN) 40 mg/0.4 mL PnKt Inject 0.4 mLs (40 mg total) into the skin every 7 days. 4 pen 11    ascorbic acid, vitamin C, (VITAMIN C) 1000 MG tablet Take by mouth. 1 Tablet Oral Every day      BACILLUS COAGULANS (PROBIOTIC, B. COAGULANS, ORAL) Take by mouth once daily.      cetirizine (ALLERGY RELIEF, CETIRIZINE,) 10 MG tablet Take 1 tablet (10 mg total) by mouth once daily. 90 tablet 1    cholecalciferol, vitamin D3, 2,000 unit Cap Take by mouth. 1 capsule Oral Every day      dicyclomine (BENTYL) 20 mg tablet Take 1 tablet (20 mg total) by mouth 3 (three) times daily as needed (abdominal pain). 90 tablet 0    lisinopriL (PRINIVIL,ZESTRIL) 40 MG tablet TAKE 1 TABLET BY MOUTH ONCE DAILY. 90 tablet 1    methocarbamoL (ROBAXIN) 500 MG Tab Take 1 tablet once or twice a day as needed for muscle spasm and neck pain. 60 tablet 0    montelukast (SINGULAIR) 10 mg tablet Take 1 tablet (10 mg total) by mouth every evening. 90 tablet 3     multivitamin-Ca-iron-minerals 18-0.4 mg Tab Take by mouth. 1 Tablet Oral Every day      potassium bicarbonate disintegrating tablet Take 10 mEq by mouth 2 (two) times daily.      pravastatin (PRAVACHOL) 80 MG tablet Take 1 tablet by mouth in the evening 90 tablet 3    zinc sulfate (ZINC-220 ORAL)       azaTHIOprine (IMURAN) 50 mg Tab Take 3 tablets (150 mg total) by mouth once daily. 270 tablet 0    azelastine (ASTELIN) 137 mcg (0.1 %) nasal spray 2 sprays (274 mcg total) by Nasal route 2 (two) times daily. (Patient not taking: Reported on 8/31/2023) 90 mL 3    hydroquinone 4 % Crea Apply to dark spots once daily. Use with sunscreen if outdoors 28 g 1    ibuprofen (ADVIL,MOTRIN) 800 MG tablet Take 1 tablet (800 mg total) by mouth 2 (two) times daily as needed. 180 tablet 1    ipratropium (ATROVENT) 21 mcg (0.03 %) nasal spray 2 sprays by Nasal route 3 (three) times daily. 20 mL 5    valACYclovir (VALTREX) 1000 MG tablet TAKE 2 TABLETS BY MOUTH TWICE DAILY FOR 1 DAY PER EPISODE 30 tablet 0       Physical Exam:  Vital Signs:   Vitals:    02/01/24 0718   BP: 111/66   Pulse: 82   Resp: 16   Temp: 97.7 °F (36.5 °C)     General Appearance: Well appearing in no acute distress  ENT: OP clear  Chest: CTA B  CV: RRR, no m/r/g  Abd: s/nt/nd/nabs  Ext: no edema    Labs:  Reviewed    IMPRESSION:    Crohn's with dysplasia    Plan:  I have explained the risks and benefits of colonoscopy to the patient including but not limited to bleeding, perforation, infection, and death. The patient wishes to proceed with colonoscopy.

## 2024-02-01 NOTE — TRANSFER OF CARE
Anesthesia Transfer of Care Note    Patient: Angelica Flores    Procedure(s) Performed: Procedure(s) (LRB):  Colonosocpy with CHROMO (N/A)    Patient location: GI    Anesthesia Type: general    Transport from OR: Transported from OR on room air with adequate spontaneous ventilation    Post pain: adequate analgesia    Post assessment: no apparent anesthetic complications    Post vital signs: stable    Level of consciousness: responds to stimulation and awake    Nausea/Vomiting: no nausea/vomiting    Complications: none    Transfer of care protocol was followed      Last vitals: Visit Vitals  BP 91/56   Pulse 77   Temp 97.5   Resp 16   Ht    Wt    SpO2 100%   Breastfeeding    BMI

## 2024-02-06 LAB
FINAL PATHOLOGIC DIAGNOSIS: NORMAL
GROSS: NORMAL
Lab: NORMAL

## 2024-02-17 DIAGNOSIS — R55 SYNCOPE, UNSPECIFIED SYNCOPE TYPE: ICD-10-CM

## 2024-02-17 DIAGNOSIS — I10 ESSENTIAL HYPERTENSION: ICD-10-CM

## 2024-02-19 DIAGNOSIS — M54.12 CERVICAL RADICULOPATHY: ICD-10-CM

## 2024-02-19 DIAGNOSIS — G56.02 LEFT CARPAL TUNNEL SYNDROME: ICD-10-CM

## 2024-02-19 RX ORDER — AMLODIPINE BESYLATE 5 MG/1
5 TABLET ORAL
Qty: 90 TABLET | Refills: 0 | Status: SHIPPED | OUTPATIENT
Start: 2024-02-19 | End: 2024-04-15

## 2024-02-19 RX ORDER — TOPIRAMATE 25 MG/1
50 TABLET ORAL
Qty: 60 TABLET | Refills: 0 | Status: SHIPPED | OUTPATIENT
Start: 2024-02-19 | End: 2024-03-15

## 2024-02-19 RX ORDER — METOPROLOL SUCCINATE 25 MG/1
25 TABLET, EXTENDED RELEASE ORAL
Qty: 90 TABLET | Refills: 0 | Status: SHIPPED | OUTPATIENT
Start: 2024-02-19 | End: 2024-05-14

## 2024-02-20 ENCOUNTER — PATIENT MESSAGE (OUTPATIENT)
Dept: GASTROENTEROLOGY | Facility: CLINIC | Age: 64
End: 2024-02-20
Payer: COMMERCIAL

## 2024-02-22 ENCOUNTER — OFFICE VISIT (OUTPATIENT)
Dept: GASTROENTEROLOGY | Facility: CLINIC | Age: 64
End: 2024-02-22
Payer: COMMERCIAL

## 2024-02-22 VITALS
HEIGHT: 60 IN | BODY MASS INDEX: 37.18 KG/M2 | SYSTOLIC BLOOD PRESSURE: 104 MMHG | WEIGHT: 189.38 LBS | HEART RATE: 60 BPM | DIASTOLIC BLOOD PRESSURE: 70 MMHG

## 2024-02-22 DIAGNOSIS — D84.9 IMMUNOCOMPROMISED STATE: ICD-10-CM

## 2024-02-22 DIAGNOSIS — K50.10 CROHN'S DISEASE OF COLON WITHOUT COMPLICATION: Primary | ICD-10-CM

## 2024-02-22 DIAGNOSIS — D12.6 DYSPLASIA OF COLON: ICD-10-CM

## 2024-02-22 PROCEDURE — 99214 OFFICE O/P EST MOD 30 MIN: CPT | Mod: S$GLB,,, | Performed by: NURSE PRACTITIONER

## 2024-02-22 PROCEDURE — 99999 PR PBB SHADOW E&M-EST. PATIENT-LVL V: CPT | Mod: PBBFAC,,, | Performed by: NURSE PRACTITIONER

## 2024-02-22 PROCEDURE — 1160F RVW MEDS BY RX/DR IN RCRD: CPT | Mod: CPTII,S$GLB,, | Performed by: NURSE PRACTITIONER

## 2024-02-22 PROCEDURE — 3008F BODY MASS INDEX DOCD: CPT | Mod: CPTII,S$GLB,, | Performed by: NURSE PRACTITIONER

## 2024-02-22 PROCEDURE — 3074F SYST BP LT 130 MM HG: CPT | Mod: CPTII,S$GLB,, | Performed by: NURSE PRACTITIONER

## 2024-02-22 PROCEDURE — 1159F MED LIST DOCD IN RCRD: CPT | Mod: CPTII,S$GLB,, | Performed by: NURSE PRACTITIONER

## 2024-02-22 PROCEDURE — 3078F DIAST BP <80 MM HG: CPT | Mod: CPTII,S$GLB,, | Performed by: NURSE PRACTITIONER

## 2024-02-22 NOTE — PROGRESS NOTES
Clinic Follow Up:  [facility] Follow Up Note    Reason for Follow Up:  The primary encounter diagnosis was Crohn's disease of colon without complication. Diagnoses of Dysplasia of colon and Immunocompromised state were also pertinent to this visit.    PCP: Monet Alvarez       HPI:  This is a 63 y.o. female here for follow up of Crohn's disease     IBD History  - Type: crohn's disease  - Disease Location: colon  - Amount of colon involvement (for Crohn's Disease only): More than 1/3 of the colon is involved   - Phenotype:  inflammatory  - Diagnosed: 1990s  - Surgeries related to IBD: none   - Extra-intestinal Manifestations: joint pain(s)-- left shoulder only     Current Medications  Humira 40 mg every 7 days, started 10/2022, increased to weekly 12/2023  Break in treatment from September 2023- January 2024.   Imuran 150 mg daily      Past Medications  Mercaptopurine 50 mg daily, started 10-12 years ago     Drug and Antibody Levels   2020- Pro-predict thiopurine metabolites 6TG low normal at 233 (230-400)  12/19/22 Humira level 6.5; no AB formation (standard dosing)  3/3/23 Humira level 16.5; no AB formation (weekly dosing)     Endoscopy Reports  2008- patchy inflammation (descending and ascending)  2012- normal   2015- scattered inflammation (proximal transverse)   2017- patchy inflammation (transverse, ascending, cecum)  2020- normal  9/2022- patchy inflammation (hepatic flexure, ascending, cecum)- on 6-MP  8/22/23- normal; path with focal low grade dysplasia.   2/1/24- chromoednoscopy- 3 small flat polypoid lesions; 2 sessile serrated polyps. Recall in 6 months.      Pertinent Imagine Reports:  NA     Interval History  She restarted Humira in January 2021.      Preventative Medicine     Immunizations  - Influenza: 9/22/23  - Pnemococcal:  PCV 20: 12/7/22  - Hepatitis A/B: immune per serology   - Herpes Zoster: 11/16/19, 8/1/19  - COVID-19: 2022     Cancer Prevention   - Date of last pap  "smear: NA- hyst   - Date of last skin cancer screenin2022, recommend yearly   - Date of last surveillance colonoscopy: 2024     Bone Health  - Vitamin D level: 73-- on supplement.   - Date of last DEXA: NA     Therapy Related Testing  - Date of last TB testin- negative quant gold   - TPMT status: normal      Miscellaneous  - Vitamin B 12 level (if ileal disease or resection): NA  - Smoking status: no  - NSAID use: no   - History of C. Diff: no  - Family planning: hyst     Recent Labs:   Lab Results   Component Value Date    WBC 10.58 2023    HGB 13.0 2023    HCT 40.3 2023     2023    ALT 20 2023    AST 23 2023    BUN 21 2023    CREATININE 0.9 2023    ALBUMIN 3.9 2023     2023    K 3.6 2023    GLU 77 2023     Lab Results   Component Value Date    CRP 5.6 2023    SEDRATE 23 2023    CALPROTECTIN 29.3 08/15/2023     Lab Results   Component Value Date    HEPBSAB 216.75 2023    HEPBSAB Reactive 2023    HEPBSAG Non-reactive 2023    HEPBCAB Non-reactive 2023    HEPAIGG Negative 2020     Lab Results   Component Value Date    HGMXHMWT55 533 2009     Lab Results   Component Value Date    [id number] 73 2023     No results found for: "TBGOLD"     Review of Systems   Constitutional:  Negative for activity change and appetite change.        As per interval history above   Respiratory:  Negative for cough and shortness of breath.    Cardiovascular:  Negative for chest pain.   Gastrointestinal:  Negative for abdominal pain, blood in stool, constipation, diarrhea, nausea and vomiting.   Skin:  Negative for color change and rash.       Medical History:  Past Medical History:   Diagnosis Date    Anxiety     Asthma     Crohn's disease     Fibroids     Hyperlipidemia     Hypertension     Iritis     Migraine headache     Ocular    Osteopenia 2019    Syncope and collapse     " Vitamin D deficiency disease        Surgical History:   Past Surgical History:   Procedure Laterality Date    ANKLE FRACTURE SURGERY  08/19/2008    right ankle    BREAST BIOPSY Left 1977    COLONOSCOPY N/A 08/11/2017    Procedure: COLONOSCOPY - REQUESTS DR. MATTI WOOD;  Surgeon: Matti Wood III, MD;  Location: [facility];  Service: Endoscopy;  Laterality: N/A;    COLONOSCOPY N/A 11/23/2020    Procedure: COLONOSCOPY;  Surgeon: Matti Wood III, MD;  Location: [facility];  Service: Endoscopy;  Laterality: N/A;    COLONOSCOPY N/A 09/28/2022    Procedure: COLONOSCOPY;  Surgeon: Matti Wood III, MD;  Location: [facility];  Service: Endoscopy;  Laterality: N/A;    COLONOSCOPY N/A 08/22/2023    Procedure: COLONOSCOPY;  Surgeon: Erlinda Lindsay MD;  Location: [facility];  Service: Endoscopy;  Laterality: N/A;    COLONOSCOPY N/A 2/1/2024    Procedure: Colonosocpy with CHROMO;  Surgeon: Edwin Muller MD;  Location: [facility] ([address] [place] [address]);  Service: Endoscopy;  Laterality: N/A;  Colonoscopy with CHROMO. referral by Haylee Gallo NP, Suprep, portal -ml  1/25-precall complete-MS    OOPHORECTOMY Bilateral     TAHBSO  02/2011    Due to abnormal pap smear and fibroids    TOTAL ABDOMINAL HYSTERECTOMY         Family History:   Family History   Problem Relation Age of Onset    Arthritis Mother     Fuch's dystrophy Mother     Breast cancer Mother     Lupus Sister     Rheum arthritis Sister     Obesity Sister     Hypertension Father     Cancer Maternal Grandfather         lung ca    Stroke Maternal Grandmother     Diabetes Maternal Grandmother     Colon cancer Paternal Aunt        Social History:   Social History     Tobacco Use    Smoking status: Never    Smokeless tobacco: Never   Substance Use Topics    Alcohol use: Yes     Alcohol/week: 0.0 standard drinks of alcohol     Comment: ocassionally    Drug use: No       Allergies: Review of patient's allergies indicates:  No Known Allergies    Home  Medications:  Current Outpatient Medications on File Prior to Visit   Medication Sig Dispense Refill    adalimumab (HUMIRA,CF, PEN) 40 mg/0.4 mL PnKt Inject 0.4 mLs (40 mg total) into the skin every 7 days. 4 pen 11    ALPRAZolam (XANAX) 0.5 MG tablet Take 1 tablet by mouth twice daily as needed 60 tablet 0    amLODIPine (NORVASC) 5 MG tablet Take 1 tablet by mouth once daily 90 tablet 0    ascorbic acid, vitamin C, (VITAMIN C) 1000 MG tablet Take by mouth. 1 Tablet Oral Every day      BACILLUS COAGULANS (PROBIOTIC, B. COAGULANS, ORAL) Take by mouth once daily.      cetirizine (ALLERGY RELIEF, CETIRIZINE,) 10 MG tablet Take 1 tablet (10 mg total) by mouth once daily. 90 tablet 1    cholecalciferol, vitamin D3, 2,000 unit Cap Take by mouth. 1 capsule Oral Every day      dicyclomine (BENTYL) 20 mg tablet Take 1 tablet (20 mg total) by mouth 3 (three) times daily as needed (abdominal pain). 90 tablet 0    hydroCHLOROthiazide (HYDRODIURIL) 25 MG tablet Take 1 tablet by mouth once daily 90 tablet 3    hydroquinone 4 % Crea Apply to dark spots once daily. Use with sunscreen if outdoors 28 g 1    ipratropium (ATROVENT) 21 mcg (0.03 %) nasal spray 2 sprays by Nasal route 3 (three) times daily. 20 mL 5    lisinopriL (PRINIVIL,ZESTRIL) 40 MG tablet TAKE 1 TABLET BY MOUTH ONCE DAILY. 90 tablet 1    methocarbamoL (ROBAXIN) 500 MG Tab Take 1 tablet once or twice a day as needed for muscle spasm and neck pain. 60 tablet 0    metoprolol succinate (TOPROL-XL) 25 MG 24 hr tablet Take 1 tablet by mouth once daily 90 tablet 0    montelukast (SINGULAIR) 10 mg tablet Take 1 tablet (10 mg total) by mouth every evening. 90 tablet 3    multivitamin-Ca-iron-minerals 18-0.4 mg Tab Take by mouth. 1 Tablet Oral Every day      potassium bicarbonate disintegrating tablet Take 10 mEq by mouth 2 (two) times daily.      pravastatin (PRAVACHOL) 80 MG tablet Take 1 tablet by mouth in the evening 90 tablet 3    topiramate (TOPAMAX) 25 MG tablet TAKE 2  TABLETS BY MOUTH IN THE EVENING 60 tablet 0    tretinoin (RETIN-A) 0.025 % cream Apply pea-sized amount to entire face at bedtime.  Use every third night and increase as tolerated to nightly. 20 g 11    valACYclovir (VALTREX) 1000 MG tablet TAKE 2 TABLETS BY MOUTH TWICE DAILY FOR 1 DAY PER EPISODE 30 tablet 0    zinc sulfate (ZINC-220 ORAL)       azaTHIOprine (IMURAN) 50 mg Tab Take 3 tablets (150 mg total) by mouth once daily. 270 tablet 0    azelastine (ASTELIN) 137 mcg (0.1 %) nasal spray 2 sprays (274 mcg total) by Nasal route 2 (two) times daily. (Patient not taking: Reported on 8/31/2023) 90 mL 3     Current Facility-Administered Medications on File Prior to Visit   Medication Dose Route Frequency Provider Last Rate Last Admin    lactated ringers infusion   Intravenous Continuous Erlinda Lindsay  mL/hr at 08/22/23 1222 Rate Change at 08/22/23 1222       /70   Pulse 60   Ht 5' (1.524 m)   Wt 85.9 kg (189 lb 6 oz)   BMI 36.98 kg/m²   Body mass index is 36.98 kg/m².  Physical Exam  Constitutional:       General: She is not in acute distress.  HENT:      Head: Normocephalic.   Neurological:      General: No focal deficit present.      Mental Status: She is alert.   Psychiatric:         Mood and Affect: Mood normal.         Judgment: Judgment normal.         Labs: Pertinent labs reviewed.    Assessment:   1. Crohn's disease of colon without complication    2. Dysplasia of colon    3. Immunocompromised state      Recommendations:   - Humira + AZA  - trough drug levels 3/14  - needs repeat chromoendoscopy in 6 months.     Crohn's disease of colon without complication  -     CBC Auto Differential; Future; Expected date: 02/22/2024  -     Comprehensive Metabolic Panel; Future; Expected date: 02/22/2024  -     ADALIMUMAB CONCENTRATION AND ANTI-ADALIMUMAB ANTIBODY (SERIA); Future; Expected date: 02/22/2024    Dysplasia of colon    Immunocompromised state    Return to Clinic:  Follow up to be determined  after results/ procedure(s).      Thank you for the opportunity to participate in the care of this patient.  BK Del Toro

## 2024-03-12 ENCOUNTER — OFFICE VISIT (OUTPATIENT)
Dept: FAMILY MEDICINE | Facility: CLINIC | Age: 64
End: 2024-03-12
Attending: FAMILY MEDICINE
Payer: COMMERCIAL

## 2024-03-12 VITALS
OXYGEN SATURATION: 98 % | HEIGHT: 60 IN | TEMPERATURE: 99 F | SYSTOLIC BLOOD PRESSURE: 112 MMHG | DIASTOLIC BLOOD PRESSURE: 62 MMHG | BODY MASS INDEX: 37.49 KG/M2 | WEIGHT: 190.94 LBS | RESPIRATION RATE: 18 BRPM | HEART RATE: 60 BPM

## 2024-03-12 DIAGNOSIS — E78.5 HYPERLIPIDEMIA, UNSPECIFIED HYPERLIPIDEMIA TYPE: ICD-10-CM

## 2024-03-12 DIAGNOSIS — Z79.899 DRUG-INDUCED IMMUNODEFICIENCY: ICD-10-CM

## 2024-03-12 DIAGNOSIS — D84.821 DRUG-INDUCED IMMUNODEFICIENCY: ICD-10-CM

## 2024-03-12 DIAGNOSIS — Z78.0 POSTMENOPAUSAL: ICD-10-CM

## 2024-03-12 DIAGNOSIS — K50.10 CROHN'S DISEASE OF LARGE INTESTINE WITHOUT COMPLICATION: ICD-10-CM

## 2024-03-12 DIAGNOSIS — M25.511 ACUTE PAIN OF RIGHT SHOULDER: ICD-10-CM

## 2024-03-12 DIAGNOSIS — E66.01 SEVERE OBESITY (BMI 35.0-39.9) WITH COMORBIDITY: ICD-10-CM

## 2024-03-12 DIAGNOSIS — I10 ESSENTIAL HYPERTENSION: Primary | ICD-10-CM

## 2024-03-12 PROCEDURE — 3074F SYST BP LT 130 MM HG: CPT | Mod: CPTII,S$GLB,, | Performed by: FAMILY MEDICINE

## 2024-03-12 PROCEDURE — 1159F MED LIST DOCD IN RCRD: CPT | Mod: CPTII,S$GLB,, | Performed by: FAMILY MEDICINE

## 2024-03-12 PROCEDURE — 3078F DIAST BP <80 MM HG: CPT | Mod: CPTII,S$GLB,, | Performed by: FAMILY MEDICINE

## 2024-03-12 PROCEDURE — 99999 PR PBB SHADOW E&M-EST. PATIENT-LVL V: CPT | Mod: PBBFAC,,, | Performed by: FAMILY MEDICINE

## 2024-03-12 PROCEDURE — 99214 OFFICE O/P EST MOD 30 MIN: CPT | Mod: S$GLB,,, | Performed by: FAMILY MEDICINE

## 2024-03-12 PROCEDURE — 1160F RVW MEDS BY RX/DR IN RCRD: CPT | Mod: CPTII,S$GLB,, | Performed by: FAMILY MEDICINE

## 2024-03-12 PROCEDURE — 4010F ACE/ARB THERAPY RXD/TAKEN: CPT | Mod: CPTII,S$GLB,, | Performed by: FAMILY MEDICINE

## 2024-03-12 PROCEDURE — 3008F BODY MASS INDEX DOCD: CPT | Mod: CPTII,S$GLB,, | Performed by: FAMILY MEDICINE

## 2024-03-12 RX ORDER — METHOCARBAMOL 500 MG/1
TABLET, FILM COATED ORAL
Qty: 60 TABLET | Refills: 1 | Status: SHIPPED | OUTPATIENT
Start: 2024-03-12 | End: 2024-05-08

## 2024-03-12 NOTE — PROGRESS NOTES
Angelica Flores    Chief Complaint   Patient presents with    Hypertension    Follow-up       History of Present Illness:   Ms. Flores comes in today for 6-month hypertension follow-up.  She states she is not fasting but has taken medications today.  She states she continues to take lisinopril 40 mg daily, amlodipine 5 mg daily, Toprol-XL 25 mg daily for blood pressure control.  She states she occasionally performs home blood pressure checks with levels ranging 112-1 2 4/70-80's.  She states she monitors her diet and exercises 2 to 3 times a week by walking 30 minutes each time.    She complains of having intermittent pain in her right shoulder for 1 month.  She denies associated trauma.  She states pain is 3/10 on pain scale today.    Otherwise, she denies having fever, chills, fatigue, appetite changes; shortness of breath, cough, wheezing; chest pain, palpitations, leg swelling; abdominal pain, nausea, vomiting, diarrhea, constipation; unusual urinary symptoms; polydipsia, polyphagia, polyuria, hot or cold intolerance; back pain; acute visual changes, numbness, headache; anxiety, depression, homicidal or suicidal thoughts.      She saw Dr. Argueta, cardiologist, on November 21, 2023 4 hypertension, hyperlipidemia with follow-up with Dr. Wharton, cardiologist, scheduled for May 23, 2024.  She saw KIMMIE Gallo with GI on February 22, 2024 for Crohn's disease.      Labs:                    WBC                      10.58               11/06/2023                 HGB                      13.0                11/06/2023                 HCT                      40.3                11/06/2023                 PLT                      258                 11/06/2023                 CHOL                     211 (H)             08/31/2023                 TRIG                     55                  08/31/2023                 HDL                      59                  08/31/2023                 ALT                       20                  11/06/2023                 AST                      23                  11/06/2023                 NA                       142                 11/06/2023                 K                        3.6                 11/06/2023                 CL                       102                 11/06/2023                 CREATININE               0.9                 11/06/2023                 BUN                      21                  11/06/2023                 CO2                      26                  11/06/2023                 TSH                      0.854               08/31/2023                 INR                      0.9                 08/18/2008                 HGBA1C                   5.4                 08/31/2023               LDLCALC                  141.0               08/31/2023                  Current Outpatient Medications   Medication Sig    adalimumab (HUMIRA,CF, PEN) 40 mg/0.4 mL PnKt Inject 0.4 mLs (40 mg total) into the skin every 7 days.    ALPRAZolam (XANAX) 0.5 MG tablet Take 1 tablet by mouth twice daily as needed    amLODIPine (NORVASC) 5 MG tablet Take 1 tablet by mouth once daily    ascorbic acid, vitamin C, (VITAMIN C) 1000 MG tablet Take by mouth. 1 Tablet Oral Every day    azaTHIOprine (IMURAN) 50 mg Tab Take 3 tablets (150 mg total) by mouth once daily.    azelastine (ASTELIN) 137 mcg (0.1 %) nasal spray 2 sprays (274 mcg total) by Nasal route 2 (two) times daily.    BACILLUS COAGULANS (PROBIOTIC, B. COAGULANS, ORAL) Take by mouth once daily.    cetirizine (ALLERGY RELIEF, CETIRIZINE,) 10 MG tablet Take 1 tablet (10 mg total) by mouth once daily.    cholecalciferol, vitamin D3, 2,000 unit Cap Take by mouth. 1 capsule Oral Every day    dicyclomine (BENTYL) 20 mg tablet Take 1 tablet (20 mg total) by mouth 3 (three) times daily as needed (abdominal pain).    hydroCHLOROthiazide (HYDRODIURIL) 25 MG tablet Take 1 tablet by mouth once daily    hydroquinone 4 % Crea Apply  to dark spots once daily. Use with sunscreen if outdoors    ipratropium (ATROVENT) 21 mcg (0.03 %) nasal spray 2 sprays by Nasal route 3 (three) times daily.    lisinopriL (PRINIVIL,ZESTRIL) 40 MG tablet Take 1 tablet by mouth once daily    metoprolol succinate (TOPROL-XL) 25 MG 24 hr tablet Take 1 tablet by mouth once daily    montelukast (SINGULAIR) 10 mg tablet Take 1 tablet (10 mg total) by mouth every evening.    multivitamin-Ca-iron-minerals 18-0.4 mg Tab Take by mouth. 1 Tablet Oral Every day    potassium bicarbonate disintegrating tablet Take 10 mEq by mouth 2 (two) times daily.    pravastatin (PRAVACHOL) 80 MG tablet Take 1 tablet by mouth in the evening    topiramate (TOPAMAX) 25 MG tablet TAKE 2 TABLETS BY MOUTH IN THE EVENING    tretinoin (RETIN-A) 0.025 % cream Apply pea-sized amount to entire face at bedtime.  Use every third night and increase as tolerated to nightly.    valACYclovir (VALTREX) 1000 MG tablet TAKE 2 TABLETS BY MOUTH TWICE DAILY FOR 1 DAY PER EPISODE    zinc sulfate (ZINC-220 ORAL)     methocarbamoL (ROBAXIN) 500 MG Tab Take 1 tablet once or twice a day as needed for muscle spasm and neck pain. (Patient not taking: Reported on 3/12/2024)         Review of Systems   Constitutional:  Negative for activity change, appetite change, chills, fatigue and fever.        Weight 87.6 kg (193 lb 2 oz) at August 31, 2023 visit.   Eyes:  Negative for visual disturbance.   Respiratory:  Negative for cough, shortness of breath and wheezing.    Cardiovascular:  Negative for chest pain, palpitations and leg swelling.        See history of present illness.   Gastrointestinal:  Negative for abdominal pain, constipation, diarrhea, nausea and vomiting.        See history of present illness.   Endocrine: Negative for cold intolerance, heat intolerance, polydipsia, polyphagia and polyuria.   Genitourinary:  Negative for difficulty urinating.   Musculoskeletal:  Positive for myalgias. Negative for back pain,  joint swelling and neck pain.   Neurological:  Negative for numbness and headaches.   Psychiatric/Behavioral:  Negative for dysphoric mood and suicidal ideas. The patient is not nervous/anxious.         Negative for homicidal ideas.       Objective:  Physical Exam  Vitals reviewed.   Constitutional:       General: She is not in acute distress.     Appearance: Normal appearance. She is well-developed. She is obese. She is not ill-appearing, toxic-appearing or diaphoretic.      Comments: Pleasant.   Neck:      Thyroid: No thyromegaly.   Cardiovascular:      Rate and Rhythm: Normal rate and regular rhythm.      Heart sounds: Normal heart sounds. No murmur heard.  Pulmonary:      Effort: Pulmonary effort is normal. No respiratory distress.      Breath sounds: Normal breath sounds. No wheezing.   Abdominal:      General: Bowel sounds are normal. There is no distension.      Palpations: Abdomen is soft. There is no mass.      Tenderness: There is no abdominal tenderness. There is no guarding or rebound.   Musculoskeletal:         General: No swelling or tenderness. Normal range of motion.      Cervical back: Normal range of motion and neck supple. No tenderness.      Comments: She is ambulatory without problems. Non tender right shoulder with full range of motion noted.     Lymphadenopathy:      Cervical: No cervical adenopathy.   Neurological:      General: No focal deficit present.      Mental Status: She is alert and oriented to person, place, and time.   Psychiatric:         Mood and Affect: Mood normal.         Behavior: Behavior normal.         Thought Content: Thought content normal.         Judgment: Judgment normal.         ASSESSMENT:  1. Essential hypertension    2. Hyperlipidemia, unspecified hyperlipidemia type    3. Acute pain of right shoulder    4. Crohn's disease of large intestine without complication    5. Drug-induced immunodeficiency    6. Severe obesity (BMI 35.0-39.9) with comorbidity    7.  Postmenopausal        PLAN:  Angelica was seen today for hypertension and follow-up.    Diagnoses and all orders for this visit:    Essential hypertension    Hyperlipidemia, unspecified hyperlipidemia type    Acute pain of right shoulder  -     methocarbamoL (ROBAXIN) 500 MG Tab; Take 1 tablet once or twice a day as needed for muscle spasm and neck pain.    Crohn's disease of large intestine without complication    Drug-induced immunodeficiency    Severe obesity (BMI 35.0-39.9) with comorbidity    Postmenopausal        Continue current medications, follow low sodium, low cholesterol, low carb diet, daily walks.  Prescription refill as noted above.  Keep follow up with specialists.  Follow up in about 25 weeks (around 9/3/2024) for physical.

## 2024-03-14 ENCOUNTER — LAB VISIT (OUTPATIENT)
Dept: LAB | Facility: HOSPITAL | Age: 64
End: 2024-03-14
Attending: NURSE PRACTITIONER
Payer: COMMERCIAL

## 2024-03-14 DIAGNOSIS — K50.10 CROHN'S DISEASE OF COLON WITHOUT COMPLICATION: ICD-10-CM

## 2024-03-14 LAB
ALBUMIN SERPL BCP-MCNC: 4.2 G/DL (ref 3.5–5.2)
ALP SERPL-CCNC: 53 U/L (ref 55–135)
ALT SERPL W/O P-5'-P-CCNC: 14 U/L (ref 10–44)
ANION GAP SERPL CALC-SCNC: 11 MMOL/L (ref 8–16)
AST SERPL-CCNC: 16 U/L (ref 10–40)
BASOPHILS # BLD AUTO: 0.06 K/UL (ref 0–0.2)
BASOPHILS NFR BLD: 0.6 % (ref 0–1.9)
BILIRUB SERPL-MCNC: 0.6 MG/DL (ref 0.1–1)
BUN SERPL-MCNC: 20 MG/DL (ref 8–23)
CALCIUM SERPL-MCNC: 10.3 MG/DL (ref 8.7–10.5)
CHLORIDE SERPL-SCNC: 105 MMOL/L (ref 95–110)
CO2 SERPL-SCNC: 25 MMOL/L (ref 23–29)
CREAT SERPL-MCNC: 0.9 MG/DL (ref 0.5–1.4)
DIFFERENTIAL METHOD BLD: ABNORMAL
EOSINOPHIL # BLD AUTO: 0.1 K/UL (ref 0–0.5)
EOSINOPHIL NFR BLD: 1.1 % (ref 0–8)
ERYTHROCYTE [DISTWIDTH] IN BLOOD BY AUTOMATED COUNT: 14.1 % (ref 11.5–14.5)
EST. GFR  (NO RACE VARIABLE): >60 ML/MIN/1.73 M^2
GLUCOSE SERPL-MCNC: 86 MG/DL (ref 70–110)
HCT VFR BLD AUTO: 42 % (ref 37–48.5)
HGB BLD-MCNC: 13.5 G/DL (ref 12–16)
IMM GRANULOCYTES # BLD AUTO: 0.02 K/UL (ref 0–0.04)
IMM GRANULOCYTES NFR BLD AUTO: 0.2 % (ref 0–0.5)
LYMPHOCYTES # BLD AUTO: 3.4 K/UL (ref 1–4.8)
LYMPHOCYTES NFR BLD: 36.6 % (ref 18–48)
MCH RBC QN AUTO: 32.1 PG (ref 27–31)
MCHC RBC AUTO-ENTMCNC: 32.1 G/DL (ref 32–36)
MCV RBC AUTO: 100 FL (ref 82–98)
MONOCYTES # BLD AUTO: 0.6 K/UL (ref 0.3–1)
MONOCYTES NFR BLD: 6.5 % (ref 4–15)
NEUTROPHILS # BLD AUTO: 5.1 K/UL (ref 1.8–7.7)
NEUTROPHILS NFR BLD: 55 % (ref 38–73)
NRBC BLD-RTO: 0 /100 WBC
PLATELET # BLD AUTO: 382 K/UL (ref 150–450)
PMV BLD AUTO: 11.5 FL (ref 9.2–12.9)
POTASSIUM SERPL-SCNC: 4.4 MMOL/L (ref 3.5–5.1)
PROT SERPL-MCNC: 7.8 G/DL (ref 6–8.4)
RBC # BLD AUTO: 4.2 M/UL (ref 4–5.4)
SODIUM SERPL-SCNC: 141 MMOL/L (ref 136–145)
WBC # BLD AUTO: 9.27 K/UL (ref 3.9–12.7)

## 2024-03-14 PROCEDURE — 85025 COMPLETE CBC W/AUTO DIFF WBC: CPT | Performed by: NURSE PRACTITIONER

## 2024-03-14 PROCEDURE — 80053 COMPREHEN METABOLIC PANEL: CPT | Performed by: NURSE PRACTITIONER

## 2024-03-14 PROCEDURE — 80145 DRUG ASSAY ADALIMUMAB: CPT | Performed by: NURSE PRACTITIONER

## 2024-03-14 PROCEDURE — 36415 COLL VENOUS BLD VENIPUNCTURE: CPT | Performed by: NURSE PRACTITIONER

## 2024-03-15 DIAGNOSIS — M54.12 CERVICAL RADICULOPATHY: ICD-10-CM

## 2024-03-15 DIAGNOSIS — G56.02 LEFT CARPAL TUNNEL SYNDROME: ICD-10-CM

## 2024-03-15 RX ORDER — TOPIRAMATE 25 MG/1
50 TABLET ORAL
Qty: 60 TABLET | Refills: 0 | Status: SHIPPED | OUTPATIENT
Start: 2024-03-15 | End: 2024-04-14

## 2024-03-18 ENCOUNTER — PATIENT MESSAGE (OUTPATIENT)
Dept: GASTROENTEROLOGY | Facility: CLINIC | Age: 64
End: 2024-03-18
Payer: COMMERCIAL

## 2024-03-18 LAB — ADALIMUMAB SERPL IA-MCNC: 19.5 MCG/ML

## 2024-04-08 ENCOUNTER — PATIENT MESSAGE (OUTPATIENT)
Dept: SPORTS MEDICINE | Facility: CLINIC | Age: 64
End: 2024-04-08
Payer: COMMERCIAL

## 2024-04-08 DIAGNOSIS — M25.511 RIGHT SHOULDER PAIN, UNSPECIFIED CHRONICITY: Primary | ICD-10-CM

## 2024-04-09 ENCOUNTER — HOSPITAL ENCOUNTER (OUTPATIENT)
Dept: RADIOLOGY | Facility: HOSPITAL | Age: 64
Discharge: HOME | End: 2024-04-09
Attending: STUDENT IN AN ORGANIZED HEALTH CARE EDUCATION/TRAINING PROGRAM
Payer: COMMERCIAL

## 2024-04-09 ENCOUNTER — OFFICE VISIT (OUTPATIENT)
Dept: SPORTS MEDICINE | Facility: CLINIC | Age: 64
End: 2024-04-09
Payer: COMMERCIAL

## 2024-04-09 VITALS — HEIGHT: 60 IN | RESPIRATION RATE: 17 BRPM | WEIGHT: 190.94 LBS | BODY MASS INDEX: 37.49 KG/M2

## 2024-04-09 DIAGNOSIS — M25.512 CHRONIC LEFT SHOULDER PAIN: ICD-10-CM

## 2024-04-09 DIAGNOSIS — M75.01 ADHESIVE CAPSULITIS OF RIGHT SHOULDER: Primary | ICD-10-CM

## 2024-04-09 DIAGNOSIS — M25.511 RIGHT SHOULDER PAIN, UNSPECIFIED CHRONICITY: ICD-10-CM

## 2024-04-09 DIAGNOSIS — G89.29 CHRONIC LEFT SHOULDER PAIN: ICD-10-CM

## 2024-04-09 PROCEDURE — 4010F ACE/ARB THERAPY RXD/TAKEN: CPT | Mod: CPTII,S$GLB,, | Performed by: STUDENT IN AN ORGANIZED HEALTH CARE EDUCATION/TRAINING PROGRAM

## 2024-04-09 PROCEDURE — 99203 OFFICE O/P NEW LOW 30 MIN: CPT | Mod: 25,S$GLB,, | Performed by: STUDENT IN AN ORGANIZED HEALTH CARE EDUCATION/TRAINING PROGRAM

## 2024-04-09 PROCEDURE — 99999 PR PBB SHADOW E&M-EST. PATIENT-LVL III: CPT | Mod: PBBFAC,,, | Performed by: STUDENT IN AN ORGANIZED HEALTH CARE EDUCATION/TRAINING PROGRAM

## 2024-04-09 PROCEDURE — 20610 DRAIN/INJ JOINT/BURSA W/O US: CPT | Mod: RT,S$GLB,, | Performed by: STUDENT IN AN ORGANIZED HEALTH CARE EDUCATION/TRAINING PROGRAM

## 2024-04-09 PROCEDURE — 1160F RVW MEDS BY RX/DR IN RCRD: CPT | Mod: CPTII,S$GLB,, | Performed by: STUDENT IN AN ORGANIZED HEALTH CARE EDUCATION/TRAINING PROGRAM

## 2024-04-09 PROCEDURE — 3008F BODY MASS INDEX DOCD: CPT | Mod: CPTII,S$GLB,, | Performed by: STUDENT IN AN ORGANIZED HEALTH CARE EDUCATION/TRAINING PROGRAM

## 2024-04-09 PROCEDURE — 73030 X-RAY EXAM OF SHOULDER: CPT | Mod: 26,RT,, | Performed by: RADIOLOGY

## 2024-04-09 PROCEDURE — 73030 X-RAY EXAM OF SHOULDER: CPT | Mod: TC,RT

## 2024-04-09 PROCEDURE — 1159F MED LIST DOCD IN RCRD: CPT | Mod: CPTII,S$GLB,, | Performed by: STUDENT IN AN ORGANIZED HEALTH CARE EDUCATION/TRAINING PROGRAM

## 2024-04-09 RX ADMIN — TRIAMCINOLONE ACETONIDE 40 MG: 40 INJECTION, SUSPENSION INTRA-ARTICULAR; INTRAMUSCULAR at 02:04

## 2024-04-09 NOTE — PROGRESS NOTES
"Orthopaedics Sports Medicine     Shoulder Initial Visit         4/9/2024    Referring MD: Self, Aaareferral    Chief Complaint   Patient presents with    Right Shoulder - Pain         History of Present Illness:   Angelica Flores is a 63 y.o. RHD-hand dominant female who presents with right shoulder pain and dysfunction.     Onset of the symptoms was 1 month ago     Inciting event:Denies any specific injury, endorses insidious onset     Current symptoms include "feels like a pinched nerve", has radiating pain into neck and shoulder.      Pain is aggravated by overhead movements, reach movements.       Evaluation to date: X-Ray     Treatment to date: Rest, activity modification, robaxin     Past Medical History:   Past Medical History:   Diagnosis Date    Anxiety     Asthma     Crohn's disease     Fibroids     Hyperlipidemia     Hypertension     Iritis     Migraine headache     Ocular    Osteopenia 08/2019    Syncope and collapse     Vitamin D deficiency disease        Past Surgical History:   Past Surgical History:   Procedure Laterality Date    ANKLE FRACTURE SURGERY  08/19/2008    right ankle    BREAST BIOPSY Left 1977    COLONOSCOPY N/A 08/11/2017    Procedure: COLONOSCOPY - REQUESTS DR. MATTI WOOD;  Surgeon: Matti Wood III, MD;  Location: [facility];  Service: Endoscopy;  Laterality: N/A;    COLONOSCOPY N/A 11/23/2020    Procedure: COLONOSCOPY;  Surgeon: Matti Wood III, MD;  Location: [facility];  Service: Endoscopy;  Laterality: N/A;    COLONOSCOPY N/A 09/28/2022    Procedure: COLONOSCOPY;  Surgeon: Matti Wood III, MD;  Location: [facility];  Service: Endoscopy;  Laterality: N/A;    COLONOSCOPY N/A 08/22/2023    Procedure: COLONOSCOPY;  Surgeon: Erlinda Lindsay MD;  Location: [facility];  Service: Endoscopy;  Laterality: N/A;    COLONOSCOPY N/A 2/1/2024    Procedure: Colonosocpy with CHROMO;  Surgeon: Edwin Muller MD;  Location: [facility] ([address] [place] [address]);  Service: Endoscopy;  " Laterality: N/A;  Colonoscopy with CHROMO. referral by Haylee Gallo NP, Suprep, portal -ml  1/25-precall complete-MS    OOPHORECTOMY Bilateral     TAHBSO  02/2011    Due to abnormal pap smear and fibroids    TOTAL ABDOMINAL HYSTERECTOMY         Medications:  Patient's Medications   New Prescriptions    No medications on file   Previous Medications    ADALIMUMAB (HUMIRA,CF, PEN) 40 MG/0.4 ML PNKT    Inject 0.4 mLs (40 mg total) into the skin every 7 days.    ALPRAZOLAM (XANAX) 0.5 MG TABLET    Take 1 tablet by mouth twice daily as needed    AMLODIPINE (NORVASC) 5 MG TABLET    Take 1 tablet by mouth once daily    ASCORBIC ACID, VITAMIN C, (VITAMIN C) 1000 MG TABLET    Take by mouth. 1 Tablet Oral Every day    AZATHIOPRINE (IMURAN) 50 MG TAB    Take 3 tablets (150 mg total) by mouth once daily.    AZELASTINE (ASTELIN) 137 MCG (0.1 %) NASAL SPRAY    2 sprays (274 mcg total) by Nasal route 2 (two) times daily.    BACILLUS COAGULANS (PROBIOTIC, B. COAGULANS, ORAL)    Take by mouth once daily.    CETIRIZINE (ALLERGY RELIEF, CETIRIZINE,) 10 MG TABLET    Take 1 tablet (10 mg total) by mouth once daily.    CHOLECALCIFEROL, VITAMIN D3, 2,000 UNIT CAP    Take by mouth. 1 capsule Oral Every day    DICYCLOMINE (BENTYL) 20 MG TABLET    Take 1 tablet (20 mg total) by mouth 3 (three) times daily as needed (abdominal pain).    HYDROCHLOROTHIAZIDE (HYDRODIURIL) 25 MG TABLET    Take 1 tablet by mouth once daily    HYDROQUINONE 4 % CREA    Apply to dark spots once daily. Use with sunscreen if outdoors    IPRATROPIUM (ATROVENT) 21 MCG (0.03 %) NASAL SPRAY    2 sprays by Nasal route 3 (three) times daily.    LISINOPRIL (PRINIVIL,ZESTRIL) 40 MG TABLET    Take 1 tablet by mouth once daily    METHOCARBAMOL (ROBAXIN) 500 MG TAB    Take 1 tablet once or twice a day as needed for muscle spasm and neck pain.    METOPROLOL SUCCINATE (TOPROL-XL) 25 MG 24 HR TABLET    Take 1 tablet by mouth once daily    MONTELUKAST (SINGULAIR) 10 MG TABLET     Take 1 tablet (10 mg total) by mouth every evening.    MULTIVITAMIN-CA-IRON-MINERALS 18-0.4 MG TAB    Take by mouth. 1 Tablet Oral Every day    POTASSIUM BICARBONATE DISINTEGRATING TABLET    Take 10 mEq by mouth 2 (two) times daily.    PRAVASTATIN (PRAVACHOL) 80 MG TABLET    Take 1 tablet by mouth in the evening    TOPIRAMATE (TOPAMAX) 25 MG TABLET    TAKE 2 TABLETS BY MOUTH IN THE EVENING    TRETINOIN (RETIN-A) 0.025 % CREAM    Apply pea-sized amount to entire face at bedtime.  Use every third night and increase as tolerated to nightly.    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  Tobacco use: She reports that she has never smoked. She has never used smokeless tobacco.    Review of systems:  History of recent illness, fevers, shakes, or chills: no      Physical Examination:  Estimated body mass index is 37.29 kg/m² as calculated from the following:    Height as of this encounter: 5' (1.524 m).    Weight as of this encounter: 86.6 kg (190 lb 14.7 oz).    General  Healthy appearing female in no acute distress  Alert and oriented, normal mood, appropriate affect    Shoulder Examination:  Patient is alert and oriented, no distress. Skin is intact. Neuro is normal with no focal motor or sensory findings.    Cervical exam is unremarkable. Intact cervical ROM. Negative Spurling's test    Physical Exam:  RIGHT    LEFT    Scap Dyskinesis/Winging +    (-)    Tenderness:          Greater Tuberosity             (-)    (-)  Bicipital Groove  +    (-)  AC joint   (-)    (-)  Other:     ROM:  Forward  Elevation 120    180  Abduction  100    120  ER (at side)  50    80  IR   Back pocket   T8    Strength:   Supraspinatus  4/5    5/5  Infraspinatus  5/5    5/5  Subscap / IR  5/5    5/5     Special Tests:   Neer:    +    (-)   [place]:   (-)    (-)   SS Stress:   +    (-)   Bear Hug:   (-)    (-)   [place]'s:   +    (-)   Resisted Thrower's:   +    (-)   Cross Arm Abduction:  (-)    (-)    Neurovascular examination  - Motor grossly intact bilaterally to shoulder abduction, elbow flexion and extension, wrist flexion and extension, and intrinsic hand musculature  - Sensation intact to light touch bilaterally in axillary, median, radial, and ulnar distributions  - Symmetrical radial pulses      Imaging:  XR Results:  Results for orders placed during the hospital encounter of 04/09/24    X-ray Shoulder 2 or More Views Right    Narrative  EXAMINATION:  XR SHOULDER COMPLETE 2 OR MORE VIEWS RIGHT    CLINICAL HISTORY:  Pain in right shoulder    TECHNIQUE:  Two or three views of the right shoulder were performed.    COMPARISON:  None    FINDINGS:  No acute abnormality with minimal degenerative findings of the shoulder.  Lung parenchyma clear.    Impression  As above      Electronically signed by: Cisco Hare MD  Date:    04/09/2024  Time:    14:34      MRI Results:  No results found for this or any previous visit.      CT Results:  No results found for this or any previous visit.      Physician Read: I agree with the above impression.      Impression:  63 y.o. female with right shoulder adhesive capsulitis       Plan:  Discussed diagnosis and treatment options with patient today. Her exam is most consistent with right shoulder adhesive capsulitis.  Discussed the natural course of frozen shoulder and associated treatments including CSI and physical therapy.   I recommend proceeding with intra-articular corticosteroid injection into the right glenohumeral joint. The patient is in agreement with this plan.   Procedure  performed today and patient tolerated the procedure well with no immediate complications.   Follow up 6 weeks           Delon Harper MD    I, Peter Collazo, acted as a scribe for Delon Harper MD for the duration of this office visit.

## 2024-04-10 ENCOUNTER — TELEPHONE (OUTPATIENT)
Dept: ENDOSCOPY | Facility: HOSPITAL | Age: 64
End: 2024-04-10
Payer: COMMERCIAL

## 2024-04-10 RX ORDER — TRIAMCINOLONE ACETONIDE 40 MG/ML
40 INJECTION, SUSPENSION INTRA-ARTICULAR; INTRAMUSCULAR
Status: DISCONTINUED | OUTPATIENT
Start: 2024-04-09 | End: 2024-04-10 | Stop reason: HOSPADM

## 2024-04-10 NOTE — PROCEDURES
Large Joint Aspiration/Injection: R glenohumeral    Date/Time: 4/9/2024 2:30 PM    Performed by: Delon Harper MD  Authorized by: Delon Harper MD    Consent Done?:  Yes (Verbal)  Indications:  Pain  Site marked: the procedure site was marked    Timeout: prior to procedure the correct patient, procedure, and site was verified    Prep: patient was prepped and draped in usual sterile fashion      Local anesthesia used?: Yes    Anesthesia:  Local infiltration  Local anesthetic:  Lidocaine 1% without epinephrine    Details:  Needle Size:  22 G  Ultrasonic Guidance for needle placement?: No    Approach:  Posterior  Location:  Shoulder  Site:  R glenohumeral  Medications:  40 mg triamcinolone acetonide 40 mg/mL  Patient tolerance:  Patient tolerated the procedure well with no immediate complications

## 2024-04-10 NOTE — TELEPHONE ENCOUNTER
"Contacted the patient to schedule an endoscopy procedure(s) Colonoscopy chromoendoscopy. The patient did not answer the call and left a voice message requesting a call back.          ----- Message -----   From: Edwin Muller MD   Sent: 2024   4:39 PM CST   To: [facility]     Procedure:  Chromoendoscopy     Diagnosis:  Crohn's surveillance     Procedure Timin-12 weeks (2024)     *If within 4 weeks selected, please leidy as high priority*     *If greater than 12 weeks, please select "4-12 weeks" and delay sending until 2 months prior to requested date*     Provider: Myself     Location: [address]     Additional Scheduling Information: No scheduling concerns     Prep Specifications:Standard prep     Have you attached a patient to this message: Yes   "

## 2024-04-13 DIAGNOSIS — G56.02 LEFT CARPAL TUNNEL SYNDROME: ICD-10-CM

## 2024-04-13 DIAGNOSIS — I10 ESSENTIAL HYPERTENSION: ICD-10-CM

## 2024-04-13 DIAGNOSIS — M54.12 CERVICAL RADICULOPATHY: ICD-10-CM

## 2024-04-14 RX ORDER — TOPIRAMATE 25 MG/1
50 TABLET ORAL
Qty: 60 TABLET | Refills: 0 | Status: SHIPPED | OUTPATIENT
Start: 2024-04-14 | End: 2024-05-10

## 2024-04-15 RX ORDER — AMLODIPINE BESYLATE 5 MG/1
5 TABLET ORAL
Qty: 90 TABLET | Refills: 0 | Status: SHIPPED | OUTPATIENT
Start: 2024-04-15

## 2024-04-16 ENCOUNTER — TELEPHONE (OUTPATIENT)
Dept: ENDOSCOPY | Facility: HOSPITAL | Age: 64
End: 2024-04-16
Payer: COMMERCIAL

## 2024-04-16 ENCOUNTER — PATIENT MESSAGE (OUTPATIENT)
Dept: GASTROENTEROLOGY | Facility: CLINIC | Age: 64
End: 2024-04-16
Payer: COMMERCIAL

## 2024-04-16 NOTE — TELEPHONE ENCOUNTER
Contacted and spoke with patient to schedule Chromoendoscopy patient states she is not due to have this done until August and wants to know why it was ordered for July. Informed patient I would have nurse contact her to answer her questions.

## 2024-04-19 DIAGNOSIS — K50.10 CROHN'S DISEASE OF COLON WITHOUT COMPLICATION: Primary | ICD-10-CM

## 2024-04-23 ENCOUNTER — CLINICAL SUPPORT (OUTPATIENT)
Dept: REHABILITATION | Facility: HOSPITAL | Age: 64
End: 2024-04-23
Attending: STUDENT IN AN ORGANIZED HEALTH CARE EDUCATION/TRAINING PROGRAM
Payer: COMMERCIAL

## 2024-04-23 DIAGNOSIS — R53.1 DECREASED STRENGTH, ENDURANCE, AND MOBILITY: ICD-10-CM

## 2024-04-23 DIAGNOSIS — M25.512 CHRONIC LEFT SHOULDER PAIN: ICD-10-CM

## 2024-04-23 DIAGNOSIS — R68.89 DECREASED STRENGTH, ENDURANCE, AND MOBILITY: ICD-10-CM

## 2024-04-23 DIAGNOSIS — M25.511 CHRONIC RIGHT SHOULDER PAIN: Primary | ICD-10-CM

## 2024-04-23 DIAGNOSIS — G89.29 CHRONIC LEFT SHOULDER PAIN: ICD-10-CM

## 2024-04-23 DIAGNOSIS — G89.29 CHRONIC RIGHT SHOULDER PAIN: Primary | ICD-10-CM

## 2024-04-23 DIAGNOSIS — Z74.09 DECREASED STRENGTH, ENDURANCE, AND MOBILITY: ICD-10-CM

## 2024-04-23 DIAGNOSIS — Z74.09 DECREASED FUNCTIONAL MOBILITY AND ENDURANCE: ICD-10-CM

## 2024-04-23 DIAGNOSIS — M62.81 MUSCLE WEAKNESS OF RIGHT UPPER EXTREMITY: ICD-10-CM

## 2024-04-23 PROCEDURE — 97161 PT EVAL LOW COMPLEX 20 MIN: CPT

## 2024-04-23 PROCEDURE — 97112 NEUROMUSCULAR REEDUCATION: CPT

## 2024-04-23 NOTE — PROGRESS NOTES
"[facility]   Physical Therapy Initial Evaluation      Date: 4/23/2024   Name: Angelica [facility] Number: [id number]    Therapy Diagnosis:    Encounter Diagnoses   Name Primary?    Chronic right shoulder pain Yes    Decreased functional mobility and endurance     Muscle weakness of right upper extremity     Chronic left shoulder pain     Decreased strength, endurance, and mobility       Physician: Delon Harper     Physician Orders: PT Eval and Treat  Medical Diagnosis from Referral: Chronic right shoulder pain   Evaluation Date: 4/23/2024  Authorization Period Expiration: 04/10/2026   Plan of Care Expiration: 06/18/2024  Progress Note Due: 05/23/2024  Visit # / Visits authorized: 1/1   FOTO: 1/3 (last performed on 4/23/2024)    Precautions: Standard, asthma, hypertension, osteopenia, syncope and collapse    Time In: 1600  Time Out: 1700  Total Billable Time (timed & untimed codes): 60 minutes    Subjective     Date of onset: 6 weeks    History of current condition - Angelica reports she has been having right shoulder pain for about the last 6 weeks with no clear mechanism of injury. Patient notes she had history of similar symptoms on left shoulder that was seen as a frozen shoulder. Patient reports symptoms of stiffness and pain feel similar to previous symptoms on contralateral shoulder. Patient states all most all movements cause the shoulder to hurt worse and feels better when she rest. I having some numbness/tingling in forearm and hand but notes history of carpal tunnel pathology.     Falls: 0    Imaging: [] Xray [] MRI [] CT: Performed on: 04/08/2024  "FINDINGS:  No acute abnormality with minimal degenerative findings of the shoulder.  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  Prior Level of Function: Independent and pain free with all ADL, IADL, community mobility and functional activities.   Current Level of Function: Independent with all ADL, IADL, community mobility and functional activities with reports of increased pain and need for increased time and frequent breaks.      Dominant Extremity:    [x] Right    [] Left    Pts goals: Pt reported goals are to decrease overall pain levels in order to return to prior functional level.     Medical History:   Past Medical History:   Diagnosis Date    Anxiety     Asthma     Crohn's disease     Fibroids     Hyperlipidemia     Hypertension     Iritis     Migraine headache     Ocular    Osteopenia 08/2019    Syncope and collapse     Vitamin D deficiency disease        Surgical History:   Angelica Flores  has a past surgical history that includes TAHBSO (02/2011); Total abdominal hysterectomy; Ankle fracture surgery (08/19/2008); Breast biopsy (Left, 1977); Colonoscopy (N/A, 08/11/2017); Colonoscopy (N/A, 11/23/2020); Colonoscopy (N/A, 09/28/2022); Colonoscopy (N/A, 08/22/2023); Oophorectomy (Bilateral); and Colonoscopy (N/A, 2/1/2024).    Medications:   Angelica has a current medication list which includes the following prescription(s): humira(cf) pen, alprazolam, amlodipine, ascorbic acid (vitamin c), azathioprine, azelastine, bacillus coagulans, cetirizine, cholecalciferol (vitamin d3), dicyclomine, hydrochlorothiazide, hydroquinone, ipratropium, lisinopril, methocarbamol, metoprolol succinate, montelukast, multivitamin-ca-iron-minerals, potassium bicarbonate, pravastatin, topiramate, tretinoin, valacyclovir, and zinc sulfate, and the following Facility-Administered Medications: lactated ringers.    Allergies:   Review of patient's allergies indicates:  No Known Allergies      Objective      RANGE OF MOTION:   Cervical Right   (spine) Left    Pain/Dysfunction with Movement Goal   Cervical Flexion (60º) 50 ---  60   Cervical Extension (80º) 55 ---  70   Cervical Side Bending (45º) 20 25  30   Cervical Rotation (75º) 60 60  70     Shoulder AROM/PROM Right Left Pain/Dysfunction with Movement Goal   Shoulder Flexion (180º) 105 140  165   Shoulder Abduction (180º) 100 180  165   Shoulder Extension (60º) 45 70  70   Shoulder ER  at 90º (90º) 45 70  80   Shoulder IR at 90º (70º) 5 50  50      STRENGTH:   U/E MMT Right Left Pain/Dysfunction with Movement Goal   Shoulder Flexion 3+/5 4/5  4+/5 B   Shoulder Extension 4-/5 4/5  4+/5 B   Shoulder Abduction 3+/5 4/5  4+/5 B   Shoulder IR 4-/5 4+/5  4+/5 B   Shoulder ER 4-/5 4/5  4+/5 B   Middle Trapezius 3+/5 4/5  4+/5 B   Lower Trapezius 3+/5 4-/5  4+/5 B     MUSCLE LENGTH:   Muscle Tested  Right Left  Limitation Goal   Upper Trapezius [] Normal  [x] Limited [] Normal  [] Limited  Normal B   Levator Scapular  [] Normal  [x] Limited [] Normal  [] Limited  Normal B   Scalenes [] Normal  [] Limited [] Normal  [] Limited  Normal B   Pectoralis Minor [] Normal  [x] Limited [] Normal  [x] Limited  Normal B   Pectoralis Major [] Normal  [x] Limited [] Normal  [x] Limited  Normal B     JOINT MOBILITY:   Joint Motion  Right Mobility  (spine) Left Mobility Goal   Cervical Upglides  [] Hypo     [x] Normal     [] Hyper [] Hypo     [x] Normal     [] Hyper Normal    Cervical Downglides  [] Hypo     [x] Normal     [] Hyper [] Hypo     [x] Normal     [] Hyper Normal    1st Rib [] Hypo     [x] Normal     [] Hyper [] Hypo     [x] Normal     [] Hyper Normal    Thoracic PA  [x] Hypo     [] Normal     [] Hyper --- Normal   Costotransverse  [] Hypo     [x] Normal     [] Hyper [] Hypo     [x] Normal     [] Hyper Normal    Glenohumeral distraction [x] Hypo     [] Normal     [] Hyper [] Hypo     [x] Normal     [] Hyper Normal    Glenohumeral inferior  [x] Hypo     []  Normal     [] Hyper [] Hypo     [x] Normal     [] Hyper Normal    Glenohumeral anterior [] Hypo     [x] Normal     [] Hyper [] Hypo     [x] Normal     [] Hyper Normal    Glenohumeral posterior [x] Hypo     [] Normal     [] Hyper [] Hypo     [x] Normal     [] Hyper Normal      SPECIAL TESTS:    Right  (spine) Left  Goal   Cervical Distraction [] Positive    [x] Negative [] Positive    [x] Negative Negative B    Cervical Compression  [x] Positive    [] Negative [] Positive    [x] Negative Negative B    Median ULTT  [] Positive    [x] Negative [] Positive    [x] Negative Negative B    Ulnar ULTT  [x] Positive    [] Negative [] Positive    [x] Negative Negative B    Raidal ULTT  [] Positive    [x] Negative [] Positive    [x] Negative Negative B         Right  (spine) Left  Goal   Anterior Apprehension [] Positive    [x] Negative [] Positive    [x] Negative Negative B    Posterior Apprehension  [] Positive    [x] Negative [] Positive    [x] Negative Negative B    Sulcus Sign  [] Positive    [x] Negative [] Positive    [x] Negative Negative B    Umana Alfa  [x] Positive    [] Negative [] Positive    [x] Negative Negative B    Empty Can  [x] Positive    [] Negative [] Positive    [x] Negative Negative B    Lift Off [] Positive    [x] Negative [] Positive    [x] Negative Negative B    Steele's Test [] Positive    [x] Negative [] Positive    [x] Negative Negative B   Biceps Load  [] Positive    [x] Negative [] Positive    [x] Negative Negative B   Crank  [] Positive    [x] Negative [] Positive    [x] Negative Negative B   Horizontal Adduction Test  [] Positive    [x] Negative [] Positive    [x] Negative Negative B     SENSATION  [x] Intact to Light Touch   [] Impaired:    PALPATION: Muscles: Increased tone and tenderness to palpation of: right upper trapezius, levator scapulae , periscapular musculature, rotator cuff muscles. Structures: Increased tenderness to palpation of: right glenohumeral joint, tuberosities of  humerus, bicipital groove.    POSTURE:  Pt presents with postural abnormalities which include:    [x] Forward Head   [] Increased Lumbar Lordosis   [x] Rounded Shoulder   [] Genu Recurvatum   [] Increased Thoracic Kyphosis [] Genu Valgus   [] Trunk Deviated    [] Pes Planus   [] Scapular Winging    [] Other:     Function:     Intake Outcome Measure for FOTO Shoulder Survey    Therapist reviewed FOTO scores for Angelcia on 4/23/2024.   FOTO report - see Media section or FOTO account for episode details    Intake Score: 46%       Treatment     Total Treatment time (time-based codes) separate from Evaluation: (15) minutes     Angelica received the treatments listed below:      MANUAL THERAPY TECHNIQUES were applied for (0) minutes, including:    Manual Intervention Performed Today    Soft Tissue Mobilization [] right   Joint Mobilizations []     []     []    Functional Dry Needling  []      Plan for Next Visit: Continue as needed      NEUROMUSCULAR RE-EDUCATION ACTIVITIES to improve Balance, Coordination, Kinesthetic, Sense, Proprioception, and Posture for (15) minutes.  The following were included:    Intervention Performed Today    Home exercise plan  x Demonstration, education, performance   Table Slides  Flexion  Abduction   Bilateral External Rotation Iso     Scapular Retraction                           Plan for Next Visit: UBE, Pulley, internal rotation, pec stretch, supine ER stretch, cross arm stretch, wall walks, external rotation actively, land mine press     Patient Education and Home Exercises     Education provided: time included in treatment time.   PURPOSE: Patient educated on the impairments noted above and the effects of physical therapy intervention to improve overall condition and QOL.   EXERCISE: Patient was educated on all the above exercise prior/during/after for proper posture, positioning, and execution for safe performance with home exercise program.   STRENGTH: Patient educated on the importance  of improved core and extremity strength in order to improve alignment of the spine and extremities with static positions and dynamic movement.   POSTURE: Patient educated on postural awareness to reduce stress and maintain optimal alignment of the spine with static positions and dynamic movement   SLEEPING POSITIONS: Patient educated on the use of pillows to aid in neutral alignment of spine and extremities when sleeping in supine or side lying.  ERGONOMICS: Patient educated on proper ergonomics at the work station in order to maintain optimal alignment of the musculoskeletal system and improve efficiency in the work environment.    Written Home Exercises Provided: yes.  Exercises were reviewed and Angelica was able to demonstrate them prior to the end of the session.  Angelica demonstrated good  understanding of the education provided. See EMR under Patient Instructions for exercises provided during therapy sessions.    Assessment     Angelica is a 63 y.o. female referred to outpatient Physical Therapy with a medical diagnosis of Chronic right shoulder pain . Pt presents with impairments in the following categories: IMPAIRMENTS: ROM, strength, joint mobility, muscle length, and posture    Pt prognosis is Good  Pt will benefit from skilled outpatient Physical Therapy to address the deficits stated above and in the chart below, provide pt/family education, and to maximize pt's level of independence.     Plan of care discussed with patient: Yes  Pt's spiritual, cultural and educational needs considered and patient is agreeable to the plan of care and goals as stated below:     Anticipated Barriers for therapy: co-morbidities, sedentary lifestyle, and chronicity of condition    Medical Necessity is demonstrated by the following  History  Co-morbidities and personal factors that may impact the plan of care [] LOW: no personal factors / co-morbidities  [x] MODERATE: 1-2 personal factors / co-morbidities  [] HIGH: 3+ personal  "factors / co-morbidities    Moderate / High Support Documentation: lifestyle, chronicity  Past Medical History:   Diagnosis Date    Anxiety     Asthma     Crohn's disease     Fibroids     Hyperlipidemia     Hypertension     Iritis     Migraine headache     Ocular    Osteopenia 08/2019    Syncope and collapse     Vitamin D deficiency disease         Examination  Body Structures and Functions, activity limitations and participation restrictions that may impact the plan of care [] LOW: addressing 1-2 elements  [x] MODERATE: 3+ elements  [] HIGH: 4+ elements (please support below)    Moderate / High Support Documentation: See above in "Current Level of Function"      Clinical Presentation [x] LOW: stable  [] MODERATE: Evolving  [] HIGH: Unstable     Decision Making/ Complexity Score: low         Short Term Goals:  4 weeks Status  Date Met   PAIN: Pt will report worst pain of 6/10 in order to progress toward max functional ability and improve quality of life. [x] Progressing  [] Met  [] Not Met    FUNCTION: Patient will demonstrate improved function as indicated by a score of greater than or equal to 50% of goal score out of 100 on FOTO. [x] Progressing  [] Met  [] Not Met    MOBILITY: Patient will improve AROM to 50% of stated goals, listed in objective measures above, in order to progress towards independence with functional activities.  [x] Progressing  [] Met  [] Not Met    STRENGTH: Patient will improve strength to 50% of stated goals, listed in objective measures above, in order to progress towards independence with functional activities. [x] Progressing  [] Met  [] Not Met    POSTURE: Patient will correct postural deviations in sitting and standing, to decrease pain and promote long term stability.  [x] Progressing  [] Met  [] Not Met    HEP: Patient will demonstrate independence with HEP in order to progress toward functional independence. [x] Progressing  [] Met  [] Not Met      Long Term Goals:  8 weeks Status " Date Met   PAIN: Pt will report worst pain of 2/10 in order to progress toward max functional ability and improve quality of life [x] Progressing  [] Met  [] Not Met    FUNCTION: Patient will demonstrate improved function as indicated by a score of greater than or equal to goal score out of 100 on FOTO. [x] Progressing  [] Met  [] Not Met    MOBILITY: Patient will improve AROM to stated goals, listed in objective measures above, in order to return to maximal functional potential and improve quality of life.  [x] Progressing  [] Met  [] Not Met    STRENGTH: Patient will improve strength to stated goals, listed in objective measures above, in order to improve functional independence and quality of life.  [x] Progressing  [] Met  [] Not Met    GAIT: Patient will demonstrate normalized gait mechanics with minimal compensation in order to return to PLOF. [x] Progressing  [] Met  [] Not Met    Patient will return to normal ADL's, IADL's, community involvement, recreational activities, and work-related activities with less than or equal to 2/10 pain and maximal function.  [x] Progressing  [] Met  [] Not Met      Plan     Plan of care Certification: 4/23/2024 to 06/18/2024.    Outpatient Physical Therapy 2 times weekly for 8 weeks to include any combination of the following interventions: virtual visits, dry needling, modalities, electrical stimulation (IFC, Pre-Mod, Attended with Functional Dry Needling), Cervical/Lumbar Traction, Gait Training, Manual Therapy, Neuromuscular Re-ed, Patient Education, Self Care, Therapeutic Exercise, and Therapeutic Activites     Julio Cesar Benedict, PT, DPT

## 2024-04-24 PROBLEM — M62.81 MUSCLE WEAKNESS OF RIGHT UPPER EXTREMITY: Status: ACTIVE | Noted: 2024-04-24

## 2024-04-24 PROBLEM — M25.511 CHRONIC RIGHT SHOULDER PAIN: Status: ACTIVE | Noted: 2024-04-24

## 2024-04-24 PROBLEM — G89.29 CHRONIC RIGHT SHOULDER PAIN: Status: ACTIVE | Noted: 2024-04-24

## 2024-04-24 NOTE — PLAN OF CARE
"[facility]   Physical Therapy Initial Evaluation      Date: 4/23/2024   Name: Angelica Pate [facility] Number: [id number]    Therapy Diagnosis:    Encounter Diagnoses   Name Primary?    Chronic right shoulder pain Yes    Decreased functional mobility and endurance     Muscle weakness of right upper extremity     Chronic left shoulder pain     Decreased strength, endurance, and mobility       Physician: Delon Harper     Physician Orders: PT Eval and Treat  Medical Diagnosis from Referral: Chronic right shoulder pain   Evaluation Date: 4/23/2024  Authorization Period Expiration: 04/10/2026   Plan of Care Expiration: 06/18/2024  Progress Note Due: 05/23/2024  Visit # / Visits authorized: 1/1   FOTO: 1/3 (last performed on 4/23/2024)    Precautions: Standard, asthma, hypertension, osteopenia, syncope and collapse    Time In: 1600  Time Out: 1700  Total Billable Time (timed & untimed codes): 60 minutes    Subjective     Date of onset: 6 weeks    History of current condition - Angelica reports she has been having right shoulder pain for about the last 6 weeks with no clear mechanism of injury. Patient notes she had history of similar symptoms on left shoulder that was seen as a frozen shoulder. Patient reports symptoms of stiffness and pain feel similar to previous symptoms on contralateral shoulder. Patient states all most all movements cause the shoulder to hurt worse and feels better when she rest. I having some numbness/tingling in forearm and hand but notes history of carpal tunnel pathology.     Falls: 0    Imaging: [] Xray [] MRI [] CT: Performed on: 04/08/2024  "FINDINGS:  No acute abnormality with minimal degenerative findings of the shoulder.  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  Prior Level of Function: Independent and pain free with all ADL, IADL, community mobility and functional activities.   Current Level of Function: Independent with all ADL, IADL, community mobility and functional activities with reports of increased pain and need for increased time and frequent breaks.      Dominant Extremity:    [x] Right    [] Left    Pts goals: Pt reported goals are to decrease overall pain levels in order to return to prior functional level.     Medical History:   Past Medical History:   Diagnosis Date    Anxiety     Asthma     Crohn's disease     Fibroids     Hyperlipidemia     Hypertension     Iritis     Migraine headache     Ocular    Osteopenia 08/2019    Syncope and collapse     Vitamin D deficiency disease        Surgical History:   Angelica Flores  has a past surgical history that includes TAHBSO (02/2011); Total abdominal hysterectomy; Ankle fracture surgery (08/19/2008); Breast biopsy (Left, 1977); Colonoscopy (N/A, 08/11/2017); Colonoscopy (N/A, 11/23/2020); Colonoscopy (N/A, 09/28/2022); Colonoscopy (N/A, 08/22/2023); Oophorectomy (Bilateral); and Colonoscopy (N/A, 2/1/2024).    Medications:   Angelica has a current medication list which includes the following prescription(s): humira(cf) pen, alprazolam, amlodipine, ascorbic acid (vitamin c), azathioprine, azelastine, bacillus coagulans, cetirizine, cholecalciferol (vitamin d3), dicyclomine, hydrochlorothiazide, hydroquinone, ipratropium, lisinopril, methocarbamol, metoprolol succinate, montelukast, multivitamin-ca-iron-minerals, potassium bicarbonate, pravastatin, topiramate, tretinoin, valacyclovir, and zinc sulfate, and the following Facility-Administered Medications: lactated ringers.    Allergies:   Review of patient's allergies indicates:  No Known Allergies      Objective      RANGE OF MOTION:   Cervical Right   (spine) Left    Pain/Dysfunction with Movement Goal   Cervical Flexion (60º) 50 ---  60   Cervical Extension (80º) 55 ---  70   Cervical Side Bending (45º) 20 25  30   Cervical Rotation (75º) 60 60  70     Shoulder AROM/PROM Right Left Pain/Dysfunction with Movement Goal   Shoulder Flexion (180º) 105 140  165   Shoulder Abduction (180º) 100 180  165   Shoulder Extension (60º) 45 70  70   Shoulder ER  at 90º (90º) 45 70  80   Shoulder IR at 90º (70º) 5 50  50      STRENGTH:   U/E MMT Right Left Pain/Dysfunction with Movement Goal   Shoulder Flexion 3+/5 4/5  4+/5 B   Shoulder Extension 4-/5 4/5  4+/5 B   Shoulder Abduction 3+/5 4/5  4+/5 B   Shoulder IR 4-/5 4+/5  4+/5 B   Shoulder ER 4-/5 4/5  4+/5 B   Middle Trapezius 3+/5 4/5  4+/5 B   Lower Trapezius 3+/5 4-/5  4+/5 B     MUSCLE LENGTH:   Muscle Tested  Right Left  Limitation Goal   Upper Trapezius [] Normal  [x] Limited [] Normal  [] Limited  Normal B   Levator Scapular  [] Normal  [x] Limited [] Normal  [] Limited  Normal B   Scalenes [] Normal  [] Limited [] Normal  [] Limited  Normal B   Pectoralis Minor [] Normal  [x] Limited [] Normal  [x] Limited  Normal B   Pectoralis Major [] Normal  [x] Limited [] Normal  [x] Limited  Normal B     JOINT MOBILITY:   Joint Motion  Right Mobility  (spine) Left Mobility Goal   Cervical Upglides  [] Hypo     [x] Normal     [] Hyper [] Hypo     [x] Normal     [] Hyper Normal    Cervical Downglides  [] Hypo     [x] Normal     [] Hyper [] Hypo     [x] Normal     [] Hyper Normal    1st Rib [] Hypo     [x] Normal     [] Hyper [] Hypo     [x] Normal     [] Hyper Normal    Thoracic PA  [x] Hypo     [] Normal     [] Hyper --- Normal   Costotransverse  [] Hypo     [x] Normal     [] Hyper [] Hypo     [x] Normal     [] Hyper Normal    Glenohumeral distraction [x] Hypo     [] Normal     [] Hyper [] Hypo     [x] Normal     [] Hyper Normal    Glenohumeral inferior  [x] Hypo     []  Normal     [] Hyper [] Hypo     [x] Normal     [] Hyper Normal    Glenohumeral anterior [] Hypo     [x] Normal     [] Hyper [] Hypo     [x] Normal     [] Hyper Normal    Glenohumeral posterior [x] Hypo     [] Normal     [] Hyper [] Hypo     [x] Normal     [] Hyper Normal      SPECIAL TESTS:    Right  (spine) Left  Goal   Cervical Distraction [] Positive    [x] Negative [] Positive    [x] Negative Negative B    Cervical Compression  [x] Positive    [] Negative [] Positive    [x] Negative Negative B    Median ULTT  [] Positive    [x] Negative [] Positive    [x] Negative Negative B    Ulnar ULTT  [x] Positive    [] Negative [] Positive    [x] Negative Negative B    Raidal ULTT  [] Positive    [x] Negative [] Positive    [x] Negative Negative B         Right  (spine) Left  Goal   Anterior Apprehension [] Positive    [x] Negative [] Positive    [x] Negative Negative B    Posterior Apprehension  [] Positive    [x] Negative [] Positive    [x] Negative Negative B    Sulcus Sign  [] Positive    [x] Negative [] Positive    [x] Negative Negative B    Umana Alfa  [x] Positive    [] Negative [] Positive    [x] Negative Negative B    Empty Can  [x] Positive    [] Negative [] Positive    [x] Negative Negative B    Lift Off [] Positive    [x] Negative [] Positive    [x] Negative Negative B    Steele's Test [] Positive    [x] Negative [] Positive    [x] Negative Negative B   Biceps Load  [] Positive    [x] Negative [] Positive    [x] Negative Negative B   Crank  [] Positive    [x] Negative [] Positive    [x] Negative Negative B   Horizontal Adduction Test  [] Positive    [x] Negative [] Positive    [x] Negative Negative B     SENSATION  [x] Intact to Light Touch   [] Impaired:    PALPATION: Muscles: Increased tone and tenderness to palpation of: right upper trapezius, levator scapulae , periscapular musculature, rotator cuff muscles. Structures: Increased tenderness to palpation of: right glenohumeral joint, tuberosities of  humerus, bicipital groove.    POSTURE:  Pt presents with postural abnormalities which include:    [x] Forward Head   [] Increased Lumbar Lordosis   [x] Rounded Shoulder   [] Genu Recurvatum   [] Increased Thoracic Kyphosis [] Genu Valgus   [] Trunk Deviated    [] Pes Planus   [] Scapular Winging    [] Other:     Function:     Intake Outcome Measure for FOTO Shoulder Survey    Therapist reviewed FOTO scores for Angelica on 4/23/2024.   FOTO report - see Media section or FOTO account for episode details    Intake Score: 46%       Treatment     Total Treatment time (time-based codes) separate from Evaluation: (15) minutes     Angelica received the treatments listed below:      MANUAL THERAPY TECHNIQUES were applied for (0) minutes, including:    Manual Intervention Performed Today    Soft Tissue Mobilization [] right   Joint Mobilizations []     []     []    Functional Dry Needling  []      Plan for Next Visit: Continue as needed      NEUROMUSCULAR RE-EDUCATION ACTIVITIES to improve Balance, Coordination, Kinesthetic, Sense, Proprioception, and Posture for (15) minutes.  The following were included:    Intervention Performed Today    Home exercise plan  x Demonstration, education, performance   Table Slides  Flexion  Abduction   Bilateral External Rotation Iso     Scapular Retraction                           Plan for Next Visit: UBE, Pulley, internal rotation, pec stretch, supine ER stretch, cross arm stretch, wall walks, external rotation actively, land mine press     Patient Education and Home Exercises     Education provided: time included in treatment time.   PURPOSE: Patient educated on the impairments noted above and the effects of physical therapy intervention to improve overall condition and QOL.   EXERCISE: Patient was educated on all the above exercise prior/during/after for proper posture, positioning, and execution for safe performance with home exercise program.   STRENGTH: Patient educated on the importance  of improved core and extremity strength in order to improve alignment of the spine and extremities with static positions and dynamic movement.   POSTURE: Patient educated on postural awareness to reduce stress and maintain optimal alignment of the spine with static positions and dynamic movement   SLEEPING POSITIONS: Patient educated on the use of pillows to aid in neutral alignment of spine and extremities when sleeping in supine or side lying.  ERGONOMICS: Patient educated on proper ergonomics at the work station in order to maintain optimal alignment of the musculoskeletal system and improve efficiency in the work environment.    Written Home Exercises Provided: yes.  Exercises were reviewed and Angelica was able to demonstrate them prior to the end of the session.  Angelica demonstrated good  understanding of the education provided. See EMR under Patient Instructions for exercises provided during therapy sessions.    Assessment     Angelica is a 63 y.o. female referred to outpatient Physical Therapy with a medical diagnosis of Chronic right shoulder pain . Pt presents with impairments in the following categories: IMPAIRMENTS: ROM, strength, joint mobility, muscle length, and posture    Pt prognosis is Good  Pt will benefit from skilled outpatient Physical Therapy to address the deficits stated above and in the chart below, provide pt/family education, and to maximize pt's level of independence.     Plan of care discussed with patient: Yes  Pt's spiritual, cultural and educational needs considered and patient is agreeable to the plan of care and goals as stated below:     Anticipated Barriers for therapy: co-morbidities, sedentary lifestyle, and chronicity of condition    Medical Necessity is demonstrated by the following  History  Co-morbidities and personal factors that may impact the plan of care [] LOW: no personal factors / co-morbidities  [x] MODERATE: 1-2 personal factors / co-morbidities  [] HIGH: 3+ personal  "factors / co-morbidities    Moderate / High Support Documentation: lifestyle, chronicity  Past Medical History:   Diagnosis Date    Anxiety     Asthma     Crohn's disease     Fibroids     Hyperlipidemia     Hypertension     Iritis     Migraine headache     Ocular    Osteopenia 08/2019    Syncope and collapse     Vitamin D deficiency disease         Examination  Body Structures and Functions, activity limitations and participation restrictions that may impact the plan of care [] LOW: addressing 1-2 elements  [x] MODERATE: 3+ elements  [] HIGH: 4+ elements (please support below)    Moderate / High Support Documentation: See above in "Current Level of Function"      Clinical Presentation [x] LOW: stable  [] MODERATE: Evolving  [] HIGH: Unstable     Decision Making/ Complexity Score: low         Short Term Goals:  4 weeks Status  Date Met   PAIN: Pt will report worst pain of 6/10 in order to progress toward max functional ability and improve quality of life. [x] Progressing  [] Met  [] Not Met    FUNCTION: Patient will demonstrate improved function as indicated by a score of greater than or equal to 50% of goal score out of 100 on FOTO. [x] Progressing  [] Met  [] Not Met    MOBILITY: Patient will improve AROM to 50% of stated goals, listed in objective measures above, in order to progress towards independence with functional activities.  [x] Progressing  [] Met  [] Not Met    STRENGTH: Patient will improve strength to 50% of stated goals, listed in objective measures above, in order to progress towards independence with functional activities. [x] Progressing  [] Met  [] Not Met    POSTURE: Patient will correct postural deviations in sitting and standing, to decrease pain and promote long term stability.  [x] Progressing  [] Met  [] Not Met    HEP: Patient will demonstrate independence with HEP in order to progress toward functional independence. [x] Progressing  [] Met  [] Not Met      Long Term Goals:  8 weeks Status " Date Met   PAIN: Pt will report worst pain of 2/10 in order to progress toward max functional ability and improve quality of life [x] Progressing  [] Met  [] Not Met    FUNCTION: Patient will demonstrate improved function as indicated by a score of greater than or equal to goal score out of 100 on FOTO. [x] Progressing  [] Met  [] Not Met    MOBILITY: Patient will improve AROM to stated goals, listed in objective measures above, in order to return to maximal functional potential and improve quality of life.  [x] Progressing  [] Met  [] Not Met    STRENGTH: Patient will improve strength to stated goals, listed in objective measures above, in order to improve functional independence and quality of life.  [x] Progressing  [] Met  [] Not Met    GAIT: Patient will demonstrate normalized gait mechanics with minimal compensation in order to return to PLOF. [x] Progressing  [] Met  [] Not Met    Patient will return to normal ADL's, IADL's, community involvement, recreational activities, and work-related activities with less than or equal to 2/10 pain and maximal function.  [x] Progressing  [] Met  [] Not Met      Plan     Plan of care Certification: 4/23/2024 to 06/18/2024.    Outpatient Physical Therapy 2 times weekly for 8 weeks to include any combination of the following interventions: virtual visits, dry needling, modalities, electrical stimulation (IFC, Pre-Mod, Attended with Functional Dry Needling), Cervical/Lumbar Traction, Gait Training, Manual Therapy, Neuromuscular Re-ed, Patient Education, Self Care, Therapeutic Exercise, and Therapeutic Activites     Julio Cesar Benedict, PT, DPT

## 2024-05-01 ENCOUNTER — CLINICAL SUPPORT (OUTPATIENT)
Dept: REHABILITATION | Facility: HOSPITAL | Age: 64
End: 2024-05-01
Payer: COMMERCIAL

## 2024-05-01 DIAGNOSIS — Z74.09 DECREASED STRENGTH, ENDURANCE, AND MOBILITY: Primary | ICD-10-CM

## 2024-05-01 DIAGNOSIS — Z74.09 DECREASED FUNCTIONAL MOBILITY AND ENDURANCE: ICD-10-CM

## 2024-05-01 DIAGNOSIS — M62.81 MUSCLE WEAKNESS OF RIGHT UPPER EXTREMITY: ICD-10-CM

## 2024-05-01 DIAGNOSIS — R68.89 DECREASED STRENGTH, ENDURANCE, AND MOBILITY: Primary | ICD-10-CM

## 2024-05-01 DIAGNOSIS — M25.511 CHRONIC RIGHT SHOULDER PAIN: ICD-10-CM

## 2024-05-01 DIAGNOSIS — G89.29 CHRONIC RIGHT SHOULDER PAIN: ICD-10-CM

## 2024-05-01 DIAGNOSIS — R53.1 DECREASED STRENGTH, ENDURANCE, AND MOBILITY: Primary | ICD-10-CM

## 2024-05-01 PROCEDURE — 97110 THERAPEUTIC EXERCISES: CPT

## 2024-05-01 PROCEDURE — 97112 NEUROMUSCULAR REEDUCATION: CPT

## 2024-05-01 PROCEDURE — 97140 MANUAL THERAPY 1/> REGIONS: CPT

## 2024-05-01 NOTE — PROGRESS NOTES
[facility]   Physical Therapy Treatment Note        Name: Angelica Pate [facility] Number: [id number]    Therapy Diagnosis:   Encounter Diagnoses   Name Primary?    Decreased strength, endurance, and mobility Yes    Decreased functional mobility and endurance     Chronic right shoulder pain     Muscle weakness of right upper extremity      Physician: Delon Harper*    Visit Date: 5/1/2024    Physician Orders: PT Eval and Treat  Medical Diagnosis from Referral: Chronic right shoulder pain   Evaluation Date: 4/23/2024  Authorization Period Expiration: 04/10/2026   Plan of Care Expiration: 06/18/2024  Progress Note Due: 05/23/2024  Visit # / Visits authorized: 1/20 (+1 evaluation)   FOTO: 1/3 (last performed on 4/23/2024)     Precautions: Standard, asthma, hypertension, osteopenia, syncope and collapse  PTA Visit #: 0/5     Time In: 0732  Time Out: 0831  Total Billable Time: 59 minutes (Billing reflects 1 on 1 treatment time spent with patient)    Subjective     Patient reports: she felt some good improvement in her pain and stiffness following initial evaluation. Patient notes the shoulder pain returned over the next .    He/She was compliant with home exercise program.  Response to previous treatment: decreased pain and stiffness  Functional change: performing home exercise plan    Pain: 5/10     Location: right shoulder    Objective      Objective Measures updated at progress report or POC update only unless otherwise noted.       Treatment     Angelica received the treatments listed below:     MANUAL THERAPY TECHNIQUES were applied for (10) minutes, including:     Manual Intervention Performed Today     Soft Tissue Mobilization [x]  Right pec, anterior delt, upper trap   Joint Mobilizations [x]  Grade II-III shoulder A/P and inferior glide     []        []      Functional Dry Needling  []         Plan for Next Visit: Continue as needed      THERAPEUTIC EXERCISES: to develop  "strength, endurance, ROM, flexibility, posture and core stabilization for (30)  minutes including: x = performed today    Therapeutic Exercise 5/1/2024    ROM/Chondral: UBE x 3'/3'   Pulleys x 3'/3'   Table Slides x  x X30 forward  X30 lateral   Internal rotation x 3x10 with RTB   Posterior Capsule Stretch x 3x30"              BOLD = new this visit  Plan for Next Visit:      NEUROMUSCULAR RE-EDUCATION ACTIVITIES to improve Balance, Coordination, Kinesthetic, Sense, Proprioception, and Posture for (10) minutes.  The following were included:     Intervention Performed Today     Bilateral External Rotation Iso  x 3 minutes with 5 second hold    Scapular Retraction  x 20x5" holds                                      Plan for Next Visit:  pec stretch, supine ER stretch, wall walks, external rotation actively, land mine press     THERAPEUTIC ACTIVITIES: to improve functional performance through dynamic activities, for (0)  minutes including: x = performed today    Therapeutic Activities 5/1/2024                         BOLD = new this visit  Plan for Next Visit:      Examples: Coordination, Kinesthetic Sense, Push/pull, Squat, Stairs, bending, lifting, catching, pushing, pulling, throwing, squatting  Patient Education and Home Exercises       Home Exercises Provided and Patient Education Provided     Education provided: time included in treatment time.   PURPOSE: Patient educated on the impairments noted above and the effects of physical therapy intervention to improve overall condition and QOL.   EXERCISE: Patient was educated on all the above exercise prior/during/after for proper posture, positioning, and execution for safe performance with home exercise program.   STRENGTH: Patient educated on the importance of improved core and extremity strength in order to improve alignment of the spine and extremities with static positions and dynamic movement.   POSTURE: Patient educated on postural awareness to reduce stress and " maintain optimal alignment of the spine with static positions and dynamic movement   SLEEPING POSITIONS: Patient educated on the use of pillows to aid in neutral alignment of spine and extremities when sleeping in supine or side lying.    Written Home Exercises Provided: yes.  Exercises were reviewed and Angelica was able to demonstrate them prior to the end of the session.  Angelica demonstrated good  understanding of the education provided. See EMR under Patient Instructions for exercises provided during therapy sessions.    Assessment     Patient tolerated session well. Patient able to demonstrate her home exercise plan plan well but cueing for form required. Patient attempted to perform scapular retractions in prone but unable to perform with correct muscle activation and form. Patient able to better demonstrate in sitting. Patient progressed with internal rotation strengthening as well and further mobility.     Angelica is progressing well towards her goals.   Patient prognosis is Good.     Patient will continue to benefit from skilled outpatient physical therapy to address the deficits listed in the problem list box on initial evaluation, provide pt/family education and to maximize patient's level of independence in the home and community environment.     Patient's spiritual, cultural and educational needs considered and pt agreeable to plan of care and goals.    Anticipated Barriers for therapy: co-morbidities, sedentary lifestyle, and chronicity of condition     Short Term Goals:  4 weeks Status  Date Met   PAIN: Pt will report worst pain of 6/10 in order to progress toward max functional ability and improve quality of life. [x] Progressing  [] Met  [] Not Met     FUNCTION: Patient will demonstrate improved function as indicated by a score of greater than or equal to 50% of goal score out of 100 on FOTO. [x] Progressing  [] Met  [] Not Met     MOBILITY: Patient will improve AROM to 50% of stated goals, listed in  objective measures above, in order to progress towards independence with functional activities.  [x] Progressing  [] Met  [] Not Met     STRENGTH: Patient will improve strength to 50% of stated goals, listed in objective measures above, in order to progress towards independence with functional activities. [x] Progressing  [] Met  [] Not Met     POSTURE: Patient will correct postural deviations in sitting and standing, to decrease pain and promote long term stability.  [x] Progressing  [] Met  [] Not Met     HEP: Patient will demonstrate independence with HEP in order to progress toward functional independence. [x] Progressing  [] Met  [] Not Met        Long Term Goals:  8 weeks Status Date Met   PAIN: Pt will report worst pain of 2/10 in order to progress toward max functional ability and improve quality of life [x] Progressing  [] Met  [] Not Met     FUNCTION: Patient will demonstrate improved function as indicated by a score of greater than or equal to goal score out of 100 on FOTO. [x] Progressing  [] Met  [] Not Met     MOBILITY: Patient will improve AROM to stated goals, listed in objective measures above, in order to return to maximal functional potential and improve quality of life.  [x] Progressing  [] Met  [] Not Met     STRENGTH: Patient will improve strength to stated goals, listed in objective measures above, in order to improve functional independence and quality of life.  [x] Progressing  [] Met  [] Not Met     GAIT: Patient will demonstrate normalized gait mechanics with minimal compensation in order to return to PLOF. [x] Progressing  [] Met  [] Not Met     Patient will return to normal ADL's, IADL's, community involvement, recreational activities, and work-related activities with less than or equal to 2/10 pain and maximal function.  [x] Progressing  [] Met  [] Not Met       Plan     Continue Plan of Care (POC) and progress per patient tolerance. See treatment section for details on planned  progressions next session.      Julio Cesar Benedict, PT

## 2024-05-03 ENCOUNTER — CLINICAL SUPPORT (OUTPATIENT)
Dept: REHABILITATION | Facility: HOSPITAL | Age: 64
End: 2024-05-03
Payer: COMMERCIAL

## 2024-05-03 DIAGNOSIS — M25.511 CHRONIC RIGHT SHOULDER PAIN: ICD-10-CM

## 2024-05-03 DIAGNOSIS — R53.1 DECREASED STRENGTH, ENDURANCE, AND MOBILITY: Primary | ICD-10-CM

## 2024-05-03 DIAGNOSIS — M62.81 MUSCLE WEAKNESS OF RIGHT UPPER EXTREMITY: ICD-10-CM

## 2024-05-03 DIAGNOSIS — R68.89 DECREASED STRENGTH, ENDURANCE, AND MOBILITY: Primary | ICD-10-CM

## 2024-05-03 DIAGNOSIS — Z74.09 DECREASED FUNCTIONAL MOBILITY AND ENDURANCE: ICD-10-CM

## 2024-05-03 DIAGNOSIS — Z74.09 DECREASED STRENGTH, ENDURANCE, AND MOBILITY: Primary | ICD-10-CM

## 2024-05-03 DIAGNOSIS — G89.29 CHRONIC RIGHT SHOULDER PAIN: ICD-10-CM

## 2024-05-03 PROCEDURE — 97110 THERAPEUTIC EXERCISES: CPT

## 2024-05-03 PROCEDURE — 97112 NEUROMUSCULAR REEDUCATION: CPT

## 2024-05-03 NOTE — PROGRESS NOTES
[facility]   Physical Therapy Treatment Note        Name: Angelica Pate [facility] Number: [id number]    Therapy Diagnosis:   Encounter Diagnoses   Name Primary?    Decreased strength, endurance, and mobility Yes    Decreased functional mobility and endurance     Chronic right shoulder pain     Muscle weakness of right upper extremity      Physician: Delon Harper*    Visit Date: 5/3/2024    Physician Orders: PT Eval and Treat  Medical Diagnosis from Referral: Chronic right shoulder pain   Evaluation Date: 4/23/2024  Authorization Period Expiration: 04/10/2026   Plan of Care Expiration: 06/18/2024  Progress Note Due: 05/23/2024  Visit # / Visits authorized: 2/20 (+1 evaluation)   FOTO: 1/3 (last performed on 4/23/2024)     Precautions: Standard, asthma, hypertension, osteopenia, syncope and collapse  PTA Visit #: 0/5     Time In: 0928  Time Out: 1025  Total Billable Time: 57 minutes (Billing reflects 1 on 1 treatment time spent with patient)    Subjective     Patient reports: shoulder is sore this morning that she attributes to the change in weather and the early time. Has been using cross arm stretch to get some relief during the day.     He/She was compliant with home exercise program.  Response to previous treatment: decreased pain and stiffness  Functional change: performing home exercise plan    Pain: 5/10     Location: right shoulder    Objective      Objective Measures updated at progress report or POC update only unless otherwise noted.       Treatment     Angelica received the treatments listed below:     MANUAL THERAPY TECHNIQUES were applied for (0) minutes, including:     Manual Intervention Performed Today     Soft Tissue Mobilization []  Right pec, anterior delt, upper trap   Joint Mobilizations []  Grade II-III shoulder A/P and inferior glide     []        []      Functional Dry Needling  []         Plan for Next Visit: Continue as needed      THERAPEUTIC  "EXERCISES: to develop strength, endurance, ROM, flexibility, posture and core stabilization for (28)  minutes including: x = performed today    Therapeutic Exercise 5/3/2024    ROM/Chondral: UBE x 3'/3'   Pulleys x 3'/3'   Table Slides x  x X30 forward  X30 lateral   Internal rotation x 3x10 with RTB   Posterior Capsule Stretch x 3x30"              BOLD = new this visit  Plan for Next Visit:      NEUROMUSCULAR RE-EDUCATION ACTIVITIES to improve Balance, Coordination, Kinesthetic, Sense, Proprioception, and Posture for (25) minutes.  The following were included:     Intervention Performed Today     Bilateral External Rotation Iso  x 3 minutes with 5 second hold    Scapular Retraction  x 20x5" holds   Side Lying External Rotation  x 3x10    Side Lying Abduction x 3x10   Supine Serratus Punch x X30                          Plan for Next Visit:  pec stretch, supine ER stretch, wall walks, external rotation actively, land mine press     THERAPEUTIC ACTIVITIES: to improve functional performance through dynamic activities, for (0)  minutes including: x = performed today    Therapeutic Activities 5/3/2024                         BOLD = new this visit  Plan for Next Visit:      Examples: Coordination, Kinesthetic Sense, Push/pull, Squat, Stairs, bending, lifting, catching, pushing, pulling, throwing, squatting  Patient Education and Home Exercises       Home Exercises Provided and Patient Education Provided     Education provided: time included in treatment time.   PURPOSE: Patient educated on the impairments noted above and the effects of physical therapy intervention to improve overall condition and QOL.   EXERCISE: Patient was educated on all the above exercise prior/during/after for proper posture, positioning, and execution for safe performance with home exercise program.   STRENGTH: Patient educated on the importance of improved core and extremity strength in order to improve alignment of the spine and extremities with " static positions and dynamic movement.   POSTURE: Patient educated on postural awareness to reduce stress and maintain optimal alignment of the spine with static positions and dynamic movement   SLEEPING POSITIONS: Patient educated on the use of pillows to aid in neutral alignment of spine and extremities when sleeping in supine or side lying.    Written Home Exercises Provided: yes.  Exercises were reviewed and Angelica was able to demonstrate them prior to the end of the session.  Angelica demonstrated good  understanding of the education provided. See EMR under Patient Instructions for exercises provided during therapy sessions.    Assessment     Patient tolerated session well. Patient progressed with active shoulder external rotation and abduction in side lying to encourage activation of rotator cuff with active movements. Patient also progressed with serratus activation in supine to assist with scapular upward rotation.     Angelica is progressing well towards her goals.   Patient prognosis is Good.     Patient will continue to benefit from skilled outpatient physical therapy to address the deficits listed in the problem list box on initial evaluation, provide pt/family education and to maximize patient's level of independence in the home and community environment.     Patient's spiritual, cultural and educational needs considered and pt agreeable to plan of care and goals.    Anticipated Barriers for therapy: co-morbidities, sedentary lifestyle, and chronicity of condition     Short Term Goals:  4 weeks Status  Date Met   PAIN: Pt will report worst pain of 6/10 in order to progress toward max functional ability and improve quality of life. [x] Progressing  [] Met  [] Not Met     FUNCTION: Patient will demonstrate improved function as indicated by a score of greater than or equal to 50% of goal score out of 100 on FOTO. [x] Progressing  [] Met  [] Not Met     MOBILITY: Patient will improve AROM to 50% of stated  goals, listed in objective measures above, in order to progress towards independence with functional activities.  [x] Progressing  [] Met  [] Not Met     STRENGTH: Patient will improve strength to 50% of stated goals, listed in objective measures above, in order to progress towards independence with functional activities. [x] Progressing  [] Met  [] Not Met     POSTURE: Patient will correct postural deviations in sitting and standing, to decrease pain and promote long term stability.  [x] Progressing  [] Met  [] Not Met     HEP: Patient will demonstrate independence with HEP in order to progress toward functional independence. [x] Progressing  [] Met  [] Not Met        Long Term Goals:  8 weeks Status Date Met   PAIN: Pt will report worst pain of 2/10 in order to progress toward max functional ability and improve quality of life [x] Progressing  [] Met  [] Not Met     FUNCTION: Patient will demonstrate improved function as indicated by a score of greater than or equal to goal score out of 100 on FOTO. [x] Progressing  [] Met  [] Not Met     MOBILITY: Patient will improve AROM to stated goals, listed in objective measures above, in order to return to maximal functional potential and improve quality of life.  [x] Progressing  [] Met  [] Not Met     STRENGTH: Patient will improve strength to stated goals, listed in objective measures above, in order to improve functional independence and quality of life.  [x] Progressing  [] Met  [] Not Met     GAIT: Patient will demonstrate normalized gait mechanics with minimal compensation in order to return to PLOF. [x] Progressing  [] Met  [] Not Met     Patient will return to normal ADL's, IADL's, community involvement, recreational activities, and work-related activities with less than or equal to 2/10 pain and maximal function.  [x] Progressing  [] Met  [] Not Met       Plan     Continue Plan of Care (POC) and progress per patient tolerance. See treatment section for details on  planned progressions next session.      Julio Cesar Benedict, PT

## 2024-05-07 ENCOUNTER — CLINICAL SUPPORT (OUTPATIENT)
Dept: REHABILITATION | Facility: HOSPITAL | Age: 64
End: 2024-05-07
Payer: COMMERCIAL

## 2024-05-07 DIAGNOSIS — R53.1 DECREASED STRENGTH, ENDURANCE, AND MOBILITY: Primary | ICD-10-CM

## 2024-05-07 DIAGNOSIS — G89.29 CHRONIC RIGHT SHOULDER PAIN: ICD-10-CM

## 2024-05-07 DIAGNOSIS — R68.89 DECREASED STRENGTH, ENDURANCE, AND MOBILITY: Primary | ICD-10-CM

## 2024-05-07 DIAGNOSIS — M25.511 CHRONIC RIGHT SHOULDER PAIN: ICD-10-CM

## 2024-05-07 DIAGNOSIS — Z74.09 DECREASED STRENGTH, ENDURANCE, AND MOBILITY: Primary | ICD-10-CM

## 2024-05-07 DIAGNOSIS — M62.81 MUSCLE WEAKNESS OF RIGHT UPPER EXTREMITY: ICD-10-CM

## 2024-05-07 DIAGNOSIS — Z74.09 DECREASED FUNCTIONAL MOBILITY AND ENDURANCE: ICD-10-CM

## 2024-05-07 PROCEDURE — 97112 NEUROMUSCULAR REEDUCATION: CPT

## 2024-05-07 PROCEDURE — 97110 THERAPEUTIC EXERCISES: CPT

## 2024-05-07 NOTE — PROGRESS NOTES
[facility]   Physical Therapy Treatment Note      Name: Angelica Pate [facility] Number: [id number]    Therapy Diagnosis:   Encounter Diagnoses   Name Primary?    Decreased strength, endurance, and mobility Yes    Decreased functional mobility and endurance     Chronic right shoulder pain     Muscle weakness of right upper extremity      Physician: Delon Harper*    Visit Date: 5/7/2024    Physician Orders: PT Eval and Treat  Medical Diagnosis from Referral: Chronic right shoulder pain   Evaluation Date: 4/23/2024  Authorization Period Expiration: 04/10/2026   Plan of Care Expiration: 06/18/2024  Progress Note Due: 05/23/2024  Visit # / Visits authorized: 3/20 (+1 evaluation)   FOTO: 1/3 (last performed on 4/23/2024)     Precautions: Standard, asthma, hypertension, osteopenia, syncope and collapse  PTA Visit #: 0/5     Time In: 1559  Time Out: 1700  Total Billable Time: 61 minutes (Billing reflects 1 on 1 treatment time spent with patient)    Subjective     Patient reports: has been able to help manage some of her symptoms with home exercise plan, primarily stretches.     He/She was compliant with home exercise program.  Response to previous treatment: decreased pain and stiffness  Functional change: performing home exercise plan    Pain: 5/10     Location: right shoulder    Objective      Objective Measures updated at progress report or POC update only unless otherwise noted.       Treatment     Angelica received the treatments listed below:     MANUAL THERAPY TECHNIQUES were applied for (0) minutes, including:     Manual Intervention Performed Today     Soft Tissue Mobilization []  Right pec, anterior delt, upper trap   Joint Mobilizations []  Grade II-III shoulder A/P and inferior glide     []        []      Functional Dry Needling  []         Plan for Next Visit: Continue as needed      THERAPEUTIC EXERCISES: to develop strength, endurance, ROM, flexibility, posture and core  "stabilization for (30)  minutes including: x = performed today    Therapeutic Exercise 5/7/2024    ROM/Chondral: UBE x 3'/3'   Pulleys x 3'/3'   Table Slides x  x X30 forward  X30 lateral   Internal rotation x 3x10 with RTB   Posterior Capsule Stretch x 3x30"   Pec Stretch x 3x30" corner                         BOLD = new this visit  Plan for Next Visit:      NEUROMUSCULAR RE-EDUCATION ACTIVITIES to improve Balance, Coordination, Kinesthetic, Sense, Proprioception, and Posture for (26) minutes.  The following were included:     Intervention Performed Today     Bilateral External Rotation Iso  x 3 minutes with 5 second hold    Scapular Retraction  x 20x5" holds   Side Lying External Rotation  x 3x10    Side Lying Abduction x 3x10   Supine Serratus Punch x X30 (attempted in standing but unable)                          Plan for Next Visit:  pec stretch, supine ER stretch, wall walks, external rotation actively, land mine press     THERAPEUTIC ACTIVITIES: to improve functional performance through dynamic activities, for (0)  minutes including: x = performed today    Therapeutic Activities 5/7/2024                         BOLD = new this visit  Plan for Next Visit:      Examples: Coordination, Kinesthetic Sense, Push/pull, Squat, Stairs, bending, lifting, catching, pushing, pulling, throwing, squatting  Patient Education and Home Exercises       Home Exercises Provided and Patient Education Provided     Education provided: time included in treatment time.   PURPOSE: Patient educated on the impairments noted above and the effects of physical therapy intervention to improve overall condition and QOL.   EXERCISE: Patient was educated on all the above exercise prior/during/after for proper posture, positioning, and execution for safe performance with home exercise program.   STRENGTH: Patient educated on the importance of improved core and extremity strength in order to improve alignment of the spine and extremities with " static positions and dynamic movement.   POSTURE: Patient educated on postural awareness to reduce stress and maintain optimal alignment of the spine with static positions and dynamic movement   SLEEPING POSITIONS: Patient educated on the use of pillows to aid in neutral alignment of spine and extremities when sleeping in supine or side lying.    Written Home Exercises Provided: yes.  Exercises were reviewed and Angelica was able to demonstrate them prior to the end of the session. Angelica demonstrated good  understanding of the education provided. See EMR under Patient Instructions for exercises provided during therapy sessions.    Assessment     Patient tolerated session well. Patient tolerated side lying rotator cuff activation exercises well with noted fatigue. Patient progressed with corner pec stretch for shoulder mobility and posture. Patient attempted serratus push in standing against wall but unable to get good activation so continued in supine.     Angelica is progressing well towards her goals.   Patient prognosis is Good.     Patient will continue to benefit from skilled outpatient physical therapy to address the deficits listed in the problem list box on initial evaluation, provide pt/family education and to maximize patient's level of independence in the home and community environment.     Patient's spiritual, cultural and educational needs considered and pt agreeable to plan of care and goals.    Anticipated Barriers for therapy: co-morbidities, sedentary lifestyle, and chronicity of condition     Short Term Goals:  4 weeks Status  Date Met   PAIN: Pt will report worst pain of 6/10 in order to progress toward max functional ability and improve quality of life. [x] Progressing  [] Met  [] Not Met     FUNCTION: Patient will demonstrate improved function as indicated by a score of greater than or equal to 50% of goal score out of 100 on FOTO. [x] Progressing  [] Met  [] Not Met     MOBILITY: Patient will  improve AROM to 50% of stated goals, listed in objective measures above, in order to progress towards independence with functional activities.  [x] Progressing  [] Met  [] Not Met     STRENGTH: Patient will improve strength to 50% of stated goals, listed in objective measures above, in order to progress towards independence with functional activities. [x] Progressing  [] Met  [] Not Met     POSTURE: Patient will correct postural deviations in sitting and standing, to decrease pain and promote long term stability.  [x] Progressing  [] Met  [] Not Met     HEP: Patient will demonstrate independence with HEP in order to progress toward functional independence. [x] Progressing  [] Met  [] Not Met        Long Term Goals:  8 weeks Status Date Met   PAIN: Pt will report worst pain of 2/10 in order to progress toward max functional ability and improve quality of life [x] Progressing  [] Met  [] Not Met     FUNCTION: Patient will demonstrate improved function as indicated by a score of greater than or equal to goal score out of 100 on FOTO. [x] Progressing  [] Met  [] Not Met     MOBILITY: Patient will improve AROM to stated goals, listed in objective measures above, in order to return to maximal functional potential and improve quality of life.  [x] Progressing  [] Met  [] Not Met     STRENGTH: Patient will improve strength to stated goals, listed in objective measures above, in order to improve functional independence and quality of life.  [x] Progressing  [] Met  [] Not Met     GAIT: Patient will demonstrate normalized gait mechanics with minimal compensation in order to return to PLOF. [x] Progressing  [] Met  [] Not Met     Patient will return to normal ADL's, IADL's, community involvement, recreational activities, and work-related activities with less than or equal to 2/10 pain and maximal function.  [x] Progressing  [] Met  [] Not Met       Plan     Continue Plan of Care (POC) and progress per patient tolerance. See  treatment section for details on planned progressions next session.    Julio Cesar Benedict, PT

## 2024-05-08 DIAGNOSIS — M25.511 ACUTE PAIN OF RIGHT SHOULDER: ICD-10-CM

## 2024-05-08 RX ORDER — METHOCARBAMOL 500 MG/1
TABLET, FILM COATED ORAL
Qty: 60 TABLET | Refills: 0 | Status: SHIPPED | OUTPATIENT
Start: 2024-05-08

## 2024-05-08 NOTE — TELEPHONE ENCOUNTER
No care due was identified.  [facility] Embedded Care Due Messages. Reference number: [id number].   5/08/2024 5:51:00 AM CDT

## 2024-05-09 ENCOUNTER — CLINICAL SUPPORT (OUTPATIENT)
Dept: REHABILITATION | Facility: HOSPITAL | Age: 64
End: 2024-05-09
Payer: COMMERCIAL

## 2024-05-09 DIAGNOSIS — R68.89 DECREASED STRENGTH, ENDURANCE, AND MOBILITY: Primary | ICD-10-CM

## 2024-05-09 DIAGNOSIS — M62.81 MUSCLE WEAKNESS OF RIGHT UPPER EXTREMITY: ICD-10-CM

## 2024-05-09 DIAGNOSIS — M25.511 CHRONIC RIGHT SHOULDER PAIN: ICD-10-CM

## 2024-05-09 DIAGNOSIS — Z74.09 DECREASED STRENGTH, ENDURANCE, AND MOBILITY: Primary | ICD-10-CM

## 2024-05-09 DIAGNOSIS — Z74.09 DECREASED FUNCTIONAL MOBILITY AND ENDURANCE: ICD-10-CM

## 2024-05-09 DIAGNOSIS — G89.29 CHRONIC RIGHT SHOULDER PAIN: ICD-10-CM

## 2024-05-09 DIAGNOSIS — R53.1 DECREASED STRENGTH, ENDURANCE, AND MOBILITY: Primary | ICD-10-CM

## 2024-05-09 PROCEDURE — 97112 NEUROMUSCULAR REEDUCATION: CPT

## 2024-05-09 PROCEDURE — 97110 THERAPEUTIC EXERCISES: CPT

## 2024-05-09 PROCEDURE — 97140 MANUAL THERAPY 1/> REGIONS: CPT

## 2024-05-10 DIAGNOSIS — G56.02 LEFT CARPAL TUNNEL SYNDROME: ICD-10-CM

## 2024-05-10 DIAGNOSIS — M54.12 CERVICAL RADICULOPATHY: ICD-10-CM

## 2024-05-10 RX ORDER — TOPIRAMATE 25 MG/1
50 TABLET ORAL
Qty: 60 TABLET | Refills: 0 | Status: SHIPPED | OUTPATIENT
Start: 2024-05-10 | End: 2024-06-05

## 2024-05-10 NOTE — PROGRESS NOTES
[facility]   Physical Therapy Treatment Note      Name: Angelica Pate [facility] Number: [id number]    Therapy Diagnosis:   Encounter Diagnoses   Name Primary?    Decreased strength, endurance, and mobility Yes    Decreased functional mobility and endurance     Chronic right shoulder pain     Muscle weakness of right upper extremity      Physician: Delon Harper    Visit Date: 5/9/2024    Physician Orders: PT Eval and Treat  Medical Diagnosis from Referral: Chronic right shoulder pain   Evaluation Date: 4/23/2024  Authorization Period Expiration: 04/10/2026   Plan of Care Expiration: 06/18/2024  Progress Note Due: 05/23/2024  Visit # / Visits authorized: 4/20 (+1 evaluation)   FOTO: 1/3 (last performed on 4/23/2024)     Precautions: Standard, asthma, hypertension, osteopenia, syncope and collapse  PTA Visit #: 0/5     Time In: 1710  Time Out: 1805  Total Billable Time: 55 minutes (Billing reflects 1 on 1 treatment time spent with patient)    Subjective     Patient reports: shoulder is not feeling as good today as it has been with no clear reason for her increased symptoms. Patient is more anterior shoulder.     He/She was compliant with home exercise program.  Response to previous treatment: decreased pain and stiffness  Functional change: performing home exercise plan    Pain: 5/10     Location: right shoulder    Objective      Objective Measures updated at progress report or POC update only unless otherwise noted.       Treatment     Angelica received the treatments listed below:     MANUAL THERAPY TECHNIQUES were applied for (10) minutes, including:     Manual Intervention Performed Today     Soft Tissue Mobilization [x]  Right bicep tendon, anterior delt, pec   Joint Mobilizations [x]  Scapular mobilizations in all planes     []        []      Functional Dry Needling  []         Plan for Next Visit: Continue as needed      THERAPEUTIC EXERCISES: to develop strength,  "endurance, ROM, flexibility, posture and core stabilization for (25)  minutes including: x = performed today    Therapeutic Exercise 5/9/2024    ROM/Chondral: UBE x 3'/3'   Pulleys x 3'/3'   Table Slides x  x X30 forward  X30 lateral   Internal rotation  3x10 with RTB   Posterior Capsule Stretch x 3x30"   Pec Stretch  3x30" corner                         BOLD = new this visit  Plan for Next Visit:      NEUROMUSCULAR RE-EDUCATION ACTIVITIES to improve Balance, Coordination, Kinesthetic, Sense, Proprioception, and Posture for (18) minutes.  The following were included:     Intervention Performed Today     Bilateral External Rotation Iso  x 3 minutes with 5 second hold    Scapular Retraction  x 20x5" holds   Side Lying External Rotation  x 3x10    Side Lying Abduction x 3x10   Supine Serratus Punch  X30                          Plan for Next Visit:  pec stretch, supine ER stretch, wall walks, external rotation actively, land mine press     THERAPEUTIC ACTIVITIES: to improve functional performance through dynamic activities, for (0)  minutes including: x = performed today    Therapeutic Activities 5/9/2024                         BOLD = new this visit  Plan for Next Visit:      Examples: Coordination, Kinesthetic Sense, Push/pull, Squat, Stairs, bending, lifting, catching, pushing, pulling, throwing, squatting  Patient Education and Home Exercises       Home Exercises Provided and Patient Education Provided     Education provided: time included in treatment time.   PURPOSE: Patient educated on the impairments noted above and the effects of physical therapy intervention to improve overall condition and QOL.   EXERCISE: Patient was educated on all the above exercise prior/during/after for proper posture, positioning, and execution for safe performance with home exercise program.   STRENGTH: Patient educated on the importance of improved core and extremity strength in order to improve alignment of the spine and extremities " with static positions and dynamic movement.   POSTURE: Patient educated on postural awareness to reduce stress and maintain optimal alignment of the spine with static positions and dynamic movement   SLEEPING POSITIONS: Patient educated on the use of pillows to aid in neutral alignment of spine and extremities when sleeping in supine or side lying.    Written Home Exercises Provided: yes.  Exercises were reviewed and Angelica was able to demonstrate them prior to the end of the session. Angelica demonstrated good  understanding of the education provided. See EMR under Patient Instructions for exercises provided during therapy sessions.    Assessment     Patient tolerated session moderately well secondary to pain in anterior shoulder. Patient pain significantly reduced with soft tissue massage to proximal bicep tendon. Patient notes decreased stiffness with mobility exercises performed.     Angelica is progressing well towards her goals.   Patient prognosis is Good.     Patient will continue to benefit from skilled outpatient physical therapy to address the deficits listed in the problem list box on initial evaluation, provide pt/family education and to maximize patient's level of independence in the home and community environment.     Patient's spiritual, cultural and educational needs considered and pt agreeable to plan of care and goals.    Anticipated Barriers for therapy: co-morbidities, sedentary lifestyle, and chronicity of condition     Short Term Goals:  4 weeks Status  Date Met   PAIN: Pt will report worst pain of 6/10 in order to progress toward max functional ability and improve quality of life. [x] Progressing  [] Met  [] Not Met     FUNCTION: Patient will demonstrate improved function as indicated by a score of greater than or equal to 50% of goal score out of 100 on FOTO. [x] Progressing  [] Met  [] Not Met     MOBILITY: Patient will improve AROM to 50% of stated goals, listed in objective measures above, in  order to progress towards independence with functional activities.  [x] Progressing  [] Met  [] Not Met     STRENGTH: Patient will improve strength to 50% of stated goals, listed in objective measures above, in order to progress towards independence with functional activities. [x] Progressing  [] Met  [] Not Met     POSTURE: Patient will correct postural deviations in sitting and standing, to decrease pain and promote long term stability.  [x] Progressing  [] Met  [] Not Met     HEP: Patient will demonstrate independence with HEP in order to progress toward functional independence. [x] Progressing  [] Met  [] Not Met        Long Term Goals:  8 weeks Status Date Met   PAIN: Pt will report worst pain of 2/10 in order to progress toward max functional ability and improve quality of life [x] Progressing  [] Met  [] Not Met     FUNCTION: Patient will demonstrate improved function as indicated by a score of greater than or equal to goal score out of 100 on FOTO. [x] Progressing  [] Met  [] Not Met     MOBILITY: Patient will improve AROM to stated goals, listed in objective measures above, in order to return to maximal functional potential and improve quality of life.  [x] Progressing  [] Met  [] Not Met     STRENGTH: Patient will improve strength to stated goals, listed in objective measures above, in order to improve functional independence and quality of life.  [x] Progressing  [] Met  [] Not Met     GAIT: Patient will demonstrate normalized gait mechanics with minimal compensation in order to return to PLOF. [x] Progressing  [] Met  [] Not Met     Patient will return to normal ADL's, IADL's, community involvement, recreational activities, and work-related activities with less than or equal to 2/10 pain and maximal function.  [x] Progressing  [] Met  [] Not Met       Plan     Continue Plan of Care (POC) and progress per patient tolerance. See treatment section for details on planned progressions next session.    Julio Cesar  White, PT

## 2024-05-14 ENCOUNTER — CLINICAL SUPPORT (OUTPATIENT)
Dept: REHABILITATION | Facility: HOSPITAL | Age: 64
End: 2024-05-14
Payer: COMMERCIAL

## 2024-05-14 DIAGNOSIS — R55 SYNCOPE, UNSPECIFIED SYNCOPE TYPE: ICD-10-CM

## 2024-05-14 DIAGNOSIS — M25.511 CHRONIC RIGHT SHOULDER PAIN: ICD-10-CM

## 2024-05-14 DIAGNOSIS — R53.1 DECREASED STRENGTH, ENDURANCE, AND MOBILITY: Primary | ICD-10-CM

## 2024-05-14 DIAGNOSIS — M62.81 MUSCLE WEAKNESS OF RIGHT UPPER EXTREMITY: ICD-10-CM

## 2024-05-14 DIAGNOSIS — Z74.09 DECREASED STRENGTH, ENDURANCE, AND MOBILITY: Primary | ICD-10-CM

## 2024-05-14 DIAGNOSIS — R68.89 DECREASED STRENGTH, ENDURANCE, AND MOBILITY: Primary | ICD-10-CM

## 2024-05-14 DIAGNOSIS — Z74.09 DECREASED FUNCTIONAL MOBILITY AND ENDURANCE: ICD-10-CM

## 2024-05-14 DIAGNOSIS — G89.29 CHRONIC RIGHT SHOULDER PAIN: ICD-10-CM

## 2024-05-14 PROCEDURE — 97110 THERAPEUTIC EXERCISES: CPT

## 2024-05-14 PROCEDURE — 97112 NEUROMUSCULAR REEDUCATION: CPT

## 2024-05-14 PROCEDURE — 97530 THERAPEUTIC ACTIVITIES: CPT

## 2024-05-14 RX ORDER — METOPROLOL SUCCINATE 25 MG/1
25 TABLET, EXTENDED RELEASE ORAL
Qty: 90 TABLET | Refills: 0 | Status: SHIPPED | OUTPATIENT
Start: 2024-05-14 | End: 2024-06-06

## 2024-05-14 NOTE — PROGRESS NOTES
[facility]   Physical Therapy Treatment Note      Name: Angelica Pate [facility] Number: [id number]    Therapy Diagnosis:   Encounter Diagnoses   Name Primary?    Decreased strength, endurance, and mobility Yes    Decreased functional mobility and endurance     Chronic right shoulder pain     Muscle weakness of right upper extremity      Physician: Delon Harper    Visit Date: 5/14/2024    Physician Orders: PT Eval and Treat  Medical Diagnosis from Referral: Chronic right shoulder pain   Evaluation Date: 4/23/2024  Authorization Period Expiration: 04/10/2026   Plan of Care Expiration: 06/18/2024  Progress Note Due: 05/23/2024  Visit # / Visits authorized: 4/20 (+1 evaluation)   FOTO: 1/3 (last performed on 4/23/2024)     Precautions: Standard, asthma, hypertension, osteopenia, syncope and collapse  PTA Visit #: 0/5     Time In: 1710  Time Out: 1805  Total Billable Time: 55 minutes (Billing reflects 1 on 1 treatment time spent with patient)    Subjective     Patient reports: shoulder is not feeling as good today as it has been with no clear reason for her increased symptoms. Patient is more anterior shoulder.     He/She was compliant with home exercise program.  Response to previous treatment: decreased pain and stiffness  Functional change: performing home exercise plan    Pain: 5/10     Location: right shoulder    Objective      Objective Measures updated at progress report or POC update only unless otherwise noted.       Treatment     Angelica received the treatments listed below:     MANUAL THERAPY TECHNIQUES were applied for (0) minutes, including:     Manual Intervention Performed Today     Soft Tissue Mobilization []  Right bicep tendon, anterior delt, pec   Joint Mobilizations []  Scapular mobilizations in all planes     []        []      Functional Dry Needling  []         Plan for Next Visit: Continue as needed      THERAPEUTIC EXERCISES: to develop strength,  "endurance, ROM, flexibility, posture and core stabilization for (27)  minutes including: x = performed today    Therapeutic Exercise 5/14/2024    ROM/Chondral: UBE x 3'/3'   Pulleys x 3'/3'   Table Slides x  x X30 forward  X30 lateral   Internal rotation x 3x10 with RTB   Posterior Capsule Stretch x 3x30" bilateral    Pec Stretch  3x30" corner    Wall ABC's x X1 with ball                   BOLD = new this visit  Plan for Next Visit:      NEUROMUSCULAR RE-EDUCATION ACTIVITIES to improve Balance, Coordination, Kinesthetic, Sense, Proprioception, and Posture for (15) minutes.  The following were included:     Intervention Performed Today     Bilateral External Rotation Iso   3 minutes with 5 second hold    Scapular Retraction   20x5" holds   Side Lying External Rotation  x 3x10    Side Lying Shoulder Flexion x 3x10   Side Lying Abduction x 3x10   Supine Serratus Punch  X30                          Plan for Next Visit:  pec stretch, supine ER stretch, wall walks, external rotation actively, land mine press     THERAPEUTIC ACTIVITIES: to improve functional performance through dynamic activities, for (10)  minutes including: x = performed today    Therapeutic Activities 5/14/2024    Landmine Press x 3x10   Scaption x 3x10 with 1#              BOLD = new this visit  Plan for Next Visit:      Examples: Coordination, Kinesthetic Sense, Push/pull, Squat, Stairs, bending, lifting, catching, pushing, pulling, throwing, squatting  Patient Education and Home Exercises       Home Exercises Provided and Patient Education Provided     Education provided: time included in treatment time.   PURPOSE: Patient educated on the impairments noted above and the effects of physical therapy intervention to improve overall condition and QOL.   EXERCISE: Patient was educated on all the above exercise prior/during/after for proper posture, positioning, and execution for safe performance with home exercise program.   STRENGTH: Patient educated on " the importance of improved core and extremity strength in order to improve alignment of the spine and extremities with static positions and dynamic movement.   POSTURE: Patient educated on postural awareness to reduce stress and maintain optimal alignment of the spine with static positions and dynamic movement   SLEEPING POSITIONS: Patient educated on the use of pillows to aid in neutral alignment of spine and extremities when sleeping in supine or side lying.    Written Home Exercises Provided: yes.  Exercises were reviewed and Angelica was able to demonstrate them prior to the end of the session. Angelica demonstrated good  understanding of the education provided. See EMR under Patient Instructions for exercises provided during therapy sessions.    Assessment     Patient tolerated session well. Patient notes minimal to no pain at start of session or pain noted during session with exercises. Patient progressed functionally with performance of scaption with resistance and notes fatigue but able to perform with good form. Patient progressed as well with side lying shoulder flexion to work on rotator cuff activation throughout arc of motion into shoulder flexion.     Angelica is progressing well towards her goals.   Patient prognosis is Good.     Patient will continue to benefit from skilled outpatient physical therapy to address the deficits listed in the problem list box on initial evaluation, provide pt/family education and to maximize patient's level of independence in the home and community environment.     Patient's spiritual, cultural and educational needs considered and pt agreeable to plan of care and goals.    Anticipated Barriers for therapy: co-morbidities, sedentary lifestyle, and chronicity of condition     Short Term Goals:  4 weeks Status  Date Met   PAIN: Pt will report worst pain of 6/10 in order to progress toward max functional ability and improve quality of life. [x] Progressing  [] Met  [] Not Met      FUNCTION: Patient will demonstrate improved function as indicated by a score of greater than or equal to 50% of goal score out of 100 on FOTO. [x] Progressing  [] Met  [] Not Met     MOBILITY: Patient will improve AROM to 50% of stated goals, listed in objective measures above, in order to progress towards independence with functional activities.  [x] Progressing  [] Met  [] Not Met     STRENGTH: Patient will improve strength to 50% of stated goals, listed in objective measures above, in order to progress towards independence with functional activities. [x] Progressing  [] Met  [] Not Met     POSTURE: Patient will correct postural deviations in sitting and standing, to decrease pain and promote long term stability.  [x] Progressing  [] Met  [] Not Met     HEP: Patient will demonstrate independence with HEP in order to progress toward functional independence. [x] Progressing  [] Met  [] Not Met        Long Term Goals:  8 weeks Status Date Met   PAIN: Pt will report worst pain of 2/10 in order to progress toward max functional ability and improve quality of life [x] Progressing  [] Met  [] Not Met     FUNCTION: Patient will demonstrate improved function as indicated by a score of greater than or equal to goal score out of 100 on FOTO. [x] Progressing  [] Met  [] Not Met     MOBILITY: Patient will improve AROM to stated goals, listed in objective measures above, in order to return to maximal functional potential and improve quality of life.  [x] Progressing  [] Met  [] Not Met     STRENGTH: Patient will improve strength to stated goals, listed in objective measures above, in order to improve functional independence and quality of life.  [x] Progressing  [] Met  [] Not Met     GAIT: Patient will demonstrate normalized gait mechanics with minimal compensation in order to return to PLOF. [x] Progressing  [] Met  [] Not Met     Patient will return to normal ADL's, IADL's, community involvement, recreational activities,  and work-related activities with less than or equal to 2/10 pain and maximal function.  [x] Progressing  [] Met  [] Not Met       Plan     Continue Plan of Care (POC) and progress per patient tolerance. See treatment section for details on planned progressions next session.    Julio Cesar Benedict, PT

## 2024-05-16 ENCOUNTER — CLINICAL SUPPORT (OUTPATIENT)
Dept: REHABILITATION | Facility: HOSPITAL | Age: 64
End: 2024-05-16
Payer: COMMERCIAL

## 2024-05-16 DIAGNOSIS — G89.29 CHRONIC RIGHT SHOULDER PAIN: ICD-10-CM

## 2024-05-16 DIAGNOSIS — Z74.09 DECREASED STRENGTH, ENDURANCE, AND MOBILITY: Primary | ICD-10-CM

## 2024-05-16 DIAGNOSIS — M25.511 CHRONIC RIGHT SHOULDER PAIN: ICD-10-CM

## 2024-05-16 DIAGNOSIS — R53.1 DECREASED STRENGTH, ENDURANCE, AND MOBILITY: Primary | ICD-10-CM

## 2024-05-16 DIAGNOSIS — Z74.09 DECREASED FUNCTIONAL MOBILITY AND ENDURANCE: ICD-10-CM

## 2024-05-16 DIAGNOSIS — M62.81 MUSCLE WEAKNESS OF RIGHT UPPER EXTREMITY: ICD-10-CM

## 2024-05-16 DIAGNOSIS — R68.89 DECREASED STRENGTH, ENDURANCE, AND MOBILITY: Primary | ICD-10-CM

## 2024-05-16 PROCEDURE — 97112 NEUROMUSCULAR REEDUCATION: CPT

## 2024-05-16 PROCEDURE — 97110 THERAPEUTIC EXERCISES: CPT

## 2024-05-16 PROCEDURE — 97530 THERAPEUTIC ACTIVITIES: CPT

## 2024-05-16 NOTE — PROGRESS NOTES
"Orthopaedic Follow-Up Visit    Last Appointment: 4/9/24  Diagnosis: Right shoulder adhesive capsulitis   Prior Procedure: R TORY CSI    Angelica Flores is a 63 y.o. female who is here for f/u evaluation of her right shoulder. The patient was last seen here by me on 4/9/24 at which her exam was most consistent with right shoulder adhesive capsulitis. I recommended proceeding with intra-articular corticosteroid injection into the right glenohumeral joint.  The patient returns today reporting that the symptoms have improved but she still has limitations with ROM.     To review her history, Angelica Flores is a 63 y.o. RHD-hand dominant female who presented on 4/9/24 with right shoulder pain and dysfunction that began 1 month prior with no specific injury, but endorsed insidious onset. His symptoms included "feels like a pinched nerve", and had radiating pain into neck and shoulder. Her pain was aggravated by overhead movements and reaching movements. She had tried rest, activity modification, and robaxin.      Patient's medications, allergies, past medical, surgical, social and family histories were reviewed and updated as appropriate.    Review of Systems   All systems reviewed were negative.  Specifically, the patient denies fever, chills, weight loss, chest pain, shortness of breath, or dyspnea on exertion.      Past Medical History:   Diagnosis Date    Anxiety     Asthma     Crohn's disease     Fibroids     Hyperlipidemia     Hypertension     Iritis     Migraine headache     Ocular    Osteopenia 08/2019    Syncope and collapse     Vitamin D deficiency disease        Past Surgical History:   Procedure Laterality Date    ANKLE FRACTURE SURGERY  08/19/2008    right ankle    BREAST BIOPSY Left 1977    COLONOSCOPY N/A 08/11/2017    Procedure: COLONOSCOPY - REQUESTS DR. MATTI ACUNA;  Surgeon: Matti Acuna III, MD;  Location: [facility];  Service: Endoscopy;  Laterality: N/A;    COLONOSCOPY N/A 11/23/2020 "    Procedure: COLONOSCOPY;  Surgeon: Lalo Wood III, MD;  Location: [facility] END[facility];  Service: Endoscopy;  Laterality: N/A;    COLONOSCOPY N/A 09/28/2022    Procedure: COLONOSCOPY;  Surgeon: Lalo Wood III, MD;  Location: [facility] ENDO;  Service: Endoscopy;  Laterality: N/A;    COLONOSCOPY N/A 08/22/2023    Procedure: COLONOSCOPY;  Surgeon: Erlinda Lindsay MD;  Location: [facility] END[facility];  Service: Endoscopy;  Laterality: N/A;    COLONOSCOPY N/A 2/1/2024    Procedure: Colonosocpy with CHROMO;  Surgeon: Edwin Muller MD;  [facility]  Service: Endoscopy;  Laterality: N/A;  Colonoscopy with CHROMO. referral by Haylee Gallo NP, Suprep, portal -ml  1/25-precall complete-MS    OOPHORECTOMY Bilateral     TAHBSO  02/2011    Due to abnormal pap smear and fibroids    TOTAL ABDOMINAL HYSTERECTOMY         Patient's Medications   New Prescriptions    No medications on file   Previous Medications    ADALIMUMAB (HUMIRA,CF, PEN) 40 MG/0.4 ML PNKT    Inject 0.4 mLs (40 mg total) into the skin every 7 days.    ALPRAZOLAM (XANAX) 0.5 MG TABLET    Take 1 tablet by mouth twice daily as needed    AMLODIPINE (NORVASC) 5 MG TABLET    Take 1 tablet by mouth once daily    ASCORBIC ACID, VITAMIN C, (VITAMIN C) 1000 MG TABLET    Take by mouth. 1 Tablet Oral Every day    AZATHIOPRINE (IMURAN) 50 MG TAB    Take 3 tablets (150 mg total) by mouth once daily.    AZELASTINE (ASTELIN) 137 MCG (0.1 %) NASAL SPRAY    2 sprays (274 mcg total) by Nasal route 2 (two) times daily.    BACILLUS COAGULANS (PROBIOTIC, B. COAGULANS, ORAL)    Take by mouth once daily.    CETIRIZINE (ALLERGY RELIEF, CETIRIZINE,) 10 MG TABLET    Take 1 tablet (10 mg total) by mouth once daily.    CHOLECALCIFEROL, VITAMIN D3, 2,000 UNIT CAP    Take by mouth. 1 capsule Oral Every day    DICYCLOMINE (BENTYL) 20 MG TABLET    Take 1 tablet (20 mg total) by mouth 3 (three) times daily as needed (abdominal pain).    HYDROCHLOROTHIAZIDE (HYDRODIURIL) 25 MG  TABLET    Take 1 tablet by mouth once daily    HYDROQUINONE 4 % CREA    Apply to dark spots once daily. Use with sunscreen if outdoors    IPRATROPIUM (ATROVENT) 21 MCG (0.03 %) NASAL SPRAY    2 sprays by Nasal route 3 (three) times daily.    LISINOPRIL (PRINIVIL,ZESTRIL) 40 MG TABLET    Take 1 tablet by mouth once daily    METHOCARBAMOL (ROBAXIN) 500 MG TAB    TAKE 1 TABLET BY MOUTH TWICE DAILY AS NEEDED FOR MUSCLE SPASM AND  NECK  PAIN    METOPROLOL SUCCINATE (TOPROL-XL) 25 MG 24 HR TABLET    Take 1 tablet by mouth once daily    MONTELUKAST (SINGULAIR) 10 MG TABLET    Take 1 tablet (10 mg total) by mouth every evening.    MULTIVITAMIN-CA-IRON-MINERALS 18-0.4 MG TAB    Take by mouth. 1 Tablet Oral Every day    POTASSIUM BICARBONATE DISINTEGRATING TABLET    Take 10 mEq by mouth 2 (two) times daily.    PRAVASTATIN (PRAVACHOL) 80 MG TABLET    Take 1 tablet by mouth in the evening    TOPIRAMATE (TOPAMAX) 25 MG TABLET    TAKE 2 TABLETS BY MOUTH IN THE EVENING    TRETINOIN (RETIN-A) 0.025 % CREAM    Apply pea-sized amount to entire face at bedtime.  Use every third night and increase as tolerated to nightly.    VALACYCLOVIR (VALTREX) 1000 MG TABLET    TAKE 2 TABLETS BY MOUTH TWICE DAILY FOR 1 DAY PER EPISODE    ZINC SULFATE (ZINC-220 ORAL)       Modified Medications    No medications on file   Discontinued Medications    No medications on file       Family History   Problem Relation Name Age of Onset    Arthritis Mother      Fuch's dystrophy Mother      Breast cancer Mother      Lupus Sister      Rheum arthritis Sister      Obesity Sister      Hypertension Father      Cancer Maternal Grandfather          lung ca    Stroke Maternal Grandmother      Diabetes Maternal Grandmother      Colon cancer Paternal Aunt         Review of patient's allergies indicates:  No Known Allergies      Objective:      Physical Exam  Patient is alert and oriented, no distress. Skin is intact. Neuro is normal with no focal motor or sensory  findings.    Cervical exam is unremarkable. Intact cervical ROM. Negative Spurling's test    Physical Exam:                       RIGHT                                     LEFT     Scap Dyskinesis/Winging       +                                              (-)     Tenderness:                                                                              Greater Tuberosity                  (-)                                            (-)  Bicipital Groove                       +                                              (-)  AC joint                                   (-)                                             (-)  Other:      ROM:  Forward Elevation 120                                          180  Abduction                    100                                          120  ER (at side)                 50                                            80  IR                                 Back pocket                            T8     Strength:   Supraspinatus             4/5                                           5/5  Infraspinatus               5/5                                           5/5  Subscap / IR               5/5                                           5/5      Special Tests:              Neer:                                       +                                              (-)              Umana:                                 (-)                                             (-)              SS Stress:                               +                                              (-)              Bear Hug:                                (-)                                             (-)              Prescott's:                                 +                                              (-)              Resisted Thrower's:                +                                              (-)              Cross Arm Abduction:             (-)                                              (-)    Neurovascular examination  - Motor grossly intact bilaterally to shoulder abduction, elbow flexion and extension, wrist flexion and extension, and intrinsic hand musculature  - Sensation intact to light touch bilaterally in axillary, median, radial, and ulnar distributions  - Symmetrical radial pulses    Imaging:    XR Results:  Results for orders placed during the hospital encounter of 04/09/24    X-ray Shoulder 2 or More Views Right    Narrative  EXAMINATION:  XR SHOULDER COMPLETE 2 OR MORE VIEWS RIGHT    CLINICAL HISTORY:  Pain in right shoulder    TECHNIQUE:  Two or three views of the right shoulder were performed.    COMPARISON:  None    FINDINGS:  No acute abnormality with minimal degenerative findings of the shoulder.  Lung parenchyma clear.    Impression  As above      Electronically signed by: Cisco Hare MD  Date:    04/09/2024  Time:    14:34      MRI Results:  No results found for this or any previous visit.      CT Results:  No results found for this or any previous visit.      Physician read: I agree with the above impression.    Assessment/Plan:   Angelica Flores is a 63 y.o. female with right shoulder adhesive capsulitis     Plan:    She has had some interval improvement from previous CSI but continues to have pain and limited ROM.   I recommend referral to [facility] provider for brisement procedure. Discussed the potential for operative treatment if her symptoms continue to persist following brisement procedure. The patient is in agreement with this plan.   Follow up with me as needed.  If she continues to have pain and stiffness after brisement, then we will consider arthroscopic lysis of adhesions and manipulation under anesthesia.          Delon Harper MD    I, Julio Cesar Woods, acted as a scribe for Delon Harper MD for the duration of this office visit.

## 2024-05-17 NOTE — PROGRESS NOTES
[facility]   Physical Therapy Treatment Note      Name: Angelica Pate [facility] Number: [id number]    Therapy Diagnosis:   Encounter Diagnoses   Name Primary?    Decreased strength, endurance, and mobility Yes    Decreased functional mobility and endurance     Chronic right shoulder pain     Muscle weakness of right upper extremity      Physician: Delon Harper    Visit Date: 5/16/2024    Physician Orders: PT Eval and Treat  Medical Diagnosis from Referral: Chronic right shoulder pain   Evaluation Date: 4/23/2024  Authorization Period Expiration: 04/10/2026   Plan of Care Expiration: 06/18/2024  Progress Note Due: 05/23/2024  Visit # / Visits authorized: 6/20 (+1 evaluation)   FOTO: 1/3 (last performed on 4/23/2024)     Precautions: Standard, asthma, hypertension, osteopenia, syncope and collapse  PTA Visit #: 0/5     Time In: 1603  Time Out: 1701  Total Billable Time: 58 minutes (Billing reflects 1 on 1 treatment time spent with patient)    Subjective     Patient reports: anterior shoulder pain has been almost completely relieved with massage performed in previous visits. Patient notes her largest problem at this point is reaching behind herself such as to grab the seatbelt or tuck in her shirt.    He/She was compliant with home exercise program.  Response to previous treatment: decreased pain and stiffness  Functional change: performing home exercise plan    Pain: 5/10     Location: right shoulder    Objective      Objective Measures updated at progress report or POC update only unless otherwise noted.       Treatment     Angelica received the treatments listed below:     MANUAL THERAPY TECHNIQUES were applied for (0) minutes, including:     Manual Intervention Performed Today     Soft Tissue Mobilization []  Right bicep tendon, anterior delt, pec   Joint Mobilizations []  Scapular mobilizations in all planes     []        []      Functional Dry Needling  []         Plan for  "Next Visit: Continue as needed      THERAPEUTIC EXERCISES: to develop strength, endurance, ROM, flexibility, posture and core stabilization for (28)  minutes including: x = performed today    Therapeutic Exercise 5/16/2024    ROM/Chondral: UBE x 3'/3'   Pulleys x 3'/3'   Table Slides    X30 forward  X30 lateral   Internal rotation x 3x10 with RTB   Sleeper's Stretch x 3x30"   Posterior Capsule Stretch x 3x30" bilateral    Pec Stretch  3x30" corner    Wall ABC's x X1 with ball   Shoulder Extension range of motion with dowel x 30x3" holds              BOLD = new this visit  Plan for Next Visit:      NEUROMUSCULAR RE-EDUCATION ACTIVITIES to improve Balance, Coordination, Kinesthetic, Sense, Proprioception, and Posture for (12) minutes.  The following were included:     Intervention Performed Today     Bilateral External Rotation Iso   3 minutes with 5 second hold    Scapular Retraction   20x5" holds   Side Lying External Rotation x 3x10 with 1#   Side Lying Shoulder Flexion x 3x10   Side Lying Abduction x 3x10   Supine Serratus Punch x X30                          Plan for Next Visit:  pec stretch, supine ER stretch, wall walks, external rotation actively, land mine press     THERAPEUTIC ACTIVITIES: to improve functional performance through dynamic activities, for (14)  minutes including: x = performed today    Therapeutic Activities 5/16/2024    Landmine Press x 3x10   Scaption x 3x10 with 1#   Shoulder Extensions x 3x10 with RTB         BOLD = new this visit  Plan for Next Visit:      Examples: Coordination, Kinesthetic Sense, Push/pull, Squat, Stairs, bending, lifting, catching, pushing, pulling, throwing, squatting  Patient Education and Home Exercises       Home Exercises Provided and Patient Education Provided     Education provided: time included in treatment time.   PURPOSE: Patient educated on the impairments noted above and the effects of physical therapy intervention to improve overall condition and QOL. "   EXERCISE: Patient was educated on all the above exercise prior/during/after for proper posture, positioning, and execution for safe performance with home exercise program.   STRENGTH: Patient educated on the importance of improved core and extremity strength in order to improve alignment of the spine and extremities with static positions and dynamic movement.   POSTURE: Patient educated on postural awareness to reduce stress and maintain optimal alignment of the spine with static positions and dynamic movement   SLEEPING POSITIONS: Patient educated on the use of pillows to aid in neutral alignment of spine and extremities when sleeping in supine or side lying.    Written Home Exercises Provided: yes.  Exercises were reviewed and Angelica was able to demonstrate them prior to the end of the session. Angelica demonstrated good  understanding of the education provided. See EMR under Patient Instructions for exercises provided during therapy sessions.    Assessment     Patient tolerated session well. To assist with patient reaching behind her back mobility exercises and stretches added for increased shoulder extension and internal rotation. Patient able to tolerate well and maintain pain free if limiting range of motion.     Angelica is progressing well towards her goals.   Patient prognosis is Good.     Patient will continue to benefit from skilled outpatient physical therapy to address the deficits listed in the problem list box on initial evaluation, provide pt/family education and to maximize patient's level of independence in the home and community environment.     Patient's spiritual, cultural and educational needs considered and pt agreeable to plan of care and goals.    Anticipated Barriers for therapy: co-morbidities, sedentary lifestyle, and chronicity of condition     Short Term Goals:  4 weeks Status  Date Met   PAIN: Pt will report worst pain of 6/10 in order to progress toward max functional ability and  improve quality of life. [x] Progressing  [] Met  [] Not Met     FUNCTION: Patient will demonstrate improved function as indicated by a score of greater than or equal to 50% of goal score out of 100 on FOTO. [x] Progressing  [] Met  [] Not Met     MOBILITY: Patient will improve AROM to 50% of stated goals, listed in objective measures above, in order to progress towards independence with functional activities.  [x] Progressing  [] Met  [] Not Met     STRENGTH: Patient will improve strength to 50% of stated goals, listed in objective measures above, in order to progress towards independence with functional activities. [x] Progressing  [] Met  [] Not Met     POSTURE: Patient will correct postural deviations in sitting and standing, to decrease pain and promote long term stability.  [x] Progressing  [] Met  [] Not Met     HEP: Patient will demonstrate independence with HEP in order to progress toward functional independence. [x] Progressing  [] Met  [] Not Met        Long Term Goals:  8 weeks Status Date Met   PAIN: Pt will report worst pain of 2/10 in order to progress toward max functional ability and improve quality of life [x] Progressing  [] Met  [] Not Met     FUNCTION: Patient will demonstrate improved function as indicated by a score of greater than or equal to goal score out of 100 on FOTO. [x] Progressing  [] Met  [] Not Met     MOBILITY: Patient will improve AROM to stated goals, listed in objective measures above, in order to return to maximal functional potential and improve quality of life.  [x] Progressing  [] Met  [] Not Met     STRENGTH: Patient will improve strength to stated goals, listed in objective measures above, in order to improve functional independence and quality of life.  [x] Progressing  [] Met  [] Not Met     GAIT: Patient will demonstrate normalized gait mechanics with minimal compensation in order to return to PLOF. [x] Progressing  [] Met  [] Not Met     Patient will return to normal  ADL's, IADL's, community involvement, recreational activities, and work-related activities with less than or equal to 2/10 pain and maximal function.  [x] Progressing  [] Met  [] Not Met       Plan     Continue Plan of Care (POC) and progress per patient tolerance. See treatment section for details on planned progressions next session.    Julio Cesar eBnedict, PT

## 2024-05-21 ENCOUNTER — PATIENT MESSAGE (OUTPATIENT)
Dept: ADMINISTRATIVE | Facility: HOSPITAL | Age: 64
End: 2024-05-21
Payer: COMMERCIAL

## 2024-05-21 ENCOUNTER — CLINICAL SUPPORT (OUTPATIENT)
Dept: REHABILITATION | Facility: HOSPITAL | Age: 64
End: 2024-05-21
Payer: COMMERCIAL

## 2024-05-21 ENCOUNTER — OFFICE VISIT (OUTPATIENT)
Dept: SPORTS MEDICINE | Facility: CLINIC | Age: 64
End: 2024-05-21
Payer: COMMERCIAL

## 2024-05-21 ENCOUNTER — TELEPHONE (OUTPATIENT)
Dept: SPORTS MEDICINE | Facility: CLINIC | Age: 64
End: 2024-05-21
Payer: COMMERCIAL

## 2024-05-21 VITALS — BODY MASS INDEX: 37.49 KG/M2 | RESPIRATION RATE: 17 BRPM | HEIGHT: 60 IN | WEIGHT: 190.94 LBS

## 2024-05-21 DIAGNOSIS — R68.89 DECREASED STRENGTH, ENDURANCE, AND MOBILITY: Primary | ICD-10-CM

## 2024-05-21 DIAGNOSIS — Z74.09 DECREASED FUNCTIONAL MOBILITY AND ENDURANCE: ICD-10-CM

## 2024-05-21 DIAGNOSIS — R53.1 DECREASED STRENGTH, ENDURANCE, AND MOBILITY: Primary | ICD-10-CM

## 2024-05-21 DIAGNOSIS — G89.29 CHRONIC RIGHT SHOULDER PAIN: ICD-10-CM

## 2024-05-21 DIAGNOSIS — M62.81 MUSCLE WEAKNESS OF RIGHT UPPER EXTREMITY: ICD-10-CM

## 2024-05-21 DIAGNOSIS — Z74.09 DECREASED STRENGTH, ENDURANCE, AND MOBILITY: Primary | ICD-10-CM

## 2024-05-21 DIAGNOSIS — M25.511 CHRONIC RIGHT SHOULDER PAIN: ICD-10-CM

## 2024-05-21 DIAGNOSIS — M75.01 ADHESIVE CAPSULITIS OF RIGHT SHOULDER: Primary | ICD-10-CM

## 2024-05-21 PROCEDURE — 99214 OFFICE O/P EST MOD 30 MIN: CPT | Mod: S$GLB,,, | Performed by: STUDENT IN AN ORGANIZED HEALTH CARE EDUCATION/TRAINING PROGRAM

## 2024-05-21 PROCEDURE — 99999 PR PBB SHADOW E&M-EST. PATIENT-LVL V: CPT | Mod: PBBFAC,,, | Performed by: STUDENT IN AN ORGANIZED HEALTH CARE EDUCATION/TRAINING PROGRAM

## 2024-05-21 PROCEDURE — 97110 THERAPEUTIC EXERCISES: CPT

## 2024-05-21 PROCEDURE — 97112 NEUROMUSCULAR REEDUCATION: CPT

## 2024-05-21 PROCEDURE — 3008F BODY MASS INDEX DOCD: CPT | Mod: CPTII,S$GLB,, | Performed by: STUDENT IN AN ORGANIZED HEALTH CARE EDUCATION/TRAINING PROGRAM

## 2024-05-21 PROCEDURE — 1159F MED LIST DOCD IN RCRD: CPT | Mod: CPTII,S$GLB,, | Performed by: STUDENT IN AN ORGANIZED HEALTH CARE EDUCATION/TRAINING PROGRAM

## 2024-05-21 PROCEDURE — 97140 MANUAL THERAPY 1/> REGIONS: CPT

## 2024-05-21 PROCEDURE — 4010F ACE/ARB THERAPY RXD/TAKEN: CPT | Mod: CPTII,S$GLB,, | Performed by: STUDENT IN AN ORGANIZED HEALTH CARE EDUCATION/TRAINING PROGRAM

## 2024-05-21 PROCEDURE — 1160F RVW MEDS BY RX/DR IN RCRD: CPT | Mod: CPTII,S$GLB,, | Performed by: STUDENT IN AN ORGANIZED HEALTH CARE EDUCATION/TRAINING PROGRAM

## 2024-05-21 NOTE — PROGRESS NOTES
[facility]   Physical Therapy Treatment Note      Name: Angelica Pate [facility] Number: [id number]    Therapy Diagnosis:   Encounter Diagnoses   Name Primary?    Decreased strength, endurance, and mobility Yes    Decreased functional mobility and endurance     Chronic right shoulder pain     Muscle weakness of right upper extremity      Physician: Delon Harper    Visit Date: 5/21/2024    Physician Orders: PT Eval and Treat  Medical Diagnosis from Referral: Chronic right shoulder pain   Evaluation Date: 4/23/2024  Authorization Period Expiration: 04/10/2026   Plan of Care Expiration: 06/18/2024  Progress Note Due: 05/23/2024  Visit # / Visits authorized: 6/20 (+1 evaluation)   FOTO: 1/3 (last performed on 4/23/2024)     Precautions: Standard, asthma, hypertension, osteopenia, syncope and collapse  PTA Visit #: 0/5     Time In: 1603  Time Out: 1704  Total Billable Time: 61 minutes (Billing reflects 1 on 1 treatment time spent with patient)    Subjective     Patient reports: she had follow up with Dr. Harper today and they are looking to schedule Brisement procedure on right shoulder.     He/She was compliant with home exercise program.  Response to previous treatment: decreased pain and stiffness  Functional change: performing home exercise plan    Pain: 5/10     Location: right shoulder    Objective      Objective Measures updated at progress report or POC update only unless otherwise noted.       Treatment     Angelica received the treatments listed below:     MANUAL THERAPY TECHNIQUES were applied for (12) minutes, including:     Manual Intervention Performed Today     Soft Tissue Mobilization []  Right bicep tendon, anterior delt, pec   Joint Mobilizations [x]  Shoulder inferior glides at 90 and 120 degrees   Mobilizations with movement [x]  Shoulder flexion, abduction, ER, IR     []      Functional Dry Needling  []         Plan for Next Visit: Continue as needed   "    THERAPEUTIC EXERCISES: to develop strength, endurance, ROM, flexibility, posture and core stabilization for (30)  minutes including: x = performed today    Therapeutic Exercise 5/21/2024    ROM/Chondral: UBE x 3'/3'   Pulleys x 3'/3'   Table Slides x  x X30 forward  X30 lateral   Internal rotation  3x10 with RTB   Sleeper's Stretch x 3x30"   Posterior Capsule Stretch  3x30" bilateral    Pec Stretch  3x30" corner    Wall ABC's  X1 with ball   Shoulder Extension range of motion with dowel x 30x3" holds   Wall Walks with Turn x X10 with 5 second hold   Abduction with Dowel x 20x5"                   BOLD = new this visit  Plan for Next Visit:      NEUROMUSCULAR RE-EDUCATION ACTIVITIES to improve Balance, Coordination, Kinesthetic, Sense, Proprioception, and Posture for (15) minutes.  The following were included:     Intervention Performed Today     Bilateral External Rotation Iso   3 minutes with 5 second hold    Scapular Retraction   20x5" holds   Side Lying External Rotation x 3x10 with 1#   Side Lying Shoulder Flexion x 3x10 with 1#   Side Lying Abduction x 3x10 with 1#   Supine Serratus Punch  X30                          Plan for Next Visit:  pec stretch, supine ER stretch, wall walks, external rotation actively, land mine press     THERAPEUTIC ACTIVITIES: to improve functional performance through dynamic activities, for (0)  minutes including: x = performed today    Therapeutic Activities 5/21/2024    Landmine Press  3x10   Scaption  3x10 with 1#   Shoulder Extensions  3x10 with RTB         BOLD = new this visit  Plan for Next Visit:      Examples: Coordination, Kinesthetic Sense, Push/pull, Squat, Stairs, bending, lifting, catching, pushing, pulling, throwing, squatting  Patient Education and Home Exercises       Home Exercises Provided and Patient Education Provided     Education provided: time included in treatment time.   PURPOSE: Patient educated on the impairments noted above and the effects of physical " therapy intervention to improve overall condition and QOL.   EXERCISE: Patient was educated on all the above exercise prior/during/after for proper posture, positioning, and execution for safe performance with home exercise program.   STRENGTH: Patient educated on the importance of improved core and extremity strength in order to improve alignment of the spine and extremities with static positions and dynamic movement.   POSTURE: Patient educated on postural awareness to reduce stress and maintain optimal alignment of the spine with static positions and dynamic movement   SLEEPING POSITIONS: Patient educated on the use of pillows to aid in neutral alignment of spine and extremities when sleeping in supine or side lying.    Written Home Exercises Provided: yes.  Exercises were reviewed and Angelica was able to demonstrate them prior to the end of the session. Angelica demonstrated good  understanding of the education provided. See EMR under Patient Instructions for exercises provided during therapy sessions.    Assessment     Patient tolerated session well. Patient able to demonstrate excellent observed improvements in shoulder abduction following shoulder inferior glides and active assist stretches. Patient progressed with increased resistance used with side lying shoulder flexion/external rotation/abduction.      Angelica is progressing well towards her goals.   Patient prognosis is Good.     Patient will continue to benefit from skilled outpatient physical therapy to address the deficits listed in the problem list box on initial evaluation, provide pt/family education and to maximize patient's level of independence in the home and community environment.     Patient's spiritual, cultural and educational needs considered and pt agreeable to plan of care and goals.    Anticipated Barriers for therapy: co-morbidities, sedentary lifestyle, and chronicity of condition     Short Term Goals:  4 weeks Status  Date Met   PAIN: Pt  will report worst pain of 6/10 in order to progress toward max functional ability and improve quality of life. [x] Progressing  [] Met  [] Not Met     FUNCTION: Patient will demonstrate improved function as indicated by a score of greater than or equal to 50% of goal score out of 100 on FOTO. [x] Progressing  [] Met  [] Not Met     MOBILITY: Patient will improve AROM to 50% of stated goals, listed in objective measures above, in order to progress towards independence with functional activities.  [x] Progressing  [] Met  [] Not Met     STRENGTH: Patient will improve strength to 50% of stated goals, listed in objective measures above, in order to progress towards independence with functional activities. [x] Progressing  [] Met  [] Not Met     POSTURE: Patient will correct postural deviations in sitting and standing, to decrease pain and promote long term stability.  [x] Progressing  [] Met  [] Not Met     HEP: Patient will demonstrate independence with HEP in order to progress toward functional independence. [x] Progressing  [] Met  [] Not Met        Long Term Goals:  8 weeks Status Date Met   PAIN: Pt will report worst pain of 2/10 in order to progress toward max functional ability and improve quality of life [x] Progressing  [] Met  [] Not Met     FUNCTION: Patient will demonstrate improved function as indicated by a score of greater than or equal to goal score out of 100 on FOTO. [x] Progressing  [] Met  [] Not Met     MOBILITY: Patient will improve AROM to stated goals, listed in objective measures above, in order to return to maximal functional potential and improve quality of life.  [x] Progressing  [] Met  [] Not Met     STRENGTH: Patient will improve strength to stated goals, listed in objective measures above, in order to improve functional independence and quality of life.  [x] Progressing  [] Met  [] Not Met     GAIT: Patient will demonstrate normalized gait mechanics with minimal compensation in order to  return to PLOF. [x] Progressing  [] Met  [] Not Met     Patient will return to normal ADL's, IADL's, community involvement, recreational activities, and work-related activities with less than or equal to 2/10 pain and maximal function.  [x] Progressing  [] Met  [] Not Met       Plan     Continue Plan of Care (POC) and progress per patient tolerance. See treatment section for details on planned progressions next session.    Julio Cesar Benedict, PT

## 2024-05-22 ENCOUNTER — TELEPHONE (OUTPATIENT)
Dept: SPORTS MEDICINE | Facility: CLINIC | Age: 64
End: 2024-05-22
Payer: COMMERCIAL

## 2024-05-22 ENCOUNTER — PATIENT OUTREACH (OUTPATIENT)
Dept: ADMINISTRATIVE | Facility: HOSPITAL | Age: 64
End: 2024-05-22
Payer: COMMERCIAL

## 2024-05-22 DIAGNOSIS — Z12.11 COLON CANCER SCREENING: Primary | ICD-10-CM

## 2024-05-22 NOTE — TELEPHONE ENCOUNTER
----- Message from Jennifer Dominguez sent at 5/22/2024  1:52 PM CDT -----  Regarding: call back  Patient is asking for Stephanie to call her back asap    Thanks

## 2024-05-23 ENCOUNTER — OFFICE VISIT (OUTPATIENT)
Dept: CARDIOLOGY | Facility: CLINIC | Age: 64
End: 2024-05-23
Payer: COMMERCIAL

## 2024-05-23 VITALS
DIASTOLIC BLOOD PRESSURE: 79 MMHG | SYSTOLIC BLOOD PRESSURE: 126 MMHG | OXYGEN SATURATION: 99 % | BODY MASS INDEX: 37.31 KG/M2 | HEART RATE: 61 BPM | HEIGHT: 60 IN | WEIGHT: 190.06 LBS

## 2024-05-23 DIAGNOSIS — M25.512 CHRONIC LEFT SHOULDER PAIN: ICD-10-CM

## 2024-05-23 DIAGNOSIS — G89.29 CHRONIC LEFT SHOULDER PAIN: ICD-10-CM

## 2024-05-23 DIAGNOSIS — K52.9 IBD (INFLAMMATORY BOWEL DISEASE): ICD-10-CM

## 2024-05-23 DIAGNOSIS — E66.01 SEVERE OBESITY (BMI 35.0-39.9) WITH COMORBIDITY: ICD-10-CM

## 2024-05-23 DIAGNOSIS — K50.10 CROHN'S DISEASE OF LARGE INTESTINE WITHOUT COMPLICATION: ICD-10-CM

## 2024-05-23 DIAGNOSIS — E78.49 OTHER HYPERLIPIDEMIA: Primary | ICD-10-CM

## 2024-05-23 DIAGNOSIS — I10 ESSENTIAL HYPERTENSION: ICD-10-CM

## 2024-05-23 DIAGNOSIS — E78.5 HYPERLIPIDEMIA, UNSPECIFIED HYPERLIPIDEMIA TYPE: ICD-10-CM

## 2024-05-23 PROCEDURE — 99999 PR PBB SHADOW E&M-EST. PATIENT-LVL II: CPT | Mod: PBBFAC,,, | Performed by: INTERNAL MEDICINE

## 2024-05-23 PROCEDURE — 3074F SYST BP LT 130 MM HG: CPT | Mod: CPTII,S$GLB,, | Performed by: INTERNAL MEDICINE

## 2024-05-23 PROCEDURE — 99214 OFFICE O/P EST MOD 30 MIN: CPT | Mod: S$GLB,,, | Performed by: INTERNAL MEDICINE

## 2024-05-23 PROCEDURE — 4010F ACE/ARB THERAPY RXD/TAKEN: CPT | Mod: CPTII,S$GLB,, | Performed by: INTERNAL MEDICINE

## 2024-05-23 PROCEDURE — 3078F DIAST BP <80 MM HG: CPT | Mod: CPTII,S$GLB,, | Performed by: INTERNAL MEDICINE

## 2024-05-23 PROCEDURE — 3008F BODY MASS INDEX DOCD: CPT | Mod: CPTII,S$GLB,, | Performed by: INTERNAL MEDICINE

## 2024-05-23 RX ORDER — ATORVASTATIN CALCIUM 20 MG/1
20 TABLET, FILM COATED ORAL NIGHTLY
Qty: 30 TABLET | Refills: 11 | Status: SHIPPED | OUTPATIENT
Start: 2024-05-23 | End: 2025-05-23

## 2024-05-23 NOTE — PROGRESS NOTES
Subjective:   Patient ID:  Angelica Flores is a 63 y.o. female who presents for evaluation of No chief complaint on file.      HPI  May 23, 2024.    Comes in for a follow-up in the interim she was seen by Dr. Argueta  She had a normal echocardiogram and nuclear stress test.    Denies any chest pain, dyspnea on exertion, or other episode of syncope.    She plans on joining the gym.        5.22.2023  Comes in for six-month follow-up.    Doing very well.    Denies any complaints.    No syncope presyncope   Blood pressure control.    Undergoing rehab for her shoulder.    No chest pain, dyspnea on exertion.  Lower extremity swelling.  Palpitations.    Holter monitor benign.        8.4.2022  63 yo female, crohn's disease, htn, asthma, anxiety   She comes in for two episodes of syncope   Two months and a month ago ,   First episode happened while at festival, was hot, but states was hydrating all day and was not drinking any alcohol and was standing , she let the person next to her to know she was going to lose consciousness , for few seconds, no hospitalization, immediately felt better   Second time was at the [employer], had two glasses of wine, stood up  To go home and also passed out shortly, normal afterwards, felt it happening,   Gets hot , started to feel fainting and flushed sensation   She had occular migraine 25 years ago with similar episode  No chest pain, no dyspnea, no leg swelling     Past Medical History:   Diagnosis Date    Anxiety     Asthma     Crohn's disease     Fibroids     Hyperlipidemia     Hypertension     Iritis     Migraine headache     Ocular    Osteopenia 08/2019    Syncope and collapse     Vitamin D deficiency disease        Past Surgical History:   Procedure Laterality Date    ANKLE FRACTURE SURGERY  08/19/2008    right ankle    BREAST BIOPSY Left 1977    COLONOSCOPY N/A 08/11/2017    Procedure: COLONOSCOPY - REQUESTS DR. MATTI ACUNA;  Surgeon: Matti Acuna III, MD;  Location: [facility]  ENDO;  Service: Endoscopy;  Laterality: N/A;    COLONOSCOPY N/A 11/23/2020    Procedure: COLONOSCOPY;  Surgeon: Lalo Wood III, MD;  Location: [facility] ENDO;  Service: Endoscopy;  Laterality: N/A;    COLONOSCOPY N/A 09/28/2022    Procedure: COLONOSCOPY;  Surgeon: Lalo Wood III, MD;  Location: [facility] ENDO;  Service: Endoscopy;  Laterality: N/A;    COLONOSCOPY N/A 08/22/2023    Procedure: COLONOSCOPY;  Surgeon: Erlinda Lindsay MD;  Location: [facility] ENDO;  Service: Endoscopy;  Laterality: N/A;    COLONOSCOPY N/A 2/1/2024    Procedure: Colonosocpy with CHROMO;  Surgeon: Edwin Muller MD;  [facility]  Service: Endoscopy;  Laterality: N/A;  Colonoscopy with CHROMO. referral by Haylee Gallo NP, Suprep, portal -ml  1/25-precall complete-MS    OOPHORECTOMY Bilateral     TAHBSO  02/2011    Due to abnormal pap smear and fibroids    TOTAL ABDOMINAL HYSTERECTOMY         Social History     Tobacco Use    Smoking status: Never    Smokeless tobacco: Never   Substance Use Topics    Alcohol use: Yes     Alcohol/week: 0.0 standard drinks of alcohol     Comment: ocassionally    Drug use: No       Family History   Problem Relation Name Age of Onset    Arthritis Mother      Fuch's dystrophy Mother      Breast cancer Mother      Lupus Sister      Rheum arthritis Sister      Obesity Sister      Hypertension Father      Cancer Maternal Grandfather          lung ca    Stroke Maternal Grandmother      Diabetes Maternal Grandmother      Colon cancer Paternal Aunt         Review of Systems   Cardiovascular:  Negative for chest pain, dyspnea on exertion, palpitations and syncope.   Genitourinary: Negative.    Neurological: Negative.        Current Outpatient Medications on File Prior to Visit   Medication Sig    adalimumab (HUMIRA,CF, PEN) 40 mg/0.4 mL PnKt Inject 0.4 mLs (40 mg total) into the skin every 7 days.    ALPRAZolam (XANAX) 0.5 MG tablet Take 1 tablet by mouth twice daily as needed    amLODIPine  (NORVASC) 5 MG tablet Take 1 tablet by mouth once daily    ascorbic acid, vitamin C, (VITAMIN C) 1000 MG tablet Take by mouth. 1 Tablet Oral Every day    azaTHIOprine (IMURAN) 50 mg Tab Take 3 tablets (150 mg total) by mouth once daily.    azelastine (ASTELIN) 137 mcg (0.1 %) nasal spray 2 sprays (274 mcg total) by Nasal route 2 (two) times daily.    BACILLUS COAGULANS (PROBIOTIC, B. COAGULANS, ORAL) Take by mouth once daily.    cetirizine (ALLERGY RELIEF, CETIRIZINE,) 10 MG tablet Take 1 tablet (10 mg total) by mouth once daily.    cholecalciferol, vitamin D3, 2,000 unit Cap Take by mouth. 1 capsule Oral Every day    dicyclomine (BENTYL) 20 mg tablet Take 1 tablet (20 mg total) by mouth 3 (three) times daily as needed (abdominal pain).    hydroCHLOROthiazide (HYDRODIURIL) 25 MG tablet Take 1 tablet by mouth once daily    hydroquinone 4 % Crea Apply to dark spots once daily. Use with sunscreen if outdoors    ipratropium (ATROVENT) 21 mcg (0.03 %) nasal spray 2 sprays by Nasal route 3 (three) times daily.    lisinopriL (PRINIVIL,ZESTRIL) 40 MG tablet Take 1 tablet by mouth once daily    methocarbamoL (ROBAXIN) 500 MG Tab TAKE 1 TABLET BY MOUTH TWICE DAILY AS NEEDED FOR MUSCLE SPASM AND  NECK  PAIN    metoprolol succinate (TOPROL-XL) 25 MG 24 hr tablet Take 1 tablet by mouth once daily    montelukast (SINGULAIR) 10 mg tablet Take 1 tablet (10 mg total) by mouth every evening.    multivitamin-Ca-iron-minerals 18-0.4 mg Tab Take by mouth. 1 Tablet Oral Every day    potassium bicarbonate disintegrating tablet Take 10 mEq by mouth 2 (two) times daily.    topiramate (TOPAMAX) 25 MG tablet TAKE 2 TABLETS BY MOUTH IN THE EVENING    tretinoin (RETIN-A) 0.025 % cream Apply pea-sized amount to entire face at bedtime.  Use every third night and increase as tolerated to nightly.    valACYclovir (VALTREX) 1000 MG tablet TAKE 2 TABLETS BY MOUTH TWICE DAILY FOR 1 DAY PER EPISODE    zinc sulfate (ZINC-220 ORAL)     [DISCONTINUED]  pravastatin (PRAVACHOL) 80 MG tablet Take 1 tablet by mouth in the evening     Current Facility-Administered Medications on File Prior to Visit   Medication    lactated ringers infusion       Objective:   Objective:  Wt Readings from Last 3 Encounters:   05/23/24 86.2 kg (190 lb 0.6 oz)   05/21/24 86.6 kg (190 lb 14.7 oz)   04/09/24 86.6 kg (190 lb 14.7 oz)     Temp Readings from Last 3 Encounters:   03/12/24 98.5 °F (36.9 °C) (Tympanic)   02/01/24 97.5 °F (36.4 °C)   08/31/23 98 °F (36.7 °C) (Tympanic)     BP Readings from Last 3 Encounters:   05/23/24 126/79   03/12/24 112/62   02/22/24 104/70     Pulse Readings from Last 3 Encounters:   05/23/24 61   03/12/24 60   02/22/24 60       Physical Exam  Vitals reviewed.   Constitutional:       Appearance: She is well-developed.   Neck:      Vascular: No carotid bruit.   Cardiovascular:      Rate and Rhythm: Normal rate and regular rhythm.      Pulses: Intact distal pulses.      Heart sounds: Normal heart sounds. No murmur heard.  Pulmonary:      Breath sounds: Normal breath sounds.   Neurological:      Mental Status: She is oriented to person, place, and time.         Lab Results   Component Value Date    CHOL 211 (H) 08/31/2023    CHOL 220 (H) 08/23/2022    CHOL 147 08/11/2021     Lab Results   Component Value Date    HDL 59 08/31/2023    HDL 63 08/23/2022    HDL 50 08/11/2021     Lab Results   Component Value Date    LDLCALC 141.0 08/31/2023    LDLCALC 142.8 08/23/2022    LDLCALC 82.0 08/11/2021     Lab Results   Component Value Date    TRIG 55 08/31/2023    TRIG 71 08/23/2022    TRIG 75 08/11/2021     Lab Results   Component Value Date    CHOLHDL 28.0 08/31/2023    CHOLHDL 28.6 08/23/2022    CHOLHDL 34.0 08/11/2021       Chemistry        Component Value Date/Time     03/14/2024 1049    K 4.4 03/14/2024 1049     03/14/2024 1049    CO2 25 03/14/2024 1049    BUN 20 03/14/2024 1049    CREATININE 0.9 03/14/2024 1049    GLU 86 03/14/2024 1049        Component  Value Date/Time    CALCIUM 10.3 03/14/2024 1049    ALKPHOS 53 (L) 03/14/2024 1049    AST 16 03/14/2024 1049    ALT 14 03/14/2024 1049    BILITOT 0.6 03/14/2024 1049    ESTGFRAFRICA >60.0 08/11/2021 0950    EGFRNONAA >60.0 08/11/2021 0950          Lab Results   Component Value Date    TSH 0.854 08/31/2023     Lab Results   Component Value Date    INR 0.9 08/18/2008     Lab Results   Component Value Date    WBC 9.27 03/14/2024    HGB 13.5 03/14/2024    HCT 42.0 03/14/2024     (H) 03/14/2024     03/14/2024     BNP  @LABRCNTIP(BNP,BNPTRIAGEBLO)@  CrCl cannot be calculated (Patient's most recent lab result is older than the maximum 7 days allowed.).     Imaging:  ======  No results found for this or any previous visit.    No results found for this or any previous visit.    No results found for this or any previous visit.    Results for orders placed in visit on 02/11/11    X-Ray Chest PA And Lateral    Narrative  DATE OF EXAM: Feb 23 2011    GEN   0012  -  CHEST PA & LAT:   1123 HOURS  [id number]    CLINICAL HISTORY:   V72.84 0 PREOP EXAMINATION NOS    PROCEDURE COMMENT:    ICD 9 CODE(S):   ()    CPT 4 CODE(S)/MODIFIER(S):   ()    RESULTS: COMPARISON - 11/2009.    NORMAL CHEST RADIOGRAPH.    IMPRESSION: NORMAL CHEST RADIOGRAPH.      : florentin  Transcribe Date/Time: Feb 23 2011  1:51P  Dictated by : JAYSHREE YORK MD  Read On: Feb 23 2011 11:25A  JAYSHREE YORK M.D.  [id number]  Images were reviewed, findings were verified and document was  electronically  SIGNED BY: JAYSHREE YORK MD On: Feb 24 2011  3:43P    No results found for this or any previous visit.    No valid procedures specified.    Diagnostic Results:  ECG: Reviewed    Results for orders placed during the hospital encounter of 12/19/23    Nuclear Stress - Cardiology Interpreted    Interpretation Summary    Normal myocardial perfusion scan. There is no evidence of myocardial ischemia or infarction.    The gated perfusion images  showed an ejection fraction of 60% at rest. The gated perfusion images showed an ejection fraction of 77% post stress.    There is normal wall motion at rest and post stress.    LV cavity size is normal at rest and normal at stress.    The ECG portion of the study is negative for ischemia.    The patient reported no chest pain during the stress test.    Results for orders placed during the hospital encounter of 12/19/23    Echo    Interpretation Summary    Left Ventricle: The left ventricle is normal in size. Normal wall thickness. Normal wall motion. There is normal systolic function with a visually estimated ejection fraction of 55 - 70%. Ejection fraction by visual approximation is 65%. There is normal diastolic function.    Right Ventricle: Normal right ventricular cavity size. Wall thickness is normal. Right ventricle wall motion  is normal. Systolic function is normal.    Pulmonary Artery: The estimated pulmonary artery systolic pressure is 26 mmHg.    IVC/SVC: Normal venous pressure at 3 mmHg.    The 10-year ASCVD risk score (Herb CUNNINGHAM, et al., 2019) is: 8.5%    Values used to calculate the score:      Age: 63 years      Sex: Female      Is Non- : Yes      Diabetic: No      Tobacco smoker: No      Systolic Blood Pressure: 126 mmHg      Is BP treated: Yes      HDL Cholesterol: 59 mg/dL      Total Cholesterol: 211 mg/dL    Assessment and Plan:   Other hyperlipidemia  -     atorvastatin (LIPITOR) 20 MG tablet; Take 1 tablet (20 mg total) by mouth every evening.  Dispense: 30 tablet; Refill: 11    Severe obesity (BMI 35.0-39.9) with comorbidity    Essential hypertension    Hyperlipidemia, unspecified hyperlipidemia type    Chronic left shoulder pain    Crohn's disease of large intestine without complication    IBD (inflammatory bowel disease)    BMI 37.0-37.9, adult      Reviewed echocardiogram and nuclear stress test.  Angina free, htn controlled, euvolemic  cw compression stockings.   Hydration.  Lower body exercise.  Reviewed all tests and above medical conditions with patient in detail and formulated treatment plan.  Risk factor modification discussed.   Cardiac low salt diet discussed.  Maintaining healthy weight and weight loss goals were discussed in clinic.    Follow up in 6 months

## 2024-06-05 ENCOUNTER — OFFICE VISIT (OUTPATIENT)
Dept: SPORTS MEDICINE | Facility: CLINIC | Age: 64
End: 2024-06-05
Payer: COMMERCIAL

## 2024-06-05 VITALS — HEIGHT: 60 IN | WEIGHT: 190.06 LBS | BODY MASS INDEX: 37.31 KG/M2

## 2024-06-05 DIAGNOSIS — J31.0 NON-ALLERGIC RHINITIS: ICD-10-CM

## 2024-06-05 DIAGNOSIS — G56.02 LEFT CARPAL TUNNEL SYNDROME: ICD-10-CM

## 2024-06-05 DIAGNOSIS — R55 SYNCOPE, UNSPECIFIED SYNCOPE TYPE: ICD-10-CM

## 2024-06-05 DIAGNOSIS — M54.12 CERVICAL RADICULOPATHY: ICD-10-CM

## 2024-06-05 DIAGNOSIS — M75.01 ADHESIVE CAPSULITIS OF RIGHT SHOULDER: ICD-10-CM

## 2024-06-05 PROCEDURE — 1159F MED LIST DOCD IN RCRD: CPT | Mod: CPTII,S$GLB,, | Performed by: STUDENT IN AN ORGANIZED HEALTH CARE EDUCATION/TRAINING PROGRAM

## 2024-06-05 PROCEDURE — 3008F BODY MASS INDEX DOCD: CPT | Mod: CPTII,S$GLB,, | Performed by: STUDENT IN AN ORGANIZED HEALTH CARE EDUCATION/TRAINING PROGRAM

## 2024-06-05 PROCEDURE — 99213 OFFICE O/P EST LOW 20 MIN: CPT | Mod: S$GLB,,, | Performed by: STUDENT IN AN ORGANIZED HEALTH CARE EDUCATION/TRAINING PROGRAM

## 2024-06-05 PROCEDURE — 99999 PR PBB SHADOW E&M-EST. PATIENT-LVL IV: CPT | Mod: PBBFAC,,, | Performed by: STUDENT IN AN ORGANIZED HEALTH CARE EDUCATION/TRAINING PROGRAM

## 2024-06-05 PROCEDURE — 4010F ACE/ARB THERAPY RXD/TAKEN: CPT | Mod: CPTII,S$GLB,, | Performed by: STUDENT IN AN ORGANIZED HEALTH CARE EDUCATION/TRAINING PROGRAM

## 2024-06-05 RX ORDER — TOPIRAMATE 25 MG/1
50 TABLET ORAL
Qty: 60 TABLET | Refills: 0 | Status: SHIPPED | OUTPATIENT
Start: 2024-06-05

## 2024-06-05 RX ORDER — MONTELUKAST SODIUM 10 MG/1
10 TABLET ORAL NIGHTLY
Qty: 90 TABLET | Refills: 3 | Status: SHIPPED | OUTPATIENT
Start: 2024-06-05

## 2024-06-05 NOTE — PROGRESS NOTES
Patient ID: Angelica Flores  YOB: 1960  MRN: [id number]    Chief Complaint: Pain of the Right Shoulder    Referred By: Dr. Rama harper for  right shoulder pain    History of Present Illness: Angelica Flores is a right-hand dominant 63 y.o. female who presents today with  right shoulder stiffness and pain.      63-year-old female presenting today for right shoulder evaluation.  Had a history of adhesive capsulitis left shoulder about a year and a half ago and has now had it on the right for about 3 months.  Left side improved with physical therapy over time.  Her right shoulder still has some pain and stiffness throughout the shoulder joint itself has not had generalized improvement in range of motion recently had a CSI with some pain relief but continues to have lack range of motion.  Currently in physical therapy not making any progress and here today for further evaluation and treatment.    Past Medical History:   Past Medical History:   Diagnosis Date    Anxiety     Asthma     Crohn's disease     Fibroids     Hyperlipidemia     Hypertension     Iritis     Migraine headache     Ocular    Osteopenia 08/2019    Syncope and collapse     Vitamin D deficiency disease      Past Surgical History:   Procedure Laterality Date    ANKLE FRACTURE SURGERY  08/19/2008    right ankle    BREAST BIOPSY Left 1977    COLONOSCOPY N/A 08/11/2017    Procedure: COLONOSCOPY - REQUESTS DR. MATTI WOOD;  Surgeon: Matti Wood III, MD;  Location: [facility];  Service: Endoscopy;  Laterality: N/A;    COLONOSCOPY N/A 11/23/2020    Procedure: COLONOSCOPY;  Surgeon: Matti Wood III, MD;  Location: [facility];  Service: Endoscopy;  Laterality: N/A;    COLONOSCOPY N/A 09/28/2022    Procedure: COLONOSCOPY;  Surgeon: Matti Wood III, MD;  Location: [facility];  Service: Endoscopy;  Laterality: N/A;    COLONOSCOPY N/A 08/22/2023    Procedure: COLONOSCOPY;  Surgeon: Erlinda Lindsay MD;  Location: [facility]  ENDO;  Service: Endoscopy;  Laterality: N/A;    COLONOSCOPY N/A 2/1/2024    Procedure: Colonosocpy with CHROMO;  Surgeon: Edwin Muller MD;  Location: [facility] ENDO (4TH FLR);  Service: Endoscopy;  Laterality: N/A;  Colonoscopy with CHROMO. referral by Haylee Gallo NP, Suprep, portal -ml  1/25-precall complete-MS    OOPHORECTOMY Bilateral     TAHBSO  02/2011    Due to abnormal pap smear and fibroids    TOTAL ABDOMINAL HYSTERECTOMY       Family History   Problem Relation Name Age of Onset    Arthritis Mother      Fuch's dystrophy Mother      Breast cancer Mother      Lupus Sister      Rheum arthritis Sister      Obesity Sister      Hypertension Father      Cancer Maternal Grandfather          lung ca    Stroke Maternal Grandmother      Diabetes Maternal Grandmother      Colon cancer Paternal Aunt       Social History     Socioeconomic History    Marital status:     Number of children: 2   Occupational History    Occupation: RN     Employer: [employer]     Comment: [employer]   Tobacco Use    Smoking status: Never    Smokeless tobacco: Never   Substance and Sexual Activity    Alcohol use: Yes     Alcohol/week: 0.0 standard drinks of alcohol     Comment: ocassionally    Drug use: No    Sexual activity: Yes     Partners: Male     Birth control/protection: Surgical   Social History Narrative    She wears seatbelt.  July 22, 2017. She states she works new position at [employer].     Social Determinants of Health     Financial Resource Strain: Low Risk  (2/17/2023)    Overall Financial Resource Strain (CARDIA)     Difficulty of Paying Living Expenses: Not hard at all   Food Insecurity: No Food Insecurity (2/17/2023)    Hunger Vital Sign     Worried About Running Out of Food in the Last Year: Never true     Ran Out of Food in the Last Year: Never true   Transportation Needs: No Transportation Needs (2/17/2023)    PRAPARE - Transportation     Lack of Transportation (Medical): No     Lack of Transportation  (Non-Medical): No   Physical Activity: Insufficiently Active (3/12/2024)    Exercise Vital Sign     Days of Exercise per Week: 3 days     Minutes of Exercise per Session: 30 min   Stress: Stress Concern Present (2/17/2023)    [employer] [place] [employer] - Occupational Stress Questionnaire     Feeling of Stress : To some extent   Housing Stability: Low Risk  (2/17/2023)    Housing Stability Vital Sign     Unable to Pay for Housing in the Last Year: No     Number of Places Lived in the Last Year: 1     Unstable Housing in the Last Year: No     Medication List with Changes/Refills   Current Medications    ADALIMUMAB (HUMIRA,CF, PEN) 40 MG/0.4 ML PNKT    Inject 0.4 mLs (40 mg total) into the skin every 7 days.    ALPRAZOLAM (XANAX) 0.5 MG TABLET    Take 1 tablet by mouth twice daily as needed    AMLODIPINE (NORVASC) 5 MG TABLET    Take 1 tablet by mouth once daily    ASCORBIC ACID, VITAMIN C, (VITAMIN C) 1000 MG TABLET    Take by mouth. 1 Tablet Oral Every day    ATORVASTATIN (LIPITOR) 20 MG TABLET    Take 1 tablet (20 mg total) by mouth every evening.    AZATHIOPRINE (IMURAN) 50 MG TAB    Take 3 tablets (150 mg total) by mouth once daily.    AZELASTINE (ASTELIN) 137 MCG (0.1 %) NASAL SPRAY    2 sprays (274 mcg total) by Nasal route 2 (two) times daily.    BACILLUS COAGULANS (PROBIOTIC, B. COAGULANS, ORAL)    Take by mouth once daily.    CETIRIZINE (ALLERGY RELIEF, CETIRIZINE,) 10 MG TABLET    Take 1 tablet (10 mg total) by mouth once daily.    CHOLECALCIFEROL, VITAMIN D3, 2,000 UNIT CAP    Take by mouth. 1 capsule Oral Every day    DICYCLOMINE (BENTYL) 20 MG TABLET    Take 1 tablet (20 mg total) by mouth 3 (three) times daily as needed (abdominal pain).    HYDROCHLOROTHIAZIDE (HYDRODIURIL) 25 MG TABLET    Take 1 tablet by mouth once daily    HYDROQUINONE 4 % CREA    Apply to dark spots once daily. Use with sunscreen if outdoors    IPRATROPIUM (ATROVENT) 21 MCG (0.03 %) NASAL SPRAY    2 sprays by Nasal  route 3 (three) times daily.    LISINOPRIL (PRINIVIL,ZESTRIL) 40 MG TABLET    Take 1 tablet by mouth once daily    METHOCARBAMOL (ROBAXIN) 500 MG TAB    TAKE 1 TABLET BY MOUTH TWICE DAILY AS NEEDED FOR MUSCLE SPASM AND  NECK  PAIN    METOPROLOL SUCCINATE (TOPROL-XL) 25 MG 24 HR TABLET    Take 1 tablet by mouth once daily    MULTIVITAMIN-CA-IRON-MINERALS 18-0.4 MG TAB    Take by mouth. 1 Tablet Oral Every day    POTASSIUM BICARBONATE DISINTEGRATING TABLET    Take 10 mEq by mouth 2 (two) times daily.    TRETINOIN (RETIN-A) 0.025 % CREAM    Apply pea-sized amount to entire face at bedtime.  Use every third night and increase as tolerated to nightly.    VALACYCLOVIR (VALTREX) 1000 MG TABLET    TAKE 2 TABLETS BY MOUTH TWICE DAILY FOR 1 DAY PER EPISODE    ZINC SULFATE (ZINC-220 ORAL)       Changed and/or Refilled Medications    Modified Medication Previous Medication    MONTELUKAST (SINGULAIR) 10 MG TABLET montelukast (SINGULAIR) 10 mg tablet       TAKE 1 TABLET BY MOUTH ONCE DAILY IN THE EVENING    Take 1 tablet (10 mg total) by mouth every evening.    TOPIRAMATE (TOPAMAX) 25 MG TABLET topiramate (TOPAMAX) 25 MG tablet       TAKE 2 TABLETS BY MOUTH IN THE EVENING    TAKE 2 TABLETS BY MOUTH IN THE EVENING     Review of patient's allergies indicates:  No Known Allergies    Physical Exam:   Body mass index is 37.11 kg/m².    GENERAL: Well appearing, in no acute distress.  HEAD: Normocephalic and atraumatic.  ENT: External ears and nose grossly normal.  EYES: EOMI bilaterally  PULMONARY: Respirations are grossly even and non-labored.  NEURO: Awake, alert, and oriented x 3.  SKIN: No obvious rashes appreciated.  PSYCH: Mood & affect are appropriate.    Detailed MSK exam:       Right shoulder  Limited range of motion flexion short by about 20° compared to contralateral   External rotation short by about 30° at 45 compared to 75   Internal rotation side pocket   abduction limited to about 80°    Imaging:  X-ray Shoulder 2 or  More Views Right  Narrative: EXAMINATION:  XR SHOULDER COMPLETE 2 OR MORE VIEWS RIGHT    CLINICAL HISTORY:  Pain in right shoulder    TECHNIQUE:  Two or three views of the right shoulder were performed.    COMPARISON:  None    FINDINGS:  No acute abnormality with minimal degenerative findings of the shoulder.  Lung parenchyma clear.  Impression: As above    Electronically signed by: Cisco Hare MD  Date:    04/09/2024  Time:    14:34      Relevant imaging results were reviewed and interpreted by me and per my read  as above.  This was discussed with the patient and / or family today.     Assessment:  Angelica Flores is a 63 y.o. female  presents today for signs and symptoms of early frozen stage of adhesive capsulitis of the right shoulder.  Discussed the diagnosis prognosis as well as other conservative treatment options moving forward.  I discussed moving forward with a glenohumeral hydro dilation as she has failed a corticosteroid injection continues have lack of range of motion.  We will coordinate with Martina to have this done within the next 2 weeks at the patient's earliest convenience.  Follow-up with me for brisement right shoulder.     Adhesive capsulitis of right shoulder  -     Ambulatory referral/consult to Sports Medicine         A copy of today's visit note has been sent to the referring provider.       Juan Caballero MD    Disclaimer: This note was prepared using a voice recognition system and is likely to have sound alike errors within the text.

## 2024-06-05 NOTE — TELEPHONE ENCOUNTER
Angelica Flores  is requesting a refill authorization.  Brief Assessment and Rationale for Refill:  Approve     Medication Therapy Plan:         Pharmacist review requested: Yes   Extended chart review required: Yes   Comments:     Note composed:12:53 PM 06/05/2024

## 2024-06-05 NOTE — TELEPHONE ENCOUNTER
No care due was identified.  [facility] Embedded Care Due Messages. Reference number: [id number].   6/05/2024 12:05:44 PM CDT

## 2024-06-05 NOTE — TELEPHONE ENCOUNTER
Refill Routing Note   Medication(s) are not appropriate for processing by [facility] for the following reason(s):      Drug-disease interaction:montelukast and Dysthymic disorder     [facility] action(s):  Defer Care Due:  None identified     Medication Therapy Plan: montelukast and Dysthymic disorder    Pharmacist review requested: Yes     Appointments  past 12m or future 3m with PCP    Date Provider   Last Visit   3/12/2024 Monet Alvarez MD   Next Visit   9/3/2024 Monet Alvarez MD   ED visits in past 90 days: 0        Note composed:12:42 PM 06/05/2024

## 2024-06-06 ENCOUNTER — LAB VISIT (OUTPATIENT)
Dept: LAB | Facility: HOSPITAL | Age: 64
End: 2024-06-06
Attending: NURSE PRACTITIONER
Payer: COMMERCIAL

## 2024-06-06 DIAGNOSIS — K50.10 CROHN'S DISEASE OF COLON WITHOUT COMPLICATION: ICD-10-CM

## 2024-06-06 DIAGNOSIS — M75.01 ADHESIVE CAPSULITIS OF RIGHT SHOULDER: Primary | ICD-10-CM

## 2024-06-06 LAB
ALBUMIN SERPL BCP-MCNC: 3.9 G/DL (ref 3.5–5.2)
ALP SERPL-CCNC: 75 U/L (ref 55–135)
ALT SERPL W/O P-5'-P-CCNC: 16 U/L (ref 10–44)
ANION GAP SERPL CALC-SCNC: 9 MMOL/L (ref 8–16)
AST SERPL-CCNC: 17 U/L (ref 10–40)
BASOPHILS # BLD AUTO: 0.05 K/UL (ref 0–0.2)
BASOPHILS NFR BLD: 0.4 % (ref 0–1.9)
BILIRUB SERPL-MCNC: 0.4 MG/DL (ref 0.1–1)
BUN SERPL-MCNC: 22 MG/DL (ref 8–23)
CALCIUM SERPL-MCNC: 10 MG/DL (ref 8.7–10.5)
CHLORIDE SERPL-SCNC: 104 MMOL/L (ref 95–110)
CO2 SERPL-SCNC: 25 MMOL/L (ref 23–29)
CREAT SERPL-MCNC: 0.9 MG/DL (ref 0.5–1.4)
DIFFERENTIAL METHOD BLD: ABNORMAL
EOSINOPHIL # BLD AUTO: 0.1 K/UL (ref 0–0.5)
EOSINOPHIL NFR BLD: 0.9 % (ref 0–8)
ERYTHROCYTE [DISTWIDTH] IN BLOOD BY AUTOMATED COUNT: 13.6 % (ref 11.5–14.5)
EST. GFR  (NO RACE VARIABLE): >60 ML/MIN/1.73 M^2
GLUCOSE SERPL-MCNC: 72 MG/DL (ref 70–110)
HCT VFR BLD AUTO: 40.5 % (ref 37–48.5)
HGB BLD-MCNC: 13.3 G/DL (ref 12–16)
IMM GRANULOCYTES # BLD AUTO: 0.09 K/UL (ref 0–0.04)
IMM GRANULOCYTES NFR BLD AUTO: 0.8 % (ref 0–0.5)
LYMPHOCYTES # BLD AUTO: 3.8 K/UL (ref 1–4.8)
LYMPHOCYTES NFR BLD: 32.5 % (ref 18–48)
MCH RBC QN AUTO: 31.5 PG (ref 27–31)
MCHC RBC AUTO-ENTMCNC: 32.8 G/DL (ref 32–36)
MCV RBC AUTO: 96 FL (ref 82–98)
MONOCYTES # BLD AUTO: 0.9 K/UL (ref 0.3–1)
MONOCYTES NFR BLD: 7.6 % (ref 4–15)
NEUTROPHILS # BLD AUTO: 6.8 K/UL (ref 1.8–7.7)
NEUTROPHILS NFR BLD: 57.8 % (ref 38–73)
NRBC BLD-RTO: 0 /100 WBC
PLATELET # BLD AUTO: 324 K/UL (ref 150–450)
PMV BLD AUTO: 11.1 FL (ref 9.2–12.9)
POTASSIUM SERPL-SCNC: 3.5 MMOL/L (ref 3.5–5.1)
PROT SERPL-MCNC: 8 G/DL (ref 6–8.4)
RBC # BLD AUTO: 4.22 M/UL (ref 4–5.4)
SODIUM SERPL-SCNC: 138 MMOL/L (ref 136–145)
WBC # BLD AUTO: 11.69 K/UL (ref 3.9–12.7)

## 2024-06-06 PROCEDURE — 85025 COMPLETE CBC W/AUTO DIFF WBC: CPT | Performed by: NURSE PRACTITIONER

## 2024-06-06 PROCEDURE — 80053 COMPREHEN METABOLIC PANEL: CPT | Performed by: NURSE PRACTITIONER

## 2024-06-06 PROCEDURE — 80145 DRUG ASSAY ADALIMUMAB: CPT | Performed by: NURSE PRACTITIONER

## 2024-06-06 RX ORDER — METOPROLOL SUCCINATE 25 MG/1
25 TABLET, EXTENDED RELEASE ORAL
Qty: 90 TABLET | Refills: 3 | Status: SHIPPED | OUTPATIENT
Start: 2024-06-06

## 2024-06-10 LAB — ADALIMUMAB SERPL IA-MCNC: 26.3 MCG/ML

## 2024-06-12 ENCOUNTER — PATIENT MESSAGE (OUTPATIENT)
Dept: GASTROENTEROLOGY | Facility: CLINIC | Age: 64
End: 2024-06-12
Payer: COMMERCIAL

## 2024-06-12 DIAGNOSIS — K50.10 CROHN'S DISEASE OF COLON WITHOUT COMPLICATION: Primary | ICD-10-CM

## 2024-06-12 RX ORDER — ADALIMUMAB 40MG/0.4ML
40 KIT SUBCUTANEOUS
Qty: 2 PEN | Refills: 11 | Status: SHIPPED | OUTPATIENT
Start: 2024-06-12 | End: 2025-06-12

## 2024-06-12 NOTE — TELEPHONE ENCOUNTER
Spoke to patient  She will decrease Humira to every 14 days- recheck levels 12 weeks after change   She needs colonoscopy with Dr. Muller in [place] in August. She has their contact number and will contact his office to schedule.

## 2024-06-14 ENCOUNTER — TELEPHONE (OUTPATIENT)
Dept: GASTROENTEROLOGY | Facility: CLINIC | Age: 64
End: 2024-06-14
Payer: COMMERCIAL

## 2024-06-14 NOTE — TELEPHONE ENCOUNTER
Returned call to [facility] per message. [facility] requested clarification on the dosing change of Humira from every 7 days to every 14 days . I spoke with Larissa Villalta and confirmed the change secondary to high adalimumab levels.     Sydney Keith, PharmD , [facility]  Clinical Pharmacist Gastroenterology  [facility]

## 2024-06-19 ENCOUNTER — OFFICE VISIT (OUTPATIENT)
Dept: SPORTS MEDICINE | Facility: CLINIC | Age: 64
End: 2024-06-19
Payer: COMMERCIAL

## 2024-06-19 VITALS — HEIGHT: 60 IN | BODY MASS INDEX: 37.31 KG/M2 | WEIGHT: 190.06 LBS

## 2024-06-19 DIAGNOSIS — M25.511 RIGHT SHOULDER PAIN, UNSPECIFIED CHRONICITY: ICD-10-CM

## 2024-06-19 DIAGNOSIS — M75.01 ADHESIVE CAPSULITIS OF RIGHT SHOULDER: Primary | ICD-10-CM

## 2024-06-19 PROCEDURE — 3008F BODY MASS INDEX DOCD: CPT | Mod: CPTII,S$GLB,, | Performed by: STUDENT IN AN ORGANIZED HEALTH CARE EDUCATION/TRAINING PROGRAM

## 2024-06-19 PROCEDURE — 1159F MED LIST DOCD IN RCRD: CPT | Mod: CPTII,S$GLB,, | Performed by: STUDENT IN AN ORGANIZED HEALTH CARE EDUCATION/TRAINING PROGRAM

## 2024-06-19 PROCEDURE — 99999 PR PBB SHADOW E&M-EST. PATIENT-LVL IV: CPT | Mod: PBBFAC,,, | Performed by: STUDENT IN AN ORGANIZED HEALTH CARE EDUCATION/TRAINING PROGRAM

## 2024-06-19 PROCEDURE — 4010F ACE/ARB THERAPY RXD/TAKEN: CPT | Mod: CPTII,S$GLB,, | Performed by: STUDENT IN AN ORGANIZED HEALTH CARE EDUCATION/TRAINING PROGRAM

## 2024-06-19 PROCEDURE — 99213 OFFICE O/P EST LOW 20 MIN: CPT | Mod: S$GLB,,, | Performed by: STUDENT IN AN ORGANIZED HEALTH CARE EDUCATION/TRAINING PROGRAM

## 2024-06-19 RX ORDER — TRIAMCINOLONE ACETONIDE 40 MG/ML
40 INJECTION, SUSPENSION INTRA-ARTICULAR; INTRAMUSCULAR
Status: DISCONTINUED | OUTPATIENT
Start: 2024-06-19 | End: 2024-06-19 | Stop reason: HOSPADM

## 2024-06-19 NOTE — PROCEDURES
Suprascapular nerve injection right shoulder   Performed by Dr. Juan Caballero  Authorized by Dr. Juan Caballero   Consent done: Yes verbal  Indication, brisement procedure  Time-out was performed and the site was marked and ultrasound was used for guidance.  The area was prepped and sterilized in a normal fashion.  A 22 gauge spinal needle using ultrasound guidance was then inserted and guided down to this suprascapular notch indirect visualization of injection to suprascapular nerve was performed.  5 cc of 1% normal lidocaine without epinephrine was injected into the suprascapular notch around the suprascapular nerve.  Patient tolerated the procedure well and had no complications.     Patient then waited 10 minutes before moving forward with a glenohumeral hydro dilation documented in the next procedure note.     Juan Caballero    Large Joint Aspiration/Injection: R glenohumeral    Date/Time: 6/19/2024 11:00 AM    Performed by: Juan Caballero MD  Authorized by: Juan Caballero MD    Consent Done?:  Yes (Verbal)  Indications:  Pain  Site marked: the procedure site was marked    Timeout: prior to procedure the correct patient, procedure, and site was verified    Prep: patient was prepped and draped in usual sterile fashion      Local anesthesia used?: Yes    Anesthesia:  Nerve block  Local anesthetic:  Topical anesthetic    Details:  Needle Size:  21 G  Ultrasonic Guidance for needle placement?: Yes    Images are saved and documented.  Approach:  Posterior  Location:  Shoulder (glenohumeral hydro dilation)  Site:  R glenohumeral  Medications:  40 mg triamcinolone acetonide 40 mg/mL  Medications comment:   40 cc sterile water  Patient tolerance:  Patient tolerated the procedure well with no immediate complications     Ultrasound guidance was used for needle localization. Images were saved and stored for documentation. The appropriate structures were visualized. Dynamic visualization of the needle was continuous  throughout the procedures and maintained good position.     We discussed the proper protocols after the injection such as no submerging pools, baths tubs, or hot tubs for 24 hr.  Showering is okay today.  We also discussed that blood sugars can be elevated after an injection and asked patient to properly checked her sugars over the next few days and contact their PCP if there are any concerns.  We discussed red flags such as fevers, chills, red, warm, tender joint at the area of injection to please seek medical care immediately.

## 2024-06-19 NOTE — PROGRESS NOTES
Patient ID: Angelica Flores  YOB: 1960  MRN: [id number]    Chief Complaint: Pain and Procedure of the Right Shoulder      History of Present Illness: Angelica Flores is a  63-year-old female presenting today for right shoulder glenohumeral hydro dilation.  Patient notes just over last 3 weeks she has had dramatic improvements in range of motion.  Pain has not been as much of an issue since her last cortisone shot but notes her ability sleep on her right shoulder now as well.  She is able to comb her hair and has overall had dramatic improvements just in the last week with range of motion.    Past Medical History:   Past Medical History:   Diagnosis Date    Anxiety     Asthma     Crohn's disease     Fibroids     Hyperlipidemia     Hypertension     Iritis     Migraine headache     Ocular    Osteopenia 08/2019    Syncope and collapse     Vitamin D deficiency disease      Past Surgical History:   Procedure Laterality Date    ANKLE FRACTURE SURGERY  08/19/2008    right ankle    BREAST BIOPSY Left 1977    COLONOSCOPY N/A 08/11/2017    Procedure: COLONOSCOPY - REQUESTS DR. MATTI WOOD;  Surgeon: Matti Wood III, MD;  Location: [facility];  Service: Endoscopy;  Laterality: N/A;    COLONOSCOPY N/A 11/23/2020    Procedure: COLONOSCOPY;  Surgeon: Matti Wood III, MD;  Location: [facility];  Service: Endoscopy;  Laterality: N/A;    COLONOSCOPY N/A 09/28/2022    Procedure: COLONOSCOPY;  Surgeon: Matti Wood III, MD;  Location: [facility];  Service: Endoscopy;  Laterality: N/A;    COLONOSCOPY N/A 08/22/2023    Procedure: COLONOSCOPY;  Surgeon: Erlinda Lindsay MD;  Location: [facility];  Service: Endoscopy;  Laterality: N/A;    COLONOSCOPY N/A 2/1/2024    Procedure: Colonosocpy with CHROMO;  Surgeon: Edwin Muller MD;  Location: [facility] ([address] [place] [address]);  Service: Endoscopy;  Laterality: N/A;  Colonoscopy with CHROMO. referral by Haylee Gallo NP, Suprep, [facility]  1/25-[facility]  complete-MS    OOPHORECTOMY Bilateral     TAHBSO  02/2011    Due to abnormal pap smear and fibroids    TOTAL ABDOMINAL HYSTERECTOMY       Family History   Problem Relation Name Age of Onset    Arthritis Mother      Fuch's dystrophy Mother      Breast cancer Mother      Lupus Sister      Rheum arthritis Sister      Obesity Sister      Hypertension Father      Cancer Maternal Grandfather          lung ca    Stroke Maternal Grandmother      Diabetes Maternal Grandmother      Colon cancer Paternal Aunt       Social History     Socioeconomic History    Marital status:     Number of children: 2   Occupational History    Occupation: RN     Employer: [employer]     Comment: [employer]   Tobacco Use    Smoking status: Never    Smokeless tobacco: Never   Substance and Sexual Activity    Alcohol use: Yes     Alcohol/week: 0.0 standard drinks of alcohol     Comment: ocassionally    Drug use: No    Sexual activity: Yes     Partners: Male     Birth control/protection: Surgical   Social History Narrative    She wears seatbelt.  July 22, 2017. She states she works new position at [employer].     Social Determinants of Health     Financial Resource Strain: Low Risk  (2/17/2023)    Overall Financial Resource Strain (CARDIA)     Difficulty of Paying Living Expenses: Not hard at all   Food Insecurity: No Food Insecurity (2/17/2023)    Hunger Vital Sign     Worried About Running Out of Food in the Last Year: Never true     Ran Out of Food in the Last Year: Never true   Transportation Needs: No Transportation Needs (2/17/2023)    PRAPARE - Transportation     Lack of Transportation (Medical): No     Lack of Transportation (Non-Medical): No   Physical Activity: Insufficiently Active (3/12/2024)    Exercise Vital Sign     Days of Exercise per Week: 3 days     Minutes of Exercise per Session: 30 min   Stress: Stress Concern Present (2/17/2023)    [employer] - Occupational Stress Questionnaire      Feeling of Stress : To some extent   Housing Stability: Low Risk  (2/17/2023)    Housing Stability Vital Sign     Unable to Pay for Housing in the Last Year: No     Number of Places Lived in the Last Year: 1     Unstable Housing in the Last Year: No     Medication List with Changes/Refills   Current Medications    ADALIMUMAB (HUMIRA,CF, PEN) 40 MG/0.4 ML PNKT    Inject 0.4 mLs (40 mg total) into the skin every 14 (fourteen) days.    ALPRAZOLAM (XANAX) 0.5 MG TABLET    Take 1 tablet by mouth twice daily as needed    AMLODIPINE (NORVASC) 5 MG TABLET    Take 1 tablet by mouth once daily    ASCORBIC ACID, VITAMIN C, (VITAMIN C) 1000 MG TABLET    Take by mouth. 1 Tablet Oral Every day    ATORVASTATIN (LIPITOR) 20 MG TABLET    Take 1 tablet (20 mg total) by mouth every evening.    AZATHIOPRINE (IMURAN) 50 MG TAB    Take 3 tablets (150 mg total) by mouth once daily.    AZELASTINE (ASTELIN) 137 MCG (0.1 %) NASAL SPRAY    2 sprays (274 mcg total) by Nasal route 2 (two) times daily.    BACILLUS COAGULANS (PROBIOTIC, B. COAGULANS, ORAL)    Take by mouth once daily.    CETIRIZINE (ALLERGY RELIEF, CETIRIZINE,) 10 MG TABLET    Take 1 tablet (10 mg total) by mouth once daily.    CHOLECALCIFEROL, VITAMIN D3, 2,000 UNIT CAP    Take by mouth. 1 capsule Oral Every day    DICYCLOMINE (BENTYL) 20 MG TABLET    Take 1 tablet (20 mg total) by mouth 3 (three) times daily as needed (abdominal pain).    HYDROCHLOROTHIAZIDE (HYDRODIURIL) 25 MG TABLET    Take 1 tablet by mouth once daily    HYDROQUINONE 4 % CREA    Apply to dark spots once daily. Use with sunscreen if outdoors    IPRATROPIUM (ATROVENT) 21 MCG (0.03 %) NASAL SPRAY    2 sprays by Nasal route 3 (three) times daily.    LISINOPRIL (PRINIVIL,ZESTRIL) 40 MG TABLET    Take 1 tablet by mouth once daily    METHOCARBAMOL (ROBAXIN) 500 MG TAB    TAKE 1 TABLET BY MOUTH TWICE DAILY AS NEEDED FOR MUSCLE SPASM AND  NECK  PAIN    METOPROLOL SUCCINATE (TOPROL-XL) 25 MG 24 HR TABLET    Take 1  tablet by mouth once daily    MONTELUKAST (SINGULAIR) 10 MG TABLET    TAKE 1 TABLET BY MOUTH ONCE DAILY IN THE EVENING    MULTIVITAMIN-CA-IRON-MINERALS 18-0.4 MG TAB    Take by mouth. 1 Tablet Oral Every day    POTASSIUM BICARBONATE DISINTEGRATING TABLET    Take 10 mEq by mouth 2 (two) times daily.    TOPIRAMATE (TOPAMAX) 25 MG TABLET    TAKE 2 TABLETS BY MOUTH IN THE EVENING    TRETINOIN (RETIN-A) 0.025 % CREAM    Apply pea-sized amount to entire face at bedtime.  Use every third night and increase as tolerated to nightly.    VALACYCLOVIR (VALTREX) 1000 MG TABLET    TAKE 2 TABLETS BY MOUTH TWICE DAILY FOR 1 DAY PER EPISODE    ZINC SULFATE (ZINC-220 ORAL)         Review of patient's allergies indicates:  No Known Allergies    Physical Exam:   Body mass index is 37.11 kg/m².    GENERAL: Well appearing, in no acute distress.  HEAD: Normocephalic and atraumatic.  ENT: External ears and nose grossly normal.  EYES: EOMI bilaterally  PULMONARY: Respirations are grossly even and non-labored.  NEURO: Awake, alert, and oriented x 3.  SKIN: No obvious rashes appreciated.  PSYCH: Mood & affect are appropriate.    Detailed MSK exam:       Right shoulder range of motion short by about 10-15 degrees in flexion abduction and external rotation internal rotation still limited to back pocket    Imaging:  X-ray Shoulder 2 or More Views Right  Narrative: EXAMINATION:  XR SHOULDER COMPLETE 2 OR MORE VIEWS RIGHT    CLINICAL HISTORY:  Pain in right shoulder    TECHNIQUE:  Two or three views of the right shoulder were performed.    COMPARISON:  None    FINDINGS:  No acute abnormality with minimal degenerative findings of the shoulder.  Lung parenchyma clear.  Impression: As above    Electronically signed by: Cisco Hare MD  Date:    04/09/2024  Time:    14:34    Relevant imaging results were reviewed and interpreted by me and per my read  as above.  This was discussed with the patient and / or family today.     Assessment:  Angelica  Rio Flores is a 63 y.o. female  presents today for possible glenohumeral hydro dilation right shoulder.  Has seen dramatic improvements just over last couple weeks since being seen by myself.  I discussed at this time with the improvement she has made incremental just last few weeks I would defer brisement and consider in the future if she plateaus.  Continue home exercises and will follow up in 6 weeks or sooner if needed.    Adhesive capsulitis of right shoulder  -     ULS US Guidance for Needle Placement  -     Large Joint Aspiration/Injection: R glenohumeral    Right shoulder pain, unspecified chronicity  -     ULS US Guidance for Needle Placement  -     Large Joint Aspiration/Injection: R glenohumeral           Juan Caballero MD    Disclaimer: This note was prepared using a voice recognition system and is likely to have sound alike errors within the text.

## 2024-07-09 DIAGNOSIS — M54.12 CERVICAL RADICULOPATHY: ICD-10-CM

## 2024-07-09 DIAGNOSIS — G56.02 LEFT CARPAL TUNNEL SYNDROME: ICD-10-CM

## 2024-07-09 RX ORDER — TOPIRAMATE 25 MG/1
50 TABLET ORAL
Qty: 60 TABLET | Refills: 0 | Status: SHIPPED | OUTPATIENT
Start: 2024-07-09

## 2024-07-17 ENCOUNTER — TELEPHONE (OUTPATIENT)
Dept: FAMILY MEDICINE | Facility: CLINIC | Age: 64
End: 2024-07-17
Payer: COMMERCIAL

## 2024-07-17 ENCOUNTER — PATIENT MESSAGE (OUTPATIENT)
Dept: GASTROENTEROLOGY | Facility: CLINIC | Age: 64
End: 2024-07-17
Payer: COMMERCIAL

## 2024-07-17 ENCOUNTER — PATIENT MESSAGE (OUTPATIENT)
Dept: FAMILY MEDICINE | Facility: CLINIC | Age: 64
End: 2024-07-17
Payer: COMMERCIAL

## 2024-07-17 DIAGNOSIS — G56.02 LEFT CARPAL TUNNEL SYNDROME: ICD-10-CM

## 2024-07-17 DIAGNOSIS — M54.12 CERVICAL RADICULOPATHY: ICD-10-CM

## 2024-07-18 RX ORDER — TOPIRAMATE 25 MG/1
TABLET ORAL
Qty: 180 TABLET | Refills: 3 | Status: SHIPPED | OUTPATIENT
Start: 2024-07-18

## 2024-07-18 NOTE — TELEPHONE ENCOUNTER
I have put the following orders and/or medications to this note.  Please advise pt.    No orders of the defined types were placed in this encounter.      Medications Ordered This Encounter   Medications    topiramate (TOPAMAX) 25 MG tablet     Sig: TAKE 2 TABLETS BY MOUTH IN THE EVENING     Dispense:  180 tablet     Refill:  3

## 2024-07-24 ENCOUNTER — TELEPHONE (OUTPATIENT)
Dept: ENDOSCOPY | Facility: HOSPITAL | Age: 64
End: 2024-07-24
Payer: COMMERCIAL

## 2024-07-24 DIAGNOSIS — K50.919 CROHN'S DISEASE WITH COMPLICATION, UNSPECIFIED GASTROINTESTINAL TRACT LOCATION: Primary | ICD-10-CM

## 2024-07-24 DIAGNOSIS — Z12.11 ENCOUNTER FOR SCREENING COLONOSCOPY FOR NON-HIGH-RISK PATIENT: Primary | ICD-10-CM

## 2024-07-24 DIAGNOSIS — Z78.0 MENOPAUSE: ICD-10-CM

## 2024-07-24 RX ORDER — SODIUM, POTASSIUM,MAG SULFATES 17.5-3.13G
1 SOLUTION, RECONSTITUTED, ORAL ORAL DAILY
Qty: 1 KIT | Refills: 0 | Status: SHIPPED | OUTPATIENT
Start: 2024-07-24 | End: 2024-07-26

## 2024-07-24 NOTE — TELEPHONE ENCOUNTER
Spoke to pt to schedule procedure(s) Colonoscopy/Chromoendoscopy       Physician to perform procedure(s) Dr. MAGGY Gramajo  Date of Procedure (s) 8/20/24  Arrival Time 8:10 AM  Time of Procedure(s) 9:10 AM   Location of Procedure(s) [place] [address] [facility]  Type of Rx Prep sent to patient: Suprep  Instructions provided to patient via [facility]    Patient was informed on the following information and verbalized understanding. Screening questionnaire reviewed with patient and complete. If procedure requires anesthesia, a responsible adult needs to be present to accompany the patient home, patient cannot drive after receiving anesthesia. Appointment details are tentative, especially check-in time. Patient will receive a prep-op call 7 days prior to confirm check-in time for procedure. If applicable the patient should contact their pharmacy to verify Rx for procedure prep is ready for pick-up. Patient was advised to call the scheduling department at [phone] if pharmacy states no Rx is available. Patient was advised to call the endoscopy scheduling department if any questions or concerns arise.      [facility] Scheduling Department

## 2024-07-24 NOTE — TELEPHONE ENCOUNTER
Dear Referring Provider and Staff,    The Endoscopy Scheduling Department has made 2+ attempts to reach the patient for scheduling their Colonoscopy/Chromoendoscopy. All final attempts have been made for this referral, if the referring provider would like the patient to receive endoscopic care, please confirm with the patient whether they would like to proceed. If so, then submit a new referral and inform the Pt that the Endoscopy Scheduling department will be contacting them to schedule their procedure.      Thank you,    Endoscopy Scheduling Department

## 2024-07-24 NOTE — TELEPHONE ENCOUNTER
Spoke with patient on [phone], in reference to further recommendations. Patient voices understanding and states she spoke with someone today in regard to scheduling appointment. Patient states the appointment is scheduled.

## 2024-07-29 ENCOUNTER — TELEPHONE (OUTPATIENT)
Dept: FAMILY MEDICINE | Facility: CLINIC | Age: 64
End: 2024-07-29
Payer: COMMERCIAL

## 2024-07-29 ENCOUNTER — PATIENT MESSAGE (OUTPATIENT)
Dept: GASTROENTEROLOGY | Facility: CLINIC | Age: 64
End: 2024-07-29
Payer: COMMERCIAL

## 2024-07-29 NOTE — PROGRESS NOTES
Subjective     Patient ID: Angelica Flores is a 64 y.o. female.    Chief Complaint: Epistaxis, Fever, and Conjunctivitis      HPI  Patient presents due to URI symptoms. She reports nose bleeds, eye redness, sinus pressure, body aches and blurry vision since Friday. She did 2 home COVID tests which were negative. She has been taking Nyquil with limited improvement.    COVID risk score: 4   Review of Systems   Constitutional:  Positive for fever. Negative for chills and fatigue.   HENT:  Positive for nosebleeds.    Eyes:  Positive for visual disturbance.        Eye redness   Respiratory:  Negative for chest tightness and shortness of breath.    Cardiovascular:  Negative for chest pain and palpitations.   Gastrointestinal:  Negative for abdominal pain, constipation, diarrhea, nausea and vomiting.   Genitourinary:  Negative for frequency and urgency.   Neurological:  Positive for headaches. Negative for dizziness and numbness.          Objective       Current Outpatient Medications:     adalimumab (HUMIRA,CF, PEN) 40 mg/0.4 mL PnKt, Inject 0.4 mLs (40 mg total) into the skin every 14 (fourteen) days., Disp: 2 pen , Rfl: 11    ALPRAZolam (XANAX) 0.5 MG tablet, Take 1 tablet by mouth twice daily as needed, Disp: 60 tablet, Rfl: 0    amLODIPine (NORVASC) 5 MG tablet, Take 1 tablet by mouth once daily, Disp: 90 tablet, Rfl: 0    ascorbic acid, vitamin C, (VITAMIN C) 1000 MG tablet, Take by mouth. 1 Tablet Oral Every day, Disp: , Rfl:     atorvastatin (LIPITOR) 20 MG tablet, Take 1 tablet (20 mg total) by mouth every evening., Disp: 30 tablet, Rfl: 11    azaTHIOprine (IMURAN) 50 mg Tab, Take 3 tablets (150 mg total) by mouth once daily., Disp: 270 tablet, Rfl: 0    azelastine (ASTELIN) 137 mcg (0.1 %) nasal spray, 2 sprays (274 mcg total) by Nasal route 2 (two) times daily., Disp: 90 mL, Rfl: 3    BACILLUS COAGULANS (PROBIOTIC, B. COAGULANS, ORAL), Take by mouth once daily., Disp: , Rfl:     cetirizine (ALLERGY RELIEF,  CETIRIZINE,) 10 MG tablet, Take 1 tablet (10 mg total) by mouth once daily., Disp: 90 tablet, Rfl: 1    cholecalciferol, vitamin D3, 2,000 unit Cap, Take by mouth. 1 capsule Oral Every day, Disp: , Rfl:     dicyclomine (BENTYL) 20 mg tablet, Take 1 tablet (20 mg total) by mouth 3 (three) times daily as needed (abdominal pain)., Disp: 90 tablet, Rfl: 0    hydroCHLOROthiazide (HYDRODIURIL) 25 MG tablet, Take 1 tablet by mouth once daily, Disp: 90 tablet, Rfl: 3    hydroquinone 4 % Crea, Apply to dark spots once daily. Use with sunscreen if outdoors, Disp: 28 g, Rfl: 1    ipratropium (ATROVENT) 21 mcg (0.03 %) nasal spray, 2 sprays by Nasal route 3 (three) times daily., Disp: 20 mL, Rfl: 5    lisinopriL (PRINIVIL,ZESTRIL) 40 MG tablet, Take 1 tablet by mouth once daily, Disp: 90 tablet, Rfl: 1    methocarbamoL (ROBAXIN) 500 MG Tab, TAKE 1 TABLET BY MOUTH TWICE DAILY AS NEEDED FOR MUSCLE SPASM AND  NECK  PAIN, Disp: 60 tablet, Rfl: 0    metoprolol succinate (TOPROL-XL) 25 MG 24 hr tablet, Take 1 tablet by mouth once daily, Disp: 90 tablet, Rfl: 3    montelukast (SINGULAIR) 10 mg tablet, TAKE 1 TABLET BY MOUTH ONCE DAILY IN THE EVENING, Disp: 90 tablet, Rfl: 3    multivitamin-Ca-iron-minerals 18-0.4 mg Tab, Take by mouth. 1 Tablet Oral Every day, Disp: , Rfl:     nirmatrelvir-ritonavir (PAXLOVID) 300 mg (150 mg x 2)-100 mg copackaged tablets (EUA), Take 3 tablets by mouth 2 (two) times daily. Each dose contains 2 nirmatrelvir (pink tablets) and 1 ritonavir (white tablet). Take all 3 tablets together, Disp: 30 tablet, Rfl: 0    potassium bicarbonate disintegrating tablet, Take 10 mEq by mouth 2 (two) times daily., Disp: , Rfl:     topiramate (TOPAMAX) 25 MG tablet, TAKE 2 TABLETS BY MOUTH IN THE EVENING, Disp: 180 tablet, Rfl: 3    tretinoin (RETIN-A) 0.025 % cream, Apply pea-sized amount to entire face at bedtime.  Use every third night and increase as tolerated to nightly., Disp: 20 g, Rfl: 11    valACYclovir (VALTREX)  1000 MG tablet, TAKE 2 TABLETS BY MOUTH TWICE DAILY FOR 1 DAY PER EPISODE, Disp: 30 tablet, Rfl: 0    zinc sulfate (ZINC-220 ORAL), , Disp: , Rfl:   No current facility-administered medications for this visit.    Facility-Administered Medications Ordered in Other Visits:     lactated ringers infusion, , Intravenous, Continuous, Erlinda Lindsay MD, Last Rate: 200 mL/hr at 08/22/23 1222, Rate Change at 08/22/23 1222     Physical Exam  Constitutional:       General: She is not in acute distress.     Appearance: Normal appearance. She is obese.   HENT:      Head: Normocephalic and atraumatic.   Eyes:      Comments: Popped blood vessel in left eye   Cardiovascular:      Rate and Rhythm: Normal rate and regular rhythm.      Heart sounds: Normal heart sounds. No murmur heard.     No friction rub. No gallop.   Pulmonary:      Effort: Pulmonary effort is normal.      Breath sounds: Normal breath sounds. No wheezing, rhonchi or rales.   Abdominal:      General: Bowel sounds are normal. There is no distension.      Palpations: Abdomen is soft.      Tenderness: There is no abdominal tenderness. There is no rebound.   Skin:     General: Skin is warm and dry.      Coloration: Skin is not jaundiced.   Neurological:      General: No focal deficit present.      Mental Status: She is alert and oriented to person, place, and time. Mental status is at baseline.   Psychiatric:         Mood and Affect: Mood normal.         Behavior: Behavior normal.              VITAL SIGNS:  Vitals:    07/30/24 0857   BP: 122/76   BP Location: Left arm   Patient Position: Sitting   BP Method: Medium (Manual)   Pulse: 88   Resp: 18   Temp: 99.1 °F (37.3 °C)   TempSrc: Tympanic   SpO2: 95%   Weight: 84.8 kg (186 lb 15.2 oz)   Height: 5' (1.524 m)       CURRENT BMI:   Body mass index is 36.51 kg/m².        ~~~~~~~~~~~~~~~~~~~~~~~~~~~~~~~~~~~~~~~~~~~~~~~    LABS REVIEWED:    CBC:  Lab Results   Component Value Date    WBC 11.69 06/06/2024    RBC 4.22  06/06/2024    HGB 13.3 06/06/2024    HCT 40.5 06/06/2024    MCV 96 06/06/2024    MCH 31.5 (H) 06/06/2024    MCHC 32.8 06/06/2024    RDW 13.6 06/06/2024     06/06/2024    MPV 11.1 06/06/2024    GRAN 6.8 06/06/2024    GRAN 57.8 06/06/2024    LYMPH 3.8 06/06/2024    LYMPH 32.5 06/06/2024    MONO 0.9 06/06/2024    MONO 7.6 06/06/2024    EOS 0.1 06/06/2024    BASO 0.05 06/06/2024    EOSINOPHIL 0.9 06/06/2024    BASOPHIL 0.4 06/06/2024       CHEMISTRY:  Sodium   Date Value Ref Range Status   06/06/2024 138 136 - 145 mmol/L Final     Potassium   Date Value Ref Range Status   06/06/2024 3.5 3.5 - 5.1 mmol/L Final     Chloride   Date Value Ref Range Status   06/06/2024 104 95 - 110 mmol/L Final     CO2   Date Value Ref Range Status   06/06/2024 25 23 - 29 mmol/L Final     Glucose   Date Value Ref Range Status   06/06/2024 72 70 - 110 mg/dL Final     BUN   Date Value Ref Range Status   06/06/2024 22 8 - 23 mg/dL Final     Creatinine   Date Value Ref Range Status   06/06/2024 0.9 0.5 - 1.4 mg/dL Final     Calcium   Date Value Ref Range Status   06/06/2024 10.0 8.7 - 10.5 mg/dL Final     Total Protein   Date Value Ref Range Status   06/06/2024 8.0 6.0 - 8.4 g/dL Final     Albumin   Date Value Ref Range Status   06/06/2024 3.9 3.5 - 5.2 g/dL Final     Total Bilirubin   Date Value Ref Range Status   06/06/2024 0.4 0.1 - 1.0 mg/dL Final     Comment:     For infants and newborns, interpretation of results should be based  on gestational age, weight and in agreement with clinical  observations.    Premature Infant recommended reference ranges:  Up to 24 hours.............<8.0 mg/dL  Up to 48 hours............<12.0 mg/dL  3-5 days..................<15.0 mg/dL  6-29 days.................<15.0 mg/dL       Alkaline Phosphatase   Date Value Ref Range Status   06/06/2024 75 55 - 135 U/L Final     AST   Date Value Ref Range Status   06/06/2024 17 10 - 40 U/L Final     ALT   Date Value Ref Range Status   06/06/2024 16 10 - 44 U/L  Final     Anion Gap   Date Value Ref Range Status   06/06/2024 9 8 - 16 mmol/L Final     eGFR   Date Value Ref Range Status   06/06/2024 >60.0 >60 mL/min/1.73 m^2 Final       LIPID PANEL:  Lab Results   Component Value Date    CHOL 211 (H) 08/31/2023    CHOL 220 (H) 08/23/2022     Lab Results   Component Value Date    TRIG 55 08/31/2023    TRIG 71 08/23/2022     Lab Results   Component Value Date    HDL 59 08/31/2023    HDL 63 08/23/2022     Lab Results   Component Value Date    LDLCALC 141.0 08/31/2023    LDLCALC 142.8 08/23/2022       THYROID:  Lab Results   Component Value Date    TSH 0.854 08/31/2023    FREET4 1.15 04/27/2016       DIABETES:  Lab Results   Component Value Date    HGBA1C 5.4 08/31/2023     Microalb/Creat Ratio   Date Value Ref Range Status   04/27/2016 8.6 0.0 - 30.0 ug/mg Final         Assessment and Plan     1. Flu-like symptoms  Comments:  Will test for COVID.  Orders:  -     POCT COVID-19 Rapid Screening    2. Subconjunctival hemorrhage of left eye  Comments:  Advised patient to use lubricating eye drops (artificial tears) as eye heals    3. Epistaxis  Comments:  Advised patient to use saline nasal spray to keep mucus membranes moisturized    4. COVID-19  Comments:  Will prescribe Paxlovid due to COVID risk score. Advised patient to not take Lipitor while on Paxlovid  Orders:  -     nirmatrelvir-ritonavir (PAXLOVID) 300 mg (150 mg x 2)-100 mg copackaged tablets (EUA); Take 3 tablets by mouth 2 (two) times daily. Each dose contains 2 nirmatrelvir (pink tablets) and 1 ritonavir (white tablet). Take all 3 tablets together  Dispense: 30 tablet; Refill: 0      34 of total time spent on the encounter, which includes face to face time and non-face to face time preparing to see the patient. This includes obtaining and/or reviewing separately obtained history, performing a medically appropriate examination and/or evaluation, and counseling and educating the patient/family/caregiver. Includes  documenting clinical information in the electronic or other health record, independently interpreting results (not separately reported) and communicating results to the patient/family/caregiver, with care coordination (not separately reported). Medications, tests and/or procedures ordered as necessary along with referring and communicating with other health professionals (when not separately reported).           No follow-ups on file.

## 2024-07-29 NOTE — TELEPHONE ENCOUNTER
----- Message from Rita Harden sent at 7/29/2024 11:07 AM CDT -----  Contact: Cristiane  Patient is calling in due to she has had over the weekend three nose bleeds and her left eye is half red and blurry. She is concerned due to her eye getting more red. She checked for Covid and it was negative. Call back is  [phone]

## 2024-07-30 ENCOUNTER — OFFICE VISIT (OUTPATIENT)
Dept: FAMILY MEDICINE | Facility: CLINIC | Age: 64
End: 2024-07-30
Payer: MEDICARE

## 2024-07-30 VITALS
HEIGHT: 60 IN | OXYGEN SATURATION: 95 % | BODY MASS INDEX: 36.7 KG/M2 | DIASTOLIC BLOOD PRESSURE: 76 MMHG | RESPIRATION RATE: 18 BRPM | TEMPERATURE: 99 F | HEART RATE: 88 BPM | WEIGHT: 186.94 LBS | SYSTOLIC BLOOD PRESSURE: 122 MMHG

## 2024-07-30 DIAGNOSIS — H11.32 SUBCONJUNCTIVAL HEMORRHAGE OF LEFT EYE: ICD-10-CM

## 2024-07-30 DIAGNOSIS — R04.0 EPISTAXIS: ICD-10-CM

## 2024-07-30 DIAGNOSIS — U07.1 COVID-19: ICD-10-CM

## 2024-07-30 DIAGNOSIS — R68.89 FLU-LIKE SYMPTOMS: Primary | ICD-10-CM

## 2024-07-30 LAB
CTP QC/QA: YES
SARS-COV-2 RDRP RESP QL NAA+PROBE: POSITIVE

## 2024-07-30 PROCEDURE — 99215 OFFICE O/P EST HI 40 MIN: CPT | Mod: PBBFAC,PO | Performed by: INTERNAL MEDICINE

## 2024-07-30 PROCEDURE — 99999 PR PBB SHADOW E&M-EST. PATIENT-LVL V: CPT | Mod: PBBFAC,,, | Performed by: INTERNAL MEDICINE

## 2024-07-30 PROCEDURE — 99999PBSHW: Mod: PBBFAC,,,

## 2024-07-30 PROCEDURE — 87635 SARS-COV-2 COVID-19 AMP PRB: CPT | Mod: PBBFAC,PO | Performed by: INTERNAL MEDICINE

## 2024-07-30 PROCEDURE — 99214 OFFICE O/P EST MOD 30 MIN: CPT | Mod: S$PBB,,, | Performed by: INTERNAL MEDICINE

## 2024-07-30 RX ORDER — NIRMATRELVIR AND RITONAVIR 300-100 MG
KIT ORAL
Qty: 30 TABLET | Refills: 0 | Status: SHIPPED | OUTPATIENT
Start: 2024-07-30 | End: 2024-08-04

## 2024-08-08 ENCOUNTER — TELEPHONE (OUTPATIENT)
Dept: GASTROENTEROLOGY | Facility: CLINIC | Age: 64
End: 2024-08-08
Payer: COMMERCIAL

## 2024-08-15 ENCOUNTER — PATIENT MESSAGE (OUTPATIENT)
Dept: FAMILY MEDICINE | Facility: CLINIC | Age: 64
End: 2024-08-15
Payer: COMMERCIAL

## 2024-08-15 ENCOUNTER — CLINICAL SUPPORT (OUTPATIENT)
Dept: ENDOSCOPY | Facility: HOSPITAL | Age: 64
End: 2024-08-15
Payer: MEDICARE

## 2024-08-15 ENCOUNTER — TELEPHONE (OUTPATIENT)
Dept: FAMILY MEDICINE | Facility: CLINIC | Age: 64
End: 2024-08-15
Payer: COMMERCIAL

## 2024-08-15 DIAGNOSIS — K50.10 CROHN'S DISEASE OF LARGE INTESTINE WITHOUT COMPLICATION: Primary | ICD-10-CM

## 2024-08-15 NOTE — TELEPHONE ENCOUNTER
Good morning, I saw Dr. Ryan on 7/30 because you were not available and was diagnosed with covid. I was prescribed paxlovid and completed. I am still COVID positive as of yesterday. I still have a terrible frontal sinus headache especially on left side. I have had another left sided nose bleed x 1. I still have cold-like symptoms with drainage. No fever but I am still taking coricidin HB, NyQuil and   Meds for relief around the clock and coughing. My sputum is yellowish.      Is there any way I can get some  doxycycline and steroids?? If you cant order, I understand. I will just make another appointment or go to urgent care. Thank you

## 2024-08-16 RX ORDER — METHYLPREDNISOLONE 4 MG/1
TABLET ORAL
Qty: 1 EACH | Refills: 0 | Status: SHIPPED | OUTPATIENT
Start: 2024-08-16

## 2024-08-16 RX ORDER — DOXYCYCLINE HYCLATE 100 MG
100 TABLET ORAL EVERY 12 HOURS
Qty: 20 TABLET | Refills: 0 | Status: SHIPPED | OUTPATIENT
Start: 2024-08-16 | End: 2024-08-26

## 2024-08-16 NOTE — TELEPHONE ENCOUNTER
Please advise patient sorry for delay. I have put the following orders and/or medications to this note.  Thanks.    No orders of the defined types were placed in this encounter.      Medications Ordered This Encounter   Medications    doxycycline (VIBRA-TABS) 100 MG tablet     Sig: Take 1 tablet (100 mg total) by mouth every 12 (twelve) hours. for 10 days     Dispense:  20 tablet     Refill:  0    methylPREDNISolone (MEDROL DOSEPACK) 4 mg tablet     Sig: use as directed     Dispense:  1 each     Refill:  0

## 2024-08-20 ENCOUNTER — ANESTHESIA EVENT (OUTPATIENT)
Dept: ENDOSCOPY | Facility: HOSPITAL | Age: 64
End: 2024-08-20
Payer: MEDICARE

## 2024-08-20 ENCOUNTER — ANESTHESIA (OUTPATIENT)
Dept: ENDOSCOPY | Facility: HOSPITAL | Age: 64
End: 2024-08-20
Payer: MEDICARE

## 2024-08-20 ENCOUNTER — HOSPITAL ENCOUNTER (OUTPATIENT)
Facility: HOSPITAL | Age: 64
Discharge: HOME | End: 2024-08-20
Attending: INTERNAL MEDICINE | Admitting: INTERNAL MEDICINE
Payer: COMMERCIAL

## 2024-08-20 VITALS
DIASTOLIC BLOOD PRESSURE: 59 MMHG | WEIGHT: 186 LBS | SYSTOLIC BLOOD PRESSURE: 105 MMHG | BODY MASS INDEX: 36.52 KG/M2 | OXYGEN SATURATION: 100 % | RESPIRATION RATE: 18 BRPM | HEART RATE: 50 BPM | TEMPERATURE: 97 F | HEIGHT: 60 IN

## 2024-08-20 DIAGNOSIS — K50.90 CROHN'S DISEASE: ICD-10-CM

## 2024-08-20 PROCEDURE — 45385 COLONOSCOPY W/LESION REMOVAL: CPT | Mod: PT | Performed by: INTERNAL MEDICINE

## 2024-08-20 PROCEDURE — 27201012 HC FORCEPS, HOT/COLD, DISP: Performed by: INTERNAL MEDICINE

## 2024-08-20 PROCEDURE — 63600175 PHARM REV CODE 636 W HCPCS: Performed by: NURSE ANESTHETIST, CERTIFIED REGISTERED

## 2024-08-20 PROCEDURE — 44799 UNLISTED PX SMALL INTESTINE: CPT | Performed by: INTERNAL MEDICINE

## 2024-08-20 PROCEDURE — 88305 TISSUE EXAM BY PATHOLOGIST: CPT | Performed by: PATHOLOGY

## 2024-08-20 PROCEDURE — 88305 TISSUE EXAM BY PATHOLOGIST: CPT | Mod: 26,,, | Performed by: PATHOLOGY

## 2024-08-20 PROCEDURE — E9220 PRA ENDO ANESTHESIA: HCPCS | Mod: ,,, | Performed by: NURSE ANESTHETIST, CERTIFIED REGISTERED

## 2024-08-20 PROCEDURE — 25000003 PHARM REV CODE 250: Performed by: NURSE ANESTHETIST, CERTIFIED REGISTERED

## 2024-08-20 PROCEDURE — 45385 COLONOSCOPY W/LESION REMOVAL: CPT | Mod: 33,,, | Performed by: INTERNAL MEDICINE

## 2024-08-20 PROCEDURE — 44799 UNLISTED PX SMALL INTESTINE: CPT | Mod: ,,, | Performed by: INTERNAL MEDICINE

## 2024-08-20 PROCEDURE — 45380 COLONOSCOPY AND BIOPSY: CPT | Mod: 59,33 | Performed by: INTERNAL MEDICINE

## 2024-08-20 PROCEDURE — 45380 COLONOSCOPY AND BIOPSY: CPT | Mod: 59,33,, | Performed by: INTERNAL MEDICINE

## 2024-08-20 PROCEDURE — 27201089 HC SNARE, DISP (ANY): Performed by: INTERNAL MEDICINE

## 2024-08-20 PROCEDURE — 37000008 HC ANESTHESIA 1ST 15 MINUTES: Performed by: INTERNAL MEDICINE

## 2024-08-20 PROCEDURE — 37000009 HC ANESTHESIA EA ADD 15 MINS: Performed by: INTERNAL MEDICINE

## 2024-08-20 RX ORDER — PROPOFOL 10 MG/ML
VIAL (ML) INTRAVENOUS
Status: DISCONTINUED | OUTPATIENT
Start: 2024-08-20 | End: 2024-08-20

## 2024-08-20 RX ORDER — PROPOFOL 10 MG/ML
VIAL (ML) INTRAVENOUS CONTINUOUS PRN
Status: DISCONTINUED | OUTPATIENT
Start: 2024-08-20 | End: 2024-08-20

## 2024-08-20 RX ORDER — LIDOCAINE HYDROCHLORIDE 20 MG/ML
INJECTION INTRAVENOUS
Status: DISCONTINUED | OUTPATIENT
Start: 2024-08-20 | End: 2024-08-20

## 2024-08-20 RX ORDER — SODIUM CHLORIDE 9 MG/ML
INJECTION, SOLUTION INTRAVENOUS CONTINUOUS
Status: DISCONTINUED | OUTPATIENT
Start: 2024-08-20 | End: 2024-08-20 | Stop reason: HOSPADM

## 2024-08-20 RX ADMIN — PROPOFOL 150 MCG/KG/MIN: 10 INJECTION, EMULSION INTRAVENOUS at 09:08

## 2024-08-20 RX ADMIN — SODIUM CHLORIDE: 9 INJECTION, SOLUTION INTRAVENOUS at 09:08

## 2024-08-20 RX ADMIN — PROPOFOL 60 MG: 10 INJECTION, EMULSION INTRAVENOUS at 09:08

## 2024-08-20 RX ADMIN — LIDOCAINE HYDROCHLORIDE 30 MG: 20 INJECTION INTRAVENOUS at 09:08

## 2024-08-20 NOTE — ANESTHESIA PREPROCEDURE EVALUATION
08/20/2024  Angelica Flores is a 64 y.o., female.    Pre-operative evaluation for Procedure(s) (LRB):  COLONOSCOPY, WITH CHROMOENDOSCOPY (N/A)    Angelica Flores is a 64 y.o. female     Patient Active Problem List   Diagnosis    Essential hypertension    Dysthymic disorder    Hyperlipidemia    Vitamin D deficiency    Insomnia    Allergic rhinitis    Postmenopausal    Migraine without status migrainosus, not intractable    Severe obesity (BMI 35.0-39.9) with comorbidity    Drug-induced immunodeficiency    Crohn's disease of large intestine without complication    Chronic left shoulder pain    Neck pain on left side    Strain of left trapezius muscle    Decreased range of motion of left shoulder    Decreased strength, endurance, and mobility    Decreased functional mobility and endurance    Neuropathy of left hand    Cervical spine arthritis    Cervical radiculopathy    Bilateral carpal tunnel syndrome    Abnormal ECG    Syncope    Chronic right shoulder pain    Muscle weakness of right upper extremity       Review of patient's allergies indicates:  No Known Allergies    Current Facility-Administered Medications on File Prior to Encounter   Medication Dose Route Frequency Provider Last Rate Last Admin    lactated ringers infusion   Intravenous Continuous Erlinda Lindsay  mL/hr at 08/22/23 1222 Rate Change at 08/22/23 1222     Current Outpatient Medications on File Prior to Encounter   Medication Sig Dispense Refill    adalimumab (HUMIRA,CF, PEN) 40 mg/0.4 mL PnKt Inject 0.4 mLs (40 mg total) into the skin every 14 (fourteen) days. 2 pen 11    amLODIPine (NORVASC) 5 MG tablet Take 1 tablet by mouth once daily 90 tablet 0    hydroCHLOROthiazide (HYDRODIURIL) 25 MG tablet Take 1 tablet by mouth once daily 90 tablet 3    metoprolol succinate (TOPROL-XL) 25 MG 24 hr tablet Take 1 tablet by mouth  once daily 90 tablet 3    ALPRAZolam (XANAX) 0.5 MG tablet Take 1 tablet by mouth twice daily as needed 60 tablet 0    ascorbic acid, vitamin C, (VITAMIN C) 1000 MG tablet Take by mouth. 1 Tablet Oral Every day      atorvastatin (LIPITOR) 20 MG tablet Take 1 tablet (20 mg total) by mouth every evening. 30 tablet 11    azaTHIOprine (IMURAN) 50 mg Tab Take 3 tablets (150 mg total) by mouth once daily. 270 tablet 0    azelastine (ASTELIN) 137 mcg (0.1 %) nasal spray 2 sprays (274 mcg total) by Nasal route 2 (two) times daily. 90 mL 3    BACILLUS COAGULANS (PROBIOTIC, B. COAGULANS, ORAL) Take by mouth once daily.      cetirizine (ALLERGY RELIEF, CETIRIZINE,) 10 MG tablet Take 1 tablet (10 mg total) by mouth once daily. 90 tablet 1    cholecalciferol, vitamin D3, 2,000 unit Cap Take by mouth. 1 capsule Oral Every day      dicyclomine (BENTYL) 20 mg tablet Take 1 tablet (20 mg total) by mouth 3 (three) times daily as needed (abdominal pain). 90 tablet 0    hydroquinone 4 % Crea Apply to dark spots once daily. Use with sunscreen if outdoors 28 g 1    ipratropium (ATROVENT) 21 mcg (0.03 %) nasal spray 2 sprays by Nasal route 3 (three) times daily. 20 mL 5    lisinopriL (PRINIVIL,ZESTRIL) 40 MG tablet Take 1 tablet by mouth once daily 90 tablet 1    methocarbamoL (ROBAXIN) 500 MG Tab TAKE 1 TABLET BY MOUTH TWICE DAILY AS NEEDED FOR MUSCLE SPASM AND  NECK  PAIN 60 tablet 0    montelukast (SINGULAIR) 10 mg tablet TAKE 1 TABLET BY MOUTH ONCE DAILY IN THE EVENING 90 tablet 3    multivitamin-Ca-iron-minerals 18-0.4 mg Tab Take by mouth. 1 Tablet Oral Every day      potassium bicarbonate disintegrating tablet Take 10 mEq by mouth 2 (two) times daily.      topiramate (TOPAMAX) 25 MG tablet TAKE 2 TABLETS BY MOUTH IN THE EVENING 180 tablet 3    tretinoin (RETIN-A) 0.025 % cream Apply pea-sized amount to entire face at bedtime.  Use every third night and increase as tolerated to nightly. 20 g 11    valACYclovir (VALTREX) 1000 MG  tablet TAKE 2 TABLETS BY MOUTH TWICE DAILY FOR 1 DAY PER EPISODE 30 tablet 0    zinc sulfate (ZINC-220 ORAL)          Past Surgical History:   Procedure Laterality Date    ANKLE FRACTURE SURGERY  08/19/2008    right ankle    BREAST BIOPSY Left 1977    COLONOSCOPY N/A 08/11/2017    Procedure: COLONOSCOPY - REQUESTS DR. MATTI WOOD;  Surgeon: Matti Wood III, MD;  Location: [facility];  Service: Endoscopy;  Laterality: N/A;    COLONOSCOPY N/A 11/23/2020    Procedure: COLONOSCOPY;  Surgeon: Matti Wood III, MD;  Location: [facility];  Service: Endoscopy;  Laterality: N/A;    COLONOSCOPY N/A 09/28/2022    Procedure: COLONOSCOPY;  Surgeon: Matti Wood III, MD;  Location: [facility];  Service: Endoscopy;  Laterality: N/A;    COLONOSCOPY N/A 08/22/2023    Procedure: COLONOSCOPY;  Surgeon: Erlinda Lindsay MD;  Location: [facility];  Service: Endoscopy;  Laterality: N/A;    COLONOSCOPY N/A 2/1/2024    Procedure: Colonosocpy with CHROMO;  Surgeon: Edwin Muller MD;  Location: [facility] ([address] [place] [address]);  Service: Endoscopy;  Laterality: N/A;  Colonoscopy with CHROMO. referral by Haylee Gallo NP, Suprep, salima -ml  1/25-precall complete-MS    OOPHORECTOMY Bilateral     TAHBSO  02/2011    Due to abnormal pap smear and fibroids    TOTAL ABDOMINAL HYSTERECTOMY         Social History     Socioeconomic History    Marital status:     Number of children: 2   Occupational History    Occupation: RN     Employer: [employer]     Comment: [employer]   Tobacco Use    Smoking status: Never    Smokeless tobacco: Never   Substance and Sexual Activity    Alcohol use: Yes     Alcohol/week: 0.0 standard drinks of alcohol     Comment: ocassionally    Drug use: No    Sexual activity: Yes     Partners: Male     Birth control/protection: Surgical   Social History Narrative    She wears seatbelt.  July 22, 2017. She states she works new position at [employer].     Social Determinants of Health     Financial Resource  "Strain: Low Risk  (2023)    Overall Financial Resource Strain (CARDIA)     Difficulty of Paying Living Expenses: Not hard at all   Food Insecurity: No Food Insecurity (2023)    Hunger Vital Sign     Worried About Running Out of Food in the Last Year: Never true     Ran Out of Food in the Last Year: Never true   Transportation Needs: No Transportation Needs (2023)    PRAPARE - Transportation     Lack of Transportation (Medical): No     Lack of Transportation (Non-Medical): No   Physical Activity: Insufficiently Active (3/12/2024)    Exercise Vital Sign     Days of Exercise per Week: 3 days     Minutes of Exercise per Session: 30 min   Stress: Stress Concern Present (2023)    [employer] - Occupational Stress Questionnaire     Feeling of Stress : To some extent   Housing Stability: Low Risk  (2023)    Housing Stability Vital Sign     Unable to Pay for Housing in the Last Year: No     Number of Places Lived in the Last Year: 1     Unstable Housing in the Last Year: No         CBC: No results for input(s): "WBC", "RBC", "HGB", "HCT", "PLT", "MCV", "MCH", "MCHC" in the last 72 hours.    CMP: No results for input(s): "NA", "K", "CL", "CO2", "BUN", "CREATININE", "GLU", "MG", "PHOS", "CALCIUM", "ALBUMIN", "PROT", "ALKPHOS", "ALT", "AST", "BILITOT" in the last 72 hours.    INR  No results for input(s): "PT", "INR", "PROTIME", "APTT" in the last 72 hours.        Diagnostic Studies:      EKD Echo:  No results found for this or any previous visit.        Pre-op Assessment    I have reviewed the Patient Summary Reports.     I have reviewed the Nursing Notes. I have reviewed the NPO Status.   I have reviewed the Medications.     Review of Systems  Social:  Social Alcohol Use, Non-Smoker       EENT/Dental:  chronic allergic rhinitis           Cardiovascular:  Exercise tolerance: good   Hypertension           hyperlipidemia                             Pulmonary:    " Asthma                    Musculoskeletal:  Arthritis               Neurological:      Headaches (Migraines)                                 Endocrine:        Obesity / BMI > 30      Physical Exam  General: Well nourished, Cooperative and Alert    Airway:  Mallampati: II   Mouth Opening: Normal  TM Distance: Normal  Tongue: Normal  Neck ROM: Normal ROM    Dental:  Intact    Chest/Lungs:  Normal Respiratory Rate    Heart:  Rate: Normal        Anesthesia Plan  Type of Anesthesia, risks & benefits discussed:    Anesthesia Type: Gen ETT, Gen Natural Airway  Intra-op Monitoring Plan: Standard ASA Monitors  Post Op Pain Control Plan: multimodal analgesia and IV/PO Opioids PRN  Induction:  IV  Airway Plan: Direct, Post-Induction  Informed Consent: Informed consent signed with the Patient and all parties understand the risks and agree with anesthesia plan.  All questions answered.   ASA Score: 2  Day of Surgery Review of History & Physical: H&P Update referred to the surgeon/provider.    Ready For Surgery From Anesthesia Perspective.     .

## 2024-08-20 NOTE — ANESTHESIA POSTPROCEDURE EVALUATION
Anesthesia Post Evaluation    Patient: Angelica Flores    Procedure(s) Performed: Procedure(s) (LRB):  COLONOSCOPY, WITH CHROMOENDOSCOPY (N/A)    Final Anesthesia Type: general      Patient location during evaluation: GI PACU  Patient participation: Yes- Able to Participate  Level of consciousness: awake and alert  Post-procedure vital signs: reviewed and stable  Pain management: adequate  Airway patency: patent  POLI mitigation strategies: Multimodal analgesia and Extubation and recovery carried out in lateral, semiupright, or other nonsupine position  PONV status at discharge: No PONV  Anesthetic complications: no      Cardiovascular status: stable  Respiratory status: unassisted and spontaneous ventilation  Hydration status: euvolemic  Follow-up not needed.              Vitals Value Taken Time   BP 97/61 08/20/24 1002   Temp  08/20/24 1012   Pulse 54 08/20/24 1002   Resp 18 08/20/24 1002   SpO2 99 % 08/20/24 1002         No case tracking events are documented in the log.      Pain/Germán Score: Germán Score: 9 (8/20/2024 10:02 AM)

## 2024-08-20 NOTE — H&P
Short Stay Endoscopy History and Physical    PCP - Monet Alvarez MD    Procedure - Colonoscopy  ASA - 2  Mallampati - per anesthesia  History of Anesthesia problems - no  Family history Anesthesia problems -  no     HPI:  This is a 64 y.o. female here for evaluation of :     Average Risk Screening: No  High risk screening: yes - Crohn's  History of polyps: No  Anemia: No  Blood in stools: No  Diarrhea: No  Abdominal Pain: No    Review of Systems:  CONSTITUTIONAL: Denies weight change,  fatigue, fevers, chills, night sweats.  CARDIOVASCULAR: Denies chest pain, shortness of breath, orthopnea and edema.  RESPIRATORY: Denies cough, hemoptysis, dyspnea, and wheezing.  GI: See HPI.    Medical History:  Past Medical History:   Diagnosis Date    Anxiety     Asthma     Crohn's disease     Fibroids     Hyperlipidemia     Hypertension     Iritis     Migraine headache     Ocular    Osteopenia 08/2019    Syncope and collapse     Vitamin D deficiency disease        Surgical History:   Past Surgical History:   Procedure Laterality Date    ANKLE FRACTURE SURGERY  08/19/2008    right ankle    BREAST BIOPSY Left 1977    COLONOSCOPY N/A 08/11/2017    Procedure: COLONOSCOPY - REQUESTS DR. MATTI WOOD;  Surgeon: Matti Wood III, MD;  Location: [facility];  Service: Endoscopy;  Laterality: N/A;    COLONOSCOPY N/A 11/23/2020    Procedure: COLONOSCOPY;  Surgeon: Matti Wood III, MD;  Location: [facility];  Service: Endoscopy;  Laterality: N/A;    COLONOSCOPY N/A 09/28/2022    Procedure: COLONOSCOPY;  Surgeon: Matti Wood III, MD;  Location: [facility];  Service: Endoscopy;  Laterality: N/A;    COLONOSCOPY N/A 08/22/2023    Procedure: COLONOSCOPY;  Surgeon: Erlinda Lindsay MD;  Location: [facility];  Service: Endoscopy;  Laterality: N/A;    COLONOSCOPY N/A 2/1/2024    Procedure: Colonosocpy with CHROMO;  Surgeon: Edwin Muller MD;  Location: [address];  Service: Endoscopy;  Laterality: N/A;   Colonoscopy with CHROMO. referral by Haylee Gallo NP, Suprep, portal -ml  1/25-precall complete-MS    OOPHORECTOMY Bilateral     TAHBSO  02/2011    Due to abnormal pap smear and fibroids    TOTAL ABDOMINAL HYSTERECTOMY         Family History:   Family History   Problem Relation Name Age of Onset    Arthritis Mother      Fuch's dystrophy Mother      Breast cancer Mother      Lupus Sister      Rheum arthritis Sister      Obesity Sister      Hypertension Father      Cancer Maternal Grandfather          lung ca    Stroke Maternal Grandmother      Diabetes Maternal Grandmother      Colon cancer Paternal Aunt         Social History:   Social History     Tobacco Use    Smoking status: Never    Smokeless tobacco: Never   Substance Use Topics    Alcohol use: Yes     Alcohol/week: 0.0 standard drinks of alcohol     Comment: ocassionally    Drug use: No       Allergies: Reviewed.    Medications:  Current Facility-Administered Medications on File Prior to Encounter   Medication Dose Route Frequency Provider Last Rate Last Admin    lactated ringers infusion   Intravenous Continuous Erlinda Lindsay  mL/hr at 08/22/23 1222 Rate Change at 08/22/23 1222     Current Outpatient Medications on File Prior to Encounter   Medication Sig Dispense Refill    adalimumab (HUMIRA,CF, PEN) 40 mg/0.4 mL PnKt Inject 0.4 mLs (40 mg total) into the skin every 14 (fourteen) days. 2 pen 11    amLODIPine (NORVASC) 5 MG tablet Take 1 tablet by mouth once daily 90 tablet 0    hydroCHLOROthiazide (HYDRODIURIL) 25 MG tablet Take 1 tablet by mouth once daily 90 tablet 3    metoprolol succinate (TOPROL-XL) 25 MG 24 hr tablet Take 1 tablet by mouth once daily 90 tablet 3    ALPRAZolam (XANAX) 0.5 MG tablet Take 1 tablet by mouth twice daily as needed 60 tablet 0    ascorbic acid, vitamin C, (VITAMIN C) 1000 MG tablet Take by mouth. 1 Tablet Oral Every day      atorvastatin (LIPITOR) 20 MG tablet Take 1 tablet (20 mg total) by mouth every evening.  30 tablet 11    azaTHIOprine (IMURAN) 50 mg Tab Take 3 tablets (150 mg total) by mouth once daily. 270 tablet 0    azelastine (ASTELIN) 137 mcg (0.1 %) nasal spray 2 sprays (274 mcg total) by Nasal route 2 (two) times daily. 90 mL 3    BACILLUS COAGULANS (PROBIOTIC, B. COAGULANS, ORAL) Take by mouth once daily.      cetirizine (ALLERGY RELIEF, CETIRIZINE,) 10 MG tablet Take 1 tablet (10 mg total) by mouth once daily. 90 tablet 1    cholecalciferol, vitamin D3, 2,000 unit Cap Take by mouth. 1 capsule Oral Every day      dicyclomine (BENTYL) 20 mg tablet Take 1 tablet (20 mg total) by mouth 3 (three) times daily as needed (abdominal pain). 90 tablet 0    hydroquinone 4 % Crea Apply to dark spots once daily. Use with sunscreen if outdoors 28 g 1    ipratropium (ATROVENT) 21 mcg (0.03 %) nasal spray 2 sprays by Nasal route 3 (three) times daily. 20 mL 5    lisinopriL (PRINIVIL,ZESTRIL) 40 MG tablet Take 1 tablet by mouth once daily 90 tablet 1    methocarbamoL (ROBAXIN) 500 MG Tab TAKE 1 TABLET BY MOUTH TWICE DAILY AS NEEDED FOR MUSCLE SPASM AND  NECK  PAIN 60 tablet 0    montelukast (SINGULAIR) 10 mg tablet TAKE 1 TABLET BY MOUTH ONCE DAILY IN THE EVENING 90 tablet 3    multivitamin-Ca-iron-minerals 18-0.4 mg Tab Take by mouth. 1 Tablet Oral Every day      potassium bicarbonate disintegrating tablet Take 10 mEq by mouth 2 (two) times daily.      topiramate (TOPAMAX) 25 MG tablet TAKE 2 TABLETS BY MOUTH IN THE EVENING 180 tablet 3    tretinoin (RETIN-A) 0.025 % cream Apply pea-sized amount to entire face at bedtime.  Use every third night and increase as tolerated to nightly. 20 g 11    valACYclovir (VALTREX) 1000 MG tablet TAKE 2 TABLETS BY MOUTH TWICE DAILY FOR 1 DAY PER EPISODE 30 tablet 0    zinc sulfate (ZINC-220 ORAL)          Physical Exam:  Vital Signs:   Vitals:    08/20/24 0822   BP: (!) 144/75   Pulse: 79   Resp: 18   Temp: 97.3 °F (36.3 °C)     General Appearance: Well appearing in no acute distress  ENT: OP  clear  Chest: CTA B  CV: RRR, no m/r/g  Abd: s/nt/nd/nabs  Ext: no edema    Labs:  Reviewed    IMPRESSION:    Crohn's surveillance    Plan:  I have explained the risks and benefits of colonoscopy to the patient including but not limited to bleeding, perforation, infection, and death. The patient wishes to proceed with colonoscopy.

## 2024-08-20 NOTE — TRANSFER OF CARE
Anesthesia Transfer of Care Note    Patient: Angelica Flores    Procedure(s) Performed: Procedure(s) (LRB):  COLONOSCOPY, WITH CHROMOENDOSCOPY (N/A)    Patient location: PACU    Anesthesia Type: general    Transport from OR: Transported from OR on room air with adequate spontaneous ventilation    Post pain: adequate analgesia    Post assessment: no apparent anesthetic complications and tolerated procedure well    Post vital signs: stable    Level of consciousness: awake, alert and oriented    Nausea/Vomiting: no nausea/vomiting    Complications: none    Transfer of care protocol was followed      Last vitals: Visit Vitals  BP (!) 144/75 (BP Location: Left arm, Patient Position: Lying)   Pulse 79   Temp 36.3 °C (97.3 °F)   Resp 18   Ht 5' (1.524 m)   Wt 84.4 kg (186 lb)   SpO2 99%   Breastfeeding No   BMI 36.33 kg/m²

## 2024-08-20 NOTE — [employer] PATIENT INSTRUCTIONS
Discharge Summary/Instructions after an Endoscopic Procedure  Patient Name: Angelica Flores  Patient MRN: [id number]  Patient YOB: 1960 Tuesday, August 20, 2024  Edwin Muller MD  Dear patient,  As a result of recent federal legislation (The Federal Cures Act), you may   receive lab or pathology results from your procedure in your [facility]   account before your physician is able to contact you. Your physician or   their representative will relay the results to you with their   recommendations at their soonest availability.  Thank you,  RESTRICTIONS:  During your procedure today, you received medications for sedation.  These   medications may affect your judgment, balance and coordination.  Therefore,   for 24 hours, you have the following restrictions:   - DO NOT drive a car, operate machinery, make legal/financial decisions,   sign important papers or drink alcohol.    ACTIVITY:  Today: no heavy lifting, straining or running due to procedural   sedation/anesthesia.  The following day: return to full activity including work.  DIET:  Eat and drink normally unless instructed otherwise.     TREATMENT FOR COMMON SIDE EFFECTS:  - Mild abdominal pain, nausea, belching, bloating or excessive gas:  rest,   eat lightly and use a heating pad.  - Sore Throat: treat with throat lozenges and/or gargle with warm salt   water.  - Because air was used during the procedure, expelling large amounts of air   from your rectum or belching is normal.  - If a bowel prep was taken, you may not have a bowel movement for 1-3 days.    This is normal.  SYMPTOMS TO WATCH FOR AND REPORT TO YOUR PHYSICIAN:  1. Abdominal pain or bloating, other than gas cramps.  2. Chest pain.  3. Back pain.  4. Signs of infection such as: chills or fever occurring within 24 hours   after the procedure.  5. Rectal bleeding, which would show as bright red, maroon, or black stools.   (A tablespoon of blood from the rectum is not serious, especially if    hemorrhoids are present.)  6. Vomiting.  7. Weakness or dizziness.  GO DIRECTLY TO THE NEAREST EMERGENCY ROOM IF YOU HAVE ANY OF THE FOLLOWING:      Difficulty breathing              Chills and/or fever over 101 F   Persistent vomiting and/or vomiting blood   Severe abdominal pain   Severe chest pain   Black, tarry stools   Bleeding- more than one tablespoon   Any other symptom or condition that you feel may need urgent attention  Your doctor recommends these additional instructions:  If any biopsies were taken, your doctors clinic will contact you in 1 to 2   weeks with any results.  - Discharge patient to home.   - Resume previous diet today.   - Continue present medications.   - Await pathology results.   - Repeat colonoscopy in 1 year for surveillance.   - Return to referring physician as previously scheduled.   - If there are no dysplastic lesions noted on today's exam future   colonoscopies can be performed with high definition white light endoscopy   rather than chromoendoscopy.  - Patient has a contact number available for emergencies.  The signs and   symptoms of potential delayed complications were discussed with the   patient.  Return to normal activities tomorrow.  Written discharge   instructions were provided to the patient.  For questions, problems or results please call your physician - Edwin Muller MD at Work:  [phone].  [facility], EMERGENCY ROOM PHONE NUMBER: [phone]  IF A COMPLICATION OR EMERGENCY SITUATION ARISES AND YOU ARE UNABLE TO REACH   YOUR PHYSICIAN - GO DIRECTLY TO THE EMERGENCY ROOM.  Edwin Muller MD  8/20/2024 10:01:02 AM  This report has been verified and signed electronically.  Dear patient,  As a result of recent federal legislation (The Federal Cures Act), you may   receive lab or pathology results from your procedure in your [facility]   account before your physician is able to contact you. Your physician or   their representative will relay  the results to you with their   recommendations at their soonest availability.  Thank you,  [employer]ATION

## 2024-08-22 DIAGNOSIS — I10 ESSENTIAL HYPERTENSION: ICD-10-CM

## 2024-08-22 LAB
FINAL PATHOLOGIC DIAGNOSIS: NORMAL
GROSS: NORMAL
Lab: NORMAL

## 2024-08-23 RX ORDER — AMLODIPINE BESYLATE 5 MG/1
5 TABLET ORAL
Qty: 90 TABLET | Refills: 1 | Status: SHIPPED | OUTPATIENT
Start: 2024-08-23

## 2024-08-27 ENCOUNTER — TELEPHONE (OUTPATIENT)
Dept: FAMILY MEDICINE | Facility: CLINIC | Age: 64
End: 2024-08-27
Payer: COMMERCIAL

## 2024-08-27 DIAGNOSIS — Z12.31 OTHER SCREENING MAMMOGRAM: Primary | ICD-10-CM

## 2024-08-27 NOTE — TELEPHONE ENCOUNTER
I have put the following orders and/or medications to this note.  Please advise pt and assist.    Orders Placed This Encounter   Procedures    Mammo Digital Screening Bilat w/ Viktor     Standing Status:   Future     Standing Expiration Date:   8/27/2026     Order Specific Question:   May the Radiologist modify the order per protocol to meet the clinical needs of the patient?     Answer:   Yes     Order Specific Question:   Release to patient     Answer:   Immediate

## 2024-08-27 NOTE — TELEPHONE ENCOUNTER
----- Message from Josiane Tristan sent at 8/27/2024 11:02 AM CDT -----  Type:  Mammogram    Caller is requesting to schedule their annual mammogram appointment.  Order is not listed in EPIC.  Please enter order and contact patient to schedule.  Name of Caller: Angelica Pate Sandra    Where would they like the mammogram performed? [facility]   Would the patient rather a call back or a response via [facility]?    Best Call Back Number:[phone]    Additional Information: needs mammo orders sent so she can schedule

## 2024-08-29 DIAGNOSIS — I10 ESSENTIAL HYPERTENSION: ICD-10-CM

## 2024-08-29 RX ORDER — LISINOPRIL 40 MG/1
TABLET ORAL
Qty: 90 TABLET | Refills: 1 | Status: SHIPPED | OUTPATIENT
Start: 2024-08-29

## 2024-08-29 NOTE — TELEPHONE ENCOUNTER
No care due was identified.  [facility] Embedded Care Due Messages. Reference number: [id number].   8/29/2024 5:51:03 AM CDT

## 2024-08-29 NOTE — TELEPHONE ENCOUNTER
Refill Decision Note   Angelica Sandra  is requesting a refill authorization.  Brief Assessment and Rationale for Refill:  Approve     Medication Therapy Plan:         Comments:     Note composed:2:18 PM 08/29/2024

## 2024-09-03 ENCOUNTER — OFFICE VISIT (OUTPATIENT)
Dept: FAMILY MEDICINE | Facility: CLINIC | Age: 64
End: 2024-09-03
Attending: FAMILY MEDICINE
Payer: MEDICARE

## 2024-09-03 ENCOUNTER — LAB VISIT (OUTPATIENT)
Dept: LAB | Facility: HOSPITAL | Age: 64
End: 2024-09-03
Attending: FAMILY MEDICINE
Payer: MEDICARE

## 2024-09-03 VITALS
HEIGHT: 60 IN | HEART RATE: 58 BPM | OXYGEN SATURATION: 98 % | DIASTOLIC BLOOD PRESSURE: 74 MMHG | SYSTOLIC BLOOD PRESSURE: 110 MMHG | TEMPERATURE: 97 F | BODY MASS INDEX: 35.96 KG/M2 | WEIGHT: 183.19 LBS | RESPIRATION RATE: 20 BRPM

## 2024-09-03 DIAGNOSIS — E55.9 VITAMIN D DEFICIENCY: ICD-10-CM

## 2024-09-03 DIAGNOSIS — I10 ESSENTIAL HYPERTENSION: ICD-10-CM

## 2024-09-03 DIAGNOSIS — R04.0 EPISTAXIS: ICD-10-CM

## 2024-09-03 DIAGNOSIS — E78.5 HYPERLIPIDEMIA, UNSPECIFIED HYPERLIPIDEMIA TYPE: ICD-10-CM

## 2024-09-03 DIAGNOSIS — Z79.899 DRUG-INDUCED IMMUNODEFICIENCY: ICD-10-CM

## 2024-09-03 DIAGNOSIS — E66.01 SEVERE OBESITY (BMI 35.0-39.9) WITH COMORBIDITY: ICD-10-CM

## 2024-09-03 DIAGNOSIS — D84.821 DRUG-INDUCED IMMUNODEFICIENCY: ICD-10-CM

## 2024-09-03 DIAGNOSIS — Z78.0 POSTMENOPAUSAL: ICD-10-CM

## 2024-09-03 DIAGNOSIS — M85.80 OSTEOPENIA, UNSPECIFIED LOCATION: ICD-10-CM

## 2024-09-03 DIAGNOSIS — K50.10 CROHN'S DISEASE OF LARGE INTESTINE WITHOUT COMPLICATION: ICD-10-CM

## 2024-09-03 LAB
ALBUMIN SERPL BCP-MCNC: 3.7 G/DL (ref 3.5–5.2)
ALP SERPL-CCNC: 77 U/L (ref 55–135)
ALT SERPL W/O P-5'-P-CCNC: 19 U/L (ref 10–44)
ANION GAP SERPL CALC-SCNC: 11 MMOL/L (ref 8–16)
AST SERPL-CCNC: 18 U/L (ref 10–40)
BACTERIA #/AREA URNS AUTO: ABNORMAL /HPF
BASOPHILS # BLD AUTO: 0.05 K/UL (ref 0–0.2)
BASOPHILS NFR BLD: 0.4 % (ref 0–1.9)
BILIRUB SERPL-MCNC: 0.4 MG/DL (ref 0.1–1)
BILIRUB UR QL STRIP: NEGATIVE
BUN SERPL-MCNC: 14 MG/DL (ref 8–23)
CALCIUM SERPL-MCNC: 9.7 MG/DL (ref 8.7–10.5)
CHLORIDE SERPL-SCNC: 104 MMOL/L (ref 95–110)
CHOLEST SERPL-MCNC: 200 MG/DL (ref 120–199)
CHOLEST/HDLC SERPL: 3.3 {RATIO} (ref 2–5)
CLARITY UR REFRACT.AUTO: CLEAR
CO2 SERPL-SCNC: 23 MMOL/L (ref 23–29)
COLOR UR AUTO: YELLOW
CREAT SERPL-MCNC: 0.9 MG/DL (ref 0.5–1.4)
DIFFERENTIAL METHOD BLD: ABNORMAL
EOSINOPHIL # BLD AUTO: 0.1 K/UL (ref 0–0.5)
EOSINOPHIL NFR BLD: 0.8 % (ref 0–8)
ERYTHROCYTE [DISTWIDTH] IN BLOOD BY AUTOMATED COUNT: 13.7 % (ref 11.5–14.5)
EST. GFR  (NO RACE VARIABLE): >60 ML/MIN/1.73 M^2
GLUCOSE SERPL-MCNC: 102 MG/DL (ref 70–110)
GLUCOSE UR QL STRIP: NEGATIVE
HCT VFR BLD AUTO: 40.9 % (ref 37–48.5)
HDLC SERPL-MCNC: 60 MG/DL (ref 40–75)
HDLC SERPL: 30 % (ref 20–50)
HGB BLD-MCNC: 13.2 G/DL (ref 12–16)
HGB UR QL STRIP: NEGATIVE
HYALINE CASTS UR QL AUTO: 4 /LPF
IMM GRANULOCYTES # BLD AUTO: 0.04 K/UL (ref 0–0.04)
IMM GRANULOCYTES NFR BLD AUTO: 0.3 % (ref 0–0.5)
KETONES UR QL STRIP: NEGATIVE
LDLC SERPL CALC-MCNC: 116 MG/DL (ref 63–159)
LEUKOCYTE ESTERASE UR QL STRIP: ABNORMAL
LYMPHOCYTES # BLD AUTO: 4.1 K/UL (ref 1–4.8)
LYMPHOCYTES NFR BLD: 31.3 % (ref 18–48)
MCH RBC QN AUTO: 30.8 PG (ref 27–31)
MCHC RBC AUTO-ENTMCNC: 32.3 G/DL (ref 32–36)
MCV RBC AUTO: 96 FL (ref 82–98)
MICROSCOPIC COMMENT: ABNORMAL
MONOCYTES # BLD AUTO: 0.9 K/UL (ref 0.3–1)
MONOCYTES NFR BLD: 6.9 % (ref 4–15)
NEUTROPHILS # BLD AUTO: 7.8 K/UL (ref 1.8–7.7)
NEUTROPHILS NFR BLD: 60.3 % (ref 38–73)
NITRITE UR QL STRIP: NEGATIVE
NONHDLC SERPL-MCNC: 140 MG/DL
NRBC BLD-RTO: 0 /100 WBC
PH UR STRIP: 6 [PH] (ref 5–8)
PLATELET # BLD AUTO: 318 K/UL (ref 150–450)
PMV BLD AUTO: 11.6 FL (ref 9.2–12.9)
POTASSIUM SERPL-SCNC: 3.5 MMOL/L (ref 3.5–5.1)
PROT SERPL-MCNC: 7.9 G/DL (ref 6–8.4)
PROT UR QL STRIP: ABNORMAL
RBC # BLD AUTO: 4.28 M/UL (ref 4–5.4)
RBC #/AREA URNS AUTO: 1 /HPF (ref 0–4)
SODIUM SERPL-SCNC: 138 MMOL/L (ref 136–145)
SP GR UR STRIP: 1.02 (ref 1–1.03)
SQUAMOUS #/AREA URNS AUTO: 1 /HPF
TRIGL SERPL-MCNC: 120 MG/DL (ref 30–150)
TSH SERPL DL<=0.005 MIU/L-ACNC: 0.84 UIU/ML (ref 0.4–4)
URN SPEC COLLECT METH UR: ABNORMAL
WBC # BLD AUTO: 12.96 K/UL (ref 3.9–12.7)
WBC #/AREA URNS AUTO: 2 /HPF (ref 0–5)

## 2024-09-03 PROCEDURE — 81001 URINALYSIS AUTO W/SCOPE: CPT | Performed by: FAMILY MEDICINE

## 2024-09-03 PROCEDURE — 85025 COMPLETE CBC W/AUTO DIFF WBC: CPT | Performed by: FAMILY MEDICINE

## 2024-09-03 PROCEDURE — G2211 COMPLEX E/M VISIT ADD ON: HCPCS | Mod: S$PBB,,, | Performed by: FAMILY MEDICINE

## 2024-09-03 PROCEDURE — 99215 OFFICE O/P EST HI 40 MIN: CPT | Mod: PBBFAC,PO | Performed by: FAMILY MEDICINE

## 2024-09-03 PROCEDURE — 99215 OFFICE O/P EST HI 40 MIN: CPT | Mod: S$PBB,,, | Performed by: FAMILY MEDICINE

## 2024-09-03 PROCEDURE — 80061 LIPID PANEL: CPT | Performed by: FAMILY MEDICINE

## 2024-09-03 PROCEDURE — 80053 COMPREHEN METABOLIC PANEL: CPT | Performed by: FAMILY MEDICINE

## 2024-09-03 PROCEDURE — 36415 COLL VENOUS BLD VENIPUNCTURE: CPT | Mod: PO | Performed by: FAMILY MEDICINE

## 2024-09-03 PROCEDURE — 99999 PR PBB SHADOW E&M-EST. PATIENT-LVL V: CPT | Mod: PBBFAC,,, | Performed by: FAMILY MEDICINE

## 2024-09-03 PROCEDURE — 84443 ASSAY THYROID STIM HORMONE: CPT | Performed by: FAMILY MEDICINE

## 2024-09-03 RX ORDER — SEMAGLUTIDE 0.25 MG/.5ML
0.25 INJECTION, SOLUTION SUBCUTANEOUS
Qty: 2 ML | Refills: 0 | Status: SHIPPED | OUTPATIENT
Start: 2024-09-03

## 2024-09-03 NOTE — PATIENT INSTRUCTIONS
Please call Dr. Alvarez for your results.    If Wegovy is tolerable and covered, please follow up with Dr. Alvarez via E-visit in 1 month for Wegovy titration.    Thanks.

## 2024-09-03 NOTE — PROGRESS NOTES
HISTORY OF PRESENT ILLNESS: Ms. Mcfadden comes in today fasting but with taking medication for annual wellness examination and follow up of chronic medical problems.     END OF LIFE DECISION: She does not have a living will and states she does not desire life support. However, she states she is an organ donor.     Current Outpatient Medications   Medication Sig    adalimumab (HUMIRA,CF, PEN) 40 mg/0.4 mL PnKt Inject 0.4 mLs (40 mg total) into the skin every 14 (fourteen) days.    ALPRAZolam (XANAX) 0.5 MG tablet Take 1 tablet by mouth twice daily as needed    amLODIPine (NORVASC) 5 MG tablet Take 1 tablet by mouth once daily    ascorbic acid, vitamin C, (VITAMIN C) 1000 MG tablet Take by mouth. 1 Tablet Oral Every day    atorvastatin (LIPITOR) 20 MG tablet Take 1 tablet (20 mg total) by mouth every evening.    azaTHIOprine (IMURAN) 50 mg Tab Take 3 tablets (150 mg total) by mouth once daily.    azelastine (ASTELIN) 137 mcg (0.1 %) nasal spray 2 sprays (274 mcg total) by Nasal route 2 (two) times daily.    BACILLUS COAGULANS (PROBIOTIC, B. COAGULANS, ORAL) Take by mouth once daily.    cetirizine (ALLERGY RELIEF, CETIRIZINE,) 10 MG tablet Take 1 tablet (10 mg total) by mouth once daily.    cholecalciferol, vitamin D3, 2,000 unit Cap Take by mouth. 1 capsule Oral Every day    dicyclomine (BENTYL) 20 mg tablet Take 1 tablet (20 mg total) by mouth 3 (three) times daily as needed (abdominal pain).    hydroCHLOROthiazide (HYDRODIURIL) 25 MG tablet Take 1 tablet by mouth once daily    hydroquinone 4 % Crea Apply to dark spots once daily. Use with sunscreen if outdoors    ipratropium (ATROVENT) 21 mcg (0.03 %) nasal spray 2 sprays by Nasal route 3 (three) times daily.    lisinopriL (PRINIVIL,ZESTRIL) 40 MG tablet Take 1 tablet by mouth once daily    metoprolol succinate (TOPROL-XL) 25 MG 24 hr tablet Take 1 tablet by mouth once daily    montelukast (SINGULAIR) 10 mg tablet TAKE 1 TABLET BY MOUTH ONCE DAILY IN THE EVENING     multivitamin-Ca-iron-minerals 18-0.4 mg Tab Take by mouth. 1 Tablet Oral Every day    potassium bicarbonate disintegrating tablet Take 10 mEq by mouth 2 (two) times daily.    topiramate (TOPAMAX) 25 MG tablet TAKE 2 TABLETS BY MOUTH IN THE EVENING    tretinoin (RETIN-A) 0.025 % cream Apply pea-sized amount to entire face at bedtime.  Use every third night and increase as tolerated to nightly.    valACYclovir (VALTREX) 1000 MG tablet TAKE 2 TABLETS BY MOUTH TWICE DAILY FOR 1 DAY PER EPISODE    zinc sulfate (ZINC-220 ORAL)      SCREENINGS:   Cholesterol: August 31, 2023.   FFS/Colonoscopy: August 20, 2024 - benign colon polyps; repeat in 1 year.   Mammogram: August 31, 2023 - okay. Scheduled for September 26, 2024.  GYN Exam: August 31, 2023 - okay August 23, 2022 pap smear, HPV - okay. Pap smear no longer needed.   Dexa Scan: August 13, 2019 - osteopenia.   Eye Exam: November 2021 at [facility] per patient. She wears glasses.   Dental Exam:June 2024 and every 6 months.  PPD: Negative in the past.   Immunizations: Td/Tdap - May 5, 2016.   Gardasil - N./A.   Zostavax - Never. Reports history of varicella and zoster. She declines.   Shingrix - Reports had 1st dose in September 2019, then 2nd dose on November 16, 2019.   Pneumovax - Never.   Prevnar-20 - December 7, 2022.   Seasonal Flu - September 22, 2023.   COVID-19 (Pfizer) vaccine series - December 26, 2020, January 25, 2021, September 30, 2021, April 8, 2022.   RSV - Never. Advised patient available at local pharmacy.  Hepatitis B vaccine series - December 7, 2022, January 13, 2023, May 10, 2023.    ROS:   GENERAL: Denies fever, chills, fatigue or unusual weight change. Appetite normal. Weight 86.6 kg (190 lb 14.7 oz) at March 12, 2024 visit. Monitors diet but does not exercise.  Reports works [employer] most days. Desires GLP-1RA for weight management.  SKIN: Denies rashes, itching, changes in mole, color or texture of skin or easy bruising.   HEAD: Denies headaches  or recent head trauma.   EYES: Denies change in vision, pain, diplopia, redness or discharge.   EARS: Denies ear pain, discharge, vertigo or decreased hearing.   NOSE: Denies loss of smell or rhinitis today. Reports left sided epistaxis with occasional headaches - 3 times within the last 2 weeks and recently took 10 minutes to stop.  MOUTH & THROAT: Denies hoarseness or change in voice. Denies excessive gum bleeding or mouth sores. Denies sore throat.   NODES: Denies swollen glands.   CHEST: Denies MIKE, wheezing, cough, or sputum production.   CARDIOVASCULAR: Denies chest pain, PND, orthopnea or reduced exercise tolerance. Denies palpitations. Saw Dr. Wharton, cardiologist, on May 23, 2024 for HTN, HLD, Obesity, chronic left shoulder pain and changed from Pravastatin 80 mg daily to Lipitor 20 mg daily with 6-month follow up advised. Performs home blood pressure checks 3 to 4 times per week with levels ranging 110-120/70-80's.  ABDOMEN: Denies nausea, vomiting, diarrhea, abdominal pain, or blood in stool except reports intermittent constipation.  Saw KIMMIE Gallo with GI on February 22, 2024 for Crohn's disease.   URINARY: Denies flank pain, dysuria or hematuria.   GENITOURINARY: Denies flank pain, dysuria, frequency or hematuria. Performs monthly breast self-examinations.   ENDOCRINE: Denies diabetes or thyroid problems.   HEME/LYMPH: Denies bleeding problems.   PERIPHERAL VASCULAR:Denies claudication or cyanosis.  MUSCULOSKELETAL: Denies joint stiffness, pain or swelling. Saw Dr. Juan Caballero with sports medicine on June 19, 2024 for adhesive capsulitis of right shoulder. Saw Dr. Ewing, pain management specialist, on April 27, 2023 for DDD (degenerative disc disease), cervical, cervical spondylosis, cervical radiculopathy, bilateral carpal tunnel syndrome   NEUROLOGIC: Denies history of seizures, tremors, paralysis, alteration of gait or coordination.   PSYCHIATRIC: Denies mood swings, uncontrolled  depression or anxiety, homicidal or suicidal thoughts. Denies sleep problems.     PE:   VS: /74   Pulse (!) 58   Temp 96.9 °F (36.1 °C) (Tympanic)   Resp 20   Ht 5' (1.524 m)   Wt 83.1 kg (183 lb 3.2 oz)   SpO2 98%   BMI 35.78 kg/m²   APPEARANCE: Well nourished, well developed female, obese and pleasant, alert and oriented in no acute distress.   HEAD: Nontender. Full range of motion.   EYES: PERRL, conjunctiva pink, lids no edema. She wears glasses.   EARS: External canal patent, no swelling or redness. TM's shiny and clear.   NOSE: Mucosa and turbinates swollen. No discharge. Nontender sinuses.   THROAT: No pharyngeal erythema or exudate. No stridor.   NECK: Supple, no mass, thyroid not enlarged.   NODES: No cervical, axillary lymph node enlargement.   CHEST: Normal respiratory effort. Lungs clear to auscultation.   CARDIOVASCULAR: Normal S1, S2. No rubs, murmurs or gallops. PMI not displaced. No carotid bruit. Pedal pulses palpable bilaterally. No edema.   ABDOMEN: Bowel sounds present. Not distended. Soft. No tenderness, masses or organomegaly.   BREAST EXAM: Symmetrical, no external lesions, no discharge, no masses palpated.   PELVIC EXAM: Not examined as patient with TAHBSO.  RECTAL EXAM: Not examined as colonoscopy recently performed.    MUSCULOSKELETAL: No joint deformities or stiffness. She is ambulatory without problems.   SKIN: No rashes or suspicious lesions, normal color and turgor.   NEUROLOGIC: Cranial Nerves: II-XII grossly intact. DTR's: Knees, Ankles 2+ and equal bilaterally. Gait & Posture: Normal gait and fine motion.   PSYCHIATRIC: Patient alert, oriented x 3. Mood/Affect normal without acute anxiety and depression noted. Judgment/insight good as she makes appropriate decisions on today's examination.     ASSESSMENT:    ICD-10-CM ICD-9-CM    1. Essential hypertension  I10 401.9 Lipid Panel      Comprehensive Metabolic Panel      CBC Auto Differential      TSH      Urinalysis       Ambulatory referral/consult to Optometry      2. Hyperlipidemia, unspecified hyperlipidemia type  E78.5 272.4 Lipid Panel      Comprehensive Metabolic Panel      3. Crohn's disease of large intestine without complication  K50.10 555.1       4. Drug-induced immunodeficiency  D84.821 279.3     Z79.899 E947.9       5. Osteopenia, unspecified location  M85.80 733.90 DXA Bone Density Axial Skeleton 1 or more sites      6. Vitamin D deficiency  E55.9 268.9       7. Epistaxis  R04.0 784.7 Ambulatory referral/consult to ENT      8. Severe obesity (BMI 35.0-39.9) with comorbidity  E66.01 278.01 semaglutide, weight loss, (WEGOVY) 0.25 mg/0.5 mL PnIj      9. Postmenopausal  Z78.0 V49.81 DXA Bone Density Axial Skeleton 1 or more sites        PLAN:  1. Age-appropriate counseling-appropriate low-sodium, low-cholesterol, low carbohydrate diet and exercise daily, monthly breast self exam, annual wellness examination.   2. Patient advised to call for results.  3. Continue current medications.  4. Keep follow up with specialists.  5. Flu shot this fall.  6. Try Wegovy 0.25 mg weekly as directed; medication precautions discussed with patient. If Wegovy is tolerable and covered, please follow up with me via E-visit in 1 month for Wegovy titration.  7. Follow up in about 6 months (around 3/3/2025) for hypertension follow up.     40 minutes of total time spent on the encounter, which includes face to face time and non-face to face time preparing to see the patient (eg, review of tests), Obtaining and/or reviewing separately obtained history, Documenting clinical information in the electronic or other health record, Independently interpreting results (not separately reported) and communicating results to the patient/family/caregiver, or Care coordination (not separately reported).

## 2024-09-09 ENCOUNTER — APPOINTMENT (OUTPATIENT)
Dept: RADIOLOGY | Facility: HOSPITAL | Age: 64
End: 2024-09-09
Attending: FAMILY MEDICINE
Payer: MEDICARE

## 2024-09-09 ENCOUNTER — OFFICE VISIT (OUTPATIENT)
Dept: OTOLARYNGOLOGY | Facility: CLINIC | Age: 64
End: 2024-09-09
Attending: FAMILY MEDICINE
Payer: MEDICARE

## 2024-09-09 VITALS — WEIGHT: 185.63 LBS | BODY MASS INDEX: 36.25 KG/M2

## 2024-09-09 DIAGNOSIS — Z78.0 POSTMENOPAUSAL: ICD-10-CM

## 2024-09-09 DIAGNOSIS — R04.0 EPISTAXIS: ICD-10-CM

## 2024-09-09 DIAGNOSIS — M85.80 OSTEOPENIA, UNSPECIFIED LOCATION: ICD-10-CM

## 2024-09-09 DIAGNOSIS — J30.89 NON-SEASONAL ALLERGIC RHINITIS, UNSPECIFIED TRIGGER: Primary | ICD-10-CM

## 2024-09-09 PROCEDURE — 31231 NASAL ENDOSCOPY DX: CPT | Mod: S$PBB,,, | Performed by: OTOLARYNGOLOGY

## 2024-09-09 PROCEDURE — 99213 OFFICE O/P EST LOW 20 MIN: CPT | Mod: PBBFAC,25 | Performed by: OTOLARYNGOLOGY

## 2024-09-09 PROCEDURE — 77080 DXA BONE DENSITY AXIAL: CPT | Mod: TC

## 2024-09-09 PROCEDURE — 77080 DXA BONE DENSITY AXIAL: CPT | Mod: 26,,, | Performed by: RADIOLOGY

## 2024-09-09 PROCEDURE — 99999 PR PBB SHADOW E&M-EST. PATIENT-LVL III: CPT | Mod: PBBFAC,,, | Performed by: OTOLARYNGOLOGY

## 2024-09-09 PROCEDURE — 31231 NASAL ENDOSCOPY DX: CPT | Mod: PBBFAC | Performed by: OTOLARYNGOLOGY

## 2024-09-09 PROCEDURE — 99203 OFFICE O/P NEW LOW 30 MIN: CPT | Mod: 25,S$PBB,, | Performed by: OTOLARYNGOLOGY

## 2024-09-09 NOTE — PROGRESS NOTES
Referring Provider:    Monet Alvarez Md  [address] [place] [address]  Subjective:   Patient: Angelica Flores [id number], :1960   Visit date:2024 9:38 AM    Chief Complaint:  Epistaxis (Pt is coming in today for nose bleeds pt states that she have been having left sided nose bleeds and a couple of weeks ago she had two in one day and it lasted longer than usual it was ten minutes longer and she is also having headaches on the same side as the nose bleeds)    HPI:    Prior notes reviewed by myself.  Clinical documentation obtained by nursing staff reviewed.      64-year-old female presents for evaluation of recurrent left-sided anterior nosebleeds.  She has had these intermittently for months but recently has become more frequent and severe.  She also occasionally has left-sided headaches but these do not always coincide with the nosebleeds.  She denies any anticoagulation, elevated blood pressure, trauma.  She generally can stop these with pressure and Kleenex.  No other significant ENT history.      Objective:     Physical Exam:  Vitals:  Wt 84.2 kg (185 lb 10 oz)   BMI 36.25 kg/m²   General appearance:  Well developed, well nourished    Ears:  Otoscopy of external auditory canals and tympanic membranes was normal, clinical speech reception thresholds grossly intact, no mass/lesion of auricle.    Nose:  No masses/lesions of external nose, nasal mucosa congested with prominent arterioles right greater than left, septum, and turbinates were within normal limits.    Mouth:  No mass/lesion of lips, teeth, gums, hard/soft palate, tongue, tonsils, or oropharynx.    Neck & Lymphatics:  No cervical lymphadenopathy, no neck mass/crepitus/ asymmetry, trachea is midline, no thyroid enlargement/tenderness/mass.    PROCEDURE NOTE:  Diagnostic nasal endoscopy  Preprocedure diagnosis:  recurrent epistaxis  Postprocedure diangosis:  Same  Complications:  None  Blood Loss:  None    Procedure in  detail:  After verbal consent was obtained, the patient's nasal cavity was anesthesized using topical Tetracaine and Neosynepherine.  A rigid 30 degree endoscope was placed in first the right, then the left nasal cavity.  She was noted to have prominent arterioles along the anterior and mid nasal septum bilaterally, no sign of active or recent bleeding.   The inferior and middle turbinates were examined, and found to be normal bilaterally.  The middle meatus and maxillary antrum was also examined, and found to be normal bilaterally.  No purulent drainage or masses seen. The superior meatus as well as the sphenoethmoid recess were also inspected and noted to be free of purulent drainage, masses or other pathology. The patient tolerated the procedure well and there were no complications.      [x]  Data Reviewed:    Lab Results   Component Value Date    WBC 12.96 (H) 09/03/2024    HGB 13.2 09/03/2024    HCT 40.9 09/03/2024    MCV 96 09/03/2024    EOSINOPHIL 0.8 09/03/2024             Assessment & Plan:   Non-seasonal allergic rhinitis, unspecified trigger    Epistaxis  -     Ambulatory referral/consult to ENT         She has some prominent vessels along her nasal septum.  We discussed conservative management options for epistaxis as noted below.  We also discussed the option for cauterization.  Her nasal endoscopy did not demonstrate any other potential sources for bleeding.  She would like to continue with conservative management strategies for now and will follow up as needed      Nose Bleed Instructions:  We had a long discussion regarding the importance of nasal moisture, and the use of a nasal saline spray or gel into nose four times daily to keep moist.    Bactroban ointment in nostril twice daily if ordered.  Do not sleep or sit for long periods of time under a ceiling fan or other source of aggressive airflow.  Use a humidifier in bedroom or any room in your home you spend long periods of time.  Engage in only  light activity. No strenuous activity. No heavy lifting or straining.   No bending over at the hips. Keep nose above your heart at all times.  Sneeze with an open mouth to reduce pressure from nose.   Avoid foods or drinks hot in temperature for at least 48 hours then progress slowly.  Avoid hot steamy showers or baths for one week.    Home Treatment of Epistaxis    The biggest mistake people make when treating a nosebleed is to lean their head back. Epistaxis, the medical term for nosebleeds, is a mild ailment that can often be treated at home. The first step in treating nosebleeds is to recognize the etiology. Nosebleeds can occur due to:    Age  High blood pressure  Daily aspirin or blood thinner  Bleeding disorder  Common cold  Allergies  Low humidity and dry weather  Dependence on oxygen treatment  Deviated septum  Smoking  Alcohol abuse  Kidney or liver disease  Drug use (sniffing)  Frequent nose picking  Trauma to nose  Prevention of nosebleeds would be controlling or managing the risks above (i.e., humidifier, quit smoking, saline nasal spray, etc.). If a nosebleed does occur, we recommend the following steps for at-home treatment.    Stay calm- an increase in heart rate can cause more bleeding  2.   Keep your head above your heart- remain standing or sit-up  3.  Lean forward slightly - so the bleeding does not go down the back of your throat  4.  Pinch your nose at the lower portion (where your nostrils flare out) to provide pressure at the bleeding point - keep this pressure for at least 10 minutes  5.  If bleeding continues after step #4, gently blow the nose to remove clots and then spray 2-3 sprays of Afrin (pump spray mist, red and white box)  6.  Repeat step #4 - ok to repeat steps 5 and 6 up to 3 times    If your nosebleed continues after home treatment, the frequency of nosebleeds increases, nosebleed is caused by trauma to the nose, or occurs in a child under 2 years, then see an ENT doctor for  further evaluation and treatment of the nosebleed.         Robi Alejandro M.D.  Department of Otolaryngology - Head & Neck Surgery  [address] [facility].  [place] [address]  P: [phone]  F: [phone]        DISCLAIMER: This note was prepared with [employer] voice recognition transcription software. Garbled syntax, mangled pronouns, and other bizarre constructions may be attributed to that software system. While efforts were made to correct any mistakes made by this voice recognition program, some errors and/or omissions may remain in the note that were missed when the note was originally created.

## 2024-09-26 ENCOUNTER — HOSPITAL ENCOUNTER (OUTPATIENT)
Dept: RADIOLOGY | Facility: HOSPITAL | Age: 64
Discharge: HOME | End: 2024-09-26
Attending: FAMILY MEDICINE
Payer: MEDICARE

## 2024-09-26 DIAGNOSIS — Z12.31 OTHER SCREENING MAMMOGRAM: ICD-10-CM

## 2024-09-26 PROCEDURE — 77067 SCR MAMMO BI INCL CAD: CPT | Mod: TC

## 2024-10-03 DIAGNOSIS — J30.2 SEASONAL ALLERGIC RHINITIS, UNSPECIFIED TRIGGER: ICD-10-CM

## 2024-10-04 NOTE — TELEPHONE ENCOUNTER
Care Due:                  Date            Visit Type   Department     Provider  --------------------------------------------------------------------------------                                EP -                              PRIMARY      [facility]  Last Visit: 09-      CARE ([facility])   MEDICINE       Monet Alvarez                              EP -                              PRIMARY      [facility]  Next Visit: 03-      CARE ([facility])   MEDICINE       Monet Alvarez                                                            Last  Test          Frequency    Reason                     Performed    Due Date  --------------------------------------------------------------------------------    HBA1C.......  6 months...  semaglutide,.............  09- 02-    [facility] Embedded Care Due Messages. Reference number: [id number].   10/03/2024 9:37:17 PM CDT

## 2024-10-06 RX ORDER — AZELASTINE 1 MG/ML
2 SPRAY, METERED NASAL 2 TIMES DAILY
Qty: 90 ML | Refills: 3 | Status: SHIPPED | OUTPATIENT
Start: 2024-10-06 | End: 2025-10-06

## 2024-10-21 DIAGNOSIS — E78.49 OTHER HYPERLIPIDEMIA: ICD-10-CM

## 2024-10-21 RX ORDER — ATORVASTATIN CALCIUM 20 MG/1
20 TABLET, FILM COATED ORAL NIGHTLY
Qty: 90 TABLET | Refills: 1 | Status: SHIPPED | OUTPATIENT
Start: 2024-10-21

## 2024-11-11 ENCOUNTER — OFFICE VISIT (OUTPATIENT)
Dept: CARDIOLOGY | Facility: CLINIC | Age: 64
End: 2024-11-11
Payer: MEDICARE

## 2024-11-11 VITALS
BODY MASS INDEX: 36.7 KG/M2 | DIASTOLIC BLOOD PRESSURE: 64 MMHG | OXYGEN SATURATION: 99 % | HEIGHT: 60 IN | SYSTOLIC BLOOD PRESSURE: 100 MMHG | HEART RATE: 63 BPM | WEIGHT: 186.94 LBS

## 2024-11-11 DIAGNOSIS — E78.49 OTHER HYPERLIPIDEMIA: Primary | ICD-10-CM

## 2024-11-11 DIAGNOSIS — K50.10 CROHN'S DISEASE OF LARGE INTESTINE WITHOUT COMPLICATION: ICD-10-CM

## 2024-11-11 DIAGNOSIS — I10 ESSENTIAL HYPERTENSION: ICD-10-CM

## 2024-11-11 DIAGNOSIS — E66.01 SEVERE OBESITY (BMI 35.0-39.9) WITH COMORBIDITY: ICD-10-CM

## 2024-11-11 DIAGNOSIS — K52.9 IBD (INFLAMMATORY BOWEL DISEASE): ICD-10-CM

## 2024-11-11 PROCEDURE — 99999 PR PBB SHADOW E&M-EST. PATIENT-LVL IV: CPT | Mod: PBBFAC,,, | Performed by: INTERNAL MEDICINE

## 2024-11-11 PROCEDURE — 99214 OFFICE O/P EST MOD 30 MIN: CPT | Mod: PBBFAC | Performed by: INTERNAL MEDICINE

## 2024-11-11 PROCEDURE — 99214 OFFICE O/P EST MOD 30 MIN: CPT | Mod: S$PBB,,, | Performed by: INTERNAL MEDICINE

## 2024-11-11 NOTE — PROGRESS NOTES
Subjective:   Patient ID:  Angelica Flores is a 64 y.o. female who presents for evaluation of No chief complaint on file.      HPI  11.11.2024  Comes in for six-month visit.    Denies any significant MIKE, angina, palpitations, lower extremity swelling syncope or presyncope   Compliant with medications.      May 23, 2024.    Comes in for a follow-up in the interim she was seen by Dr. Argueta  She had a normal echocardiogram and nuclear stress test.    Denies any chest pain, dyspnea on exertion, or other episode of syncope.    She plans on joining the gym.        5.22.2023  Comes in for six-month follow-up.    Doing very well.    Denies any complaints.    No syncope presyncope   Blood pressure control.    Undergoing rehab for her shoulder.    No chest pain, dyspnea on exertion.  Lower extremity swelling.  Palpitations.    Holter monitor benign.        8.4.2022  61 yo female, crohn's disease, htn, asthma, anxiety   She comes in for two episodes of syncope   Two months and a month ago ,   First episode happened while at festival, was hot, but states was hydrating all day and was not drinking any alcohol and was standing , she let the person next to her to know she was going to lose consciousness , for few seconds, no hospitalization, immediately felt better   Second [place] the casino, had two glasses of wine, stood up  To go home and also passed out shortly, normal afterwards, felt it happening,   Gets hot , started to feel fainting and flushed sensation   She had occular migraine 25 years ago with similar episode  No chest pain, no dyspnea, no leg swelling     Past Medical History:   Diagnosis Date    Anxiety     Asthma     Crohn's disease     Fibroids     Hyperlipidemia     Hypertension     Iritis     Migraine headache     Ocular    Osteopenia 08/2019    Syncope and collapse     Vitamin D deficiency disease        Past Surgical History:   Procedure Laterality Date    ANKLE FRACTURE SURGERY  08/19/2008     right ankle    BREAST BIOPSY Left 1977    COLONOSCOPY N/A 08/11/2017    Procedure: COLONOSCOPY - REQUESTS DR. MATTI WOOD;  Surgeon: Matti Wood III, MD;  Location: [facility];  Service: Endoscopy;  Laterality: N/A;    COLONOSCOPY N/A 11/23/2020    Procedure: COLONOSCOPY;  Surgeon: Matti Wood III, MD;  Location: [facility];  Service: Endoscopy;  Laterality: N/A;    COLONOSCOPY N/A 09/28/2022    Procedure: COLONOSCOPY;  Surgeon: Matti Wood III, MD;  Location: [facility];  Service: Endoscopy;  Laterality: N/A;    COLONOSCOPY N/A 08/22/2023    Procedure: COLONOSCOPY;  Surgeon: Erlinda Lindsay MD;  Location: [facility];  Service: Endoscopy;  Laterality: N/A;    COLONOSCOPY N/A 2/1/2024    Procedure: Colonosocpy with CHROMO;  Surgeon: Edwin Muller MD;  Location: [facility]  Service: Endoscopy;  Laterality: N/A;  Colonoscopy with CHROMO. referral by Haylee Gallo NP, Suprep, portal -  1/25-precall complete-MS    COLONOSCOPY WITH CHROMOENDOSCOPY N/A 8/20/2024    Procedure: COLONOSCOPY, WITH CHROMOENDOSCOPY;  Surgeon: Edwin Muller MD;  Location: [facility] ([facility]  Service: Endoscopy;  Laterality: N/A;  Ref By: salima Kaiser suprep.  8/13/24-preop call complete-js    OOPHORECTOMY Bilateral     TAHBSO  02/2011    Due to abnormal pap smear and fibroids    TOTAL ABDOMINAL HYSTERECTOMY         Social History     Tobacco Use    Smoking status: Never    Smokeless tobacco: Never   Substance Use Topics    Alcohol use: Yes     Alcohol/week: 0.0 standard drinks of alcohol     Comment: ocassionally    Drug use: No       Family History   Problem Relation Name Age of Onset    Arthritis Mother      Fuch's dystrophy Mother      Breast cancer Mother      Hypertension Father      Lupus Sister      Rheum arthritis Sister      Obesity Sister      Stroke Maternal Grandmother      Diabetes Maternal Grandmother      Cancer Maternal Grandfather          lung ca    Colon cancer Paternal Aunt       Thyroid cancer Neg Hx          MEN2       Review of Systems   Cardiovascular:  Negative for chest pain, dyspnea on exertion, palpitations and syncope.   Genitourinary: Negative.    Neurological: Negative.        Current Outpatient Medications on File Prior to Visit   Medication Sig    adalimumab (HUMIRA,CF, PEN) 40 mg/0.4 mL PnKt Inject 0.4 mLs (40 mg total) into the skin every 14 (fourteen) days.    ALPRAZolam (XANAX) 0.5 MG tablet Take 1 tablet by mouth twice daily as needed    amLODIPine (NORVASC) 5 MG tablet Take 1 tablet by mouth once daily    ascorbic acid, vitamin C, (VITAMIN C) 1000 MG tablet Take by mouth. 1 Tablet Oral Every day    atorvastatin (LIPITOR) 20 MG tablet TAKE 1 TABLET BY MOUTH ONCE DAILY IN THE EVENING    azaTHIOprine (IMURAN) 50 mg Tab Take 3 tablets (150 mg total) by mouth once daily.    azelastine (ASTELIN) 137 mcg (0.1 %) nasal spray 2 sprays (274 mcg total) by Nasal route 2 (two) times daily.    BACILLUS COAGULANS (PROBIOTIC, B. COAGULANS, ORAL) Take by mouth once daily.    cetirizine (ALLERGY RELIEF, CETIRIZINE,) 10 MG tablet Take 1 tablet (10 mg total) by mouth once daily.    cholecalciferol, vitamin D3, 2,000 unit Cap Take by mouth. 1 capsule Oral Every day    dicyclomine (BENTYL) 20 mg tablet Take 1 tablet (20 mg total) by mouth 3 (three) times daily as needed (abdominal pain).    hydroCHLOROthiazide (HYDRODIURIL) 25 MG tablet Take 1 tablet by mouth once daily    hydroquinone 4 % Crea Apply to dark spots once daily. Use with sunscreen if outdoors    ipratropium (ATROVENT) 21 mcg (0.03 %) nasal spray 2 sprays by Nasal route 3 (three) times daily.    lisinopriL (PRINIVIL,ZESTRIL) 40 MG tablet Take 1 tablet by mouth once daily    metoprolol succinate (TOPROL-XL) 25 MG 24 hr tablet Take 1 tablet by mouth once daily    montelukast (SINGULAIR) 10 mg tablet TAKE 1 TABLET BY MOUTH ONCE DAILY IN THE EVENING    multivitamin-Ca-iron-minerals 18-0.4 mg Tab Take by mouth. 1 Tablet Oral Every  day    potassium bicarbonate disintegrating tablet Take 10 mEq by mouth 2 (two) times daily.    semaglutide, weight loss, (WEGOVY) 0.25 mg/0.5 mL PnIj Inject 0.25 mg into the skin every 7 days.    topiramate (TOPAMAX) 25 MG tablet TAKE 2 TABLETS BY MOUTH IN THE EVENING    tretinoin (RETIN-A) 0.025 % cream Apply pea-sized amount to entire face at bedtime.  Use every third night and increase as tolerated to nightly.    valACYclovir (VALTREX) 1000 MG tablet TAKE 2 TABLETS BY MOUTH TWICE DAILY FOR 1 DAY PER EPISODE    zinc sulfate (ZINC-220 ORAL)      Current Facility-Administered Medications on File Prior to Visit   Medication    lactated ringers infusion       Objective:   Objective:  Wt Readings from Last 3 Encounters:   09/09/24 84.2 kg (185 lb 10 oz)   09/03/24 83.1 kg (183 lb 3.2 oz)   08/20/24 84.4 kg (186 lb)     Temp Readings from Last 3 Encounters:   09/03/24 96.9 °F (36.1 °C) (Tympanic)   08/20/24 97.3 °F (36.3 °C)   07/30/24 99.1 °F (37.3 °C) (Tympanic)     BP Readings from Last 3 Encounters:   09/03/24 110/74   08/20/24 (!) 105/59   07/30/24 122/76     Pulse Readings from Last 3 Encounters:   09/03/24 (!) 58   08/20/24 (!) 50   07/30/24 88       Physical Exam  Vitals reviewed.   Constitutional:       Appearance: She is well-developed.   Neck:      Vascular: No carotid bruit.   Cardiovascular:      Rate and Rhythm: Normal rate and regular rhythm.      Pulses: Intact distal pulses.      Heart sounds: Normal heart sounds. No murmur heard.  Pulmonary:      Breath sounds: Normal breath sounds.   Neurological:      Mental Status: She is oriented to person, place, and time.         Lab Results   Component Value Date    CHOL 200 (H) 09/03/2024    CHOL 211 (H) 08/31/2023    CHOL 220 (H) 08/23/2022     Lab Results   Component Value Date    HDL 60 09/03/2024    HDL 59 08/31/2023    HDL 63 08/23/2022     Lab Results   Component Value Date    LDLCALC 116.0 09/03/2024    LDLCALC 141.0 08/31/2023    LDLCALC 142.8  08/23/2022     Lab Results   Component Value Date    TRIG 120 09/03/2024    TRIG 55 08/31/2023    TRIG 71 08/23/2022     Lab Results   Component Value Date    CHOLHDL 30.0 09/03/2024    CHOLHDL 28.0 08/31/2023    CHOLHDL 28.6 08/23/2022       Chemistry        Component Value Date/Time     09/03/2024 1018    K 3.5 09/03/2024 1018     09/03/2024 1018    CO2 23 09/03/2024 1018    BUN 14 09/03/2024 1018    CREATININE 0.9 09/03/2024 1018     09/03/2024 1018        Component Value Date/Time    CALCIUM 9.7 09/03/2024 1018    ALKPHOS 77 09/03/2024 1018    AST 18 09/03/2024 1018    ALT 19 09/03/2024 1018    BILITOT 0.4 09/03/2024 1018    ESTGFRAFRICA >60.0 08/11/2021 0950    EGFRNONAA >60.0 08/11/2021 0950          Lab Results   Component Value Date    TSH 0.839 09/03/2024     Lab Results   Component Value Date    INR 0.9 08/18/2008     Lab Results   Component Value Date    WBC 12.96 (H) 09/03/2024    HGB 13.2 09/03/2024    HCT 40.9 09/03/2024    MCV 96 09/03/2024     09/03/2024     BNP  @LABRCNTIP(BNP,BNPTRIAGEBLO)@  CrCl cannot be calculated (Patient's most recent lab result is older than the maximum 7 days allowed.).     Imaging:  ======  No results found for this or any previous visit.    No results found for this or any previous visit.    No results found for this or any previous visit.    Results for orders placed in visit on 02/11/11    X-Ray Chest PA And Lateral    Narrative  DATE OF EXAM: Feb 23 2011    GEN   0012  -  CHEST PA & LAT:   1123 HOURS  [id number]    CLINICAL HISTORY:   V72.84 0 PREOP EXAMINATION NOS    PROCEDURE COMMENT:    ICD 9 CODE(S):   ()    CPT 4 CODE(S)/MODIFIER(S):   ()    RESULTS: COMPARISON - 11/2009.    NORMAL CHEST RADIOGRAPH.    IMPRESSION: NORMAL CHEST RADIOGRAPH.      : florentin  Transcribe Date/Time: Feb 23 2011  1:51P  Dictated by : JAYSHREE YORK MD  Read On: Feb 23 2011 11:25A  JAYSHREE YORK M.D.  [id number]  Images were reviewed, findings were  verified and document was  electronically  SIGNED BY: JAYSHREE YORK MD On: Feb 24 2011  3:43P    No results found for this or any previous visit.    No valid procedures specified.    Diagnostic Results:  ECG: Reviewed    Results for orders placed during the hospital encounter of 12/19/23    Nuclear Stress - Cardiology Interpreted    Interpretation Summary    Normal myocardial perfusion scan. There is no evidence of myocardial ischemia or infarction.    The gated perfusion images showed an ejection fraction of 60% at rest. The gated perfusion images showed an ejection fraction of 77% post stress.    There is normal wall motion at rest and post stress.    LV cavity size is normal at rest and normal at stress.    The ECG portion of the study is negative for ischemia.    The patient reported no chest pain during the stress test.    Results for orders placed during the hospital encounter of 12/19/23    Echo    Interpretation Summary    Left Ventricle: The left ventricle is normal in size. Normal wall thickness. Normal wall motion. There is normal systolic function with a visually estimated ejection fraction of 55 - 70%. Ejection fraction by visual approximation is 65%. There is normal diastolic function.    Right Ventricle: Normal right ventricular cavity size. Wall thickness is normal. Right ventricle wall motion  is normal. Systolic function is normal.    Pulmonary Artery: The estimated pulmonary artery systolic pressure is 26 mmHg.    IVC/SVC: Normal venous pressure at 3 mmHg.      The 10-year ASCVD risk score (Herb DK, et al., 2019) is: 6.3%    Values used to calculate the score:      Age: 64 years      Sex: Female      Is Non- : Yes      Diabetic: No      Tobacco smoker: No      Systolic Blood Pressure: 110 mmHg      Is BP treated: Yes      HDL Cholesterol: 60 mg/dL      Total Cholesterol: 200 mg/dL    Assessment and Plan:   Other hyperlipidemia    Essential hypertension    Severe  obesity (BMI 35.0-39.9) with comorbidity    IBD (inflammatory bowel disease)    Crohn's disease of large intestine without complication        Reviewed echocardiogram and nuclear stress test. 1.2024  Angina free, htn controlled, euvolemic  cw compression stockings.  Hydration.  Lower body exercise.  Reviewed all tests and above medical conditions with patient in detail and formulated treatment plan.  Risk factor modification discussed.   Cardiac low salt diet discussed.  Maintaining healthy weight and weight loss goals were discussed in clinic.  Discussed 1000 calories deficit diet    Follow up in 6 months

## 2024-11-14 ENCOUNTER — TELEPHONE (OUTPATIENT)
Dept: SPORTS MEDICINE | Facility: CLINIC | Age: 64
End: 2024-11-14
Payer: COMMERCIAL

## 2024-11-14 ENCOUNTER — PATIENT MESSAGE (OUTPATIENT)
Dept: SPORTS MEDICINE | Facility: CLINIC | Age: 64
End: 2024-11-14
Payer: COMMERCIAL

## 2024-12-02 ENCOUNTER — OFFICE VISIT (OUTPATIENT)
Dept: OPHTHALMOLOGY | Facility: CLINIC | Age: 64
End: 2024-12-02
Attending: FAMILY MEDICINE
Payer: COMMERCIAL

## 2024-12-02 DIAGNOSIS — H25.12 NUCLEAR SCLEROSIS OF LEFT EYE: ICD-10-CM

## 2024-12-02 DIAGNOSIS — H25.811 COMBINED FORMS OF AGE-RELATED CATARACT OF RIGHT EYE: Primary | ICD-10-CM

## 2024-12-02 DIAGNOSIS — I10 ESSENTIAL HYPERTENSION: ICD-10-CM

## 2024-12-02 PROCEDURE — 92015 DETERMINE REFRACTIVE STATE: CPT | Mod: S$GLB,,, | Performed by: OPHTHALMOLOGY

## 2024-12-02 PROCEDURE — 99999 PR PBB SHADOW E&M-EST. PATIENT-LVL IV: CPT | Mod: PBBFAC,,, | Performed by: OPHTHALMOLOGY

## 2024-12-02 PROCEDURE — 99214 OFFICE O/P EST MOD 30 MIN: CPT | Mod: PBBFAC | Performed by: OPHTHALMOLOGY

## 2024-12-02 PROCEDURE — 92004 COMPRE OPH EXAM NEW PT 1/>: CPT | Mod: S$GLB,,, | Performed by: OPHTHALMOLOGY

## 2024-12-02 NOTE — PROGRESS NOTES
HPI     Annual Exam     Additional comments: Pt here for routine eye exam. Pt says she has   noticed her vision has declined, especially in the last 6 months. She says   she has trouble with driving at night. Pt wears +1.25 readers.           Last edited by Toni Rojas MA on 12/2/2024  8:34 AM.            Assessment /Plan     For exam results, see Encounter Report.    Combined forms of age-related cataract of right eye  Nuclear sclerosis of left eye  Cataracts are present but not visually significant. Will continue to monitor. Mrx provided.      Return to clinic in 1 year or sooner PRN

## 2024-12-17 ENCOUNTER — DOCUMENTATION ONLY (OUTPATIENT)
Dept: REHABILITATION | Facility: HOSPITAL | Age: 64
End: 2024-12-17
Payer: COMMERCIAL

## 2024-12-17 DIAGNOSIS — M62.81 MUSCLE WEAKNESS OF RIGHT UPPER EXTREMITY: ICD-10-CM

## 2024-12-17 DIAGNOSIS — M25.511 CHRONIC RIGHT SHOULDER PAIN: ICD-10-CM

## 2024-12-17 DIAGNOSIS — Z74.09 DECREASED FUNCTIONAL MOBILITY AND ENDURANCE: ICD-10-CM

## 2024-12-17 DIAGNOSIS — R68.89 DECREASED STRENGTH, ENDURANCE, AND MOBILITY: Primary | ICD-10-CM

## 2024-12-17 DIAGNOSIS — R53.1 DECREASED STRENGTH, ENDURANCE, AND MOBILITY: Primary | ICD-10-CM

## 2024-12-17 DIAGNOSIS — G89.29 CHRONIC RIGHT SHOULDER PAIN: ICD-10-CM

## 2024-12-17 DIAGNOSIS — Z74.09 DECREASED STRENGTH, ENDURANCE, AND MOBILITY: Primary | ICD-10-CM

## 2024-12-17 NOTE — PROGRESS NOTES
[facility]  Physical Therapy Discharge Note    Name: Angelica Pate [facility] Number: [id number]       Therapy Diagnosis:        Encounter Diagnoses   Name Primary?    Decreased strength, endurance, and mobility Yes    Decreased functional mobility and endurance      Chronic right shoulder pain      Muscle weakness of right upper extremity        Physician: Delon Harper*        Physician Orders: PT Eval and Treat  Medical Diagnosis from Referral: Chronic right shoulder pain   Evaluation Date: 4/23/2024      Date of Last visit: 05/21/2024  Total Visits Received: 7    ASSESSMENT      See daily note    Discharge reason: Patient has not attended therapy since 05/21/2024    Discharge FOTO Score: see daily note    Goals: see daily note    PLAN   This patient is discharged from Physical Therapy      Julio Cesar Beendict PT

## 2024-12-21 DIAGNOSIS — I10 ESSENTIAL HYPERTENSION: ICD-10-CM

## 2024-12-21 RX ORDER — HYDROCHLOROTHIAZIDE 25 MG/1
25 TABLET ORAL
Qty: 90 TABLET | Refills: 2 | Status: SHIPPED | OUTPATIENT
Start: 2024-12-21

## 2024-12-21 NOTE — TELEPHONE ENCOUNTER
Provider Staff:  Action required for this patient    Requires labs      Please see care gap opportunities below in Care Due Message.    Thanks!  [facility]     Appointments      Date Provider   Last Visit   9/3/2024 Monet Alvarez MD   Next Visit   3/4/2025 Monet Alvarez MD     Refill Decision Note   Angelica Flores  is requesting a refill authorization.  Brief Assessment and Rationale for Refill:  Approve     Medication Therapy Plan:         Comments:     Note composed:4:54 PM 12/21/2024

## 2024-12-21 NOTE — TELEPHONE ENCOUNTER
Care Due:                  Date            Visit Type   Department     Provider  --------------------------------------------------------------------------------                                EP -                              PRIMARY      [facility]  Last Visit: 09-      CARE ([facility])   MEDICINE       Monet Alvarez                              EP -                              PRIMARY      [facility]  Next Visit: 03-      CARE ([facility])   MEDICINE       Monet Alvarez                                                            Last  Test          Frequency    Reason                     Performed    Due Date  --------------------------------------------------------------------------------    HBA1C.......  6 months...  semaglutide,.............  09- 02-    [facility] Embedded Care Due Messages. Reference number: [id number].   12/21/2024 5:51:54 AM CST

## 2024-12-26 PROBLEM — M62.81 MUSCLE WEAKNESS OF RIGHT UPPER EXTREMITY: Status: RESOLVED | Noted: 2024-04-24 | Resolved: 2024-12-26

## 2024-12-26 PROBLEM — R53.1 DECREASED STRENGTH, ENDURANCE, AND MOBILITY: Status: RESOLVED | Noted: 2023-01-04 | Resolved: 2024-12-26

## 2024-12-26 PROBLEM — M25.511 CHRONIC RIGHT SHOULDER PAIN: Status: RESOLVED | Noted: 2024-04-24 | Resolved: 2024-12-26

## 2024-12-26 PROBLEM — G89.29 CHRONIC RIGHT SHOULDER PAIN: Status: RESOLVED | Noted: 2024-04-24 | Resolved: 2024-12-26

## 2024-12-26 PROBLEM — R68.89 DECREASED STRENGTH, ENDURANCE, AND MOBILITY: Status: RESOLVED | Noted: 2023-01-04 | Resolved: 2024-12-26

## 2024-12-26 PROBLEM — Z74.09 DECREASED STRENGTH, ENDURANCE, AND MOBILITY: Status: RESOLVED | Noted: 2023-01-04 | Resolved: 2024-12-26

## 2024-12-26 PROBLEM — Z74.09 DECREASED FUNCTIONAL MOBILITY AND ENDURANCE: Status: RESOLVED | Noted: 2023-01-04 | Resolved: 2024-12-26

## 2025-01-09 ENCOUNTER — PATIENT MESSAGE (OUTPATIENT)
Dept: GASTROENTEROLOGY | Facility: CLINIC | Age: 65
End: 2025-01-09
Payer: COMMERCIAL

## 2025-01-30 ENCOUNTER — PATIENT MESSAGE (OUTPATIENT)
Dept: GASTROENTEROLOGY | Facility: CLINIC | Age: 65
End: 2025-01-30
Payer: COMMERCIAL

## 2025-01-30 ENCOUNTER — TELEPHONE (OUTPATIENT)
Dept: GASTROENTEROLOGY | Facility: CLINIC | Age: 65
End: 2025-01-30
Payer: COMMERCIAL

## 2025-01-30 NOTE — TELEPHONE ENCOUNTER
Called the [facility] Prior Authorization Team at [phone] opt 2 then opt 1 and spoke with Remberto to do a verbal PA request. Per Remberto, this patient's Humira has been approved for 18 months with effective dates 12/31/2024 through 07/29/2026. The [facility] program does not generate a PA number , but she was faxing the approval letter to [phone] for our records and informing [facility] of the approval. Will inform the patient via patient portal and will attach approval when received.    Sydney Keith, PharmD , AAHIVP  Clinical Pharmacist Gastroenterology   [facility]

## 2025-02-06 ENCOUNTER — TELEPHONE (OUTPATIENT)
Dept: GASTROENTEROLOGY | Facility: CLINIC | Age: 65
End: 2025-02-06
Payer: COMMERCIAL

## 2025-02-06 NOTE — TELEPHONE ENCOUNTER
Called patient to schedule IBD Clinic follow up appointment.   Patient agreed to a virtual appointment on 3/11.

## 2025-02-17 DIAGNOSIS — I10 ESSENTIAL HYPERTENSION: ICD-10-CM

## 2025-02-18 DIAGNOSIS — I10 ESSENTIAL HYPERTENSION: ICD-10-CM

## 2025-02-18 RX ORDER — LISINOPRIL 40 MG/1
40 TABLET ORAL
Qty: 90 TABLET | Refills: 1 | Status: SHIPPED | OUTPATIENT
Start: 2025-02-18

## 2025-02-18 NOTE — TELEPHONE ENCOUNTER
No care due was identified.  [facility] Embedded Care Due Messages. Reference number: [id number].   2/18/2025 5:52:09 AM CST

## 2025-02-18 NOTE — TELEPHONE ENCOUNTER
Refill Decision Note   Angelicajason Flores  is requesting a refill authorization.  Brief Assessment and Rationale for Refill:  Approve     Medication Therapy Plan:         Comments:     Note composed:12:32 PM 02/18/2025

## 2025-02-19 RX ORDER — AMLODIPINE BESYLATE 5 MG/1
5 TABLET ORAL
Qty: 90 TABLET | Refills: 3 | Status: SHIPPED | OUTPATIENT
Start: 2025-02-19

## 2025-02-24 DIAGNOSIS — Z00.00 ENCOUNTER FOR MEDICARE ANNUAL WELLNESS EXAM: ICD-10-CM

## 2025-03-04 ENCOUNTER — OFFICE VISIT (OUTPATIENT)
Dept: FAMILY MEDICINE | Facility: CLINIC | Age: 65
End: 2025-03-04
Attending: FAMILY MEDICINE
Payer: COMMERCIAL

## 2025-03-04 ENCOUNTER — LAB VISIT (OUTPATIENT)
Dept: LAB | Facility: HOSPITAL | Age: 65
End: 2025-03-04
Attending: FAMILY MEDICINE
Payer: COMMERCIAL

## 2025-03-04 VITALS
HEIGHT: 60 IN | WEIGHT: 183.63 LBS | DIASTOLIC BLOOD PRESSURE: 70 MMHG | OXYGEN SATURATION: 99 % | BODY MASS INDEX: 36.05 KG/M2 | RESPIRATION RATE: 18 BRPM | SYSTOLIC BLOOD PRESSURE: 122 MMHG | HEART RATE: 54 BPM | TEMPERATURE: 97 F

## 2025-03-04 DIAGNOSIS — E78.49 OTHER HYPERLIPIDEMIA: ICD-10-CM

## 2025-03-04 DIAGNOSIS — I10 ESSENTIAL HYPERTENSION: Primary | ICD-10-CM

## 2025-03-04 DIAGNOSIS — Z79.899 DRUG-INDUCED IMMUNODEFICIENCY: ICD-10-CM

## 2025-03-04 DIAGNOSIS — D84.821 DRUG-INDUCED IMMUNODEFICIENCY: ICD-10-CM

## 2025-03-04 DIAGNOSIS — E66.01 SEVERE OBESITY (BMI 35.0-39.9) WITH COMORBIDITY: ICD-10-CM

## 2025-03-04 DIAGNOSIS — K50.10 CROHN'S DISEASE OF LARGE INTESTINE WITHOUT COMPLICATION: ICD-10-CM

## 2025-03-04 DIAGNOSIS — I10 ESSENTIAL HYPERTENSION: ICD-10-CM

## 2025-03-04 LAB
ALBUMIN SERPL BCP-MCNC: 3.9 G/DL (ref 3.5–5.2)
ALP SERPL-CCNC: 69 U/L (ref 40–150)
ALT SERPL W/O P-5'-P-CCNC: 16 U/L (ref 10–44)
ANION GAP SERPL CALC-SCNC: 10 MMOL/L (ref 8–16)
AST SERPL-CCNC: 24 U/L (ref 10–40)
BASOPHILS # BLD AUTO: 0.06 K/UL (ref 0–0.2)
BASOPHILS NFR BLD: 0.6 % (ref 0–1.9)
BILIRUB SERPL-MCNC: 0.5 MG/DL (ref 0.1–1)
BUN SERPL-MCNC: 21 MG/DL (ref 8–23)
CALCIUM SERPL-MCNC: 9.6 MG/DL (ref 8.7–10.5)
CHLORIDE SERPL-SCNC: 105 MMOL/L (ref 95–110)
CHOLEST SERPL-MCNC: 185 MG/DL (ref 120–199)
CHOLEST/HDLC SERPL: 3.1 {RATIO} (ref 2–5)
CO2 SERPL-SCNC: 23 MMOL/L (ref 23–29)
CREAT SERPL-MCNC: 0.8 MG/DL (ref 0.5–1.4)
DIFFERENTIAL METHOD BLD: ABNORMAL
EOSINOPHIL # BLD AUTO: 0.1 K/UL (ref 0–0.5)
EOSINOPHIL NFR BLD: 1.1 % (ref 0–8)
ERYTHROCYTE [DISTWIDTH] IN BLOOD BY AUTOMATED COUNT: 13.6 % (ref 11.5–14.5)
EST. GFR  (NO RACE VARIABLE): >60 ML/MIN/1.73 M^2
GLUCOSE SERPL-MCNC: 94 MG/DL (ref 70–110)
HCT VFR BLD AUTO: 39.6 % (ref 37–48.5)
HDLC SERPL-MCNC: 60 MG/DL (ref 40–75)
HDLC SERPL: 32.4 % (ref 20–50)
HGB BLD-MCNC: 13.2 G/DL (ref 12–16)
IMM GRANULOCYTES # BLD AUTO: 0.04 K/UL (ref 0–0.04)
IMM GRANULOCYTES NFR BLD AUTO: 0.4 % (ref 0–0.5)
LDLC SERPL CALC-MCNC: 108.4 MG/DL (ref 63–159)
LYMPHOCYTES # BLD AUTO: 3.5 K/UL (ref 1–4.8)
LYMPHOCYTES NFR BLD: 33.4 % (ref 18–48)
MCH RBC QN AUTO: 30.7 PG (ref 27–31)
MCHC RBC AUTO-ENTMCNC: 33.3 G/DL (ref 32–36)
MCV RBC AUTO: 92 FL (ref 82–98)
MONOCYTES # BLD AUTO: 0.6 K/UL (ref 0.3–1)
MONOCYTES NFR BLD: 5.6 % (ref 4–15)
NEUTROPHILS # BLD AUTO: 6.2 K/UL (ref 1.8–7.7)
NEUTROPHILS NFR BLD: 58.9 % (ref 38–73)
NONHDLC SERPL-MCNC: 125 MG/DL
NRBC BLD-RTO: 0 /100 WBC
PLATELET # BLD AUTO: 313 K/UL (ref 150–450)
PLATELET BLD QL SMEAR: ABNORMAL
PMV BLD AUTO: 11.9 FL (ref 9.2–12.9)
POTASSIUM SERPL-SCNC: 3.7 MMOL/L (ref 3.5–5.1)
PROT SERPL-MCNC: 8.3 G/DL (ref 6–8.4)
RBC # BLD AUTO: 4.3 M/UL (ref 4–5.4)
SODIUM SERPL-SCNC: 138 MMOL/L (ref 136–145)
TRIGL SERPL-MCNC: 83 MG/DL (ref 30–150)
WBC # BLD AUTO: 10.47 K/UL (ref 3.9–12.7)

## 2025-03-04 PROCEDURE — 85025 COMPLETE CBC W/AUTO DIFF WBC: CPT | Performed by: FAMILY MEDICINE

## 2025-03-04 PROCEDURE — G2211 COMPLEX E/M VISIT ADD ON: HCPCS | Mod: S$GLB,,, | Performed by: FAMILY MEDICINE

## 2025-03-04 PROCEDURE — 99999 PR PBB SHADOW E&M-EST. PATIENT-LVL V: CPT | Mod: PBBFAC,,, | Performed by: FAMILY MEDICINE

## 2025-03-04 PROCEDURE — 1101F PT FALLS ASSESS-DOCD LE1/YR: CPT | Mod: CPTII,S$GLB,, | Performed by: FAMILY MEDICINE

## 2025-03-04 PROCEDURE — 1159F MED LIST DOCD IN RCRD: CPT | Mod: CPTII,S$GLB,, | Performed by: FAMILY MEDICINE

## 2025-03-04 PROCEDURE — 3288F FALL RISK ASSESSMENT DOCD: CPT | Mod: CPTII,S$GLB,, | Performed by: FAMILY MEDICINE

## 2025-03-04 PROCEDURE — 3008F BODY MASS INDEX DOCD: CPT | Mod: CPTII,S$GLB,, | Performed by: FAMILY MEDICINE

## 2025-03-04 PROCEDURE — 1160F RVW MEDS BY RX/DR IN RCRD: CPT | Mod: CPTII,S$GLB,, | Performed by: FAMILY MEDICINE

## 2025-03-04 PROCEDURE — 99214 OFFICE O/P EST MOD 30 MIN: CPT | Mod: S$GLB,,, | Performed by: FAMILY MEDICINE

## 2025-03-04 PROCEDURE — 3074F SYST BP LT 130 MM HG: CPT | Mod: CPTII,S$GLB,, | Performed by: FAMILY MEDICINE

## 2025-03-04 PROCEDURE — 36415 COLL VENOUS BLD VENIPUNCTURE: CPT | Mod: PO | Performed by: FAMILY MEDICINE

## 2025-03-04 PROCEDURE — 80053 COMPREHEN METABOLIC PANEL: CPT | Performed by: FAMILY MEDICINE

## 2025-03-04 PROCEDURE — 80061 LIPID PANEL: CPT | Performed by: FAMILY MEDICINE

## 2025-03-04 PROCEDURE — 3078F DIAST BP <80 MM HG: CPT | Mod: CPTII,S$GLB,, | Performed by: FAMILY MEDICINE

## 2025-03-04 PROCEDURE — 1126F AMNT PAIN NOTED NONE PRSNT: CPT | Mod: CPTII,S$GLB,, | Performed by: FAMILY MEDICINE

## 2025-03-04 PROCEDURE — 4010F ACE/ARB THERAPY RXD/TAKEN: CPT | Mod: CPTII,S$GLB,, | Performed by: FAMILY MEDICINE

## 2025-03-04 RX ORDER — CALCIUM CARB, CITRATE, MALATE 250 MG
CAPSULE ORAL DAILY
COMMUNITY

## 2025-03-04 NOTE — PROGRESS NOTES
Angelica Flores    Chief Complaint   Patient presents with    Hypertension    Follow-up       History of Present Illness:   Ms. Flores comes in today for hypertension follow-up.  She states she is fasting but has taken medications today.  She states she performs home blood pressure checks 3 times per week with levels ranging <120/60-70's.  She states she monitors her diet but has not been exercising.  She states she continues to take amlodipine 5 mg daily, hydrochlorothiazide 25 mg daily, lisinopril 40 mg daily, Toprol-XL 25 mg daily, and over-the-counter potassium 99 mg daily for blood pressure control; Lipitor 20 mg nightly for high cholesterol control; Topamax 25 mg-2 pills at night for migraine headache treatment; Humira every 2 weeks for treatment of Crohn's.    She states she occasionally has stomach upset, pain when upset with work issues and takes Bentyl with help.    Otherwise, she reports no acute problems today. She denies having fever, chills, fatigue, appetite changes; shortness of breath, cough, wheezing; chest pain, palpitations, leg swelling; nausea, vomiting, diarrhea, constipation; unusual urinary symptoms; polydipsia, polyphagia, polyuria, hot or cold intolerance; back pain; acute visual changes, numbness, headache; anxiety, depression, homicidal or suicidal thoughts.      She saw Dr. Wharton, cardiologist, on November 11, 2024 for other hyperlipidemia, essential hypertension, severe obesity (BMI 35.0-39.9) with comorbidity, IBD (inflammatory bowel disease), Crohn's disease of large intestine without complication with follow up scheduled for May 29, 2025. She saw Dr. Feldman, ophthalmologist, on December 2, 2024 for combined forms of age-related cataract of right eye, essential hypertension, nuclear sclerosis of left eye. She saw KIMMIE Gallo with GI on February 22, 2024 for Crohn's disease with follow up scheduled for March 11, 2025.      Labs:                     WBC                       12.96 (H)           09/03/2024                 HGB                      13.2                09/03/2024                 HCT                      40.9                09/03/2024                 PLT                      318                 09/03/2024                 CHOL                     200 (H)             09/03/2024                 TRIG                     120                 09/03/2024                 HDL                      60                  09/03/2024                 ALT                      19                  09/03/2024                 AST                      18                  09/03/2024                 NA                       138                 09/03/2024                 K                        3.5                 09/03/2024                 CL                       104                 09/03/2024                 CREATININE               0.9                 09/03/2024                 BUN                      14                  09/03/2024                 CO2                      23                  09/03/2024                 TSH                      0.839               09/03/2024                 INR                      0.9                 08/18/2008                 HGBA1C                   5.4                 08/31/2023              LDLCALC                  116.0               09/03/2024        CREATININE               0.9                 09/03/2024                 CALCIUM                  9.7                 09/03/2024                 ANIONGAP                 11                  09/03/2024                 EGFRNORACEVR             >60.0               09/03/2024                            Current Outpatient Medications   Medication Sig    adalimumab (HUMIRA,CF, PEN) 40 mg/0.4 mL PnKt Inject 0.4 mLs (40 mg total) into the skin every 14 (fourteen) days.    ALPRAZolam (XANAX) 0.5 MG tablet Take 1 tablet by mouth twice daily as needed    amLODIPine (NORVASC) 5 MG tablet Take 1 tablet by mouth once daily     ascorbic acid, vitamin C, (VITAMIN C) 1000 MG tablet Take by mouth. 1 Tablet Oral Every day    atorvastatin (LIPITOR) 20 MG tablet TAKE 1 TABLET BY MOUTH ONCE DAILY IN THE EVENING    azelastine (ASTELIN) 137 mcg (0.1 %) nasal spray 2 sprays (274 mcg total) by Nasal route 2 (two) times daily.    BACILLUS COAGULANS (PROBIOTIC, B. COAGULANS, ORAL) Take by mouth once daily.    cetirizine (ALLERGY RELIEF, CETIRIZINE,) 10 MG tablet Take 1 tablet (10 mg total) by mouth once daily.    cholecalciferol, vitamin D3, 2,000 unit Cap Take by mouth. 1 capsule Oral Every day    dicyclomine (BENTYL) 20 mg tablet Take 1 tablet (20 mg total) by mouth 3 (three) times daily as needed (abdominal pain).    hydroCHLOROthiazide (HYDRODIURIL) 25 MG tablet Take 1 tablet by mouth once daily    hydroquinone 4 % Crea Apply to dark spots once daily. Use with sunscreen if outdoors    ipratropium (ATROVENT) 21 mcg (0.03 %) nasal spray 2 sprays by Nasal route 3 (three) times daily.    lisinopriL (PRINIVIL,ZESTRIL) 40 MG tablet Take 1 tablet by mouth once daily    metoprolol succinate (TOPROL-XL) 25 MG 24 hr tablet Take 1 tablet by mouth once daily    montelukast (SINGULAIR) 10 mg tablet TAKE 1 TABLET BY MOUTH ONCE DAILY IN THE EVENING    multivitamin-Ca-iron-minerals 18-0.4 mg Tab Take by mouth. 1 Tablet Oral Every day    potassium citrate 99 mg Cap Take by mouth Daily.    topiramate (TOPAMAX) 25 MG tablet TAKE 2 TABLETS BY MOUTH IN THE EVENING    tretinoin (RETIN-A) 0.025 % cream Apply pea-sized amount to entire face at bedtime.  Use every third night and increase as tolerated to nightly.    valACYclovir (VALTREX) 1000 MG tablet TAKE 2 TABLETS BY MOUTH TWICE DAILY FOR 1 DAY PER EPISODE    zinc sulfate (ZINC-220 ORAL)        Review of Systems   Constitutional:  Negative for activity change, appetite change, chills, fatigue and fever.        Wt 83.1 kg (183 lb 3.2 oz) at September 3, 2024 visit.   Eyes:  Negative for visual disturbance.    Respiratory:  Negative for cough, shortness of breath and wheezing.    Cardiovascular:  Negative for chest pain, palpitations and leg swelling.        See history of present illness.   Gastrointestinal:  Positive for abdominal pain. Negative for constipation, diarrhea, nausea and vomiting.        See history of present illness.   Endocrine: Negative for cold intolerance, heat intolerance, polydipsia, polyphagia and polyuria.   Genitourinary:  Negative for difficulty urinating.   Musculoskeletal:  Negative for back pain, joint swelling, myalgias and neck pain.   Neurological:  Negative for numbness and headaches.   Psychiatric/Behavioral:  Negative for dysphoric mood and suicidal ideas. The patient is not nervous/anxious.         Negative for homicidal ideas.       Objective:  Physical Exam  Vitals reviewed.   Constitutional:       General: She is not in acute distress.     Appearance: Normal appearance. She is well-developed. She is obese. She is not ill-appearing, toxic-appearing or diaphoretic.      Comments: Pleasant.   Neck:      Thyroid: No thyromegaly.   Cardiovascular:      Rate and Rhythm: Normal rate and regular rhythm.      Heart sounds: Normal heart sounds. No murmur heard.  Pulmonary:      Effort: Pulmonary effort is normal. No respiratory distress.      Breath sounds: Normal breath sounds. No wheezing.   Abdominal:      General: Bowel sounds are normal. There is no distension.      Palpations: Abdomen is soft. There is no mass.      Tenderness: There is no abdominal tenderness. There is no guarding or rebound.   Musculoskeletal:         General: No swelling or tenderness. Normal range of motion.      Cervical back: Normal range of motion and neck supple. No tenderness.      Comments: She is ambulatory without problems.   Lymphadenopathy:      Cervical: No cervical adenopathy.   Neurological:      General: No focal deficit present.      Mental Status: She is alert and oriented to person, place, and  time.   Psychiatric:         Mood and Affect: Mood normal.         Behavior: Behavior normal.         Thought Content: Thought content normal.         Judgment: Judgment normal.       ASSESSMENT:  1. Essential hypertension    2. Other hyperlipidemia    3. Crohn's disease of large intestine without complication    4. Drug-induced immunodeficiency    5. Severe obesity (BMI 35.0-39.9) with comorbidity        PLAN:  Angelica was seen today for hypertension and follow-up.    Diagnoses and all orders for this visit:    Essential hypertension - stable on medication  -     Comprehensive Metabolic Panel; Future  -     CBC Auto Differential; Future    Other hyperlipidemia - stable on medication  -     Lipid Panel; Future  -     Comprehensive Metabolic Panel; Future    Crohn's disease of large intestine without complication - stable on medication and managed by GI    Drug-induced immunodeficiency - stable and managed by GI    Severe obesity (BMI 35.0-39.9) with comorbidity - stable and managed with diet      Patient advised to call for results.  Continue current medications, follow low sodium, low cholesterol, low carb diet, daily walks.  Keep follow up with specialists.  Patient states she had flu shot in September 2024 at work.  Follow up in about 6 months (around 9/4/2025) for physical.

## 2025-03-11 ENCOUNTER — OFFICE VISIT (OUTPATIENT)
Dept: GASTROENTEROLOGY | Facility: CLINIC | Age: 65
End: 2025-03-11
Payer: COMMERCIAL

## 2025-03-11 ENCOUNTER — LAB VISIT (OUTPATIENT)
Dept: LAB | Facility: HOSPITAL | Age: 65
End: 2025-03-11
Attending: NURSE PRACTITIONER
Payer: COMMERCIAL

## 2025-03-11 DIAGNOSIS — Z79.899 DRUG-INDUCED IMMUNODEFICIENCY: ICD-10-CM

## 2025-03-11 DIAGNOSIS — K50.10 CROHN'S DISEASE OF COLON WITHOUT COMPLICATION: ICD-10-CM

## 2025-03-11 DIAGNOSIS — D84.821 DRUG-INDUCED IMMUNODEFICIENCY: ICD-10-CM

## 2025-03-11 DIAGNOSIS — K50.10 CROHN'S DISEASE OF COLON WITHOUT COMPLICATION: Primary | ICD-10-CM

## 2025-03-11 PROCEDURE — 36415 COLL VENOUS BLD VENIPUNCTURE: CPT | Performed by: NURSE PRACTITIONER

## 2025-03-11 PROCEDURE — 82306 VITAMIN D 25 HYDROXY: CPT | Performed by: NURSE PRACTITIONER

## 2025-03-11 PROCEDURE — 86480 TB TEST CELL IMMUN MEASURE: CPT | Performed by: NURSE PRACTITIONER

## 2025-03-11 RX ORDER — DICYCLOMINE HYDROCHLORIDE 20 MG/1
20 TABLET ORAL 3 TIMES DAILY PRN
Qty: 30 TABLET | Refills: 5 | Status: SHIPPED | OUTPATIENT
Start: 2025-03-11

## 2025-03-11 NOTE — PROGRESS NOTES
Clinic Follow Up:  [facility] Follow Up Note    Reason for Follow Up:  The primary encounter diagnosis was Crohn's disease of colon without complication. A diagnosis of Drug-induced immunodeficiency was also pertinent to this visit.    PCP: Monet Alvarez       HPI:  This is a 64 y.o. female here for follow up of Crohn's disease.     IBD History  - Type: crohn's disease  - Disease Location: colon  - Amount of colon involvement (for Crohn's Disease only): More than 1/3 of the colon is involved   - Phenotype:  inflammatory  - Diagnosed: 1990s  - Surgeries related to IBD: none   - Extra-intestinal Manifestations: joint pain(s)-- left shoulder only     Current Medications  Humira 40 mg every 14 days, started 10/2022, increased to weekly 12/2023, decreased back to Q14 due to high drug levels in 6/2024    Past Medications  Mercaptopurine 50 mg daily     Drug and Antibody Levels   2020- Pro-predict thiopurine metabolites 6TG low normal at 233 (230-400)  12/19/22 Humira level 6.5; no AB formation (standard dosing)  3/3/23 Humira level 16.5; no AB formation (weekly dosing)  3/1/24 Humira level 19.5  6/6/24 Humira 26 (on weekly dosing)  9/26/24 Humira 17.9 (on standard dosing)     Endoscopy Reports  2008- patchy inflammation (descending and ascending)  2012- normal   2015- scattered inflammation (proximal transverse)   2017- patchy inflammation (transverse, ascending, cecum)  2020- normal  9/2022- patchy inflammation (hepatic flexure, ascending, cecum)- on 6-MP  8/22/23- normal; path with focal low grade dysplasia.   2/1/24- chromoednoscopy- 3 small flat polypoid lesions; 2 sessile serrated polyps. Recall in 6 months.   8/20/24- chomoendoscopy- polyps were not TA polyps. Colon bx without dysplasia. Does not need additional chromo exams at this time.      Pertinent Imagine Reports:  NA     Interval History  She is doing well. Does get occasional abdominal cramping and will take Bentyl      Preventative  "Medicine     Immunizations  - Influenza: 2024  - Pnemococcal:  PCV 20: 22  - Hepatitis A/B: immune per serology   - Herpes Zoster: 19, 19  - COVID-2024     Cancer Prevention   - Date of last pap smear: NA- hyst   - Date of last skin cancer screenin2022, recommend yearly   - Date of last surveillance colonoscopy: 2024-- does need annual colonoscopies because of hx of low angelic dysplasia in .      Bone Health  - Vitamin D level: 73-- on supplement.   - Date of last DEXA: NA     Therapy Related Testing  - Date of last TB testin23- negative quant gold   - TPMT status: normal      Miscellaneous  - Vitamin B 12 level (if ileal disease or resection): NA  - Smoking status: no  - NSAID use: no   - History of C. Diff: no  - Family planning: hyst     Recent Labs:   Lab Results   Component Value Date    WBC 10.47 2025    HGB 13.2 2025    HCT 39.6 2025     2025    ALT 16 2025    AST 24 2025    BUN 21 2025    CREATININE 0.8 2025    ALBUMIN 3.9 2025     2025    K 3.7 2025    GLU 94 2025     Lab Results   Component Value Date    CRP 5.6 2023    SEDRATE 23 2023    CALPROTECTIN 29.3 08/15/2023     Lab Results   Component Value Date    HEPBSAB 216.75 2023    HEPBSAB Reactive 2023    HEPBSAG Non-reactive 2023    HEPBCAB Non-reactive 2023    HEPAIGG Negative 2020     Lab Results   Component Value Date    RCEFYQWC45 533 2009     Lab Results   Component Value Date    ORRQPIEM95BV 73 2023     No results found for: "TBGOLD"     Review of Systems    Medical History:  Past Medical History:   Diagnosis Date    Anxiety     Asthma     Crohn's disease     Fibroids     Hyperlipidemia     Hypertension     Iritis     Migraine headache     Ocular    Osteopenia 2019    Syncope and collapse     Vitamin D deficiency disease        Surgical History:   Past Surgical History: "   Procedure Laterality Date    ANKLE FRACTURE SURGERY  08/19/2008    right ankle    BREAST BIOPSY Left 1977    COLONOSCOPY N/A 08/11/2017    Procedure: COLONOSCOPY - REQUESTS DR. MATTI WOOD;  Surgeon: Matti Wood III, MD;  Location: [facility];  Service: Endoscopy;  Laterality: N/A;    COLONOSCOPY N/A 11/23/2020    Procedure: COLONOSCOPY;  Surgeon: Matti Wood III, MD;  Location: [facility];  Service: Endoscopy;  Laterality: N/A;    COLONOSCOPY N/A 09/28/2022    Procedure: COLONOSCOPY;  Surgeon: Matti Wood III, MD;  Location: [facility];  Service: Endoscopy;  Laterality: N/A;    COLONOSCOPY N/A 08/22/2023    Procedure: COLONOSCOPY;  Surgeon: Erlinda Lindsay MD;  Location: [facility];  Service: Endoscopy;  Laterality: N/A;    COLONOSCOPY N/A 2/1/2024    Procedure: Colonosocpy with CHROMO;  Surgeon: Edwin Muller MD;  [facility]  Service: Endoscopy;  Laterality: N/A;  Colonoscopy with CHROMO. referral by Haylee Gallo NP, Suprep, portal -ml  1/25-precall complete-MS    COLONOSCOPY WITH CHROMOENDOSCOPY N/A 8/20/2024    Procedure: COLONOSCOPY, WITH CHROMOENDOSCOPY;  Surgeon: Edwin Muller MD;  Location: [facility] (4TH FLR);  Service: Endoscopy;  Laterality: N/A;  Ref By: salima Kaiser [email or web address]  8/13/24-preop call complete-js    OOPHORECTOMY Bilateral     TAHBSO  02/2011    Due to abnormal pap smear and fibroids    TOTAL ABDOMINAL HYSTERECTOMY         Family History:   Family History   Problem Relation Name Age of Onset    Arthritis Mother      Fuch's dystrophy Mother      Breast cancer Mother      Hypertension Father      Lupus Sister      Rheum arthritis Sister      Obesity Sister      Stroke Maternal Grandmother      Diabetes Maternal Grandmother      Cancer Maternal Grandfather          lung ca    Colon cancer Paternal Aunt      Thyroid cancer Neg Hx          MEN2       Social History:   Social History[1]    Allergies: Review of patient's allergies  indicates:  No Known Allergies    Home Medications:  Medications Ordered Prior to Encounter[2]    There were no vitals taken for this visit.  There is no height or weight on file to calculate BMI.  Physical Exam    Labs: Pertinent labs reviewed.  CRC Screening:     Assessment:   1. Crohn's disease of colon without complication    2. Drug-induced immunodeficiency    In remission on Humira. On standard dosing. Has hx of low grade dysplasia or colon bx. Has completed chromoendoscopy x 2. Does not need additional chromo at this time. Does need annual colonoscopy because of hx.     Recommendations:   - continue Humira standard dosing  - due for colonoscopy (dysplasia screening) in August 2025.   - Bentyl refilled-- she uses PRN  - update labs    Health Maintenance Discussed  - Vitamin D supplement: continue   - Vaccines due: RSV-- per PCP recommendations.   - DEXA scan: per PCP  - Skin cancer screening with dermatologist: due, will schedule appt for her.   - Sun exposure precautions: Stay in the shade, especially during midday. Wear clothing to protect exposed skin. Wear a hat with a wide brim to shade the face, head, ears, and neck. Wear sunglasses to protect your eyes. Use sunscreen with at least SPF 30 before you go outside.   - Cervical cancer screening with GYN: NA  - Screening colonoscopy: due in August.     Crohn's disease of colon without complication  -     dicyclomine (BENTYL) 20 mg tablet; Take 1 tablet (20 mg total) by mouth 3 (three) times daily as needed (abdominal pain).  Dispense: 30 tablet; Refill: 5  -     Quantiferon Gold TB; Future; Expected date: 03/11/2025  -     Vitamin D; Future; Expected date: 03/11/2025    Drug-induced immunodeficiency    Return to Clinic:  Follow up in about 1 year (around 3/11/2026).    Thank you for the opportunity to participate in the care of this patient.  BK Del Toro             [1]   Social History  Tobacco Use    Smoking status: Never    Smokeless tobacco:  Never   Substance Use Topics    Alcohol use: Yes     Alcohol/week: 0.0 standard drinks of alcohol     Comment: ocassionally    Drug use: No   [2]   Current Outpatient Medications on File Prior to Visit   Medication Sig Dispense Refill    adalimumab (HUMIRA,CF, PEN) 40 mg/0.4 mL PnKt Inject 0.4 mLs (40 mg total) into the skin every 14 (fourteen) days. 2 pen 11    ALPRAZolam (XANAX) 0.5 MG tablet Take 1 tablet by mouth twice daily as needed 60 tablet 0    amLODIPine (NORVASC) 5 MG tablet Take 1 tablet by mouth once daily 90 tablet 3    ascorbic acid, vitamin C, (VITAMIN C) 1000 MG tablet Take by mouth. 1 Tablet Oral Every day      atorvastatin (LIPITOR) 20 MG tablet TAKE 1 TABLET BY MOUTH ONCE DAILY IN THE EVENING 90 tablet 1    azelastine (ASTELIN) 137 mcg (0.1 %) nasal spray 2 sprays (274 mcg total) by Nasal route 2 (two) times daily. 90 mL 3    BACILLUS COAGULANS (PROBIOTIC, B. COAGULANS, ORAL) Take by mouth once daily.      cetirizine (ALLERGY RELIEF, CETIRIZINE,) 10 MG tablet Take 1 tablet (10 mg total) by mouth once daily. 90 tablet 1    cholecalciferol, vitamin D3, 2,000 unit Cap Take by mouth. 1 capsule Oral Every day      hydroCHLOROthiazide (HYDRODIURIL) 25 MG tablet Take 1 tablet by mouth once daily 90 tablet 2    hydroquinone 4 % Crea Apply to dark spots once daily. Use with sunscreen if outdoors 28 g 1    ipratropium (ATROVENT) 21 mcg (0.03 %) nasal spray 2 sprays by Nasal route 3 (three) times daily. 20 mL 5    lisinopriL (PRINIVIL,ZESTRIL) 40 MG tablet Take 1 tablet by mouth once daily 90 tablet 1    metoprolol succinate (TOPROL-XL) 25 MG 24 hr tablet Take 1 tablet by mouth once daily 90 tablet 3    montelukast (SINGULAIR) 10 mg tablet TAKE 1 TABLET BY MOUTH ONCE DAILY IN THE EVENING 90 tablet 3    multivitamin-Ca-iron-minerals 18-0.4 mg Tab Take by mouth. 1 Tablet Oral Every day      potassium citrate 99 mg Cap Take by mouth Daily.      topiramate (TOPAMAX) 25 MG tablet TAKE 2 TABLETS BY MOUTH IN THE  EVENING 180 tablet 3    tretinoin (RETIN-A) 0.025 % cream Apply pea-sized amount to entire face at bedtime.  Use every third night and increase as tolerated to nightly. 20 g 11    valACYclovir (VALTREX) 1000 MG tablet TAKE 2 TABLETS BY MOUTH TWICE DAILY FOR 1 DAY PER EPISODE 30 tablet 0    zinc sulfate (ZINC-220 ORAL)       [DISCONTINUED] dicyclomine (BENTYL) 20 mg tablet Take 1 tablet (20 mg total) by mouth 3 (three) times daily as needed (abdominal pain). 90 tablet 0     Current Facility-Administered Medications on File Prior to Visit   Medication Dose Route Frequency Provider Last Rate Last Admin    lactated ringers infusion   Intravenous Continuous Erlinda Lindsay  mL/hr at 08/22/23 1222 Rate Change at 08/22/23 1222

## 2025-03-11 NOTE — Clinical Note
Please schedule her with Dr. Walker for skin cancer screening.  Due for colonoscopy in August 2025.

## 2025-03-12 LAB — 25(OH)D3+25(OH)D2 SERPL-MCNC: 65 NG/ML (ref 30–96)

## 2025-03-15 LAB
GAMMA INTERFERON BACKGROUND BLD IA-ACNC: 0.04 IU/ML
M TB IFN-G CD4+ BCKGRND COR BLD-ACNC: 0.01 IU/ML
M TB IFN-G CD4+ BCKGRND COR BLD-ACNC: 0.02 IU/ML
MITOGEN IGNF BCKGRD COR BLD-ACNC: 5.29 IU/ML
TB GOLD PLUS: NEGATIVE

## 2025-03-17 ENCOUNTER — RESULTS FOLLOW-UP (OUTPATIENT)
Dept: GASTROENTEROLOGY | Facility: CLINIC | Age: 65
End: 2025-03-17

## 2025-05-08 DIAGNOSIS — K50.10 CROHN'S DISEASE OF COLON WITHOUT COMPLICATION: ICD-10-CM

## 2025-05-08 RX ORDER — ADALIMUMAB 40MG/0.4ML
KIT SUBCUTANEOUS
Qty: 2 PEN | Refills: 11 | Status: SHIPPED | OUTPATIENT
Start: 2025-05-08

## 2025-05-29 ENCOUNTER — OFFICE VISIT (OUTPATIENT)
Dept: CARDIOLOGY | Facility: CLINIC | Age: 65
End: 2025-05-29
Payer: COMMERCIAL

## 2025-05-29 VITALS
BODY MASS INDEX: 34.75 KG/M2 | DIASTOLIC BLOOD PRESSURE: 88 MMHG | HEART RATE: 93 BPM | WEIGHT: 177 LBS | SYSTOLIC BLOOD PRESSURE: 134 MMHG | HEIGHT: 60 IN | OXYGEN SATURATION: 95 %

## 2025-05-29 DIAGNOSIS — K52.9 IBD (INFLAMMATORY BOWEL DISEASE): ICD-10-CM

## 2025-05-29 DIAGNOSIS — I10 ESSENTIAL HYPERTENSION: Primary | ICD-10-CM

## 2025-05-29 DIAGNOSIS — E78.49 OTHER HYPERLIPIDEMIA: ICD-10-CM

## 2025-05-29 DIAGNOSIS — K50.10 CROHN'S DISEASE OF LARGE INTESTINE WITHOUT COMPLICATION: ICD-10-CM

## 2025-05-29 PROCEDURE — 99999 PR PBB SHADOW E&M-EST. PATIENT-LVL IV: CPT | Mod: PBBFAC,,, | Performed by: INTERNAL MEDICINE

## 2025-05-29 NOTE — PROGRESS NOTES
Subjective:   Patient ID:  05/29/2025      Angelica Flores is a 64 y.o. female who presents for evaluation of No chief complaint on file.      History of Present Illness    CHIEF COMPLAINT:  Patient presents today for 6-month follow up.    HYPERTENSION:  BP today is 134/88, elevated from usual baseline of 120/60. She attributes this elevation to missing 1.5 days of medications due to delayed luggage during recent travel. Current antihypertensive medications include amlodipine, lisinopril, HCTZ, and metoprolol.    DIET AND EXERCISE:  She has lost 5 lbs through lifestyle modifications including elimination of fried foods, increased water intake, and avoiding between-meal snacks. She reports increased physical activity since returning to in-person work. She avoids foods with seeds, okra, and corn, but tolerates lettuce, spinach, and fresh fruits and vegetables in moderation.    MEDICATIONS:  She takes Lipitor 20mg for hypercholesterolemia. She reports a 3-month period of medication non-adherence with Lipitor but has since resumed consistent use.    SOCIAL HISTORY:  She reports minimal alcohol consumption, limited to occasional lemon drop cocktails on weekends during social gatherings.         HPI    Past Medical History:   Diagnosis Date    Anxiety     Asthma     Crohn's disease     Fibroids     Hyperlipidemia     Hypertension     Iritis     Migraine headache     Ocular    Osteopenia 08/2019    Syncope and collapse     Vitamin D deficiency disease        Past Surgical History:   Procedure Laterality Date    ANKLE FRACTURE SURGERY  08/19/2008    right ankle    BREAST BIOPSY Left 1977    COLONOSCOPY N/A 08/11/2017    Procedure: COLONOSCOPY - REQUESTS DR. MATTI ACUNA;  Surgeon: Matti Acuna III, MD;  Location: [facility];  Service: Endoscopy;  Laterality: N/A;    COLONOSCOPY N/A 11/23/2020    Procedure: COLONOSCOPY;  Surgeon: Matti Acuna III, MD;  Location: [facility];  Service: Endoscopy;  Laterality:  N/A;    COLONOSCOPY N/A 09/28/2022    Procedure: COLONOSCOPY;  Surgeon: Lalo Wood III, MD;  Location: [facility] ENDO;  Service: Endoscopy;  Laterality: N/A;    COLONOSCOPY N/A 08/22/2023    Procedure: COLONOSCOPY;  Surgeon: Erlinda Lindsay MD;  Location: [facility] ENDO;  Service: Endoscopy;  Laterality: N/A;    COLONOSCOPY N/A 2/1/2024    Procedure: Colonosocpy with CHROMO;  Surgeon: Edwin Muller MD;  Location: [facility] (4TH FLR);  Service: Endoscopy;  Laterality: N/A;  Colonoscopy with CHROMO. referral by Haylee Gallo NP, Suprep, portal -  1/25-precall complete-MS    COLONOSCOPY WITH CHROMOENDOSCOPY N/A 8/20/2024    Procedure: COLONOSCOPY, WITH CHROMOENDOSCOPY;  Surgeon: Edwin Muller MD;  Location: [facility] (4TH FLR);  Service: Endoscopy;  Laterality: N/A;  Ref By: salima Kaiser suprep.  8/13/24-preop call complete-js    OOPHORECTOMY Bilateral     TAHBSO  02/2011    Due to abnormal pap smear and fibroids    TOTAL ABDOMINAL HYSTERECTOMY         Social History[1]    Family History   Problem Relation Name Age of Onset    Arthritis Mother      Fuch's dystrophy Mother      Breast cancer Mother      Hypertension Father      Lupus Sister      Rheum arthritis Sister      Obesity Sister      Stroke Maternal Grandmother      Diabetes Maternal Grandmother      Cancer Maternal Grandfather          lung ca    Colon cancer Paternal Aunt      Thyroid cancer Neg Hx          MEN2         Review of Systems   Cardiovascular:  Negative for chest pain, dyspnea on exertion, palpitations and syncope.   Neurological:  Negative for focal weakness.       Current Outpatient Medications on File Prior to Visit   Medication Sig    adalimumab (HUMIRA,CF, PEN) 40 mg/0.4 mL PnKt INJECT 1 PEN UNDER THE SKIN EVERY 14 DAYS    ALPRAZolam (XANAX) 0.5 MG tablet Take 1 tablet by mouth twice daily as needed    amLODIPine (NORVASC) 5 MG tablet Take 1 tablet by mouth once daily    ascorbic acid, vitamin C, (VITAMIN C) 1000 MG  tablet Take by mouth. 1 Tablet Oral Every day    atorvastatin (LIPITOR) 20 MG tablet TAKE 1 TABLET BY MOUTH ONCE DAILY IN THE EVENING    azelastine (ASTELIN) 137 mcg (0.1 %) nasal spray 2 sprays (274 mcg total) by Nasal route 2 (two) times daily.    BACILLUS COAGULANS (PROBIOTIC, B. COAGULANS, ORAL) Take by mouth once daily.    cetirizine (ALLERGY RELIEF, CETIRIZINE,) 10 MG tablet Take 1 tablet (10 mg total) by mouth once daily.    cholecalciferol, vitamin D3, 2,000 unit Cap Take by mouth. 1 capsule Oral Every day    dicyclomine (BENTYL) 20 mg tablet Take 1 tablet (20 mg total) by mouth 3 (three) times daily as needed (abdominal pain).    hydroCHLOROthiazide (HYDRODIURIL) 25 MG tablet Take 1 tablet by mouth once daily    hydroquinone 4 % Crea Apply to dark spots once daily. Use with sunscreen if outdoors    ipratropium (ATROVENT) 21 mcg (0.03 %) nasal spray 2 sprays by Nasal route 3 (three) times daily.    lisinopriL (PRINIVIL,ZESTRIL) 40 MG tablet Take 1 tablet by mouth once daily    metoprolol succinate (TOPROL-XL) 25 MG 24 hr tablet Take 1 tablet by mouth once daily    montelukast (SINGULAIR) 10 mg tablet TAKE 1 TABLET BY MOUTH ONCE DAILY IN THE EVENING    multivitamin-Ca-iron-minerals 18-0.4 mg Tab Take by mouth. 1 Tablet Oral Every day    potassium citrate 99 mg Cap Take by mouth Daily.    topiramate (TOPAMAX) 25 MG tablet TAKE 2 TABLETS BY MOUTH IN THE EVENING    tretinoin (RETIN-A) 0.025 % cream Apply pea-sized amount to entire face at bedtime.  Use every third night and increase as tolerated to nightly.    valACYclovir (VALTREX) 1000 MG tablet TAKE 2 TABLETS BY MOUTH TWICE DAILY FOR 1 DAY PER EPISODE    zinc sulfate (ZINC-220 ORAL)      Current Facility-Administered Medications on File Prior to Visit   Medication    lactated ringers infusion       Objective:   Objective:  Wt Readings from Last 3 Encounters:   05/29/25 80.3 kg (177 lb 0.5 oz)   03/04/25 83.3 kg (183 lb 10.3 oz)   11/11/24 84.8 kg (186 lb 15.2  oz)     Temp Readings from Last 3 Encounters:   03/04/25 97.4 °F (36.3 °C) (Tympanic)   09/03/24 96.9 °F (36.1 °C) (Tympanic)   08/20/24 97.3 °F (36.3 °C)     BP Readings from Last 3 Encounters:   05/29/25 134/88   03/04/25 122/70   11/11/24 100/64     Pulse Readings from Last 3 Encounters:   05/29/25 93   03/04/25 (!) 54   11/11/24 63       Physical Exam  Vitals reviewed.   Constitutional:       Appearance: She is well-developed.   Neck:      Vascular: No carotid bruit.   Cardiovascular:      Rate and Rhythm: Normal rate and regular rhythm.      Pulses: Intact distal pulses.      Heart sounds: Normal heart sounds. No murmur heard.  Pulmonary:      Breath sounds: Normal breath sounds.   Neurological:      Mental Status: She is oriented to person, place, and time.           Lab Results   Component Value Date    CHOL 185 03/04/2025    CHOL 200 (H) 09/03/2024    CHOL 211 (H) 08/31/2023     Lab Results   Component Value Date    HDL 60 03/04/2025    HDL 60 09/03/2024    HDL 59 08/31/2023     Lab Results   Component Value Date    LDLCALC 108.4 03/04/2025    LDLCALC 116.0 09/03/2024    LDLCALC 141.0 08/31/2023     Lab Results   Component Value Date    TRIG 83 03/04/2025    TRIG 120 09/03/2024    TRIG 55 08/31/2023     Lab Results   Component Value Date    CHOLHDL 32.4 03/04/2025    CHOLHDL 30.0 09/03/2024    CHOLHDL 28.0 08/31/2023       Chemistry        Component Value Date/Time     03/04/2025 0924    K 3.7 03/04/2025 0924     03/04/2025 0924    CO2 23 03/04/2025 0924    BUN 21 03/04/2025 0924    CREATININE 0.8 03/04/2025 0924    GLU 94 03/04/2025 0924        Component Value Date/Time    CALCIUM 9.6 03/04/2025 0924    ALKPHOS 69 03/04/2025 0924    AST 24 03/04/2025 0924    ALT 16 03/04/2025 0924    BILITOT 0.5 03/04/2025 0924    ESTGFRAFRICA >60.0 08/11/2021 0950    EGFRNONAA >60.0 08/11/2021 0950          Lab Results   Component Value Date    TSH 0.839 09/03/2024     Lab Results   Component Value Date    INR  0.9 08/18/2008     Lab Results   Component Value Date    WBC 10.47 03/04/2025    HGB 13.2 03/04/2025    HCT 39.6 03/04/2025    MCV 92 03/04/2025     03/04/2025     BNP  @LABRCNTIP(BNP,BNPTRIAGEBLO)@  CrCl cannot be calculated (Patient's most recent lab result is older than the maximum 7 days allowed.).     Imaging:  ======    No results found for this or any previous visit.    No results found for this or any previous visit.    Results for orders placed during the hospital encounter of 07/26/23    X-Ray Chest PA And Lateral    Narrative  EXAMINATION:  XR CHEST PA AND LATERAL    CLINICAL HISTORY:  cough; Cough, unspecified    TECHNIQUE:  PA and lateral views of the chest were performed.    COMPARISON:  02/23/2011    FINDINGS:  The lungs are clear and free of infiltrate.  No pleural effusion or pneumothorax. The heart is borderline enlarged.  There is mild tortuosity of the descending thoracic aorta.    Impression  1.  No acute cardiopulmonary process.      Electronically signed by: Jesse Carroll DO  Date:    07/27/2023  Time:    08:15      No results found for this or any previous visit.      No valid procedures specified.        Results for orders placed during the hospital encounter of 12/19/23    Nuclear Stress - Cardiology Interpreted    Interpretation Summary    Normal myocardial perfusion scan. There is no evidence of myocardial ischemia or infarction.    The gated perfusion images showed an ejection fraction of 60% at rest. The gated perfusion images showed an ejection fraction of 77% post stress.    There is normal wall motion at rest and post stress.    LV cavity size is normal at rest and normal at stress.    The ECG portion of the study is negative for ischemia.    The patient reported no chest pain during the stress test.      Results for orders placed during the hospital encounter of 12/19/23    Echo    Interpretation Summary    Left Ventricle: The left ventricle is normal in size. Normal wall  thickness. Normal wall motion. There is normal systolic function with a visually estimated ejection fraction of 55 - 70%. Ejection fraction by visual approximation is 65%. There is normal diastolic function.    Right Ventricle: Normal right ventricular cavity size. Wall thickness is normal. Right ventricle wall motion  is normal. Systolic function is normal.    Pulmonary Artery: The estimated pulmonary artery systolic pressure is 26 mmHg.    IVC/SVC: Normal venous pressure at 3 mmHg.      No results found for this or any previous visit.      Lab Results   Component Value Date    HGBA1C 5.4 08/31/2023         The 10-year ASCVD risk score (Herb CUNNINGHAM, et al., 2019) is: 9.3%    Values used to calculate the score:      Age: 64 years      Sex: Female      Is Non- : Yes      Diabetic: No      Tobacco smoker: No      Systolic Blood Pressure: 134 mmHg      Is BP treated: Yes      HDL Cholesterol: 60 mg/dL      Total Cholesterol: 185 mg/dL    Diagnostic Results:  ECG: Reviewed    Assessment and Plan:   Essential hypertension    IBD (inflammatory bowel disease)    Other hyperlipidemia    Crohn's disease of large intestine without complication    BMI 34.0-34.9,adult        Assessment & Plan    K50.10 Crohn's disease of large intestine without complication  I10 Essential hypertension  K52.9 IBD (inflammatory bowel disease)  E78.49 Other hyperlipidemia  Z68.34 BMI 34.0-34.9,adult    Blood pressure elevated at 134/88 and 133/82 upon rechecking.  Elevated blood pressure attributed to patient missing 1.5 days of medication due to travel delays.  Decided not to make medication changes at this time, opting to monitor blood pressure over the next week.    I10 ESSENTIAL HYPERTENSION:  - Current medication regimen: amlodipine, lisinopril, HCTZ, and metoprolol for blood pressure control.  - Follow up in 1 week to monitor blood pressure.    E78.49 OTHER HYPERLIPIDEMIA:  - Lipitor 20 mg for high cholesterol has been  helpful since switching from pravastatin.    Z68.34 BMI 34.0-34.9,ADULT:  - Patient to continue avoiding fried foods, maintain increased water intake, and avoid snacking between meals.  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    GENERAL:  - Patient to resume regular medication routine.           This note was generated with the assistance of ambient listening technology. Verbal consent was obtained by the patient and accompanying visitor(s) for the recording of patient appointment to facilitate this note. I attest to having reviewed and edited the generated note for accuracy, though some syntax or spelling errors may persist. Please contact the author of this note for any clarification.      Reviewed all tests and above medical conditions with patient in detail and formulated treatment plan.  Maintaining healthy weight and weight loss goals were discussed in clinic.    Follow up in  6 months             [1]   Social History  Tobacco Use    Smoking status: Never    Smokeless tobacco: Never   Substance Use Topics    Alcohol use: Yes     Alcohol/week: 0.0 standard drinks of alcohol     Comment: ocassionally    Drug use: No

## 2025-06-23 DIAGNOSIS — I10 ESSENTIAL HYPERTENSION: ICD-10-CM

## 2025-06-23 DIAGNOSIS — J31.0 NON-ALLERGIC RHINITIS: ICD-10-CM

## 2025-06-23 DIAGNOSIS — R55 SYNCOPE, UNSPECIFIED SYNCOPE TYPE: ICD-10-CM

## 2025-06-23 RX ORDER — MONTELUKAST SODIUM 10 MG/1
10 TABLET ORAL NIGHTLY
Qty: 90 TABLET | Refills: 2 | Status: SHIPPED | OUTPATIENT
Start: 2025-06-23

## 2025-06-23 RX ORDER — LISINOPRIL 40 MG/1
40 TABLET ORAL
Qty: 90 TABLET | Refills: 2 | Status: SHIPPED | OUTPATIENT
Start: 2025-06-23

## 2025-06-23 RX ORDER — METOPROLOL SUCCINATE 25 MG/1
25 TABLET, EXTENDED RELEASE ORAL
Qty: 90 TABLET | Refills: 5 | Status: SHIPPED | OUTPATIENT
Start: 2025-06-23

## 2025-06-23 NOTE — TELEPHONE ENCOUNTER
No care due was identified.  [facility] Embedded Care Due Messages. Reference number: [id number].   6/23/2025 1:06:45 PM CDT

## 2025-06-23 NOTE — TELEPHONE ENCOUNTER
Refill Decision Note   Angelicajason Flores  is requesting a refill authorization.  Brief Assessment and Rationale for Refill:  Approve     Medication Therapy Plan:        Comments:     Note composed:3:48 PM 06/23/2025

## 2025-06-27 ENCOUNTER — OFFICE VISIT (OUTPATIENT)
Dept: DERMATOLOGY | Facility: CLINIC | Age: 65
End: 2025-06-27
Payer: COMMERCIAL

## 2025-06-27 DIAGNOSIS — D22.9 MULTIPLE NEVI: Primary | ICD-10-CM

## 2025-06-27 DIAGNOSIS — L81.1 MELASMA: ICD-10-CM

## 2025-06-27 DIAGNOSIS — D23.9 DERMATOFIBROMA: ICD-10-CM

## 2025-06-27 PROCEDURE — 99999 PR PBB SHADOW E&M-EST. PATIENT-LVL IV: CPT | Mod: PBBFAC,,, | Performed by: DERMATOLOGY

## 2025-06-27 RX ORDER — FLUOCINOLONE ACETONIDE, HYDROQUINONE, AND TRETINOIN .1; 40; .5 MG/G; MG/G; MG/G
CREAM TOPICAL
Qty: 30 G | Refills: 2 | Status: SHIPPED | OUTPATIENT
Start: 2025-06-27

## 2025-06-27 NOTE — PROGRESS NOTES
Subjective:       Patient ID:  Angelica Flores is a 64 y.o. female who presents for   Chief Complaint   Patient presents with    Skin Check     FBSE    Loc on the right arm, noticed 6 month ago, changing color, no treatment.      Hx of melasma and Crohn's disease (currently on Humira and imuran since 10/29/22), here to establish care, last seen on 12/7/22. She is s/p HQ 6% and tretinoin 0.025% cream. Continue hyperpigmentation.     For melasma, previously treated with tretinoin micro, HQ 4%        Review of Systems   Constitutional:  Negative for fever and chills.   Gastrointestinal:  Negative for nausea and vomiting.   Skin:  Positive for activity-related sunscreen use. Negative for daily sunscreen use and recent sunburn.   Hematologic/Lymphatic: Does not bruise/bleed easily.        Objective:    Physical Exam   Constitutional: She appears well-developed and well-nourished. No distress.   Neurological: She is alert and oriented to person, place, and time. She is not disoriented.   Psychiatric: She has a normal mood and affect.   Skin:   Areas Examined (abnormalities noted in diagram):   Head / Face Inspection Performed  Neck Inspection Performed  Chest / Axilla Inspection Performed  Abdomen Inspection Performed  Back Inspection Performed  RUE Inspected  LUE Inspection Performed  RLE Inspected  LLE Inspection Performed  Nails and Digits Inspection Performed                   Diagram Legend     Erythematous scaling macule/papule c/w actinic keratosis       Vascular papule c/w angioma      Pigmented verrucoid papule/plaque c/w seborrheic keratosis      Yellow umbilicated papule c/w sebaceous hyperplasia      Irregularly shaped tan macule c/w lentigo     1-2 mm smooth white papules consistent with Milia      Movable subcutaneous cyst with punctum c/w epidermal inclusion cyst      Subcutaneous movable cyst c/w pilar cyst      Firm pink to brown papule c/w dermatofibroma      Pedunculated fleshy papule(s) c/w  skin tag(s)      Evenly pigmented macule c/w junctional nevus     Mildly variegated pigmented, slightly irregular-bordered macule c/w mildly atypical nevus      Flesh colored to evenly pigmented papule c/w intradermal nevus       Pink pearly papule/plaque c/w basal cell carcinoma      Erythematous hyperkeratotic cursted plaque c/w SCC      Surgical scar with no sign of skin cancer recurrence      Open and closed comedones      Inflammatory papules and pustules      Verrucoid papule consistent consistent with wart     Erythematous eczematous patches and plaques     Dystrophic onycholytic nail with subungual debris c/w onychomycosis     Umbilicated papule    Erythematous-base heme-crusted tan verrucoid plaque consistent with inflamed seborrheic keratosis     Erythematous Silvery Scaling Plaque c/w Psoriasis     See annotation      Assessment / Plan:      Multiple nevi  Reassurance given.  Discussed ABCDEF of melanoma and changes for patient to look for.  AAD Handout given. Discussed importance of daily use of sunscreen which is broad-spectrum and has a minimum SPF of 30.    Total body skin examination performed today including at least 12 points as noted in physical examination. No lesions suspicious for malignancy noted.    Dermatofibroma  Reassurance given.  Lesions are benign.    Melasma  -     TRI-KELL 0.01-4-0.05 % Crea; Apply thin layer to darks spots qhs  Dispense: 30 g; Refill: 2  -     recommend mineral based sunscreen with spf 50.              Follow up in about 4 months (around 10/27/2025).

## 2025-07-10 ENCOUNTER — TELEPHONE (OUTPATIENT)
Dept: GASTROENTEROLOGY | Facility: CLINIC | Age: 65
End: 2025-07-10
Payer: COMMERCIAL

## 2025-07-10 DIAGNOSIS — K50.10 CROHN'S DISEASE OF COLON WITHOUT COMPLICATION: Primary | ICD-10-CM

## 2025-07-10 RX ORDER — SODIUM, POTASSIUM,MAG SULFATES 17.5-3.13G
SOLUTION, RECONSTITUTED, ORAL ORAL
Qty: 354 ML | Refills: 0 | Status: SHIPPED | OUTPATIENT
Start: 2025-07-10

## 2025-07-10 NOTE — TELEPHONE ENCOUNTER
Patient is due for dysplasia screening colonoscopy.   Does have hx of low grade dysplasia in the past.   There were no dysplastic lesions on last colonsocopy with chomo so recommendation is that further colonoscopies can be performed with high definition white light endoscopy rather than chomoendoscopy.

## 2025-07-22 ENCOUNTER — TELEPHONE (OUTPATIENT)
Dept: GASTROENTEROLOGY | Facility: CLINIC | Age: 65
End: 2025-07-22
Payer: COMMERCIAL

## 2025-07-22 NOTE — TELEPHONE ENCOUNTER
Copied from CRM #[id number]. Topic: General Inquiry - Patient Advice  >> Jul 22, 2025  2:58 PM Evelin wrote:  Type:  Patient Returning Call    Who Called:Cristiane  Who Left Message for Patient:  Does the patient know what this is regarding?: Crohns Disease  Would the patient rather a call back or a response via [facility]? Callback  Best Call Back Number:[phone]  Additional Information: Need to speak with office asap. Please callback to assist  >> Jul 22, 2025  3:19 PM Med Assistant Janette wrote:  Crohn's patient.. I remembered this time.. lol  ----- Message -----  From: Evelin Dominguez  Sent: 7/22/2025   3:00 PM CDT  To: Sabino Narvaez

## 2025-07-23 ENCOUNTER — TELEPHONE (OUTPATIENT)
Dept: GASTROENTEROLOGY | Facility: CLINIC | Age: 65
End: 2025-07-23
Payer: COMMERCIAL

## 2025-07-23 ENCOUNTER — HOSPITAL ENCOUNTER (OUTPATIENT)
Dept: PREADMISSION TESTING | Facility: HOSPITAL | Age: 65
Discharge: HOME | End: 2025-07-23
Attending: COLON & RECTAL SURGERY
Payer: COMMERCIAL

## 2025-07-23 DIAGNOSIS — F34.1 DYSTHYMIC DISORDER: Chronic | ICD-10-CM

## 2025-07-23 DIAGNOSIS — K50.10 CROHN'S DISEASE OF COLON WITHOUT COMPLICATION: Primary | ICD-10-CM

## 2025-07-23 RX ORDER — ALPRAZOLAM 0.5 MG/1
0.5 TABLET ORAL 2 TIMES DAILY PRN
Qty: 60 TABLET | Refills: 0 | Status: SHIPPED | OUTPATIENT
Start: 2025-07-23

## 2025-07-23 NOTE — TELEPHONE ENCOUNTER
Copied from CRM #[id number]. Topic: General Inquiry - Patient Advice  >> Jul 23, 2025  8:50 AM Eliana wrote:  .Type:  Patient Returning Call    Who Called:Hope  Who Left Message for Patient:nurse  Does the patient know what this is regarding?:yes  Would the patient rather a call back or a response via [facility]? Call back  Best Call Back Number:[phone]  Additional Information: na  >> Jul 23, 2025  9:40 AM Med Assistant Filiberto wrote:    ----- Message -----  From: Eliana Knapp  Sent: 7/23/2025   8:51 AM CDT  To: Sabino Narvaez

## 2025-07-23 NOTE — TELEPHONE ENCOUNTER
----- Message from Jane Grimes sent at 7/23/2025  1:43 PM CDT -----  Regarding: Labs, Stool Study and Work Excuse  Returned call from patient. She stated she has felt bad since July17th. She has had increased stool frequency at approximately 10 times daily. She stated her stools were watery, diarrhea. Patient staed she is very nauseated and has been taking Bentyl for the cramping. Patient wanted to see if Haylee could order labs and provide a work excuse for her from July 17th- July25th. Spoke with Haylee , who will order labs and stool study and provide work excuse. Called patient back to relay the provider instructions. Patient verbalized understanding.     Sydney Keith, PharmD , AAHIVP  Clinical Pharmacist Gastroenterology  [facility]

## 2025-07-23 NOTE — LETTER
July 23, 2025      [facility]  [address]  [place] [address]  Phone: [phone]  Fax: [phone]       Patient: Angelica Flores   YOB: 1960  Date of Visit: 07/23/2025    To Whom It May Concern:    Pia Flores  was at [facility] on 07/23/2025. Please excuse the patient from work/school from 7/17/25 through 7/25/25 with no restrictions. If you have any questions or concerns, or if I can be of further assistance, please do not hesitate to contact me.    Sincerely,    Swati Mclain RN    Plan: Pt has PMH of dementia  Baseline AAOx2  Home meds- Quetapine 25mg QD, Donepezil 5mg QD  - c/w home meds

## 2025-07-23 NOTE — TELEPHONE ENCOUNTER
Returned call from patient. She stated she has felt bad since July17th. She has had increased stool frequency at approximately 10 times daily. She stated her stools were watery, diarrhea. Patient staed she is very nauseated and has been taking Bentyl for the cramping. Patient wanted to see if Haylee could order labs and provide a work excuse for her from July 17th- July25th. Spoke with Haylee , who will order labs and stool study and provide work excuse. Called patient back to relay the provider instructions. Patient verbalized understanding.    Sydney Keith, PharmD , [facility]  Clinical Pharmacist Gastroenterology  [facility]

## 2025-07-23 NOTE — TELEPHONE ENCOUNTER
----- Message from Jane Grimes sent at 7/23/2025  1:43 PM CDT -----  Regarding: Labs, Stool Study and Work Excuse  Returned call from patient. She stated she has felt bad since July17th. She has had increased stool frequency at approximately 10 times daily. She stated her stools were watery, diarrhea. Patient staed she is very nauseated and has been taking Bentyl for the cramping. Patient wanted to see if Haylee could order labs and provide a work excuse for her from July 17th- July25th. Spoke with Haylee , who will order labs and stool study and provide work excuse. Called patient back to relay the provider instructions. Patient verbalized understanding.     Sydney Keith, PharmD , AAHIVP  Clinical Pharmacist Gastroenterology  [facility]

## 2025-07-23 NOTE — TELEPHONE ENCOUNTER
No care due was identified.  [facility] Embedded Care Due Messages. Reference number: [id number].   7/23/2025 8:35:44 AM CDT

## 2025-07-24 ENCOUNTER — LAB VISIT (OUTPATIENT)
Dept: LAB | Facility: HOSPITAL | Age: 65
End: 2025-07-24
Attending: NURSE PRACTITIONER
Payer: COMMERCIAL

## 2025-07-24 DIAGNOSIS — K50.10 CROHN'S DISEASE OF COLON WITHOUT COMPLICATION: ICD-10-CM

## 2025-07-24 LAB
ABSOLUTE EOSINOPHIL (OHS): 0.07 K/UL
ABSOLUTE MONOCYTE (OHS): 0.66 K/UL (ref 0.3–1)
ABSOLUTE NEUTROPHIL COUNT (OHS): 6.13 K/UL (ref 1.8–7.7)
ALBUMIN SERPL BCP-MCNC: 4.3 G/DL (ref 3.5–5.2)
ALP SERPL-CCNC: 57 UNIT/L (ref 40–150)
ALT SERPL W/O P-5'-P-CCNC: 21 UNIT/L (ref 0–55)
ANION GAP (OHS): 8 MMOL/L (ref 8–16)
AST SERPL-CCNC: 25 UNIT/L (ref 0–50)
BASOPHILS # BLD AUTO: 0.05 K/UL
BASOPHILS NFR BLD AUTO: 0.5 %
BILIRUB SERPL-MCNC: 0.4 MG/DL (ref 0.1–1)
BUN SERPL-MCNC: 19 MG/DL (ref 8–23)
CALCIUM SERPL-MCNC: 9.9 MG/DL (ref 8.7–10.5)
CHLORIDE SERPL-SCNC: 106 MMOL/L (ref 95–110)
CO2 SERPL-SCNC: 25 MMOL/L (ref 23–29)
CREAT SERPL-MCNC: 0.9 MG/DL (ref 0.5–1.4)
CRP SERPL-MCNC: 4.8 MG/L
ERYTHROCYTE [DISTWIDTH] IN BLOOD BY AUTOMATED COUNT: 14.2 % (ref 11.5–14.5)
ERYTHROCYTE [SEDIMENTATION RATE] IN BLOOD BY PHOTOMETRIC METHOD: 49 MM/HR
GFR SERPLBLD CREATININE-BSD FMLA CKD-EPI: >60 ML/MIN/1.73/M2
GLUCOSE SERPL-MCNC: 101 MG/DL (ref 70–110)
HCT VFR BLD AUTO: 41.5 % (ref 37–48.5)
HGB BLD-MCNC: 13.1 GM/DL (ref 12–16)
IMM GRANULOCYTES # BLD AUTO: 0.03 K/UL (ref 0–0.04)
IMM GRANULOCYTES NFR BLD AUTO: 0.3 % (ref 0–0.5)
LYMPHOCYTES # BLD AUTO: 3.87 K/UL (ref 1–4.8)
MCH RBC QN AUTO: 30 PG (ref 27–31)
MCHC RBC AUTO-ENTMCNC: 31.6 G/DL (ref 32–36)
MCV RBC AUTO: 95 FL (ref 82–98)
NUCLEATED RBC (/100WBC) (OHS): 0 /100 WBC
PLATELET # BLD AUTO: 379 K/UL (ref 150–450)
PMV BLD AUTO: 11.2 FL (ref 9.2–12.9)
POTASSIUM SERPL-SCNC: 4 MMOL/L (ref 3.5–5.1)
PROT SERPL-MCNC: 8.5 GM/DL (ref 6–8.4)
RBC # BLD AUTO: 4.36 M/UL (ref 4–5.4)
RELATIVE EOSINOPHIL (OHS): 0.6 %
RELATIVE LYMPHOCYTE (OHS): 35.8 % (ref 18–48)
RELATIVE MONOCYTE (OHS): 6.1 % (ref 4–15)
RELATIVE NEUTROPHIL (OHS): 56.7 % (ref 38–73)
SODIUM SERPL-SCNC: 139 MMOL/L (ref 136–145)
WBC # BLD AUTO: 10.81 K/UL (ref 3.9–12.7)

## 2025-07-24 PROCEDURE — 86140 C-REACTIVE PROTEIN: CPT

## 2025-07-24 PROCEDURE — 36415 COLL VENOUS BLD VENIPUNCTURE: CPT

## 2025-07-24 PROCEDURE — 85025 COMPLETE CBC W/AUTO DIFF WBC: CPT

## 2025-07-24 PROCEDURE — 85652 RBC SED RATE AUTOMATED: CPT

## 2025-07-24 PROCEDURE — 80053 COMPREHEN METABOLIC PANEL: CPT

## 2025-07-25 ENCOUNTER — LAB VISIT (OUTPATIENT)
Dept: LAB | Facility: HOSPITAL | Age: 65
End: 2025-07-25
Attending: NURSE PRACTITIONER
Payer: COMMERCIAL

## 2025-07-25 ENCOUNTER — RESULTS FOLLOW-UP (OUTPATIENT)
Dept: GASTROENTEROLOGY | Facility: CLINIC | Age: 65
End: 2025-07-25
Payer: COMMERCIAL

## 2025-07-25 DIAGNOSIS — K50.10 CROHN'S DISEASE OF COLON WITHOUT COMPLICATION: ICD-10-CM

## 2025-07-25 PROCEDURE — 87177 OVA AND PARASITES SMEARS: CPT

## 2025-07-25 RX ORDER — ONDANSETRON 8 MG/1
8 TABLET, ORALLY DISINTEGRATING ORAL EVERY 6 HOURS PRN
Qty: 14 TABLET | Refills: 0 | Status: SHIPPED | OUTPATIENT
Start: 2025-07-25

## 2025-07-30 ENCOUNTER — LAB VISIT (OUTPATIENT)
Dept: LAB | Facility: HOSPITAL | Age: 65
End: 2025-07-30
Attending: FAMILY MEDICINE
Payer: COMMERCIAL

## 2025-07-30 DIAGNOSIS — K50.10 CROHN'S DISEASE OF COLON WITHOUT COMPLICATION: ICD-10-CM

## 2025-07-30 PROCEDURE — 87427 SHIGA-LIKE TOXIN AG IA: CPT

## 2025-07-30 PROCEDURE — 87046 STOOL CULTR AEROBIC BACT EA: CPT | Mod: 59

## 2025-07-30 PROCEDURE — 83993 ASSAY FOR CALPROTECTIN FECAL: CPT

## 2025-07-30 PROCEDURE — 87045 FECES CULTURE AEROBIC BACT: CPT

## 2025-07-30 PROCEDURE — 87329 GIARDIA AG IA: CPT

## 2025-07-31 LAB
C COLI+JEJ+UPSA DNA STL QL NAA+NON-PROBE: NEGATIVE
CALPROTECTIN INTERP (OHS): NORMAL
CALPROTECTIN STOOL (OHS): 24.2 ΜG/G
CRYPTOSP AG SPEC QL: NEGATIVE
G LAMBLIA AG STL QL IA: NEGATIVE

## 2025-08-01 ENCOUNTER — TELEPHONE (OUTPATIENT)
Dept: GASTROENTEROLOGY | Facility: CLINIC | Age: 65
End: 2025-08-01
Payer: COMMERCIAL

## 2025-08-01 ENCOUNTER — ANESTHESIA EVENT (OUTPATIENT)
Dept: ENDOSCOPY | Facility: HOSPITAL | Age: 65
End: 2025-08-01
Payer: COMMERCIAL

## 2025-08-01 LAB
E COLI SXT1 STL QL IA: NEGATIVE
E COLI SXT2 STL QL IA: NEGATIVE

## 2025-08-01 NOTE — TELEPHONE ENCOUNTER
Called patient to see how she was feeling. Patient reports alternating bouts of diarrhea with formed stool. I did relay that the C. Diff test was not performed due to formed stool sample. I did tell her that all other labs were being processed normally. Patient verbalized understanding.    Sydney Keith, PharmD , [facility]  Clinical Pharmacist Gastroenterology  [facility]

## 2025-08-01 NOTE — ANESTHESIA PREPROCEDURE EVALUATION
08/01/2025  Angelica Flores is a 65 y.o., female.  Past Medical History:   Diagnosis Date    Anxiety     Asthma     Crohn's disease     Fibroids     Hyperlipidemia     Hypertension     Iritis     Migraine headache     Ocular    Osteopenia 08/2019    Syncope and collapse     Vitamin D deficiency disease        Past Surgical History:   Procedure Laterality Date    ANKLE FRACTURE SURGERY  08/19/2008    right ankle    BREAST BIOPSY Left 1977    COLONOSCOPY N/A 08/11/2017    Procedure: COLONOSCOPY - REQUESTS DR. MATTI WOOD;  Surgeon: Matti Wood III, MD;  Location: [facility];  Service: Endoscopy;  Laterality: N/A;    COLONOSCOPY N/A 11/23/2020    Procedure: COLONOSCOPY;  Surgeon: Matti Wood III, MD;  Location: [facility];  Service: Endoscopy;  Laterality: N/A;    COLONOSCOPY N/A 09/28/2022    Procedure: COLONOSCOPY;  Surgeon: Matti Wood III, MD;  Location: [facility];  Service: Endoscopy;  Laterality: N/A;    COLONOSCOPY N/A 08/22/2023    Procedure: COLONOSCOPY;  Surgeon: Erlinda Lindsay MD;  Location: [facility];  Service: Endoscopy;  Laterality: N/A;    COLONOSCOPY N/A 2/1/2024    Procedure: Colonosocpy with CHROMO;  Surgeon: Edwin Muller MD;  [facility]  Service: Endoscopy;  Laterality: N/A;  Colonoscopy with CHROMO. referral by Haylee Gallo NP, Suprep, portal -  1/25-precall complete-MS    COLONOSCOPY WITH CHROMOENDOSCOPY N/A 8/20/2024    Procedure: COLONOSCOPY, WITH CHROMOENDOSCOPY;  Surgeon: Edwin Muller MD;  [facility]  Service: Endoscopy;  Laterality: N/A;  Ref By: salima Kaiser suprep.  8/13/24-preop call complete-js    OOPHORECTOMY Bilateral     TAHBSO  02/2011    Due to abnormal pap smear and fibroids    TOTAL ABDOMINAL HYSTERECTOMY       Patient Active Problem List   Diagnosis    Essential hypertension     Dysthymic disorder    Hyperlipidemia    Vitamin D deficiency    Insomnia    Allergic rhinitis    Postmenopausal    Migraine without status migrainosus, not intractable    Severe obesity (BMI 35.0-39.9) with comorbidity    Drug-induced immunodeficiency    Crohn's disease of large intestine without complication    Chronic left shoulder pain    Neck pain on left side    Strain of left trapezius muscle    Decreased range of motion of left shoulder    Neuropathy of left hand    Cervical spine arthritis    Cervical radiculopathy    Bilateral carpal tunnel syndrome    Abnormal ECG    Syncope    Osteopenia       Pre-op Assessment    I have reviewed the Patient Summary Reports.     I have reviewed the Nursing Notes. I have reviewed the NPO Status.   I have reviewed the Medications.     Review of Systems  Anesthesia Hx:  No problems with previous Anesthesia   History of prior surgery of interest to airway management or planning:  Previous anesthesia: General        Denies Family Hx of Anesthesia complications.    Denies Personal Hx of Anesthesia complications.                    Social:  Non-Smoker, Social Alcohol Use       EENT/Dental:  chronic allergic rhinitis           Cardiovascular:  Exercise tolerance: good   Hypertension           hyperlipidemia                         Hypertension         Pulmonary:    Asthma mild      Asthma:               Hepatic/GI:  Bowel Prep.      Crohn's disease             Musculoskeletal:  Arthritis               Neurological:    Neuromuscular Disease,  Headaches      Dx of Headaches                         Neuromuscular Disease   Endocrine:        Obesity / BMI > 30  Psych:  Psychiatric History anxiety                 Physical Exam  General: Alert and Oriented    Airway:  Mallampati: II   Mouth Opening: Normal  TM Distance: Normal  Tongue: Normal  Neck ROM: Normal ROM    Dental:  Intact    Chest/Lungs:  Normal Respiratory Rate    Heart:  Rate: Normal  Rhythm: Regular  Rhythm        Anesthesia Plan  Type of Anesthesia, risks & benefits discussed:    Anesthesia Type: Gen Natural Airway  Intra-op Monitoring Plan: Standard ASA Monitors  Post Op Pain Control Plan: multimodal analgesia  Induction:  IV  Airway Plan: Direct  Informed Consent: Informed consent signed with the Patient and all parties understand the risks and agree with anesthesia plan.  All questions answered. Patient consented to blood products? No  ASA Score: 2  Day of Surgery Review of History & Physical: H&P Update referred to the surgeon/provider.    Ready For Surgery From Anesthesia Perspective.     .

## 2025-08-02 LAB — BACTERIA STL CULT: NORMAL

## 2025-08-04 ENCOUNTER — HOSPITAL ENCOUNTER (OUTPATIENT)
Dept: ENDOSCOPY | Facility: HOSPITAL | Age: 65
Discharge: HOME | End: 2025-08-04
Attending: NURSE PRACTITIONER
Payer: COMMERCIAL

## 2025-08-04 ENCOUNTER — ANESTHESIA (OUTPATIENT)
Dept: ENDOSCOPY | Facility: HOSPITAL | Age: 65
End: 2025-08-04
Payer: COMMERCIAL

## 2025-08-04 VITALS
TEMPERATURE: 97 F | HEART RATE: 55 BPM | SYSTOLIC BLOOD PRESSURE: 94 MMHG | BODY MASS INDEX: 34 KG/M2 | OXYGEN SATURATION: 97 % | RESPIRATION RATE: 22 BRPM | WEIGHT: 173.19 LBS | DIASTOLIC BLOOD PRESSURE: 58 MMHG | HEIGHT: 60 IN

## 2025-08-04 DIAGNOSIS — K50.10 CROHN'S DISEASE OF COLON WITHOUT COMPLICATION: Primary | ICD-10-CM

## 2025-08-04 PROCEDURE — 37000009 HC ANESTHESIA EA ADD 15 MINS

## 2025-08-04 PROCEDURE — 27201012 HC FORCEPS, HOT/COLD, DISP: Performed by: INTERNAL MEDICINE

## 2025-08-04 PROCEDURE — 25000003 PHARM REV CODE 250: Performed by: INTERNAL MEDICINE

## 2025-08-04 PROCEDURE — 37000008 HC ANESTHESIA 1ST 15 MINUTES

## 2025-08-04 PROCEDURE — 27201089 HC SNARE, DISP (ANY): Performed by: INTERNAL MEDICINE

## 2025-08-04 PROCEDURE — 63600175 PHARM REV CODE 636 W HCPCS: Performed by: NURSE ANESTHETIST, CERTIFIED REGISTERED

## 2025-08-04 PROCEDURE — 63600175 PHARM REV CODE 636 W HCPCS: Performed by: INTERNAL MEDICINE

## 2025-08-04 PROCEDURE — 88305 TISSUE EXAM BY PATHOLOGIST: CPT | Mod: TC,91 | Performed by: INTERNAL MEDICINE

## 2025-08-04 RX ORDER — SODIUM CHLORIDE, SODIUM LACTATE, POTASSIUM CHLORIDE, CALCIUM CHLORIDE 600; 310; 30; 20 MG/100ML; MG/100ML; MG/100ML; MG/100ML
INJECTION, SOLUTION INTRAVENOUS CONTINUOUS
Status: DISCONTINUED | OUTPATIENT
Start: 2025-08-04 | End: 2025-08-05 | Stop reason: HOSPADM

## 2025-08-04 RX ORDER — LIDOCAINE HYDROCHLORIDE 20 MG/ML
INJECTION INTRAVENOUS
Status: DISCONTINUED | OUTPATIENT
Start: 2025-08-04 | End: 2025-08-04

## 2025-08-04 RX ORDER — DEXTROMETHORPHAN/PSEUDOEPHED 2.5-7.5/.8
DROPS ORAL
Status: COMPLETED | OUTPATIENT
Start: 2025-08-04 | End: 2025-08-04

## 2025-08-04 RX ORDER — PROPOFOL 10 MG/ML
VIAL (ML) INTRAVENOUS
Status: DISCONTINUED | OUTPATIENT
Start: 2025-08-04 | End: 2025-08-04

## 2025-08-04 RX ADMIN — PROPOFOL 20 MG: 10 INJECTION, EMULSION INTRAVENOUS at 11:08

## 2025-08-04 RX ADMIN — PROPOFOL 40 MG: 10 INJECTION, EMULSION INTRAVENOUS at 10:08

## 2025-08-04 RX ADMIN — SIMETHICONE 200 MG: 20 SUSPENSION/ DROPS ORAL at 10:08

## 2025-08-04 RX ADMIN — LIDOCAINE HYDROCHLORIDE 50 MG: 20 INJECTION INTRAVENOUS at 10:08

## 2025-08-04 RX ADMIN — PROPOFOL 80 MG: 10 INJECTION, EMULSION INTRAVENOUS at 10:08

## 2025-08-04 RX ADMIN — SODIUM CHLORIDE, POTASSIUM CHLORIDE, SODIUM LACTATE AND CALCIUM CHLORIDE: 600; 310; 30; 20 INJECTION, SOLUTION INTRAVENOUS at 10:08

## 2025-08-04 NOTE — ANESTHESIA POSTPROCEDURE EVALUATION
Anesthesia Post Evaluation    Patient: Angelica Flores    Procedure(s) Performed: * No procedures listed *    Final Anesthesia Type: general      Patient location during evaluation: PACU  Patient participation: Yes- Able to Participate  Level of consciousness: awake  Post-procedure vital signs: reviewed and stable  Pain management: adequate  Airway patency: patent    PONV status at discharge: No PONV  Anesthetic complications: no      Cardiovascular status: stable  Respiratory status: unassisted  Hydration status: euvolemic  Follow-up not needed.              Vitals Value Taken Time   BP 97/61 08/04/25 11:27   Temp 36.1 °C (97 °F) 08/04/25 11:09   Pulse 54 08/04/25 11:28   Resp 36 08/04/25 11:28   SpO2 96 % 08/04/25 11:28   Vitals shown include unfiled device data.      No case tracking events are documented in the log.      Pain/Germán Score: Germán Score: 9 (8/4/2025 11:09 AM)

## 2025-08-04 NOTE — TRANSFER OF CARE
Anesthesia Transfer of Care Note    Patient: Angelica Flores    Procedure(s) Performed: * No procedures listed *    Patient location: PACU    Anesthesia Type: general    Transport from OR: Transported from OR on room air with adequate spontaneous ventilation    Post pain: adequate analgesia    Post assessment: no apparent anesthetic complications    Post vital signs: stable    Level of consciousness: sedated    Nausea/Vomiting: no nausea/vomiting    Complications: none    Transfer of care protocol was followed      Last vitals: Visit Vitals  BP (!) 90/54   Pulse (!) 55   Temp 36.1 °C (97 °F)   Resp 15   Ht 5' (1.524 m)   Wt 78.5 kg (173 lb 2.7 oz)   SpO2 98%   Breastfeeding No   BMI 33.82 kg/m²

## 2025-08-04 NOTE — H&P
PRE PROCEDURE H&P    Patient Name: Angelica Flores  MRN: [id number]  : 1960  Date of Procedure:  2025  Referring Physician: Haylee Gallo NP  Primary Physician: Monet Alvarez MD  Procedure Physician: Erlinda Lindsay MD       Planned Procedure: Colonoscopy  Diagnosis: dysplasia surveillance, UC   Chief Complaint: Same as above    HPI: Patient is an 65 y.o. female is here for the above.     Last colonoscopy:   Family history: neg   Anticoagulation: none     Past Medical History:   Past Medical History:   Diagnosis Date    Anxiety     Asthma     Crohn's disease     Fibroids     Hyperlipidemia     Hypertension     Iritis     Migraine headache     Ocular    Osteopenia 2019    Syncope and collapse     Vitamin D deficiency disease         Past Surgical History:  Past Surgical History:   Procedure Laterality Date    ANKLE FRACTURE SURGERY  2008    right ankle    BREAST BIOPSY Left 1977    COLONOSCOPY N/A 2017    Procedure: COLONOSCOPY - REQUESTS DR. MATTI WOOD;  Surgeon: Matti Wood III, MD;  Location: [facility];  Service: Endoscopy;  Laterality: N/A;    COLONOSCOPY N/A 2020    Procedure: COLONOSCOPY;  Surgeon: Matti Wood III, MD;  Location: [facility];  Service: Endoscopy;  Laterality: N/A;    COLONOSCOPY N/A 2022    Procedure: COLONOSCOPY;  Surgeon: Matti Wood III, MD;  Location: [facility];  Service: Endoscopy;  Laterality: N/A;    COLONOSCOPY N/A 2023    Procedure: COLONOSCOPY;  Surgeon: Erlinda Lindsay MD;  Location: [facility];  Service: Endoscopy;  Laterality: N/A;    COLONOSCOPY N/A 2024    Procedure: Colonosocpy with CHROMO;  Surgeon: Edwin Muller MD;  Location: [address]);  Service: Endoscopy;  Laterality: N/A;  Colonoscopy with CHROMO. referral by aHylee Gallo NP, [facility]  -precall complete-MS    COLONOSCOPY WITH CHROMOENDOSCOPY N/A 2024    Procedure: COLONOSCOPY, WITH CHROMOENDOSCOPY;   Surgeon: Edwin Muller MD;  Location: [facility] ([address] [place] [address]);  Service: Endoscopy;  Laterality: N/A;  Ref By: salima Kaiser suprep.  8/13/24-preop call complete-js    OOPHORECTOMY Bilateral     TAHBSO  02/2011    Due to abnormal pap smear and fibroids    TOTAL ABDOMINAL HYSTERECTOMY          Home Medications:  Prior to Admission medications    Medication Sig Start Date End Date Taking? Authorizing Provider   amLODIPine (NORVASC) 5 MG tablet Take 1 tablet by mouth once daily 2/19/25  Yes Amie Wharton MD   metoprolol succinate (TOPROL-XL) 25 MG 24 hr tablet Take 1 tablet by mouth once daily 6/23/25  Yes Amie Wharton MD   adalimumab (HUMIRA,CF, PEN) 40 mg/0.4 mL PnKt INJECT 1 PEN UNDER THE SKIN EVERY 14 DAYS 5/8/25   Haylee Gallo, NP   ALPRAZolam (XANAX) 0.5 MG tablet Take 1 tablet (0.5 mg total) by mouth 2 (two) times daily as needed. 7/23/25   Monet Alvarez MD   ascorbic acid, vitamin C, (VITAMIN C) 1000 MG tablet Take by mouth. 1 Tablet Oral Every day    Provider, Historical   atorvastatin (LIPITOR) 20 MG tablet TAKE 1 TABLET BY MOUTH ONCE DAILY IN THE EVENING 10/21/24   Amie Wharton MD   azelastine (ASTELIN) 137 mcg (0.1 %) nasal spray 2 sprays (274 mcg total) by Nasal route 2 (two) times daily. 10/6/24 10/6/25  Monet Alvarez MD   BACILLUS COAGULANS (PROBIOTIC, B. COAGULANS, ORAL) Take by mouth once daily.    Provider, Historical   cetirizine (ALLERGY RELIEF, CETIRIZINE,) 10 MG tablet Take 1 tablet (10 mg total) by mouth once daily. 8/16/22   Kathy Elliott MD   cholecalciferol, vitamin D3, 2,000 unit Cap Take by mouth. 1 capsule Oral Every day    Provider, Historical   dicyclomine (BENTYL) 20 mg tablet Take 1 tablet (20 mg total) by mouth 3 (three) times daily as needed (abdominal pain). 3/11/25   Haylee Gallo, NP   hydroCHLOROthiazide (HYDRODIURIL) 25 MG tablet Take 1 tablet by mouth once daily 12/21/24   Monet Alvarez MD   hydroquinone 4 % Crea Apply to dark  spots once daily. Use with sunscreen if outdoors 12/7/22   Rosmery Walker MD   ipratropium (ATROVENT) 21 mcg (0.03 %) nasal spray 2 sprays by Nasal route 3 (three) times daily. 4/11/22   Kathy Elliott MD   lisinopriL (PRINIVIL,ZESTRIL) 40 MG tablet Take 1 tablet by mouth once daily 6/23/25   Monet Alvarez MD   montelukast (SINGULAIR) 10 mg tablet TAKE 1 TABLET BY MOUTH ONCE DAILY IN THE EVENING 6/23/25   Monet Alvarez MD   multivitamin-Ca-iron-minerals 18-0.4 mg Tab Take by mouth. 1 Tablet Oral Every day    Provider, Historical   ondansetron (ZOFRAN-ODT) 8 MG TbDL Take 1 tablet (8 mg total) by mouth every 6 (six) hours as needed (nausea and vomiting). 7/25/25   Haylee Gallo, NP   potassium citrate 99 mg Cap Take by mouth Daily.    Provider, Historical   sodium,potassium,mag sulfates (SUPREP BOWEL PREP KIT) 17.5-3.13-1.6 gram SolR Use as directed 7/10/25   Haylee Gallo, NP   topiramate (TOPAMAX) 25 MG tablet TAKE 2 TABLETS BY MOUTH IN THE EVENING 7/18/24   Monet Alvarez MD   tretinoin (RETIN-A) 0.025 % cream Apply pea-sized amount to entire face at bedtime.  Use every third night and increase as tolerated to nightly. 1/24/24   Rosmery Walker MD   TRI-KELL 0.01-4-0.05 % Crea Apply thin layer to darks spots qhs 6/27/25   Rosmery Walker MD   valACYclovir (VALTREX) 1000 MG tablet TAKE 2 TABLETS BY MOUTH TWICE DAILY FOR 1 DAY PER EPISODE 8/12/22   Monet Alvarez MD   zinc sulfate (ZINC-220 ORAL)  7/10/20   Provider, Historical        Allergies:  Review of patient's allergies indicates:  No Known Allergies     Social History:   Social History[1]    Family History:  Family History   Problem Relation Name Age of Onset    Arthritis Mother      Fuch's dystrophy Mother      Breast cancer Mother      Hypertension Father      Lupus Sister      Rheum arthritis Sister      Obesity Sister      Stroke Maternal Grandmother      Diabetes Maternal Grandmother      Cancer Maternal Grandfather           lung ca    Colon cancer Paternal Aunt      Thyroid cancer Neg Hx          MEN2       ROS: No acute cardiac events, no acute respiratory complaints.     Physical Exam (all patients):    /72   Pulse 62   Temp 97.5 °F (36.4 °C) (Temporal)   Resp 16   Ht 5' (1.524 m)   Wt 78.5 kg (173 lb 2.7 oz)   SpO2 99%   Breastfeeding No   BMI 33.82 kg/m²   Lungs: Clear to auscultation bilaterally, respirations unlabored  Heart: Regular rate and rhythm, S1 and S2 normal, no obvious murmurs  Abdomen:         Soft, non-tender, bowel sounds normal, no masses, no organomegaly    Lab Results   Component Value Date    WBC 10.81 07/24/2025    MCV 95 07/24/2025    RDW 14.2 07/24/2025     07/24/2025    INR 0.9 08/18/2008     07/24/2025    HGBA1C 5.4 08/31/2023    BUN 19 07/24/2025     07/24/2025    K 4.0 07/24/2025     07/24/2025        SEDATION PLAN: per anesthesia      History reviewed, vital signs satisfactory, cardiopulmonary status satisfactory, sedation options, risks and plans have been discussed with the patient  All their questions were answered and the patient agrees to the sedation procedures as planned and the patient is deemed an appropriate candidate for the sedation as planned.    Procedure explained to patient, informed consent obtained and placed in chart.    Erlinda Yatesbrandan  8/4/2025  10:43 AM         [1]   Social History  Socioeconomic History    Marital status:     Number of children: 2   Occupational History    Occupation: RN     Employer: [employer]     Comment: [employer]   Tobacco Use    Smoking status: Never    Smokeless tobacco: Never   Substance and Sexual Activity    Alcohol use: Yes     Alcohol/week: 0.0 standard drinks of alcohol     Comment: ocassionally    Drug use: No    Sexual activity: Yes     Partners: Male     Birth control/protection: Surgical   Social History Narrative    She wears seatbelt.  July 22, 2017. She states she works new position at  [employer].     Social Drivers of Health     Financial Resource Strain: Low Risk  (2/17/2023)    Overall Financial Resource Strain (CARDIA)     Difficulty of Paying Living Expenses: Not hard at all   Food Insecurity: No Food Insecurity (2/17/2023)    Hunger Vital Sign     Worried About Running Out of Food in the Last Year: Never true     Ran Out of Food in the Last Year: Never true   Transportation Needs: No Transportation Needs (2/17/2023)    PRAPARE - Transportation     Lack of Transportation (Medical): No     Lack of Transportation (Non-Medical): No   Physical Activity: Inactive (3/4/2025)    Exercise Vital Sign     Days of Exercise per Week: 0 days     Minutes of Exercise per Session: 0 min   Stress: Stress Concern Present (2/17/2023)    [employer] [place] [employer] - Occupational Stress Questionnaire     Feeling of Stress : To some extent   Housing Stability: Low Risk  (2/17/2023)    Housing Stability Vital Sign     Unable to Pay for Housing in the Last Year: No     Number of Places Lived in the Last Year: 1     Unstable Housing in the Last Year: No

## 2025-08-07 LAB
ESTROGEN SERPL-MCNC: NORMAL PG/ML
INSULIN SERPL-ACNC: NORMAL U[IU]/ML
LAB AP CLINICAL INFORMATION: NORMAL
LAB AP GROSS DESCRIPTION: NORMAL
LAB AP PERFORMING LOCATION(S): NORMAL
LAB AP REPORT FOOTNOTES: NORMAL

## 2025-08-15 DIAGNOSIS — I10 ESSENTIAL HYPERTENSION: ICD-10-CM

## 2025-08-15 RX ORDER — HYDROCHLOROTHIAZIDE 25 MG/1
25 TABLET ORAL
Qty: 90 TABLET | Refills: 1 | Status: SHIPPED | OUTPATIENT
Start: 2025-08-15

## 2025-08-26 DIAGNOSIS — M54.12 CERVICAL RADICULOPATHY: ICD-10-CM

## 2025-08-26 DIAGNOSIS — G56.02 LEFT CARPAL TUNNEL SYNDROME: ICD-10-CM

## 2025-08-27 RX ORDER — TOPIRAMATE 25 MG/1
50 TABLET, FILM COATED ORAL
Qty: 180 TABLET | Refills: 0 | Status: SHIPPED | OUTPATIENT
Start: 2025-08-27